# Patient Record
Sex: MALE | Race: BLACK OR AFRICAN AMERICAN | NOT HISPANIC OR LATINO | Employment: OTHER | URBAN - METROPOLITAN AREA
[De-identification: names, ages, dates, MRNs, and addresses within clinical notes are randomized per-mention and may not be internally consistent; named-entity substitution may affect disease eponyms.]

---

## 2017-01-05 ENCOUNTER — LAB VISIT (OUTPATIENT)
Dept: LAB | Facility: HOSPITAL | Age: 72
End: 2017-01-05
Attending: INTERNAL MEDICINE
Payer: MEDICARE

## 2017-01-05 DIAGNOSIS — C90.00 MULTIPLE MYELOMA: ICD-10-CM

## 2017-01-05 DIAGNOSIS — B37.0 THRUSH: ICD-10-CM

## 2017-01-05 DIAGNOSIS — B00.9 HERPES INFECTION: ICD-10-CM

## 2017-01-05 DIAGNOSIS — E11.9 TYPE 2 DIABETES MELLITUS WITHOUT COMPLICATION: ICD-10-CM

## 2017-01-05 LAB
BASOPHILS # BLD AUTO: 0.01 K/UL
BASOPHILS NFR BLD: 0.2 %
DIFFERENTIAL METHOD: ABNORMAL
EOSINOPHIL # BLD AUTO: 0.1 K/UL
EOSINOPHIL NFR BLD: 2 %
ERYTHROCYTE [DISTWIDTH] IN BLOOD BY AUTOMATED COUNT: 14.5 %
HCT VFR BLD AUTO: 37.9 %
HGB BLD-MCNC: 12.6 G/DL
LYMPHOCYTES # BLD AUTO: 2.4 K/UL
LYMPHOCYTES NFR BLD: 43 %
MCH RBC QN AUTO: 30.6 PG
MCHC RBC AUTO-ENTMCNC: 33.2 %
MCV RBC AUTO: 92 FL
MONOCYTES # BLD AUTO: 0.6 K/UL
MONOCYTES NFR BLD: 11 %
NEUTROPHILS # BLD AUTO: 2.4 K/UL
NEUTROPHILS NFR BLD: 43.8 %
PLATELET # BLD AUTO: 174 K/UL
PMV BLD AUTO: 10.9 FL
RBC # BLD AUTO: 4.12 M/UL
WBC # BLD AUTO: 5.53 K/UL

## 2017-01-05 PROCEDURE — 36415 COLL VENOUS BLD VENIPUNCTURE: CPT | Mod: PO

## 2017-01-05 PROCEDURE — 85025 COMPLETE CBC W/AUTO DIFF WBC: CPT | Mod: PO

## 2017-01-12 ENCOUNTER — TELEPHONE (OUTPATIENT)
Dept: HEMATOLOGY/ONCOLOGY | Facility: CLINIC | Age: 72
End: 2017-01-12

## 2017-01-12 NOTE — TELEPHONE ENCOUNTER
----- Message from Nimisha Ferro sent at 1/11/2017  3:22 PM CST -----  Contact: [facility]  Talia called to speak with the nurse; she stated the therapist wants to know what modalities can she do to help him with pain. She needs to know what she can and can't do since the patient has Bone Cancer. She can be contacted at [phone].    Thanks,  Nimisha

## 2017-01-31 ENCOUNTER — LAB VISIT (OUTPATIENT)
Dept: LAB | Facility: HOSPITAL | Age: 72
End: 2017-01-31
Attending: INTERNAL MEDICINE
Payer: MEDICARE

## 2017-01-31 ENCOUNTER — OFFICE VISIT (OUTPATIENT)
Dept: HEMATOLOGY/ONCOLOGY | Facility: CLINIC | Age: 72
End: 2017-01-31
Payer: MEDICARE

## 2017-01-31 VITALS
SYSTOLIC BLOOD PRESSURE: 118 MMHG | WEIGHT: 183.63 LBS | OXYGEN SATURATION: 99 % | DIASTOLIC BLOOD PRESSURE: 56 MMHG | BODY MASS INDEX: 26.29 KG/M2 | HEIGHT: 70 IN | HEART RATE: 72 BPM | TEMPERATURE: 98 F

## 2017-01-31 DIAGNOSIS — C90.00 MULTIPLE MYELOMA NOT HAVING ACHIEVED REMISSION: Primary | ICD-10-CM

## 2017-01-31 DIAGNOSIS — C90.00 MULTIPLE MYELOMA NOT HAVING ACHIEVED REMISSION: ICD-10-CM

## 2017-01-31 DIAGNOSIS — I70.0 CALCIFICATION OF ABDOMINAL AORTA: Chronic | ICD-10-CM

## 2017-01-31 DIAGNOSIS — Z79.4 INSULIN LONG-TERM USE: Chronic | ICD-10-CM

## 2017-01-31 LAB
ALBUMIN SERPL BCP-MCNC: 3.5 G/DL
ALP SERPL-CCNC: 65 U/L
ALT SERPL W/O P-5'-P-CCNC: 20 U/L
ANION GAP SERPL CALC-SCNC: 9 MMOL/L
AST SERPL-CCNC: 19 U/L
BASOPHILS # BLD AUTO: 0.01 K/UL
BASOPHILS NFR BLD: 0.2 %
BILIRUB SERPL-MCNC: 0.6 MG/DL
BUN SERPL-MCNC: 15 MG/DL
CALCIUM SERPL-MCNC: 9.3 MG/DL
CHLORIDE SERPL-SCNC: 103 MMOL/L
CO2 SERPL-SCNC: 29 MMOL/L
CREAT SERPL-MCNC: 1.2 MG/DL
DIFFERENTIAL METHOD: ABNORMAL
EOSINOPHIL # BLD AUTO: 0.1 K/UL
EOSINOPHIL NFR BLD: 1.5 %
ERYTHROCYTE [DISTWIDTH] IN BLOOD BY AUTOMATED COUNT: 15.7 %
EST. GFR  (AFRICAN AMERICAN): >60 ML/MIN/1.73 M^2
EST. GFR  (NON AFRICAN AMERICAN): >60 ML/MIN/1.73 M^2
GLUCOSE SERPL-MCNC: 178 MG/DL
HCT VFR BLD AUTO: 37.3 %
HGB BLD-MCNC: 12.1 G/DL
LYMPHOCYTES # BLD AUTO: 1.3 K/UL
LYMPHOCYTES NFR BLD: 24.5 %
MCH RBC QN AUTO: 30.7 PG
MCHC RBC AUTO-ENTMCNC: 32.4 %
MCV RBC AUTO: 95 FL
MONOCYTES # BLD AUTO: 0.6 K/UL
MONOCYTES NFR BLD: 10.9 %
NEUTROPHILS # BLD AUTO: 3.5 K/UL
NEUTROPHILS NFR BLD: 62.9 %
PLATELET # BLD AUTO: 139 K/UL
PMV BLD AUTO: 11.4 FL
POTASSIUM SERPL-SCNC: 4 MMOL/L
PROT SERPL-MCNC: 6.9 G/DL
RBC # BLD AUTO: 3.94 M/UL
SODIUM SERPL-SCNC: 141 MMOL/L
WBC # BLD AUTO: 5.48 K/UL

## 2017-01-31 PROCEDURE — 3078F DIAST BP <80 MM HG: CPT | Mod: S$GLB,,, | Performed by: INTERNAL MEDICINE

## 2017-01-31 PROCEDURE — 3074F SYST BP LT 130 MM HG: CPT | Mod: S$GLB,,, | Performed by: INTERNAL MEDICINE

## 2017-01-31 PROCEDURE — 1157F ADVNC CARE PLAN IN RCRD: CPT | Mod: S$GLB,,, | Performed by: INTERNAL MEDICINE

## 2017-01-31 PROCEDURE — 84165 PROTEIN E-PHORESIS SERUM: CPT | Mod: 26,,, | Performed by: PATHOLOGY

## 2017-01-31 PROCEDURE — 1159F MED LIST DOCD IN RCRD: CPT | Mod: S$GLB,,, | Performed by: INTERNAL MEDICINE

## 2017-01-31 PROCEDURE — 1160F RVW MEDS BY RX/DR IN RCRD: CPT | Mod: S$GLB,,, | Performed by: INTERNAL MEDICINE

## 2017-01-31 PROCEDURE — 1125F AMNT PAIN NOTED PAIN PRSNT: CPT | Mod: S$GLB,,, | Performed by: INTERNAL MEDICINE

## 2017-01-31 PROCEDURE — 99214 OFFICE O/P EST MOD 30 MIN: CPT | Mod: S$GLB,,, | Performed by: INTERNAL MEDICINE

## 2017-01-31 PROCEDURE — 99999 PR PBB SHADOW E&M-EST. PATIENT-LVL III: CPT | Mod: PBBFAC,,, | Performed by: INTERNAL MEDICINE

## 2017-01-31 NOTE — PROGRESS NOTES
Subjective:       Patient ID: Familia Durbin Jr. is a 71 y.o. male.    Chief Complaint: Results; Pain; Multiple Myeloma; and Chemotherapy    HPI 71-year-old male history of multiple myeloma patient is being treated at [facility] the last note that I received a been from September 2016 the patient appears to have a mass growing in his right clavicular region    Past Medical History   Diagnosis Date    CAD (coronary artery disease)      luikart    Diabetes mellitus, type 2 1979     eye dr rocha    Hearing impaired     Hyperlipidemia     Hypertension     Lupus      Discoid    Old MI (myocardial infarction) 2012    Tracheostomy tube present      Family History   Problem Relation Age of Onset    Diabetes Mother     Diabetes Father     Prostate cancer Father     Pancreatic cancer Sister     No Known Problems Brother     Diabetes Maternal Uncle     Diabetes Maternal Grandmother     No Known Problems Maternal Grandfather     No Known Problems Paternal Grandmother     No Known Problems Paternal Grandfather      Social History     Social History    Marital status: Single     Spouse name: N/A    Number of children: 9    Years of education: N/A     Occupational History    Not on file.     Social History Main Topics    Smoking status: Never Smoker    Smokeless tobacco: Never Used    Alcohol use No    Drug use: No    Sexual activity: Yes     Partners: Female     Other Topics Concern    Not on file     Social History Narrative     Past Surgical History   Procedure Laterality Date    Coronary artery bypass graft      Knee surgery      Colonoscopy      Cardiac surgery      Wrist fusion      Hand surgery      Tracheostomy tube placement         Labs:  Lab Results   Component Value Date    WBC 5.48 01/31/2017    HGB 12.1 (L) 01/31/2017    HCT 37.3 (L) 01/31/2017    MCV 95 01/31/2017     (L) 01/31/2017     BMP  Lab Results   Component Value Date      12/29/2016    K 3.8 12/29/2016     12/29/2016    CO2 26 12/29/2016    BUN 12 12/29/2016    CREATININE 1.2 12/29/2016    CALCIUM 9.4 12/29/2016    ANIONGAP 11 12/29/2016    ESTGFRAFRICA >60 12/29/2016    EGFRNONAA >60 12/29/2016     Lab Results   Component Value Date    ALT 36 12/29/2016    AST 28 12/29/2016    ALKPHOS 80 12/29/2016    BILITOT 0.3 12/29/2016       Lab Results   Component Value Date    IRON 81 07/21/2016    TIBC 302 07/21/2016    FERRITIN 324 (H) 07/21/2016     No results found for: [id number]  No results found for: FOLATE  No results found for: TSH      Review of Systems   Constitutional: Negative for activity change, appetite change, chills, diaphoresis, fatigue, fever and unexpected weight change.   HENT: Negative for congestion, dental problem, drooling, ear discharge, ear pain, facial swelling, hearing loss, mouth sores, nosebleeds, postnasal drip, rhinorrhea, sinus pressure, sneezing, sore throat, tinnitus, trouble swallowing and voice change.    Eyes: Negative for photophobia, pain, discharge, redness, itching and visual disturbance.   Respiratory: Negative for apnea, cough, choking, chest tightness, shortness of breath, wheezing and stridor.    Cardiovascular: Positive for chest pain. Negative for palpitations and leg swelling.   Gastrointestinal: Negative for abdominal distention, abdominal pain, anal bleeding, blood in stool, constipation, diarrhea, nausea, rectal pain and vomiting.   Endocrine: Negative for cold intolerance, heat intolerance, polydipsia, polyphagia and polyuria.   Genitourinary: Negative for decreased urine volume, difficulty urinating, discharge, dysuria, enuresis, flank pain, frequency, genital sores, hematuria, penile pain, penile swelling, scrotal swelling, testicular pain and urgency.   Musculoskeletal: Negative for arthralgias, back pain, gait problem, joint swelling, myalgias, neck pain and neck stiffness.   Skin: Negative for color change, pallor,  rash and wound.   Allergic/Immunologic: Negative for environmental allergies, food allergies and immunocompromised state.   Neurological: Negative for dizziness, tremors, seizures, syncope, facial asymmetry, speech difficulty, weakness, light-headedness, numbness and headaches.   Hematological: Negative for adenopathy. Does not bruise/bleed easily.   Psychiatric/Behavioral: Negative for agitation, behavioral problems, confusion, decreased concentration, dysphoric mood, hallucinations, self-injury, sleep disturbance and suicidal ideas. The patient is not nervous/anxious and is not hyperactive.        Objective:      Physical Exam   Constitutional: He is oriented to person, place, and time. He appears well-developed and well-nourished. No distress.   HENT:   Head: Normocephalic.   Right Ear: External ear normal.   Left Ear: External ear normal.   Nose: Nose normal. Right sinus exhibits no maxillary sinus tenderness and no frontal sinus tenderness. Left sinus exhibits no maxillary sinus tenderness and no frontal sinus tenderness.   Mouth/Throat: Oropharynx is clear and moist. No oropharyngeal exudate.   Eyes: EOM and lids are normal. Pupils are equal, round, and reactive to light. Right eye exhibits no discharge. Left eye exhibits no discharge. Right conjunctiva is not injected. Right conjunctiva has no hemorrhage. Left conjunctiva is not injected. Left conjunctiva has no hemorrhage. No scleral icterus. Right eye exhibits normal extraocular motion. Left eye exhibits normal extraocular motion.   Neck: Normal range of motion. Neck supple. No JVD present. No tracheal deviation present. No thyromegaly present.   Cardiovascular: Normal rate, regular rhythm and normal heart sounds.    Pulmonary/Chest: Effort normal. No stridor. No respiratory distress.       Abdominal: Soft. He exhibits no mass. There is no hepatosplenomegaly, splenomegaly or hepatomegaly. There is no tenderness.   Musculoskeletal: Normal range of motion. He  exhibits no edema or tenderness.   Lymphadenopathy:        Head (right side): No posterior auricular and no occipital adenopathy present.        Head (left side): No posterior auricular and no occipital adenopathy present.     He has no cervical adenopathy.        Right cervical: No superficial cervical, no deep cervical and no posterior cervical adenopathy present.       Left cervical: No superficial cervical, no deep cervical and no posterior cervical adenopathy present.     He has no axillary adenopathy.        Right: No supraclavicular adenopathy present.        Left: No supraclavicular adenopathy present.   Neurological: He is alert and oriented to person, place, and time. He has normal strength. No cranial nerve deficit. Coordination normal.   Skin: Skin is dry. No rash noted. He is not diaphoretic. No cyanosis or erythema. Nails show no clubbing.   Psychiatric: He has a normal mood and affect. His behavior is normal. Judgment and thought content normal. Cognition and memory are normal.   Vitals reviewed.          Assessment:       1. Multiple myeloma not having achieved remission    2. Metastatic bone cancer    3. Calcification of abdominal aorta            Plan:         I spoken with the patient also the patient's daughter by phone at this point the patient has a groin mass on his clavicle is scheduled [facility] appears that they're considering palliative radiation which I would concur I've asked that his records from there be sent to us so that I can review them and be aware of the issues that are going on with the patient I concerned about the progressive nature of the disease and that if he has a complication here in the [facility] that I would be not able to fully understand what the treatment plans or discussed this with them

## 2017-02-01 LAB
ALBUMIN SERPL ELPH-MCNC: 3.56 G/DL
ALPHA1 GLOB SERPL ELPH-MCNC: 0.33 G/DL
ALPHA2 GLOB SERPL ELPH-MCNC: 0.78 G/DL
B-GLOBULIN SERPL ELPH-MCNC: 0.88 G/DL
ESTIMATED AVG GLUCOSE: 252 MG/DL
GAMMA GLOB SERPL ELPH-MCNC: 1.05 G/DL
HBA1C MFR BLD HPLC: 10.4 %
KAPPA LC SER QL IA: 2.96 MG/DL
KAPPA LC/LAMBDA SER IA: 0.8
LAMBDA LC SER QL IA: 3.72 MG/DL
PROT SERPL-MCNC: 6.6 G/DL

## 2017-02-02 LAB — PATHOLOGIST INTERPRETATION SPE: NORMAL

## 2017-02-08 ENCOUNTER — OFFICE VISIT (OUTPATIENT)
Dept: HEMATOLOGY/ONCOLOGY | Facility: CLINIC | Age: 72
End: 2017-02-08
Payer: MEDICARE

## 2017-02-08 VITALS
BODY MASS INDEX: 25.73 KG/M2 | HEIGHT: 70 IN | SYSTOLIC BLOOD PRESSURE: 122 MMHG | WEIGHT: 179.69 LBS | OXYGEN SATURATION: 98 % | RESPIRATION RATE: 18 BRPM | HEART RATE: 78 BPM | DIASTOLIC BLOOD PRESSURE: 58 MMHG | TEMPERATURE: 98 F

## 2017-02-08 DIAGNOSIS — G89.3 NEOPLASM RELATED PAIN: ICD-10-CM

## 2017-02-08 DIAGNOSIS — B37.0 THRUSH: ICD-10-CM

## 2017-02-08 DIAGNOSIS — C90.00 MULTIPLE MYELOMA NOT HAVING ACHIEVED REMISSION: Primary | ICD-10-CM

## 2017-02-08 PROCEDURE — 1160F RVW MEDS BY RX/DR IN RCRD: CPT | Mod: S$GLB,,, | Performed by: INTERNAL MEDICINE

## 2017-02-08 PROCEDURE — 3078F DIAST BP <80 MM HG: CPT | Mod: S$GLB,,, | Performed by: INTERNAL MEDICINE

## 2017-02-08 PROCEDURE — 1159F MED LIST DOCD IN RCRD: CPT | Mod: S$GLB,,, | Performed by: INTERNAL MEDICINE

## 2017-02-08 PROCEDURE — 1157F ADVNC CARE PLAN IN RCRD: CPT | Mod: S$GLB,,, | Performed by: INTERNAL MEDICINE

## 2017-02-08 PROCEDURE — 99214 OFFICE O/P EST MOD 30 MIN: CPT | Mod: S$GLB,,, | Performed by: INTERNAL MEDICINE

## 2017-02-08 PROCEDURE — 1125F AMNT PAIN NOTED PAIN PRSNT: CPT | Mod: S$GLB,,, | Performed by: INTERNAL MEDICINE

## 2017-02-08 PROCEDURE — 3074F SYST BP LT 130 MM HG: CPT | Mod: S$GLB,,, | Performed by: INTERNAL MEDICINE

## 2017-02-08 PROCEDURE — 99999 PR PBB SHADOW E&M-EST. PATIENT-LVL IV: CPT | Mod: PBBFAC,,, | Performed by: INTERNAL MEDICINE

## 2017-02-08 RX ORDER — AMOXICILLIN 250 MG
2 CAPSULE ORAL DAILY PRN
Qty: 60 TABLET | Refills: 1 | Status: SHIPPED | OUTPATIENT
Start: 2017-02-08 | End: 2018-02-08

## 2017-02-08 RX ORDER — OXYCODONE HYDROCHLORIDE 10 MG/1
TABLET ORAL
COMMUNITY
Start: 2017-01-20 | End: 2017-02-08 | Stop reason: SDUPTHER

## 2017-02-08 RX ORDER — FLUCONAZOLE 100 MG/1
100 TABLET ORAL DAILY
Qty: 30 TABLET | Refills: 0 | Status: SHIPPED | OUTPATIENT
Start: 2017-02-08 | End: 2017-07-26 | Stop reason: SDUPTHER

## 2017-02-08 RX ORDER — OXYCODONE HYDROCHLORIDE 10 MG/1
10 TABLET ORAL EVERY 6 HOURS PRN
Qty: 30 TABLET | Refills: 0 | Status: SHIPPED | OUTPATIENT
Start: 2017-02-08 | End: 2017-09-06 | Stop reason: SDUPTHER

## 2017-02-08 RX ORDER — FENTANYL 12.5 UG/1
PATCH TRANSDERMAL
COMMUNITY
Start: 2017-01-20 | End: 2017-12-07 | Stop reason: SDUPTHER

## 2017-02-08 NOTE — PROGRESS NOTES
Subjective:       Patient ID: Familia Durbin Jr. is a 71 y.o. male.    Chief Complaint: No chief complaint on file.    HPI 71-year-old male with multiple myeloma who sees Dr. Solo, but is also getting treated at [facility] in [place], [place]. The patient states he is on treatment Revlimid + Dexamethasone as well as what sounds like monthly Zometa, for the past several months since appro 9/2016. The patient states he has an enlarging mass in the right clavicular region, for which he might get radiation. First, he is scheduled for an MRI of this region next week. Today, he comes in for an urgent visit ahead of schedule requesting pain medications. His pain is located in his right lower back and is not new. He was given Oxycodone 10mg and Fentanyl 12.5mcg patch on 1/22/17 by his doctors in [place]. He states the patch is not helping much, but he admits that he has not remembered to replace it every 3 days. He is taking the Oxycodone 4-6 times a day and states he will run out on Friday. He also complains of nausea which is new, constipation with no bowel movement in the past 3 days, as well as altered taste in his mouth related to a white coating. He states he was prescribed a pill for this, but cannot remember the name. His daughter called on the phone and states this medication is Fluconazole.     Past Medical History   Diagnosis Date    CAD (coronary artery disease)      tyree    Diabetes mellitus, type 2 1979     eye dr rocha    Hearing impaired     Hyperlipidemia     Hypertension     Lupus      Discoid    Old MI (myocardial infarction) 2012    Tracheostomy tube present      Family History   Problem Relation Age of Onset    Diabetes Mother     Diabetes Father     Prostate cancer Father     Pancreatic cancer Sister     No Known Problems Brother     Diabetes Maternal Uncle     Diabetes Maternal Grandmother     No Known Problems Maternal Grandfather     No Known  Problems Paternal Grandmother     No Known Problems Paternal Grandfather      Social History     Social History    Marital status: Single     Spouse name: N/A    Number of children: 9    Years of education: N/A     Occupational History    Not on file.     Social History Main Topics    Smoking status: Never Smoker    Smokeless tobacco: Never Used    Alcohol use No    Drug use: No    Sexual activity: Yes     Partners: Female     Other Topics Concern    Not on file     Social History Narrative     Past Surgical History   Procedure Laterality Date    Coronary artery bypass graft      Knee surgery      Colonoscopy      Cardiac surgery      Wrist fusion      Hand surgery      Tracheostomy tube placement         Labs:  Lab Results   Component Value Date    WBC 5.48 01/31/2017    HGB 12.1 (L) 01/31/2017    HCT 37.3 (L) 01/31/2017    MCV 95 01/31/2017     (L) 01/31/2017     BMP  Lab Results   Component Value Date     01/31/2017    K 4.0 01/31/2017     01/31/2017    CO2 29 01/31/2017    BUN 15 01/31/2017    CREATININE 1.2 01/31/2017    CALCIUM 9.3 01/31/2017    ANIONGAP 9 01/31/2017    ESTGFRAFRICA >60 01/31/2017    EGFRNONAA >60 01/31/2017     Lab Results   Component Value Date    ALT 20 01/31/2017    AST 19 01/31/2017    ALKPHOS 65 01/31/2017    BILITOT 0.6 01/31/2017       Lab Results   Component Value Date    IRON 81 07/21/2016    TIBC 302 07/21/2016    FERRITIN 324 (H) 07/21/2016     No results found for: [id number]  No results found for: FOLATE  No results found for: TSH      Review of Systems   Constitutional: Negative for activity change, appetite change, chills, diaphoresis, fatigue, fever and unexpected weight change.   HENT: Negative for congestion, dental problem, drooling, ear discharge, ear pain, facial swelling, hearing loss, mouth sores, nosebleeds, postnasal drip, rhinorrhea, sinus pressure, sneezing, sore throat, tinnitus, trouble swallowing and voice change.    Eyes:  Negative for photophobia, pain, discharge, redness, itching and visual disturbance.   Respiratory: Negative for apnea, cough, choking, chest tightness, shortness of breath, wheezing and stridor.    Cardiovascular: Positive for chest pain. Negative for palpitations and leg swelling.   Gastrointestinal: Negative for abdominal distention, abdominal pain, anal bleeding, blood in stool, constipation, diarrhea, nausea, rectal pain and vomiting.   Endocrine: Negative for cold intolerance, heat intolerance, polydipsia, polyphagia and polyuria.   Genitourinary: Negative for decreased urine volume, difficulty urinating, discharge, dysuria, enuresis, flank pain, frequency, genital sores, hematuria, penile pain, penile swelling, scrotal swelling, testicular pain and urgency.   Musculoskeletal: Negative for arthralgias, back pain, gait problem, joint swelling, myalgias, neck pain and neck stiffness.   Skin: Negative for color change, pallor, rash and wound.   Allergic/Immunologic: Negative for environmental allergies, food allergies and immunocompromised state.   Neurological: Negative for dizziness, tremors, seizures, syncope, facial asymmetry, speech difficulty, weakness, light-headedness, numbness and headaches.   Hematological: Negative for adenopathy. Does not bruise/bleed easily.   Psychiatric/Behavioral: Negative for agitation, behavioral problems, confusion, decreased concentration, dysphoric mood, hallucinations, self-injury, sleep disturbance and suicidal ideas. The patient is not nervous/anxious and is not hyperactive.        Objective:      Physical Exam   Constitutional: He is oriented to person, place, and time. He appears well-developed and well-nourished. No distress.   HENT:   Head: Normocephalic.   Right Ear: External ear normal.   Left Ear: External ear normal.   Nose: Nose normal. Right sinus exhibits no maxillary sinus tenderness and no frontal sinus tenderness. Left sinus exhibits no maxillary sinus  tenderness and no frontal sinus tenderness.   Mouth/Throat: Oropharynx is clear and moist. No oropharyngeal exudate.   Eyes: EOM and lids are normal. Pupils are equal, round, and reactive to light. Right eye exhibits no discharge. Left eye exhibits no discharge. Right conjunctiva is not injected. Right conjunctiva has no hemorrhage. Left conjunctiva is not injected. Left conjunctiva has no hemorrhage. No scleral icterus. Right eye exhibits normal extraocular motion. Left eye exhibits normal extraocular motion.   Neck: Normal range of motion. Neck supple. No JVD present. No tracheal deviation present. No thyromegaly present.   Cardiovascular: Normal rate, regular rhythm and normal heart sounds.    Pulmonary/Chest: Effort normal. No stridor. No respiratory distress.       Abdominal: Soft. He exhibits no mass. There is no hepatosplenomegaly, splenomegaly or hepatomegaly. There is no tenderness.   Musculoskeletal: Normal range of motion. He exhibits no edema or tenderness.   Lymphadenopathy:        Head (right side): No posterior auricular and no occipital adenopathy present.        Head (left side): No posterior auricular and no occipital adenopathy present.     He has no cervical adenopathy.        Right cervical: No superficial cervical, no deep cervical and no posterior cervical adenopathy present.       Left cervical: No superficial cervical, no deep cervical and no posterior cervical adenopathy present.     He has no axillary adenopathy.        Right: No supraclavicular adenopathy present.        Left: No supraclavicular adenopathy present.   Neurological: He is alert and oriented to person, place, and time. He has normal strength. No cranial nerve deficit. Coordination normal.   Skin: Skin is dry. No rash noted. He is not diaphoretic. No cyanosis or erythema. Nails show no clubbing.   Psychiatric: He has a normal mood and affect. His behavior is normal. Judgment and thought content normal. Cognition and memory  are normal.   Vitals reviewed.          Assessment:       1. Multiple myeloma not having achieved remission    2. Metastatic bone cancer    3. Neoplasm related pain    4. Thrush            Plan:     I discussed with the patient that ideally, he should be requesting his pain medications from one physician or one physician practice. I agreed to prescribe Oxycodone, with enough pills to last till his appt in [place] next week. I also asked patient to keep better track of his Fentanyl patches and switch them every 3 days. I discussing following a bowel regimen while on opioids with the patient and told him the opioid-induced constipation may be causing his nausea. I asked him to take Docusate/senna 2 tabs once or twice a day. He can take antinausea meds as needed. I also prescribed Fluconazole 100mg PO daily per patient's request, but no active thrush seen in oropharynx today.

## 2017-02-24 DIAGNOSIS — C90.00 MULTIPLE MYELOMA NOT HAVING ACHIEVED REMISSION: Primary | ICD-10-CM

## 2017-02-24 RX ORDER — DEXAMETHASONE 4 MG/1
40 TABLET ORAL WEEKLY
Qty: 40 TABLET | Refills: 12 | Status: SHIPPED | OUTPATIENT
Start: 2017-02-24 | End: 2017-09-05

## 2017-02-24 RX ORDER — DEXAMETHASONE 4 MG/1
TABLET ORAL
OUTPATIENT
Start: 2017-02-24

## 2017-02-24 NOTE — TELEPHONE ENCOUNTER
----- Message from Clarice Desai sent at 2/24/2017 12:08 PM CST -----  Contact: [facility]  Call pt sangeeta Nichole at [phone]//regarding refill rx for steroids he need 2 call to [facility] in [facility]//barron neely

## 2017-03-23 ENCOUNTER — TELEPHONE (OUTPATIENT)
Dept: HEMATOLOGY/ONCOLOGY | Facility: CLINIC | Age: 72
End: 2017-03-23

## 2017-03-29 ENCOUNTER — TELEPHONE (OUTPATIENT)
Dept: INTERNAL MEDICINE | Facility: CLINIC | Age: 72
End: 2017-03-29

## 2017-03-30 ENCOUNTER — OFFICE VISIT (OUTPATIENT)
Dept: HEMATOLOGY/ONCOLOGY | Facility: CLINIC | Age: 72
End: 2017-03-30
Payer: MEDICARE

## 2017-03-30 ENCOUNTER — LAB VISIT (OUTPATIENT)
Dept: LAB | Facility: HOSPITAL | Age: 72
End: 2017-03-30
Attending: INTERNAL MEDICINE
Payer: MEDICARE

## 2017-03-30 VITALS
DIASTOLIC BLOOD PRESSURE: 58 MMHG | HEART RATE: 75 BPM | SYSTOLIC BLOOD PRESSURE: 122 MMHG | TEMPERATURE: 98 F | WEIGHT: 183.19 LBS | BODY MASS INDEX: 26.22 KG/M2 | HEIGHT: 70 IN | OXYGEN SATURATION: 98 %

## 2017-03-30 DIAGNOSIS — C90.00 MULTIPLE MYELOMA NOT HAVING ACHIEVED REMISSION: ICD-10-CM

## 2017-03-30 DIAGNOSIS — C90.00 MULTIPLE MYELOMA NOT HAVING ACHIEVED REMISSION: Primary | ICD-10-CM

## 2017-03-30 LAB
ALBUMIN SERPL BCP-MCNC: 3.5 G/DL
ALP SERPL-CCNC: 68 U/L
ALT SERPL W/O P-5'-P-CCNC: 15 U/L
ANION GAP SERPL CALC-SCNC: 7 MMOL/L
AST SERPL-CCNC: 14 U/L
BASOPHILS # BLD AUTO: 0.01 K/UL
BASOPHILS NFR BLD: 0.2 %
BILIRUB SERPL-MCNC: 0.4 MG/DL
BUN SERPL-MCNC: 17 MG/DL
CALCIUM SERPL-MCNC: 8.8 MG/DL
CHLORIDE SERPL-SCNC: 103 MMOL/L
CO2 SERPL-SCNC: 29 MMOL/L
CREAT SERPL-MCNC: 1.4 MG/DL
DIFFERENTIAL METHOD: ABNORMAL
EOSINOPHIL # BLD AUTO: 0.1 K/UL
EOSINOPHIL NFR BLD: 2.2 %
ERYTHROCYTE [DISTWIDTH] IN BLOOD BY AUTOMATED COUNT: 15.2 %
EST. GFR  (AFRICAN AMERICAN): 58 ML/MIN/1.73 M^2
EST. GFR  (NON AFRICAN AMERICAN): 50 ML/MIN/1.73 M^2
GLUCOSE SERPL-MCNC: 313 MG/DL
HCT VFR BLD AUTO: 33.5 %
HGB BLD-MCNC: 10.8 G/DL
LYMPHOCYTES # BLD AUTO: 1 K/UL
LYMPHOCYTES NFR BLD: 25.2 %
MCH RBC QN AUTO: 32 PG
MCHC RBC AUTO-ENTMCNC: 32.2 %
MCV RBC AUTO: 99 FL
MONOCYTES # BLD AUTO: 0.6 K/UL
MONOCYTES NFR BLD: 14 %
NEUTROPHILS # BLD AUTO: 2.4 K/UL
NEUTROPHILS NFR BLD: 58.4 %
PLATELET # BLD AUTO: 134 K/UL
PMV BLD AUTO: 10.7 FL
POTASSIUM SERPL-SCNC: 4 MMOL/L
PROT SERPL-MCNC: 7.1 G/DL
RBC # BLD AUTO: 3.38 M/UL
SODIUM SERPL-SCNC: 139 MMOL/L
WBC # BLD AUTO: 4.13 K/UL

## 2017-03-30 PROCEDURE — 85025 COMPLETE CBC W/AUTO DIFF WBC: CPT

## 2017-03-30 PROCEDURE — 84165 PROTEIN E-PHORESIS SERUM: CPT

## 2017-03-30 PROCEDURE — 1157F ADVNC CARE PLAN IN RCRD: CPT | Mod: S$GLB,,, | Performed by: INTERNAL MEDICINE

## 2017-03-30 PROCEDURE — 99214 OFFICE O/P EST MOD 30 MIN: CPT | Mod: S$GLB,,, | Performed by: INTERNAL MEDICINE

## 2017-03-30 PROCEDURE — 1160F RVW MEDS BY RX/DR IN RCRD: CPT | Mod: S$GLB,,, | Performed by: INTERNAL MEDICINE

## 2017-03-30 PROCEDURE — 84165 PROTEIN E-PHORESIS SERUM: CPT | Mod: 26,,, | Performed by: PATHOLOGY

## 2017-03-30 PROCEDURE — 83520 IMMUNOASSAY QUANT NOS NONAB: CPT

## 2017-03-30 PROCEDURE — 36415 COLL VENOUS BLD VENIPUNCTURE: CPT

## 2017-03-30 PROCEDURE — 1126F AMNT PAIN NOTED NONE PRSNT: CPT | Mod: S$GLB,,, | Performed by: INTERNAL MEDICINE

## 2017-03-30 PROCEDURE — 99999 PR PBB SHADOW E&M-EST. PATIENT-LVL III: CPT | Mod: PBBFAC,,, | Performed by: INTERNAL MEDICINE

## 2017-03-30 PROCEDURE — 1159F MED LIST DOCD IN RCRD: CPT | Mod: S$GLB,,, | Performed by: INTERNAL MEDICINE

## 2017-03-30 PROCEDURE — 3074F SYST BP LT 130 MM HG: CPT | Mod: S$GLB,,, | Performed by: INTERNAL MEDICINE

## 2017-03-30 PROCEDURE — 3078F DIAST BP <80 MM HG: CPT | Mod: S$GLB,,, | Performed by: INTERNAL MEDICINE

## 2017-03-30 PROCEDURE — 80053 COMPREHEN METABOLIC PANEL: CPT

## 2017-03-30 NOTE — PROGRESS NOTES
Subjective:       Patient ID: Familia Durbin Jr. is a 71 y.o. male.    Chief Complaint: Results and Multiple Myeloma    HPI 71-year-old male history of multiple myeloma patient is currently being treated at [facility] currently receiving Revlimid dexamethasone.  At this time patient returns for follow-up was seen by me 2 months ago recently had kyphoplasty is on the spine and palliative radiation therapy note recorded in a chronic means ECOG status 1    Past Medical History:   Diagnosis Date    CAD (coronary artery disease)     tyree    Diabetes mellitus, type 2 1979    eye dr rocha    Hearing impaired     Hyperlipidemia     Hypertension     Lupus     Discoid    Old MI (myocardial infarction) 2012    Tracheostomy tube present      Family History   Problem Relation Age of Onset    Diabetes Mother     Diabetes Father     Prostate cancer Father     Pancreatic cancer Sister     No Known Problems Brother     Diabetes Maternal Uncle     Diabetes Maternal Grandmother     No Known Problems Maternal Grandfather     No Known Problems Paternal Grandmother     No Known Problems Paternal Grandfather      Social History     Social History    Marital status: Single     Spouse name: N/A    Number of children: 9    Years of education: N/A     Occupational History    Not on file.     Social History Main Topics    Smoking status: Never Smoker    Smokeless tobacco: Never Used    Alcohol use No    Drug use: No    Sexual activity: Yes     Partners: Female     Other Topics Concern    Not on file     Social History Narrative     Past Surgical History:   Procedure Laterality Date    CARDIAC SURGERY      COLONOSCOPY      CORONARY ARTERY BYPASS GRAFT      HAND SURGERY      KNEE SURGERY      TRACHEOSTOMY TUBE PLACEMENT      WRIST FUSION         Labs:  Lab Results   Component Value Date    WBC 5.48 01/31/2017    HGB 12.1 (L) 01/31/2017    HCT 37.3 (L)  01/31/2017    MCV 95 01/31/2017     (L) 01/31/2017     BMP  Lab Results   Component Value Date     01/31/2017    K 4.0 01/31/2017     01/31/2017    CO2 29 01/31/2017    BUN 15 01/31/2017    CREATININE 1.2 01/31/2017    CALCIUM 9.3 01/31/2017    ANIONGAP 9 01/31/2017    ESTGFRAFRICA >60 01/31/2017    EGFRNONAA >60 01/31/2017     Lab Results   Component Value Date    ALT 20 01/31/2017    AST 19 01/31/2017    ALKPHOS 65 01/31/2017    BILITOT 0.6 01/31/2017       Lab Results   Component Value Date    IRON 81 07/21/2016    TIBC 302 07/21/2016    FERRITIN 324 (H) 07/21/2016     No results found for: [id number]  No results found for: FOLATE  No results found for: TSH      Review of Systems   Constitutional: Negative for activity change, appetite change, chills, diaphoresis, fatigue, fever and unexpected weight change.   HENT: Negative for congestion, dental problem, drooling, ear discharge, ear pain, facial swelling, hearing loss, mouth sores, nosebleeds, postnasal drip, rhinorrhea, sinus pressure, sneezing, sore throat, tinnitus, trouble swallowing and voice change.    Eyes: Negative for photophobia, pain, discharge, redness, itching and visual disturbance.   Respiratory: Negative for apnea, cough, choking, chest tightness, shortness of breath, wheezing and stridor.    Cardiovascular: Negative for chest pain, palpitations and leg swelling.   Gastrointestinal: Negative for abdominal distention, abdominal pain, anal bleeding, blood in stool, constipation, diarrhea, nausea, rectal pain and vomiting.   Endocrine: Negative for cold intolerance, heat intolerance, polydipsia, polyphagia and polyuria.   Genitourinary: Negative for decreased urine volume, difficulty urinating, discharge, dysuria, enuresis, flank pain, frequency, genital sores, hematuria, penile pain, penile swelling, scrotal swelling, testicular pain and urgency.   Musculoskeletal: Positive for arthralgias, back pain and myalgias. Negative for  gait problem, joint swelling, neck pain and neck stiffness.   Skin: Negative for color change, pallor, rash and wound.   Allergic/Immunologic: Negative for environmental allergies, food allergies and immunocompromised state.   Neurological: Negative for dizziness, tremors, seizures, syncope, facial asymmetry, speech difficulty, weakness, light-headedness, numbness and headaches.   Hematological: Negative for adenopathy. Does not bruise/bleed easily.   Psychiatric/Behavioral: Negative for agitation, behavioral problems, confusion, decreased concentration, dysphoric mood, hallucinations, self-injury, sleep disturbance and suicidal ideas. The patient is not nervous/anxious and is not hyperactive.        Objective:      Physical Exam   Constitutional: He is oriented to person, place, and time. He appears well-developed and well-nourished. No distress.   HENT:   Head: Normocephalic.   Right Ear: External ear normal.   Left Ear: External ear normal.   Nose: Nose normal. Right sinus exhibits no maxillary sinus tenderness and no frontal sinus tenderness. Left sinus exhibits no maxillary sinus tenderness and no frontal sinus tenderness.   Mouth/Throat: Oropharynx is clear and moist. No oropharyngeal exudate.   Eyes: EOM and lids are normal. Pupils are equal, round, and reactive to light. Right eye exhibits no discharge. Left eye exhibits no discharge. Right conjunctiva is not injected. Right conjunctiva has no hemorrhage. Left conjunctiva is not injected. Left conjunctiva has no hemorrhage. No scleral icterus. Right eye exhibits normal extraocular motion. Left eye exhibits normal extraocular motion.   Neck: Normal range of motion. Neck supple. No JVD present. No tracheal deviation present. No thyromegaly present.   Cardiovascular: Normal rate, regular rhythm and normal heart sounds.    Pulmonary/Chest: Effort normal and breath sounds normal. No stridor. No respiratory distress.   Abdominal: Soft. Bowel sounds are normal. He  exhibits no mass. There is no hepatosplenomegaly, splenomegaly or hepatomegaly. There is no tenderness.   Musculoskeletal: Normal range of motion. He exhibits no edema or tenderness.   Lymphadenopathy:        Head (right side): No posterior auricular and no occipital adenopathy present.        Head (left side): No posterior auricular and no occipital adenopathy present.     He has no cervical adenopathy.        Right cervical: No superficial cervical, no deep cervical and no posterior cervical adenopathy present.       Left cervical: No superficial cervical, no deep cervical and no posterior cervical adenopathy present.     He has no axillary adenopathy.        Right: No supraclavicular adenopathy present.        Left: No supraclavicular adenopathy present.   Neurological: He is alert and oriented to person, place, and time. He has normal strength. No cranial nerve deficit. Coordination normal.   Skin: Skin is dry. No rash noted. He is not diaphoretic. No cyanosis or erythema. Nails show no clubbing.   Psychiatric: He has a normal mood and affect. His behavior is normal. Judgment and thought content normal. Cognition and memory are normal.   Vitals reviewed.          Assessment:       1. Multiple myeloma not having achieved remission    2. Metastatic bone cancer            Plan:     continue with current treatment recommendations from cancer treatment since [facility] or paraspinous medication I will see the patient back in 3 months with CBC CMP SPEP and free light chain same labs done today as baseline for continued follow-up

## 2017-03-31 LAB
ALBUMIN SERPL ELPH-MCNC: 3.59 G/DL
ALPHA1 GLOB SERPL ELPH-MCNC: 0.34 G/DL
ALPHA2 GLOB SERPL ELPH-MCNC: 0.78 G/DL
B-GLOBULIN SERPL ELPH-MCNC: 0.87 G/DL
GAMMA GLOB SERPL ELPH-MCNC: 1.11 G/DL
KAPPA LC SER QL IA: 4.31 MG/DL
KAPPA LC/LAMBDA SER IA: 1.27
LAMBDA LC SER QL IA: 3.39 MG/DL
PATHOLOGIST INTERPRETATION SPE: NORMAL
PROT SERPL-MCNC: 6.7 G/DL

## 2017-04-26 ENCOUNTER — LAB VISIT (OUTPATIENT)
Dept: LAB | Facility: HOSPITAL | Age: 72
End: 2017-04-26
Attending: PEDIATRICS
Payer: MEDICARE

## 2017-04-26 ENCOUNTER — OFFICE VISIT (OUTPATIENT)
Dept: DIABETES | Facility: CLINIC | Age: 72
End: 2017-04-26
Payer: MEDICARE

## 2017-04-26 ENCOUNTER — OFFICE VISIT (OUTPATIENT)
Dept: OPHTHALMOLOGY | Facility: CLINIC | Age: 72
End: 2017-04-26
Payer: MEDICARE

## 2017-04-26 VITALS
SYSTOLIC BLOOD PRESSURE: 140 MMHG | HEIGHT: 70 IN | DIASTOLIC BLOOD PRESSURE: 65 MMHG | BODY MASS INDEX: 25.88 KG/M2 | WEIGHT: 180.75 LBS

## 2017-04-26 DIAGNOSIS — E11.21 TYPE 2 DIABETES MELLITUS WITH DIABETIC NEPHROPATHY, WITHOUT LONG-TERM CURRENT USE OF INSULIN: Chronic | ICD-10-CM

## 2017-04-26 DIAGNOSIS — Z96.1 PSEUDOPHAKIA OF BOTH EYES: ICD-10-CM

## 2017-04-26 DIAGNOSIS — L93.0 DISCOID LUPUS: Chronic | ICD-10-CM

## 2017-04-26 DIAGNOSIS — E11.9 DIABETES MELLITUS TYPE 2 WITHOUT RETINOPATHY: ICD-10-CM

## 2017-04-26 DIAGNOSIS — E11.21 TYPE 2 DIABETES MELLITUS WITH DIABETIC NEPHROPATHY, WITHOUT LONG-TERM CURRENT USE OF INSULIN: Primary | Chronic | ICD-10-CM

## 2017-04-26 DIAGNOSIS — H52.7 REFRACTIVE ERROR: ICD-10-CM

## 2017-04-26 DIAGNOSIS — Z13.5 GLAUCOMA SCREENING: ICD-10-CM

## 2017-04-26 LAB — GLUCOSE SERPL-MCNC: 251 MG/DL (ref 70–110)

## 2017-04-26 PROCEDURE — 92004 COMPRE OPH EXAM NEW PT 1/>: CPT | Mod: S$GLB,,, | Performed by: OPTOMETRIST

## 2017-04-26 PROCEDURE — 3078F DIAST BP <80 MM HG: CPT | Mod: S$GLB,,, | Performed by: PHYSICIAN ASSISTANT

## 2017-04-26 PROCEDURE — 82948 REAGENT STRIP/BLOOD GLUCOSE: CPT | Mod: S$GLB,,, | Performed by: PHYSICIAN ASSISTANT

## 2017-04-26 PROCEDURE — 99999 PR PBB SHADOW E&M-EST. PATIENT-LVL III: CPT | Mod: PBBFAC,,, | Performed by: PHYSICIAN ASSISTANT

## 2017-04-26 PROCEDURE — 3046F HEMOGLOBIN A1C LEVEL >9.0%: CPT | Mod: S$GLB,,, | Performed by: PHYSICIAN ASSISTANT

## 2017-04-26 PROCEDURE — 1160F RVW MEDS BY RX/DR IN RCRD: CPT | Mod: S$GLB,,, | Performed by: PHYSICIAN ASSISTANT

## 2017-04-26 PROCEDURE — 92015 DETERMINE REFRACTIVE STATE: CPT | Mod: S$GLB,,, | Performed by: OPTOMETRIST

## 2017-04-26 PROCEDURE — 3077F SYST BP >= 140 MM HG: CPT | Mod: S$GLB,,, | Performed by: PHYSICIAN ASSISTANT

## 2017-04-26 PROCEDURE — 3077F SYST BP >= 140 MM HG: CPT | Mod: S$GLB,,, | Performed by: OPTOMETRIST

## 2017-04-26 PROCEDURE — 99999 PR PBB SHADOW E&M-EST. PATIENT-LVL II: CPT | Mod: PBBFAC,,, | Performed by: OPTOMETRIST

## 2017-04-26 PROCEDURE — 1159F MED LIST DOCD IN RCRD: CPT | Mod: S$GLB,,, | Performed by: PHYSICIAN ASSISTANT

## 2017-04-26 PROCEDURE — 99214 OFFICE O/P EST MOD 30 MIN: CPT | Mod: S$GLB,,, | Performed by: PHYSICIAN ASSISTANT

## 2017-04-26 PROCEDURE — 3078F DIAST BP <80 MM HG: CPT | Mod: S$GLB,,, | Performed by: OPTOMETRIST

## 2017-04-26 NOTE — MR AVS SNAPSHOT
[facility] Ophthalmology  [address] [place] [address]  Phone: [phone]  Fax: [phone]                  Familia Durbin JrAmisha   2017 2:30 PM   Office Visit    Description:  Male : 1945   Provider:  MENDEZ Silva OD   Department:  [facility] - Ophthalmology           Reason for Visit     Diabetic Eye Exam           Diagnoses this Visit        Comments    Type 2 diabetes mellitus with diabetic nephropathy, without long-term current use of insulin    -  Primary     Discoid lupus         Diabetes mellitus type 2 without retinopathy         Pseudophakia of both eyes         Glaucoma screening         Refractive error                To Do List           Future Appointments        Provider Department Dept Phone    2017 2:00 PM LABORATORY, [facility] [phone]    2017 2:40 PM Rodolfo Solo MD [facility] Hemotology Oncology [phone]    2017 1:00 PM Sanchez Rosario Jr., PA-C [facility] Diabetes Management [phone]      Goals (5 Years of Data)     None      Follow-Up and Disposition     Return in about 1 year (around 2018).      [facility]     [facility] Nurse Care Line -  Assistance  Unless otherwise directed by your provider, please contact [facility], our nurse care line that is available for  assistance.     Registered nurses in the [facility] Center provide: appointment scheduling, clinical advisement, health education, and other advisory services.  Call: [phone] (toll free)               Medications                Verify that the below list of medications is an accurate representation of the medications you are currently taking.  If none reported, the list may be blank. If incorrect, please contact your healthcare provider. Carry this list with you in case of emergency.           Current Medications     aspirin 81 MG Chew Take 1 tablet (81 mg total) by mouth once daily.    blood sugar diagnostic Strp 1  "strip by Misc.(Non-Drug; Combo Route) route 6 (six) times daily. FOR TRUE MATRIX METER    blood sugar diagnostic Strp 1 strip by Misc.(Non-Drug; Combo Route) route 6 (six) times daily.    blood-glucose meter kit Use as instructed    dexamethasone (DECADRON) 4 MG Tab Take 10 tablets (40 mg total) by mouth once a week.    fentanyl (DURAGESIC) 12 mcg/hr PT72     fluconazole (DIFLUCAN) 100 MG tablet Take 1 tablet (100 mg total) by mouth once daily.    insulin aspart (NOVOLOG) 100 unit/mL InPn pen Inject 20 Units into the skin 3 (three) times daily before meals.    insulin glargine (LANTUS) 100 unit/mL injection Inject 30 Units into the skin once daily.    lancets (TRUEPLUS LANCETS) 33 gauge Misc 1 lancet by Misc.(Non-Drug; Combo Route) route 6 (six) times daily.    lancets 33 gauge Misc 1 lancet by Misc.(Non-Drug; Combo Route) route 6 (six) times daily. FOR TRUE MATRIX METER    lenalidomide (REVLIMID) 25 mg Cap Take 1 capsule (25 mg total) by mouth once daily. Take days 1-21 every 28 days    metoprolol tartrate (LOPRESSOR) 50 MG tablet Take 1 tablet (50 mg total) by mouth 2 (two) times daily.    NITROSTAT 0.4 mg SL tablet     ondansetron (ZOFRAN) 4 MG tablet     oxycodone (ROXICODONE) 10 mg Tab immediate release tablet Take 1 tablet (10 mg total) by mouth every 6 (six) hours as needed for Pain.    pen needle, diabetic (BD ULTRA-FINE ADVE PEN NEEDLES) 32 gauge x 5/32" Ndle 1 each by Misc.(Non-Drug; Combo Route) route 4 (four) times daily with meals and nightly.    pioglitazone (ACTOS) 30 MG tablet Take 1 tablet (30 mg total) by mouth once daily.    ranolazine (RANEXA) 500 MG Tb12 Take 1 tablet (500 mg total) by mouth 2 (two) times daily.    rosuvastatin (CRESTOR) 40 MG Tab Take 1 tablet (40 mg total) by mouth every evening.    senna-docusate 8.6-50 mg (PERICOLACE) 8.6-50 mg per tablet Take 2 tablets by mouth daily as needed for Constipation.    trazodone (DESYREL) 50 MG tablet     meloxicam (MOBIC) 15 MG tablet 30 mg.  "    valacyclovir (VALTREX) 500 MG tablet Take 1 tablet (500 mg total) by mouth once daily.           Clinical Reference Information           Allergies as of 4/26/2017     Cephalexin    Codeine    Morphine    Morpholine Analogues      Immunizations Administered on Date of Encounter - 4/26/2017     None      Language Assistance Services     ATTENTION: Language assistance services are available, free of charge. Please call [phone].      ATENCIÓN: Si habla alexandre, tiene a mcnair disposición servicios gratuitos de asistencia lingüística. Llame al [phone].     CHÚ Ý: N?u b?n nói Ti?ng Vi?t, có các d?ch v? h? tr? ngôn ng? mi?n phí dành cho b?n. G?i s? [phone].         [facility] complies with applicable Federal civil rights laws and does not discriminate on the basis of race, color, national origin, age, disability, or sex.

## 2017-04-26 NOTE — PROGRESS NOTES
HPI     New Patient  Pseudophakia, OU  Diabetic/ 04/26/2017  Patient is on steroid medication  Being treated for cancer   Discoid Lupus  Screening for glaucoma  RE  Using OTC Readers +2.50       Last edited by MENDEZ Silva, OD on 4/26/2017  3:01 PM.         Assessment /Plan     For exam results, see Encounter Report.    Type 2 diabetes mellitus with diabetic nephropathy, without long-term current use of insulin    Discoid lupus    Diabetes mellitus type 2 without retinopathy    Pseudophakia of both eyes    Glaucoma screening    Refractive error      No diabetic retinopathy OU.  No ocular signs/symptoms of lupus.  Stable PIOL OU.  OTC readers.

## 2017-04-26 NOTE — LETTER
April 26, 2017      Sanchez Rosario Jr., YE  [address]  [place] [address]           [facility]hthalmology  [address] [place] [address]  Phone: [phone]  Fax: [phone]          Patient: Familia Durbin Jr.   MR Number: [id number]   YOB: 1945   Date of Visit: 4/26/2017       Dear Sanchez Rosario Jr.:    Thank you for referring Familia Durbin to me for evaluation. Attached you will find relevant portions of my assessment and plan of care.    If you have questions, please do not hesitate to call me. I look forward to following Familia Durbin along with you.    Sincerely,    MENDEZ Silva, OD    Enclosure  CC:  No Recipients    If you would like to receive this communication electronically, please contact [email or web address] or [phone] to request more information on [employer] [facility] access.    For providers and/or their staff who would like to refer a patient to [facility], please contact us through our one-stop-shop provider referral line, [facility] , at [phone].    If you feel you have received this communication in error or would no longer like to receive these types of communications, please e-mail [email or web address]

## 2017-04-26 NOTE — PROGRESS NOTES
Subjective:      Patient ID: Familia Durbin Jr. is a 71 y.o. male.    PCP: Andrea Elkins MD      Familia Durbin is a pleasant 71 y.o. male presenting to follow up on diabetes mellitus. He has had diabetes for 40 or more years. His last visit in Diabetes Management was 11/15/2016 Since that time he has had no improvement in his glycemia. His blood sugar range fasting has been 200's and 2 hour post meal has been 200's, and he has been monitoring 2 times per day as directed. His current concerns are glycemic control.    He denies any hospital admissions, emergency room visits, hypoglycemia, syncope, diaphoresis, chest pain, or dyspnea.    He has lost 3 pounds since last visit. His BMI is 25.94    His blood sugar in the clinic today was:   Lab Results   Component Value Date    POCGLU 251 (A) 04/26/2017       We discussed the American diabetes Association recommendations:  hemoglobin A1c below 7.0%; all diabetics should be on statins unless contraindicated; one aspirin daily unless contraindicated; fasting blood sugar between 80 and 130 mg/dL; postprandial blood sugar below 180 mg/dl; prevention of hypoglycemia, may adjust goals to higher levels if persistent; ACE or ARB therapy if not contraindicated; and maintain in an ideal body weight with BMI below 25.    Familia is compliant most of the time with DM medications.     Familia is noncompliant some of the time with lifestyle modifications to include activity and meal planning.       STANDARDS OF CARE:  Eye doctor: Dr. Silva exam, 4/26/2017.  Dental exam: Recommend regular exams; denies gums bleeding.  Podiatry doctor:    ACTIVITY LEVEL: He exercises rarely.  MEAL PLANNING: Number of meals per day - 3. Number of snacks per day - 2.  Per dietary recall, patient is not limiting carbohydrates, saturated fats and sodium.   BLOOD GLUCOSE TESTING: Self-monitoring with   SOCIAL HISTORY: single. Lives alone. retired no tobacco use    The following results were reviewed  with patient.    Lab Results   Component Value Date    WBC 4.13 03/30/2017    HGB 10.8 (L) 03/30/2017    HCT 33.5 (L) 03/30/2017     (L) 03/30/2017    CHOL 159 09/21/2016    TRIG 122 09/21/2016    HDL 44 09/21/2016    ALT 15 03/30/2017    AST 14 03/30/2017     03/30/2017    K 4.0 03/30/2017     03/30/2017    CREATININE 1.4 03/30/2017    ESTGFRAFRICA 58 (A) 03/30/2017    EGFRNONAA 50 (A) 03/30/2017    BUN 17 03/30/2017    CO2 29 03/30/2017    PSA 0.34 09/15/2015     (H) 03/30/2017       Lab Results   Component Value Date    HGBA1C 10.4 (H) 01/31/2017    HGBA1C 10.0 (H) 12/29/2016    HGBA1C 8.4 (H) 11/11/2016       Lab Results   Component Value Date    GLUTAMICACID 0.00 11/15/2016    CPEPTIDE 1.4 11/15/2016     Lab Results   Component Value Date    IRON 81 07/21/2016    TIBC 302 07/21/2016    FERRITIN 324 (H) 07/21/2016     Lab Results   Component Value Date    CALCIUM 8.8 03/30/2017           Review of patient's allergies indicates:   Allergen Reactions    Cephalexin     Codeine     Morphine     Morpholine analogues        Past Medical History:   Diagnosis Date    CAD (coronary artery disease)     luikart    Diabetes mellitus, type 2 1979    eye dr rocha    Hearing impaired     Hyperlipidemia     Hypertension     Lupus     Discoid    Old MI (myocardial infarction) 2012    Tracheostomy tube present        Review of Systems   Constitutional: Negative.  Negative for activity change, appetite change, chills, diaphoresis, fatigue, fever and unexpected weight change.   HENT: Negative.  Negative for dental problem, facial swelling, hearing loss, nosebleeds, trouble swallowing and voice change.    Eyes: Negative.  Negative for photophobia, pain, discharge, redness, itching and visual disturbance.   Respiratory: Negative.  Negative for cough, choking, chest tightness, shortness of breath and wheezing.    Cardiovascular: Negative.  Negative for chest pain, palpitations and leg  "swelling.   Gastrointestinal: Negative.  Negative for abdominal distention, abdominal pain, blood in stool, constipation, diarrhea, nausea and vomiting.   Endocrine: Negative.  Negative for cold intolerance, heat intolerance, polydipsia, polyphagia and polyuria.   Genitourinary: Negative.  Negative for decreased urine volume, difficulty urinating, dysuria, frequency, scrotal swelling, testicular pain and urgency.   Musculoskeletal: Negative.  Negative for arthralgias, back pain, gait problem, joint swelling, myalgias, neck pain and neck stiffness.   Skin: Negative.  Negative for color change, pallor, rash and wound.   Allergic/Immunologic: Negative.  Negative for environmental allergies, food allergies and immunocompromised state.   Neurological: Negative.  Negative for dizziness, tremors, seizures, syncope, facial asymmetry, speech difficulty, weakness, light-headedness, numbness and headaches.   Hematological: Negative.  Negative for adenopathy. Does not bruise/bleed easily.   Psychiatric/Behavioral: Negative.  Negative for agitation, behavioral problems, confusion, decreased concentration, dysphoric mood, hallucinations, self-injury, sleep disturbance and suicidal ideas. The patient is not nervous/anxious and is not hyperactive.       Objective:     Vitals - 1 value per visit 2/8/2017 3/30/2017 4/26/2017   SYSTOLIC 122 122 140   DIASTOLIC 58 58 65   PULSE 78 75 -   TEMPERATURE 97.5 98 -   RESPIRATIONS 18 - -   SPO2 98 98 -   Weight (lb) 179.68 183.2 180.78   Weight (kg) 81.5 83.1 82   HEIGHT 5' 10" 5' 10" 5' 10"   BODY MASS INDEX 25.78 26.29 25.94   VISIT REPORT - - -   Pain Score  9 0 -       Physical Exam   Constitutional: He is oriented to person, place, and time. He appears well-developed and well-nourished. He is cooperative.  Non-toxic appearance. He does not have a sickly appearance. He does not appear ill. No distress. He is not intubated.   HENT:   Head: Normocephalic and atraumatic. Not macrocephalic " and not microcephalic. Head is without raccoon's eyes, without Crane's sign, without abrasion, without contusion, without laceration, without right periorbital erythema and without left periorbital erythema. Hair is normal.   Right Ear: External ear normal. No lacerations. No drainage, swelling or tenderness. No foreign bodies. No mastoid tenderness. Tympanic membrane is not injected, not scarred, not perforated, not erythematous, not retracted and not bulging. Tympanic membrane mobility is normal. No middle ear effusion. No hemotympanum. No decreased hearing is noted.   Left Ear: External ear normal. No lacerations. No drainage, swelling or tenderness. No foreign bodies. No mastoid tenderness. Tympanic membrane is not injected, not scarred, not perforated, not erythematous, not retracted and not bulging. Tympanic membrane mobility is normal.  No middle ear effusion. No hemotympanum. No decreased hearing is noted.   Nose: Nose normal.   Mouth/Throat: Oropharynx is clear and moist.   Eyes: EOM and lids are normal. Pupils are equal, round, and reactive to light. Right eye exhibits no chemosis, no discharge, no exudate and no hordeolum. No foreign body present in the right eye. Left eye exhibits no chemosis, no discharge, no exudate and no hordeolum. No foreign body present in the left eye. Right conjunctiva is not injected. Right conjunctiva has no hemorrhage. Left conjunctiva is not injected. Left conjunctiva has no hemorrhage. No scleral icterus. Right eye exhibits normal extraocular motion and no nystagmus. Left eye exhibits normal extraocular motion and no nystagmus. Right pupil is round and reactive. Left pupil is round and reactive. Pupils are equal.   Fundoscopic exam:       The right eye shows no arteriolar narrowing, no AV nicking, no exudate, no hemorrhage and no papilledema. The right eye shows red reflex and venous pulsations.        The left eye shows no arteriolar narrowing, no AV nicking, no exudate,  no hemorrhage and no papilledema. The left eye shows red reflex and venous pulsations.   Neck: Normal range of motion, full passive range of motion without pain and phonation normal. Neck supple. Normal carotid pulses, no hepatojugular reflux and no JVD present. No tracheal tenderness, no spinous process tenderness and no muscular tenderness present. Carotid bruit is not present. No rigidity. No tracheal deviation, no edema, no erythema and normal range of motion present. No thyroid mass and no thyromegaly present.   Cardiovascular: Normal rate, regular rhythm, normal heart sounds and intact distal pulses.   No extrasystoles are present. PMI is not displaced.  Exam reveals no gallop, no friction rub and no decreased pulses.    No murmur heard.  Pulses:       Carotid pulses are 2+ on the right side, and 2+ on the left side.       Radial pulses are 2+ on the right side, and 2+ on the left side.        Dorsalis pedis pulses are 2+ on the right side, and 2+ on the left side.        Posterior tibial pulses are 2+ on the right side, and 2+ on the left side.   Pulmonary/Chest: Effort normal and breath sounds normal. No accessory muscle usage or stridor. No apnea, no tachypnea and no bradypnea. He is not intubated. No respiratory distress. He has no decreased breath sounds. He has no wheezes. He has no rhonchi. He has no rales. He exhibits no tenderness.   Abdominal: Soft. Bowel sounds are normal. He exhibits no shifting dullness, no distension, no pulsatile liver, no fluid wave, no abdominal bruit, no ascites, no pulsatile midline mass and no mass. There is no hepatosplenomegaly, splenomegaly or hepatomegaly. There is no tenderness. There is no rigidity, no rebound, no guarding, no CVA tenderness and no tenderness at McBurney's point. No hernia. Hernia confirmed negative in the ventral area.   Musculoskeletal: Normal range of motion. He exhibits no edema or tenderness.        Right foot: There is normal range of motion and  no deformity.        Left foot: There is normal range of motion and no deformity.   Feet:   Right Foot:   Protective Sensation: 6 sites tested. 6 sites sensed.   Skin Integrity: Negative for ulcer, blister, skin breakdown, erythema, warmth, callus or dry skin.   Left Foot:   Protective Sensation: 6 sites tested. 6 sites sensed.   Skin Integrity: Negative for ulcer, blister, skin breakdown, erythema, warmth, callus or dry skin.   Lymphadenopathy:        Head (right side): No submental, no submandibular, no tonsillar, no preauricular, no posterior auricular and no occipital adenopathy present.        Head (left side): No submental, no submandibular, no tonsillar, no preauricular, no posterior auricular and no occipital adenopathy present.     He has no cervical adenopathy.        Right cervical: No superficial cervical, no deep cervical and no posterior cervical adenopathy present.       Left cervical: No superficial cervical, no deep cervical and no posterior cervical adenopathy present.   Neurological: He is alert and oriented to person, place, and time. He has normal reflexes. He displays no atrophy and no tremor. No cranial nerve deficit or sensory deficit. He exhibits normal muscle tone. He displays no seizure activity. Coordination and gait normal.   Reflex Scores:       Bicep reflexes are 2+ on the right side and 2+ on the left side.       Brachioradialis reflexes are 2+ on the right side and 2+ on the left side.       Patellar reflexes are 2+ on the right side and 2+ on the left side.  Skin: Skin is warm and dry. No rash noted. No erythema. No pallor.   Psychiatric: He has a normal mood and affect. His mood appears not anxious. His affect is not angry, not blunt, not labile and not inappropriate. His speech is not rapid and/or pressured, not delayed, not tangential and not slurred. He is not agitated, not aggressive, not hyperactive, not slowed, not withdrawn, not actively hallucinating and not combative.  Thought content is not paranoid and not delusional. Cognition and memory are not impaired. He does not express impulsivity or inappropriate judgment. He does not exhibit a depressed mood. He expresses no homicidal and no suicidal ideation. He expresses no suicidal plans and no homicidal plans. He is communicative. He exhibits normal recent memory and normal remote memory. He is attentive.     Assessment:     1. Type 2 diabetes mellitus with diabetic nephropathy, without long-term current use of insulin       Plan:   Familia Durbin is seen today for   1. Type 2 diabetes mellitus with diabetic nephropathy, without long-term current use of insulin      We have discussed the etiology and treatment options associated with the diagnosis as well as alternatives. I will intensify his insulin therapy to achieve better glycemic control. He has elected the following treatments.     Type 2 diabetes mellitus with diabetic nephropathy, without long-term current use of insulin  -     POCT glucose  -     Hemoglobin A1c; Future; Expected date: 4/26/17    1.) Patient was instructed to monitor blood glucose twice daily, fasting, and 2 hour post meal; if on Multiple Daily Injections (MDI) he will need to have pre-meal blood glucose as well. Reminded to bring BG meter or record to each visit for review.  2.) Reviewed pathophysiology of type 2 diabetes, complications related to the disease, importance of annual dilated eye exam and self daily foot examination.  3.) Continue medications as prescribed Lantus and Novolog. [facility] MyChart or Phone review in 1 week with BG records for adjustment of medication.  4.) Reviewed carb counting, portion control, importance of spacing meals throughout the day to prevent post prandial elevations. Recommended low saturated fat, low sodium diet to aid in control of hypertension and cholesterol.  5.) Discussed activity, benefits, methods, and precautions. Recommended patient start/continue some form of  "exercise and increase as tolerated to 60 minutes per day to facilitate weight loss and aid in control of BGs. Also reminded patient of WHO recommendation of 10,000 steps daily as a goal.   6.) A1C, TSH, Lipid Panel, CMP with eGFR and Micro/Creatinine per ADA protocol.  7.) Return to clinic in 3 months for follow up. Advised patient to call clinic with any questions or concerns.    I have reviewed your results and they are still quite high. I would like to adjust your "Treating to Target". The treatment will be Insulin and your target will be the Fasting and 2 hour post meal blood sugar. It will work in this manner.      1. Goal for Fasting blood sugar is  mg/dl. I realize that you will need time to adjust to the new levels and presently you may feel too low if you are too aggressive now. So go slow and aim to lower your blood sugar to below 200 then 150 then 100 over several months.      2. Goal for 2 hour post meal blood sugar is below 180 mg/dl, here the same rules apply as in #1.      3. You will check your fasting blood sugar daily, if not where we would like it to be over a 3 day period then that evening we will increase the Lantus dose by 5 units. Then repeat the process over the next 3 days. Remember this is a slow process and take our time getting to goal. But, each week should be better than prior weeks. Blood sugars below 70 are unacceptable and should raise a "RED FLAG" where we may have to reduce our dose of insulin.      4. You will check your post meal glucose daily as well. However, each day you will check a different meal, (ie. Monday-breakfast; Tuesday- lunch; Wednesday- supper, then repeat). If your post meal glucose is not where we would like, increase pre-meal insulin by 2 units next time. A word of CAUTION: mealtime insulin is dependant on the size and concentration of your meal content. If not consuming a large meal do not take large dose of insulin. Use the reasonable person rule. " "      5. If you have any questions please do not hesitate to call.      Intensive insulin Therapy with correction factor:      You are on Intensive insulin therapy with Basal and Bolus insulin. Lantus, is your Basal insulin and will help maintain you Fasting and between meal sugar. Your fast acting or rescue insulin is Novolog insulin and will control your post meal sugar.       You will Take 35 units of Lantus at 9 pm each night. This will be adjusted up or down depending on your fasting blood sugar before breakfast.      Novolog will follow this pre meal schedule; Correction factor of "2 units per 50 mg/dl" and is based on 45-60 grams of carbohydrates in each meal, and 1800 k/aditya per day.       If blood sugar is below 70 eat first then check your blood sugar 2 hours later and make correction.  If blood pre-meal sugar is 70 -150 take 16 units of Novolog;  If blood pre-meal sugar is 151-200 Add +2 units of Novolog;  If blood pre-meal sugar is 201-250 Add +4 units of Novolog;  If blood pre-meal sugar is 251-300 Add +6 units of Novolog;  If blood pre-meal sugar is 301-350 Add +8 units of Novolog;  If blood pre-meal sugar is 351-400+ Add +10 units of Novolog;  Also increase water intake and call for appointment.          A total of 40 minutes was spent in face to face time, of which 50 % was spent in counseling patient on disease process, complications, treatment, and side effects of medications.    The patient was explained the above plan and given opportunity to ask questions.  He understands, chooses and consents to this plan and accepts all the risks, which include but are not limited to the risks mentioned above.   He understands the alternative of having no testing, interventions or treatments at this time. He left content and without further questions.   "

## 2017-04-27 LAB
ESTIMATED AVG GLUCOSE: 220 MG/DL
HBA1C MFR BLD HPLC: 9.3 %

## 2017-04-27 NOTE — PATIENT INSTRUCTIONS
"I have reviewed your results and they are still quite high. I would like to adjust your "Treating to Target". The treatment will be Insulin and your target will be the Fasting and 2 hour post meal blood sugar. It will work in this manner.      1. Goal for Fasting blood sugar is  mg/dl. I realize that you will need time to adjust to the new levels and presently you may feel too low if you are too aggressive now. So go slow and aim to lower your blood sugar to below 200 then 150 then 100 over several months.      2. Goal for 2 hour post meal blood sugar is below 180 mg/dl, here the same rules apply as in #1.      3. You will check your fasting blood sugar daily, if not where we would like it to be over a 3 day period then that evening we will increase the Lantus dose by 5 units. Then repeat the process over the next 3 days. Remember this is a slow process and take our time getting to goal. But, each week should be better than prior weeks. Blood sugars below 70 are unacceptable and should raise a "RED FLAG" where we may have to reduce our dose of insulin.      4. You will check your post meal glucose daily as well. However, each day you will check a different meal, (ie. Monday-breakfast; Tuesday- lunch; Wednesday- supper, then repeat). If your post meal glucose is not where we would like, increase pre-meal insulin by 2 units next time. A word of CAUTION: mealtime insulin is dependant on the size and concentration of your meal content. If not consuming a large meal do not take large dose of insulin. Use the reasonable person rule.       5. If you have any questions please do not hesitate to call.      Intensive insulin Therapy with correction factor:      You are on Intensive insulin therapy with Basal and Bolus insulin. Lantus, is your Basal insulin and will help maintain you Fasting and between meal sugar. Your fast acting or rescue insulin is Novolog insulin and will control your post meal sugar.       You will " "Take 35 units of Lantus at 9 pm each night. This will be adjusted up or down depending on your fasting blood sugar before breakfast.      Novolog will follow this pre meal schedule; Correction factor of "2 units per 50 mg/dl" and is based on 45-60 grams of carbohydrates in each meal, and 1800 k/aditya per day.       If blood sugar is below 70 eat first then check your blood sugar 2 hours later and make correction.  If blood pre-meal sugar is 70 -150 take 16 units of Novolog;  If blood pre-meal sugar is 151-200 Add +2 units of Novolog;  If blood pre-meal sugar is 201-250 Add +4 units of Novolog;  If blood pre-meal sugar is 251-300 Add +6 units of Novolog;  If blood pre-meal sugar is 301-350 Add +8 units of Novolog;  If blood pre-meal sugar is 351-400+ Add +10 units of Novolog;  Also increase water intake and call for appointment.          "

## 2017-05-06 DIAGNOSIS — I10 ESSENTIAL HYPERTENSION: ICD-10-CM

## 2017-05-09 RX ORDER — METOPROLOL TARTRATE 50 MG/1
TABLET ORAL
Qty: 60 TABLET | Refills: 0 | Status: SHIPPED | OUTPATIENT
Start: 2017-05-09 | End: 2017-06-11 | Stop reason: SDUPTHER

## 2017-05-27 ENCOUNTER — OFFICE VISIT (OUTPATIENT)
Dept: URGENT CARE | Facility: CLINIC | Age: 72
End: 2017-05-27
Payer: MEDICARE

## 2017-05-27 VITALS
OXYGEN SATURATION: 98 % | WEIGHT: 181.19 LBS | RESPIRATION RATE: 16 BRPM | HEART RATE: 70 BPM | TEMPERATURE: 98 F | SYSTOLIC BLOOD PRESSURE: 134 MMHG | HEIGHT: 70 IN | DIASTOLIC BLOOD PRESSURE: 78 MMHG | BODY MASS INDEX: 25.94 KG/M2

## 2017-05-27 DIAGNOSIS — J02.9 SORE THROAT: Primary | ICD-10-CM

## 2017-05-27 DIAGNOSIS — R11.10 VOMITING IN ADULT: ICD-10-CM

## 2017-05-27 PROCEDURE — 1159F MED LIST DOCD IN RCRD: CPT | Mod: S$GLB,,, | Performed by: INTERNAL MEDICINE

## 2017-05-27 PROCEDURE — 99213 OFFICE O/P EST LOW 20 MIN: CPT | Mod: S$GLB,,, | Performed by: INTERNAL MEDICINE

## 2017-05-27 PROCEDURE — 1126F AMNT PAIN NOTED NONE PRSNT: CPT | Mod: S$GLB,,, | Performed by: INTERNAL MEDICINE

## 2017-05-27 PROCEDURE — 99999 PR PBB SHADOW E&M-EST. PATIENT-LVL III: CPT | Mod: PBBFAC,,, | Performed by: INTERNAL MEDICINE

## 2017-05-27 NOTE — PATIENT INSTRUCTIONS

## 2017-05-27 NOTE — PROGRESS NOTES
"Familia Durbin Jr.  71 y.o.  Black or  male    Chief Complaint   Patient presents with    Sore Throat       HPI:  Presents to the urgent care clinic with complaint of a sore throat since yesterday. He states his throat became sore after vomiting a lot. He had been well until he took a new medication (Ninlaro) for multiple myeloma. He had never taken the medication. He became nauseated and began vomiting approximately 3 hours after taking the medication.   He states this morning the soreness has improved but not gone away.   Besides feeling "worn out" he has no other complaints.     PMH: Reviewed    MEDS: Reviewed med card    ALLERGIES: Reviewed allergy card    PE: Reviewed vitals  GENERAL: Alert and oriented, no acute distress  THROAT: No exudate, minimal erythema   NECK: No lymphadenopathy  HEART: Regular rate and rhythm   LUNGS: Unlabored respirations, bilaterally clear      ASSESSMENT/PLAN:    Familia was seen today for sore throat.    Diagnoses and all orders for this visit:    Sore throat  -     Likely due to vomiting  -     No abnormal findings on exam and symptoms are improving  -     Recommend warm salt water gargles  -     Handout provided    Vomiting in adult  -     Resolved  -     Due to new medication   -     Recommend anti-emetic 30 minutes to 1 hour prior to taking medication   -     Recommend discussing with oncologist    RTC: As needed    "

## 2017-06-03 DIAGNOSIS — Z79.4 TYPE 2 DIABETES MELLITUS WITHOUT COMPLICATION, WITH LONG-TERM CURRENT USE OF INSULIN: Chronic | ICD-10-CM

## 2017-06-03 DIAGNOSIS — E11.9 TYPE 2 DIABETES MELLITUS WITHOUT COMPLICATION, WITH LONG-TERM CURRENT USE OF INSULIN: Chronic | ICD-10-CM

## 2017-06-03 DIAGNOSIS — I10 ESSENTIAL HYPERTENSION: Chronic | ICD-10-CM

## 2017-06-03 DIAGNOSIS — Z79.52 LONG TERM CURRENT USE OF SYSTEMIC STEROIDS: ICD-10-CM

## 2017-06-03 DIAGNOSIS — E78.5 HYPERLIPIDEMIA, UNSPECIFIED HYPERLIPIDEMIA TYPE: Chronic | ICD-10-CM

## 2017-06-03 DIAGNOSIS — E11.65 TYPE 2 DIABETES MELLITUS WITH HYPERGLYCEMIA, WITH LONG-TERM CURRENT USE OF INSULIN: ICD-10-CM

## 2017-06-03 DIAGNOSIS — Z79.4 INSULIN LONG-TERM USE: Chronic | ICD-10-CM

## 2017-06-03 DIAGNOSIS — Z79.4 TYPE 2 DIABETES MELLITUS WITH HYPERGLYCEMIA, WITH LONG-TERM CURRENT USE OF INSULIN: ICD-10-CM

## 2017-06-04 RX ORDER — PIOGLITAZONEHYDROCHLORIDE 30 MG/1
TABLET ORAL
Qty: 90 TABLET | Refills: 0 | Status: SHIPPED | OUTPATIENT
Start: 2017-06-04 | End: 2017-07-10 | Stop reason: SDUPTHER

## 2017-06-09 DIAGNOSIS — I10 ESSENTIAL HYPERTENSION: ICD-10-CM

## 2017-06-11 RX ORDER — METOPROLOL TARTRATE 50 MG/1
TABLET ORAL
Qty: 60 TABLET | Refills: 0 | Status: SHIPPED | OUTPATIENT
Start: 2017-06-11 | End: 2017-07-11 | Stop reason: SDUPTHER

## 2017-07-05 ENCOUNTER — HOSPITAL ENCOUNTER (EMERGENCY)
Facility: HOSPITAL | Age: 72
Discharge: HOME | End: 2017-07-05
Attending: EMERGENCY MEDICINE
Payer: MEDICARE

## 2017-07-05 ENCOUNTER — OFFICE VISIT (OUTPATIENT)
Dept: HEMATOLOGY/ONCOLOGY | Facility: CLINIC | Age: 72
End: 2017-07-05
Payer: MEDICARE

## 2017-07-05 VITALS
TEMPERATURE: 98 F | SYSTOLIC BLOOD PRESSURE: 145 MMHG | HEART RATE: 70 BPM | RESPIRATION RATE: 18 BRPM | HEIGHT: 70 IN | DIASTOLIC BLOOD PRESSURE: 43 MMHG | OXYGEN SATURATION: 100 % | WEIGHT: 166 LBS | BODY MASS INDEX: 23.77 KG/M2

## 2017-07-05 VITALS
DIASTOLIC BLOOD PRESSURE: 60 MMHG | HEART RATE: 69 BPM | TEMPERATURE: 98 F | HEIGHT: 70 IN | SYSTOLIC BLOOD PRESSURE: 128 MMHG | OXYGEN SATURATION: 98 % | WEIGHT: 166.44 LBS | BODY MASS INDEX: 23.83 KG/M2 | RESPIRATION RATE: 18 BRPM

## 2017-07-05 DIAGNOSIS — C90.00 MULTIPLE MYELOMA NOT HAVING ACHIEVED REMISSION: Primary | ICD-10-CM

## 2017-07-05 DIAGNOSIS — E86.0 DEHYDRATION: ICD-10-CM

## 2017-07-05 DIAGNOSIS — R73.9 HYPERGLYCEMIA WITHOUT KETOSIS: Primary | ICD-10-CM

## 2017-07-05 DIAGNOSIS — N28.9 ACUTE RENAL INSUFFICIENCY: ICD-10-CM

## 2017-07-05 DIAGNOSIS — E87.6 HYPOKALEMIA: ICD-10-CM

## 2017-07-05 DIAGNOSIS — B37.0 THRUSH OF MOUTH AND ESOPHAGUS: ICD-10-CM

## 2017-07-05 DIAGNOSIS — B37.81 THRUSH OF MOUTH AND ESOPHAGUS: ICD-10-CM

## 2017-07-05 PROCEDURE — 1159F MED LIST DOCD IN RCRD: CPT | Mod: S$GLB,,, | Performed by: INTERNAL MEDICINE

## 2017-07-05 PROCEDURE — 96365 THER/PROPH/DIAG IV INF INIT: CPT

## 2017-07-05 PROCEDURE — 63600175 PHARM REV CODE 636 W HCPCS: Performed by: EMERGENCY MEDICINE

## 2017-07-05 PROCEDURE — 93010 ELECTROCARDIOGRAM REPORT: CPT | Mod: ,,, | Performed by: INTERNAL MEDICINE

## 2017-07-05 PROCEDURE — 96366 THER/PROPH/DIAG IV INF ADDON: CPT

## 2017-07-05 PROCEDURE — 99215 OFFICE O/P EST HI 40 MIN: CPT | Mod: S$GLB,,, | Performed by: INTERNAL MEDICINE

## 2017-07-05 PROCEDURE — 93005 ELECTROCARDIOGRAM TRACING: CPT

## 2017-07-05 PROCEDURE — 25000003 PHARM REV CODE 250: Performed by: EMERGENCY MEDICINE

## 2017-07-05 PROCEDURE — 99999 PR PBB SHADOW E&M-EST. PATIENT-LVL III: CPT | Mod: PBBFAC,,, | Performed by: INTERNAL MEDICINE

## 2017-07-05 PROCEDURE — 99284 EMERGENCY DEPT VISIT MOD MDM: CPT | Mod: 25

## 2017-07-05 PROCEDURE — 1126F AMNT PAIN NOTED NONE PRSNT: CPT | Mod: S$GLB,,, | Performed by: INTERNAL MEDICINE

## 2017-07-05 RX ORDER — IXAZOMIB 4 MG/1
CAPSULE ORAL
COMMUNITY
Start: 2017-06-19 | End: 2017-09-05

## 2017-07-05 RX ORDER — FLUCONAZOLE 100 MG/1
100 TABLET ORAL DAILY
Qty: 14 TABLET | Refills: 0 | Status: SHIPPED | OUTPATIENT
Start: 2017-07-05 | End: 2017-08-04

## 2017-07-05 RX ORDER — POTASSIUM CHLORIDE 750 MG/1
10 TABLET, EXTENDED RELEASE ORAL DAILY
Qty: 7 TABLET | Refills: 0 | Status: SHIPPED | OUTPATIENT
Start: 2017-07-05 | End: 2017-09-05

## 2017-07-05 RX ORDER — POTASSIUM CHLORIDE 7.45 MG/ML
20 INJECTION INTRAVENOUS
Status: COMPLETED | OUTPATIENT
Start: 2017-07-05 | End: 2017-07-05

## 2017-07-05 RX ORDER — POTASSIUM CHLORIDE 20 MEQ/1
40 TABLET, EXTENDED RELEASE ORAL
Status: COMPLETED | OUTPATIENT
Start: 2017-07-05 | End: 2017-07-05

## 2017-07-05 RX ADMIN — SODIUM CHLORIDE 1000 ML: 0.9 INJECTION, SOLUTION INTRAVENOUS at 07:07

## 2017-07-05 RX ADMIN — POTASSIUM CHLORIDE 20 MEQ: 10 INJECTION, SOLUTION INTRAVENOUS at 07:07

## 2017-07-05 RX ADMIN — POTASSIUM CHLORIDE 40 MEQ: 1500 TABLET, EXTENDED RELEASE ORAL at 07:07

## 2017-07-05 NOTE — PROGRESS NOTES
Subjective:       Patient ID: Familia Durbin Jr. is a 71 y.o. male.    Chief Complaint: Results; Multiple Myeloma; and Anemia    HPI 71-year-old male history of relapsed multiple myeloma being treated at [facility] treatment [facility] initially treated with lenalidomide dexamethasone patient has been seen and evaluated last visit approximately 6 weeks ago started on IXAZOMIB .  The patient reports over the last several days and week having increasing fatigue weakness nausea abdominal discomfort    Past Medical History:   Diagnosis Date    CAD (coronary artery disease)     tyree    Diabetes mellitus, type 2 1979    eye dr rocha    Hearing impaired     Hyperlipidemia     Hypertension     Lupus     Discoid    Old MI (myocardial infarction) 2012    Tracheostomy tube present      Family History   Problem Relation Age of Onset    Diabetes Mother     Diabetes Father     Prostate cancer Father     Pancreatic cancer Sister     No Known Problems Brother     Diabetes Maternal Uncle     Diabetes Maternal Grandmother     No Known Problems Maternal Grandfather     No Known Problems Paternal Grandmother     No Known Problems Paternal Grandfather      Social History     Social History    Marital status: Single     Spouse name: N/A    Number of children: 9    Years of education: N/A     Occupational History    Not on file.     Social History Main Topics    Smoking status: Never Smoker    Smokeless tobacco: Never Used    Alcohol use No    Drug use: No    Sexual activity: Yes     Partners: Female     Other Topics Concern    Not on file     Social History Narrative    No narrative on file     Past Surgical History:   Procedure Laterality Date    CARDIAC SURGERY      COLONOSCOPY      CORONARY ARTERY BYPASS GRAFT      HAND SURGERY      KNEE SURGERY      TRACHEOSTOMY TUBE PLACEMENT      WRIST FUSION         Labs:  Lab Results   Component Value Date    WBC 4.24 07/05/2017    HGB 11.8 (L)  07/05/2017    HCT 36.3 (L) 07/05/2017    MCV 98 07/05/2017    PLT 56 (L) 07/05/2017     BMP  Lab Results   Component Value Date     07/05/2017    K 2.7 (LL) 07/05/2017    CL 98 07/05/2017    CO2 31 (H) 07/05/2017    BUN 14 07/05/2017    CREATININE 1.7 (H) 07/05/2017    CALCIUM 9.8 07/05/2017    ANIONGAP 10 07/05/2017    ESTGFRAFRICA 46 (A) 07/05/2017    EGFRNONAA 39 (A) 07/05/2017     Lab Results   Component Value Date    ALT 15 07/05/2017    AST 20 07/05/2017    ALKPHOS 59 07/05/2017    BILITOT 0.5 07/05/2017       Lab Results   Component Value Date    IRON 81 07/21/2016    TIBC 302 07/21/2016    FERRITIN 324 (H) 07/21/2016     No results found for: [id number]  No results found for: FOLATE  No results found for: TSH      Review of Systems   Constitutional: Positive for appetite change and unexpected weight change. Negative for activity change, chills, diaphoresis, fatigue and fever.   HENT: Negative for congestion, dental problem, drooling, ear discharge, ear pain, facial swelling, hearing loss, mouth sores, nosebleeds, postnasal drip, rhinorrhea, sinus pressure, sneezing, sore throat, tinnitus, trouble swallowing and voice change.    Eyes: Negative for photophobia, pain, discharge, redness, itching and visual disturbance.   Respiratory: Negative for apnea, cough, choking, chest tightness, shortness of breath, wheezing and stridor.    Cardiovascular: Negative for chest pain, palpitations and leg swelling.   Gastrointestinal: Positive for abdominal pain. Negative for abdominal distention, anal bleeding, blood in stool, constipation, diarrhea, nausea, rectal pain and vomiting.   Endocrine: Negative for cold intolerance, heat intolerance, polydipsia, polyphagia and polyuria.   Genitourinary: Negative for decreased urine volume, difficulty urinating, discharge, dysuria, enuresis, flank pain, frequency, genital sores, hematuria, penile pain, penile swelling, scrotal swelling, testicular pain and urgency.    Musculoskeletal: Positive for arthralgias and joint swelling. Negative for back pain, gait problem, myalgias, neck pain and neck stiffness.   Skin: Negative for color change, pallor, rash and wound.   Allergic/Immunologic: Negative for environmental allergies, food allergies and immunocompromised state.   Neurological: Positive for weakness. Negative for dizziness, tremors, seizures, syncope, facial asymmetry, speech difficulty, light-headedness, numbness and headaches.   Hematological: Negative for adenopathy. Does not bruise/bleed easily.   Psychiatric/Behavioral: Positive for dysphoric mood. Negative for agitation, behavioral problems, confusion, decreased concentration, hallucinations, self-injury, sleep disturbance and suicidal ideas. The patient is nervous/anxious. The patient is not hyperactive.        Objective:      Physical Exam   Constitutional: He is oriented to person, place, and time. He appears well-developed and well-nourished. He has a sickly appearance. He appears ill. He appears distressed.   HENT:   Head: Normocephalic.   Right Ear: External ear normal.   Left Ear: External ear normal.   Nose: Nose normal. Right sinus exhibits no maxillary sinus tenderness and no frontal sinus tenderness. Left sinus exhibits no maxillary sinus tenderness and no frontal sinus tenderness.   Mouth/Throat: Oropharynx is clear and moist. No oropharyngeal exudate.   Eyes: EOM and lids are normal. Pupils are equal, round, and reactive to light. Right eye exhibits no discharge. Left eye exhibits no discharge. Right conjunctiva is not injected. Right conjunctiva has no hemorrhage. Left conjunctiva is not injected. Left conjunctiva has no hemorrhage. No scleral icterus. Right eye exhibits normal extraocular motion. Left eye exhibits normal extraocular motion.   Neck: Normal range of motion. Neck supple. No JVD present. No tracheal deviation present. No thyromegaly present.   Cardiovascular: Normal rate, regular rhythm  and normal heart sounds.    Pulmonary/Chest: Effort normal and breath sounds normal. No stridor. No respiratory distress.   Abdominal: Soft. Bowel sounds are normal. He exhibits no mass. There is no hepatosplenomegaly, splenomegaly or hepatomegaly. There is no tenderness.   Musculoskeletal: Normal range of motion. He exhibits no edema or tenderness.   Lymphadenopathy:        Head (right side): No posterior auricular and no occipital adenopathy present.        Head (left side): No posterior auricular and no occipital adenopathy present.     He has no cervical adenopathy.        Right cervical: No superficial cervical, no deep cervical and no posterior cervical adenopathy present.       Left cervical: No superficial cervical, no deep cervical and no posterior cervical adenopathy present.     He has no axillary adenopathy.        Right: No supraclavicular adenopathy present.        Left: No supraclavicular adenopathy present.   Neurological: He is alert and oriented to person, place, and time. He has normal strength. No cranial nerve deficit. Coordination normal.   Skin: Skin is dry. No rash noted. He is not diaphoretic. No cyanosis or erythema. Nails show no clubbing.   Psychiatric: His behavior is normal. Judgment and thought content normal. His mood appears anxious. Cognition and memory are normal. He exhibits a depressed mood.   Vitals reviewed.          Assessment:      1. Multiple myeloma not having achieved remission    2. Thrush of mouth and esophagus    3. Metastatic bone cancer           Plan:   Review of laboratory studies demonstrates declining kidney function hypokalemia patient has been advised to go to the emergency room [facility] for further evaluation of spoken to Dr. Oates in the emergency room will assess the patient recommend intravenous fluids because of his abdominal pain infected is not a bowel movement 3 days will recommend flat and erect of the abdomen if patient responds  clinically awaiting results of SPEP and free light chain will see back in 48 hours for review will notify he marked attending Dr. Gonzalez of patient

## 2017-07-05 NOTE — ED PROVIDER NOTES
SCRIBE #1 NOTE: I, Erica Rodarte, am scribing for, and in the presence of, Shamir Mahmood MD. I have scribed the entire note.      History      Chief Complaint   Patient presents with    Abnormal Lab     low potassium level, sent by Dr. Solo for evaluation       Review of patient's allergies indicates:   Allergen Reactions    Cephalexin     Codeine     Morphine     Morpholine analogues         HPI   HPI    7/5/2017, 6:37 PM   History obtained from the patient      History of Present Illness: Familia Durbin Jr. is a 71 y.o. male patient, with a PMHx of DM, CAD, and HTN, who presents to the Emergency Department for hypokalemia. Pt was seen by Dr. Solo (Hem/Onc) this morning and had blood work done that showed a low potassium level. Pt was sent to the ED for IV fluids and further evaluation. Sx have been constant and moderate in severity. No modifying factors noted. Associated sx include generalized weakness and decreased appetite. Pt denies any fever, chills, CP, SOB, abdominal pain, n/v,  HA, dizziness, or focal weakness/numbness. Daughter notes that pt has a f/u appointment with Dr. Solo in 2 days. No further complaints at this time.       Arrival mode: Personal vehicle     PCP: Andrea Elkins MD       Past Medical History:  Past Medical History:   Diagnosis Date    CAD (coronary artery disease)     maevert    Diabetes mellitus, type 2 1979    eye dr rocha    Hearing impaired     Hyperlipidemia     Hypertension     Lupus     Discoid    Old MI (myocardial infarction) 2012    Tracheostomy tube present        Past Surgical History:  Past Surgical History:   Procedure Laterality Date    CARDIAC SURGERY      COLONOSCOPY      CORONARY ARTERY BYPASS GRAFT      HAND SURGERY      KNEE SURGERY      TRACHEOSTOMY TUBE PLACEMENT      WRIST FUSION           Family History:  Family History   Problem Relation Age of Onset    Diabetes Mother     Diabetes Father     Prostate cancer Father      Pancreatic cancer Sister     No Known Problems Brother     Diabetes Maternal Uncle     Diabetes Maternal Grandmother     No Known Problems Maternal Grandfather     No Known Problems Paternal Grandmother     No Known Problems Paternal Grandfather        Social History:  Social History     Social History Main Topics    Smoking status: Never Smoker    Smokeless tobacco: Never Used    Alcohol use No    Drug use: No    Sexual activity: Yes     Partners: Female       ROS   Review of Systems   Constitutional: Positive for appetite change (decreased). Negative for chills, diaphoresis and fever.        (+) generalized weakness (+) hypokalemia   HENT: Negative for sore throat.    Respiratory: Negative for shortness of breath.    Cardiovascular: Negative for chest pain.   Gastrointestinal: Negative for abdominal pain, blood in stool, constipation, diarrhea, nausea and vomiting.   Genitourinary: Negative for dysuria.   Musculoskeletal: Negative for back pain.   Skin: Negative for rash.   Neurological: Negative for dizziness, weakness, light-headedness, numbness and headaches.   Hematological: Does not bruise/bleed easily.       Physical Exam      Initial Vitals [07/05/17 1648]   BP Pulse Resp Temp SpO2   (!) 117/53 63 19 97.5 °F (36.4 °C) 98 %      MAP       74.33          Physical Exam  Nursing Notes and Vital Signs Reviewed.  Constitutional: Patient is in no acute distress. Well-developed and well-nourished.  Head: Atraumatic. Normocephalic.  Eyes: PERRL. EOM intact. Conjunctivae are not pale. No scleral icterus.  ENT: Mucous membranes are moist. Oropharynx is clear and symmetric.    Neck: Supple. Full ROM. No lymphadenopathy.  Cardiovascular: Regular rate. Regular rhythm. 2/6 systolic ejection murmur. No rubs or gallops. Distal pulses are 2+ and symmetric.  Pulmonary/Chest: No respiratory distress. Clear to auscultation bilaterally. No wheezing, rales, or rhonchi.  Abdominal: Soft and non-distended.  There is no  "tenderness.  No rebound, guarding, or rigidity.  Musculoskeletal: Moves all extremities. No obvious deformities. No edema. No calf tenderness.  Skin: Warm and dry.  Neurological:  Alert, awake, and appropriate.  Normal speech.  No acute focal neurological deficits are appreciated.  Psychiatric: Normal affect. Good eye contact. Appropriate in content.    ED Course    Procedures  ED Vital Signs:  Vitals:    07/05/17 1648 07/05/17 2020 07/05/17 2032 07/05/17 2049   BP: (!) 117/53 (!) 152/53 (!) 139/56 (!) 136/98   Pulse: 63 70 70 76   Resp: 19 20 16 19   Temp: 97.5 °F (36.4 °C)      TempSrc: Oral      SpO2: 98% 100% 100% 100%   Weight: 75.3 kg (166 lb)      Height: 5' 10" (1.778 m)       07/05/17 2132 07/05/17 2147   BP: (!) 143/46 (!) 145/43   Pulse: 71 70   Resp: 20 18   Temp:  98.1 °F (36.7 °C)   TempSrc:  Oral   SpO2: 100% 100%   Weight:     Height:                  The Emergency Provider reviewed the vital signs and test results, which are outlined above.    ED Discussion     8:23 PM: Reassessed pt at this time.   Discussed with pt all pertinent ED information and results. Discussed pt dx and plan of tx. Gave pt all f/u and return to the ED instructions. All questions and concerns were addressed at this time. Pt expresses understanding of information and instructions, and is comfortable with plan to discharge. Pt is stable for discharge.      ED Medication(s):  Medications   sodium chloride 0.9% bolus 1,000 mL (0 mLs Intravenous Stopped 7/5/17 2129)   potassium chloride SA CR tablet 40 mEq (40 mEq Oral Given 7/5/17 1918)   potassium chloride 10 mEq in 100 mL IVPB (0 mEq Intravenous Stopped 7/5/17 2146)       Discharge Medication List as of 7/5/2017  8:41 PM      START taking these medications    Details   potassium chloride (KLOR-CON) 10 MEQ TbSR Take 1 tablet (10 mEq total) by mouth once daily., Starting Wed 7/5/2017, Print             Follow-up Information     Rodolfo Solo MD.    Specialty:  Hematology and " Oncology  Why:  Follow up with Dr. Solo in 2 days for a re-check as scheduled.  Return to the emergency department for any worsening signs or symptoms.  Contact information:  [id number]  [place] [address] [phone]                     Medical Decision Making              Scribe Attestation:   Scribe #1: I performed the above scribed service and the documentation accurately describes the services I performed. I attest to the accuracy of the note.    Attending:   Physician Attestation Statement for Scribe #1: I, Shamir Mahmood MD, personally performed the services described in this documentation, as scribed by Erica Rodarte, in my presence, and it is both accurate and complete.          Clinical Impression       ICD-10-CM ICD-9-CM   1. Hyperglycemia without ketosis R73.9 790.29   2. Hypokalemia E87.6 276.8   3. Dehydration E86.0 276.51   4. Acute renal insufficiency N28.9 593.9       Disposition:   Disposition: Discharged  Condition: Stable         Shamir Mahmood MD  07/08/17 1136

## 2017-07-06 NOTE — ED NOTES
Patient laying in bed with family member at the bedside.  Awaiting IV potassium infusion to complete for discharge.  Vitals stable.  Will continue to monitor.

## 2017-07-06 NOTE — ED NOTES
Patient placed on continuous cardiac monitor, automatic blood pressure cuff and continuous pulse oximeter by FELIX Mandel

## 2017-07-06 NOTE — ED NOTES
Food brought in by family members, patient sitting up in bed eating [employer].  [place] given to family member.  No further needs voiced at this time.

## 2017-07-06 NOTE — ED NOTES
Patient given urinal, extra blankets, and several cups of ice water.  Patient's IV fluids and IV potassium are infusing at this time.  Family members have stepped away from the bedside at the moment.  No further needs voiced.  Patient is stable.  Bed is in low, locked, position with side rails up x 2 and call light within reach.  Will continue to monitor.

## 2017-07-07 ENCOUNTER — OFFICE VISIT (OUTPATIENT)
Dept: HEMATOLOGY/ONCOLOGY | Facility: CLINIC | Age: 72
End: 2017-07-07
Payer: MEDICARE

## 2017-07-07 ENCOUNTER — LAB VISIT (OUTPATIENT)
Dept: LAB | Facility: HOSPITAL | Age: 72
End: 2017-07-07
Attending: INTERNAL MEDICINE
Payer: MEDICARE

## 2017-07-07 VITALS
TEMPERATURE: 97 F | WEIGHT: 168.88 LBS | SYSTOLIC BLOOD PRESSURE: 122 MMHG | DIASTOLIC BLOOD PRESSURE: 64 MMHG | HEIGHT: 70 IN | BODY MASS INDEX: 24.18 KG/M2 | OXYGEN SATURATION: 99 % | HEART RATE: 64 BPM

## 2017-07-07 DIAGNOSIS — C90.00 MULTIPLE MYELOMA NOT HAVING ACHIEVED REMISSION: ICD-10-CM

## 2017-07-07 DIAGNOSIS — C90.00 MULTIPLE MYELOMA NOT HAVING ACHIEVED REMISSION: Primary | ICD-10-CM

## 2017-07-07 LAB
ALBUMIN SERPL BCP-MCNC: 3.2 G/DL
ALP SERPL-CCNC: 55 U/L
ALT SERPL W/O P-5'-P-CCNC: 15 U/L
ANION GAP SERPL CALC-SCNC: 8 MMOL/L
AST SERPL-CCNC: 22 U/L
BILIRUB SERPL-MCNC: 0.4 MG/DL
BUN SERPL-MCNC: 12 MG/DL
CALCIUM SERPL-MCNC: 8.9 MG/DL
CHLORIDE SERPL-SCNC: 99 MMOL/L
CO2 SERPL-SCNC: 31 MMOL/L
CREAT SERPL-MCNC: 1.5 MG/DL
EST. GFR  (AFRICAN AMERICAN): 53 ML/MIN/1.73 M^2
EST. GFR  (NON AFRICAN AMERICAN): 46 ML/MIN/1.73 M^2
GLUCOSE SERPL-MCNC: 286 MG/DL
POTASSIUM SERPL-SCNC: 2.9 MMOL/L
PROT SERPL-MCNC: 7.7 G/DL
SODIUM SERPL-SCNC: 138 MMOL/L

## 2017-07-07 PROCEDURE — 1126F AMNT PAIN NOTED NONE PRSNT: CPT | Mod: S$GLB,,, | Performed by: INTERNAL MEDICINE

## 2017-07-07 PROCEDURE — 1159F MED LIST DOCD IN RCRD: CPT | Mod: S$GLB,,, | Performed by: INTERNAL MEDICINE

## 2017-07-07 PROCEDURE — 99999 PR PBB SHADOW E&M-EST. PATIENT-LVL III: CPT | Mod: PBBFAC,,, | Performed by: INTERNAL MEDICINE

## 2017-07-07 PROCEDURE — 99214 OFFICE O/P EST MOD 30 MIN: CPT | Mod: S$GLB,,, | Performed by: INTERNAL MEDICINE

## 2017-07-07 NOTE — PROGRESS NOTES
"Subjective:       Patient ID: Familia Durbin Jr. is a 71 y.o. male.    Chief Complaint: Results and Multiple Myeloma    HPI 71-year-old male recently sent to the emergency room treated for hypokalemia discharged home on oral test as "patient did not get just skin filled and starting on today patient returns for    Past Medical History:   Diagnosis Date    CAD (coronary artery disease)     luikart    Diabetes mellitus, type 2 1979    eye dr rocha    Hearing impaired     Hyperlipidemia     Hypertension     Lupus     Discoid    Old MI (myocardial infarction) 2012    Tracheostomy tube present      Family History   Problem Relation Age of Onset    Diabetes Mother     Diabetes Father     Prostate cancer Father     Pancreatic cancer Sister     No Known Problems Brother     Diabetes Maternal Uncle     Diabetes Maternal Grandmother     No Known Problems Maternal Grandfather     No Known Problems Paternal Grandmother     No Known Problems Paternal Grandfather      Social History     Social History    Marital status: Single     Spouse name: N/A    Number of children: 9    Years of education: N/A     Occupational History    Not on file.     Social History Main Topics    Smoking status: Never Smoker    Smokeless tobacco: Never Used    Alcohol use No    Drug use: No    Sexual activity: Yes     Partners: Female     Other Topics Concern    Not on file     Social History Narrative    No narrative on file     Past Surgical History:   Procedure Laterality Date    CARDIAC SURGERY      COLONOSCOPY      CORONARY ARTERY BYPASS GRAFT      HAND SURGERY      KNEE SURGERY      TRACHEOSTOMY TUBE PLACEMENT      WRIST FUSION         Labs:  Lab Results   Component Value Date    WBC 4.24 07/05/2017    HGB 11.8 (L) 07/05/2017    HCT 36.3 (L) 07/05/2017    MCV 98 07/05/2017    PLT 56 (L) 07/05/2017     BMP  Lab Results   Component Value Date     07/07/2017    K 2.9 (L) 07/07/2017    CL 99 07/07/2017    " CO2 31 (H) 07/07/2017    BUN 12 07/07/2017    CREATININE 1.5 (H) 07/07/2017    CALCIUM 8.9 07/07/2017    ANIONGAP 8 07/07/2017    ESTGFRAFRICA 53 (A) 07/07/2017    EGFRNONAA 46 (A) 07/07/2017     Lab Results   Component Value Date    ALT 15 07/07/2017    AST 22 07/07/2017    ALKPHOS 55 07/07/2017    BILITOT 0.4 07/07/2017       Lab Results   Component Value Date    IRON 81 07/21/2016    TIBC 302 07/21/2016    FERRITIN 324 (H) 07/21/2016     No results found for: [id number]  No results found for: FOLATE  No results found for: TSH      Review of Systems   Constitutional: Positive for activity change and fatigue. Negative for appetite change, chills, diaphoresis, fever and unexpected weight change.   HENT: Negative for congestion, dental problem, drooling, ear discharge, ear pain, facial swelling, hearing loss, mouth sores, nosebleeds, postnasal drip, rhinorrhea, sinus pressure, sneezing, sore throat, tinnitus, trouble swallowing and voice change.    Eyes: Negative for photophobia, pain, discharge, redness, itching and visual disturbance.   Respiratory: Negative for apnea, cough, choking, chest tightness, shortness of breath, wheezing and stridor.    Cardiovascular: Negative for chest pain, palpitations and leg swelling.   Gastrointestinal: Negative for abdominal distention, abdominal pain, anal bleeding, blood in stool, constipation, diarrhea, nausea, rectal pain and vomiting.   Endocrine: Negative for cold intolerance, heat intolerance, polydipsia, polyphagia and polyuria.   Genitourinary: Negative for decreased urine volume, difficulty urinating, discharge, dysuria, enuresis, flank pain, frequency, genital sores, hematuria, penile pain, penile swelling, scrotal swelling, testicular pain and urgency.   Musculoskeletal: Negative for arthralgias, back pain, gait problem, joint swelling, myalgias, neck pain and neck stiffness.   Skin: Negative for color change, pallor, rash and wound.   Allergic/Immunologic: Negative  for environmental allergies, food allergies and immunocompromised state.   Neurological: Negative for dizziness, tremors, seizures, syncope, facial asymmetry, speech difficulty, weakness, light-headedness, numbness and headaches.   Hematological: Negative for adenopathy. Does not bruise/bleed easily.   Psychiatric/Behavioral: Positive for dysphoric mood. Negative for agitation, behavioral problems, confusion, decreased concentration, hallucinations, self-injury, sleep disturbance and suicidal ideas. The patient is nervous/anxious. The patient is not hyperactive.        Objective:      Physical Exam   Constitutional: He is oriented to person, place, and time. He appears well-developed. He has a sickly appearance. He appears ill. He appears distressed.   HENT:   Head: Normocephalic.   Right Ear: External ear normal.   Left Ear: External ear normal.   Nose: Nose normal. Right sinus exhibits no maxillary sinus tenderness and no frontal sinus tenderness. Left sinus exhibits no maxillary sinus tenderness and no frontal sinus tenderness.   Mouth/Throat: Oropharynx is clear and moist. No oropharyngeal exudate.   Eyes: EOM and lids are normal. Pupils are equal, round, and reactive to light. Right eye exhibits no discharge. Left eye exhibits no discharge. Right conjunctiva is not injected. Right conjunctiva has no hemorrhage. Left conjunctiva is not injected. Left conjunctiva has no hemorrhage. No scleral icterus. Right eye exhibits normal extraocular motion. Left eye exhibits normal extraocular motion.   Neck: Normal range of motion. Neck supple. No JVD present. No tracheal deviation present. No thyromegaly present.   Cardiovascular: Normal rate.    Pulmonary/Chest: Effort normal. No stridor. No respiratory distress.   Abdominal: Soft. He exhibits no mass. There is no hepatosplenomegaly, splenomegaly or hepatomegaly. There is no tenderness.   Musculoskeletal: Normal range of motion. He exhibits no edema or tenderness.    Lymphadenopathy:        Head (right side): No posterior auricular and no occipital adenopathy present.        Head (left side): No posterior auricular and no occipital adenopathy present.     He has no cervical adenopathy.        Right cervical: No superficial cervical, no deep cervical and no posterior cervical adenopathy present.       Left cervical: No superficial cervical, no deep cervical and no posterior cervical adenopathy present.     He has no axillary adenopathy.        Right: No supraclavicular adenopathy present.        Left: No supraclavicular adenopathy present.   Neurological: He is alert and oriented to person, place, and time. He has normal strength. No cranial nerve deficit. Coordination normal.   Skin: Skin is dry. No rash noted. He is not diaphoretic. No cyanosis or erythema. Nails show no clubbing.   Psychiatric: His behavior is normal. Judgment and thought content normal. His mood appears anxious. Cognition and memory are normal. He exhibits a depressed mood.   Vitals reviewed.          Assessment:      1. Multiple myeloma not having achieved remission    2. Metastatic bone cancer           Plan:   Repeat potassium is 2.9 patient will start oral supplementation recommended patient have CMP performed in one week as scheduled to go in 2 weeks to be seen at [facility] [place] again of asked the family to provide documentation for my review I'll see back in one month with CBC CMP reviewed information with them

## 2017-07-10 ENCOUNTER — PATIENT MESSAGE (OUTPATIENT)
Dept: HEMATOLOGY/ONCOLOGY | Facility: CLINIC | Age: 72
End: 2017-07-10

## 2017-07-10 DIAGNOSIS — Z79.52 LONG TERM CURRENT USE OF SYSTEMIC STEROIDS: ICD-10-CM

## 2017-07-10 DIAGNOSIS — I10 ESSENTIAL HYPERTENSION: Chronic | ICD-10-CM

## 2017-07-10 DIAGNOSIS — Z79.4 TYPE 2 DIABETES MELLITUS WITH HYPERGLYCEMIA, WITH LONG-TERM CURRENT USE OF INSULIN: ICD-10-CM

## 2017-07-10 DIAGNOSIS — E11.9 TYPE 2 DIABETES MELLITUS WITHOUT COMPLICATION, WITH LONG-TERM CURRENT USE OF INSULIN: Chronic | ICD-10-CM

## 2017-07-10 DIAGNOSIS — E11.65 TYPE 2 DIABETES MELLITUS WITH HYPERGLYCEMIA, WITH LONG-TERM CURRENT USE OF INSULIN: ICD-10-CM

## 2017-07-10 DIAGNOSIS — Z79.4 INSULIN LONG-TERM USE: Chronic | ICD-10-CM

## 2017-07-10 DIAGNOSIS — I10 ESSENTIAL HYPERTENSION: ICD-10-CM

## 2017-07-10 DIAGNOSIS — Z79.4 TYPE 2 DIABETES MELLITUS WITHOUT COMPLICATION, WITH LONG-TERM CURRENT USE OF INSULIN: Chronic | ICD-10-CM

## 2017-07-10 DIAGNOSIS — E78.5 HYPERLIPIDEMIA, UNSPECIFIED HYPERLIPIDEMIA TYPE: Chronic | ICD-10-CM

## 2017-07-11 ENCOUNTER — OFFICE VISIT (OUTPATIENT)
Dept: HEMATOLOGY/ONCOLOGY | Facility: CLINIC | Age: 72
End: 2017-07-11
Payer: MEDICARE

## 2017-07-11 VITALS
HEIGHT: 70 IN | WEIGHT: 167.75 LBS | OXYGEN SATURATION: 98 % | SYSTOLIC BLOOD PRESSURE: 120 MMHG | DIASTOLIC BLOOD PRESSURE: 61 MMHG | BODY MASS INDEX: 24.02 KG/M2 | HEART RATE: 67 BPM | TEMPERATURE: 97 F

## 2017-07-11 DIAGNOSIS — C90.00 MULTIPLE MYELOMA NOT HAVING ACHIEVED REMISSION: Primary | ICD-10-CM

## 2017-07-11 PROCEDURE — 1126F AMNT PAIN NOTED NONE PRSNT: CPT | Mod: S$GLB,,, | Performed by: INTERNAL MEDICINE

## 2017-07-11 PROCEDURE — 99215 OFFICE O/P EST HI 40 MIN: CPT | Mod: S$GLB,,, | Performed by: INTERNAL MEDICINE

## 2017-07-11 PROCEDURE — 1159F MED LIST DOCD IN RCRD: CPT | Mod: S$GLB,,, | Performed by: INTERNAL MEDICINE

## 2017-07-11 PROCEDURE — 99999 PR PBB SHADOW E&M-EST. PATIENT-LVL III: CPT | Mod: PBBFAC,,, | Performed by: INTERNAL MEDICINE

## 2017-07-11 RX ORDER — PIOGLITAZONEHYDROCHLORIDE 30 MG/1
TABLET ORAL
Qty: 90 TABLET | Refills: 0 | Status: SHIPPED | OUTPATIENT
Start: 2017-07-11 | End: 2017-10-09 | Stop reason: SDUPTHER

## 2017-07-11 RX ORDER — METOPROLOL TARTRATE 50 MG/1
TABLET ORAL
Qty: 60 TABLET | Refills: 0 | Status: SHIPPED | OUTPATIENT
Start: 2017-07-11 | End: 2017-08-09 | Stop reason: SDUPTHER

## 2017-07-11 RX ORDER — ROSUVASTATIN CALCIUM 40 MG/1
40 TABLET, COATED ORAL NIGHTLY
Qty: 90 TABLET | Refills: 3 | Status: SHIPPED | OUTPATIENT
Start: 2017-07-11 | End: 2018-07-01 | Stop reason: SDUPTHER

## 2017-07-11 NOTE — PROGRESS NOTES
Subjective:       Patient ID: Familia Durbin Jr. is a 71 y.o. male.    Chief Complaint: Results; Multiple Myeloma; and Chemotherapy    HPI 71-year-old male returns patient is had progressive disease and is followed at [facility] [place] patient is currently on Revlimid 25 mg a 1 through 21 dexamethasone 8 mg weekly patient has started on Ninaldro on weekly basis.  The patient reports nausea weakness and fatigue it appears from his records that he has had progressive disease and was started on this medicine in the last several weeks    Past Medical History:   Diagnosis Date    CAD (coronary artery disease)     tyree    Diabetes mellitus, type 2 1979    eye dr rocha    Hearing impaired     Hyperlipidemia     Hypertension     Lupus     Discoid    Old MI (myocardial infarction) 2012    Tracheostomy tube present      Family History   Problem Relation Age of Onset    Diabetes Mother     Diabetes Father     Prostate cancer Father     Pancreatic cancer Sister     No Known Problems Brother     Diabetes Maternal Uncle     Diabetes Maternal Grandmother     No Known Problems Maternal Grandfather     No Known Problems Paternal Grandmother     No Known Problems Paternal Grandfather      Social History     Social History    Marital status: Single     Spouse name: N/A    Number of children: 9    Years of education: N/A     Occupational History    Not on file.     Social History Main Topics    Smoking status: Never Smoker    Smokeless tobacco: Never Used    Alcohol use No    Drug use: No    Sexual activity: Yes     Partners: Female     Other Topics Concern    Not on file     Social History Narrative    No narrative on file     Past Surgical History:   Procedure Laterality Date    CARDIAC SURGERY      COLONOSCOPY      CORONARY ARTERY BYPASS GRAFT      HAND SURGERY      KNEE SURGERY      TRACHEOSTOMY TUBE PLACEMENT      WRIST FUSION         Labs:  Lab Results   Component  Value Date    WBC 4.24 07/05/2017    HGB 11.8 (L) 07/05/2017    HCT 36.3 (L) 07/05/2017    MCV 98 07/05/2017    PLT 56 (L) 07/05/2017     BMP  Lab Results   Component Value Date     07/07/2017    K 2.9 (L) 07/07/2017    CL 99 07/07/2017    CO2 31 (H) 07/07/2017    BUN 12 07/07/2017    CREATININE 1.5 (H) 07/07/2017    CALCIUM 8.9 07/07/2017    ANIONGAP 8 07/07/2017    ESTGFRAFRICA 53 (A) 07/07/2017    EGFRNONAA 46 (A) 07/07/2017     Lab Results   Component Value Date    ALT 15 07/07/2017    AST 22 07/07/2017    ALKPHOS 55 07/07/2017    BILITOT 0.4 07/07/2017       Lab Results   Component Value Date    IRON 81 07/21/2016    TIBC 302 07/21/2016    FERRITIN 324 (H) 07/21/2016     No results found for: [id number]  No results found for: FOLATE  No results found for: TSH      Review of Systems   Constitutional: Positive for activity change, appetite change and fatigue. Negative for chills, diaphoresis, fever and unexpected weight change.   HENT: Negative for congestion, dental problem, drooling, ear discharge, ear pain, facial swelling, hearing loss, mouth sores, nosebleeds, postnasal drip, rhinorrhea, sinus pressure, sneezing, sore throat, tinnitus, trouble swallowing and voice change.    Eyes: Negative for photophobia, pain, discharge, redness, itching and visual disturbance.   Respiratory: Negative for apnea, cough, choking, chest tightness, shortness of breath, wheezing and stridor.    Cardiovascular: Negative for chest pain, palpitations and leg swelling.   Gastrointestinal: Positive for nausea. Negative for abdominal distention, abdominal pain, anal bleeding, blood in stool, constipation, diarrhea, rectal pain and vomiting.   Endocrine: Negative for cold intolerance, heat intolerance, polydipsia, polyphagia and polyuria.   Genitourinary: Negative for decreased urine volume, difficulty urinating, discharge, dysuria, enuresis, flank pain, frequency, genital sores, hematuria, penile pain, penile swelling, scrotal  swelling, testicular pain and urgency.   Musculoskeletal: Negative for arthralgias, back pain, gait problem, joint swelling, myalgias, neck pain and neck stiffness.   Skin: Negative for color change, pallor, rash and wound.   Allergic/Immunologic: Negative for environmental allergies, food allergies and immunocompromised state.   Neurological: Positive for weakness. Negative for dizziness, tremors, seizures, syncope, facial asymmetry, speech difficulty, light-headedness, numbness and headaches.   Hematological: Negative for adenopathy. Does not bruise/bleed easily.   Psychiatric/Behavioral: Negative for agitation, behavioral problems, confusion, decreased concentration, dysphoric mood, hallucinations, self-injury, sleep disturbance and suicidal ideas. The patient is not nervous/anxious and is not hyperactive.        Objective:      Physical Exam   Constitutional: He is oriented to person, place, and time. He appears well-developed and well-nourished. No distress.   HENT:   Head: Normocephalic.   Right Ear: External ear normal.   Left Ear: External ear normal.   Nose: Nose normal. Right sinus exhibits no maxillary sinus tenderness and no frontal sinus tenderness. Left sinus exhibits no maxillary sinus tenderness and no frontal sinus tenderness.   Mouth/Throat: Oropharynx is clear and moist. No oropharyngeal exudate.   Eyes: EOM and lids are normal. Pupils are equal, round, and reactive to light. Right eye exhibits no discharge. Left eye exhibits no discharge. Right conjunctiva is not injected. Right conjunctiva has no hemorrhage. Left conjunctiva is not injected. Left conjunctiva has no hemorrhage. No scleral icterus. Right eye exhibits normal extraocular motion. Left eye exhibits normal extraocular motion.   Neck: Normal range of motion. Neck supple. No JVD present. No tracheal deviation present. No thyromegaly present.   Cardiovascular: Normal rate and regular rhythm.    Pulmonary/Chest: Effort normal. No stridor.  No respiratory distress.   Abdominal: Soft. Bowel sounds are normal. He exhibits no mass. There is no hepatosplenomegaly, splenomegaly or hepatomegaly. There is no tenderness.   Musculoskeletal: Normal range of motion. He exhibits no edema or tenderness.   Lymphadenopathy:        Head (right side): No posterior auricular and no occipital adenopathy present.        Head (left side): No posterior auricular and no occipital adenopathy present.     He has no cervical adenopathy.        Right cervical: No superficial cervical, no deep cervical and no posterior cervical adenopathy present.       Left cervical: No superficial cervical, no deep cervical and no posterior cervical adenopathy present.     He has no axillary adenopathy.        Right: No supraclavicular adenopathy present.        Left: No supraclavicular adenopathy present.   Neurological: He is alert and oriented to person, place, and time. He has normal strength. No cranial nerve deficit. Coordination normal.   Skin: Skin is dry. No rash noted. He is not diaphoretic. No cyanosis or erythema. Nails show no clubbing.   Psychiatric: He has a normal mood and affect. His behavior is normal. Judgment and thought content normal. Cognition and memory are normal.   Vitals reviewed.          Assessment:      1. Multiple myeloma not having achieved remission    2. Metastatic bone cancer           Plan:   Extensive conversation with the patient's family present in the room I have gotten his records and have been able to review them at this point I agree that the patient has had progressive disease it appears that he has had some response already with a fall in his free light chains since initiation of medicine in mid June 2017 if he is not able to tolerate the current medications there are another of options that are intravenous he is currently on oral all oral regimen.  Discussed implications with him he is scheduled to be seen there on 7/20/17 and I'll will rate their  decision as to recommendations in terms of treatment 40 minutes face-to-face reviewed information with him

## 2017-07-12 ENCOUNTER — TELEPHONE (OUTPATIENT)
Dept: HEMATOLOGY/ONCOLOGY | Facility: CLINIC | Age: 72
End: 2017-07-12

## 2017-07-12 NOTE — TELEPHONE ENCOUNTER
----- Message from Lisa Baig sent at 7/12/2017 11:16 AM CDT -----  Contact: lucio/tisha  Would like to speak to nurse about pt. Please call back at [phone]. thanks

## 2017-07-20 ENCOUNTER — HOSPITAL ENCOUNTER (OUTPATIENT)
Dept: RADIOLOGY | Facility: HOSPITAL | Age: 72
Discharge: HOME | End: 2017-07-20
Attending: INTERNAL MEDICINE
Payer: MEDICARE

## 2017-07-20 DIAGNOSIS — C90.00 MULTIPLE MYELOMA NOT HAVING ACHIEVED REMISSION: ICD-10-CM

## 2017-07-20 PROCEDURE — 74020 XR ABDOMEN FLAT AND ERECT: CPT | Mod: 26,,, | Performed by: RADIOLOGY

## 2017-07-20 PROCEDURE — 71020 XR CHEST PA AND LATERAL: CPT | Mod: 26,,, | Performed by: RADIOLOGY

## 2017-07-20 PROCEDURE — 71020 XR CHEST PA AND LATERAL: CPT | Mod: TC,PO

## 2017-07-20 PROCEDURE — 74020 XR ABDOMEN FLAT AND ERECT: CPT | Mod: TC,PO

## 2017-07-26 ENCOUNTER — LAB VISIT (OUTPATIENT)
Dept: LAB | Facility: HOSPITAL | Age: 72
End: 2017-07-26
Attending: PEDIATRICS
Payer: MEDICARE

## 2017-07-26 ENCOUNTER — OFFICE VISIT (OUTPATIENT)
Dept: DIABETES | Facility: CLINIC | Age: 72
End: 2017-07-26
Payer: MEDICARE

## 2017-07-26 VITALS
DIASTOLIC BLOOD PRESSURE: 64 MMHG | BODY MASS INDEX: 23.99 KG/M2 | WEIGHT: 167.56 LBS | HEIGHT: 70 IN | SYSTOLIC BLOOD PRESSURE: 132 MMHG

## 2017-07-26 DIAGNOSIS — Z79.4 TYPE 2 DIABETES MELLITUS WITH DIABETIC NEPHROPATHY, WITH LONG-TERM CURRENT USE OF INSULIN: ICD-10-CM

## 2017-07-26 DIAGNOSIS — Z79.4 TYPE 2 DIABETES MELLITUS WITH DIABETIC NEPHROPATHY, WITH LONG-TERM CURRENT USE OF INSULIN: Primary | ICD-10-CM

## 2017-07-26 DIAGNOSIS — E11.21 TYPE 2 DIABETES MELLITUS WITH DIABETIC NEPHROPATHY, WITH LONG-TERM CURRENT USE OF INSULIN: Primary | ICD-10-CM

## 2017-07-26 DIAGNOSIS — E11.21 TYPE 2 DIABETES MELLITUS WITH DIABETIC NEPHROPATHY, WITH LONG-TERM CURRENT USE OF INSULIN: ICD-10-CM

## 2017-07-26 LAB — GLUCOSE SERPL-MCNC: 114 MG/DL (ref 70–110)

## 2017-07-26 PROCEDURE — 36415 COLL VENOUS BLD VENIPUNCTURE: CPT | Mod: PO

## 2017-07-26 PROCEDURE — 3045F PR MOST RECENT HEMOGLOBIN A1C LEVEL 7.0-9.0%: CPT | Mod: S$GLB,,, | Performed by: PHYSICIAN ASSISTANT

## 2017-07-26 PROCEDURE — 99999 PR PBB SHADOW E&M-EST. PATIENT-LVL IV: CPT | Mod: PBBFAC,,, | Performed by: PHYSICIAN ASSISTANT

## 2017-07-26 PROCEDURE — 83036 HEMOGLOBIN GLYCOSYLATED A1C: CPT

## 2017-07-26 PROCEDURE — 3008F BODY MASS INDEX DOCD: CPT | Mod: S$GLB,,, | Performed by: PHYSICIAN ASSISTANT

## 2017-07-26 PROCEDURE — 1159F MED LIST DOCD IN RCRD: CPT | Mod: S$GLB,,, | Performed by: PHYSICIAN ASSISTANT

## 2017-07-26 PROCEDURE — 3078F DIAST BP <80 MM HG: CPT | Mod: S$GLB,,, | Performed by: PHYSICIAN ASSISTANT

## 2017-07-26 PROCEDURE — 82948 REAGENT STRIP/BLOOD GLUCOSE: CPT | Mod: S$GLB,,, | Performed by: PHYSICIAN ASSISTANT

## 2017-07-26 PROCEDURE — 3075F SYST BP GE 130 - 139MM HG: CPT | Mod: S$GLB,,, | Performed by: PHYSICIAN ASSISTANT

## 2017-07-26 PROCEDURE — 1125F AMNT PAIN NOTED PAIN PRSNT: CPT | Mod: S$GLB,,, | Performed by: PHYSICIAN ASSISTANT

## 2017-07-26 PROCEDURE — 99214 OFFICE O/P EST MOD 30 MIN: CPT | Mod: S$GLB,,, | Performed by: PHYSICIAN ASSISTANT

## 2017-07-26 NOTE — PROGRESS NOTES
Subjective:      Patient ID: Familia Durbin Jr. is a 71 y.o. male.    PCP: Andrea Elkins MD      Familia Durbin is a pleasant 71 y.o. male presenting to follow up on diabetes mellitus. He has had diabetes for 40 or more years. His last visit in Diabetes Management was 4/26/2017 Since that time he has had no improvement in his glycemia. His blood sugar range fasting has been  and 2 hour post meal has been 180-200, and he has been monitoring 2 times per day as directed. His current concerns are glycemic control.    He denies any hospital admissions, emergency room visits, hypoglycemia, syncope, diaphoresis, chest pain, or dyspnea.    He has maintained 167 pounds since last visit. His BMI is 24.04    His blood sugar in the clinic today was:   Lab Results   Component Value Date    POCGLU 114 (A) 07/26/2017     We discussed the American diabetes Association recommendations:  hemoglobin A1c below 7.0%; all diabetics should be on statins unless contraindicated; one aspirin daily unless contraindicated; fasting blood sugar between 80 and 130 mg/dL; postprandial blood sugar below 180 mg/dl; prevention of hypoglycemia, may adjust goals to higher levels if persistent; ACE or ARB therapy if not contraindicated; and maintain in an ideal body weight with BMI below 25.    Familia is compliant most of the time with DM medications.     Familia is compliant most of the time with lifestyle modifications to include activity and meal planning.       STANDARDS OF CARE:  Eye doctor: Dr. Silva exam, 4/26/2017.  Dental exam: Recommend regular exams; denies gums bleeding.  Podiatry doctor:     ACTIVITY LEVEL: He exercises rarely.  BLOOD GLUCOSE TESTING: Self-monitoring with   SOCIAL HISTORY: single. Lives alone. retired no tobacco use    The following results were reviewed with patient.    Lab Results   Component Value Date    WBC 3.27 (L) 07/20/2017    HGB 9.8 (L) 07/20/2017    HCT 30.8 (L) 07/20/2017     07/20/2017     CHOL 159 09/21/2016    TRIG 122 09/21/2016    HDL 44 09/21/2016    ALT 15 07/20/2017    AST 22 07/20/2017     07/20/2017    K 4.3 07/20/2017     07/20/2017    CREATININE 1.5 (H) 07/20/2017    ESTGFRAFRICA 53 (A) 07/20/2017    EGFRNONAA 46 (A) 07/20/2017    BUN 17 07/20/2017    CO2 28 07/20/2017    PSA 0.34 09/15/2015     (H) 07/20/2017       Lab Results   Component Value Date    HGBA1C 9.3 (H) 04/26/2017    HGBA1C 10.4 (H) 01/31/2017    HGBA1C 10.0 (H) 12/29/2016       Lab Results   Component Value Date    GLUTAMICACID 0.00 11/15/2016    CPEPTIDE 1.4 11/15/2016     Lab Results   Component Value Date    IRON 81 07/21/2016    TIBC 302 07/21/2016    FERRITIN 324 (H) 07/21/2016     Lab Results   Component Value Date    CALCIUM 9.2 07/20/2017       Review of patient's allergies indicates:   Allergen Reactions    Cephalexin     Codeine     Morphine     Morpholine analogues        Past Medical History:   Diagnosis Date    CAD (coronary artery disease)     tyree    Diabetes mellitus, type 2 1979    eye dr rocha    Hearing impaired     Hyperlipidemia     Hypertension     Lupus     Discoid    Old MI (myocardial infarction) 2012    Tracheostomy tube present        Review of Systems   Constitutional: Negative.  Negative for activity change, appetite change, chills, diaphoresis, fatigue, fever and unexpected weight change.   HENT: Negative.  Negative for dental problem, facial swelling, hearing loss, nosebleeds, trouble swallowing and voice change.    Eyes: Negative.  Negative for photophobia, pain, discharge, redness, itching and visual disturbance.   Respiratory: Negative.  Negative for cough, choking, chest tightness, shortness of breath and wheezing.    Cardiovascular: Negative.  Negative for chest pain, palpitations and leg swelling.   Gastrointestinal: Negative.  Negative for abdominal distention, abdominal pain, blood in stool, constipation, diarrhea, nausea and vomiting.   Endocrine:  "Negative.  Negative for cold intolerance, heat intolerance, polydipsia, polyphagia and polyuria.   Genitourinary: Negative.  Negative for decreased urine volume, difficulty urinating, dysuria, frequency, scrotal swelling, testicular pain and urgency.   Musculoskeletal: Negative.  Negative for arthralgias, back pain, gait problem, joint swelling, myalgias, neck pain and neck stiffness.   Skin: Negative.  Negative for color change, pallor, rash and wound.   Allergic/Immunologic: Negative.  Negative for environmental allergies, food allergies and immunocompromised state.   Neurological: Negative.  Negative for dizziness, tremors, seizures, syncope, facial asymmetry, speech difficulty, weakness, light-headedness, numbness and headaches.   Hematological: Negative.  Negative for adenopathy. Does not bruise/bleed easily.   Psychiatric/Behavioral: Negative.  Negative for agitation, behavioral problems, confusion, decreased concentration, dysphoric mood, hallucinations, self-injury, sleep disturbance and suicidal ideas. The patient is not nervous/anxious and is not hyperactive.       Objective:     Vitals - 1 value per visit 7/7/2017 7/11/2017 7/26/2017   SYSTOLIC 122 120 132   DIASTOLIC 64 61 64   PULSE 64 67 -   TEMPERATURE 97.2 97.4 -   RESPIRATIONS - - -   SPO2 99 98 -   Weight (lb) 168.87 167.77 167.55   Weight (kg) 76.6 76.1 76   HEIGHT 5' 10" 5' 10" 5' 10"   BODY MASS INDEX 24.23 24.07 24.04   VISIT REPORT - - -   Pain Score  0 0 8   Some recent data might be hidden       Physical Exam   Constitutional: He is oriented to person, place, and time. He appears well-developed and well-nourished. He is cooperative.  Non-toxic appearance. He does not have a sickly appearance. He does not appear ill. No distress. He is not intubated.   HENT:   Head: Normocephalic and atraumatic. Not macrocephalic and not microcephalic. Head is without raccoon's eyes, without Crane's sign, without abrasion, without contusion, without " laceration, without right periorbital erythema and without left periorbital erythema. Hair is normal.   Right Ear: External ear normal. No lacerations. No drainage, swelling or tenderness. No foreign bodies. No mastoid tenderness. Tympanic membrane is not injected, not scarred, not perforated, not erythematous, not retracted and not bulging. Tympanic membrane mobility is normal. No middle ear effusion. No hemotympanum. No decreased hearing is noted.   Left Ear: External ear normal. No lacerations. No drainage, swelling or tenderness. No foreign bodies. No mastoid tenderness. Tympanic membrane is not injected, not scarred, not perforated, not erythematous, not retracted and not bulging. Tympanic membrane mobility is normal.  No middle ear effusion. No hemotympanum. No decreased hearing is noted.   Nose: Nose normal.   Mouth/Throat: Oropharynx is clear and moist.   Eyes: EOM and lids are normal. Pupils are equal, round, and reactive to light. Right eye exhibits no chemosis, no discharge, no exudate and no hordeolum. No foreign body present in the right eye. Left eye exhibits no chemosis, no discharge, no exudate and no hordeolum. No foreign body present in the left eye. Right conjunctiva is not injected. Right conjunctiva has no hemorrhage. Left conjunctiva is not injected. Left conjunctiva has no hemorrhage. No scleral icterus. Right eye exhibits normal extraocular motion and no nystagmus. Left eye exhibits normal extraocular motion and no nystagmus. Right pupil is round and reactive. Left pupil is round and reactive. Pupils are equal.   Neck: Normal range of motion, full passive range of motion without pain and phonation normal. Neck supple. Normal carotid pulses, no hepatojugular reflux and no JVD present. No tracheal tenderness, no spinous process tenderness and no muscular tenderness present. Carotid bruit is not present. No neck rigidity. No tracheal deviation, no edema, no erythema and normal range of motion  present. No thyroid mass and no thyromegaly present.   Cardiovascular: Normal rate, regular rhythm, normal heart sounds and intact distal pulses.   No extrasystoles are present. PMI is not displaced.  Exam reveals no gallop, no friction rub and no decreased pulses.    No murmur heard.  Pulses:       Carotid pulses are 2+ on the right side, and 2+ on the left side.       Radial pulses are 2+ on the right side, and 2+ on the left side.        Dorsalis pedis pulses are 2+ on the right side, and 2+ on the left side.        Posterior tibial pulses are 2+ on the right side, and 2+ on the left side.   Pulmonary/Chest: Effort normal and breath sounds normal. No accessory muscle usage or stridor. No apnea, no tachypnea and no bradypnea. He is not intubated. No respiratory distress. He has no decreased breath sounds. He has no wheezes. He has no rhonchi. He has no rales. He exhibits no tenderness.   Abdominal: Soft. Bowel sounds are normal. He exhibits no shifting dullness, no distension, no pulsatile liver, no fluid wave, no abdominal bruit, no ascites, no pulsatile midline mass and no mass. There is no hepatosplenomegaly, splenomegaly or hepatomegaly. There is no tenderness. There is no rigidity, no rebound, no guarding, no CVA tenderness and no tenderness at McBurney's point. No hernia. Hernia confirmed negative in the ventral area.   Musculoskeletal: Normal range of motion. He exhibits no edema or tenderness.        Right foot: There is normal range of motion and no deformity.        Left foot: There is normal range of motion and no deformity.   Feet:   Right Foot:   Protective Sensation: 6 sites tested. 6 sites sensed.   Skin Integrity: Negative for ulcer, blister, skin breakdown, erythema, warmth, callus or dry skin.   Left Foot:   Protective Sensation: 6 sites tested. 6 sites sensed.   Skin Integrity: Negative for ulcer, blister, skin breakdown, erythema, warmth, callus or dry skin.   Lymphadenopathy:        Head  (right side): No submental, no submandibular, no tonsillar, no preauricular, no posterior auricular and no occipital adenopathy present.        Head (left side): No submental, no submandibular, no tonsillar, no preauricular, no posterior auricular and no occipital adenopathy present.     He has no cervical adenopathy.        Right cervical: No superficial cervical, no deep cervical and no posterior cervical adenopathy present.       Left cervical: No superficial cervical, no deep cervical and no posterior cervical adenopathy present.   Neurological: He is alert and oriented to person, place, and time. He has normal reflexes. He displays no atrophy and no tremor. No cranial nerve deficit or sensory deficit. He exhibits normal muscle tone. He displays no seizure activity. Coordination and gait normal.   Reflex Scores:       Bicep reflexes are 2+ on the right side and 2+ on the left side.       Brachioradialis reflexes are 2+ on the right side and 2+ on the left side.       Patellar reflexes are 2+ on the right side and 2+ on the left side.  Skin: Skin is warm and dry. No rash noted. No erythema. No pallor.   Psychiatric: He has a normal mood and affect. His mood appears not anxious. His affect is not angry, not blunt, not labile and not inappropriate. His speech is not rapid and/or pressured, not delayed, not tangential and not slurred. He is not agitated, not aggressive, not hyperactive, not slowed, not withdrawn, not actively hallucinating and not combative. Thought content is not paranoid and not delusional. Cognition and memory are not impaired. He does not express impulsivity or inappropriate judgment. He does not exhibit a depressed mood. He expresses no homicidal and no suicidal ideation. He expresses no suicidal plans and no homicidal plans. He is communicative. He exhibits normal recent memory and normal remote memory. He is attentive.     Assessment:     1. Type 2 diabetes mellitus with diabetic nephropathy,  with long-term current use of insulin         Plan:     Familia Durbin is seen today for   1. Type 2 diabetes mellitus with diabetic nephropathy, with long-term current use of insulin      We have discussed the etiology and treatment options associated with the diagnosis as well as alternatives. He has elected the following treatments.     Type 2 diabetes mellitus with diabetic nephropathy, with long-term current use of insulin  -     POCT glucose  -     Hemoglobin A1c; Future; Expected date: 07/26/2017    1.) Patient was instructed to monitor blood glucose twice daily, fasting, and 2 hour post meal; if on Multiple Daily Injections (MDI) he will need to have pre-meal blood glucose as well. Reminded to bring BG meter or record to each visit for review.  2.) Reviewed pathophysiology of diabetes, complications related to the disease, importance of annual dilated eye exam and self daily foot examination.  3.) Continue medications as prescribed Lantus; Novolog and Actos. [facility] or Phone review in 1 week with [facility] records for adjustment of medication.  4.) Advised patient to continue to follow up with Dietician/CDE as directed.  5.) Discussed activity, benefits, methods, and precautions. Recommended patient start/continue some form of exercise and increase as tolerated to 60 minutes per day to facilitate weight loss and aid in control of BGs. Also reminded patient of WHO recommendation of 10,000 steps daily as a goal.   6.) A1C, POCT-glucose.  7.) Return to clinic in 3 months for follow up. Advised patient to call clinic with any questions or concerns.    A total of 50 minutes was spent in face to face time, of which 50 % was spent in counseling patient on disease process, complications, treatment, and side effects of medications.    The patient was explained the above plan and given opportunity to ask questions.  He understands, chooses and consents to this plan and accepts all the risks, which include but are  not limited to the risks mentioned above.   He understands the alternative of having no testing, interventions or treatments at this time. He left content and without further questions.

## 2017-07-27 LAB
ESTIMATED AVG GLUCOSE: 203 MG/DL
HBA1C MFR BLD HPLC: 8.7 %

## 2017-08-02 ENCOUNTER — PATIENT MESSAGE (OUTPATIENT)
Dept: DIABETES | Facility: CLINIC | Age: 72
End: 2017-08-02

## 2017-08-02 ENCOUNTER — TELEPHONE (OUTPATIENT)
Dept: DIABETES | Facility: CLINIC | Age: 72
End: 2017-08-02

## 2017-08-02 DIAGNOSIS — Z79.4 INSULIN LONG-TERM USE: Chronic | ICD-10-CM

## 2017-08-02 DIAGNOSIS — Z79.52 LONG TERM CURRENT USE OF SYSTEMIC STEROIDS: ICD-10-CM

## 2017-08-02 DIAGNOSIS — E11.65 TYPE 2 DIABETES MELLITUS WITH HYPERGLYCEMIA, WITHOUT LONG-TERM CURRENT USE OF INSULIN: ICD-10-CM

## 2017-08-02 DIAGNOSIS — I10 ESSENTIAL HYPERTENSION: Chronic | ICD-10-CM

## 2017-08-02 DIAGNOSIS — Z79.4 TYPE 2 DIABETES MELLITUS WITHOUT COMPLICATION, WITH LONG-TERM CURRENT USE OF INSULIN: Chronic | ICD-10-CM

## 2017-08-02 DIAGNOSIS — Z79.4 TYPE 2 DIABETES MELLITUS WITH HYPERGLYCEMIA, WITH LONG-TERM CURRENT USE OF INSULIN: ICD-10-CM

## 2017-08-02 DIAGNOSIS — E11.65 TYPE 2 DIABETES MELLITUS WITH HYPERGLYCEMIA, WITH LONG-TERM CURRENT USE OF INSULIN: ICD-10-CM

## 2017-08-02 DIAGNOSIS — E11.9 TYPE 2 DIABETES MELLITUS WITHOUT COMPLICATION, WITH LONG-TERM CURRENT USE OF INSULIN: Chronic | ICD-10-CM

## 2017-08-02 DIAGNOSIS — E78.5 HYPERLIPIDEMIA, UNSPECIFIED HYPERLIPIDEMIA TYPE: Chronic | ICD-10-CM

## 2017-08-02 RX ORDER — LANCETS 33 GAUGE
1 EACH MISCELLANEOUS
Qty: 100 EACH | Refills: 11 | Status: SHIPPED | OUTPATIENT
Start: 2017-08-02 | End: 2018-08-24 | Stop reason: SDUPTHER

## 2017-08-02 RX ORDER — INSULIN GLARGINE 100 [IU]/ML
30 INJECTION, SOLUTION SUBCUTANEOUS DAILY
Qty: 9 ML | Refills: 11 | Status: SHIPPED | OUTPATIENT
Start: 2017-08-02 | End: 2018-01-22 | Stop reason: SDUPTHER

## 2017-08-02 RX ORDER — PEN NEEDLE, DIABETIC 30 GX3/16"
1 NEEDLE, DISPOSABLE MISCELLANEOUS
Qty: 360 EACH | Refills: 3 | Status: SHIPPED | OUTPATIENT
Start: 2017-08-02 | End: 2018-08-24 | Stop reason: SDUPTHER

## 2017-08-02 RX ORDER — INSULIN ASPART 100 [IU]/ML
20 INJECTION, SOLUTION INTRAVENOUS; SUBCUTANEOUS
Qty: 18 ML | Refills: 11 | Status: SHIPPED | OUTPATIENT
Start: 2017-08-02 | End: 2018-01-22 | Stop reason: SDUPTHER

## 2017-08-02 NOTE — TELEPHONE ENCOUNTER
----- Message from Stephenie Coffey sent at 8/2/2017 10:42 AM CDT -----  Contact: David (pt's daughter)   David called and stated she needed to speak to the nurse. She stated that the pt needs a refill on all of diabetic supplies, including needles for insulin, lancets, glucose strips as well as his insulin and any other medication the pt takes for diabetes.    [facility] [address] [place], [place] - [address]  [address] [address]  Phone: [phone] Fax: [phone]    She can be reached at [phone].  Thanks,  TF

## 2017-08-07 ENCOUNTER — TELEPHONE (OUTPATIENT)
Dept: HEMATOLOGY/ONCOLOGY | Facility: CLINIC | Age: 72
End: 2017-08-07

## 2017-08-07 NOTE — TELEPHONE ENCOUNTER
Pt was SOB and went ER, [facility]. He was treated and released. He did not show for appt to day and is not answering calls//daughter will check on him and call back

## 2017-08-09 DIAGNOSIS — I10 ESSENTIAL HYPERTENSION: ICD-10-CM

## 2017-08-10 RX ORDER — METOPROLOL TARTRATE 50 MG/1
TABLET ORAL
Qty: 60 TABLET | Refills: 0 | Status: SHIPPED | OUTPATIENT
Start: 2017-08-10 | End: 2017-09-12 | Stop reason: SDUPTHER

## 2017-08-15 ENCOUNTER — LAB VISIT (OUTPATIENT)
Dept: LAB | Facility: HOSPITAL | Age: 72
End: 2017-08-15
Attending: INTERNAL MEDICINE
Payer: MEDICARE

## 2017-08-15 DIAGNOSIS — C90.00 MULTIPLE MYELOMA NOT HAVING ACHIEVED REMISSION: ICD-10-CM

## 2017-08-15 LAB
ALBUMIN SERPL BCP-MCNC: 3.7 G/DL
ALP SERPL-CCNC: 74 U/L
ALT SERPL W/O P-5'-P-CCNC: 17 U/L
ANION GAP SERPL CALC-SCNC: 11 MMOL/L
AST SERPL-CCNC: 24 U/L
BASOPHILS # BLD AUTO: 0 K/UL
BASOPHILS NFR BLD: 0 %
BILIRUB SERPL-MCNC: 0.3 MG/DL
BUN SERPL-MCNC: 19 MG/DL
CALCIUM SERPL-MCNC: 9.8 MG/DL
CHLORIDE SERPL-SCNC: 107 MMOL/L
CO2 SERPL-SCNC: 23 MMOL/L
CREAT SERPL-MCNC: 1.5 MG/DL
DIFFERENTIAL METHOD: ABNORMAL
EOSINOPHIL # BLD AUTO: 0.1 K/UL
EOSINOPHIL NFR BLD: 2.2 %
ERYTHROCYTE [DISTWIDTH] IN BLOOD BY AUTOMATED COUNT: 15.4 %
EST. GFR  (AFRICAN AMERICAN): 53 ML/MIN/1.73 M^2
EST. GFR  (NON AFRICAN AMERICAN): 46 ML/MIN/1.73 M^2
GLUCOSE SERPL-MCNC: 63 MG/DL
HCT VFR BLD AUTO: 32.4 %
HGB BLD-MCNC: 10.8 G/DL
LYMPHOCYTES # BLD AUTO: 1.7 K/UL
LYMPHOCYTES NFR BLD: 35 %
MCH RBC QN AUTO: 32.6 PG
MCHC RBC AUTO-ENTMCNC: 33.3 G/DL
MCV RBC AUTO: 98 FL
MONOCYTES # BLD AUTO: 0.6 K/UL
MONOCYTES NFR BLD: 11.9 %
NEUTROPHILS # BLD AUTO: 2.5 K/UL
NEUTROPHILS NFR BLD: 50.9 %
PLATELET # BLD AUTO: 133 K/UL
PMV BLD AUTO: 9.9 FL
POTASSIUM SERPL-SCNC: 3.7 MMOL/L
PROT SERPL-MCNC: 8.6 G/DL
RBC # BLD AUTO: 3.31 M/UL
SODIUM SERPL-SCNC: 141 MMOL/L
WBC # BLD AUTO: 4.97 K/UL

## 2017-08-15 PROCEDURE — 80053 COMPREHEN METABOLIC PANEL: CPT | Mod: PO

## 2017-08-15 PROCEDURE — 85025 COMPLETE CBC W/AUTO DIFF WBC: CPT | Mod: PO

## 2017-08-15 PROCEDURE — 36415 COLL VENOUS BLD VENIPUNCTURE: CPT | Mod: PO

## 2017-08-23 ENCOUNTER — OFFICE VISIT (OUTPATIENT)
Dept: HEMATOLOGY/ONCOLOGY | Facility: CLINIC | Age: 72
End: 2017-08-23
Payer: MEDICARE

## 2017-08-23 VITALS
TEMPERATURE: 98 F | OXYGEN SATURATION: 97 % | BODY MASS INDEX: 25.31 KG/M2 | DIASTOLIC BLOOD PRESSURE: 80 MMHG | SYSTOLIC BLOOD PRESSURE: 112 MMHG | WEIGHT: 176.81 LBS | HEART RATE: 73 BPM | RESPIRATION RATE: 18 BRPM | HEIGHT: 70 IN

## 2017-08-23 DIAGNOSIS — C90.00 MULTIPLE MYELOMA NOT HAVING ACHIEVED REMISSION: Primary | ICD-10-CM

## 2017-08-23 PROCEDURE — 99215 OFFICE O/P EST HI 40 MIN: CPT | Mod: S$GLB,,, | Performed by: INTERNAL MEDICINE

## 2017-08-23 PROCEDURE — 3079F DIAST BP 80-89 MM HG: CPT | Mod: S$GLB,,, | Performed by: INTERNAL MEDICINE

## 2017-08-23 PROCEDURE — 1125F AMNT PAIN NOTED PAIN PRSNT: CPT | Mod: S$GLB,,, | Performed by: INTERNAL MEDICINE

## 2017-08-23 PROCEDURE — 99999 PR PBB SHADOW E&M-EST. PATIENT-LVL III: CPT | Mod: PBBFAC,,, | Performed by: INTERNAL MEDICINE

## 2017-08-23 PROCEDURE — 3008F BODY MASS INDEX DOCD: CPT | Mod: S$GLB,,, | Performed by: INTERNAL MEDICINE

## 2017-08-23 PROCEDURE — 1159F MED LIST DOCD IN RCRD: CPT | Mod: S$GLB,,, | Performed by: INTERNAL MEDICINE

## 2017-08-23 PROCEDURE — 3074F SYST BP LT 130 MM HG: CPT | Mod: S$GLB,,, | Performed by: INTERNAL MEDICINE

## 2017-08-23 NOTE — PROGRESS NOTES
Subjective:       Patient ID: Familia Durbin Jr. is a 72 y.o. male.    Chief Complaint: Follow-up; Results; and Multiple Myeloma    HPI 72-year-old male history of multiple myeloma being treated currently at [facility].  The patient appears to have been treated in the past with Revlimid; dexamethasone; Ninalardo and appears to be having progressive disease.  The patient returns and is planning to return to [place] tomorrow for review he has been contacted by that organization to come back for review of medications and treatment options ECOG status 1    Past Medical History:   Diagnosis Date    CAD (coronary artery disease)     tyree    Diabetes mellitus, type 2 1979    eye dr rocha    Hearing impaired     Hyperlipidemia     Hypertension     Lupus     Discoid    Old MI (myocardial infarction) 2012    Tracheostomy tube present      Family History   Problem Relation Age of Onset    Diabetes Mother     Diabetes Father     Prostate cancer Father     Pancreatic cancer Sister     No Known Problems Brother     Diabetes Maternal Uncle     Diabetes Maternal Grandmother     No Known Problems Maternal Grandfather     No Known Problems Paternal Grandmother     No Known Problems Paternal Grandfather      Social History     Social History    Marital status: Single     Spouse name: N/A    Number of children: 9    Years of education: N/A     Occupational History    Not on file.     Social History Main Topics    Smoking status: Never Smoker    Smokeless tobacco: Never Used    Alcohol use No    Drug use: No    Sexual activity: Yes     Partners: Female     Other Topics Concern    Not on file     Social History Narrative    No narrative on file     Past Surgical History:   Procedure Laterality Date    CARDIAC SURGERY      COLONOSCOPY      CORONARY ARTERY BYPASS GRAFT      HAND SURGERY      KNEE SURGERY      TRACHEOSTOMY TUBE PLACEMENT      WRIST  FUSION         Labs:  Lab Results   Component Value Date    WBC 4.97 08/15/2017    HGB 10.8 (L) 08/15/2017    HCT 32.4 (L) 08/15/2017    MCV 98 08/15/2017     (L) 08/15/2017     BMP  Lab Results   Component Value Date     08/15/2017    K 3.7 08/15/2017     08/15/2017    CO2 23 08/15/2017    BUN 19 08/15/2017    CREATININE 1.5 (H) 08/15/2017    CALCIUM 9.8 08/15/2017    ANIONGAP 11 08/15/2017    ESTGFRAFRICA 53 (A) 08/15/2017    EGFRNONAA 46 (A) 08/15/2017     Lab Results   Component Value Date    ALT 17 08/15/2017    AST 24 08/15/2017    ALKPHOS 74 08/15/2017    BILITOT 0.3 08/15/2017       Lab Results   Component Value Date    IRON 81 07/21/2016    TIBC 302 07/21/2016    FERRITIN 324 (H) 07/21/2016     No results found for: YDNMYGJA67  No results found for: FOLATE  No results found for: TSH      Review of Systems   Constitutional: Positive for fatigue. Negative for activity change, appetite change, chills, diaphoresis, fever and unexpected weight change.   HENT: Negative for congestion, dental problem, drooling, ear discharge, ear pain, facial swelling, hearing loss, mouth sores, nosebleeds, postnasal drip, rhinorrhea, sinus pressure, sneezing, sore throat, tinnitus, trouble swallowing and voice change.    Eyes: Negative for photophobia, pain, discharge, redness, itching and visual disturbance.   Respiratory: Positive for shortness of breath. Negative for apnea, cough, choking, chest tightness, wheezing and stridor.    Cardiovascular: Negative for chest pain, palpitations and leg swelling.   Gastrointestinal: Negative for abdominal distention, abdominal pain, anal bleeding, blood in stool, constipation, diarrhea, nausea, rectal pain and vomiting.   Endocrine: Negative for cold intolerance, heat intolerance, polydipsia, polyphagia and polyuria.   Genitourinary: Negative for decreased urine volume, difficulty urinating, discharge, dysuria, enuresis, flank pain, frequency, genital sores, hematuria,  penile pain, penile swelling, scrotal swelling, testicular pain and urgency.   Musculoskeletal: Positive for back pain. Negative for arthralgias, gait problem, joint swelling, myalgias, neck pain and neck stiffness.   Skin: Negative for color change, pallor, rash and wound.   Allergic/Immunologic: Negative for environmental allergies, food allergies and immunocompromised state.   Neurological: Positive for weakness. Negative for dizziness, tremors, seizures, syncope, facial asymmetry, speech difficulty, light-headedness, numbness and headaches.   Hematological: Negative for adenopathy. Does not bruise/bleed easily.   Psychiatric/Behavioral: Positive for dysphoric mood. Negative for agitation, behavioral problems, confusion, decreased concentration, hallucinations, self-injury, sleep disturbance and suicidal ideas. The patient is nervous/anxious. The patient is not hyperactive.        Objective:      Physical Exam   Constitutional: He is oriented to person, place, and time. He appears well-developed and well-nourished. He has a sickly appearance. He appears ill. He appears distressed.   HENT:   Head: Normocephalic.   Right Ear: External ear normal.   Left Ear: External ear normal.   Nose: Nose normal. Right sinus exhibits no maxillary sinus tenderness and no frontal sinus tenderness. Left sinus exhibits no maxillary sinus tenderness and no frontal sinus tenderness.   Mouth/Throat: Oropharynx is clear and moist. No oropharyngeal exudate.   Eyes: EOM and lids are normal. Pupils are equal, round, and reactive to light. Right eye exhibits no discharge. Left eye exhibits no discharge. Right conjunctiva is not injected. Right conjunctiva has no hemorrhage. Left conjunctiva is not injected. Left conjunctiva has no hemorrhage. No scleral icterus. Right eye exhibits normal extraocular motion. Left eye exhibits normal extraocular motion.   Neck: Normal range of motion. Neck supple. No JVD present. No tracheal deviation  present. No thyromegaly present.   Cardiovascular: Normal rate and regular rhythm.    Pulmonary/Chest: Effort normal. No stridor. No respiratory distress.   Abdominal: Soft. He exhibits no mass. There is no hepatosplenomegaly, splenomegaly or hepatomegaly. There is no tenderness.   Musculoskeletal: Normal range of motion. He exhibits no edema or tenderness.   Lymphadenopathy:        Head (right side): No posterior auricular and no occipital adenopathy present.        Head (left side): No posterior auricular and no occipital adenopathy present.     He has no cervical adenopathy.        Right cervical: No superficial cervical, no deep cervical and no posterior cervical adenopathy present.       Left cervical: No superficial cervical, no deep cervical and no posterior cervical adenopathy present.     He has no axillary adenopathy.        Right: No supraclavicular adenopathy present.        Left: No supraclavicular adenopathy present.   Neurological: He is alert and oriented to person, place, and time. He has normal strength. No cranial nerve deficit. Coordination normal.   Skin: Skin is dry. No rash noted. He is not diaphoretic. No cyanosis or erythema. Nails show no clubbing.   Psychiatric: His behavior is normal. Judgment and thought content normal. His mood appears anxious. Cognition and memory are normal. He exhibits a depressed mood.   Vitals reviewed.          Assessment:      Relapsed multiple myeloma      Plan:   Extensive conversation with the patient I've talked about potential treatment options including that medicine such as Pomalyst and/or daratumumab and single agent or in combination I've discussed this with them an article from up-to-date has been printed for his review.  In addition we have shown him what a MediPort catheter looks like that would be needed should he receive intravenous medication.  At this point I've been peripherally involved in his care I've asked that when he does return to [facility]  that records be brought back here for my review in addition I would strongly urged patient to consider their treatment recommendations in the article from up-to-date I have provided with him the medicines he has received as well as  potentially in his future in terms of options.  Time spent 40 minutes face-to-face coordination of care [id number]

## 2017-09-05 ENCOUNTER — TELEPHONE (OUTPATIENT)
Dept: NEPHROLOGY | Facility: CLINIC | Age: 72
End: 2017-09-05

## 2017-09-05 ENCOUNTER — OFFICE VISIT (OUTPATIENT)
Dept: NEPHROLOGY | Facility: CLINIC | Age: 72
End: 2017-09-05
Payer: MEDICARE

## 2017-09-05 ENCOUNTER — LAB VISIT (OUTPATIENT)
Dept: LAB | Facility: HOSPITAL | Age: 72
End: 2017-09-05
Attending: NURSE PRACTITIONER
Payer: MEDICARE

## 2017-09-05 ENCOUNTER — TELEPHONE (OUTPATIENT)
Dept: HEMATOLOGY/ONCOLOGY | Facility: CLINIC | Age: 72
End: 2017-09-05

## 2017-09-05 ENCOUNTER — OFFICE VISIT (OUTPATIENT)
Dept: INTERNAL MEDICINE | Facility: CLINIC | Age: 72
End: 2017-09-05
Payer: MEDICARE

## 2017-09-05 VITALS
SYSTOLIC BLOOD PRESSURE: 116 MMHG | DIASTOLIC BLOOD PRESSURE: 62 MMHG | HEART RATE: 68 BPM | HEIGHT: 70 IN | WEIGHT: 174.19 LBS | BODY MASS INDEX: 24.94 KG/M2

## 2017-09-05 VITALS
HEART RATE: 71 BPM | TEMPERATURE: 97 F | OXYGEN SATURATION: 98 % | SYSTOLIC BLOOD PRESSURE: 106 MMHG | DIASTOLIC BLOOD PRESSURE: 48 MMHG | WEIGHT: 175.06 LBS | BODY MASS INDEX: 25.06 KG/M2 | HEIGHT: 70 IN

## 2017-09-05 DIAGNOSIS — R10.9 ACUTE RIGHT FLANK PAIN: ICD-10-CM

## 2017-09-05 DIAGNOSIS — N17.9 ACUTE KIDNEY INJURY: Primary | ICD-10-CM

## 2017-09-05 DIAGNOSIS — R10.9 ACUTE RIGHT FLANK PAIN: Primary | ICD-10-CM

## 2017-09-05 DIAGNOSIS — N08 MYELOMA KIDNEY: ICD-10-CM

## 2017-09-05 DIAGNOSIS — E11.21 DIABETIC NEPHROPATHY ASSOCIATED WITH TYPE 2 DIABETES MELLITUS: ICD-10-CM

## 2017-09-05 DIAGNOSIS — N18.30 CHRONIC KIDNEY DISEASE, STAGE III (MODERATE): ICD-10-CM

## 2017-09-05 DIAGNOSIS — N05.8 LIGHT CHAIN NEPHROPATHY: ICD-10-CM

## 2017-09-05 DIAGNOSIS — N14.0 ANALGESIC NEPHROPATHY: ICD-10-CM

## 2017-09-05 DIAGNOSIS — C90.00 MYELOMA KIDNEY: ICD-10-CM

## 2017-09-05 LAB
BACTERIA #/AREA URNS HPF: NORMAL /HPF
BILIRUB UR QL STRIP: NEGATIVE
CLARITY UR: CLEAR
COLOR UR: YELLOW
GLUCOSE UR QL STRIP: NEGATIVE
HGB UR QL STRIP: NEGATIVE
HYALINE CASTS #/AREA URNS LPF: 0 /LPF
KETONES UR QL STRIP: NEGATIVE
LEUKOCYTE ESTERASE UR QL STRIP: NEGATIVE
MICROSCOPIC COMMENT: NORMAL
NITRITE UR QL STRIP: NEGATIVE
PH UR STRIP: 6 [PH] (ref 5–8)
PROT UR QL STRIP: ABNORMAL
RBC #/AREA URNS HPF: 1 /HPF (ref 0–4)
SP GR UR STRIP: 1.02 (ref 1–1.03)
URN SPEC COLLECT METH UR: ABNORMAL
WBC #/AREA URNS HPF: 3 /HPF (ref 0–5)

## 2017-09-05 PROCEDURE — 1159F MED LIST DOCD IN RCRD: CPT | Mod: S$GLB,,, | Performed by: INTERNAL MEDICINE

## 2017-09-05 PROCEDURE — 1125F AMNT PAIN NOTED PAIN PRSNT: CPT | Mod: S$GLB,,, | Performed by: INTERNAL MEDICINE

## 2017-09-05 PROCEDURE — 3078F DIAST BP <80 MM HG: CPT | Mod: S$GLB,,, | Performed by: INTERNAL MEDICINE

## 2017-09-05 PROCEDURE — 81000 URINALYSIS NONAUTO W/SCOPE: CPT | Mod: PO

## 2017-09-05 PROCEDURE — 1125F AMNT PAIN NOTED PAIN PRSNT: CPT | Mod: S$GLB,,, | Performed by: NURSE PRACTITIONER

## 2017-09-05 PROCEDURE — 3008F BODY MASS INDEX DOCD: CPT | Mod: S$GLB,,, | Performed by: NURSE PRACTITIONER

## 2017-09-05 PROCEDURE — 3074F SYST BP LT 130 MM HG: CPT | Mod: S$GLB,,, | Performed by: INTERNAL MEDICINE

## 2017-09-05 PROCEDURE — 1159F MED LIST DOCD IN RCRD: CPT | Mod: S$GLB,,, | Performed by: NURSE PRACTITIONER

## 2017-09-05 PROCEDURE — 3066F NEPHROPATHY DOC TX: CPT | Mod: S$GLB,,, | Performed by: INTERNAL MEDICINE

## 2017-09-05 PROCEDURE — 99204 OFFICE O/P NEW MOD 45 MIN: CPT | Mod: S$GLB,,, | Performed by: INTERNAL MEDICINE

## 2017-09-05 PROCEDURE — 99999 PR PBB SHADOW E&M-EST. PATIENT-LVL V: CPT | Mod: PBBFAC,,, | Performed by: NURSE PRACTITIONER

## 2017-09-05 PROCEDURE — 99214 OFFICE O/P EST MOD 30 MIN: CPT | Mod: S$GLB,,, | Performed by: NURSE PRACTITIONER

## 2017-09-05 PROCEDURE — 3045F PR MOST RECENT HEMOGLOBIN A1C LEVEL 7.0-9.0%: CPT | Mod: S$GLB,,, | Performed by: INTERNAL MEDICINE

## 2017-09-05 PROCEDURE — 3008F BODY MASS INDEX DOCD: CPT | Mod: S$GLB,,, | Performed by: INTERNAL MEDICINE

## 2017-09-05 PROCEDURE — 3074F SYST BP LT 130 MM HG: CPT | Mod: S$GLB,,, | Performed by: NURSE PRACTITIONER

## 2017-09-05 PROCEDURE — 3078F DIAST BP <80 MM HG: CPT | Mod: S$GLB,,, | Performed by: NURSE PRACTITIONER

## 2017-09-05 PROCEDURE — 99999 PR PBB SHADOW E&M-EST. PATIENT-LVL IV: CPT | Mod: PBBFAC,,, | Performed by: INTERNAL MEDICINE

## 2017-09-05 RX ORDER — CYCLOBENZAPRINE HCL 5 MG
5 TABLET ORAL 2 TIMES DAILY PRN
Qty: 20 TABLET | Refills: 0 | Status: SHIPPED | OUTPATIENT
Start: 2017-09-05 | End: 2017-09-15

## 2017-09-05 RX ORDER — LENALIDOMIDE 25 MG/1
1 CAPSULE ORAL
COMMUNITY
Start: 2017-07-25 | End: 2017-10-10

## 2017-09-05 NOTE — PATIENT INSTRUCTIONS
Flank Pain, Uncertain Cause  The flank is the area between your upper abdomen and your back. Pain there is often caused by a problem with your kidneys. It might be a kidney infection or a kidney stone. Other causes of flank pain include spinal arthritis, a pinched nerve from a back injury, or a back muscle strain or spasm.  The cause of your flank pain is not certain. You may need other tests.  Home care  Follow these tips when caring for yourself at home:  · You may use acetaminophen or ibuprofen to control pain, unless your health care provider prescribed another medicine. If you have chronic liver or kidney disease, talk with your provider before taking these medicines. Also talk with your provider first if youve ever had a stomach ulcer or GI bleeding.  · If the pain is coming from your muscles, you may get relief with ice or heat. During the first 2 days after the injury, put an ice pack on the painful area for 20 minutes every 2 to 4 hours. This will reduce swelling and pain. A hot shower, hot bath, or heating pad works well for a muscle spasm. You can start with ice, then switch to heat after 2 days. You might find that alternating ice and heat works well. Use the method that feels the best to you.  Follow-up care  Follow up with your healthcare provider if your symptoms dont get better over the next few days.  When to seek medical advice  Call your healthcare provider right away if any of these happen:  · Repeated vomiting  · Fever of 100.4ºF (38ºC) or higher, or as directed by your health care provider  · Flank pain that gets worse  · Pain that spreads to the front of your belly (abdomen)  · Dizziness, weakness, or fainting  · Blood in your urine  · Burning feeling when you urinate or the need to urinate often  · Pain in one of your legs that gets worse  · Numbness or weakness in a leg  Date Last Reviewed: 10/1/2016  © 8263-7654 [employer]. [address] [place] [address], [place] [address]. All  rights reserved. This information is not intended as a substitute for professional medical care. Always follow your healthcare professional's instructions.

## 2017-09-05 NOTE — TELEPHONE ENCOUNTER
----- Message from Sully Daly LPN sent at 9/5/2017  8:55 AM CDT -----  Contact: family  He is seeing you today at 1:20 but would like to be seen this morning before his 11:20 appt.    Please call family ASAP  Thank you

## 2017-09-05 NOTE — PROGRESS NOTES
Subjective:       Patient ID: Familia Durbin Jr. is a 72 y.o. male.    Chief Complaint: Flank Pain (right)    Patient presents with right flank pain that started about 2 weeks ago.  Has multiple myeloma and reports having pain often.  Reports pain medications does not relieve the flank pain.  Denies dysuria.       Flank Pain   This is a new problem. The current episode started 1 to 4 weeks ago. The problem occurs constantly. The problem is unchanged. The pain is present in the costovertebral angle. The quality of the pain is described as aching. The pain does not radiate. The pain is at a severity of 10/10. The pain is moderate. The symptoms are aggravated by bending, sitting and standing. Pertinent negatives include no bladder incontinence, bowel incontinence, dysuria, fever, headaches, leg pain or tingling. Risk factors include history of cancer. He has tried analgesics for the symptoms. The treatment provided no relief.     Review of Systems   Constitutional: Negative for chills and fever.   Respiratory: Negative for cough and shortness of breath.    Gastrointestinal: Negative for bowel incontinence.   Genitourinary: Positive for flank pain. Negative for bladder incontinence, discharge and dysuria.   Musculoskeletal: Positive for back pain. Negative for joint swelling.   Skin: Negative for color change and rash.   Neurological: Negative for dizziness, tingling and headaches.   Psychiatric/Behavioral: Negative for agitation and confusion.       Objective:      Physical Exam   Constitutional: He is oriented to person, place, and time. Vital signs are normal. He appears well-developed and well-nourished.   HENT:   Head: Normocephalic and atraumatic.   Neck: Normal range of motion.   Cardiovascular: Normal rate.    Pulmonary/Chest: Effort normal.   Musculoskeletal: He exhibits tenderness.        Thoracic back: He exhibits decreased range of motion (slight decrese when flexing ) and tenderness.         Back:    Neurological: He is alert and oriented to person, place, and time.   Skin: Skin is warm and dry.   Psychiatric: He has a normal mood and affect. His behavior is normal.       Assessment:       1. Acute right flank pain        Plan:         Acute right flank pain  -     URINALYSIS; Future; Expected date: 09/05/2017  -     cyclobenzaprine (FLEXERIL) 5 MG tablet; Take 1 tablet (5 mg total) by mouth 2 (two) times daily as needed. Do take at the same time with Oxycodone.  Dispense: 20 tablet; Refill: 0      Urinalysis negative for UTI/Cystitis.  Will try muscle relaxer for pain relief.  If no improvement, return to clinic.

## 2017-09-05 NOTE — TELEPHONE ENCOUNTER
----- Message from Rodolfo Solo MD sent at 9/5/2017  8:36 AM CDT -----  Contact: Layneanda - daughter  Yes [facility] but his renal function is deteriorating he definitely needs to see someone I like to see him as well  ----- Message -----  From: Sully Daly LPN  Sent: 9/5/2017   8:10 AM  To: Rodolfo Solo MD    Did you want him to see nephrology?  ----- Message -----  From: Erica Ayala LPN  Sent: 9/1/2017   4:56 PM  To: Sully Daly LPN    I did not see in Dr. Solo not about a referral.  Please verify and get patient scheduled.       Erica  ----- Message -----  From: Oumou Cast  Sent: 9/1/2017   4:25 PM  To: Edil MONDRAGON Staff    The pt request a referral to a kidney specialist, can be reached [phone]///thxMW

## 2017-09-05 NOTE — LETTER
September 5, 2017      Rodolfo Solo MD  [address] [place] [address]           [facility] Nephrology  [address]  [place] [address]  Phone: [phone]  Fax: [phone]          Patient: Familia Durbin Jr.   MR Number: [id number]   YOB: 1945   Date of Visit: 9/5/2017       Dear Dr. Rodolfo Solo:    Thank you for referring Familia Durbin to me for evaluation. Attached you will find relevant portions of my assessment and plan of care.    If you have questions, please do not hesitate to call me. I look forward to following Familia Durbin along with you.    Sincerely,    Brando Terrazas MD    Enclosure  CC:  No Recipients    If you would like to receive this communication electronically, please contact [email or web address] or [phone] to request more information on [employer] access.    For providers and/or their staff who would like to refer a patient to [facility], please contact us through our one-stop-shop provider referral line, [facility], at [phone].    If you feel you have received this communication in error or would no longer like to receive these types of communications, please e-mail [email or web address]

## 2017-09-06 ENCOUNTER — OFFICE VISIT (OUTPATIENT)
Dept: HEMATOLOGY/ONCOLOGY | Facility: CLINIC | Age: 72
End: 2017-09-06
Payer: MEDICARE

## 2017-09-06 ENCOUNTER — LAB VISIT (OUTPATIENT)
Dept: LAB | Facility: HOSPITAL | Age: 72
End: 2017-09-06
Attending: INTERNAL MEDICINE
Payer: MEDICARE

## 2017-09-06 VITALS
SYSTOLIC BLOOD PRESSURE: 113 MMHG | DIASTOLIC BLOOD PRESSURE: 58 MMHG | OXYGEN SATURATION: 99 % | TEMPERATURE: 98 F | BODY MASS INDEX: 25.05 KG/M2 | WEIGHT: 174.63 LBS | HEART RATE: 75 BPM

## 2017-09-06 DIAGNOSIS — C90.00 MULTIPLE MYELOMA NOT HAVING ACHIEVED REMISSION: ICD-10-CM

## 2017-09-06 DIAGNOSIS — C90.00 MULTIPLE MYELOMA NOT HAVING ACHIEVED REMISSION: Primary | ICD-10-CM

## 2017-09-06 LAB
ALBUMIN SERPL BCP-MCNC: 3.6 G/DL
ALP SERPL-CCNC: 77 U/L
ALT SERPL W/O P-5'-P-CCNC: 25 U/L
ANION GAP SERPL CALC-SCNC: 8 MMOL/L
AST SERPL-CCNC: 27 U/L
BASOPHILS # BLD AUTO: 0 K/UL
BASOPHILS NFR BLD: 0 %
BILIRUB SERPL-MCNC: 0.3 MG/DL
BUN SERPL-MCNC: 23 MG/DL
CALCIUM SERPL-MCNC: 9.8 MG/DL
CHLORIDE SERPL-SCNC: 104 MMOL/L
CO2 SERPL-SCNC: 28 MMOL/L
CREAT SERPL-MCNC: 1.5 MG/DL
DIFFERENTIAL METHOD: ABNORMAL
EOSINOPHIL # BLD AUTO: 0 K/UL
EOSINOPHIL NFR BLD: 0.4 %
ERYTHROCYTE [DISTWIDTH] IN BLOOD BY AUTOMATED COUNT: 15.2 %
EST. GFR  (AFRICAN AMERICAN): 53 ML/MIN/1.73 M^2
EST. GFR  (NON AFRICAN AMERICAN): 46 ML/MIN/1.73 M^2
GLUCOSE SERPL-MCNC: 159 MG/DL
HCT VFR BLD AUTO: 35.2 %
HGB BLD-MCNC: 11.7 G/DL
LYMPHOCYTES # BLD AUTO: 1.7 K/UL
LYMPHOCYTES NFR BLD: 33.8 %
MCH RBC QN AUTO: 32.4 PG
MCHC RBC AUTO-ENTMCNC: 33.2 G/DL
MCV RBC AUTO: 98 FL
MONOCYTES # BLD AUTO: 0.5 K/UL
MONOCYTES NFR BLD: 10.9 %
NEUTROPHILS # BLD AUTO: 2.7 K/UL
NEUTROPHILS NFR BLD: 54.9 %
PLATELET # BLD AUTO: 166 K/UL
PMV BLD AUTO: 10.6 FL
POTASSIUM SERPL-SCNC: 4.6 MMOL/L
PROT SERPL-MCNC: 8.8 G/DL
RBC # BLD AUTO: 3.61 M/UL
SODIUM SERPL-SCNC: 140 MMOL/L
WBC # BLD AUTO: 4.88 K/UL

## 2017-09-06 PROCEDURE — 99999 PR PBB SHADOW E&M-EST. PATIENT-LVL V: CPT | Mod: PBBFAC,,, | Performed by: INTERNAL MEDICINE

## 2017-09-06 PROCEDURE — 84165 PROTEIN E-PHORESIS SERUM: CPT | Mod: 26,,, | Performed by: PATHOLOGY

## 2017-09-06 PROCEDURE — 3074F SYST BP LT 130 MM HG: CPT | Mod: S$GLB,,, | Performed by: INTERNAL MEDICINE

## 2017-09-06 PROCEDURE — 36415 COLL VENOUS BLD VENIPUNCTURE: CPT | Mod: PO

## 2017-09-06 PROCEDURE — 99214 OFFICE O/P EST MOD 30 MIN: CPT | Mod: S$GLB,,, | Performed by: INTERNAL MEDICINE

## 2017-09-06 PROCEDURE — 1125F AMNT PAIN NOTED PAIN PRSNT: CPT | Mod: S$GLB,,, | Performed by: INTERNAL MEDICINE

## 2017-09-06 PROCEDURE — 86334 IMMUNOFIX E-PHORESIS SERUM: CPT

## 2017-09-06 PROCEDURE — 3008F BODY MASS INDEX DOCD: CPT | Mod: S$GLB,,, | Performed by: INTERNAL MEDICINE

## 2017-09-06 PROCEDURE — 3078F DIAST BP <80 MM HG: CPT | Mod: S$GLB,,, | Performed by: INTERNAL MEDICINE

## 2017-09-06 PROCEDURE — 1159F MED LIST DOCD IN RCRD: CPT | Mod: S$GLB,,, | Performed by: INTERNAL MEDICINE

## 2017-09-06 PROCEDURE — 80053 COMPREHEN METABOLIC PANEL: CPT

## 2017-09-06 PROCEDURE — 85025 COMPLETE CBC W/AUTO DIFF WBC: CPT

## 2017-09-06 PROCEDURE — 86334 IMMUNOFIX E-PHORESIS SERUM: CPT | Mod: 26,,, | Performed by: PATHOLOGY

## 2017-09-06 PROCEDURE — 84165 PROTEIN E-PHORESIS SERUM: CPT

## 2017-09-06 PROCEDURE — 83520 IMMUNOASSAY QUANT NOS NONAB: CPT

## 2017-09-06 RX ORDER — OXYCODONE HYDROCHLORIDE 10 MG/1
10 TABLET ORAL EVERY 6 HOURS PRN
Qty: 60 TABLET | Refills: 0 | Status: SHIPPED | OUTPATIENT
Start: 2017-09-06 | End: 2017-12-07 | Stop reason: SDUPTHER

## 2017-09-06 NOTE — PROGRESS NOTES
NEPHROLOGY CONSULTATION NOTE    REASON FOR CONSULTATION:  Evidence of chronic kidney disease.    REFERRING PHYSICIAN:  Rodolfo Solo MD, from Hematology/Oncology.    HISTORY OF PRESENT ILLNESS:  Thank you very much for referring Mr. Durbin to   us.  As you know, he is a 72-year-old  male with a diagnosis of   multiple myeloma, currently under chemotherapy treatment at [facility] in   [place], who was sent to us for serum creatinine that was elevated at 1.5.  I   saw and examined the patient and I fully reviewed the medical records.  The   patient has had serum creatinine in the past six months that has ranged between   1.4 to 1.7.  Currently, creatinine is 1.5.  He says he was diagnosed with   multiple myeloma in 2016.  He also has a pertinent history of hypertension,   diabetes mellitus and he is taking Mobic.  He takes one tablet 15 mg once a day   and he has done that for several months.  The patient has bone involvement   including spine by myeloma per review of the MRI report.  Currently, he has no   acute complaints, no shortness of breath, no chest pain, no leg swelling.  No   other pertinent issues.  He takes no other NSAIDs.  No Advil.  No ibuprofen.  He   has not had any GI losses, no diarrhea.  No recent antibiotics.  No problems   urinating.  No other pertinent issues that would explain the renal   insufficiency.    PAST MEDICAL HISTORY:  1.  CKD stage III with somewhat fluctuating serum creatinine values.  2.  Multiple myeloma diagnosed in 2016, under treatment in [place].  3.  Diabetes mellitus.  4.  Hypertension.  5.  Diastolic congestive heart failure.  6.  Coronary artery disease with past history of MI and CABG.  7.  Hyperlipidemia.  8.  Discoid lupus.    PAST SURGICAL HISTORY:  Reviewed.  CABG in 2002, wrist surgery and hip surgery   after accident.    FAMILY MEDICAL HISTORY:  Reviewed.  Both parents with diabetes mellitus.    ALLERGIES:  Reviewed.  No known drug  allergies.    SOCIAL HISTORY:  Negative for smoking, quit at the age of 24.  No alcohol use.    MEDICATIONS:  Reviewed.  Aspirin 81 mg daily, fentanyl as needed, metoprolol 50   mg p.o. b.i.d., Zofran as needed, Actos 30 mg daily, Revlimid 25 mg daily,   oxycodone as needed, Mobic 15 mg daily, Crestor 40 mg daily, not on any ACE   inhibitors.    REVIEW OF SYSTEMS:  No recent hospitalizations.  GENERAL:  Negative.  HEAD, EYES, EARS AND THROAT:  Negative.  CARDIAC:  Negative.  PULMONARY:  Negative.  GASTROINTESTINAL:  Negative.  GENITOURINARY:  Negative.  PSYCHOLOGICAL:  Negative.  NEUROLOGICAL:  Negative.  ENDOCRINE:  Negative.  HEMATOLOGIC AND ONCOLOGIC:  Negative.  INFECTIOUS DISEASE:  Negative.  The rest of the review of systems is negative.    PHYSICAL EXAMINATION:  VITAL SIGNS:  Blood pressure is 116/62, pulse 68, weight is 174 pounds.  GENERAL:  He is ambulating by himself, in no acute distress.  HEENT:  Atraumatic, normocephalic, well developed, well nourished.  Mucous   membranes moist.  Speech and thought process appropriate and normal.  NECK:  No JVD.  HEART:  Regular rate and rhythm, S1 and S2 audible.  No rubs.  CHEST:  Clear to auscultation.  No rales.  No wheezes.  Breathing symmetric,   unlabored.  ABDOMEN:  Soft, nontender.  EXTREMITIES:  Showed no edema.    LABORATORY DATA:  Reviewed.  Labs show that his creatinine is 1.5, sodium 141,   potassium 3.7, chloride 107, bicarbonate 23, calcium 9.8.  Anion gap was 11.    White count is 4.9, hemoglobin 10.8, platelets 133.  Urinalysis shows that he   has 1+ protein, no blood, no casts reported, no rbc's.  Hemoglobin A1c is 8.7%.    Examination of the protein electrophoresis shows that he has lambda specific   monoclonal proteins in the urine, but not in the serum protein electrophoresis.    IMAGING STUDIES:  Reviewed and noted that he has on MRI evidence of bone   involvement by myeloma of the spine.    ASSESSMENT AND PLAN:  This is a 72-year-old   American male with evidence   of chronic kidney disease who presents for initial assessment and evaluation at   [facility].  The impression is as follows:  1.  Renal.  The patient appears to have developed mild-to-moderate renal   insufficiency in the past six months.  He has several reasons for renal damage   including possible myeloma kidney and light chain nephropathy versus diabetic   nephropathy given that he also has proteinuria versus analgesic nephropathy   given that he takes Mobic on a daily basis.    The above discussed with the patient.  Opportunity for questions and discussion   provided.  Differences between these were explained to him.  I advised the   patient to limit the intake of Mobic, explained to him what this is, explained   all other nonsteroidal anti-inflammatory drugs to him.  Advised that extensive   intake of nonsteroidal anti-inflammatory drugs can worsen renal function.  I   have not told him to totally stop Mobic, but simply lower the dose to perhaps   every other day.    Regarding proteinuria, I have explained that this is probably from diabetic   nephropathy, although it could be also the renal effect of myeloma.  His blood   pressure is rather controlled to low, but I have considered adding an   angiotensin-converting enzyme inhibitor.  I will watch him if his blood pressure   was better next visit.  We will add a low-dose of an angiotensin-converting   enzyme inhibitor.    2.  Hematology/Oncology.  He has myeloma with evidence of bone involvement.  He   constantly has back pain for which he is taking narcotics as well as Mobic.    Management of Mobic as above.    3.  Hypertension.  Blood pressure is controlled to low, management as above.  We   will consider an angiotensin-converting enzyme inhibitor if blood pressure was   higher next visit.    4.  Proteinuria.  As discussed above.    PLANS AND RECOMMENDATIONS:  As discussed above for each individual problem.    Opportunity  for questions and discussion provided.    Total time spent 50 minutes, more than 50% of the time was spent on counseling   and coordination of care.  Level 4 visit.      AK/IN  dd: 09/05/2017 17:49:31 (CDT)  td: 09/06/2017 18:55:30 (CDT)  Doc ID   #[id number]  Job ID #[id number]    CC:

## 2017-09-06 NOTE — PROGRESS NOTES
Distress Screening Results: Psychosocial Distress screening score of Distress Score: 6 {AMB ONC DISTRESS SCORE:02516}

## 2017-09-06 NOTE — PROGRESS NOTES
Subjective:       Patient ID: Familia Durbin Jr. is a 72 y.o. male.    Chief Complaint: Results; Multiple Myeloma; and Pain    HPI 72-year-old male being care for at [facility] [place] history of multiple myeloma patient reports increasing pain involving his right flank.  Patient denies nausea vomiting fever chills or night sweats patient required increasing doses of pain medicine pain is above his pelvic region on that side denies any dysuria    Past Medical History:   Diagnosis Date    CAD (coronary artery disease)     tyree    Diabetes mellitus, type 2 1979    eye dr rocha    Hearing impaired     Hyperlipidemia     Hypertension     Lupus     Discoid    Old MI (myocardial infarction) 2012    Tracheostomy tube present      Family History   Problem Relation Age of Onset    Diabetes Mother     Diabetes Father     Prostate cancer Father     Pancreatic cancer Sister     No Known Problems Brother     Diabetes Maternal Uncle     Diabetes Maternal Grandmother     No Known Problems Maternal Grandfather     No Known Problems Paternal Grandmother     No Known Problems Paternal Grandfather      Social History     Social History    Marital status: Single     Spouse name: N/A    Number of children: 9    Years of education: N/A     Occupational History    Not on file.     Social History Main Topics    Smoking status: Never Smoker    Smokeless tobacco: Never Used    Alcohol use No    Drug use: No    Sexual activity: Yes     Partners: Female     Other Topics Concern    Not on file     Social History Narrative    No narrative on file     Past Surgical History:   Procedure Laterality Date    CARDIAC SURGERY      COLONOSCOPY      CORONARY ARTERY BYPASS GRAFT      HAND SURGERY      KNEE SURGERY      TRACHEOSTOMY TUBE PLACEMENT      WRIST FUSION         Labs:  Lab Results   Component Value Date    WBC 4.97 08/15/2017    HGB 10.8 (L) 08/15/2017    HCT 32.4 (L)  08/15/2017    MCV 98 08/15/2017     (L) 08/15/2017     BMP  Lab Results   Component Value Date     08/15/2017    K 3.7 08/15/2017     08/15/2017    CO2 23 08/15/2017    BUN 19 08/15/2017    CREATININE 1.5 (H) 08/15/2017    CALCIUM 9.8 08/15/2017    ANIONGAP 11 08/15/2017    ESTGFRAFRICA 53 (A) 08/15/2017    EGFRNONAA 46 (A) 08/15/2017     Lab Results   Component Value Date    ALT 17 08/15/2017    AST 24 08/15/2017    ALKPHOS 74 08/15/2017    BILITOT 0.3 08/15/2017       Lab Results   Component Value Date    IRON 81 07/21/2016    TIBC 302 07/21/2016    FERRITIN 324 (H) 07/21/2016     No results found for: [id number]  No results found for: FOLATE  No results found for: TSH      Review of Systems   Constitutional: Positive for activity change, fatigue and unexpected weight change. Negative for appetite change, chills, diaphoresis and fever.   HENT: Negative for congestion, dental problem, drooling, ear discharge, ear pain, facial swelling, hearing loss, mouth sores, nosebleeds, postnasal drip, rhinorrhea, sinus pressure, sneezing, sore throat, tinnitus, trouble swallowing and voice change.    Eyes: Negative for photophobia, pain, discharge, redness, itching and visual disturbance.   Respiratory: Negative for apnea, cough, choking, chest tightness, shortness of breath, wheezing and stridor.    Cardiovascular: Negative for chest pain, palpitations and leg swelling.   Gastrointestinal: Negative for abdominal distention, abdominal pain, anal bleeding, blood in stool, constipation, diarrhea, nausea, rectal pain and vomiting.   Endocrine: Negative for cold intolerance, heat intolerance, polydipsia, polyphagia and polyuria.   Genitourinary: Positive for flank pain. Negative for decreased urine volume, difficulty urinating, discharge, dysuria, enuresis, frequency, genital sores, hematuria, penile pain, penile swelling, scrotal swelling, testicular pain and urgency.   Musculoskeletal: Negative for  arthralgias, back pain, gait problem, joint swelling, myalgias, neck pain and neck stiffness.   Skin: Negative for color change, pallor, rash and wound.   Allergic/Immunologic: Negative for environmental allergies, food allergies and immunocompromised state.   Neurological: Positive for weakness. Negative for dizziness, tremors, seizures, syncope, facial asymmetry, speech difficulty, light-headedness, numbness and headaches.   Hematological: Negative for adenopathy. Does not bruise/bleed easily.   Psychiatric/Behavioral: Positive for dysphoric mood. Negative for agitation, behavioral problems, confusion, decreased concentration, hallucinations, self-injury, sleep disturbance and suicidal ideas. The patient is nervous/anxious. The patient is not hyperactive.        Objective:      Physical Exam   Constitutional: He is oriented to person, place, and time. He appears well-developed and well-nourished. He appears distressed.   HENT:   Head: Normocephalic.   Right Ear: External ear normal.   Left Ear: External ear normal.   Nose: Nose normal. Right sinus exhibits no maxillary sinus tenderness and no frontal sinus tenderness. Left sinus exhibits no maxillary sinus tenderness and no frontal sinus tenderness.   Mouth/Throat: Oropharynx is clear and moist. No oropharyngeal exudate.   Eyes: EOM and lids are normal. Pupils are equal, round, and reactive to light. Right eye exhibits no discharge. Left eye exhibits no discharge. Right conjunctiva is not injected. Right conjunctiva has no hemorrhage. Left conjunctiva is not injected. Left conjunctiva has no hemorrhage. No scleral icterus. Right eye exhibits normal extraocular motion. Left eye exhibits normal extraocular motion.   Neck: Normal range of motion. Neck supple. No JVD present. No tracheal deviation present. No thyromegaly present.   Cardiovascular: Normal rate, regular rhythm and normal heart sounds.    Pulmonary/Chest: Effort normal and breath sounds normal. No  stridor. No respiratory distress.   Abdominal: Soft. Bowel sounds are normal. He exhibits no mass. There is no hepatosplenomegaly, splenomegaly or hepatomegaly. There is tenderness. There is guarding.       Musculoskeletal: Normal range of motion. He exhibits no edema or tenderness.   Lymphadenopathy:        Head (right side): No posterior auricular and no occipital adenopathy present.        Head (left side): No posterior auricular and no occipital adenopathy present.     He has no cervical adenopathy.        Right cervical: No superficial cervical, no deep cervical and no posterior cervical adenopathy present.       Left cervical: No superficial cervical, no deep cervical and no posterior cervical adenopathy present.     He has no axillary adenopathy.        Right: No supraclavicular adenopathy present.        Left: No supraclavicular adenopathy present.   Neurological: He is alert and oriented to person, place, and time. He has normal strength. No cranial nerve deficit. Coordination normal.   Skin: Skin is dry. No rash noted. He is not diaphoretic. No cyanosis or erythema. Nails show no clubbing.   Psychiatric: He has a normal mood and affect. His behavior is normal. Judgment and thought content normal. Cognition and memory are normal.   Vitals reviewed.          Assessment:      1. Multiple myeloma not having achieved remission    2. Metastatic bone cancer           Plan:   Concern over progression of multiple myeloma will proceed with CT abdomen and pelvis without IV contrast.  Prescription for Roxicodone given baseline laboratory studies done urinalysis chest today reveals no evidence of pyuria. Psychosocial Distress screening score of Distress Score: 6 noted and reviewed. No intervention indicated.

## 2017-09-07 LAB
ALBUMIN SERPL ELPH-MCNC: 4.07 G/DL
ALPHA1 GLOB SERPL ELPH-MCNC: 0.34 G/DL
ALPHA2 GLOB SERPL ELPH-MCNC: 0.81 G/DL
B-GLOBULIN SERPL ELPH-MCNC: 1.01 G/DL
GAMMA GLOB SERPL ELPH-MCNC: 2.07 G/DL
KAPPA LC SER QL IA: 5.49 MG/DL
KAPPA LC/LAMBDA SER IA: 0.82
LAMBDA LC SER QL IA: 6.7 MG/DL
PROT SERPL-MCNC: 8.3 G/DL

## 2017-09-07 NOTE — PROGRESS NOTES
Subjective:       Patient ID: Familia Durbin Jr. is a 72 y.o. male.    Chief Complaint: No chief complaint on file.    HPI    Past Medical History:   Diagnosis Date    CAD (coronary artery disease)     luikart    Diabetes mellitus, type 2 1979    eye dr rocha    Hearing impaired     Hyperlipidemia     Hypertension     Lupus     Discoid    Old MI (myocardial infarction) 2012    Tracheostomy tube present      Family History   Problem Relation Age of Onset    Diabetes Mother     Diabetes Father     Prostate cancer Father     Pancreatic cancer Sister     No Known Problems Brother     Diabetes Maternal Uncle     Diabetes Maternal Grandmother     No Known Problems Maternal Grandfather     No Known Problems Paternal Grandmother     No Known Problems Paternal Grandfather      Social History     Social History    Marital status: Single     Spouse name: N/A    Number of children: 9    Years of education: N/A     Occupational History    Not on file.     Social History Main Topics    Smoking status: Never Smoker    Smokeless tobacco: Never Used    Alcohol use No    Drug use: No    Sexual activity: Yes     Partners: Female     Other Topics Concern    Not on file     Social History Narrative    No narrative on file     Past Surgical History:   Procedure Laterality Date    CARDIAC SURGERY      COLONOSCOPY      CORONARY ARTERY BYPASS GRAFT      HAND SURGERY      KNEE SURGERY      TRACHEOSTOMY TUBE PLACEMENT      WRIST FUSION         Labs:  Lab Results   Component Value Date    WBC 4.88 09/06/2017    HGB 11.7 (L) 09/06/2017    HCT 35.2 (L) 09/06/2017    MCV 98 09/06/2017     09/06/2017     BMP  Lab Results   Component Value Date     09/06/2017    K 4.6 09/06/2017     09/06/2017    CO2 28 09/06/2017    BUN 23 09/06/2017    CREATININE 1.5 (H) 09/06/2017    CALCIUM 9.8 09/06/2017    ANIONGAP 8 09/06/2017    ESTGFRAFRICA 53 (A) 09/06/2017    EGFRNONAA 46 (A) 09/06/2017     Lab  Results   Component Value Date    ALT 25 09/06/2017    AST 27 09/06/2017    ALKPHOS 77 09/06/2017    BILITOT 0.3 09/06/2017       Lab Results   Component Value Date    IRON 81 07/21/2016    TIBC 302 07/21/2016    FERRITIN 324 (H) 07/21/2016     No results found for: [id number]  No results found for: FOLATE  No results found for: TSH      Review of Systems    Objective:      Physical Exam        Assessment:      No diagnosis found.       Plan:

## 2017-09-08 LAB
INTERPRETATION SERPL IFE-IMP: NORMAL
PATHOLOGIST INTERPRETATION IFE: NORMAL
PATHOLOGIST INTERPRETATION SPE: NORMAL

## 2017-09-12 ENCOUNTER — TELEPHONE (OUTPATIENT)
Dept: RADIOLOGY | Facility: HOSPITAL | Age: 72
End: 2017-09-12

## 2017-09-12 DIAGNOSIS — I10 ESSENTIAL HYPERTENSION: ICD-10-CM

## 2017-09-14 ENCOUNTER — TELEPHONE (OUTPATIENT)
Dept: RADIOLOGY | Facility: HOSPITAL | Age: 72
End: 2017-09-14

## 2017-09-14 RX ORDER — METOPROLOL TARTRATE 50 MG/1
TABLET ORAL
Qty: 60 TABLET | Refills: 1 | Status: SHIPPED | OUTPATIENT
Start: 2017-09-14 | End: 2017-11-09 | Stop reason: SDUPTHER

## 2017-09-15 ENCOUNTER — OFFICE VISIT (OUTPATIENT)
Dept: HEMATOLOGY/ONCOLOGY | Facility: CLINIC | Age: 72
End: 2017-09-15
Payer: MEDICARE

## 2017-09-15 ENCOUNTER — HOSPITAL ENCOUNTER (OUTPATIENT)
Dept: RADIOLOGY | Facility: HOSPITAL | Age: 72
Discharge: HOME | End: 2017-09-15
Attending: INTERNAL MEDICINE
Payer: MEDICARE

## 2017-09-15 VITALS
BODY MASS INDEX: 26.01 KG/M2 | WEIGHT: 181.69 LBS | TEMPERATURE: 98 F | SYSTOLIC BLOOD PRESSURE: 100 MMHG | RESPIRATION RATE: 18 BRPM | HEIGHT: 70 IN | OXYGEN SATURATION: 99 % | HEART RATE: 81 BPM | DIASTOLIC BLOOD PRESSURE: 60 MMHG

## 2017-09-15 DIAGNOSIS — C90.00 MULTIPLE MYELOMA NOT HAVING ACHIEVED REMISSION: Primary | ICD-10-CM

## 2017-09-15 PROCEDURE — 74176 CT ABD & PELVIS W/O CONTRAST: CPT | Mod: 26,,, | Performed by: RADIOLOGY

## 2017-09-15 PROCEDURE — 74176 CT ABD & PELVIS W/O CONTRAST: CPT | Mod: TC,PO

## 2017-09-15 PROCEDURE — 99214 OFFICE O/P EST MOD 30 MIN: CPT | Mod: S$GLB,,, | Performed by: INTERNAL MEDICINE

## 2017-09-15 PROCEDURE — 3078F DIAST BP <80 MM HG: CPT | Mod: S$GLB,,, | Performed by: INTERNAL MEDICINE

## 2017-09-15 PROCEDURE — 3008F BODY MASS INDEX DOCD: CPT | Mod: S$GLB,,, | Performed by: INTERNAL MEDICINE

## 2017-09-15 PROCEDURE — 99999 PR PBB SHADOW E&M-EST. PATIENT-LVL V: CPT | Mod: PBBFAC,,, | Performed by: INTERNAL MEDICINE

## 2017-09-15 PROCEDURE — 25500020 PHARM REV CODE 255: Mod: PO | Performed by: INTERNAL MEDICINE

## 2017-09-15 PROCEDURE — 1159F MED LIST DOCD IN RCRD: CPT | Mod: S$GLB,,, | Performed by: INTERNAL MEDICINE

## 2017-09-15 PROCEDURE — 3074F SYST BP LT 130 MM HG: CPT | Mod: S$GLB,,, | Performed by: INTERNAL MEDICINE

## 2017-09-15 PROCEDURE — 1125F AMNT PAIN NOTED PAIN PRSNT: CPT | Mod: S$GLB,,, | Performed by: INTERNAL MEDICINE

## 2017-09-15 RX ADMIN — IOHEXOL 30 ML: 350 INJECTION, SOLUTION INTRAVENOUS at 01:09

## 2017-09-15 NOTE — PROGRESS NOTES
Subjective:       Patient ID: Familia Durbin Jr. is a 72 y.o. male.    Chief Complaint: Follow-up    HPI 72-year-old male followed at [facility] currently receiving second line therapy with NINALARDO .  The patient has persistent right pain in his superior aspect of his pelvis    Past Medical History:   Diagnosis Date    CAD (coronary artery disease)     luikart    Diabetes mellitus, type 2 1979    eye dr rocha    Hearing impaired     Hyperlipidemia     Hypertension     Lupus     Discoid    Old MI (myocardial infarction) 2012    Tracheostomy tube present      Family History   Problem Relation Age of Onset    Diabetes Mother     Diabetes Father     Prostate cancer Father     Pancreatic cancer Sister     No Known Problems Brother     Diabetes Maternal Uncle     Diabetes Maternal Grandmother     No Known Problems Maternal Grandfather     No Known Problems Paternal Grandmother     No Known Problems Paternal Grandfather      Social History     Social History    Marital status: Single     Spouse name: N/A    Number of children: 9    Years of education: N/A     Occupational History    Not on file.     Social History Main Topics    Smoking status: Never Smoker    Smokeless tobacco: Never Used    Alcohol use No    Drug use: No    Sexual activity: Yes     Partners: Female     Other Topics Concern    Not on file     Social History Narrative    No narrative on file     Past Surgical History:   Procedure Laterality Date    CARDIAC SURGERY      COLONOSCOPY      CORONARY ARTERY BYPASS GRAFT      HAND SURGERY      KNEE SURGERY      TRACHEOSTOMY TUBE PLACEMENT      WRIST FUSION         Labs:  Lab Results   Component Value Date    WBC 4.88 09/06/2017    HGB 11.7 (L) 09/06/2017    HCT 35.2 (L) 09/06/2017    MCV 98 09/06/2017     09/06/2017     BMP  Lab Results   Component Value Date     09/06/2017    K 4.6 09/06/2017     09/06/2017     CO2 28 09/06/2017    BUN 23 09/06/2017    CREATININE 1.5 (H) 09/06/2017    CALCIUM 9.8 09/06/2017    ANIONGAP 8 09/06/2017    ESTGFRAFRICA 53 (A) 09/06/2017    EGFRNONAA 46 (A) 09/06/2017     Lab Results   Component Value Date    ALT 25 09/06/2017    AST 27 09/06/2017    ALKPHOS 77 09/06/2017    BILITOT 0.3 09/06/2017       Lab Results   Component Value Date    IRON 81 07/21/2016    TIBC 302 07/21/2016    FERRITIN 324 (H) 07/21/2016     No results found for: [id number]  No results found for: FOLATE  No results found for: TSH      Review of Systems   Constitutional: Positive for activity change and fatigue. Negative for appetite change, chills, diaphoresis, fever and unexpected weight change.   HENT: Negative for congestion, dental problem, drooling, ear discharge, ear pain, facial swelling, hearing loss, mouth sores, nosebleeds, postnasal drip, rhinorrhea, sinus pressure, sneezing, sore throat, tinnitus, trouble swallowing and voice change.    Eyes: Negative for photophobia, pain, discharge, redness, itching and visual disturbance.   Respiratory: Negative for apnea, cough, choking, chest tightness, shortness of breath, wheezing and stridor.    Cardiovascular: Negative for chest pain, palpitations and leg swelling.   Gastrointestinal: Negative for abdominal distention, abdominal pain, anal bleeding, blood in stool, constipation, diarrhea, nausea, rectal pain and vomiting.   Endocrine: Negative for cold intolerance, heat intolerance, polydipsia, polyphagia and polyuria.   Genitourinary: Negative for decreased urine volume, difficulty urinating, discharge, dysuria, enuresis, flank pain, frequency, genital sores, hematuria, penile pain, penile swelling, scrotal swelling, testicular pain and urgency.   Musculoskeletal: Positive for arthralgias, gait problem and myalgias. Negative for back pain, joint swelling, neck pain and neck stiffness.   Skin: Negative for color change, pallor, rash and wound.    Allergic/Immunologic: Negative for environmental allergies, food allergies and immunocompromised state.   Neurological: Positive for weakness. Negative for dizziness, tremors, seizures, syncope, facial asymmetry, speech difficulty, light-headedness, numbness and headaches.   Hematological: Negative for adenopathy. Does not bruise/bleed easily.   Psychiatric/Behavioral: Positive for dysphoric mood. Negative for agitation, behavioral problems, confusion, decreased concentration, hallucinations, self-injury, sleep disturbance and suicidal ideas. The patient is nervous/anxious. The patient is not hyperactive.        Objective:      Physical Exam   Constitutional: He is oriented to person, place, and time. He appears well-developed and well-nourished. He appears distressed.   HENT:   Head: Normocephalic.   Right Ear: External ear normal.   Left Ear: External ear normal.   Nose: Nose normal. Right sinus exhibits no maxillary sinus tenderness and no frontal sinus tenderness. Left sinus exhibits no maxillary sinus tenderness and no frontal sinus tenderness.   Mouth/Throat: Oropharynx is clear and moist. No oropharyngeal exudate.   Eyes: EOM and lids are normal. Pupils are equal, round, and reactive to light. Right eye exhibits no discharge. Left eye exhibits no discharge. Right conjunctiva is not injected. Right conjunctiva has no hemorrhage. Left conjunctiva is not injected. Left conjunctiva has no hemorrhage. No scleral icterus. Right eye exhibits normal extraocular motion. Left eye exhibits normal extraocular motion.   Neck: Normal range of motion. Neck supple. No JVD present. No tracheal deviation present. No thyromegaly present.   Cardiovascular: Normal rate and regular rhythm.    Pulmonary/Chest: Effort normal. No stridor. No respiratory distress.   Abdominal: Soft. He exhibits no mass. There is no hepatosplenomegaly, splenomegaly or hepatomegaly. There is no tenderness.   Musculoskeletal: Normal range of motion. He  exhibits no edema.        Legs:  Lymphadenopathy:        Head (right side): No posterior auricular and no occipital adenopathy present.        Head (left side): No posterior auricular and no occipital adenopathy present.     He has no cervical adenopathy.        Right cervical: No superficial cervical, no deep cervical and no posterior cervical adenopathy present.       Left cervical: No superficial cervical, no deep cervical and no posterior cervical adenopathy present.     He has no axillary adenopathy.        Right: No supraclavicular adenopathy present.        Left: No supraclavicular adenopathy present.   Neurological: He is alert and oriented to person, place, and time. He has normal strength. No cranial nerve deficit. Coordination normal.   Skin: Skin is dry. No rash noted. He is not diaphoretic. No cyanosis or erythema. Nails show no clubbing.   Psychiatric: His behavior is normal. Judgment and thought content normal. His mood appears anxious. Cognition and memory are normal. He exhibits a depressed mood.   Vitals reviewed.          Assessment:      1. Multiple myeloma not having achieved remission    2. Metastatic bone cancer           Plan:   CT IV contrast demonstrates no intra-abdominal mass.  There is widespread bony disease.  I would like radiation oncology to see the patient is see if any palliative radiation to be given to the superior aspect of his pelvis with is much bone sparing as possible from a bone marrow standpoint in future chemotherapeutic regimens.  He scheduled next week to be seen at [facility] in a copy of this note with his most recent CT both on paper as well as a disc will be sent with him.

## 2017-09-20 ENCOUNTER — INITIAL CONSULT (OUTPATIENT)
Dept: RADIATION ONCOLOGY | Facility: CLINIC | Age: 72
End: 2017-09-20
Payer: MEDICARE

## 2017-09-20 VITALS
SYSTOLIC BLOOD PRESSURE: 112 MMHG | DIASTOLIC BLOOD PRESSURE: 60 MMHG | WEIGHT: 180.75 LBS | BODY MASS INDEX: 25.88 KG/M2 | TEMPERATURE: 97 F | RESPIRATION RATE: 18 BRPM | HEIGHT: 70 IN | HEART RATE: 71 BPM

## 2017-09-20 DIAGNOSIS — C90.00 MULTIPLE MYELOMA NOT HAVING ACHIEVED REMISSION: Primary | ICD-10-CM

## 2017-09-20 PROCEDURE — 99999 PR PBB SHADOW E&M-EST. PATIENT-LVL III: CPT | Mod: PBBFAC,,, | Performed by: RADIOLOGY

## 2017-09-20 PROCEDURE — 1159F MED LIST DOCD IN RCRD: CPT | Mod: S$GLB,,, | Performed by: RADIOLOGY

## 2017-09-20 PROCEDURE — 3008F BODY MASS INDEX DOCD: CPT | Mod: S$GLB,,, | Performed by: RADIOLOGY

## 2017-09-20 PROCEDURE — 3074F SYST BP LT 130 MM HG: CPT | Mod: S$GLB,,, | Performed by: RADIOLOGY

## 2017-09-20 PROCEDURE — 1125F AMNT PAIN NOTED PAIN PRSNT: CPT | Mod: S$GLB,,, | Performed by: RADIOLOGY

## 2017-09-20 PROCEDURE — 3078F DIAST BP <80 MM HG: CPT | Mod: S$GLB,,, | Performed by: RADIOLOGY

## 2017-09-20 PROCEDURE — 99204 OFFICE O/P NEW MOD 45 MIN: CPT | Mod: S$GLB,,, | Performed by: RADIOLOGY

## 2017-09-20 NOTE — PROGRESS NOTES
REFERRING PHYSICIAN: Rodolfo Solo MD    PROBLEM: Mr Durbin is a 72 years old man with history of multiple myeloma now with pain in the right pelvic crest area.     OTHER MEDICAL HISTORY: Patient has never smoked. He has had coronary artery by pass, knee surgery and wrist fusion. He had an MI in 2012. He is treated or followed for coronary artery disease, diabetes, hyperlipidemia, hypertension and discoid lupus. PS is ECOG 1. Psychosocial Distress screening score of Distress Score: 1 noted and reviewed. No intervention indicated.    PRIOR CANCER HISTORY: A diagnosis of multiple myeloma was made from bone marrow biopsy on 7/21/16. He sought treatment at [facility] in [place]. While there in about August 2016 he required a tracheostomy and intubation because of a mass obstructing the upper airway.  He has had systemic treatment in [place].     He describes radiation treatment in [place] to two sites. One was to the sternum, neck or thoracic spine and a second was to the right side of the pelvis. The sternum-neck field may have been treatment for the airway obstructing mass. Radiation treatment to the pelvis was approximately 6 months ago and followed a vertebroplasty of L4.     PRESENT ILLNESS: He comes now because of development of pain in the low back. CT of the abdomen and pelvis on 9/15/17 shows extensive destruction of the visualized skeleton, osteopenia and the L4 vertebroplasty.     PHYSICAL EXAM: Patient is an alert slender man who responds appropriately with normal voice. The respirations are normal. He indicates pain and tenderness centering at a point above the right pelvic crest that is without radiation. There are no obvious radiation changes in the area suggesting that the dose given was low as is usual for multiple myeloma. There are no evident neurologic deficits. The gait is normal.     RADIOLOGIC STUDIES: As noted above.     LABORATORY STUDIES: On 9/6/17 the Hb is 11.7  with a wbc of 4,880 and a platelet count of 166,000. Comprehensive metabolic panel is remarkable for creatinine marginally elevated to 1.5 and glucose of 159.  On 9/6/17 the monoclonal band has 0.2 gm/dl which was unchanged from prior level of 7/20/17.     IMPRESSION: Because almost all the visualized skeleton has extensive bone destruction and no significant soft tissue masses are seen, it is difficult to be sure where the pain originates from. Its location suggests the right superior iliac wing, the sacrum and L4 and L5 vertebra.  Patient may benefit from irradiation of these sites and this is offered to him.     PLAN: He is going to [place] tomorrow and returns on 9/26. He will return 9/27/17 for radiation treatment planning simulation. He will bring with him the radiation treatment summary from [place]. (45 minutes in discussion with patient.).

## 2017-09-20 NOTE — LETTER
September 20, 2017      Rodolfo Solo MD  [address]  [facility] [address]           [facility]iation [facility]  [address] [place] [id number]  Phone: [phone]  Fax: [phone]          Patient: Familia Durbin Jr.   MR Number: [id number]   YOB: 1945   Date of Visit: 9/20/2017       Dear Dr. Rodolfo Solo:    Thank you for referring Familia Durbin to me for evaluation. Attached you will find relevant portions of my assessment and plan of care.    If you have questions, please do not hesitate to call me. I look forward to following Familia Durbin along with you.    Sincerely,    Rios Phelan MD    Enclosure  CC:  No Recipients    If you would like to receive this communication electronically, please contact [email or web address] or [phone] to request more information on [employer] access.    For providers and/or their staff who would like to refer a patient to [facility], please contact us through our one-stop-shop provider referral line, [facility], at [phone].    If you feel you have received this communication in error or would no longer like to receive these types of communications, please e-mail [email or web address]

## 2017-09-27 ENCOUNTER — TELEPHONE (OUTPATIENT)
Dept: RADIATION ONCOLOGY | Facility: CLINIC | Age: 72
End: 2017-09-27

## 2017-09-27 NOTE — TELEPHONE ENCOUNTER
Called and left message for patient re: missed sim appt.  No message availability at other number. Will continue to follow up.

## 2017-10-05 ENCOUNTER — TELEPHONE (OUTPATIENT)
Dept: HEMATOLOGY/ONCOLOGY | Facility: CLINIC | Age: 72
End: 2017-10-05

## 2017-10-09 DIAGNOSIS — Z79.4 TYPE 2 DIABETES MELLITUS WITHOUT COMPLICATION, WITH LONG-TERM CURRENT USE OF INSULIN: Chronic | ICD-10-CM

## 2017-10-09 DIAGNOSIS — Z79.4 INSULIN LONG-TERM USE: Chronic | ICD-10-CM

## 2017-10-09 DIAGNOSIS — I10 ESSENTIAL HYPERTENSION: Chronic | ICD-10-CM

## 2017-10-09 DIAGNOSIS — Z79.4 TYPE 2 DIABETES MELLITUS WITH HYPERGLYCEMIA, WITH LONG-TERM CURRENT USE OF INSULIN: ICD-10-CM

## 2017-10-09 DIAGNOSIS — E78.5 HYPERLIPIDEMIA, UNSPECIFIED HYPERLIPIDEMIA TYPE: Chronic | ICD-10-CM

## 2017-10-09 DIAGNOSIS — Z79.52 LONG TERM CURRENT USE OF SYSTEMIC STEROIDS: ICD-10-CM

## 2017-10-09 DIAGNOSIS — E11.65 TYPE 2 DIABETES MELLITUS WITH HYPERGLYCEMIA, WITH LONG-TERM CURRENT USE OF INSULIN: ICD-10-CM

## 2017-10-09 DIAGNOSIS — E11.9 TYPE 2 DIABETES MELLITUS WITHOUT COMPLICATION, WITH LONG-TERM CURRENT USE OF INSULIN: Chronic | ICD-10-CM

## 2017-10-10 ENCOUNTER — LAB VISIT (OUTPATIENT)
Dept: LAB | Facility: HOSPITAL | Age: 72
End: 2017-10-10
Attending: INTERNAL MEDICINE
Payer: MEDICARE

## 2017-10-10 ENCOUNTER — OFFICE VISIT (OUTPATIENT)
Dept: HEMATOLOGY/ONCOLOGY | Facility: CLINIC | Age: 72
End: 2017-10-10
Payer: MEDICARE

## 2017-10-10 VITALS
HEIGHT: 70 IN | HEART RATE: 63 BPM | WEIGHT: 176.38 LBS | TEMPERATURE: 97 F | OXYGEN SATURATION: 90 % | DIASTOLIC BLOOD PRESSURE: 60 MMHG | BODY MASS INDEX: 25.25 KG/M2 | SYSTOLIC BLOOD PRESSURE: 131 MMHG

## 2017-10-10 DIAGNOSIS — C90.00 MULTIPLE MYELOMA NOT HAVING ACHIEVED REMISSION: Primary | ICD-10-CM

## 2017-10-10 DIAGNOSIS — Z79.4 INSULIN LONG-TERM USE: Chronic | ICD-10-CM

## 2017-10-10 DIAGNOSIS — C90.00 MULTIPLE MYELOMA NOT HAVING ACHIEVED REMISSION: ICD-10-CM

## 2017-10-10 LAB
ALBUMIN SERPL BCP-MCNC: 3.7 G/DL
ALP SERPL-CCNC: 62 U/L
ALT SERPL W/O P-5'-P-CCNC: 19 U/L
ANION GAP SERPL CALC-SCNC: 7 MMOL/L
AST SERPL-CCNC: 25 U/L
BASOPHILS # BLD AUTO: 0.02 K/UL
BASOPHILS NFR BLD: 0.6 %
BILIRUB SERPL-MCNC: 0.3 MG/DL
BUN SERPL-MCNC: 19 MG/DL
CALCIUM SERPL-MCNC: 9.5 MG/DL
CHLORIDE SERPL-SCNC: 108 MMOL/L
CO2 SERPL-SCNC: 26 MMOL/L
CREAT SERPL-MCNC: 1.5 MG/DL
DIFFERENTIAL METHOD: ABNORMAL
EOSINOPHIL # BLD AUTO: 0 K/UL
EOSINOPHIL NFR BLD: 0.6 %
ERYTHROCYTE [DISTWIDTH] IN BLOOD BY AUTOMATED COUNT: 15 %
EST. GFR  (AFRICAN AMERICAN): 53 ML/MIN/1.73 M^2
EST. GFR  (NON AFRICAN AMERICAN): 46 ML/MIN/1.73 M^2
GLUCOSE SERPL-MCNC: 52 MG/DL
HCT VFR BLD AUTO: 34.3 %
HGB BLD-MCNC: 11.6 G/DL
LYMPHOCYTES # BLD AUTO: 1.5 K/UL
LYMPHOCYTES NFR BLD: 42.1 %
MCH RBC QN AUTO: 33 PG
MCHC RBC AUTO-ENTMCNC: 33.8 G/DL
MCV RBC AUTO: 97 FL
MONOCYTES # BLD AUTO: 0.4 K/UL
MONOCYTES NFR BLD: 12.1 %
NEUTROPHILS # BLD AUTO: 1.6 K/UL
NEUTROPHILS NFR BLD: 44.6 %
PLATELET # BLD AUTO: 191 K/UL
PMV BLD AUTO: 9.3 FL
POTASSIUM SERPL-SCNC: 4 MMOL/L
PROT SERPL-MCNC: 8.6 G/DL
RBC # BLD AUTO: 3.52 M/UL
SODIUM SERPL-SCNC: 141 MMOL/L
WBC # BLD AUTO: 3.54 K/UL

## 2017-10-10 PROCEDURE — 83036 HEMOGLOBIN GLYCOSYLATED A1C: CPT

## 2017-10-10 PROCEDURE — 36415 COLL VENOUS BLD VENIPUNCTURE: CPT | Mod: PO

## 2017-10-10 PROCEDURE — 84165 PROTEIN E-PHORESIS SERUM: CPT

## 2017-10-10 PROCEDURE — 99214 OFFICE O/P EST MOD 30 MIN: CPT | Mod: S$GLB,,, | Performed by: INTERNAL MEDICINE

## 2017-10-10 PROCEDURE — 99999 PR PBB SHADOW E&M-EST. PATIENT-LVL III: CPT | Mod: PBBFAC,,, | Performed by: INTERNAL MEDICINE

## 2017-10-10 PROCEDURE — 80053 COMPREHEN METABOLIC PANEL: CPT | Mod: PO

## 2017-10-10 PROCEDURE — 84165 PROTEIN E-PHORESIS SERUM: CPT | Mod: 26,,, | Performed by: PATHOLOGY

## 2017-10-10 PROCEDURE — 85025 COMPLETE CBC W/AUTO DIFF WBC: CPT | Mod: PO

## 2017-10-10 PROCEDURE — 83520 IMMUNOASSAY QUANT NOS NONAB: CPT

## 2017-10-10 RX ORDER — PIOGLITAZONEHYDROCHLORIDE 30 MG/1
TABLET ORAL
Qty: 90 TABLET | Refills: 0 | Status: SHIPPED | OUTPATIENT
Start: 2017-10-10 | End: 2018-01-22 | Stop reason: SDUPTHER

## 2017-10-10 NOTE — PROGRESS NOTES
Subjective:       Patient ID: Familia Durbin Jr. is a 72 y.o. male.    Chief Complaint: Results and Multiple Myeloma    HPI 72-year-old male history of myeloma patient returns for review; patients currently treated at [facility] recommendation for radiation for palliation for pain declined.  By treating physicians in [place] according to patient patient is currently receivingNINALARDO .    Past Medical History:   Diagnosis Date    CAD (coronary artery disease)     luikart    Diabetes mellitus, type 2 1979    eye dr rocha    Hearing impaired     Hyperlipidemia     Hypertension     Lupus     Discoid    Old MI (myocardial infarction) 2012    Tracheostomy tube present      Family History   Problem Relation Age of Onset    Diabetes Mother     Diabetes Father     Prostate cancer Father     Pancreatic cancer Sister     No Known Problems Brother     Diabetes Maternal Uncle     Diabetes Maternal Grandmother     No Known Problems Maternal Grandfather     No Known Problems Paternal Grandmother     No Known Problems Paternal Grandfather      Social History     Social History    Marital status: Single     Spouse name: N/A    Number of children: 9    Years of education: N/A     Occupational History    Not on file.     Social History Main Topics    Smoking status: Never Smoker    Smokeless tobacco: Never Used    Alcohol use No    Drug use: No    Sexual activity: Yes     Partners: Female     Other Topics Concern    Not on file     Social History Narrative    No narrative on file     Past Surgical History:   Procedure Laterality Date    CARDIAC SURGERY      COLONOSCOPY      CORONARY ARTERY BYPASS GRAFT      HAND SURGERY      KNEE SURGERY      TRACHEOSTOMY TUBE PLACEMENT      WRIST FUSION         Labs:  Lab Results   Component Value Date    WBC 3.54 (L) 10/10/2017    HGB 11.6 (L) 10/10/2017    HCT 34.3 (L) 10/10/2017    MCV 97 10/10/2017      10/10/2017     BMP  Lab Results   Component Value Date     09/06/2017    K 4.6 09/06/2017     09/06/2017    CO2 28 09/06/2017    BUN 23 09/06/2017    CREATININE 1.5 (H) 09/06/2017    CALCIUM 9.8 09/06/2017    ANIONGAP 8 09/06/2017    ESTGFRAFRICA 53 (A) 09/06/2017    EGFRNONAA 46 (A) 09/06/2017     Lab Results   Component Value Date    ALT 25 09/06/2017    AST 27 09/06/2017    ALKPHOS 77 09/06/2017    BILITOT 0.3 09/06/2017       Lab Results   Component Value Date    IRON 81 07/21/2016    TIBC 302 07/21/2016    FERRITIN 324 (H) 07/21/2016     No results found for: [id number]  No results found for: FOLATE  No results found for: TSH      Review of Systems   Constitutional: Positive for activity change and fatigue. Negative for appetite change, chills, diaphoresis, fever and unexpected weight change.   HENT: Negative for congestion, dental problem, drooling, ear discharge, ear pain, facial swelling, hearing loss, mouth sores, nosebleeds, postnasal drip, rhinorrhea, sinus pressure, sneezing, sore throat, tinnitus, trouble swallowing and voice change.    Eyes: Negative for photophobia, pain, discharge, redness, itching and visual disturbance.   Respiratory: Negative for apnea, cough, choking, chest tightness, shortness of breath, wheezing and stridor.    Cardiovascular: Negative for chest pain, palpitations and leg swelling.   Gastrointestinal: Negative for abdominal distention, abdominal pain, anal bleeding, blood in stool, constipation, diarrhea, nausea, rectal pain and vomiting.   Endocrine: Negative for cold intolerance, heat intolerance, polydipsia, polyphagia and polyuria.   Genitourinary: Negative for decreased urine volume, difficulty urinating, discharge, dysuria, enuresis, flank pain, frequency, genital sores, hematuria, penile pain, penile swelling, scrotal swelling, testicular pain and urgency.   Musculoskeletal: Positive for arthralgias, back pain, gait problem and myalgias. Negative for joint  swelling, neck pain and neck stiffness.   Skin: Negative for color change, pallor, rash and wound.   Allergic/Immunologic: Negative for environmental allergies, food allergies and immunocompromised state.   Neurological: Positive for weakness. Negative for dizziness, tremors, seizures, syncope, facial asymmetry, speech difficulty, light-headedness, numbness and headaches.   Hematological: Negative for adenopathy. Does not bruise/bleed easily.   Psychiatric/Behavioral: Positive for dysphoric mood. Negative for agitation, behavioral problems, confusion, decreased concentration, hallucinations, self-injury, sleep disturbance and suicidal ideas. The patient is nervous/anxious. The patient is not hyperactive.        Objective:      Physical Exam   Constitutional: He is oriented to person, place, and time. He has a sickly appearance. He appears ill. He appears distressed.   HENT:   Head: Normocephalic.   Right Ear: External ear normal.   Left Ear: External ear normal.   Nose: Nose normal. Right sinus exhibits no maxillary sinus tenderness and no frontal sinus tenderness. Left sinus exhibits no maxillary sinus tenderness and no frontal sinus tenderness.   Mouth/Throat: Oropharynx is clear and moist. No oropharyngeal exudate.   Eyes: EOM and lids are normal. Pupils are equal, round, and reactive to light. Right eye exhibits no discharge. Left eye exhibits no discharge. Right conjunctiva is not injected. Right conjunctiva has no hemorrhage. Left conjunctiva is not injected. Left conjunctiva has no hemorrhage. No scleral icterus. Right eye exhibits normal extraocular motion. Left eye exhibits normal extraocular motion.   Neck: Normal range of motion. Neck supple. No JVD present. No tracheal deviation present. No thyromegaly present.   Cardiovascular: Normal rate, regular rhythm and normal heart sounds.    Pulmonary/Chest: Effort normal and breath sounds normal. No stridor. No respiratory distress.   Abdominal: Soft. Bowel  sounds are normal. He exhibits no mass. There is no hepatosplenomegaly, splenomegaly or hepatomegaly. There is no tenderness.   Musculoskeletal: Normal range of motion. He exhibits no edema or tenderness.   Lymphadenopathy:        Head (right side): No posterior auricular and no occipital adenopathy present.        Head (left side): No posterior auricular and no occipital adenopathy present.     He has no cervical adenopathy.        Right cervical: No superficial cervical, no deep cervical and no posterior cervical adenopathy present.       Left cervical: No superficial cervical, no deep cervical and no posterior cervical adenopathy present.     He has no axillary adenopathy.        Right: No supraclavicular adenopathy present.        Left: No supraclavicular adenopathy present.   Neurological: He is alert and oriented to person, place, and time. He has normal strength. No cranial nerve deficit. Coordination normal.   Skin: Skin is dry. No rash noted. He is not diaphoretic. No cyanosis or erythema. Nails show no clubbing.   Psychiatric: His behavior is normal. Judgment and thought content normal. His mood appears anxious. Cognition and memory are normal. He exhibits a depressed mood.   Vitals reviewed.          Assessment:      1. Multiple myeloma not having achieved remission    2. Metastatic bone cancer           Plan:   Laboratory review pending will contact patient results to patient portal otherwise see back in 6 weeks narcotics medicines being filled by physicians in [place] [facility]

## 2017-10-11 LAB
ALBUMIN SERPL ELPH-MCNC: 3.92 G/DL
ALPHA1 GLOB SERPL ELPH-MCNC: 0.35 G/DL
ALPHA2 GLOB SERPL ELPH-MCNC: 0.8 G/DL
B-GLOBULIN SERPL ELPH-MCNC: 1.03 G/DL
ESTIMATED AVG GLUCOSE: 177 MG/DL
GAMMA GLOB SERPL ELPH-MCNC: 2 G/DL
HBA1C MFR BLD HPLC: 7.8 %
KAPPA LC SER QL IA: 6.96 MG/DL
KAPPA LC/LAMBDA SER IA: 1.9
LAMBDA LC SER QL IA: 3.66 MG/DL
PATHOLOGIST INTERPRETATION SPE: NORMAL
PROT SERPL-MCNC: 8.1 G/DL

## 2017-11-09 DIAGNOSIS — I10 ESSENTIAL HYPERTENSION: ICD-10-CM

## 2017-11-13 RX ORDER — METOPROLOL TARTRATE 50 MG/1
TABLET ORAL
Qty: 60 TABLET | Refills: 1 | Status: SHIPPED | OUTPATIENT
Start: 2017-11-13 | End: 2017-11-16 | Stop reason: SDUPTHER

## 2017-11-16 DIAGNOSIS — I10 ESSENTIAL HYPERTENSION: ICD-10-CM

## 2017-11-16 RX ORDER — METOPROLOL TARTRATE 50 MG/1
50 TABLET ORAL 2 TIMES DAILY
Qty: 60 TABLET | Refills: 1 | Status: SHIPPED | OUTPATIENT
Start: 2017-11-16 | End: 2018-03-13 | Stop reason: SDUPTHER

## 2017-11-21 ENCOUNTER — TELEPHONE (OUTPATIENT)
Dept: HEMATOLOGY/ONCOLOGY | Facility: CLINIC | Age: 72
End: 2017-11-21

## 2017-12-07 DIAGNOSIS — C90.00 MULTIPLE MYELOMA NOT HAVING ACHIEVED REMISSION: ICD-10-CM

## 2017-12-07 RX ORDER — FENTANYL 12.5 UG/1
1 PATCH TRANSDERMAL
Qty: 10 PATCH | Refills: 0 | Status: SHIPPED | OUTPATIENT
Start: 2017-12-07 | End: 2017-12-08

## 2017-12-07 RX ORDER — OXYCODONE HYDROCHLORIDE 20 MG/1
20 TABLET, FILM COATED, EXTENDED RELEASE ORAL EVERY 12 HOURS
Qty: 60 TABLET | Refills: 0 | Status: SHIPPED | OUTPATIENT
Start: 2017-12-07 | End: 2017-12-08 | Stop reason: SDUPTHER

## 2017-12-07 RX ORDER — OXYCODONE HYDROCHLORIDE 20 MG/1
TABLET, FILM COATED, EXTENDED RELEASE ORAL
COMMUNITY
Start: 2017-10-24 | End: 2017-12-07 | Stop reason: SDUPTHER

## 2017-12-07 RX ORDER — OXYCODONE HYDROCHLORIDE 10 MG/1
10 TABLET ORAL EVERY 6 HOURS PRN
Qty: 60 TABLET | Refills: 0 | Status: SHIPPED | OUTPATIENT
Start: 2017-12-07 | End: 2017-12-08 | Stop reason: SDUPTHER

## 2017-12-08 ENCOUNTER — OFFICE VISIT (OUTPATIENT)
Dept: HEMATOLOGY/ONCOLOGY | Facility: CLINIC | Age: 72
End: 2017-12-08
Payer: MEDICARE

## 2017-12-08 ENCOUNTER — LAB VISIT (OUTPATIENT)
Dept: LAB | Facility: HOSPITAL | Age: 72
End: 2017-12-08
Attending: INTERNAL MEDICINE
Payer: MEDICARE

## 2017-12-08 VITALS
OXYGEN SATURATION: 94 % | BODY MASS INDEX: 27.24 KG/M2 | SYSTOLIC BLOOD PRESSURE: 140 MMHG | HEIGHT: 70 IN | DIASTOLIC BLOOD PRESSURE: 82 MMHG | HEART RATE: 82 BPM | WEIGHT: 190.25 LBS | RESPIRATION RATE: 18 BRPM | TEMPERATURE: 98 F

## 2017-12-08 DIAGNOSIS — C90.00 MULTIPLE MYELOMA NOT HAVING ACHIEVED REMISSION: Primary | ICD-10-CM

## 2017-12-08 DIAGNOSIS — C90.00 MULTIPLE MYELOMA NOT HAVING ACHIEVED REMISSION: ICD-10-CM

## 2017-12-08 LAB
ALBUMIN SERPL BCP-MCNC: 3.8 G/DL
ALP SERPL-CCNC: 56 U/L
ALT SERPL W/O P-5'-P-CCNC: 15 U/L
ANION GAP SERPL CALC-SCNC: 8 MMOL/L
AST SERPL-CCNC: 18 U/L
BASOPHILS # BLD AUTO: 0.02 K/UL
BASOPHILS NFR BLD: 0.6 %
BILIRUB SERPL-MCNC: 0.4 MG/DL
BUN SERPL-MCNC: 22 MG/DL
CALCIUM SERPL-MCNC: 9.4 MG/DL
CHLORIDE SERPL-SCNC: 106 MMOL/L
CO2 SERPL-SCNC: 26 MMOL/L
CREAT SERPL-MCNC: 1.5 MG/DL
DIFFERENTIAL METHOD: ABNORMAL
EOSINOPHIL # BLD AUTO: 0.1 K/UL
EOSINOPHIL NFR BLD: 1.8 %
ERYTHROCYTE [DISTWIDTH] IN BLOOD BY AUTOMATED COUNT: 15.8 %
EST. GFR  (AFRICAN AMERICAN): 53 ML/MIN/1.73 M^2
EST. GFR  (NON AFRICAN AMERICAN): 46 ML/MIN/1.73 M^2
GLUCOSE SERPL-MCNC: 233 MG/DL
HCT VFR BLD AUTO: 35 %
HGB BLD-MCNC: 11.6 G/DL
LDH SERPL L TO P-CCNC: 138 U/L
LYMPHOCYTES # BLD AUTO: 1.5 K/UL
LYMPHOCYTES NFR BLD: 45.4 %
MCH RBC QN AUTO: 33 PG
MCHC RBC AUTO-ENTMCNC: 33.1 G/DL
MCV RBC AUTO: 99 FL
MONOCYTES # BLD AUTO: 0.6 K/UL
MONOCYTES NFR BLD: 17.5 %
NEUTROPHILS # BLD AUTO: 1.2 K/UL
NEUTROPHILS NFR BLD: 34.7 %
PLATELET # BLD AUTO: 256 K/UL
PMV BLD AUTO: 9.9 FL
POTASSIUM SERPL-SCNC: 4.1 MMOL/L
PROT SERPL-MCNC: 8.8 G/DL
RBC # BLD AUTO: 3.52 M/UL
SODIUM SERPL-SCNC: 140 MMOL/L
WBC # BLD AUTO: 3.37 K/UL

## 2017-12-08 PROCEDURE — 80053 COMPREHEN METABOLIC PANEL: CPT | Mod: PO

## 2017-12-08 PROCEDURE — 99999 PR PBB SHADOW E&M-EST. PATIENT-LVL III: CPT | Mod: PBBFAC,,, | Performed by: INTERNAL MEDICINE

## 2017-12-08 PROCEDURE — 99214 OFFICE O/P EST MOD 30 MIN: CPT | Mod: S$GLB,,, | Performed by: INTERNAL MEDICINE

## 2017-12-08 PROCEDURE — 83615 LACTATE (LD) (LDH) ENZYME: CPT | Mod: PO

## 2017-12-08 PROCEDURE — 85025 COMPLETE CBC W/AUTO DIFF WBC: CPT | Mod: PO

## 2017-12-08 PROCEDURE — 36415 COLL VENOUS BLD VENIPUNCTURE: CPT | Mod: PO

## 2017-12-08 PROCEDURE — 83520 IMMUNOASSAY QUANT NOS NONAB: CPT

## 2017-12-08 PROCEDURE — 84165 PROTEIN E-PHORESIS SERUM: CPT | Mod: 26,,, | Performed by: PATHOLOGY

## 2017-12-08 PROCEDURE — 84165 PROTEIN E-PHORESIS SERUM: CPT

## 2017-12-08 RX ORDER — OXYCODONE HYDROCHLORIDE 10 MG/1
10 TABLET ORAL EVERY 6 HOURS PRN
Qty: 60 TABLET | Refills: 0 | Status: SHIPPED | OUTPATIENT
Start: 2017-12-08 | End: 2018-01-19 | Stop reason: SDUPTHER

## 2017-12-08 RX ORDER — OXYCODONE HYDROCHLORIDE 20 MG/1
20 TABLET, FILM COATED, EXTENDED RELEASE ORAL EVERY 12 HOURS
Qty: 60 TABLET | Refills: 0 | Status: SHIPPED | OUTPATIENT
Start: 2017-12-08 | End: 2018-01-19 | Stop reason: SDUPTHER

## 2017-12-08 NOTE — PROGRESS NOTES
Subjective:       Patient ID: Familia Durbin Jr. is a 72 y.o. male.    Chief Complaint: Follow-up; Results; and Multiple Myeloma    HPI 72-year-old with multiple myeloma patient is being treated at [facility] in [place] patient reports needing additional kyphoplasty be done there later patient reports persistent back pain    Past Medical History:   Diagnosis Date    CAD (coronary artery disease)     luikart    Diabetes mellitus, type 2 1979    eye dr rocha    Hearing impaired     Hyperlipidemia     Hypertension     Lupus     Discoid    Old MI (myocardial infarction) 2012    Tracheostomy tube present      Family History   Problem Relation Age of Onset    Diabetes Mother     Diabetes Father     Prostate cancer Father     Pancreatic cancer Sister     No Known Problems Brother     Diabetes Maternal Uncle     Diabetes Maternal Grandmother     No Known Problems Maternal Grandfather     No Known Problems Paternal Grandmother     No Known Problems Paternal Grandfather      Social History     Social History    Marital status: Single     Spouse name: N/A    Number of children: 9    Years of education: N/A     Occupational History    Not on file.     Social History Main Topics    Smoking status: Never Smoker    Smokeless tobacco: Never Used    Alcohol use No    Drug use: No    Sexual activity: Yes     Partners: Female     Other Topics Concern    Not on file     Social History Narrative    No narrative on file     Past Surgical History:   Procedure Laterality Date    CARDIAC SURGERY      COLONOSCOPY      CORONARY ARTERY BYPASS GRAFT      HAND SURGERY      KNEE SURGERY      TRACHEOSTOMY TUBE PLACEMENT      WRIST FUSION         Labs:  Lab Results   Component Value Date    WBC 3.37 (L) 12/08/2017    HGB 11.6 (L) 12/08/2017    HCT 35.0 (L) 12/08/2017    MCV 99 (H) 12/08/2017     12/08/2017     BMP  Lab Results   Component Value Date     10/10/2017    K  4.0 10/10/2017     10/10/2017    CO2 26 10/10/2017    BUN 19 10/10/2017    CREATININE 1.5 (H) 10/10/2017    CALCIUM 9.5 10/10/2017    ANIONGAP 7 (L) 10/10/2017    ESTGFRAFRICA 53 (A) 10/10/2017    EGFRNONAA 46 (A) 10/10/2017     Lab Results   Component Value Date    ALT 19 10/10/2017    AST 25 10/10/2017    ALKPHOS 62 10/10/2017    BILITOT 0.3 10/10/2017       Lab Results   Component Value Date    IRON 81 07/21/2016    TIBC 302 07/21/2016    FERRITIN 324 (H) 07/21/2016     No results found for: [id number]  No results found for: FOLATE  No results found for: TSH      Review of Systems   Constitutional: Positive for activity change and fatigue. Negative for appetite change, chills, diaphoresis, fever and unexpected weight change.   HENT: Negative for congestion, dental problem, drooling, ear discharge, ear pain, facial swelling, hearing loss, mouth sores, nosebleeds, postnasal drip, rhinorrhea, sinus pressure, sneezing, sore throat, tinnitus, trouble swallowing and voice change.    Eyes: Negative for photophobia, pain, discharge, redness, itching and visual disturbance.   Respiratory: Negative for apnea, cough, choking, chest tightness, shortness of breath, wheezing and stridor.    Cardiovascular: Negative for chest pain, palpitations and leg swelling.   Gastrointestinal: Negative for abdominal distention, abdominal pain, anal bleeding, blood in stool, constipation, diarrhea, nausea, rectal pain and vomiting.   Endocrine: Negative for cold intolerance, heat intolerance, polydipsia, polyphagia and polyuria.   Genitourinary: Negative for decreased urine volume, difficulty urinating, discharge, dysuria, enuresis, flank pain, frequency, genital sores, hematuria, penile pain, penile swelling, scrotal swelling, testicular pain and urgency.   Musculoskeletal: Positive for arthralgias and back pain. Negative for gait problem, joint swelling, myalgias, neck pain and neck stiffness.   Skin: Negative for color change,  pallor, rash and wound.   Allergic/Immunologic: Negative for environmental allergies, food allergies and immunocompromised state.   Neurological: Positive for weakness. Negative for dizziness, tremors, seizures, syncope, facial asymmetry, speech difficulty, light-headedness, numbness and headaches.   Hematological: Negative for adenopathy. Does not bruise/bleed easily.   Psychiatric/Behavioral: Positive for dysphoric mood. Negative for agitation, behavioral problems, confusion, decreased concentration, hallucinations, self-injury, sleep disturbance and suicidal ideas. The patient is nervous/anxious. The patient is not hyperactive.        Objective:      Physical Exam   Constitutional: He is oriented to person, place, and time. He has a sickly appearance. He appears ill. He appears distressed.   HENT:   Head: Normocephalic.   Right Ear: External ear normal.   Left Ear: External ear normal.   Nose: Nose normal. Right sinus exhibits no maxillary sinus tenderness and no frontal sinus tenderness. Left sinus exhibits no maxillary sinus tenderness and no frontal sinus tenderness.   Mouth/Throat: Oropharynx is clear and moist. No oropharyngeal exudate.   Eyes: EOM and lids are normal. Pupils are equal, round, and reactive to light. Right eye exhibits no discharge. Left eye exhibits no discharge. Right conjunctiva is not injected. Right conjunctiva has no hemorrhage. Left conjunctiva is not injected. Left conjunctiva has no hemorrhage. No scleral icterus. Right eye exhibits normal extraocular motion. Left eye exhibits normal extraocular motion.   Neck: Normal range of motion. Neck supple. No JVD present. No tracheal deviation present. No thyromegaly present.   Cardiovascular: Normal rate and regular rhythm.    Pulmonary/Chest: Effort normal. No stridor. No respiratory distress.   Abdominal: Soft. He exhibits no mass. There is no hepatosplenomegaly, splenomegaly or hepatomegaly. There is no tenderness.   Musculoskeletal:  Normal range of motion. He exhibits no edema or tenderness.   Lymphadenopathy:        Head (right side): No posterior auricular and no occipital adenopathy present.        Head (left side): No posterior auricular and no occipital adenopathy present.     He has no cervical adenopathy.        Right cervical: No superficial cervical, no deep cervical and no posterior cervical adenopathy present.       Left cervical: No superficial cervical, no deep cervical and no posterior cervical adenopathy present.     He has no axillary adenopathy.        Right: No supraclavicular adenopathy present.        Left: No supraclavicular adenopathy present.   Neurological: He is alert and oriented to person, place, and time. He has normal strength. No cranial nerve deficit. Coordination normal.   Skin: Skin is dry. No rash noted. He is not diaphoretic. No cyanosis or erythema. Nails show no clubbing.   Psychiatric: His behavior is normal. Judgment and thought content normal. His mood appears anxious. Cognition and memory are normal. He exhibits a depressed mood.   Vitals reviewed.          Assessment:      1. Multiple myeloma not having achieved remission    2. Metastatic bone cancer           Plan:   Current care for [facility] will be happy to see the patient I'll see the patient back for 6 weeks with labs prior for review communicate results of labs today through electronic portal

## 2017-12-11 LAB
ALBUMIN SERPL ELPH-MCNC: 3.94 G/DL
ALPHA1 GLOB SERPL ELPH-MCNC: 0.34 G/DL
ALPHA2 GLOB SERPL ELPH-MCNC: 0.81 G/DL
B-GLOBULIN SERPL ELPH-MCNC: 1.15 G/DL
GAMMA GLOB SERPL ELPH-MCNC: 2.07 G/DL
KAPPA LC SER QL IA: 8.47 MG/DL
KAPPA LC/LAMBDA SER IA: 1.75
LAMBDA LC SER QL IA: 4.83 MG/DL
PATHOLOGIST INTERPRETATION SPE: NORMAL
PROT SERPL-MCNC: 8.3 G/DL

## 2017-12-28 ENCOUNTER — OFFICE VISIT (OUTPATIENT)
Dept: INTERNAL MEDICINE | Facility: CLINIC | Age: 72
End: 2017-12-28
Payer: MEDICARE

## 2017-12-28 ENCOUNTER — TELEPHONE (OUTPATIENT)
Dept: INTERNAL MEDICINE | Facility: CLINIC | Age: 72
End: 2017-12-28

## 2017-12-28 VITALS
SYSTOLIC BLOOD PRESSURE: 136 MMHG | WEIGHT: 188.25 LBS | BODY MASS INDEX: 26.95 KG/M2 | HEIGHT: 70 IN | TEMPERATURE: 97 F | DIASTOLIC BLOOD PRESSURE: 60 MMHG

## 2017-12-28 DIAGNOSIS — B96.89 ACUTE BACTERIAL RHINOSINUSITIS: Primary | ICD-10-CM

## 2017-12-28 DIAGNOSIS — J01.90 ACUTE BACTERIAL RHINOSINUSITIS: Primary | ICD-10-CM

## 2017-12-28 LAB
FLUAV AG SPEC QL IA: NEGATIVE
FLUBV AG SPEC QL IA: NEGATIVE
SPECIMEN SOURCE: NORMAL

## 2017-12-28 PROCEDURE — 87400 INFLUENZA A/B EACH AG IA: CPT | Mod: 59,PO

## 2017-12-28 PROCEDURE — 99999 PR PBB SHADOW E&M-EST. PATIENT-LVL III: CPT | Mod: PBBFAC,,, | Performed by: FAMILY MEDICINE

## 2017-12-28 PROCEDURE — 99214 OFFICE O/P EST MOD 30 MIN: CPT | Mod: S$GLB,,, | Performed by: FAMILY MEDICINE

## 2017-12-28 RX ORDER — AMOXICILLIN AND CLAVULANATE POTASSIUM 875; 125 MG/1; MG/1
1 TABLET, FILM COATED ORAL 2 TIMES DAILY
Qty: 10 TABLET | Refills: 0 | Status: SHIPPED | OUTPATIENT
Start: 2017-12-28 | End: 2018-01-02

## 2017-12-28 NOTE — TELEPHONE ENCOUNTER
----- Message from Nik Ibrahim MD sent at 12/28/2017  3:34 PM CST -----  Influenza testing negative.  No Tamiflu needed.  Sent prescription for Augmentin antibiotic twice a day 5 days for bacterial sinusitis.

## 2017-12-28 NOTE — PROGRESS NOTES
"Subjective:   Patient ID: Familia Durbin Jr. is a 72 y.o. male.  Chief Complaint:  Nasal Congestion; bodyaches; and Chills    Sinusitis   This is a new problem. The current episode started in the past 7 days. The problem is unchanged. There has been no fever. His pain is at a severity of 3/10. The pain is mild. Associated symptoms include chills, congestion, coughing, diaphoresis, headaches and sinus pressure. Pertinent negatives include no ear pain, hoarse voice, neck pain, shortness of breath, sneezing, sore throat or swollen glands. Past treatments include nothing.     Review of Systems   Constitutional: Positive for chills, diaphoresis and fatigue. Negative for fever.   HENT: Positive for congestion, postnasal drip, rhinorrhea, sinus pain and sinus pressure. Negative for dental problem, drooling, ear discharge, ear pain, facial swelling, hearing loss, hoarse voice, mouth sores, nosebleeds, sneezing, sore throat, tinnitus, trouble swallowing and voice change.    Eyes: Negative for discharge, redness and visual disturbance.   Respiratory: Positive for cough. Negative for shortness of breath and wheezing.    Cardiovascular: Negative for chest pain, palpitations and leg swelling.   Gastrointestinal: Negative for abdominal pain, diarrhea, nausea and vomiting.   Musculoskeletal: Positive for myalgias. Negative for neck pain.   Skin: Negative for rash.   Neurological: Positive for headaches. Negative for dizziness and light-headedness.   Hematological: Negative for adenopathy.     Objective:   /60 (BP Location: Right arm, Patient Position: Sitting, BP Method: Small (Manual))   Temp 97.3 °F (36.3 °C) (Tympanic)   Ht 5' 10" (1.778 m)   Wt 85.4 kg (188 lb 4.4 oz)   BMI 27.01 kg/m²     Physical Exam   Constitutional: Vital signs are normal. He appears well-developed and well-nourished. He has a sickly appearance. No distress.   HENT:   Head: Normocephalic and atraumatic.   Right Ear: Hearing, tympanic membrane, " "external ear and ear canal normal.   Left Ear: Hearing, tympanic membrane, external ear and ear canal normal.   Nose: Mucosal edema and rhinorrhea present. Right sinus exhibits maxillary sinus tenderness. Right sinus exhibits no frontal sinus tenderness. Left sinus exhibits maxillary sinus tenderness. Left sinus exhibits no frontal sinus tenderness.   Mouth/Throat: Uvula is midline, oropharynx is clear and moist and mucous membranes are normal.   Eyes: Conjunctivae are normal. Right conjunctiva is not injected. Left conjunctiva is not injected.   Cardiovascular: Normal rate, regular rhythm and normal heart sounds.    Pulmonary/Chest: Effort normal and breath sounds normal. He has no wheezes. He has no rhonchi. He has no rales.   Musculoskeletal: He exhibits no edema.   Lymphadenopathy:     He has no cervical adenopathy.   Skin: Skin is warm and dry. No rash noted.     Assessment:     1. Acute bacterial rhinosinusitis      Plan:   Acute bacterial rhinosinusitis  -     Influenza antigen Nasopharyngeal Swab  -     amoxicillin-clavulanate 875-125mg (AUGMENTIN) 875-125 mg per tablet; Take 1 tablet by mouth 2 (two) times daily.  Dispense: 10 tablet; Refill: 0    Rapid influenza testing negative.  Patient overall with potential immunocompromised state.  Described "double sickening "" with sinus infection. Probable bacterial sinusitis.  Treat with Augmentin 875 milligrams twice a day for 5 days.  Follow-up with Dr. Elkins and all specialists as scheduled.    "

## 2018-01-19 ENCOUNTER — TELEPHONE (OUTPATIENT)
Dept: HEMATOLOGY/ONCOLOGY | Facility: CLINIC | Age: 73
End: 2018-01-19

## 2018-01-19 DIAGNOSIS — C90.00 MULTIPLE MYELOMA NOT HAVING ACHIEVED REMISSION: ICD-10-CM

## 2018-01-19 RX ORDER — OXYCODONE HYDROCHLORIDE 10 MG/1
10 TABLET ORAL EVERY 6 HOURS PRN
Qty: 60 TABLET | Refills: 0 | Status: SHIPPED | OUTPATIENT
Start: 2018-01-19 | End: 2018-01-26

## 2018-01-19 RX ORDER — OXYCODONE HYDROCHLORIDE 20 MG/1
20 TABLET, FILM COATED, EXTENDED RELEASE ORAL EVERY 12 HOURS
Qty: 60 TABLET | Refills: 0 | Status: SHIPPED | OUTPATIENT
Start: 2018-01-19 | End: 2018-01-26 | Stop reason: SDUPTHER

## 2018-01-19 NOTE — TELEPHONE ENCOUNTER
Patient needs a refill on oxycodone 10mg and oxycontin 20mg sent to [facility] on [address]

## 2018-01-19 NOTE — TELEPHONE ENCOUNTER
----- Message from Naomi Desai sent at 1/19/2018  1:22 PM CST -----  Contact: pt's daughter  She's calling stating that the pt needs a refill on his pain medications, unsure of names, also wants to see if he could be fit into the schedule on Monday if possible, please advise [phone]

## 2018-01-22 ENCOUNTER — LAB VISIT (OUTPATIENT)
Dept: LAB | Facility: HOSPITAL | Age: 73
End: 2018-01-22
Attending: PHYSICIAN ASSISTANT
Payer: COMMERCIAL

## 2018-01-22 ENCOUNTER — OFFICE VISIT (OUTPATIENT)
Dept: HEMATOLOGY/ONCOLOGY | Facility: CLINIC | Age: 73
End: 2018-01-22
Payer: COMMERCIAL

## 2018-01-22 ENCOUNTER — TELEPHONE (OUTPATIENT)
Dept: HEMATOLOGY/ONCOLOGY | Facility: CLINIC | Age: 73
End: 2018-01-22

## 2018-01-22 ENCOUNTER — OFFICE VISIT (OUTPATIENT)
Dept: DIABETES | Facility: CLINIC | Age: 73
End: 2018-01-22
Payer: COMMERCIAL

## 2018-01-22 VITALS
BODY MASS INDEX: 26.64 KG/M2 | WEIGHT: 186.06 LBS | DIASTOLIC BLOOD PRESSURE: 60 MMHG | HEIGHT: 70 IN | SYSTOLIC BLOOD PRESSURE: 134 MMHG

## 2018-01-22 VITALS
HEIGHT: 70 IN | BODY MASS INDEX: 26.89 KG/M2 | SYSTOLIC BLOOD PRESSURE: 130 MMHG | DIASTOLIC BLOOD PRESSURE: 50 MMHG | WEIGHT: 187.81 LBS | OXYGEN SATURATION: 97 % | HEART RATE: 83 BPM | TEMPERATURE: 98 F

## 2018-01-22 DIAGNOSIS — E78.5 HYPERLIPIDEMIA, UNSPECIFIED HYPERLIPIDEMIA TYPE: Chronic | ICD-10-CM

## 2018-01-22 DIAGNOSIS — I10 ESSENTIAL HYPERTENSION: Chronic | ICD-10-CM

## 2018-01-22 DIAGNOSIS — Z79.4 TYPE 2 DIABETES MELLITUS WITH DIABETIC NEPHROPATHY, WITH LONG-TERM CURRENT USE OF INSULIN: Chronic | ICD-10-CM

## 2018-01-22 DIAGNOSIS — Z79.4 INSULIN LONG-TERM USE: Chronic | ICD-10-CM

## 2018-01-22 DIAGNOSIS — E11.21 TYPE 2 DIABETES MELLITUS WITH DIABETIC NEPHROPATHY, WITH LONG-TERM CURRENT USE OF INSULIN: Primary | Chronic | ICD-10-CM

## 2018-01-22 DIAGNOSIS — C90.00 MULTIPLE MYELOMA NOT HAVING ACHIEVED REMISSION: ICD-10-CM

## 2018-01-22 DIAGNOSIS — Z79.4 TYPE 2 DIABETES MELLITUS WITH DIABETIC NEPHROPATHY, WITH LONG-TERM CURRENT USE OF INSULIN: Primary | Chronic | ICD-10-CM

## 2018-01-22 DIAGNOSIS — C90.00 MULTIPLE MYELOMA NOT HAVING ACHIEVED REMISSION: Primary | ICD-10-CM

## 2018-01-22 DIAGNOSIS — E11.21 TYPE 2 DIABETES MELLITUS WITH DIABETIC NEPHROPATHY, WITH LONG-TERM CURRENT USE OF INSULIN: Chronic | ICD-10-CM

## 2018-01-22 DIAGNOSIS — I25.118 CORONARY ARTERY DISEASE OF NATIVE ARTERY OF NATIVE HEART WITH STABLE ANGINA PECTORIS: ICD-10-CM

## 2018-01-22 LAB
ALBUMIN SERPL BCP-MCNC: 3.5 G/DL
ALP SERPL-CCNC: 63 U/L
ALT SERPL W/O P-5'-P-CCNC: 9 U/L
ANION GAP SERPL CALC-SCNC: 7 MMOL/L
AST SERPL-CCNC: 18 U/L
BASOPHILS # BLD AUTO: 0.01 K/UL
BASOPHILS NFR BLD: 0.3 %
BILIRUB SERPL-MCNC: 0.3 MG/DL
BUN SERPL-MCNC: 15 MG/DL
CALCIUM SERPL-MCNC: 9.5 MG/DL
CHLORIDE SERPL-SCNC: 107 MMOL/L
CHOLEST SERPL-MCNC: 114 MG/DL
CHOLEST/HDLC SERPL: 3 {RATIO}
CO2 SERPL-SCNC: 28 MMOL/L
CREAT SERPL-MCNC: 1.4 MG/DL
DIFFERENTIAL METHOD: ABNORMAL
EOSINOPHIL # BLD AUTO: 0.2 K/UL
EOSINOPHIL NFR BLD: 4.9 %
ERYTHROCYTE [DISTWIDTH] IN BLOOD BY AUTOMATED COUNT: 15 %
EST. GFR  (AFRICAN AMERICAN): 57 ML/MIN/1.73 M^2
EST. GFR  (NON AFRICAN AMERICAN): 49 ML/MIN/1.73 M^2
ESTIMATED AVG GLUCOSE: 177 MG/DL
ESTIMATED AVG GLUCOSE: 177 MG/DL
GLUCOSE SERPL-MCNC: 134 MG/DL
GLUCOSE SERPL-MCNC: 80 MG/DL (ref 70–110)
HBA1C MFR BLD HPLC: 7.8 %
HBA1C MFR BLD HPLC: 7.8 %
HCT VFR BLD AUTO: 34 %
HDLC SERPL-MCNC: 38 MG/DL
HDLC SERPL: 33.3 %
HGB BLD-MCNC: 10.8 G/DL
LDLC SERPL CALC-MCNC: 57.4 MG/DL
LYMPHOCYTES # BLD AUTO: 1.8 K/UL
LYMPHOCYTES NFR BLD: 49.7 %
MCH RBC QN AUTO: 32.1 PG
MCHC RBC AUTO-ENTMCNC: 31.8 G/DL
MCV RBC AUTO: 101 FL
MONOCYTES # BLD AUTO: 0.1 K/UL
MONOCYTES NFR BLD: 3 %
NEUTROPHILS # BLD AUTO: 1.6 K/UL
NEUTROPHILS NFR BLD: 42.1 %
NONHDLC SERPL-MCNC: 76 MG/DL
PHOSPHATE SERPL-MCNC: 3.9 MG/DL
PLATELET # BLD AUTO: 185 K/UL
PMV BLD AUTO: 10.2 FL
POTASSIUM SERPL-SCNC: 4.1 MMOL/L
PROT SERPL-MCNC: 8.6 G/DL
RBC # BLD AUTO: 3.36 M/UL
SODIUM SERPL-SCNC: 142 MMOL/L
TRIGL SERPL-MCNC: 93 MG/DL
TSH SERPL DL<=0.005 MIU/L-ACNC: 0.68 UIU/ML
WBC # BLD AUTO: 3.68 K/UL

## 2018-01-22 PROCEDURE — 86334 IMMUNOFIX E-PHORESIS SERUM: CPT

## 2018-01-22 PROCEDURE — 82948 REAGENT STRIP/BLOOD GLUCOSE: CPT | Mod: S$GLB,,, | Performed by: PHYSICIAN ASSISTANT

## 2018-01-22 PROCEDURE — 83036 HEMOGLOBIN GLYCOSYLATED A1C: CPT

## 2018-01-22 PROCEDURE — 86334 IMMUNOFIX E-PHORESIS SERUM: CPT | Mod: 26,,, | Performed by: PATHOLOGY

## 2018-01-22 PROCEDURE — 84165 PROTEIN E-PHORESIS SERUM: CPT | Mod: 26,,, | Performed by: PATHOLOGY

## 2018-01-22 PROCEDURE — 99214 OFFICE O/P EST MOD 30 MIN: CPT | Mod: S$GLB,,, | Performed by: INTERNAL MEDICINE

## 2018-01-22 PROCEDURE — 83520 IMMUNOASSAY QUANT NOS NONAB: CPT | Mod: 59

## 2018-01-22 PROCEDURE — 84165 PROTEIN E-PHORESIS SERUM: CPT

## 2018-01-22 PROCEDURE — 80053 COMPREHEN METABOLIC PANEL: CPT | Mod: PO

## 2018-01-22 PROCEDURE — 99999 PR PBB SHADOW E&M-EST. PATIENT-LVL III: CPT | Mod: PBBFAC,,, | Performed by: INTERNAL MEDICINE

## 2018-01-22 PROCEDURE — 84100 ASSAY OF PHOSPHORUS: CPT | Mod: PO

## 2018-01-22 PROCEDURE — 84443 ASSAY THYROID STIM HORMONE: CPT

## 2018-01-22 PROCEDURE — 36415 COLL VENOUS BLD VENIPUNCTURE: CPT | Mod: PO

## 2018-01-22 PROCEDURE — 99214 OFFICE O/P EST MOD 30 MIN: CPT | Mod: S$GLB,,, | Performed by: PHYSICIAN ASSISTANT

## 2018-01-22 PROCEDURE — 80061 LIPID PANEL: CPT | Mod: PO

## 2018-01-22 PROCEDURE — 99999 PR PBB SHADOW E&M-EST. PATIENT-LVL III: CPT | Mod: PBBFAC,,, | Performed by: PHYSICIAN ASSISTANT

## 2018-01-22 PROCEDURE — 85025 COMPLETE CBC W/AUTO DIFF WBC: CPT | Mod: PO

## 2018-01-22 RX ORDER — ISOSORBIDE MONONITRATE 30 MG/1
30 TABLET, EXTENDED RELEASE ORAL DAILY
Status: ON HOLD | COMMUNITY
Start: 2018-01-12 | End: 2020-11-22 | Stop reason: HOSPADM

## 2018-01-22 RX ORDER — PROCHLORPERAZINE MALEATE 5 MG
TABLET ORAL
COMMUNITY
End: 2019-07-12 | Stop reason: SDUPTHER

## 2018-01-22 RX ORDER — CYANOCOBALAMIN (VITAMIN B-12) 500 MCG
TABLET ORAL
COMMUNITY
End: 2018-09-04

## 2018-01-22 RX ORDER — INSULIN GLARGINE 100 [IU]/ML
30 INJECTION, SOLUTION SUBCUTANEOUS DAILY
Qty: 9 ML | Refills: 11
Start: 2018-01-22 | End: 2018-09-18 | Stop reason: SDUPTHER

## 2018-01-22 RX ORDER — PIOGLITAZONEHYDROCHLORIDE 30 MG/1
30 TABLET ORAL DAILY
Qty: 90 TABLET | Refills: 0
Start: 2018-01-22 | End: 2018-02-13 | Stop reason: SDUPTHER

## 2018-01-22 RX ORDER — FLUCONAZOLE 100 MG/1
TABLET ORAL
COMMUNITY
End: 2018-10-23 | Stop reason: ALTCHOICE

## 2018-01-22 RX ORDER — INSULIN ASPART 100 [IU]/ML
20 INJECTION, SOLUTION INTRAVENOUS; SUBCUTANEOUS
Qty: 18 ML | Refills: 11
Start: 2018-01-22 | End: 2018-09-18 | Stop reason: SDUPTHER

## 2018-01-22 RX ORDER — VALACYCLOVIR HYDROCHLORIDE 500 MG/1
TABLET, FILM COATED ORAL
COMMUNITY
End: 2020-11-20

## 2018-01-22 RX ORDER — TRIAMCINOLONE ACETONIDE 1 MG/G
CREAM TOPICAL
COMMUNITY
End: 2019-04-09 | Stop reason: SDUPTHER

## 2018-01-22 RX ORDER — ACETAMINOPHEN 500 MG
TABLET ORAL
COMMUNITY

## 2018-01-22 NOTE — PROGRESS NOTES
Subjective:       Patient ID: Familia Durbin Jr. is a 72 y.o. male.    Chief Complaint: Results and Multiple Myeloma    HPI 72-year-old male being treated for multiple myeloma [facility].  The patient is not know the medicine he is on at present time otherwise he states that he is doing recently well feels good    Past Medical History:   Diagnosis Date    CAD (coronary artery disease)     luikart    Diabetes mellitus, type 2 1979    eye dr rocha    Hearing impaired     Hyperlipidemia     Hypertension     Lupus     Discoid    Old MI (myocardial infarction) 2012    Tracheostomy tube present      Family History   Problem Relation Age of Onset    Diabetes Mother     Diabetes Father     Prostate cancer Father     Pancreatic cancer Sister     No Known Problems Brother     Diabetes Maternal Uncle     Diabetes Maternal Grandmother     No Known Problems Maternal Grandfather     No Known Problems Paternal Grandmother     No Known Problems Paternal Grandfather      Social History     Social History    Marital status: Single     Spouse name: N/A    Number of children: 9    Years of education: N/A     Occupational History    Not on file.     Social History Main Topics    Smoking status: Never Smoker    Smokeless tobacco: Never Used    Alcohol use No    Drug use: No    Sexual activity: Yes     Partners: Female     Other Topics Concern    Not on file     Social History Narrative    No narrative on file     Past Surgical History:   Procedure Laterality Date    CARDIAC SURGERY      COLONOSCOPY      CORONARY ARTERY BYPASS GRAFT      HAND SURGERY      KNEE SURGERY      TRACHEOSTOMY TUBE PLACEMENT      WRIST FUSION         Labs:  Lab Results   Component Value Date    WBC 3.68 (L) 01/22/2018    HGB 10.8 (L) 01/22/2018    HCT 34.0 (L) 01/22/2018     (H) 01/22/2018     01/22/2018     BMP  Lab Results   Component Value Date      01/22/2018    K 4.1 01/22/2018     01/22/2018    CO2 28 01/22/2018    BUN 15 01/22/2018    CREATININE 1.4 01/22/2018    CALCIUM 9.5 01/22/2018    ANIONGAP 7 (L) 01/22/2018    ESTGFRAFRICA 57 (A) 01/22/2018    EGFRNONAA 49 (A) 01/22/2018     Lab Results   Component Value Date    ALT 9 (L) 01/22/2018    AST 18 01/22/2018    ALKPHOS 63 01/22/2018    BILITOT 0.3 01/22/2018       Lab Results   Component Value Date    IRON 81 07/21/2016    TIBC 302 07/21/2016    FERRITIN 324 (H) 07/21/2016     No results found for: [id number]  No results found for: FOLATE  No results found for: TSH      Review of Systems   Constitutional: Negative for activity change, appetite change, chills, diaphoresis, fatigue, fever and unexpected weight change.   HENT: Negative for congestion, dental problem, drooling, ear discharge, ear pain, facial swelling, hearing loss, mouth sores, nosebleeds, postnasal drip, rhinorrhea, sinus pressure, sneezing, sore throat, tinnitus, trouble swallowing and voice change.    Eyes: Negative for photophobia, pain, discharge, redness, itching and visual disturbance.   Respiratory: Negative for apnea, cough, choking, chest tightness, shortness of breath, wheezing and stridor.    Cardiovascular: Negative for chest pain, palpitations and leg swelling.   Gastrointestinal: Negative for abdominal distention, abdominal pain, anal bleeding, blood in stool, constipation, diarrhea, nausea, rectal pain and vomiting.   Endocrine: Negative for cold intolerance, heat intolerance, polydipsia, polyphagia and polyuria.   Genitourinary: Negative for decreased urine volume, difficulty urinating, discharge, dysuria, enuresis, flank pain, frequency, genital sores, hematuria, penile pain, penile swelling, scrotal swelling, testicular pain and urgency.   Musculoskeletal: Negative for arthralgias, back pain, gait problem, joint swelling, myalgias, neck pain and neck stiffness.   Skin: Negative for color change, pallor, rash and  wound.   Allergic/Immunologic: Negative for environmental allergies, food allergies and immunocompromised state.   Neurological: Negative for dizziness, tremors, seizures, syncope, facial asymmetry, speech difficulty, weakness, light-headedness, numbness and headaches.   Hematological: Negative for adenopathy. Does not bruise/bleed easily.   Psychiatric/Behavioral: Negative for agitation, behavioral problems, confusion, decreased concentration, dysphoric mood, hallucinations, self-injury, sleep disturbance and suicidal ideas. The patient is not nervous/anxious and is not hyperactive.        Objective:      Physical Exam   Constitutional: He is oriented to person, place, and time. He appears well-developed and well-nourished. No distress.   HENT:   Head: Normocephalic.   Right Ear: External ear normal.   Left Ear: External ear normal.   Nose: Nose normal. Right sinus exhibits no maxillary sinus tenderness and no frontal sinus tenderness. Left sinus exhibits no maxillary sinus tenderness and no frontal sinus tenderness.   Mouth/Throat: Oropharynx is clear and moist. No oropharyngeal exudate.   Eyes: EOM and lids are normal. Pupils are equal, round, and reactive to light. Right eye exhibits no discharge. Left eye exhibits no discharge. Right conjunctiva is not injected. Right conjunctiva has no hemorrhage. Left conjunctiva is not injected. Left conjunctiva has no hemorrhage. No scleral icterus. Right eye exhibits normal extraocular motion. Left eye exhibits normal extraocular motion.   Neck: Normal range of motion. Neck supple. No JVD present. No tracheal deviation present. No thyromegaly present.   Cardiovascular: Normal rate and regular rhythm.    Pulmonary/Chest: Effort normal. No stridor. No respiratory distress.   Abdominal: Soft. He exhibits no mass. There is no hepatosplenomegaly, splenomegaly or hepatomegaly. There is no tenderness.   Musculoskeletal: Normal range of motion. He exhibits no edema or tenderness.    Lymphadenopathy:        Head (right side): No posterior auricular and no occipital adenopathy present.        Head (left side): No posterior auricular and no occipital adenopathy present.     He has no cervical adenopathy.        Right cervical: No superficial cervical, no deep cervical and no posterior cervical adenopathy present.       Left cervical: No superficial cervical, no deep cervical and no posterior cervical adenopathy present.     He has no axillary adenopathy.        Right: No supraclavicular adenopathy present.        Left: No supraclavicular adenopathy present.   Neurological: He is alert and oriented to person, place, and time. He has normal strength. No cranial nerve deficit. Coordination normal.   Skin: Skin is dry. No rash noted. He is not diaphoretic. No cyanosis or erythema. Nails show no clubbing.   Psychiatric: He has a normal mood and affect. His behavior is normal. Judgment and thought content normal. Cognition and memory are normal.   Vitals reviewed.          Assessment:      1. Multiple myeloma not having achieved remission    2. Metastatic bone cancer           Plan:   The patient is unremarkable well as kyphoplasty for metastatic disease to bone status post radiation at this point would have patient return in 2 months scheduled for follow-up in [place] next month we'll try to obtain records to see where and what medication he is on

## 2018-01-22 NOTE — TELEPHONE ENCOUNTER
----- Message from Stephenie Coffey sent at 1/19/2018  3:49 PM CST -----  Contact: Marguerite with [facility]   Marguerite called and stated she needed to speak to the nurse. She stated she needs to verify a prescription. She can be reached at [phone].     Thanks,  Tf

## 2018-01-22 NOTE — TELEPHONE ENCOUNTER
----- Message from Lisa Cast sent at 1/22/2018 12:17 PM CST -----  Contact: [facility]  State have questions regarding Rx for Oxycotin 20.    Please adv/call    Pt use..  [id number] - [place] - [address] [place]  [address] [address]  Phone: [phone] Fax: [phone]

## 2018-01-22 NOTE — PROGRESS NOTES
Subjective:      Patient ID: Familia Durbin Jr. is a 72 y.o. male.    PCP: Andrea Elkins MD      Familia Durbin is a pleasant 72 y.o. male presenting to follow up on diabetes mellitus. He has had diabetes for 40 or more years. His last visit in Diabetes Management was 7/26/2017. Since that time he has continued to have modest improvements in his glycemic control.  However, in January of this year he was scheduled to have spinal surgery and during the procedure he coded.  Subsequent to this he was admitted and had cardiac catheterization (results are in care everywhere).  He has not been diligently monitoring his blood sugars but was told that his hemoglobin A1c was high and that he should see his diabetes specialist.. His current concerns are glycemic control.    He denies any hospital admissions, emergency room visits, hypoglycemia, syncope, diaphoresis, chest pain, or dyspnea.    He has lost 2 pounds since last visit. His BMI is 26.70 kg/m².    His blood sugar in the clinic today was:   Lab Results   Component Value Date    POCGLU 80 01/20/2018       We discussed the American diabetes Association recommendations:  hemoglobin A1c below 7.0%; all diabetics should be on statins unless contraindicated; one aspirin daily unless contraindicated; fasting blood sugar between 80 and 130 mg/dL; postprandial blood sugar below 180 mg/dl; prevention of hypoglycemia, may adjust goals to higher levels if persistent; ACE or ARB therapy if not contraindicated; and maintain in an ideal body weight with BMI below 25.    Familia is compliant most of the time with DM medications.     Familia is noncompliant some of the time with lifestyle modifications to include activity and meal planning.       Current Outpatient Prescriptions:     aspirin 81 MG Chew, Take 1 tablet (81 mg total) by mouth once daily., Disp: 100 tablet, Rfl: 12    blood sugar diagnostic Strp, 1 strip by Misc.(Non-Drug; Combo Route) route 6 (six) times daily.,  "Disp: 240 strip, Rfl: 11    blood-glucose meter kit, Use as instructed, Disp: 1 each, Rfl: 0    insulin aspart (NOVOLOG) 100 unit/mL InPn pen, Inject 20 Units into the skin 3 (three) times daily before meals., Disp: 18 mL, Rfl: 11    insulin glargine (LANTUS) 100 unit/mL injection, Inject 30 Units into the skin once daily., Disp: 9 mL, Rfl: 11    lancets 33 gauge Misc, 1 lancet by Misc.(Non-Drug; Combo Route) route 6 (six) times daily. FOR TRUE MATRIX METER, Disp: 100 each, Rfl: 11    meloxicam (MOBIC) 15 MG tablet, 30 mg. , Disp: , Rfl:     metoprolol tartrate (LOPRESSOR) 50 MG tablet, Take 1 tablet (50 mg total) by mouth 2 (two) times daily., Disp: 60 tablet, Rfl: 1    NITROSTAT 0.4 mg SL tablet, , Disp: , Rfl:     ondansetron (ZOFRAN) 4 MG tablet, , Disp: , Rfl:     oxyCODONE (ROXICODONE) 10 mg Tab immediate release tablet, Take 1 tablet (10 mg total) by mouth every 6 (six) hours as needed for Pain., Disp: 60 tablet, Rfl: 0    OXYCONTIN 20 mg 12 hr tablet, Take 1 tablet (20 mg total) by mouth every 12 (twelve) hours., Disp: 60 tablet, Rfl: 0    pen needle, diabetic (BD ULTRA-FINE DAVE PEN NEEDLES) 32 gauge x 5/32" Ndle, 1 each by Misc.(Non-Drug; Combo Route) route 4 (four) times daily with meals and nightly., Disp: 360 each, Rfl: 3    pioglitazone (ACTOS) 30 MG tablet, TAKE ONE TABLET BY MOUTH ONCE DAILY, Disp: 90 tablet, Rfl: 0    ranolazine (RANEXA) 500 MG Tb12, Take 1 tablet (500 mg total) by mouth 2 (two) times daily., Disp: 180 tablet, Rfl: 1    rosuvastatin (CRESTOR) 40 MG Tab, Take 1 tablet (40 mg total) by mouth every evening., Disp: 90 tablet, Rfl: 3    senna-docusate 8.6-50 mg (PERICOLACE) 8.6-50 mg per tablet, Take 2 tablets by mouth daily as needed for Constipation., Disp: 60 tablet, Rfl: 1    STANDARDS OF CARE:  Eye doctor: Dr. Silva exam, 4/26/2017.  Dental exam: Recommend regular exams; denies gums bleeding.  Podiatry doctor:     ACTIVITY LEVEL: He exercises rarely.  BLOOD GLUCOSE " TESTING: Self-monitoring with   SOCIAL HISTORY: single. Lives alone. retired no tobacco use  ACE/ARB: No  Statin: Yes    Health Maintenance   Topic Date Due    TETANUS VACCINE  08/21/1962    Zoster Vaccine  08/21/2004    Lipid Panel  09/21/2017    Hemoglobin A1c  04/10/2018    Eye Exam  04/26/2018    Foot Exam  10/25/2018    Colonoscopy  10/31/2024    Pneumococcal (65+)  Completed    Influenza Vaccine  Completed         Diabetes Management Status    Statin: Taking  ACE/ARB: Not taking    Screening or Prevention Patient's value Goal Complete/Controlled?   HgA1C Testing and Control   Lab Results   Component Value Date    HGBA1C 7.8 (H) 10/10/2017      Annually/Less than 8% Yes   Lipid profile : 09/21/2016 Annually No   LDL control Lab Results   Component Value Date    LDLCALC 90.6 09/21/2016    Annually/Less than 100 mg/dl  No   Nephropathy screening Lab Results   Component Value Date    LABMICR 22.0 09/21/2016     Lab Results   Component Value Date    PROTEINUA 1+ (A) 09/05/2017    Annually Yes   Blood pressure BP Readings from Last 1 Encounters:   01/22/18 134/60    Less than 140/90 Yes   Dilated retinal exam : 04/26/2017 Annually Yes   Foot exam   : 10/25/2017 Annually Yes       The following results were reviewed with patient.    Lab Results   Component Value Date    WBC 3.37 (L) 12/08/2017    HGB 11.6 (L) 12/08/2017    HCT 35.0 (L) 12/08/2017     12/08/2017    CHOL 159 09/21/2016    TRIG 122 09/21/2016    HDL 44 09/21/2016    ALT 15 12/08/2017    AST 18 12/08/2017     12/08/2017    K 4.1 12/08/2017     12/08/2017    CREATININE 1.5 (H) 12/08/2017    ESTGFRAFRICA 53 (A) 12/08/2017    EGFRNONAA 46 (A) 12/08/2017    BUN 22 12/08/2017    CO2 26 12/08/2017    PSA 0.34 09/15/2015     (H) 12/08/2017       Lab Results   Component Value Date    HGBA1C 7.8 (H) 10/10/2017    HGBA1C 8.7 (H) 07/26/2017    HGBA1C 9.3 (H) 04/26/2017       Lab Results   Component Value Date    GLUTAMICACID 0.00  11/15/2016    CPEPTIDE 1.4 11/15/2016     Lab Results   Component Value Date    IRON 81 07/21/2016    TIBC 302 07/21/2016    FERRITIN 324 (H) 07/21/2016     Lab Results   Component Value Date    CALCIUM 9.4 12/08/2017       Review of patient's allergies indicates:   Allergen Reactions    Cephalexin        Past Medical History:   Diagnosis Date    CAD (coronary artery disease)     luikart    Diabetes mellitus, type 2 1979    eye dr rocha    Hearing impaired     Hyperlipidemia     Hypertension     Lupus     Discoid    Old MI (myocardial infarction) 2012    Tracheostomy tube present        Review of Systems   Constitutional: Negative.  Negative for activity change, appetite change, chills, diaphoresis, fatigue, fever and unexpected weight change.   HENT: Negative.  Negative for dental problem, facial swelling, hearing loss, nosebleeds, trouble swallowing and voice change.    Eyes: Negative.  Negative for photophobia, pain, discharge, redness, itching and visual disturbance.   Respiratory: Negative.  Negative for cough, choking, chest tightness, shortness of breath and wheezing.    Cardiovascular: Positive for chest pain (recent episode of Angina, but is now better.) and palpitations. Negative for leg swelling.   Gastrointestinal: Negative.  Negative for abdominal distention, abdominal pain, blood in stool, constipation, diarrhea, nausea and vomiting.   Endocrine: Negative.  Negative for cold intolerance, heat intolerance, polydipsia, polyphagia and polyuria.   Genitourinary: Negative.  Negative for decreased urine volume, difficulty urinating, dysuria, frequency, scrotal swelling, testicular pain and urgency.   Musculoskeletal: Negative.  Negative for arthralgias, back pain, gait problem, joint swelling, myalgias, neck pain and neck stiffness.   Skin: Negative.  Negative for color change, pallor, rash and wound.   Allergic/Immunologic: Negative.  Negative for environmental allergies, food allergies and  "immunocompromised state.   Neurological: Negative.  Negative for dizziness, tremors, seizures, syncope, facial asymmetry, speech difficulty, weakness, light-headedness, numbness and headaches.   Hematological: Negative.  Negative for adenopathy. Does not bruise/bleed easily.   Psychiatric/Behavioral: Negative.  Negative for agitation, behavioral problems, confusion, decreased concentration, dysphoric mood, hallucinations, self-injury, sleep disturbance and suicidal ideas. The patient is not nervous/anxious and is not hyperactive.       Objective:     Vitals - 1 value per visit 12/8/2017 12/28/2017 1/22/2018   SYSTOLIC 140 136 134   DIASTOLIC 82 60 60   PULSE 82 - -   TEMPERATURE 97.7 97.3 -   RESPIRATIONS 18 - -   SPO2 94 - -   Weight (lb) 190.26 188.27 186.07   Weight (kg) 86.3 85.4 84.4   HEIGHT 5' 10" 5' 10" 5' 10"   BODY MASS INDEX 27.3 27.01 26.7   VISIT REPORT - - -   Pain Score  10 5 0   Some recent data might be hidden       Physical Exam   Constitutional: He is oriented to person, place, and time. He appears well-developed and well-nourished. He is cooperative.  Non-toxic appearance. He does not have a sickly appearance. He does not appear ill. No distress. He is not intubated.   HENT:   Head: Normocephalic and atraumatic. Not macrocephalic and not microcephalic. Head is without raccoon's eyes, without Crane's sign, without abrasion, without contusion, without laceration, without right periorbital erythema and without left periorbital erythema. Hair is normal.   Right Ear: External ear normal. No lacerations. No drainage, swelling or tenderness. No foreign bodies. No mastoid tenderness. Tympanic membrane is not injected, not scarred, not perforated, not erythematous, not retracted and not bulging. Tympanic membrane mobility is normal. No middle ear effusion. No hemotympanum. No decreased hearing is noted.   Left Ear: External ear normal. No lacerations. No drainage, swelling or tenderness. No foreign " bodies. No mastoid tenderness. Tympanic membrane is not injected, not scarred, not perforated, not erythematous, not retracted and not bulging. Tympanic membrane mobility is normal.  No middle ear effusion. No hemotympanum. No decreased hearing is noted.   Nose: Nose normal.   Mouth/Throat: Oropharynx is clear and moist.   Eyes: EOM and lids are normal. Pupils are equal, round, and reactive to light. Right eye exhibits no chemosis, no discharge, no exudate and no hordeolum. No foreign body present in the right eye. Left eye exhibits no chemosis, no discharge, no exudate and no hordeolum. No foreign body present in the left eye. Right conjunctiva is not injected. Right conjunctiva has no hemorrhage. Left conjunctiva is not injected. Left conjunctiva has no hemorrhage. No scleral icterus. Right eye exhibits normal extraocular motion and no nystagmus. Left eye exhibits normal extraocular motion and no nystagmus. Right pupil is round and reactive. Left pupil is round and reactive. Pupils are equal.   Neck: Normal range of motion, full passive range of motion without pain and phonation normal. Neck supple. Normal carotid pulses, no hepatojugular reflux and no JVD present. No tracheal tenderness, no spinous process tenderness and no muscular tenderness present. Carotid bruit is not present. No neck rigidity. No tracheal deviation, no edema, no erythema and normal range of motion present. No thyroid mass and no thyromegaly present.   Cardiovascular: Normal rate, regular rhythm, normal heart sounds and intact distal pulses.   No extrasystoles are present. PMI is not displaced.  Exam reveals no gallop, no friction rub and no decreased pulses.    No murmur heard.  Pulses:       Carotid pulses are 2+ on the right side, and 2+ on the left side.       Radial pulses are 2+ on the right side, and 2+ on the left side.        Dorsalis pedis pulses are 2+ on the right side, and 2+ on the left side.        Posterior tibial pulses are  2+ on the right side, and 2+ on the left side.   Pulmonary/Chest: Effort normal and breath sounds normal. No accessory muscle usage or stridor. No apnea, no tachypnea and no bradypnea. He is not intubated. No respiratory distress. He has no decreased breath sounds. He has no wheezes. He has no rhonchi. He has no rales. He exhibits no tenderness.   Abdominal: Soft. Bowel sounds are normal. He exhibits no shifting dullness, no distension, no pulsatile liver, no fluid wave, no abdominal bruit, no ascites, no pulsatile midline mass and no mass. There is no hepatosplenomegaly, splenomegaly or hepatomegaly. There is no tenderness. There is no rigidity, no rebound, no guarding, no CVA tenderness and no tenderness at McBurney's point. No hernia. Hernia confirmed negative in the ventral area.   Musculoskeletal: Normal range of motion. He exhibits no edema or tenderness.        Right foot: There is normal range of motion and no deformity.        Left foot: There is normal range of motion and no deformity.   Feet:   Right Foot:   Protective Sensation: 6 sites tested. 6 sites sensed.   Skin Integrity: Negative for ulcer, blister, skin breakdown, erythema, warmth, callus or dry skin.   Left Foot:   Protective Sensation: 6 sites tested. 6 sites sensed.   Skin Integrity: Negative for ulcer, blister, skin breakdown, erythema, warmth, callus or dry skin.   Lymphadenopathy:        Head (right side): No submental, no submandibular, no tonsillar, no preauricular, no posterior auricular and no occipital adenopathy present.        Head (left side): No submental, no submandibular, no tonsillar, no preauricular, no posterior auricular and no occipital adenopathy present.     He has no cervical adenopathy.        Right cervical: No superficial cervical, no deep cervical and no posterior cervical adenopathy present.       Left cervical: No superficial cervical, no deep cervical and no posterior cervical adenopathy present.   Neurological: He  is alert and oriented to person, place, and time. He has normal reflexes. He displays no atrophy and no tremor. No cranial nerve deficit or sensory deficit. He exhibits normal muscle tone. He displays no seizure activity. Coordination and gait normal.   Reflex Scores:       Bicep reflexes are 2+ on the right side and 2+ on the left side.       Brachioradialis reflexes are 2+ on the right side and 2+ on the left side.       Patellar reflexes are 2+ on the right side and 2+ on the left side.  Skin: Skin is warm and dry. No rash noted. No erythema. No pallor.   Psychiatric: He has a normal mood and affect. His mood appears not anxious. His affect is not angry, not blunt, not labile and not inappropriate. His speech is not rapid and/or pressured, not delayed, not tangential and not slurred. He is not agitated, not aggressive, not hyperactive, not slowed, not withdrawn, not actively hallucinating and not combative. Thought content is not paranoid and not delusional. Cognition and memory are not impaired. He does not express impulsivity or inappropriate judgment. He does not exhibit a depressed mood. He expresses no homicidal and no suicidal ideation. He expresses no suicidal plans and no homicidal plans. He is communicative. He exhibits normal recent memory and normal remote memory. He is attentive.     Assessment:     1. Type 2 diabetes mellitus with diabetic nephropathy, with long-term current use of insulin      Plan:     Familia Durbin is seen today for   1. Type 2 diabetes mellitus with diabetic nephropathy, with long-term current use of insulin    2. Coronary Artery Disease with recent MI/ACS   3. Hyperlipidemia, unspecified hyperlipidemia type    4. Essential hypertension    5. Insulin long-term use    6. Type 2 diabetes mellitus with hyperglycemia, with long-term current use of insulin    7. Long term current use of systemic steroids      We have discussed the etiology and treatment options associated with the  diagnosis as well as alternatives. He has elected the following treatments.     Type 2 diabetes mellitus with diabetic nephropathy, with long-term current use of insulin  -     POCT glucose  -     Hemoglobin A1c; Future; Expected date: 01/22/2018  -     Renal function panel; Future; Expected date: 01/22/2018  -     ALT (SGPT); Future; Expected date: 01/22/2018  -     AST (SGOT); Future; Expected date: 01/22/2018  -     Protein / creatinine ratio, urine; Future; Expected date: 01/22/2018  -     Lipid panel; Future; Expected date: 01/22/2018  -     TSH; Future; Expected date: 01/22/2018  -     pioglitazone (ACTOS) 30 MG tablet; Take 1 tablet (30 mg total) by mouth once daily.  Dispense: 90 tablet; Refill: 0  -     insulin glargine (LANTUS) 100 unit/mL injection; Inject 30 Units into the skin once daily.  Dispense: 9 mL; Refill: 11  -     insulin aspart (NOVOLOG) 100 unit/mL InPn pen; Inject 20 Units into the skin 3 (three) times daily before meals.  Dispense: 18 mL; Refill: 11    1.) Patient was instructed to monitor blood glucose twice daily, fasting, post meal and ac dinner or at bedtime. If on MDI will also need to test pre-meal blood sugar. Reminded to bring BG meter or record to each visit for review.  2.) Reviewed pathophysiology of type 2 diabetes, complications related to the disease, importance of annual dilated eye exam and self daily foot examination.  3.) Continue medications as prescribed MDI with Lantus and Humalog. [facility] MyChart or Phone review in 1 week with [facility] records for adjustment of medication.  4.) Dietician/CDE evaluation for ongoing meal planning, carbohydrate counting, and diabetic education.  5.) Discussed activity, benefits, methods, and precautions. Recommended patient start/continue some form of exercise and increase as tolerated to 60 minutes per day to facilitate weight loss and aid in control of BGs. Also reminded patient of WHO recommendation of 10,000 steps daily as a goal.   6.)  A1C, TSH, Lipid Panel, CMP/Renal panel with eGFR and Micro/Creatinine.  7.) Return to clinic in 12 months for follow up. Advised patient to call clinic with any questions or concerns.     A total of 30 minutes was spent in face to face time, of which 50 % was spent in counseling patient on disease process, complications, treatment, and side effects of medications.    The patient was explained the above plan and given opportunity to ask questions. He understands, chooses and consents to this plan and accepts all the risks, which include but are not limited to the risks mentioned above. He understands the alternative of having no testing, interventions or treatments at this time. He left content and without further questions.

## 2018-01-23 LAB
ALBUMIN SERPL ELPH-MCNC: 3.56 G/DL
ALPHA1 GLOB SERPL ELPH-MCNC: 0.36 G/DL
ALPHA2 GLOB SERPL ELPH-MCNC: 0.83 G/DL
B-GLOBULIN SERPL ELPH-MCNC: 1.2 G/DL
GAMMA GLOB SERPL ELPH-MCNC: 2.14 G/DL
KAPPA LC SER QL IA: 11.59 MG/DL
KAPPA LC/LAMBDA SER IA: 2.19
LAMBDA LC SER QL IA: 5.3 MG/DL
PROT SERPL-MCNC: 8.1 G/DL

## 2018-01-26 DIAGNOSIS — C90.00 MULTIPLE MYELOMA NOT HAVING ACHIEVED REMISSION: ICD-10-CM

## 2018-01-26 RX ORDER — OXYCODONE HYDROCHLORIDE 20 MG/1
20 TABLET, FILM COATED, EXTENDED RELEASE ORAL EVERY 12 HOURS
Qty: 60 TABLET | Refills: 0 | Status: SHIPPED | OUTPATIENT
Start: 2018-01-26 | End: 2018-03-01 | Stop reason: SDUPTHER

## 2018-02-12 DIAGNOSIS — Z79.4 TYPE 2 DIABETES MELLITUS WITH HYPERGLYCEMIA, WITH LONG-TERM CURRENT USE OF INSULIN: ICD-10-CM

## 2018-02-12 DIAGNOSIS — E11.9 TYPE 2 DIABETES MELLITUS WITHOUT COMPLICATION, WITH LONG-TERM CURRENT USE OF INSULIN: Chronic | ICD-10-CM

## 2018-02-12 DIAGNOSIS — E11.65 TYPE 2 DIABETES MELLITUS WITH HYPERGLYCEMIA, WITH LONG-TERM CURRENT USE OF INSULIN: ICD-10-CM

## 2018-02-12 DIAGNOSIS — Z79.52 LONG TERM CURRENT USE OF SYSTEMIC STEROIDS: ICD-10-CM

## 2018-02-12 DIAGNOSIS — E78.5 HYPERLIPIDEMIA, UNSPECIFIED HYPERLIPIDEMIA TYPE: Chronic | ICD-10-CM

## 2018-02-12 DIAGNOSIS — Z79.4 INSULIN LONG-TERM USE: Chronic | ICD-10-CM

## 2018-02-12 DIAGNOSIS — I10 ESSENTIAL HYPERTENSION: Chronic | ICD-10-CM

## 2018-02-12 DIAGNOSIS — Z79.4 TYPE 2 DIABETES MELLITUS WITHOUT COMPLICATION, WITH LONG-TERM CURRENT USE OF INSULIN: Chronic | ICD-10-CM

## 2018-02-13 RX ORDER — PIOGLITAZONEHYDROCHLORIDE 30 MG/1
TABLET ORAL
Qty: 90 TABLET | Refills: 0 | Status: SHIPPED | OUTPATIENT
Start: 2018-02-13 | End: 2018-06-12 | Stop reason: SDUPTHER

## 2018-02-27 ENCOUNTER — TELEPHONE (OUTPATIENT)
Dept: HEMATOLOGY/ONCOLOGY | Facility: CLINIC | Age: 73
End: 2018-02-27

## 2018-02-27 NOTE — TELEPHONE ENCOUNTER
----- Message from Rodolfo Solo MD sent at 2/27/2018  9:12 AM CST -----  It's been one month I need to see him  ----- Message -----  From: Deja Vinson LPN  Sent: 2/27/2018   8:57 AM  To: Rodolfo Solo MD    Patient is requesting a refill on pain medication. Would you like to see him? Or just send it in?

## 2018-02-27 NOTE — TELEPHONE ENCOUNTER
Called patient in regards to medication refill. Message sent to Dr. Solo and verified pharmacy. Will contact patient with update.

## 2018-03-01 ENCOUNTER — OFFICE VISIT (OUTPATIENT)
Dept: HEMATOLOGY/ONCOLOGY | Facility: CLINIC | Age: 73
End: 2018-03-01
Payer: MEDICARE

## 2018-03-01 VITALS
HEIGHT: 70 IN | TEMPERATURE: 98 F | HEART RATE: 74 BPM | RESPIRATION RATE: 18 BRPM | DIASTOLIC BLOOD PRESSURE: 70 MMHG | OXYGEN SATURATION: 99 % | WEIGHT: 181.69 LBS | SYSTOLIC BLOOD PRESSURE: 120 MMHG | BODY MASS INDEX: 26.01 KG/M2

## 2018-03-01 DIAGNOSIS — C90.00 MULTIPLE MYELOMA NOT HAVING ACHIEVED REMISSION: ICD-10-CM

## 2018-03-01 PROCEDURE — 99999 PR PBB SHADOW E&M-EST. PATIENT-LVL III: CPT | Mod: PBBFAC,,, | Performed by: INTERNAL MEDICINE

## 2018-03-01 PROCEDURE — 3078F DIAST BP <80 MM HG: CPT | Mod: S$GLB,,, | Performed by: INTERNAL MEDICINE

## 2018-03-01 PROCEDURE — 99214 OFFICE O/P EST MOD 30 MIN: CPT | Mod: S$GLB,,, | Performed by: INTERNAL MEDICINE

## 2018-03-01 PROCEDURE — 3074F SYST BP LT 130 MM HG: CPT | Mod: S$GLB,,, | Performed by: INTERNAL MEDICINE

## 2018-03-01 RX ORDER — OXYCODONE HYDROCHLORIDE 20 MG/1
20 TABLET, FILM COATED, EXTENDED RELEASE ORAL EVERY 12 HOURS
Qty: 60 TABLET | Refills: 0 | Status: SHIPPED | OUTPATIENT
Start: 2018-03-01 | End: 2018-03-27 | Stop reason: SDUPTHER

## 2018-03-01 RX ORDER — OXYCODONE HYDROCHLORIDE 10 MG/1
10 TABLET ORAL EVERY 6 HOURS PRN
Qty: 100 TABLET | Refills: 0 | Status: SHIPPED | OUTPATIENT
Start: 2018-03-01 | End: 2018-03-27 | Stop reason: SDUPTHER

## 2018-03-01 NOTE — PROGRESS NOTES
Subjective:       Patient ID: Familia Durbin Jr. is a 72 y.o. male.    Chief Complaint: Follow-up; Results; Pain; and Multiple Myeloma    HPI 72-year-old male history of multiple myeloma being treated by [facility] currently on Steven dexamethasone patient is scheduled for follow-up there on 3/5/18 persistent back arm leg pain requiring chronic stable narcotic use    Past Medical History:   Diagnosis Date    CAD (coronary artery disease)     tyree    Diabetes mellitus, type 2 1979    eye dr rocha    Hearing impaired     Hyperlipidemia     Hypertension     Lupus     Discoid    Old MI (myocardial infarction) 2012    Tracheostomy tube present      Family History   Problem Relation Age of Onset    Diabetes Mother     Diabetes Father     Prostate cancer Father     Pancreatic cancer Sister     No Known Problems Brother     Diabetes Maternal Uncle     Diabetes Maternal Grandmother     No Known Problems Maternal Grandfather     No Known Problems Paternal Grandmother     No Known Problems Paternal Grandfather      Social History     Social History    Marital status: Single     Spouse name: N/A    Number of children: 9    Years of education: N/A     Occupational History    Not on file.     Social History Main Topics    Smoking status: Never Smoker    Smokeless tobacco: Never Used    Alcohol use No    Drug use: No    Sexual activity: Yes     Partners: Female     Other Topics Concern    Not on file     Social History Narrative    No narrative on file     Past Surgical History:   Procedure Laterality Date    CARDIAC SURGERY      COLONOSCOPY      CORONARY ARTERY BYPASS GRAFT      HAND SURGERY      KNEE SURGERY      TRACHEOSTOMY TUBE PLACEMENT      WRIST FUSION         Labs:  Lab Results   Component Value Date    WBC 3.68 (L) 01/22/2018    HGB 10.8 (L) 01/22/2018    HCT 34.0 (L) 01/22/2018     (H) 01/22/2018     01/22/2018     BMP  Lab Results    Component Value Date     01/22/2018    K 4.1 01/22/2018     01/22/2018    CO2 28 01/22/2018    BUN 15 01/22/2018    CREATININE 1.4 01/22/2018    CALCIUM 9.5 01/22/2018    ANIONGAP 7 (L) 01/22/2018    ESTGFRAFRICA 57 (A) 01/22/2018    EGFRNONAA 49 (A) 01/22/2018     Lab Results   Component Value Date    ALT 9 (L) 01/22/2018    AST 18 01/22/2018    ALKPHOS 63 01/22/2018    BILITOT 0.3 01/22/2018       Lab Results   Component Value Date    IRON 81 07/21/2016    TIBC 302 07/21/2016    FERRITIN 324 (H) 07/21/2016     No results found for: [id number]  No results found for: FOLATE  Lab Results   Component Value Date    TSH 0.683 01/22/2018         Review of Systems   Constitutional: Positive for fatigue. Negative for activity change, appetite change, chills, diaphoresis, fever and unexpected weight change.   HENT: Negative for congestion, dental problem, drooling, ear discharge, ear pain, facial swelling, hearing loss, mouth sores, nosebleeds, postnasal drip, rhinorrhea, sinus pressure, sneezing, sore throat, tinnitus, trouble swallowing and voice change.    Eyes: Negative for photophobia, pain, discharge, redness, itching and visual disturbance.   Respiratory: Negative for apnea, cough, choking, chest tightness, shortness of breath, wheezing and stridor.    Cardiovascular: Negative for chest pain, palpitations and leg swelling.   Gastrointestinal: Negative for abdominal distention, abdominal pain, anal bleeding, blood in stool, constipation, diarrhea, nausea, rectal pain and vomiting.   Endocrine: Negative for cold intolerance, heat intolerance, polydipsia, polyphagia and polyuria.   Genitourinary: Negative for decreased urine volume, difficulty urinating, discharge, dysuria, enuresis, flank pain, frequency, genital sores, hematuria, penile pain, penile swelling, scrotal swelling, testicular pain and urgency.   Musculoskeletal: Positive for arthralgias, back pain and myalgias. Negative for gait problem,  joint swelling, neck pain and neck stiffness.   Skin: Negative for color change, pallor, rash and wound.   Allergic/Immunologic: Negative for environmental allergies, food allergies and immunocompromised state.   Neurological: Negative for dizziness, tremors, seizures, syncope, facial asymmetry, speech difficulty, weakness, light-headedness, numbness and headaches.   Hematological: Negative for adenopathy. Does not bruise/bleed easily.   Psychiatric/Behavioral: Negative for agitation, behavioral problems, confusion, decreased concentration, dysphoric mood, hallucinations, self-injury, sleep disturbance and suicidal ideas. The patient is nervous/anxious. The patient is not hyperactive.        Objective:      Physical Exam   Constitutional: He is oriented to person, place, and time. He appears well-developed and well-nourished. He appears distressed.   HENT:   Head: Normocephalic.   Right Ear: External ear normal.   Left Ear: External ear normal.   Nose: Nose normal. Right sinus exhibits no maxillary sinus tenderness and no frontal sinus tenderness. Left sinus exhibits no maxillary sinus tenderness and no frontal sinus tenderness.   Mouth/Throat: Oropharynx is clear and moist. No oropharyngeal exudate.   Eyes: EOM and lids are normal. Pupils are equal, round, and reactive to light. Right eye exhibits no discharge. Left eye exhibits no discharge. Right conjunctiva is not injected. Right conjunctiva has no hemorrhage. Left conjunctiva is not injected. Left conjunctiva has no hemorrhage. No scleral icterus. Right eye exhibits normal extraocular motion. Left eye exhibits normal extraocular motion.   Neck: Normal range of motion. Neck supple. No JVD present. No tracheal deviation present. No thyromegaly present.   Cardiovascular: Normal rate and regular rhythm.    Pulmonary/Chest: Effort normal. No stridor. No respiratory distress.   Abdominal: Soft. He exhibits no mass. There is no hepatosplenomegaly, splenomegaly or  hepatomegaly. There is no tenderness.   Musculoskeletal: Normal range of motion. He exhibits no edema or tenderness.   Lymphadenopathy:        Head (right side): No posterior auricular and no occipital adenopathy present.        Head (left side): No posterior auricular and no occipital adenopathy present.     He has no cervical adenopathy.        Right cervical: No superficial cervical, no deep cervical and no posterior cervical adenopathy present.       Left cervical: No superficial cervical, no deep cervical and no posterior cervical adenopathy present.     He has no axillary adenopathy.        Right: No supraclavicular adenopathy present.        Left: No supraclavicular adenopathy present.   Neurological: He is alert and oriented to person, place, and time. He has normal strength. No cranial nerve deficit. Coordination normal.   Skin: Skin is dry. No rash noted. He is not diaphoretic. No cyanosis or erythema. Nails show no clubbing.   Psychiatric: He has a normal mood and affect. His behavior is normal. Judgment and thought content normal. Cognition and memory are normal.   Vitals reviewed.          Assessment:      1. Metastatic bone cancer    2. Multiple myeloma not having achieved remission           Plan:   Patient remains in chronic stable narcotic use checked through .  Follow-up with me in one month with laboratory review the results.  Continues on current dose of palm a list dexamethasone with stable multiple myeloma primary treatment through [facility]

## 2018-03-13 DIAGNOSIS — I10 ESSENTIAL HYPERTENSION: ICD-10-CM

## 2018-03-13 RX ORDER — METOPROLOL TARTRATE 50 MG/1
TABLET ORAL
Qty: 60 TABLET | Refills: 1 | Status: SHIPPED | OUTPATIENT
Start: 2018-03-13 | End: 2018-05-14 | Stop reason: SDUPTHER

## 2018-03-27 ENCOUNTER — PATIENT MESSAGE (OUTPATIENT)
Dept: HEMATOLOGY/ONCOLOGY | Facility: CLINIC | Age: 73
End: 2018-03-27

## 2018-03-27 DIAGNOSIS — C90.00 MULTIPLE MYELOMA NOT HAVING ACHIEVED REMISSION: ICD-10-CM

## 2018-03-27 RX ORDER — OXYCODONE HYDROCHLORIDE 10 MG/1
10 TABLET ORAL EVERY 6 HOURS PRN
Qty: 100 TABLET | Refills: 0 | Status: SHIPPED | OUTPATIENT
Start: 2018-03-27 | End: 2018-04-23 | Stop reason: SDUPTHER

## 2018-03-27 RX ORDER — OXYCODONE HYDROCHLORIDE 20 MG/1
TABLET, FILM COATED, EXTENDED RELEASE ORAL
Qty: 60 EACH | Refills: 0 | Status: SHIPPED | OUTPATIENT
Start: 2018-03-27 | End: 2018-04-23 | Stop reason: SDUPTHER

## 2018-04-23 ENCOUNTER — OFFICE VISIT (OUTPATIENT)
Dept: HEMATOLOGY/ONCOLOGY | Facility: CLINIC | Age: 73
End: 2018-04-23
Payer: MEDICARE

## 2018-04-23 ENCOUNTER — HOSPITAL ENCOUNTER (OUTPATIENT)
Dept: RADIOLOGY | Facility: HOSPITAL | Age: 73
Discharge: HOME | End: 2018-04-23
Attending: INTERNAL MEDICINE
Payer: MEDICARE

## 2018-04-23 VITALS
OXYGEN SATURATION: 100 % | WEIGHT: 186.5 LBS | SYSTOLIC BLOOD PRESSURE: 130 MMHG | DIASTOLIC BLOOD PRESSURE: 84 MMHG | BODY MASS INDEX: 26.7 KG/M2 | HEART RATE: 64 BPM | TEMPERATURE: 98 F | RESPIRATION RATE: 18 BRPM | HEIGHT: 70 IN

## 2018-04-23 DIAGNOSIS — I82.A12 DVT OF AXILLARY VEIN, ACUTE LEFT: ICD-10-CM

## 2018-04-23 DIAGNOSIS — C90.00 MULTIPLE MYELOMA NOT HAVING ACHIEVED REMISSION: ICD-10-CM

## 2018-04-23 PROCEDURE — 3079F DIAST BP 80-89 MM HG: CPT | Mod: CPTII,S$GLB,, | Performed by: INTERNAL MEDICINE

## 2018-04-23 PROCEDURE — 93971 EXTREMITY STUDY: CPT | Mod: TC,PO

## 2018-04-23 PROCEDURE — 93971 EXTREMITY STUDY: CPT | Mod: 26,,, | Performed by: RADIOLOGY

## 2018-04-23 PROCEDURE — 99999 PR PBB SHADOW E&M-EST. PATIENT-LVL III: CPT | Mod: PBBFAC,,, | Performed by: INTERNAL MEDICINE

## 2018-04-23 PROCEDURE — 3075F SYST BP GE 130 - 139MM HG: CPT | Mod: CPTII,S$GLB,, | Performed by: INTERNAL MEDICINE

## 2018-04-23 PROCEDURE — 99215 OFFICE O/P EST HI 40 MIN: CPT | Mod: S$GLB,,, | Performed by: INTERNAL MEDICINE

## 2018-04-23 RX ORDER — OXYCODONE HYDROCHLORIDE 10 MG/1
10 TABLET ORAL EVERY 6 HOURS PRN
Qty: 100 TABLET | Refills: 0 | Status: SHIPPED | OUTPATIENT
Start: 2018-04-23 | End: 2018-05-18 | Stop reason: SDUPTHER

## 2018-04-23 RX ORDER — OXYCODONE HCL 20 MG/1
20 TABLET, FILM COATED, EXTENDED RELEASE ORAL EVERY 12 HOURS
Qty: 60 EACH | Refills: 0 | Status: SHIPPED | OUTPATIENT
Start: 2018-04-23 | End: 2018-05-18 | Stop reason: SDUPTHER

## 2018-04-23 RX ORDER — OXYCODONE HYDROCHLORIDE 10 MG/1
10 TABLET ORAL EVERY 6 HOURS PRN
Qty: 100 TABLET | Refills: 0 | Status: SHIPPED | OUTPATIENT
Start: 2018-04-23 | End: 2018-04-23 | Stop reason: SDUPTHER

## 2018-04-23 RX ORDER — OXYCODONE HCL 20 MG/1
20 TABLET, FILM COATED, EXTENDED RELEASE ORAL EVERY 12 HOURS
Qty: 60 EACH | Refills: 0 | Status: SHIPPED | OUTPATIENT
Start: 2018-04-23 | End: 2018-04-23 | Stop reason: SDUPTHER

## 2018-04-23 NOTE — PROGRESS NOTES
Subjective:       Patient ID: Familia Durbin Jr. is a 72 y.o. male.    Chief Complaint: Follow-up; Results; and Multiple Myeloma    HPI 72-year-old male currently being treated at [facility] remains on POMALYST 3 mg days 1 through 21/ 28 day cycle.  Reports pain involving left hand and warm especially at night    Past Medical History:   Diagnosis Date    CAD (coronary artery disease)     luikart    Diabetes mellitus, type 2 1979    eye dr rocha    Hearing impaired     Hyperlipidemia     Hypertension     Lupus     Discoid    Old MI (myocardial infarction) 2012    Tracheostomy tube present      Family History   Problem Relation Age of Onset    Diabetes Mother     Diabetes Father     Prostate cancer Father     Pancreatic cancer Sister     No Known Problems Brother     Diabetes Maternal Uncle     Diabetes Maternal Grandmother     No Known Problems Maternal Grandfather     No Known Problems Paternal Grandmother     No Known Problems Paternal Grandfather      Social History     Social History    Marital status: Single     Spouse name: N/A    Number of children: 9    Years of education: N/A     Occupational History    Not on file.     Social History Main Topics    Smoking status: Never Smoker    Smokeless tobacco: Never Used    Alcohol use No    Drug use: No    Sexual activity: Yes     Partners: Female     Other Topics Concern    Not on file     Social History Narrative    No narrative on file     Past Surgical History:   Procedure Laterality Date    CARDIAC SURGERY      COLONOSCOPY      CORONARY ARTERY BYPASS GRAFT      HAND SURGERY      KNEE SURGERY      TRACHEOSTOMY TUBE PLACEMENT      WRIST FUSION         Labs:  Lab Results   Component Value Date    WBC 3.98 04/23/2018    HGB 12.1 (L) 04/23/2018    HCT 37.2 (L) 04/23/2018     (H) 04/23/2018     04/23/2018     BMP  Lab Results   Component Value Date     01/22/2018    K 4.1 01/22/2018      01/22/2018    CO2 28 01/22/2018    BUN 15 01/22/2018    CREATININE 1.4 01/22/2018    CALCIUM 9.5 01/22/2018    ANIONGAP 7 (L) 01/22/2018    ESTGFRAFRICA 57 (A) 01/22/2018    EGFRNONAA 49 (A) 01/22/2018     Lab Results   Component Value Date    ALT 9 (L) 01/22/2018    AST 18 01/22/2018    ALKPHOS 63 01/22/2018    BILITOT 0.3 01/22/2018       Lab Results   Component Value Date    IRON 81 07/21/2016    TIBC 302 07/21/2016    FERRITIN 324 (H) 07/21/2016     No results found for: [id number]  No results found for: FOLATE  Lab Results   Component Value Date    TSH 0.683 01/22/2018         Review of Systems   Constitutional: Positive for activity change and fatigue. Negative for appetite change, chills, diaphoresis, fever and unexpected weight change.   HENT: Negative for congestion, dental problem, drooling, ear discharge, ear pain, facial swelling, hearing loss, mouth sores, nosebleeds, postnasal drip, rhinorrhea, sinus pressure, sneezing, sore throat, tinnitus, trouble swallowing and voice change.    Eyes: Negative for photophobia, pain, discharge, redness, itching and visual disturbance.   Respiratory: Negative for apnea, cough, choking, chest tightness, shortness of breath, wheezing and stridor.    Cardiovascular: Negative for chest pain, palpitations and leg swelling.   Gastrointestinal: Negative for abdominal distention, abdominal pain, anal bleeding, blood in stool, constipation, diarrhea, nausea, rectal pain and vomiting.   Endocrine: Negative for cold intolerance, heat intolerance, polydipsia, polyphagia and polyuria.   Genitourinary: Negative for decreased urine volume, difficulty urinating, discharge, dysuria, enuresis, flank pain, frequency, genital sores, hematuria, penile pain, penile swelling, scrotal swelling, testicular pain and urgency.   Musculoskeletal: Positive for arthralgias, joint swelling and myalgias. Negative for back pain, gait problem, neck pain and neck stiffness.   Skin: Negative  for color change, pallor, rash and wound.   Allergic/Immunologic: Negative for environmental allergies, food allergies and immunocompromised state.   Neurological: Negative for dizziness, tremors, seizures, syncope, facial asymmetry, speech difficulty, weakness, light-headedness, numbness and headaches.   Hematological: Negative for adenopathy. Does not bruise/bleed easily.   Psychiatric/Behavioral: Negative for agitation, behavioral problems, confusion, decreased concentration, dysphoric mood, hallucinations, self-injury, sleep disturbance and suicidal ideas. The patient is not nervous/anxious and is not hyperactive.        Objective:      Physical Exam   Constitutional: He is oriented to person, place, and time. He appears well-developed and well-nourished. He appears distressed.   HENT:   Head: Normocephalic.   Right Ear: External ear normal.   Left Ear: External ear normal.   Nose: Nose normal. Right sinus exhibits no maxillary sinus tenderness and no frontal sinus tenderness. Left sinus exhibits no maxillary sinus tenderness and no frontal sinus tenderness.   Mouth/Throat: Oropharynx is clear and moist. No oropharyngeal exudate.   Eyes: EOM and lids are normal. Pupils are equal, round, and reactive to light. Right eye exhibits no discharge. Left eye exhibits no discharge. Right conjunctiva is not injected. Right conjunctiva has no hemorrhage. Left conjunctiva is not injected. Left conjunctiva has no hemorrhage. No scleral icterus. Right eye exhibits normal extraocular motion. Left eye exhibits normal extraocular motion.   Neck: Normal range of motion. Neck supple. No JVD present. No tracheal deviation present. No thyromegaly present.   Cardiovascular: Normal rate and regular rhythm.    Pulmonary/Chest: Effort normal. No stridor. No respiratory distress.   Abdominal: Soft. He exhibits no mass. There is no hepatosplenomegaly, splenomegaly or hepatomegaly. There is no tenderness.   Musculoskeletal: Normal range of  motion. He exhibits edema. He exhibits no tenderness.   Minimal swelling that I can detect in left hand some tenderness on the left on   Lymphadenopathy:        Head (right side): No posterior auricular and no occipital adenopathy present.        Head (left side): No posterior auricular and no occipital adenopathy present.     He has no cervical adenopathy.        Right cervical: No superficial cervical, no deep cervical and no posterior cervical adenopathy present.       Left cervical: No superficial cervical, no deep cervical and no posterior cervical adenopathy present.     He has no axillary adenopathy.        Right: No supraclavicular adenopathy present.        Left: No supraclavicular adenopathy present.   Neurological: He is alert and oriented to person, place, and time. He has normal strength. No cranial nerve deficit. Coordination normal.   Skin: Skin is dry. No rash noted. He is not diaphoretic. No cyanosis or erythema. Nails show no clubbing.   Psychiatric: He has a normal mood and affect. His behavior is normal. Judgment and thought content normal. Cognition and memory are normal.   Vitals reviewed.          Assessment:      1. Metastatic bone cancer    2. Multiple myeloma not having achieved remission    3. DVT of axillary vein, acute left           Plan:   Continue his current treatment recommendations labs pending prescription for pain medicine sent to our pharmacy.  In addition I would recommend that patient have an ultrasound in his left arm make sure there's no DVT if not would recommend hand splint for trial to see whether or not carpal tunnel syndrome may be developing discussed applications with him communicate results through portal

## 2018-05-11 DIAGNOSIS — I10 ESSENTIAL HYPERTENSION: ICD-10-CM

## 2018-05-11 RX ORDER — METOPROLOL TARTRATE 50 MG/1
TABLET ORAL
Qty: 60 TABLET | Refills: 1 | Status: CANCELLED | OUTPATIENT
Start: 2018-05-11

## 2018-05-14 DIAGNOSIS — I10 ESSENTIAL HYPERTENSION: ICD-10-CM

## 2018-05-15 RX ORDER — METOPROLOL TARTRATE 50 MG/1
50 TABLET ORAL 2 TIMES DAILY
Qty: 60 TABLET | Refills: 1 | Status: SHIPPED | OUTPATIENT
Start: 2018-05-15 | End: 2018-07-13 | Stop reason: SDUPTHER

## 2018-05-18 DIAGNOSIS — C90.00 MULTIPLE MYELOMA NOT HAVING ACHIEVED REMISSION: ICD-10-CM

## 2018-05-18 RX ORDER — OXYCODONE HCL 20 MG/1
20 TABLET, FILM COATED, EXTENDED RELEASE ORAL EVERY 12 HOURS
Qty: 14 EACH | Refills: 0 | Status: SHIPPED | OUTPATIENT
Start: 2018-05-18 | End: 2018-05-22 | Stop reason: SDUPTHER

## 2018-05-18 RX ORDER — OXYCODONE HYDROCHLORIDE 10 MG/1
10 TABLET ORAL EVERY 6 HOURS PRN
Qty: 20 TABLET | Refills: 0 | Status: SHIPPED | OUTPATIENT
Start: 2018-05-18 | End: 2018-05-22 | Stop reason: SDUPTHER

## 2018-05-18 RX ORDER — OXYCODONE HYDROCHLORIDE 10 MG/1
10 TABLET ORAL EVERY 6 HOURS PRN
Qty: 100 TABLET | Refills: 0 | Status: CANCELLED | OUTPATIENT
Start: 2018-05-18

## 2018-05-18 NOTE — TELEPHONE ENCOUNTER
----- Message from Roula Girard sent at 5/18/2018  2:58 PM CDT -----  Karen Brown (Daughter) is requesting a call from nurse to discuss pt medications and refills.          Please call Karenbrittany Nichole (Daughter) [phone]

## 2018-05-18 NOTE — TELEPHONE ENCOUNTER
Emmett called to say he wanted other pain pill filled too. I explained only partial refills per dr suresh because he did not see him today and for this time it can go to [facility] but in the future it will be [facility] only.

## 2018-05-18 NOTE — TELEPHONE ENCOUNTER
----- Message from Rodolfo Solo MD sent at 5/18/2018  3:20 PM CDT -----  Please let the family know that I sent a prescription for refill for only 20 tablets he is scheduled to see me in 528 I did send that to [facility] because of the weekend but all narcotics to be filled within [facility]

## 2018-05-22 ENCOUNTER — HOSPITAL ENCOUNTER (OUTPATIENT)
Dept: RADIOLOGY | Facility: HOSPITAL | Age: 73
Discharge: HOME | End: 2018-05-22
Attending: INTERNAL MEDICINE
Payer: MEDICARE

## 2018-05-22 ENCOUNTER — OFFICE VISIT (OUTPATIENT)
Dept: HEMATOLOGY/ONCOLOGY | Facility: CLINIC | Age: 73
End: 2018-05-22
Payer: MEDICARE

## 2018-05-22 VITALS
HEART RATE: 63 BPM | SYSTOLIC BLOOD PRESSURE: 130 MMHG | RESPIRATION RATE: 18 BRPM | WEIGHT: 184.31 LBS | TEMPERATURE: 97 F | BODY MASS INDEX: 26.39 KG/M2 | HEIGHT: 70 IN | DIASTOLIC BLOOD PRESSURE: 80 MMHG | OXYGEN SATURATION: 97 %

## 2018-05-22 DIAGNOSIS — C90.00 MULTIPLE MYELOMA NOT HAVING ACHIEVED REMISSION: ICD-10-CM

## 2018-05-22 DIAGNOSIS — M79.641 HAND PAIN, NOT ARTHRALGIA, RIGHT: Primary | ICD-10-CM

## 2018-05-22 DIAGNOSIS — M79.641 HAND PAIN, NOT ARTHRALGIA, RIGHT: ICD-10-CM

## 2018-05-22 PROCEDURE — 3075F SYST BP GE 130 - 139MM HG: CPT | Mod: CPTII,S$GLB,, | Performed by: INTERNAL MEDICINE

## 2018-05-22 PROCEDURE — 73130 X-RAY EXAM OF HAND: CPT | Mod: 50,TC,FY,PO

## 2018-05-22 PROCEDURE — 3079F DIAST BP 80-89 MM HG: CPT | Mod: CPTII,S$GLB,, | Performed by: INTERNAL MEDICINE

## 2018-05-22 PROCEDURE — 99214 OFFICE O/P EST MOD 30 MIN: CPT | Mod: S$GLB,,, | Performed by: INTERNAL MEDICINE

## 2018-05-22 PROCEDURE — 99999 PR PBB SHADOW E&M-EST. PATIENT-LVL III: CPT | Mod: PBBFAC,,, | Performed by: INTERNAL MEDICINE

## 2018-05-22 PROCEDURE — 73130 X-RAY EXAM OF HAND: CPT | Mod: 26,50,, | Performed by: RADIOLOGY

## 2018-05-22 RX ORDER — OXYCODONE HCL 20 MG/1
20 TABLET, FILM COATED, EXTENDED RELEASE ORAL EVERY 12 HOURS
Qty: 60 EACH | Refills: 0 | Status: SHIPPED | OUTPATIENT
Start: 2018-05-22 | End: 2018-06-20 | Stop reason: SDUPTHER

## 2018-05-22 RX ORDER — OXYCODONE HYDROCHLORIDE 10 MG/1
10 TABLET ORAL EVERY 6 HOURS PRN
Qty: 90 TABLET | Refills: 0 | Status: SHIPPED | OUTPATIENT
Start: 2018-05-22 | End: 2018-06-20 | Stop reason: SDUPTHER

## 2018-05-22 NOTE — PROGRESS NOTES
Subjective:       Patient ID: Familia Durbin Jr. is a 72 y.o. male.    Chief Complaint: Follow-up; Results; and Multiple Myeloma    HPI  72-year-old male history of relapsed multiple myeloma, on Pomalyst 3 mg daily days 1 through 21 on a 28 day cycle ECOG status 1.  Patient reports pain involving his left hand especially upon waking in the morning    Past Medical History:   Diagnosis Date    CAD (coronary artery disease)     luikart    Diabetes mellitus, type 2 1979    eye dr rocha    Hearing impaired     Hyperlipidemia     Hypertension     Lupus     Discoid    Old MI (myocardial infarction) 2012    Tracheostomy tube present      Family History   Problem Relation Age of Onset    Diabetes Mother     Diabetes Father     Prostate cancer Father     Pancreatic cancer Sister     No Known Problems Brother     Diabetes Maternal Uncle     Diabetes Maternal Grandmother     No Known Problems Maternal Grandfather     No Known Problems Paternal Grandmother     No Known Problems Paternal Grandfather      Social History     Social History    Marital status: Single     Spouse name: N/A    Number of children: 9    Years of education: N/A     Occupational History    Not on file.     Social History Main Topics    Smoking status: Never Smoker    Smokeless tobacco: Never Used    Alcohol use No    Drug use: No    Sexual activity: Yes     Partners: Female     Other Topics Concern    Not on file     Social History Narrative    No narrative on file     Past Surgical History:   Procedure Laterality Date    CARDIAC SURGERY      COLONOSCOPY      CORONARY ARTERY BYPASS GRAFT      HAND SURGERY      KNEE SURGERY      TRACHEOSTOMY TUBE PLACEMENT      WRIST FUSION         Labs:  Lab Results   Component Value Date    WBC 3.72 (L) 05/22/2018    HGB 12.4 (L) 05/22/2018    HCT 38.0 (L) 05/22/2018     (H) 05/22/2018     05/22/2018     BMP  Lab Results   Component Value Date     05/22/2018     K 4.2 05/22/2018     05/22/2018    CO2 28 05/22/2018    BUN 19 05/22/2018    CREATININE 1.7 (H) 05/22/2018    CALCIUM 10.1 05/22/2018    ANIONGAP 9 05/22/2018    ESTGFRAFRICA 45 (A) 05/22/2018    EGFRNONAA 39 (A) 05/22/2018     Lab Results   Component Value Date    ALT 12 05/22/2018    AST 18 05/22/2018    ALKPHOS 67 05/22/2018    BILITOT 0.5 05/22/2018       Lab Results   Component Value Date    IRON 81 07/21/2016    TIBC 302 07/21/2016    FERRITIN 324 (H) 07/21/2016     No results found for: [id number]  No results found for: FOLATE  Lab Results   Component Value Date    TSH 0.683 01/22/2018         Review of Systems   Constitutional: Positive for activity change and fatigue. Negative for appetite change, chills, diaphoresis, fever and unexpected weight change.   HENT: Negative for congestion, dental problem, drooling, ear discharge, ear pain, facial swelling, hearing loss, mouth sores, nosebleeds, postnasal drip, rhinorrhea, sinus pressure, sneezing, sore throat, tinnitus, trouble swallowing and voice change.    Eyes: Negative for photophobia, pain, discharge, redness, itching and visual disturbance.   Respiratory: Negative for apnea, cough, choking, chest tightness, shortness of breath, wheezing and stridor.    Cardiovascular: Negative for chest pain, palpitations and leg swelling.   Gastrointestinal: Negative for abdominal distention, abdominal pain, anal bleeding, blood in stool, constipation, diarrhea, nausea, rectal pain and vomiting.   Endocrine: Negative for cold intolerance, heat intolerance, polydipsia, polyphagia and polyuria.   Genitourinary: Negative for decreased urine volume, difficulty urinating, discharge, dysuria, enuresis, flank pain, frequency, genital sores, hematuria, penile pain, penile swelling, scrotal swelling, testicular pain and urgency.   Musculoskeletal: Positive for arthralgias, joint swelling, myalgias and neck pain. Negative for back pain, gait problem and neck stiffness.    Skin: Negative for color change, pallor, rash and wound.   Allergic/Immunologic: Negative for environmental allergies, food allergies and immunocompromised state.   Neurological: Positive for weakness. Negative for dizziness, tremors, seizures, syncope, facial asymmetry, speech difficulty, light-headedness, numbness and headaches.   Hematological: Negative for adenopathy. Does not bruise/bleed easily.   Psychiatric/Behavioral: Positive for dysphoric mood. Negative for agitation, behavioral problems, confusion, decreased concentration, hallucinations, self-injury, sleep disturbance and suicidal ideas. The patient is nervous/anxious. The patient is not hyperactive.        Objective:      Physical Exam   Constitutional: He is oriented to person, place, and time. He appears well-developed and well-nourished. He appears distressed.   HENT:   Head: Normocephalic.   Right Ear: External ear normal.   Left Ear: External ear normal.   Nose: Nose normal. Right sinus exhibits no maxillary sinus tenderness and no frontal sinus tenderness. Left sinus exhibits no maxillary sinus tenderness and no frontal sinus tenderness.   Mouth/Throat: Oropharynx is clear and moist. No oropharyngeal exudate.   Eyes: EOM and lids are normal. Pupils are equal, round, and reactive to light. Right eye exhibits no discharge. Left eye exhibits no discharge. Right conjunctiva is not injected. Right conjunctiva has no hemorrhage. Left conjunctiva is not injected. Left conjunctiva has no hemorrhage. No scleral icterus. Right eye exhibits normal extraocular motion. Left eye exhibits normal extraocular motion.   Neck: Normal range of motion. Neck supple. No JVD present. No tracheal deviation present. No thyromegaly present.   Cardiovascular: Normal rate and regular rhythm.    Pulmonary/Chest: Effort normal. No stridor. No respiratory distress.   Abdominal: Soft. He exhibits no mass. There is no hepatosplenomegaly, splenomegaly or hepatomegaly. There is  no tenderness.   Musculoskeletal: Normal range of motion. He exhibits tenderness.   Pain and tenderness involving the left hand   Lymphadenopathy:        Head (right side): No posterior auricular and no occipital adenopathy present.        Head (left side): No posterior auricular and no occipital adenopathy present.     He has no cervical adenopathy.        Right cervical: No superficial cervical, no deep cervical and no posterior cervical adenopathy present.       Left cervical: No superficial cervical, no deep cervical and no posterior cervical adenopathy present.     He has no axillary adenopathy.        Right: No supraclavicular adenopathy present.        Left: No supraclavicular adenopathy present.   Neurological: He is alert and oriented to person, place, and time. He has normal strength. No cranial nerve deficit. Coordination normal.   Skin: Skin is dry. No rash noted. He is not diaphoretic. No cyanosis or erythema. Nails show no clubbing.   Psychiatric: He has a normal mood and affect. His behavior is normal. Judgment and thought content normal. Cognition and memory are normal.   Vitals reviewed.          Assessment:      1. Hand pain, not arthralgia, right    2. Multiple myeloma not having achieved remission    3. Metastatic bone cancer           Plan:   Results of his laboratory studies are pending continue with Pomalyst to [facility] at this point x-rays of his hand will be taken and revealed no evidence of abnormalities of possibility of carpal tunnel syndrome was described discussed and recommended the hand splint refill chronic stable narcotic use discussed implications with patient

## 2018-06-12 DIAGNOSIS — C90.00 MULTIPLE MYELOMA: ICD-10-CM

## 2018-06-12 DIAGNOSIS — E11.9 TYPE 2 DIABETES MELLITUS WITHOUT COMPLICATION, WITH LONG-TERM CURRENT USE OF INSULIN: Chronic | ICD-10-CM

## 2018-06-12 DIAGNOSIS — Z79.4 TYPE 2 DIABETES MELLITUS WITH HYPERGLYCEMIA, WITH LONG-TERM CURRENT USE OF INSULIN: ICD-10-CM

## 2018-06-12 DIAGNOSIS — I10 ESSENTIAL HYPERTENSION: Chronic | ICD-10-CM

## 2018-06-12 DIAGNOSIS — E11.65 UNCONTROLLED TYPE 2 DIABETES MELLITUS WITH HYPERGLYCEMIA, WITHOUT LONG-TERM CURRENT USE OF INSULIN: Primary | ICD-10-CM

## 2018-06-12 DIAGNOSIS — Z79.4 TYPE 2 DIABETES MELLITUS WITHOUT COMPLICATION, WITH LONG-TERM CURRENT USE OF INSULIN: Chronic | ICD-10-CM

## 2018-06-12 DIAGNOSIS — E11.65 TYPE 2 DIABETES MELLITUS WITH HYPERGLYCEMIA, WITH LONG-TERM CURRENT USE OF INSULIN: ICD-10-CM

## 2018-06-12 DIAGNOSIS — E78.5 HYPERLIPIDEMIA, UNSPECIFIED HYPERLIPIDEMIA TYPE: Chronic | ICD-10-CM

## 2018-06-12 DIAGNOSIS — Z79.4 INSULIN LONG-TERM USE: Chronic | ICD-10-CM

## 2018-06-12 DIAGNOSIS — M89.9 LYTIC BONE LESIONS ON XRAY: ICD-10-CM

## 2018-06-12 DIAGNOSIS — Z79.52 LONG TERM CURRENT USE OF SYSTEMIC STEROIDS: ICD-10-CM

## 2018-06-12 RX ORDER — PIOGLITAZONEHYDROCHLORIDE 30 MG/1
TABLET ORAL
Qty: 90 TABLET | Refills: 0 | Status: SHIPPED | OUTPATIENT
Start: 2018-06-12 | End: 2018-09-13 | Stop reason: SDUPTHER

## 2018-06-12 NOTE — TELEPHONE ENCOUNTER
I have ordered a A1c for him, can we see if it can be drawn along with other labs on the 20th with Dr Solo lab order?     Thank you Ory

## 2018-06-13 ENCOUNTER — LAB VISIT (OUTPATIENT)
Dept: LAB | Facility: HOSPITAL | Age: 73
End: 2018-06-13
Attending: INTERNAL MEDICINE
Payer: MEDICARE

## 2018-06-13 DIAGNOSIS — M89.9 LYTIC BONE LESIONS ON XRAY: ICD-10-CM

## 2018-06-13 DIAGNOSIS — C90.00 MULTIPLE MYELOMA: ICD-10-CM

## 2018-06-13 DIAGNOSIS — E11.65 UNCONTROLLED TYPE 2 DIABETES MELLITUS WITH HYPERGLYCEMIA, WITHOUT LONG-TERM CURRENT USE OF INSULIN: ICD-10-CM

## 2018-06-13 PROCEDURE — 86334 IMMUNOFIX E-PHORESIS SERUM: CPT

## 2018-06-13 PROCEDURE — 36415 COLL VENOUS BLD VENIPUNCTURE: CPT | Mod: PO

## 2018-06-13 PROCEDURE — 83735 ASSAY OF MAGNESIUM: CPT

## 2018-06-13 PROCEDURE — 82232 ASSAY OF BETA-2 PROTEIN: CPT

## 2018-06-13 PROCEDURE — 83520 IMMUNOASSAY QUANT NOS NONAB: CPT | Mod: 59

## 2018-06-13 PROCEDURE — 86334 IMMUNOFIX E-PHORESIS SERUM: CPT | Mod: 26,,, | Performed by: PATHOLOGY

## 2018-06-13 PROCEDURE — 82784 ASSAY IGA/IGD/IGG/IGM EACH: CPT | Mod: 59

## 2018-06-13 PROCEDURE — 84165 PROTEIN E-PHORESIS SERUM: CPT | Mod: 26,,, | Performed by: PATHOLOGY

## 2018-06-13 PROCEDURE — 84165 PROTEIN E-PHORESIS SERUM: CPT

## 2018-06-13 PROCEDURE — 85025 COMPLETE CBC W/AUTO DIFF WBC: CPT

## 2018-06-13 PROCEDURE — 80053 COMPREHEN METABOLIC PANEL: CPT

## 2018-06-14 LAB
ALBUMIN SERPL BCP-MCNC: 3.8 G/DL
ALP SERPL-CCNC: 64 U/L
ALT SERPL W/O P-5'-P-CCNC: 11 U/L
ANION GAP SERPL CALC-SCNC: 7 MMOL/L
AST SERPL-CCNC: 17 U/L
B2 MICROGLOB SERPL-MCNC: 3.1 UG/ML
BASOPHILS # BLD AUTO: 0.03 K/UL
BASOPHILS NFR BLD: 0.8 %
BILIRUB SERPL-MCNC: 0.6 MG/DL
BUN SERPL-MCNC: 19 MG/DL
CALCIUM SERPL-MCNC: 10.1 MG/DL
CHLORIDE SERPL-SCNC: 106 MMOL/L
CO2 SERPL-SCNC: 29 MMOL/L
CREAT SERPL-MCNC: 1.6 MG/DL
DIFFERENTIAL METHOD: ABNORMAL
EOSINOPHIL # BLD AUTO: 0.1 K/UL
EOSINOPHIL NFR BLD: 1.8 %
ERYTHROCYTE [DISTWIDTH] IN BLOOD BY AUTOMATED COUNT: 15.7 %
EST. GFR  (AFRICAN AMERICAN): 48.7 ML/MIN/1.73 M^2
EST. GFR  (NON AFRICAN AMERICAN): 42.1 ML/MIN/1.73 M^2
GLUCOSE SERPL-MCNC: 135 MG/DL
HCT VFR BLD AUTO: 35.7 %
HGB BLD-MCNC: 11.2 G/DL
IGA SERPL-MCNC: 466 MG/DL
IGG SERPL-MCNC: 2077 MG/DL
IGM SERPL-MCNC: 38 MG/DL
IMM GRANULOCYTES # BLD AUTO: 0.02 K/UL
IMM GRANULOCYTES NFR BLD AUTO: 0.5 %
LYMPHOCYTES # BLD AUTO: 1.5 K/UL
LYMPHOCYTES NFR BLD: 38.7 %
MAGNESIUM SERPL-MCNC: 2.3 MG/DL
MCH RBC QN AUTO: 33 PG
MCHC RBC AUTO-ENTMCNC: 31.4 G/DL
MCV RBC AUTO: 105 FL
MONOCYTES # BLD AUTO: 0.5 K/UL
MONOCYTES NFR BLD: 12.7 %
NEUTROPHILS # BLD AUTO: 1.8 K/UL
NEUTROPHILS NFR BLD: 45.5 %
NRBC BLD-RTO: 0 /100 WBC
PLATELET # BLD AUTO: 186 K/UL
PMV BLD AUTO: 11.5 FL
POTASSIUM SERPL-SCNC: 4.3 MMOL/L
PROT SERPL-MCNC: 8.2 G/DL
RBC # BLD AUTO: 3.39 M/UL
SODIUM SERPL-SCNC: 142 MMOL/L
WBC # BLD AUTO: 3.95 K/UL

## 2018-06-15 LAB
ALBUMIN SERPL ELPH-MCNC: 3.93 G/DL
ALPHA1 GLOB SERPL ELPH-MCNC: 0.3 G/DL
ALPHA2 GLOB SERPL ELPH-MCNC: 0.71 G/DL
B-GLOBULIN SERPL ELPH-MCNC: 1.09 G/DL
GAMMA GLOB SERPL ELPH-MCNC: 1.97 G/DL
INTERPRETATION SERPL IFE-IMP: NORMAL
KAPPA LC SER QL IA: 11.4 MG/DL
KAPPA LC/LAMBDA SER IA: 2.47
LAMBDA LC SER QL IA: 4.61 MG/DL
PATHOLOGIST INTERPRETATION IFE: NORMAL
PATHOLOGIST INTERPRETATION SPE: NORMAL
PROT SERPL-MCNC: 8 G/DL

## 2018-06-18 ENCOUNTER — TELEPHONE (OUTPATIENT)
Dept: OPHTHALMOLOGY | Facility: CLINIC | Age: 73
End: 2018-06-18

## 2018-06-20 ENCOUNTER — OFFICE VISIT (OUTPATIENT)
Dept: HEMATOLOGY/ONCOLOGY | Facility: CLINIC | Age: 73
End: 2018-06-20
Payer: MEDICARE

## 2018-06-20 ENCOUNTER — LAB VISIT (OUTPATIENT)
Dept: LAB | Facility: HOSPITAL | Age: 73
End: 2018-06-20
Attending: INTERNAL MEDICINE
Payer: MEDICARE

## 2018-06-20 VITALS
SYSTOLIC BLOOD PRESSURE: 121 MMHG | HEIGHT: 70 IN | OXYGEN SATURATION: 98 % | TEMPERATURE: 98 F | HEART RATE: 80 BPM | BODY MASS INDEX: 27.36 KG/M2 | DIASTOLIC BLOOD PRESSURE: 59 MMHG | WEIGHT: 191.13 LBS

## 2018-06-20 DIAGNOSIS — C90.00 MULTIPLE MYELOMA NOT HAVING ACHIEVED REMISSION: ICD-10-CM

## 2018-06-20 DIAGNOSIS — E11.65 UNCONTROLLED TYPE 2 DIABETES MELLITUS WITH HYPERGLYCEMIA, WITHOUT LONG-TERM CURRENT USE OF INSULIN: ICD-10-CM

## 2018-06-20 DIAGNOSIS — C90.00 MULTIPLE MYELOMA: ICD-10-CM

## 2018-06-20 DIAGNOSIS — M89.8X9 BONE PAIN: ICD-10-CM

## 2018-06-20 DIAGNOSIS — M89.9 LYTIC BONE LESIONS ON XRAY: ICD-10-CM

## 2018-06-20 DIAGNOSIS — C90.00 MULTIPLE MYELOMA NOT HAVING ACHIEVED REMISSION: Primary | ICD-10-CM

## 2018-06-20 LAB
ALBUMIN SERPL BCP-MCNC: 3.8 G/DL
ALP SERPL-CCNC: 51 U/L
ALT SERPL W/O P-5'-P-CCNC: 12 U/L
ANION GAP SERPL CALC-SCNC: 9 MMOL/L
AST SERPL-CCNC: 21 U/L
BASOPHILS # BLD AUTO: 0.02 K/UL
BASOPHILS NFR BLD: 0.6 %
BILIRUB SERPL-MCNC: 0.5 MG/DL
BUN SERPL-MCNC: 19 MG/DL
CALCIUM SERPL-MCNC: 9.9 MG/DL
CHLORIDE SERPL-SCNC: 105 MMOL/L
CO2 SERPL-SCNC: 24 MMOL/L
CREAT SERPL-MCNC: 1.5 MG/DL
DIFFERENTIAL METHOD: ABNORMAL
EOSINOPHIL # BLD AUTO: 0.1 K/UL
EOSINOPHIL NFR BLD: 4.1 %
ERYTHROCYTE [DISTWIDTH] IN BLOOD BY AUTOMATED COUNT: 16 %
EST. GFR  (AFRICAN AMERICAN): 53 ML/MIN/1.73 M^2
EST. GFR  (NON AFRICAN AMERICAN): 46 ML/MIN/1.73 M^2
ESTIMATED AVG GLUCOSE: 143 MG/DL
GLUCOSE SERPL-MCNC: 182 MG/DL
HBA1C MFR BLD HPLC: 6.6 %
HCT VFR BLD AUTO: 33.9 %
HGB BLD-MCNC: 11.2 G/DL
LYMPHOCYTES # BLD AUTO: 1.5 K/UL
LYMPHOCYTES NFR BLD: 47.9 %
MCH RBC QN AUTO: 33.5 PG
MCHC RBC AUTO-ENTMCNC: 33 G/DL
MCV RBC AUTO: 102 FL
MONOCYTES # BLD AUTO: 0.5 K/UL
MONOCYTES NFR BLD: 15.8 %
NEUTROPHILS # BLD AUTO: 1 K/UL
NEUTROPHILS NFR BLD: 31.6 %
PLATELET # BLD AUTO: 140 K/UL
PMV BLD AUTO: 10.1 FL
POTASSIUM SERPL-SCNC: 3.9 MMOL/L
PROT SERPL-MCNC: 7.9 G/DL
RBC # BLD AUTO: 3.34 M/UL
SODIUM SERPL-SCNC: 138 MMOL/L
WBC # BLD AUTO: 3.17 K/UL

## 2018-06-20 PROCEDURE — 86334 IMMUNOFIX E-PHORESIS SERUM: CPT | Mod: 26,,, | Performed by: PATHOLOGY

## 2018-06-20 PROCEDURE — 86334 IMMUNOFIX E-PHORESIS SERUM: CPT

## 2018-06-20 PROCEDURE — 83520 IMMUNOASSAY QUANT NOS NONAB: CPT | Mod: 59

## 2018-06-20 PROCEDURE — 83036 HEMOGLOBIN GLYCOSYLATED A1C: CPT

## 2018-06-20 PROCEDURE — 99214 OFFICE O/P EST MOD 30 MIN: CPT | Mod: S$GLB,,, | Performed by: INTERNAL MEDICINE

## 2018-06-20 PROCEDURE — 3074F SYST BP LT 130 MM HG: CPT | Mod: CPTII,S$GLB,, | Performed by: INTERNAL MEDICINE

## 2018-06-20 PROCEDURE — 99999 PR PBB SHADOW E&M-EST. PATIENT-LVL III: CPT | Mod: PBBFAC,,, | Performed by: INTERNAL MEDICINE

## 2018-06-20 PROCEDURE — 36415 COLL VENOUS BLD VENIPUNCTURE: CPT

## 2018-06-20 PROCEDURE — 84165 PROTEIN E-PHORESIS SERUM: CPT

## 2018-06-20 PROCEDURE — 85025 COMPLETE CBC W/AUTO DIFF WBC: CPT

## 2018-06-20 PROCEDURE — 80053 COMPREHEN METABOLIC PANEL: CPT

## 2018-06-20 PROCEDURE — 3078F DIAST BP <80 MM HG: CPT | Mod: CPTII,S$GLB,, | Performed by: INTERNAL MEDICINE

## 2018-06-20 PROCEDURE — 84165 PROTEIN E-PHORESIS SERUM: CPT | Mod: 26,,, | Performed by: PATHOLOGY

## 2018-06-20 RX ORDER — OXYCODONE HCL 20 MG/1
20 TABLET, FILM COATED, EXTENDED RELEASE ORAL EVERY 12 HOURS
Qty: 60 EACH | Refills: 0 | Status: SHIPPED | OUTPATIENT
Start: 2018-06-20 | End: 2018-07-30 | Stop reason: SDUPTHER

## 2018-06-20 RX ORDER — OXYCODONE HYDROCHLORIDE 10 MG/1
10 TABLET ORAL EVERY 6 HOURS PRN
Qty: 90 TABLET | Refills: 0 | Status: SHIPPED | OUTPATIENT
Start: 2018-06-20 | End: 2018-07-30 | Stop reason: SDUPTHER

## 2018-06-20 NOTE — PROGRESS NOTES
Subjective:       Patient ID: Familia Durbin Jr. is a 72 y.o. male.    Chief Complaint: Results and Multiple Myeloma    HPI 72-year-old male history of multiple myeloma being maintained on Pomalyst 3 mg days 1 through 21 on a 28 day cycle currently being treated by [facility].  Patient returns for review    Past Medical History:   Diagnosis Date    CAD (coronary artery disease)     luikart    Diabetes mellitus, type 2 1979    eye dr rocha    Hearing impaired     Hyperlipidemia     Hypertension     Lupus     Discoid    Old MI (myocardial infarction) 2012    Tracheostomy tube present      Family History   Problem Relation Age of Onset    Diabetes Mother     Diabetes Father     Prostate cancer Father     Pancreatic cancer Sister     No Known Problems Brother     Diabetes Maternal Uncle     Diabetes Maternal Grandmother     No Known Problems Maternal Grandfather     No Known Problems Paternal Grandmother     No Known Problems Paternal Grandfather      Social History     Social History    Marital status: Single     Spouse name: N/A    Number of children: 9    Years of education: N/A     Occupational History    Not on file.     Social History Main Topics    Smoking status: Never Smoker    Smokeless tobacco: Never Used    Alcohol use No    Drug use: No    Sexual activity: Yes     Partners: Female     Other Topics Concern    Not on file     Social History Narrative    No narrative on file     Past Surgical History:   Procedure Laterality Date    CARDIAC SURGERY      COLONOSCOPY      CORONARY ARTERY BYPASS GRAFT      HAND SURGERY      KNEE SURGERY      TRACHEOSTOMY TUBE PLACEMENT      WRIST FUSION         Labs:  Lab Results   Component Value Date    WBC 3.17 (L) 06/20/2018    HGB 11.2 (L) 06/20/2018    HCT 33.9 (L) 06/20/2018     (H) 06/20/2018     (L) 06/20/2018     BMP  Lab Results   Component Value Date     06/20/2018    K 3.9  06/20/2018     06/20/2018    CO2 24 06/20/2018    BUN 19 06/20/2018    CREATININE 1.5 (H) 06/20/2018    CALCIUM 9.9 06/20/2018    ANIONGAP 9 06/20/2018    ESTGFRAFRICA 53 (A) 06/20/2018    EGFRNONAA 46 (A) 06/20/2018     Lab Results   Component Value Date    ALT 12 06/20/2018    AST 21 06/20/2018    ALKPHOS 51 (L) 06/20/2018    BILITOT 0.5 06/20/2018       Lab Results   Component Value Date    IRON 81 07/21/2016    TIBC 302 07/21/2016    FERRITIN 324 (H) 07/21/2016     No results found for: [id number]  No results found for: FOLATE  Lab Results   Component Value Date    TSH 0.683 01/22/2018         Review of Systems   Constitutional: Positive for activity change and fatigue. Negative for appetite change, chills, diaphoresis, fever and unexpected weight change.   HENT: Negative for congestion, dental problem, drooling, ear discharge, ear pain, facial swelling, hearing loss, mouth sores, nosebleeds, postnasal drip, rhinorrhea, sinus pressure, sneezing, sore throat, tinnitus, trouble swallowing and voice change.    Eyes: Negative for photophobia, pain, discharge, redness, itching and visual disturbance.   Respiratory: Negative for apnea, cough, choking, chest tightness, shortness of breath, wheezing and stridor.    Cardiovascular: Negative for chest pain, palpitations and leg swelling.   Gastrointestinal: Negative for abdominal distention, abdominal pain, anal bleeding, blood in stool, constipation, diarrhea, nausea, rectal pain and vomiting.   Endocrine: Negative for cold intolerance, heat intolerance, polydipsia, polyphagia and polyuria.   Genitourinary: Negative for decreased urine volume, difficulty urinating, discharge, dysuria, enuresis, flank pain, frequency, genital sores, hematuria, penile pain, penile swelling, scrotal swelling, testicular pain and urgency.   Musculoskeletal: Positive for arthralgias. Negative for back pain, gait problem, joint swelling, myalgias, neck pain and neck stiffness.         Complains of pain of her right hand swollen in the morning and less so as the day goes by   Skin: Negative for color change, pallor, rash and wound.   Allergic/Immunologic: Negative for environmental allergies, food allergies and immunocompromised state.   Neurological: Positive for weakness. Negative for dizziness, tremors, seizures, syncope, facial asymmetry, speech difficulty, light-headedness, numbness and headaches.   Hematological: Negative for adenopathy. Does not bruise/bleed easily.   Psychiatric/Behavioral: Positive for dysphoric mood. Negative for agitation, behavioral problems, confusion, decreased concentration, hallucinations, self-injury, sleep disturbance and suicidal ideas. The patient is nervous/anxious. The patient is not hyperactive.        Objective:      Physical Exam   Constitutional: He is oriented to person, place, and time. He appears well-developed and well-nourished. He appears distressed.   HENT:   Head: Normocephalic.   Right Ear: External ear normal.   Left Ear: External ear normal.   Nose: Nose normal. Right sinus exhibits no maxillary sinus tenderness and no frontal sinus tenderness. Left sinus exhibits no maxillary sinus tenderness and no frontal sinus tenderness.   Mouth/Throat: Oropharynx is clear and moist. No oropharyngeal exudate.   Eyes: EOM and lids are normal. Pupils are equal, round, and reactive to light. Right eye exhibits no discharge. Left eye exhibits no discharge. Right conjunctiva is not injected. Right conjunctiva has no hemorrhage. Left conjunctiva is not injected. Left conjunctiva has no hemorrhage. No scleral icterus. Right eye exhibits normal extraocular motion. Left eye exhibits normal extraocular motion.   Neck: Normal range of motion. Neck supple. No JVD present. No tracheal deviation present. No thyromegaly present.   Cardiovascular: Normal rate and regular rhythm.    Pulmonary/Chest: Effort normal. No stridor. No respiratory distress.   Abdominal: Soft. He  exhibits no mass. There is no hepatosplenomegaly, splenomegaly or hepatomegaly. There is no tenderness.   Musculoskeletal: Normal range of motion. He exhibits edema and tenderness.   Lymphadenopathy:        Head (right side): No posterior auricular and no occipital adenopathy present.        Head (left side): No posterior auricular and no occipital adenopathy present.     He has no cervical adenopathy.        Right cervical: No superficial cervical, no deep cervical and no posterior cervical adenopathy present.       Left cervical: No superficial cervical, no deep cervical and no posterior cervical adenopathy present.     He has no axillary adenopathy.        Right: No supraclavicular adenopathy present.        Left: No supraclavicular adenopathy present.   Neurological: He is alert and oriented to person, place, and time. He has normal strength. No cranial nerve deficit. Coordination normal.   Skin: Skin is dry. No rash noted. He is not diaphoretic. No cyanosis or erythema. Nails show no clubbing.   Psychiatric: He has a normal mood and affect. His behavior is normal. Judgment and thought content normal. Cognition and memory are normal.   Vitals reviewed.          Assessment:      1. Multiple myeloma not having achieved remission    2. Metastatic bone cancer           Plan:   X-rays reveals no clear etiology in terms of abnormalities in terms of hand results of laboratory studies pending will follow up in 4-6 week chronic stable narcotics given reviewed  EM.  Will check rheumatoid factor and MARGAUX

## 2018-06-21 LAB
ALBUMIN SERPL ELPH-MCNC: 3.86 G/DL
ALPHA1 GLOB SERPL ELPH-MCNC: 0.29 G/DL
ALPHA2 GLOB SERPL ELPH-MCNC: 0.72 G/DL
B-GLOBULIN SERPL ELPH-MCNC: 1.02 G/DL
GAMMA GLOB SERPL ELPH-MCNC: 1.82 G/DL
KAPPA LC SER QL IA: 10.06 MG/DL
KAPPA LC/LAMBDA SER IA: 2.31
LAMBDA LC SER QL IA: 4.36 MG/DL
PROT SERPL-MCNC: 7.7 G/DL

## 2018-06-26 ENCOUNTER — PATIENT OUTREACH (OUTPATIENT)
Dept: ADMINISTRATIVE | Facility: HOSPITAL | Age: 73
End: 2018-06-26

## 2018-07-01 DIAGNOSIS — E78.5 HYPERLIPIDEMIA, UNSPECIFIED HYPERLIPIDEMIA TYPE: Chronic | ICD-10-CM

## 2018-07-01 DIAGNOSIS — Z79.52 LONG TERM CURRENT USE OF SYSTEMIC STEROIDS: ICD-10-CM

## 2018-07-01 DIAGNOSIS — Z79.4 TYPE 2 DIABETES MELLITUS WITHOUT COMPLICATION, WITH LONG-TERM CURRENT USE OF INSULIN: Chronic | ICD-10-CM

## 2018-07-01 DIAGNOSIS — Z79.4 TYPE 2 DIABETES MELLITUS WITH HYPERGLYCEMIA, WITH LONG-TERM CURRENT USE OF INSULIN: ICD-10-CM

## 2018-07-01 DIAGNOSIS — Z79.4 INSULIN LONG-TERM USE: Chronic | ICD-10-CM

## 2018-07-01 DIAGNOSIS — E11.65 TYPE 2 DIABETES MELLITUS WITH HYPERGLYCEMIA, WITH LONG-TERM CURRENT USE OF INSULIN: ICD-10-CM

## 2018-07-01 DIAGNOSIS — E11.9 TYPE 2 DIABETES MELLITUS WITHOUT COMPLICATION, WITH LONG-TERM CURRENT USE OF INSULIN: Chronic | ICD-10-CM

## 2018-07-01 DIAGNOSIS — I10 ESSENTIAL HYPERTENSION: Chronic | ICD-10-CM

## 2018-07-01 RX ORDER — ROSUVASTATIN CALCIUM 40 MG/1
TABLET, COATED ORAL
Qty: 90 TABLET | Refills: 3 | Status: SHIPPED | OUTPATIENT
Start: 2018-07-01 | End: 2018-07-02 | Stop reason: SDUPTHER

## 2018-07-02 RX ORDER — ROSUVASTATIN CALCIUM 40 MG/1
40 TABLET, COATED ORAL NIGHTLY
Qty: 90 TABLET | Refills: 3 | Status: SHIPPED | OUTPATIENT
Start: 2018-07-02 | End: 2019-07-08 | Stop reason: SDUPTHER

## 2018-07-13 DIAGNOSIS — I10 ESSENTIAL HYPERTENSION: ICD-10-CM

## 2018-07-16 RX ORDER — METOPROLOL TARTRATE 50 MG/1
TABLET ORAL
Qty: 60 TABLET | Refills: 1 | Status: SHIPPED | OUTPATIENT
Start: 2018-07-16 | End: 2018-09-18 | Stop reason: SDUPTHER

## 2018-07-30 ENCOUNTER — OFFICE VISIT (OUTPATIENT)
Dept: HEMATOLOGY/ONCOLOGY | Facility: CLINIC | Age: 73
End: 2018-07-30
Payer: MEDICARE

## 2018-07-30 ENCOUNTER — LAB VISIT (OUTPATIENT)
Dept: LAB | Facility: HOSPITAL | Age: 73
End: 2018-07-30
Attending: INTERNAL MEDICINE
Payer: MEDICARE

## 2018-07-30 VITALS
OXYGEN SATURATION: 97 % | BODY MASS INDEX: 27.21 KG/M2 | HEIGHT: 70 IN | HEART RATE: 72 BPM | WEIGHT: 190.06 LBS | SYSTOLIC BLOOD PRESSURE: 137 MMHG | DIASTOLIC BLOOD PRESSURE: 59 MMHG | TEMPERATURE: 99 F

## 2018-07-30 DIAGNOSIS — C90.00 MULTIPLE MYELOMA NOT HAVING ACHIEVED REMISSION: Primary | ICD-10-CM

## 2018-07-30 DIAGNOSIS — M89.8X9 BONE PAIN: ICD-10-CM

## 2018-07-30 DIAGNOSIS — C90.00 MULTIPLE MYELOMA NOT HAVING ACHIEVED REMISSION: ICD-10-CM

## 2018-07-30 LAB
ALBUMIN SERPL BCP-MCNC: 3.9 G/DL
ALP SERPL-CCNC: 50 U/L
ALT SERPL W/O P-5'-P-CCNC: 16 U/L
ANION GAP SERPL CALC-SCNC: 7 MMOL/L
AST SERPL-CCNC: 20 U/L
BASOPHILS # BLD AUTO: 0.01 K/UL
BASOPHILS NFR BLD: 0.3 %
BILIRUB SERPL-MCNC: 0.4 MG/DL
BUN SERPL-MCNC: 25 MG/DL
CALCIUM SERPL-MCNC: 9.9 MG/DL
CHLORIDE SERPL-SCNC: 103 MMOL/L
CO2 SERPL-SCNC: 28 MMOL/L
CREAT SERPL-MCNC: 1.6 MG/DL
DIFFERENTIAL METHOD: ABNORMAL
EOSINOPHIL # BLD AUTO: 0 K/UL
EOSINOPHIL NFR BLD: 1 %
ERYTHROCYTE [DISTWIDTH] IN BLOOD BY AUTOMATED COUNT: 15.7 %
EST. GFR  (AFRICAN AMERICAN): 49 ML/MIN/1.73 M^2
EST. GFR  (NON AFRICAN AMERICAN): 42 ML/MIN/1.73 M^2
GLUCOSE SERPL-MCNC: 153 MG/DL
HCT VFR BLD AUTO: 36.2 %
HGB BLD-MCNC: 12.1 G/DL
LYMPHOCYTES # BLD AUTO: 1.5 K/UL
LYMPHOCYTES NFR BLD: 48.2 %
MCH RBC QN AUTO: 34 PG
MCHC RBC AUTO-ENTMCNC: 33.4 G/DL
MCV RBC AUTO: 102 FL
MONOCYTES # BLD AUTO: 0.6 K/UL
MONOCYTES NFR BLD: 19.3 %
NEUTROPHILS # BLD AUTO: 1 K/UL
NEUTROPHILS NFR BLD: 31.2 %
PLATELET # BLD AUTO: 203 K/UL
PMV BLD AUTO: 9.8 FL
POTASSIUM SERPL-SCNC: 4.2 MMOL/L
PROT SERPL-MCNC: 8.6 G/DL
RBC # BLD AUTO: 3.56 M/UL
RHEUMATOID FACT SERPL-ACNC: 11 IU/ML
SODIUM SERPL-SCNC: 138 MMOL/L
WBC # BLD AUTO: 3.05 K/UL

## 2018-07-30 PROCEDURE — 80053 COMPREHEN METABOLIC PANEL: CPT

## 2018-07-30 PROCEDURE — 86334 IMMUNOFIX E-PHORESIS SERUM: CPT

## 2018-07-30 PROCEDURE — 84165 PROTEIN E-PHORESIS SERUM: CPT

## 2018-07-30 PROCEDURE — 3075F SYST BP GE 130 - 139MM HG: CPT | Mod: CPTII,S$GLB,, | Performed by: INTERNAL MEDICINE

## 2018-07-30 PROCEDURE — 86431 RHEUMATOID FACTOR QUANT: CPT

## 2018-07-30 PROCEDURE — 3078F DIAST BP <80 MM HG: CPT | Mod: CPTII,S$GLB,, | Performed by: INTERNAL MEDICINE

## 2018-07-30 PROCEDURE — 99214 OFFICE O/P EST MOD 30 MIN: CPT | Mod: S$GLB,,, | Performed by: INTERNAL MEDICINE

## 2018-07-30 PROCEDURE — 36415 COLL VENOUS BLD VENIPUNCTURE: CPT

## 2018-07-30 PROCEDURE — 86334 IMMUNOFIX E-PHORESIS SERUM: CPT | Mod: 26,,, | Performed by: PATHOLOGY

## 2018-07-30 PROCEDURE — 84165 PROTEIN E-PHORESIS SERUM: CPT | Mod: 26,,, | Performed by: PATHOLOGY

## 2018-07-30 PROCEDURE — 86038 ANTINUCLEAR ANTIBODIES: CPT

## 2018-07-30 PROCEDURE — 83520 IMMUNOASSAY QUANT NOS NONAB: CPT | Mod: 59

## 2018-07-30 PROCEDURE — 85025 COMPLETE CBC W/AUTO DIFF WBC: CPT

## 2018-07-30 PROCEDURE — 99999 PR PBB SHADOW E&M-EST. PATIENT-LVL III: CPT | Mod: PBBFAC,,, | Performed by: INTERNAL MEDICINE

## 2018-07-30 RX ORDER — OXYCODONE HYDROCHLORIDE 10 MG/1
10 TABLET ORAL EVERY 6 HOURS PRN
Qty: 90 TABLET | Refills: 0 | Status: SHIPPED | OUTPATIENT
Start: 2018-07-30 | End: 2018-09-06 | Stop reason: SDUPTHER

## 2018-07-30 RX ORDER — OXYCODONE HCL 20 MG/1
20 TABLET, FILM COATED, EXTENDED RELEASE ORAL EVERY 12 HOURS
Qty: 60 EACH | Refills: 0 | Status: SHIPPED | OUTPATIENT
Start: 2018-07-30 | End: 2018-09-06 | Stop reason: SDUPTHER

## 2018-07-30 NOTE — PROGRESS NOTES
Subjective:       Patient ID: Familia Durbin Jr. is a 72 y.o. male.    Chief Complaint: Results and Multiple Myeloma    HPI 72-year-old male history of multiple myeloma patient returns for review patient is currently treated at [facility] in [place] is scheduled to go tomorrow the patient is currently on maintenance NINARLO    Past Medical History:   Diagnosis Date    CAD (coronary artery disease)     luikart    Diabetes mellitus, type 2 1979    eye dr rocha    Hearing impaired     Hyperlipidemia     Hypertension     Lupus     Discoid    Old MI (myocardial infarction) 2012    Tracheostomy tube present      Family History   Problem Relation Age of Onset    Diabetes Mother     Diabetes Father     Prostate cancer Father     Pancreatic cancer Sister     No Known Problems Brother     Diabetes Maternal Uncle     Diabetes Maternal Grandmother     No Known Problems Maternal Grandfather     No Known Problems Paternal Grandmother     No Known Problems Paternal Grandfather      Social History     Social History    Marital status: Single     Spouse name: N/A    Number of children: 9    Years of education: N/A     Occupational History    Not on file.     Social History Main Topics    Smoking status: Never Smoker    Smokeless tobacco: Never Used    Alcohol use No    Drug use: No    Sexual activity: Yes     Partners: Female     Other Topics Concern    Not on file     Social History Narrative    No narrative on file     Past Surgical History:   Procedure Laterality Date    CARDIAC SURGERY      COLONOSCOPY      CORONARY ARTERY BYPASS GRAFT      HAND SURGERY      KNEE SURGERY      TRACHEOSTOMY TUBE PLACEMENT      WRIST FUSION         Labs:  Lab Results   Component Value Date    WBC 3.05 (L) 07/30/2018    HGB 12.1 (L) 07/30/2018    HCT 36.2 (L) 07/30/2018     (H) 07/30/2018     07/30/2018     BMP  Lab Results   Component Value Date     07/30/2018     K 4.2 07/30/2018     07/30/2018    CO2 28 07/30/2018    BUN 25 (H) 07/30/2018    CREATININE 1.6 (H) 07/30/2018    CALCIUM 9.9 07/30/2018    ANIONGAP 7 (L) 07/30/2018    ESTGFRAFRICA 49 (A) 07/30/2018    EGFRNONAA 42 (A) 07/30/2018     Lab Results   Component Value Date    ALT 16 07/30/2018    AST 20 07/30/2018    ALKPHOS 50 (L) 07/30/2018    BILITOT 0.4 07/30/2018       Lab Results   Component Value Date    IRON 81 07/21/2016    TIBC 302 07/21/2016    FERRITIN 324 (H) 07/21/2016     No results found for: [id number]  No results found for: FOLATE  Lab Results   Component Value Date    TSH 0.683 01/22/2018         Review of Systems   Constitutional: Positive for activity change and fatigue. Negative for appetite change, chills, diaphoresis, fever and unexpected weight change.   HENT: Negative for congestion, dental problem, drooling, ear discharge, ear pain, facial swelling, hearing loss, mouth sores, nosebleeds, postnasal drip, rhinorrhea, sinus pressure, sneezing, sore throat, tinnitus, trouble swallowing and voice change.    Eyes: Negative for photophobia, pain, discharge, redness, itching and visual disturbance.   Respiratory: Negative for apnea, cough, choking, chest tightness, shortness of breath, wheezing and stridor.    Cardiovascular: Negative for chest pain, palpitations and leg swelling.   Gastrointestinal: Negative for abdominal distention, abdominal pain, anal bleeding, blood in stool, constipation, diarrhea, nausea, rectal pain and vomiting.   Endocrine: Negative for cold intolerance, heat intolerance, polydipsia, polyphagia and polyuria.   Genitourinary: Negative for decreased urine volume, difficulty urinating, discharge, dysuria, enuresis, flank pain, frequency, genital sores, hematuria, penile pain, penile swelling, scrotal swelling, testicular pain and urgency.   Musculoskeletal: Positive for arthralgias, back pain and myalgias. Negative for gait problem, joint swelling, neck pain and neck  stiffness.   Skin: Negative for color change, pallor, rash and wound.   Allergic/Immunologic: Negative for environmental allergies, food allergies and immunocompromised state.   Neurological: Positive for weakness. Negative for dizziness, tremors, seizures, syncope, facial asymmetry, speech difficulty, light-headedness, numbness and headaches.   Hematological: Negative for adenopathy. Does not bruise/bleed easily.   Psychiatric/Behavioral: Positive for dysphoric mood. Negative for agitation, behavioral problems, confusion, decreased concentration, hallucinations, self-injury, sleep disturbance and suicidal ideas. The patient is nervous/anxious. The patient is not hyperactive.        Objective:      Physical Exam   Constitutional: He is oriented to person, place, and time. He appears distressed.   HENT:   Head: Normocephalic.   Right Ear: External ear normal.   Left Ear: External ear normal.   Nose: Nose normal. Right sinus exhibits no maxillary sinus tenderness and no frontal sinus tenderness. Left sinus exhibits no maxillary sinus tenderness and no frontal sinus tenderness.   Mouth/Throat: Oropharynx is clear and moist. No oropharyngeal exudate.   Eyes: EOM and lids are normal. Pupils are equal, round, and reactive to light. Right eye exhibits no discharge. Left eye exhibits no discharge. Right conjunctiva is not injected. Right conjunctiva has no hemorrhage. Left conjunctiva is not injected. Left conjunctiva has no hemorrhage. No scleral icterus. Right eye exhibits normal extraocular motion. Left eye exhibits normal extraocular motion.   Neck: Normal range of motion. Neck supple. No JVD present. No tracheal deviation present. No thyromegaly present.   Cardiovascular: Normal rate and regular rhythm.    Pulmonary/Chest: Effort normal. No stridor. No respiratory distress.   Abdominal: Soft. He exhibits no mass. There is no hepatosplenomegaly, splenomegaly or hepatomegaly. There is no tenderness.   Musculoskeletal:  Normal range of motion. He exhibits no edema or tenderness.   Lymphadenopathy:        Head (right side): No posterior auricular and no occipital adenopathy present.        Head (left side): No posterior auricular and no occipital adenopathy present.     He has no cervical adenopathy.        Right cervical: No superficial cervical, no deep cervical and no posterior cervical adenopathy present.       Left cervical: No superficial cervical, no deep cervical and no posterior cervical adenopathy present.     He has no axillary adenopathy.        Right: No supraclavicular adenopathy present.        Left: No supraclavicular adenopathy present.   Neurological: He is alert and oriented to person, place, and time. He has normal strength. No cranial nerve deficit. Coordination normal.   Skin: Skin is dry. No rash noted. He is not diaphoretic. No cyanosis or erythema. Nails show no clubbing.   Psychiatric: He has a normal mood and affect. His behavior is normal. Judgment and thought content normal. Cognition and memory are normal.   Vitals reviewed.          Assessment:      1. Multiple myeloma not having achieved remission    2. Metastatic bone cancer           Plan:     Will last patient again followed me his records will most recent ones demonstrates stable disease review laboratory studies today communicate through patient portal reviewed state  him all narcotics filled within the [facility] follow-up with me as detailed        Rodolfo Solo Jr, MD FACP

## 2018-07-31 LAB
ALBUMIN SERPL ELPH-MCNC: 3.96 G/DL
ALPHA1 GLOB SERPL ELPH-MCNC: 0.31 G/DL
ALPHA2 GLOB SERPL ELPH-MCNC: 0.75 G/DL
ANA SER QL IF: NORMAL
B-GLOBULIN SERPL ELPH-MCNC: 1.06 G/DL
GAMMA GLOB SERPL ELPH-MCNC: 2.03 G/DL
KAPPA LC SER QL IA: 8.55 MG/DL
KAPPA LC/LAMBDA SER IA: 2.22
LAMBDA LC SER QL IA: 3.85 MG/DL
PROT SERPL-MCNC: 8.1 G/DL

## 2018-08-24 DIAGNOSIS — E11.65 TYPE 2 DIABETES MELLITUS WITH HYPERGLYCEMIA, WITHOUT LONG-TERM CURRENT USE OF INSULIN: ICD-10-CM

## 2018-08-24 DIAGNOSIS — Z79.52 LONG TERM CURRENT USE OF SYSTEMIC STEROIDS: ICD-10-CM

## 2018-08-24 RX ORDER — CALCIUM CITRATE/VITAMIN D3 200MG-6.25
TABLET ORAL
Qty: 250 STRIP | Refills: 11 | Status: SHIPPED | OUTPATIENT
Start: 2018-08-24 | End: 2022-01-25

## 2018-08-24 RX ORDER — LANCETS 33 GAUGE
EACH MISCELLANEOUS
Qty: 100 EACH | Refills: 11 | Status: SHIPPED | OUTPATIENT
Start: 2018-08-24

## 2018-08-24 RX ORDER — PEN NEEDLE, DIABETIC 31 GX5/16"
NEEDLE, DISPOSABLE MISCELLANEOUS
Qty: 400 EACH | Refills: 3 | Status: SHIPPED | OUTPATIENT
Start: 2018-08-24

## 2018-08-30 ENCOUNTER — TELEPHONE (OUTPATIENT)
Dept: INTERNAL MEDICINE | Facility: CLINIC | Age: 73
End: 2018-08-30

## 2018-08-30 NOTE — TELEPHONE ENCOUNTER
----- Message from Stephenie Coffey sent at 8/29/2018  4:25 PM CDT -----  Contact: Shandra Devi called and stated the pt is being admitted into the hospital and she needs to verify the pt's insulin dosage. She can be reached at [phone].    Thanks,  TF

## 2018-09-04 ENCOUNTER — LAB VISIT (OUTPATIENT)
Dept: LAB | Facility: HOSPITAL | Age: 73
End: 2018-09-04
Attending: INTERNAL MEDICINE
Payer: MEDICARE

## 2018-09-04 ENCOUNTER — OFFICE VISIT (OUTPATIENT)
Dept: INTERNAL MEDICINE | Facility: CLINIC | Age: 73
End: 2018-09-04
Payer: MEDICARE

## 2018-09-04 VITALS
BODY MASS INDEX: 25.75 KG/M2 | WEIGHT: 179.88 LBS | OXYGEN SATURATION: 98 % | TEMPERATURE: 97 F | HEART RATE: 71 BPM | HEIGHT: 70 IN | SYSTOLIC BLOOD PRESSURE: 136 MMHG | DIASTOLIC BLOOD PRESSURE: 60 MMHG

## 2018-09-04 DIAGNOSIS — I10 ESSENTIAL HYPERTENSION: Chronic | ICD-10-CM

## 2018-09-04 DIAGNOSIS — C90.00 MULTIPLE MYELOMA NOT HAVING ACHIEVED REMISSION: ICD-10-CM

## 2018-09-04 DIAGNOSIS — Z79.4 TYPE 2 DIABETES MELLITUS WITH DIABETIC NEPHROPATHY, WITH LONG-TERM CURRENT USE OF INSULIN: Primary | Chronic | ICD-10-CM

## 2018-09-04 DIAGNOSIS — I25.118 CORONARY ARTERY DISEASE OF NATIVE ARTERY OF NATIVE HEART WITH STABLE ANGINA PECTORIS: Chronic | ICD-10-CM

## 2018-09-04 DIAGNOSIS — E11.21 TYPE 2 DIABETES MELLITUS WITH DIABETIC NEPHROPATHY, WITH LONG-TERM CURRENT USE OF INSULIN: Primary | Chronic | ICD-10-CM

## 2018-09-04 LAB
ALBUMIN SERPL BCP-MCNC: 3.9 G/DL
ALP SERPL-CCNC: 54 U/L
ALT SERPL W/O P-5'-P-CCNC: 16 U/L
ANION GAP SERPL CALC-SCNC: 7 MMOL/L
AST SERPL-CCNC: 21 U/L
B2 MICROGLOB SERPL-MCNC: 2.9 UG/ML
BASOPHILS # BLD AUTO: 0 K/UL
BASOPHILS NFR BLD: 0 %
BILIRUB SERPL-MCNC: 0.4 MG/DL
BUN SERPL-MCNC: 30 MG/DL
CALCIUM SERPL-MCNC: 9.8 MG/DL
CHLORIDE SERPL-SCNC: 107 MMOL/L
CO2 SERPL-SCNC: 25 MMOL/L
CREAT SERPL-MCNC: 1.6 MG/DL
DIFFERENTIAL METHOD: ABNORMAL
EOSINOPHIL # BLD AUTO: 0 K/UL
EOSINOPHIL NFR BLD: 1.3 %
ERYTHROCYTE [DISTWIDTH] IN BLOOD BY AUTOMATED COUNT: 15 %
EST. GFR  (AFRICAN AMERICAN): 48 ML/MIN/1.73 M^2
EST. GFR  (NON AFRICAN AMERICAN): 42 ML/MIN/1.73 M^2
GLUCOSE SERPL-MCNC: 171 MG/DL
HCT VFR BLD AUTO: 38.7 %
HGB BLD-MCNC: 12.9 G/DL
LYMPHOCYTES # BLD AUTO: 1.2 K/UL
LYMPHOCYTES NFR BLD: 40.7 %
MCH RBC QN AUTO: 33.5 PG
MCHC RBC AUTO-ENTMCNC: 33.3 G/DL
MCV RBC AUTO: 101 FL
MONOCYTES # BLD AUTO: 0.5 K/UL
MONOCYTES NFR BLD: 15.7 %
NEUTROPHILS # BLD AUTO: 1.3 K/UL
NEUTROPHILS NFR BLD: 42.3 %
PLATELET # BLD AUTO: 199 K/UL
PMV BLD AUTO: 10.7 FL
POTASSIUM SERPL-SCNC: 4.4 MMOL/L
PROT SERPL-MCNC: 8.9 G/DL
RBC # BLD AUTO: 3.85 M/UL
SODIUM SERPL-SCNC: 139 MMOL/L
WBC # BLD AUTO: 3 K/UL

## 2018-09-04 PROCEDURE — 99214 OFFICE O/P EST MOD 30 MIN: CPT | Mod: S$PBB,,, | Performed by: FAMILY MEDICINE

## 2018-09-04 PROCEDURE — 99999 PR PBB SHADOW E&M-EST. PATIENT-LVL III: CPT | Mod: PBBFAC,,, | Performed by: FAMILY MEDICINE

## 2018-09-04 PROCEDURE — 84165 PROTEIN E-PHORESIS SERUM: CPT | Mod: 26,,, | Performed by: PATHOLOGY

## 2018-09-04 PROCEDURE — 83520 IMMUNOASSAY QUANT NOS NONAB: CPT | Mod: 59

## 2018-09-04 PROCEDURE — 99213 OFFICE O/P EST LOW 20 MIN: CPT | Mod: PBBFAC,PO | Performed by: FAMILY MEDICINE

## 2018-09-04 PROCEDURE — 86334 IMMUNOFIX E-PHORESIS SERUM: CPT

## 2018-09-04 PROCEDURE — 3044F HG A1C LEVEL LT 7.0%: CPT | Mod: CPTII,,, | Performed by: FAMILY MEDICINE

## 2018-09-04 PROCEDURE — 3078F DIAST BP <80 MM HG: CPT | Mod: CPTII,,, | Performed by: FAMILY MEDICINE

## 2018-09-04 PROCEDURE — 85025 COMPLETE CBC W/AUTO DIFF WBC: CPT | Mod: PO

## 2018-09-04 PROCEDURE — 84165 PROTEIN E-PHORESIS SERUM: CPT

## 2018-09-04 PROCEDURE — 80053 COMPREHEN METABOLIC PANEL: CPT | Mod: PO

## 2018-09-04 PROCEDURE — 86334 IMMUNOFIX E-PHORESIS SERUM: CPT | Mod: 26,,, | Performed by: PATHOLOGY

## 2018-09-04 PROCEDURE — 82232 ASSAY OF BETA-2 PROTEIN: CPT

## 2018-09-04 PROCEDURE — 36415 COLL VENOUS BLD VENIPUNCTURE: CPT | Mod: PO

## 2018-09-04 PROCEDURE — 3075F SYST BP GE 130 - 139MM HG: CPT | Mod: CPTII,,, | Performed by: FAMILY MEDICINE

## 2018-09-04 RX ORDER — ACETAMINOPHEN 500 MG
5000 TABLET ORAL DAILY
COMMUNITY

## 2018-09-04 RX ORDER — TICAGRELOR 90 MG/1
1 TABLET ORAL DAILY
COMMUNITY
Start: 2018-08-31 | End: 2021-08-18 | Stop reason: SDUPTHER

## 2018-09-04 RX ORDER — ONDANSETRON 4 MG/1
4 TABLET, ORALLY DISINTEGRATING ORAL
COMMUNITY
End: 2018-10-15 | Stop reason: SDUPTHER

## 2018-09-04 RX ORDER — DOCUSATE SODIUM 100 MG/1
100 CAPSULE, LIQUID FILLED ORAL 2 TIMES DAILY
COMMUNITY

## 2018-09-04 RX ORDER — RANOLAZINE 1000 MG/1
1000 TABLET, EXTENDED RELEASE ORAL 2 TIMES DAILY
COMMUNITY
Start: 2018-08-15 | End: 2021-12-28 | Stop reason: SDUPTHER

## 2018-09-04 NOTE — PROGRESS NOTES
Subjective:       Patient ID: Familia Durbin Jr. is a o. male.    Chief Complaint: Multiple issues see below    HPI Type 2 diabetes mellitus without complication, with long-term current use of insulin w nephrop:better with Ory's help a1c <7. Not utd; Sugar was going into 50s so has but back on insul  Mult myeloma sees Dr Solo    Coronary artery disease: up to date with cardiology. Recent olol admit with cp >> stent. Doing ok now  Hypertension: blood pressures  Typically normal. Just took med this amTolerating medicine.     Past Medical History   Diagnosis Date    CAD (coronary artery disease)      luikart    Diabetes mellitus, type 2      eye dr rocha    Hearing impaired     Hyperlipidemia     Hypertension     Lupus      Discoid    Old MI (myocardial infarction) 2012    Tracheostomy tube present      Past Surgical History   Procedure Laterality Date    Coronary artery bypass graft      Knee surgery      Colonoscopy      Cardiac surgery      Wrist fusion      Hand surgery      Tracheostomy tube placement       Family History   Problem Relation Age of Onset    Diabetes Mother     Diabetes Father     Prostate cancer Father     Pancreatic cancer Sister     No Known Problems Brother     Diabetes Maternal Uncle     Diabetes Maternal Grandmother     No Known Problems Maternal Grandfather     No Known Problems Paternal Grandmother     No Known Problems Paternal Grandfather      Social History     Social History    Marital status: Single     Spouse name: N/A    Number of children: 9    Years of education: N/A     Social History Main Topics    Smoking status: Never Smoker    Smokeless tobacco: Never Used    Alcohol use No    Drug use: No    Sexual activity: Yes     Partners: Female     Other Topics Concern    None     Social History Narrative         Review of Systems  no exertional cp no sob  Objective:    Alejandrina's sister/patients daugh here today  Physical Exam   Constitutional:  He is oriented to person, place, and time. He appears well-developed and well-nourished.   HENT:   Head: Normocephalic and atraumatic.   Right Ear: External ear normal.   Left Ear: External ear normal.   Nose: Nose normal.   Mouth/Throat: Oropharynx is clear and moist. No oropharyngeal exudate.   Nl tms   Eyes: Conjunctivae and EOM are normal. Pupils are equal, round, and reactive to light.   Neck: Normal range of motion. Neck supple. Carotid bruit is not present.   Cardiovascular: Normal rate and regular rhythm.    Pulmonary/Chest: Effort normal and breath sounds normal.   Lymphadenopathy:     He has no cervical adenopathy.   Neurological: He is alert and oriented to person, place, and time.   Skin: Skin is warm and dry.   Psychiatric: He has a normal mood and affect. His behavior is normal. Judgment and thought content normal.       Assessment:       1. Type 2 diabetes mellitus without complication, with long-term current use of insulin    2. Essential hypertension    3. CAD; s/p MI; s/p CABG    4. Multiple myeloma, remission status unspecified      Tracheostomy tube presnt   Plan:       *  Dr Solo as sched  F/u card when due  Lab January and sigifredo after    F/u nephrol (kamyar)  Shingrix new shingles vaccine  via a pharmacy   Type 2 diabetes mellitus with diabetic nephropathy, with long-term current use of insulin  -     Hemoglobin A1c; Future; Expected date: 12/03/2018  -     Lipid panel; Future; Expected date: 12/03/2018  -     Ambulatory Referral to Optometry    Coronary artery disease of native artery of native heart with stable angina pectoris    Essential hypertension    Multiple myeloma not having achieved remission

## 2018-09-05 LAB
ALBUMIN SERPL ELPH-MCNC: 3.93 G/DL
ALPHA1 GLOB SERPL ELPH-MCNC: 0.36 G/DL
ALPHA2 GLOB SERPL ELPH-MCNC: 0.82 G/DL
B-GLOBULIN SERPL ELPH-MCNC: 1.17 G/DL
GAMMA GLOB SERPL ELPH-MCNC: 2.02 G/DL
KAPPA LC SER QL IA: 7.98 MG/DL
KAPPA LC/LAMBDA SER IA: 2.02
LAMBDA LC SER QL IA: 3.96 MG/DL
PATHOLOGIST INTERPRETATION SPE: NORMAL
PROT SERPL-MCNC: 8.3 G/DL

## 2018-09-06 DIAGNOSIS — C90.00 MULTIPLE MYELOMA NOT HAVING ACHIEVED REMISSION: ICD-10-CM

## 2018-09-06 DIAGNOSIS — C90.00 MULTIPLE MYELOMA: ICD-10-CM

## 2018-09-06 DIAGNOSIS — B00.9 HERPES INFECTION: ICD-10-CM

## 2018-09-06 LAB — INTERPRETATION SERPL IFE-IMP: NORMAL

## 2018-09-06 RX ORDER — OXYCODONE HYDROCHLORIDE 10 MG/1
10 TABLET ORAL EVERY 6 HOURS PRN
Qty: 90 TABLET | Refills: 0 | Status: CANCELLED | OUTPATIENT
Start: 2018-09-06

## 2018-09-06 RX ORDER — NAPROXEN SODIUM 220 MG/1
81 TABLET, FILM COATED ORAL DAILY
Qty: 100 TABLET | Refills: 12 | Status: SHIPPED | OUTPATIENT
Start: 2018-09-06

## 2018-09-06 RX ORDER — OXYCODONE HCL 20 MG/1
20 TABLET, FILM COATED, EXTENDED RELEASE ORAL EVERY 12 HOURS
Qty: 60 EACH | Refills: 0 | Status: CANCELLED | OUTPATIENT
Start: 2018-09-06

## 2018-09-06 RX ORDER — OXYCODONE HCL 20 MG/1
20 TABLET, FILM COATED, EXTENDED RELEASE ORAL EVERY 12 HOURS
Qty: 60 EACH | Refills: 0 | Status: SHIPPED | OUTPATIENT
Start: 2018-09-06 | End: 2018-09-10 | Stop reason: SDUPTHER

## 2018-09-06 RX ORDER — OXYCODONE HYDROCHLORIDE 10 MG/1
10 TABLET ORAL EVERY 6 HOURS PRN
Qty: 90 TABLET | Refills: 0 | Status: SHIPPED | OUTPATIENT
Start: 2018-09-06 | End: 2018-09-10 | Stop reason: SDUPTHER

## 2018-09-06 NOTE — TELEPHONE ENCOUNTER
----- Message from Argentina Fierro sent at 9/6/2018 11:17 AM CDT -----  returned call...[phone] (home)

## 2018-09-06 NOTE — TELEPHONE ENCOUNTER
Attempted to contact patient to let him know of medication refill sent to [facility] pharmacy. No answer, left voicemail with callback number.

## 2018-09-06 NOTE — TELEPHONE ENCOUNTER
Returned patients phone call, no documentation that someone had called from Dr. Solo office. Patient stated he is unaware of who called him this morning. Patient asked for refill on medications. Patient is scheduled for appointment with Dr. Solo on Monday 9/10.  Advised patient that I would send refill request to Dr. Solo but he is most likely going to want to see him before filling. Patient verbalized understanding.

## 2018-09-07 LAB — PATHOLOGIST INTERPRETATION IFE: NORMAL

## 2018-09-10 ENCOUNTER — OFFICE VISIT (OUTPATIENT)
Dept: HEMATOLOGY/ONCOLOGY | Facility: CLINIC | Age: 73
End: 2018-09-10
Payer: MEDICARE

## 2018-09-10 VITALS
HEIGHT: 70 IN | OXYGEN SATURATION: 98 % | TEMPERATURE: 99 F | WEIGHT: 182.31 LBS | BODY MASS INDEX: 26.1 KG/M2 | DIASTOLIC BLOOD PRESSURE: 73 MMHG | SYSTOLIC BLOOD PRESSURE: 126 MMHG | HEART RATE: 70 BPM

## 2018-09-10 DIAGNOSIS — I70.0 CALCIFICATION OF ABDOMINAL AORTA: Chronic | ICD-10-CM

## 2018-09-10 DIAGNOSIS — I25.118 CORONARY ARTERY DISEASE OF NATIVE ARTERY OF NATIVE HEART WITH STABLE ANGINA PECTORIS: Chronic | ICD-10-CM

## 2018-09-10 DIAGNOSIS — C90.00 MULTIPLE MYELOMA NOT HAVING ACHIEVED REMISSION: Primary | ICD-10-CM

## 2018-09-10 PROCEDURE — 1101F PT FALLS ASSESS-DOCD LE1/YR: CPT | Mod: CPTII,,, | Performed by: INTERNAL MEDICINE

## 2018-09-10 PROCEDURE — 3074F SYST BP LT 130 MM HG: CPT | Mod: CPTII,,, | Performed by: INTERNAL MEDICINE

## 2018-09-10 PROCEDURE — 99999 PR PBB SHADOW E&M-EST. PATIENT-LVL III: CPT | Mod: PBBFAC,,, | Performed by: INTERNAL MEDICINE

## 2018-09-10 PROCEDURE — 99213 OFFICE O/P EST LOW 20 MIN: CPT | Mod: PBBFAC | Performed by: INTERNAL MEDICINE

## 2018-09-10 PROCEDURE — 3078F DIAST BP <80 MM HG: CPT | Mod: CPTII,,, | Performed by: INTERNAL MEDICINE

## 2018-09-10 PROCEDURE — 99214 OFFICE O/P EST MOD 30 MIN: CPT | Mod: S$PBB,,, | Performed by: INTERNAL MEDICINE

## 2018-09-10 RX ORDER — OXYCODONE AND ACETAMINOPHEN 7.5; 325 MG/1; MG/1
7.5 TABLET ORAL EVERY 12 HOURS
COMMUNITY
End: 2018-09-10

## 2018-09-10 RX ORDER — OXYCODONE HYDROCHLORIDE 10 MG/1
10 TABLET ORAL EVERY 6 HOURS PRN
Qty: 90 TABLET | Refills: 0 | Status: SHIPPED | OUTPATIENT
Start: 2018-09-10 | End: 2018-10-15 | Stop reason: SDUPTHER

## 2018-09-10 RX ORDER — OXYCODONE HCL 20 MG/1
20 TABLET, FILM COATED, EXTENDED RELEASE ORAL EVERY 12 HOURS
Qty: 60 EACH | Refills: 0 | Status: SHIPPED | OUTPATIENT
Start: 2018-09-10 | End: 2018-10-15 | Stop reason: SDUPTHER

## 2018-09-10 NOTE — PROGRESS NOTES
Subjective:       Patient ID: Familia Durbin Jr. is a 73 y.o. male.    Chief Complaint: Results and Multiple Myeloma    HPI 73-year-old male history of multiple myeloma patient is currently on Pomalyst 4 mg days 1 through 21 on a 28 day cycle managed to [facility] recently hospitalized at our [facility] underwent state heart catheterization and stent placement feels better returns today for review    Past Medical History:   Diagnosis Date    CAD (coronary artery disease)     tyree    Diabetes mellitus, type 2 1979    eye dr rocha    Hearing impaired     Hyperlipidemia     Hypertension     Lupus     Discoid    Old MI (myocardial infarction) 2012    Tracheostomy tube present      Family History   Problem Relation Age of Onset    Diabetes Mother     Diabetes Father     Prostate cancer Father     Pancreatic cancer Sister     No Known Problems Brother     Diabetes Maternal Uncle     Diabetes Maternal Grandmother     No Known Problems Maternal Grandfather     No Known Problems Paternal Grandmother     No Known Problems Paternal Grandfather      Social History     Socioeconomic History    Marital status: Single     Spouse name: Not on file    Number of children: 9    Years of education: Not on file    Highest education level: Not on file   Social Needs    Financial resource strain: Not on file    Food insecurity - worry: Not on file    Food insecurity - inability: Not on file    Transportation needs - medical: Not on file    Transportation needs - non-medical: Not on file   Occupational History    Not on file   Tobacco Use    Smoking status: Never Smoker    Smokeless tobacco: Never Used   Substance and Sexual Activity    Alcohol use: No    Drug use: No    Sexual activity: Yes     Partners: Female   Other Topics Concern    Not on file   Social History Narrative    Not on file     Past Surgical History:   Procedure Laterality Date     CARDIAC SURGERY      COLONOSCOPY      CORONARY ARTERY BYPASS GRAFT      HAND SURGERY      KNEE SURGERY      TRACHEOSTOMY TUBE PLACEMENT      WRIST FUSION         Labs:  Lab Results   Component Value Date    WBC 3.00 (L) 09/04/2018    HGB 12.9 (L) 09/04/2018    HCT 38.7 (L) 09/04/2018     (H) 09/04/2018     09/04/2018     BMP  Lab Results   Component Value Date     09/04/2018    K 4.4 09/04/2018     09/04/2018    CO2 25 09/04/2018    BUN 30 (H) 09/04/2018    CREATININE 1.6 (H) 09/04/2018    CALCIUM 9.8 09/04/2018    ANIONGAP 7 (L) 09/04/2018    ESTGFRAFRICA 48 (A) 09/04/2018    EGFRNONAA 42 (A) 09/04/2018     Lab Results   Component Value Date    ALT 16 09/04/2018    AST 21 09/04/2018    ALKPHOS 54 (L) 09/04/2018    BILITOT 0.4 09/04/2018       Lab Results   Component Value Date    IRON 81 07/21/2016    TIBC 302 07/21/2016    FERRITIN 324 (H) 07/21/2016     No results found for: JSFOFRCH82  No results found for: FOLATE  Lab Results   Component Value Date    TSH 0.683 01/22/2018         Review of Systems   Constitutional: Negative for appetite change and unexpected weight change.   HENT: Negative for hearing loss, postnasal drip, sinus pain and sneezing.    Eyes: Negative for visual disturbance.   Respiratory: Negative for cough and shortness of breath.    Cardiovascular: Negative for chest pain.   Gastrointestinal: Negative for abdominal pain and diarrhea.   Genitourinary: Negative for frequency.   Musculoskeletal: Negative for back pain.   Skin: Negative for rash.   Neurological: Negative for headaches.   Hematological: Negative for adenopathy.   Psychiatric/Behavioral: The patient is not nervous/anxious.        Objective:      Physical Exam   Constitutional: He is oriented to person, place, and time. He appears well-developed and well-nourished. He appears distressed.   HENT:   Head: Normocephalic.   Right Ear: External ear normal.   Left Ear: External ear normal.   Nose: Nose  normal. Right sinus exhibits no maxillary sinus tenderness and no frontal sinus tenderness. Left sinus exhibits no maxillary sinus tenderness and no frontal sinus tenderness.   Mouth/Throat: Oropharynx is clear and moist. No oropharyngeal exudate.   Eyes: EOM and lids are normal. Pupils are equal, round, and reactive to light. Right eye exhibits no discharge. Left eye exhibits no discharge. Right conjunctiva is not injected. Right conjunctiva has no hemorrhage. Left conjunctiva is not injected. Left conjunctiva has no hemorrhage. No scleral icterus. Right eye exhibits normal extraocular motion. Left eye exhibits normal extraocular motion.   Neck: Normal range of motion. Neck supple. No JVD present. No tracheal deviation present. No thyromegaly present.   Cardiovascular: Normal rate and regular rhythm.   Pulmonary/Chest: Effort normal. No stridor. No respiratory distress.   Abdominal: Soft. He exhibits no mass. There is no hepatosplenomegaly, splenomegaly or hepatomegaly. There is no tenderness.   Musculoskeletal: Normal range of motion. He exhibits no edema or tenderness.   Lymphadenopathy:        Head (right side): No posterior auricular and no occipital adenopathy present.        Head (left side): No posterior auricular and no occipital adenopathy present.     He has no cervical adenopathy.        Right cervical: No superficial cervical, no deep cervical and no posterior cervical adenopathy present.       Left cervical: No superficial cervical, no deep cervical and no posterior cervical adenopathy present.     He has no axillary adenopathy.        Right: No supraclavicular adenopathy present.        Left: No supraclavicular adenopathy present.   Neurological: He is alert and oriented to person, place, and time. He has normal strength. No cranial nerve deficit. Coordination normal.   Skin: Skin is dry. No rash noted. He is not diaphoretic. No cyanosis or erythema. Nails show no clubbing.   Psychiatric: He has a  normal mood and affect. His behavior is normal. Judgment and thought content normal. Cognition and memory are normal.   Vitals reviewed.          Assessment:      1. Multiple myeloma not having achieved remission    2. Metastatic bone cancer    3. Calcification of abdominal aorta    4. Coronary artery disease of native artery of native heart with stable angina pectoris           Plan:   Review of laboratory studies stable findings reviewed note through Care everywhere continue on current dose of Pomalyst prescription for through [facility] return 6 weeks chronic stable narcotic use pain medicines written stay checked state  M          Rodolfo Solo Jr, MD FACP

## 2018-09-11 ENCOUNTER — HOSPITAL ENCOUNTER (EMERGENCY)
Facility: HOSPITAL | Age: 73
Discharge: HOME | End: 2018-09-11
Attending: EMERGENCY MEDICINE
Payer: MEDICARE

## 2018-09-11 VITALS
OXYGEN SATURATION: 99 % | SYSTOLIC BLOOD PRESSURE: 182 MMHG | DIASTOLIC BLOOD PRESSURE: 77 MMHG | HEIGHT: 70 IN | BODY MASS INDEX: 26.05 KG/M2 | RESPIRATION RATE: 19 BRPM | WEIGHT: 182 LBS | HEART RATE: 60 BPM | TEMPERATURE: 97 F

## 2018-09-11 DIAGNOSIS — R53.1 WEAKNESS: ICD-10-CM

## 2018-09-11 DIAGNOSIS — R07.9 CHEST PAIN, UNSPECIFIED TYPE: Primary | ICD-10-CM

## 2018-09-11 LAB
ALBUMIN SERPL BCP-MCNC: 3.9 G/DL
ALP SERPL-CCNC: 55 U/L
ALT SERPL W/O P-5'-P-CCNC: 12 U/L
ANION GAP SERPL CALC-SCNC: 11 MMOL/L
AST SERPL-CCNC: 16 U/L
BACTERIA #/AREA URNS HPF: NORMAL /HPF
BASOPHILS # BLD AUTO: 0.01 K/UL
BASOPHILS NFR BLD: 0.2 %
BILIRUB SERPL-MCNC: 0.6 MG/DL
BILIRUB UR QL STRIP: NEGATIVE
BNP SERPL-MCNC: 150 PG/ML
BUN SERPL-MCNC: 20 MG/DL
CALCIUM SERPL-MCNC: 9.9 MG/DL
CHLORIDE SERPL-SCNC: 102 MMOL/L
CK SERPL-CCNC: 65 U/L
CLARITY UR: CLEAR
CO2 SERPL-SCNC: 24 MMOL/L
COLOR UR: YELLOW
CREAT SERPL-MCNC: 1.6 MG/DL
DIFFERENTIAL METHOD: ABNORMAL
EOSINOPHIL # BLD AUTO: 0 K/UL
EOSINOPHIL NFR BLD: 0.4 %
ERYTHROCYTE [DISTWIDTH] IN BLOOD BY AUTOMATED COUNT: 14.7 %
EST. GFR  (AFRICAN AMERICAN): 48 ML/MIN/1.73 M^2
EST. GFR  (NON AFRICAN AMERICAN): 42 ML/MIN/1.73 M^2
GLUCOSE SERPL-MCNC: 237 MG/DL
GLUCOSE UR QL STRIP: ABNORMAL
HCT VFR BLD AUTO: 37.6 %
HGB BLD-MCNC: 12.7 G/DL
HGB UR QL STRIP: NEGATIVE
HYALINE CASTS #/AREA URNS LPF: 0 /LPF
KETONES UR QL STRIP: NEGATIVE
LEUKOCYTE ESTERASE UR QL STRIP: NEGATIVE
LYMPHOCYTES # BLD AUTO: 1.4 K/UL
LYMPHOCYTES NFR BLD: 26.2 %
MCH RBC QN AUTO: 33.2 PG
MCHC RBC AUTO-ENTMCNC: 33.8 G/DL
MCV RBC AUTO: 98 FL
MICROSCOPIC COMMENT: NORMAL
MONOCYTES # BLD AUTO: 0.3 K/UL
MONOCYTES NFR BLD: 6.2 %
NEUTROPHILS # BLD AUTO: 3.7 K/UL
NEUTROPHILS NFR BLD: 67 %
NITRITE UR QL STRIP: NEGATIVE
PH UR STRIP: 7 [PH] (ref 5–8)
PLATELET # BLD AUTO: 199 K/UL
PMV BLD AUTO: 10.3 FL
POTASSIUM SERPL-SCNC: 4.2 MMOL/L
PROT SERPL-MCNC: 8.8 G/DL
PROT UR QL STRIP: ABNORMAL
RBC # BLD AUTO: 3.82 M/UL
RBC #/AREA URNS HPF: 2 /HPF (ref 0–4)
SODIUM SERPL-SCNC: 137 MMOL/L
SP GR UR STRIP: 1.02 (ref 1–1.03)
TROPONIN I SERPL DL<=0.01 NG/ML-MCNC: 0.01 NG/ML
TROPONIN I SERPL DL<=0.01 NG/ML-MCNC: 0.02 NG/ML
URN SPEC COLLECT METH UR: ABNORMAL
UROBILINOGEN UR STRIP-ACNC: NEGATIVE EU/DL
WBC # BLD AUTO: 5.46 K/UL
WBC #/AREA URNS HPF: 3 /HPF (ref 0–5)

## 2018-09-11 PROCEDURE — 82550 ASSAY OF CK (CPK): CPT

## 2018-09-11 PROCEDURE — 83880 ASSAY OF NATRIURETIC PEPTIDE: CPT

## 2018-09-11 PROCEDURE — 81000 URINALYSIS NONAUTO W/SCOPE: CPT

## 2018-09-11 PROCEDURE — 85025 COMPLETE CBC W/AUTO DIFF WBC: CPT

## 2018-09-11 PROCEDURE — 84484 ASSAY OF TROPONIN QUANT: CPT

## 2018-09-11 PROCEDURE — 93010 ELECTROCARDIOGRAM REPORT: CPT | Mod: ,,, | Performed by: INTERNAL MEDICINE

## 2018-09-11 PROCEDURE — 80053 COMPREHEN METABOLIC PANEL: CPT

## 2018-09-11 PROCEDURE — 93005 ELECTROCARDIOGRAM TRACING: CPT

## 2018-09-11 PROCEDURE — 99284 EMERGENCY DEPT VISIT MOD MDM: CPT | Mod: 25

## 2018-09-11 NOTE — ED PROVIDER NOTES
SCRIBE #1 NOTE: I, Glynn Allen, am scribing for, and in the presence of, Blanco Ervin MD. I have scribed the entire note.      History      Chief Complaint   Patient presents with    weakness     Pt reports feeling overheated, vomiting, and weakness       Review of patient's allergies indicates:   Allergen Reactions    Cephalexin     Fentanyl Nausea And Vomiting    Nsaids (non-steroidal anti-inflammatory drug)      Renal insuf        HPI   HPI    9/11/2018, 1:32 PM   History obtained from the patient      History of Present Illness: Familia Durbin Jr. is a 73 y.o. male patient with a PMHx of CAD, DM, and MI who presents to the Emergency Department for an evaluation of midsternal chest pain which onset suddenly PTA. Pt reports he suddenly began to suffer from a tightness in the center of his chest. He also reports he suddenly became sweaty and nauseated which prompted him to come in for further evaluation. Pt reports a cardiac hx and reports he is followed by a cardiologist at [facility]. Pt reports a recent heart cath. Symptoms are constant and moderate in severity. Exacerbated by nothing and relieved by nothing. Associated sxs include fatigue, N/V, and diaphoresis. Patient denies any fever, chills, leg swelling, SOB, abd pain, diarrhea, dysuria, hematuria, HA, weakness/numbness, and all other sxs at this time. No further complaints or concerns at this time.       Arrival mode: Personal vehicle    PCP: Andrea Elkins MD       Past Medical History:  Past Medical History:   Diagnosis Date    CAD (coronary artery disease)     tyree    Diabetes mellitus, type 2 1979    eye dr rocha    Hearing impaired     Hyperlipidemia     Hypertension     Lupus     Discoid    Multiple myeloma     Old MI (myocardial infarction) 2012    Tracheostomy tube present        Past Surgical History:  Past Surgical History:   Procedure Laterality Date    CARDIAC SURGERY      COLONOSCOPY      CORONARY ARTERY BYPASS  GRAFT      HAND SURGERY      KNEE SURGERY      TRACHEOSTOMY TUBE PLACEMENT      WRIST FUSION           Family History:  Family History   Problem Relation Age of Onset    Diabetes Mother     Diabetes Father     Prostate cancer Father     Pancreatic cancer Sister     No Known Problems Brother     Diabetes Maternal Uncle     Diabetes Maternal Grandmother     No Known Problems Maternal Grandfather     No Known Problems Paternal Grandmother     No Known Problems Paternal Grandfather        Social History:  Social History     Tobacco Use    Smoking status: Never Smoker    Smokeless tobacco: Never Used   Substance and Sexual Activity    Alcohol use: No    Drug use: No    Sexual activity: Yes     Partners: Female       ROS   Review of Systems   Constitutional: Positive for diaphoresis and fatigue. Negative for chills and fever.        (-) Recent travel  (-) Long car trips   HENT: Negative for congestion, sore throat and trouble swallowing.    Respiratory: Positive for chest tightness. Negative for cough and shortness of breath.    Cardiovascular: Positive for chest pain. Negative for palpitations and leg swelling.   Gastrointestinal: Positive for nausea and vomiting. Negative for abdominal pain.   Genitourinary: Negative for dysuria, frequency, hematuria and urgency.   Musculoskeletal: Negative for back pain and neck pain.   Skin: Negative for rash.   Neurological: Negative for dizziness, weakness, light-headedness, numbness and headaches.     Physical Exam      Initial Vitals [09/11/18 1255]   BP Pulse Resp Temp SpO2   (!) 129/58 73 20 97.2 °F (36.2 °C) (!) 94 %      MAP       --          Physical Exam  Nursing Notes and Vital Signs Reviewed.  Constitutional: Patient is in no acute distress. Well-developed and well-nourished.  Head: Atraumatic. Normocephalic.  Eyes: PERRL. EOM intact. Conjunctivae are not pale. No scleral icterus.  ENT: Mucous membranes are moist. Oropharynx is clear and symmetric.   "  Neck: Supple. Full ROM. No lymphadenopathy.  Cardiovascular: Regular rate. Regular rhythm. No murmurs, rubs, or gallops. Distal pulses are 2+ and symmetric.  Pulmonary/Chest: No respiratory distress. Clear to auscultation bilaterally. No wheezing or rales. Midsternal surgical scar noted.   Abdominal: Soft and non-distended.  There is no tenderness.   Musculoskeletal: Moves all extremities. No obvious deformities. No edema. No calf tenderness.  Skin: Warm and dry.  Neurological:  Alert, awake, and appropriate.  Normal speech.  No acute focal neurological deficits are appreciated.  Psychiatric: Normal affect. Good eye contact. Appropriate in content.    ED Course    Procedures  ED Vital Signs:  Vitals:    09/11/18 1255 09/11/18 1509 09/11/18 1510 09/11/18 1632   BP: (!) 129/58 (!) 154/66  (!) 137/96   Pulse: 73  65 68   Resp: 20  20 17   Temp: 97.2 °F (36.2 °C)      TempSrc: Oral      SpO2: (!) 94% 100% 100% 99%   Weight: 82.6 kg (182 lb)      Height: 5' 10" (1.778 m)       09/11/18 1802   BP: (!) 182/77   Pulse: 60   Resp: 19   Temp:    TempSrc:    SpO2:    Weight:    Height:        Abnormal Lab Results:  Labs Reviewed   CBC W/ AUTO DIFFERENTIAL - Abnormal; Notable for the following components:       Result Value    RBC 3.82 (*)     Hemoglobin 12.7 (*)     Hematocrit 37.6 (*)     MCH 33.2 (*)     RDW 14.7 (*)     All other components within normal limits   COMPREHENSIVE METABOLIC PANEL - Abnormal; Notable for the following components:    Glucose 237 (*)     Creatinine 1.6 (*)     Total Protein 8.8 (*)     eGFR if  48 (*)     eGFR if non  42 (*)     All other components within normal limits   URINALYSIS, REFLEX TO URINE CULTURE - Abnormal; Notable for the following components:    Protein, UA 1+ (*)     Glucose, UA 1+ (*)     All other components within normal limits    Narrative:     Preferred Collection Type->Urine, Clean Catch   B-TYPE NATRIURETIC PEPTIDE - Abnormal; Notable for the " following components:     (*)     All other components within normal limits   CK   TROPONIN I   URINALYSIS MICROSCOPIC    Narrative:     Preferred Collection Type->Urine, Clean Catch   TROPONIN I        All Lab Results:  Results for orders placed or performed during the hospital encounter of 09/11/18   CBC auto differential   Result Value Ref Range    WBC 5.46 3.90 - 12.70 K/uL    RBC 3.82 (L) 4.60 - 6.20 M/uL    Hemoglobin 12.7 (L) 14.0 - 18.0 g/dL    Hematocrit 37.6 (L) 40.0 - 54.0 %    MCV 98 82 - 98 fL    MCH 33.2 (H) 27.0 - 31.0 pg    MCHC 33.8 32.0 - 36.0 g/dL    RDW 14.7 (H) 11.5 - 14.5 %    Platelets 199 150 - 350 K/uL    MPV 10.3 9.2 - 12.9 fL    Gran # (ANC) 3.7 1.8 - 7.7 K/uL    Lymph # 1.4 1.0 - 4.8 K/uL    Mono # 0.3 0.3 - 1.0 K/uL    Eos # 0.0 0.0 - 0.5 K/uL    Baso # 0.01 0.00 - 0.20 K/uL    Gran% 67.0 38.0 - 73.0 %    Lymph% 26.2 18.0 - 48.0 %    Mono% 6.2 4.0 - 15.0 %    Eosinophil% 0.4 0.0 - 8.0 %    Basophil% 0.2 0.0 - 1.9 %    Differential Method Automated    Comprehensive metabolic panel   Result Value Ref Range    Sodium 137 136 - 145 mmol/L    Potassium 4.2 3.5 - 5.1 mmol/L    Chloride 102 95 - 110 mmol/L    CO2 24 23 - 29 mmol/L    Glucose 237 (H) 70 - 110 mg/dL    BUN, Bld 20 8 - 23 mg/dL    Creatinine 1.6 (H) 0.5 - 1.4 mg/dL    Calcium 9.9 8.7 - 10.5 mg/dL    Total Protein 8.8 (H) 6.0 - 8.4 g/dL    Albumin 3.9 3.5 - 5.2 g/dL    Total Bilirubin 0.6 0.1 - 1.0 mg/dL    Alkaline Phosphatase 55 55 - 135 U/L    AST 16 10 - 40 U/L    ALT 12 10 - 44 U/L    Anion Gap 11 8 - 16 mmol/L    eGFR if African American 48 (A) >60 mL/min/1.73 m^2    eGFR if non African American 42 (A) >60 mL/min/1.73 m^2   Urinalysis, Reflex to Urine Culture Urine, Clean Catch   Result Value Ref Range    Specimen UA Urine, Clean Catch     Color, UA Yellow Yellow, Straw, Nora    Appearance, UA Clear Clear    pH, UA 7.0 5.0 - 8.0    Specific Gravity, UA 1.020 1.005 - 1.030    Protein, UA 1+ (A) Negative    Glucose, UA  1+ (A) Negative    Ketones, UA Negative Negative    Bilirubin (UA) Negative Negative    Occult Blood UA Negative Negative    Nitrite, UA Negative Negative    Urobilinogen, UA Negative <2.0 EU/dL    Leukocytes, UA Negative Negative   Brain natriuretic peptide   Result Value Ref Range     (H) 0 - 99 pg/mL   CK   Result Value Ref Range    CPK 65 20 - 200 U/L   Troponin I   Result Value Ref Range    Troponin I 0.020 0.000 - 0.026 ng/mL   Urinalysis Microscopic   Result Value Ref Range    RBC, UA 2 0 - 4 /hpf    WBC, UA 3 0 - 5 /hpf    Bacteria, UA Rare None-Occ /hpf    Hyaline Casts, UA 0 0-1/lpf /lpf    Microscopic Comment SEE COMMENT    Troponin I   Result Value Ref Range    Troponin I 0.012 0.000 - 0.026 ng/mL         Imaging Results:  Imaging Results          X-Ray Chest PA And Lateral (Final result)  Result time 09/11/18 14:43:06    Final result by MARCE Lu Sr., MD (09/11/18 14:43:06)                 Impression:      1. There has been interval development of a mild amount of interstitial and alveolar opacities seen in the lower halves of both lungs.  This is characteristic of pulmonary edema.  2. There are mild emphysematous changes in both lungs.  3. There is mild pleural thickening in both hemithoraces.  4. There are mild degenerative changes in the thoracic spine.  5. There is a healed fracture in the lateral aspect of the left 8th rib.  6. Surgical changes  .      Electronically signed by: Froilan Lu MD  Date:    09/11/2018  Time:    14:43             Narrative:    EXAMINATION:  XR CHEST PA AND LATERAL    CLINICAL HISTORY:  chest pain;    COMPARISON:  07/20/2017    FINDINGS:  There are multiple sternotomy wires in place.  There are multiple surgical clips in mediastinum.  The size and contour of the heart are normal.  There has been interval development of a mild amount of interstitial and alveolar opacities seen in the lower halves of both lungs.  There are mild emphysematous changes in  both lungs.  There is mild pleural thickening in both hemithoraces.  There is no pneumothorax or pleural effusion.  There are mild degenerative changes in the thoracic spine.  There is a healed fracture in the lateral aspect of the left 8th rib.                               CT Head Without Contrast (Final result)  Result time 09/11/18 14:39:25    Final result by MARCE Lu Sr., MD (09/11/18 14:39:25)                 Impression:      Normal study.    All CT scans at this facility use dose modulation, iterative reconstruction, and/or weight base dosing when appropriate to reduce radiation dose when appropriate to reduce radiation dose to as low as reasonably achievable.      Electronically signed by: Froilan Lu MD  Date:    09/11/2018  Time:    14:39             Narrative:    EXAMINATION:  CT HEAD WITHOUT CONTRAST    CLINICAL HISTORY:  dizziness;    TECHNIQUE:  Standard brain CT protocol without IV contrast was performed.    COMPARISON:  None    FINDINGS:  The ventricles have a normal size, position, and appearance. There is no abnormal intracranial mass or intracranial hemorrhage. There is no skull fracture. The paranasal sinuses are normal in appearance.                                      The EKG was ordered, reviewed, and independently interpreted by the ED provider.  Interpretation time: 14:03  Rate: 63 BPM  Rhythm: normal sinus rhythm  Interpretation: Possible left atrial enlargement. T wave abnormality. No STEMI.      The Emergency Provider reviewed the vital signs and test results, which are outlined above.    ED Discussion     3:59 PM: Dr. Ervin discussed the pt's case with Dr. Valles (Cardiology) who recommends getting a repeat troponin in 4 hours and if it come back normal pt is safe to be d/c home with instructions to f/u with me tomorrow in clinic.    7:13 PM: Re-evaluated pt. Pt is resting comfortably and is in no acute distress.  D/w pt all pertinent results and plan discussed with   "Reena (Cardiology). D/w pt any concerns expressed at this time. Answered all questions. Pt expresses understanding at this time.    7:23 PM: Reassessed pt at this time. Pt is awake, alert, and in NAD. Pt states his condition has improved at this time. Discussed with pt all pertinent ED information and results. Discussed pt dx and plan of tx. Gave pt all f/u and return to the ED instructions. All questions and concerns were addressed at this time. Pt expresses understanding of information and instructions, and is comfortable with plan to discharge. Pt is stable for discharge.    I discussed with patient and/or family/caretaker that evaluation in the ED does not suggest any emergent or life threatening medical conditions requiring immediate intervention beyond what was provided in the ED, and I believe patient is safe for discharge.  Regardless, an unremarkable evaluation in the ED does not preclude the development or presence of a serious of life threatening condition. As such, patient was instructed to return immediately for any worsening or change in current symptoms.    I have discussed with patient and/or family/caretaker chest pain precautions, specifically to return for worsening chest pain, shortness of breath, fever, or any concern.  I have low suspicion for cardiopulmonary, vascular, infectious, respiratory, or other emergent medical condition based on my evaluation in the ED.        ED Medication(s):  Medications - No data to display       Medication List      ASK your doctor about these medications    aspirin 81 MG Chew  Take 1 tablet (81 mg total) by mouth once daily.     BD ULTRA-FINE DAVE PEN NEEDLE 32 gauge x 5/32" Ndle  Generic drug:  pen needle, diabetic  USE  4 TIMES DAILY WITH MEALS AND AT BEDTIME     blood-glucose meter kit  Use as instructed     BRILINTA 90 mg tablet  Generic drug:  ticagrelor     carboxymethylcellulose-glycern 0.5-0.9 % Drop     cholecalciferol (vitamin D3) 5,000 unit Tab   "   DOCOSAHEXANOIC ACID ORAL     docusate sodium 100 MG capsule  Commonly known as:  COLACE     fluconazole 100 MG tablet  Commonly known as:  DIFLUCAN     insulin aspart U-100 100 unit/mL Inpn pen  Commonly known as:  NovoLOG  Inject 20 Units into the skin 3 (three) times daily before meals.     insulin glargine 100 unit/mL injection  Commonly known as:  LANTUS  Inject 30 Units into the skin once daily.     isosorbide mononitrate 30 MG 24 hr tablet  Commonly known as:  IMDUR     methotrexate 10 MG tablet  Commonly known as:  TREXALL     metoprolol tartrate 50 MG tablet  Commonly known as:  LOPRESSOR  TAKE 1 TABLET BY MOUTH TWICE DAILY     multivit-min-FA-lycopen-lutein 300-600-300 mcg Tab     NITROSTAT 0.4 MG SL tablet  Generic drug:  nitroGLYCERIN     omega 3-dha-epa-fish oil 300-1,000 mg Cap     ondansetron 4 MG tablet  Commonly known as:  ZOFRAN     ondansetron 4 MG Tbdl  Commonly known as:  ZOFRAN-ODT     * oxyCODONE 10 mg Tab immediate release tablet  Commonly known as:  ROXICODONE  Take 1 tablet (10 mg total) by mouth every 6 (six) hours as needed for Pain.     * oxyCODONE 20 mg 12 hr tablet  Commonly known as:  OxyCONTIN  Take 1 tablet (20 mg total) by mouth every 12 (twelve) hours.     pioglitazone 30 MG tablet  Commonly known as:  ACTOS  TAKE 1 TABLET BY MOUTH ONCE DAILY     pomalidomide 3 mg Cap     prochlorperazine 5 MG tablet  Commonly known as:  COMPAZINE     ranolazine 1,000 mg Tb12  Commonly known as:  RANEXA     rosuvastatin 40 MG Tab  Commonly known as:  CRESTOR  Take 1 tablet (40 mg total) by mouth every evening.     triamcinolone acetonide 0.1% 0.1 % cream  Commonly known as:  KENALOG     TRUE METRIX GLUCOSE TEST STRIP Strp  Generic drug:  blood sugar diagnostic  USE  STRIP TO CHECK GLUCOSE SIX TIMES DAILY     TRUEPLUS LANCETS 33 gauge Misc  Generic drug:  lancets  USE   TO CHECK GLUCOSE SIX TIMES DAILY     valACYclovir 500 MG tablet  Commonly known as:  VALTREX     VITAMIN B COMPLEX ORAL         *  This list has 2 medication(s) that are the same as other medications prescribed for you. Read the directions carefully, and ask your doctor or other care provider to review them with you.                Follow-up Information     Valentín Valles MD In 1 day.    Specialty:  Cardiology  Contact information:  [id number]  [facility] [id number]  [phone]                     Medical Decision Making    Medical Decision Making:   Clinical Tests:   Lab Tests: Ordered and Reviewed  Radiological Study: Ordered and Reviewed  Medical Tests: Ordered and Reviewed           Scribe Attestation:   Scribe #1: I performed the above scribed service and the documentation accurately describes the services I performed. I attest to the accuracy of the note.    Attending:   Physician Attestation Statement for Scribe #1: I, Blanco Ervin MD, personally performed the services described in this documentation, as scribed by Glynn Allen, in my presence, and it is both accurate and complete.          Clinical Impression       ICD-10-CM ICD-9-CM   1. Chest pain, unspecified type R07.9 786.50   2. Weakness R53.1 780.79       Disposition:   Disposition: Discharged  Condition: Stable         Blanco Ervin MD  09/11/18 4929

## 2018-09-12 ENCOUNTER — PES CALL (OUTPATIENT)
Dept: ADMINISTRATIVE | Facility: CLINIC | Age: 73
End: 2018-09-12

## 2018-09-13 DIAGNOSIS — E11.65 TYPE 2 DIABETES MELLITUS WITH HYPERGLYCEMIA, WITH LONG-TERM CURRENT USE OF INSULIN: ICD-10-CM

## 2018-09-13 DIAGNOSIS — I10 ESSENTIAL HYPERTENSION: Chronic | ICD-10-CM

## 2018-09-13 DIAGNOSIS — Z79.4 TYPE 2 DIABETES MELLITUS WITHOUT COMPLICATION, WITH LONG-TERM CURRENT USE OF INSULIN: Chronic | ICD-10-CM

## 2018-09-13 DIAGNOSIS — E78.5 HYPERLIPIDEMIA, UNSPECIFIED HYPERLIPIDEMIA TYPE: Chronic | ICD-10-CM

## 2018-09-13 DIAGNOSIS — E11.9 TYPE 2 DIABETES MELLITUS WITHOUT COMPLICATION, WITH LONG-TERM CURRENT USE OF INSULIN: Chronic | ICD-10-CM

## 2018-09-13 DIAGNOSIS — Z79.4 INSULIN LONG-TERM USE: Chronic | ICD-10-CM

## 2018-09-13 DIAGNOSIS — Z79.4 TYPE 2 DIABETES MELLITUS WITH HYPERGLYCEMIA, WITH LONG-TERM CURRENT USE OF INSULIN: ICD-10-CM

## 2018-09-13 DIAGNOSIS — Z79.52 LONG TERM CURRENT USE OF SYSTEMIC STEROIDS: ICD-10-CM

## 2018-09-13 RX ORDER — PIOGLITAZONEHYDROCHLORIDE 30 MG/1
TABLET ORAL
Qty: 30 TABLET | Refills: 1 | Status: SHIPPED | OUTPATIENT
Start: 2018-09-13 | End: 2018-10-15

## 2018-09-18 DIAGNOSIS — I10 ESSENTIAL HYPERTENSION: Chronic | ICD-10-CM

## 2018-09-18 DIAGNOSIS — Z79.52 LONG TERM CURRENT USE OF SYSTEMIC STEROIDS: ICD-10-CM

## 2018-09-18 DIAGNOSIS — E11.65 TYPE 2 DIABETES MELLITUS WITH HYPERGLYCEMIA, WITH LONG-TERM CURRENT USE OF INSULIN: ICD-10-CM

## 2018-09-18 DIAGNOSIS — I10 ESSENTIAL HYPERTENSION: ICD-10-CM

## 2018-09-18 DIAGNOSIS — E78.5 HYPERLIPIDEMIA, UNSPECIFIED HYPERLIPIDEMIA TYPE: Chronic | ICD-10-CM

## 2018-09-18 DIAGNOSIS — Z79.4 TYPE 2 DIABETES MELLITUS WITH HYPERGLYCEMIA, WITH LONG-TERM CURRENT USE OF INSULIN: ICD-10-CM

## 2018-09-18 DIAGNOSIS — Z79.4 INSULIN LONG-TERM USE: Chronic | ICD-10-CM

## 2018-09-18 DIAGNOSIS — Z79.4 TYPE 2 DIABETES MELLITUS WITHOUT COMPLICATION, WITH LONG-TERM CURRENT USE OF INSULIN: Chronic | ICD-10-CM

## 2018-09-18 DIAGNOSIS — E11.9 TYPE 2 DIABETES MELLITUS WITHOUT COMPLICATION, WITH LONG-TERM CURRENT USE OF INSULIN: Chronic | ICD-10-CM

## 2018-09-18 RX ORDER — INSULIN ASPART 100 [IU]/ML
INJECTION, SOLUTION INTRAVENOUS; SUBCUTANEOUS
Qty: 15 ML | Refills: 1 | Status: SHIPPED | OUTPATIENT
Start: 2018-09-18 | End: 2018-10-15

## 2018-09-18 RX ORDER — INSULIN GLARGINE 100 [IU]/ML
INJECTION, SOLUTION SUBCUTANEOUS
Qty: 15 ML | Refills: 1 | Status: SHIPPED | OUTPATIENT
Start: 2018-09-18 | End: 2019-07-07 | Stop reason: SDUPTHER

## 2018-09-18 RX ORDER — METOPROLOL TARTRATE 50 MG/1
TABLET ORAL
Qty: 60 TABLET | Refills: 11 | Status: SHIPPED | OUTPATIENT
Start: 2018-09-18 | End: 2018-09-19 | Stop reason: SDUPTHER

## 2018-09-19 RX ORDER — METOPROLOL TARTRATE 50 MG/1
50 TABLET ORAL 2 TIMES DAILY
Qty: 60 TABLET | Refills: 11 | Status: SHIPPED | OUTPATIENT
Start: 2018-09-19 | End: 2019-10-15 | Stop reason: SDUPTHER

## 2018-10-08 ENCOUNTER — PATIENT MESSAGE (OUTPATIENT)
Dept: DIABETES | Facility: CLINIC | Age: 73
End: 2018-10-08

## 2018-10-15 ENCOUNTER — PATIENT MESSAGE (OUTPATIENT)
Dept: HEMATOLOGY/ONCOLOGY | Facility: CLINIC | Age: 73
End: 2018-10-15

## 2018-10-15 ENCOUNTER — OFFICE VISIT (OUTPATIENT)
Dept: DIABETES | Facility: CLINIC | Age: 73
End: 2018-10-15
Payer: MEDICARE

## 2018-10-15 VITALS
SYSTOLIC BLOOD PRESSURE: 124 MMHG | HEIGHT: 70 IN | DIASTOLIC BLOOD PRESSURE: 62 MMHG | WEIGHT: 171.5 LBS | BODY MASS INDEX: 24.55 KG/M2

## 2018-10-15 DIAGNOSIS — Z79.4 TYPE 2 DIABETES MELLITUS WITH DIABETIC NEPHROPATHY, WITH LONG-TERM CURRENT USE OF INSULIN: Primary | Chronic | ICD-10-CM

## 2018-10-15 DIAGNOSIS — C90.00 MULTIPLE MYELOMA NOT HAVING ACHIEVED REMISSION: ICD-10-CM

## 2018-10-15 DIAGNOSIS — E11.21 TYPE 2 DIABETES MELLITUS WITH DIABETIC NEPHROPATHY, WITH LONG-TERM CURRENT USE OF INSULIN: Primary | Chronic | ICD-10-CM

## 2018-10-15 LAB — GLUCOSE SERPL-MCNC: 144 MG/DL (ref 70–110)

## 2018-10-15 PROCEDURE — 1101F PT FALLS ASSESS-DOCD LE1/YR: CPT | Mod: CPTII,,, | Performed by: PHYSICIAN ASSISTANT

## 2018-10-15 PROCEDURE — 99999 PR PBB SHADOW E&M-EST. PATIENT-LVL II: CPT | Mod: PBBFAC,,, | Performed by: PHYSICIAN ASSISTANT

## 2018-10-15 PROCEDURE — 99214 OFFICE O/P EST MOD 30 MIN: CPT | Mod: S$PBB,,, | Performed by: PHYSICIAN ASSISTANT

## 2018-10-15 PROCEDURE — 3078F DIAST BP <80 MM HG: CPT | Mod: CPTII,,, | Performed by: PHYSICIAN ASSISTANT

## 2018-10-15 PROCEDURE — 3074F SYST BP LT 130 MM HG: CPT | Mod: CPTII,,, | Performed by: PHYSICIAN ASSISTANT

## 2018-10-15 PROCEDURE — 3044F HG A1C LEVEL LT 7.0%: CPT | Mod: CPTII,,, | Performed by: PHYSICIAN ASSISTANT

## 2018-10-15 PROCEDURE — 82948 REAGENT STRIP/BLOOD GLUCOSE: CPT | Mod: PBBFAC,PO | Performed by: PHYSICIAN ASSISTANT

## 2018-10-15 PROCEDURE — 99212 OFFICE O/P EST SF 10 MIN: CPT | Mod: PBBFAC,PO | Performed by: PHYSICIAN ASSISTANT

## 2018-10-15 RX ORDER — OXYCODONE HYDROCHLORIDE 10 MG/1
10 TABLET ORAL EVERY 6 HOURS PRN
Qty: 90 TABLET | Refills: 0 | Status: SHIPPED | OUTPATIENT
Start: 2018-10-15 | End: 2018-10-23 | Stop reason: SDUPTHER

## 2018-10-15 RX ORDER — OXYCODONE HCL 20 MG/1
20 TABLET, FILM COATED, EXTENDED RELEASE ORAL EVERY 12 HOURS
Qty: 60 EACH | Refills: 0 | Status: SHIPPED | OUTPATIENT
Start: 2018-10-15 | End: 2018-10-23 | Stop reason: SDUPTHER

## 2018-10-15 NOTE — PROGRESS NOTES
"Subjective:      Patient ID: Familia Durbin Jr. is a 73 y.o. male.    PCP: Andrea Elkins MD      Familia Durbin is a pleasant 73 y.o. male presenting to follow up on diabetes mellitus. Also, pertinent to this visit in decision making is hypertension, hyperlipidemia, and compliance. He has had diabetes for 40 or more years. His last visit in Diabetes Management was Visit date not found. Since that time he has had significant improvement in his glycemia. His blood sugar range fasting has been  and fed has been , and he has been monitoring 2 times per day. His current concerns are glycemic control.    He denies any hospital admissions, emergency room visits, hypoglycemia, syncope, diaphoresis, chest pain, or dyspnea.    He has lost 11 pounds since last visit. His BMI is 24.61 kg/m².    His blood sugar in the clinic today was:   Lab Results   Component Value Date    POCGLU 144 (A) 10/15/2018       We discussed the American diabetes Association recommendations:  hemoglobin A1c below 7.0%; all diabetics should be on statins unless contraindicated; one aspirin daily unless contraindicated; fasting blood sugar between 80 and 130 mg/dL; postprandial blood sugar below 180 mg/dl; prevention of hypoglycemia, may adjust goals to higher levels if persistent; ACE or ARB therapy if not contraindicated; and maintain in an ideal body weight with BMI below 25.    Familia is compliant most of the time with DM medications.     Familia is compliant most of the time with lifestyle modifications to include activity and meal planning.       Current Outpatient Medications:     aspirin 81 MG Chew, Take 1 tablet (81 mg total) by mouth once daily., Disp: 100 tablet, Rfl: 12    BD ULTRA-FINE DAVE PEN NEEDLE 32 gauge x 5/32" Ndle, USE  4 TIMES DAILY WITH MEALS AND AT BEDTIME, Disp: 400 each, Rfl: 3    blood-glucose meter kit, Use as instructed, Disp: 1 each, Rfl: 0    BRILINTA 90 mg tablet, Take 1 tablet by mouth once daily., " Disp: , Rfl:     carboxymethylcellulose-glycern 0.5-0.9 % Drop, , Disp: , Rfl:     cholecalciferol, vitamin D3, 5,000 unit Tab, Take 5,000 Units by mouth once daily., Disp: , Rfl:     DOCOSAHEXANOIC ACID ORAL, Take 1 capsule by mouth once daily., Disp: , Rfl:     docusate sodium (COLACE) 100 MG capsule, Take 100 mg by mouth 2 (two) times daily., Disp: , Rfl:     fluconazole (DIFLUCAN) 100 MG tablet, Take by mouth., Disp: , Rfl:     isosorbide mononitrate (IMDUR) 30 MG 24 hr tablet, Take by mouth., Disp: , Rfl:     LANTUS SOLOSTAR U-100 INSULIN 100 unit/mL (3 mL) InPn pen, INJECT 30 UNITS SUBCUTANEOUSLY ONCE DAILY, Disp: 15 mL, Rfl: 1    methotrexate (TREXALL) 10 MG tablet, Take 10 mg by mouth., Disp: , Rfl:     metoprolol tartrate (LOPRESSOR) 50 MG tablet, Take 1 tablet (50 mg total) by mouth 2 (two) times daily., Disp: 60 tablet, Rfl: 11    multivit-min-FA-lycopen-lutein 300-600-300 mcg Tab, Take 1 tablet by mouth once daily., Disp: , Rfl:     NITROSTAT 0.4 mg SL tablet, , Disp: , Rfl:     NOVOLOG FLEXPEN U-100 INSULIN 100 unit/mL InPn pen, INJECT 20 UNITS SUBCUTANEOUSLY THREE TIMES DAILY BEFORE MEAL(S), Disp: 15 mL, Rfl: 1    omega 3-dha-epa-fish oil 300-1,000 mg Cap, Take by mouth., Disp: , Rfl:     ondansetron (ZOFRAN) 4 MG tablet, , Disp: , Rfl:     oxyCODONE (OXYCONTIN) 20 mg 12 hr tablet, Take 1 tablet (20 mg total) by mouth every 12 (twelve) hours., Disp: 60 each, Rfl: 0    oxyCODONE (ROXICODONE) 10 mg Tab immediate release tablet, Take 1 tablet (10 mg total) by mouth every 6 (six) hours as needed for Pain., Disp: 90 tablet, Rfl: 0    pioglitazone (ACTOS) 30 MG tablet, TAKE 1 TABLET BY MOUTH ONCE DAILY, Disp: 30 tablet, Rfl: 1    pomalidomide 3 mg Cap, Take by mouth., Disp: , Rfl:     prochlorperazine (COMPAZINE) 5 MG tablet, Take by mouth., Disp: , Rfl:     ranolazine (RANEXA) 1,000 mg Tb12, Take 1,000 mg by mouth 2 (two) times daily., Disp: , Rfl:     rosuvastatin (CRESTOR) 40 MG Tab,  Take 1 tablet (40 mg total) by mouth every evening., Disp: 90 tablet, Rfl: 3    triamcinolone acetonide 0.1% (KENALOG) 0.1 % cream, as needed. , Disp: , Rfl:     TRUE METRIX GLUCOSE TEST STRIP Strp, USE  STRIP TO CHECK GLUCOSE SIX TIMES DAILY, Disp: 250 strip, Rfl: 11    TRUEPLUS LANCETS 33 gauge Misc, USE   TO CHECK GLUCOSE SIX TIMES DAILY, Disp: 100 each, Rfl: 11    valACYclovir (VALTREX) 500 MG tablet, Take by mouth., Disp: , Rfl:     VITAMIN B COMPLEX ORAL, Take by mouth., Disp: , Rfl:     STANDARDS OF CARE:  Eye doctor: Dr. Silva exam, 4/26/2017.  Dental exam: Recommend regular exams; denies gums bleeding.  Podiatry doctor:     ACTIVITY LEVEL: He exercises rarely.  BLOOD GLUCOSE TESTING: Self-monitoring with   SOCIAL HISTORY: single. Lives alone. retired no tobacco use  ACE/ARB: No  Statin: Yes    Health Maintenance   Topic Date Due    TETANUS VACCINE  08/21/1962    Zoster Vaccine  08/21/2004    Eye Exam  04/26/2018    Influenza Vaccine  08/01/2018    Hemoglobin A1c  12/20/2018    Lipid Panel  01/22/2019    Urine Microalbumin  01/22/2019    Foot Exam  04/23/2019    Colonoscopy  10/31/2024    Pneumococcal (65+)  Completed       Diabetes Status:   Diabetes Management Status    Statin: Taking  ACE/ARB: Not taking    Screening or Prevention Patient's value Goal Complete/Controlled?   HgA1C Testing and Control   Lab Results   Component Value Date    HGBA1C 6.6 (H) 06/20/2018      Annually/Less than 8% Yes   Lipid profile : 01/22/2018 Annually Yes   LDL control Lab Results   Component Value Date    LDLCALC 57.4 (L) 01/22/2018    Annually/Less than 100 mg/dl  Yes   Nephropathy screening Lab Results   Component Value Date    LABMICR 22.0 09/21/2016     Lab Results   Component Value Date    PROTEINUA 1+ (A) 09/11/2018    Annually Yes   Blood pressure BP Readings from Last 1 Encounters:   10/15/18 124/62    Less than 140/90 Yes   Dilated retinal exam : 04/26/2017 Annually No   Foot exam   : 04/23/2018  Annually Yes     The following results were reviewed with patient.    Lab Results   Component Value Date    WBC 5.46 09/11/2018    HGB 12.7 (L) 09/11/2018    HCT 37.6 (L) 09/11/2018     09/11/2018    CHOL 114 (L) 01/22/2018    TRIG 93 01/22/2018    HDL 38 (L) 01/22/2018    LDLCALC 57.4 (L) 01/22/2018    ALT 12 09/11/2018    AST 16 09/11/2018     09/11/2018    K 4.2 09/11/2018     09/11/2018    ANIONGAP 11 09/11/2018    CREATININE 1.6 (H) 09/11/2018    ESTGFRAFRICA 48 (A) 09/11/2018    EGFRNONAA 42 (A) 09/11/2018    BUN 20 09/11/2018    CO2 24 09/11/2018    TSH 0.683 01/22/2018    PSA 0.34 09/15/2015     (H) 09/11/2018    UTPCR 0.63 (H) 01/22/2018       Lab Results   Component Value Date    HGBA1C 6.6 (H) 06/20/2018    HGBA1C 7.8 (H) 01/22/2018    HGBA1C 7.8 (H) 01/22/2018       Lab Results   Component Value Date    GLUTAMICACID 0.00 11/15/2016    CPEPTIDE 1.4 11/15/2016    TSH 0.683 01/22/2018    IRON 81 07/21/2016    TIBC 302 07/21/2016    FERRITIN 324 (H) 07/21/2016    CALCIUM 9.9 09/11/2018    PHOS 3.9 01/22/2018       Review of patient's allergies indicates:   Allergen Reactions    Cephalexin     Fentanyl Nausea And Vomiting    Nsaids (non-steroidal anti-inflammatory drug)      Renal insuf       Past Medical History:   Diagnosis Date    CAD (coronary artery disease)     tyree    Diabetes mellitus, type 2 1979    eye dr rocha    Hearing impaired     Hyperlipidemia     Hypertension     Lupus     Discoid    Multiple myeloma     Old MI (myocardial infarction) 2012    Tracheostomy tube present        Review of Systems   Constitutional: Negative.  Negative for activity change, appetite change, chills, diaphoresis, fatigue, fever and unexpected weight change.   HENT: Negative.  Negative for dental problem, facial swelling, hearing loss, nosebleeds, trouble swallowing and voice change.    Eyes: Negative.  Negative for photophobia, pain, discharge, redness, itching and visual  "disturbance.   Respiratory: Negative.  Negative for cough, choking, chest tightness, shortness of breath and wheezing.    Cardiovascular: Negative.  Negative for chest pain, palpitations and leg swelling.   Gastrointestinal: Negative.  Negative for abdominal distention, abdominal pain, blood in stool, constipation, diarrhea, nausea and vomiting.   Endocrine: Negative.  Negative for cold intolerance, heat intolerance, polydipsia, polyphagia and polyuria.   Genitourinary: Negative.  Negative for decreased urine volume, difficulty urinating, dysuria, frequency, scrotal swelling, testicular pain and urgency.   Musculoskeletal: Negative.  Negative for arthralgias, back pain, gait problem, joint swelling, myalgias, neck pain and neck stiffness.   Skin: Negative.  Negative for color change, pallor, rash and wound.   Allergic/Immunologic: Negative.  Negative for environmental allergies, food allergies and immunocompromised state.   Neurological: Negative.  Negative for dizziness, tremors, seizures, syncope, facial asymmetry, speech difficulty, weakness, light-headedness, numbness and headaches.   Hematological: Negative.  Negative for adenopathy. Does not bruise/bleed easily.   Psychiatric/Behavioral: Negative.  Negative for agitation, behavioral problems, confusion, decreased concentration, dysphoric mood, hallucinations, self-injury, sleep disturbance and suicidal ideas. The patient is not nervous/anxious and is not hyperactive.       Objective:     Vitals - 1 value per visit 9/10/2018 9/11/2018 10/15/2018   SYSTOLIC 126 182 124   DIASTOLIC 73 77 62   PULSE 70 60 -   TEMPERATURE 98.9 97.2 -   RESPIRATIONS - 19 -   SPO2 98 99 -   Weight (lb) 182.32 182 171.52   Weight (kg) 82.7 82.555 77.8   HEIGHT 5' 10" 5' 10" 5' 10"   BODY MASS INDEX 26.16 26.11 24.61   VISIT REPORT - - -   Pain Score  0 - 0   Some recent data might be hidden       Physical Exam   Constitutional: He is oriented to person, place, and time. He appears " well-developed and well-nourished. He is cooperative.  Non-toxic appearance. He does not have a sickly appearance. He does not appear ill. No distress. He is not intubated.   HENT:   Head: Normocephalic and atraumatic. Not macrocephalic and not microcephalic. Head is without raccoon's eyes, without Crane's sign, without abrasion, without contusion, without laceration, without right periorbital erythema and without left periorbital erythema. Hair is normal.   Right Ear: External ear normal. No lacerations. No drainage, swelling or tenderness. No foreign bodies. No mastoid tenderness. Tympanic membrane is not injected, not scarred, not perforated, not erythematous, not retracted and not bulging. Tympanic membrane mobility is normal. No middle ear effusion. No hemotympanum. No decreased hearing is noted.   Left Ear: External ear normal. No lacerations. No drainage, swelling or tenderness. No foreign bodies. No mastoid tenderness. Tympanic membrane is not injected, not scarred, not perforated, not erythematous, not retracted and not bulging. Tympanic membrane mobility is normal.  No middle ear effusion. No hemotympanum. No decreased hearing is noted.   Nose: Nose normal.   Mouth/Throat: Oropharynx is clear and moist.   Eyes: EOM and lids are normal. Pupils are equal, round, and reactive to light. Right eye exhibits no chemosis, no discharge, no exudate and no hordeolum. No foreign body present in the right eye. Left eye exhibits no chemosis, no discharge, no exudate and no hordeolum. No foreign body present in the left eye. Right conjunctiva is not injected. Right conjunctiva has no hemorrhage. Left conjunctiva is not injected. Left conjunctiva has no hemorrhage. No scleral icterus. Right eye exhibits normal extraocular motion and no nystagmus. Left eye exhibits normal extraocular motion and no nystagmus. Right pupil is round and reactive. Left pupil is round and reactive. Pupils are equal.   Neck: Normal range of  motion, full passive range of motion without pain and phonation normal. Neck supple. Normal carotid pulses, no hepatojugular reflux and no JVD present. No tracheal tenderness, no spinous process tenderness and no muscular tenderness present. Carotid bruit is not present. No neck rigidity. No tracheal deviation, no edema, no erythema and normal range of motion present. No thyroid mass and no thyromegaly present.   Cardiovascular: Normal rate, regular rhythm, normal heart sounds and intact distal pulses.  No extrasystoles are present. PMI is not displaced. Exam reveals no gallop, no friction rub and no decreased pulses.   No murmur heard.  Pulses:       Carotid pulses are 2+ on the right side, and 2+ on the left side.       Radial pulses are 2+ on the right side, and 2+ on the left side.        Dorsalis pedis pulses are 2+ on the right side, and 2+ on the left side.        Posterior tibial pulses are 2+ on the right side, and 2+ on the left side.   Pulmonary/Chest: Effort normal and breath sounds normal. No accessory muscle usage or stridor. No apnea, no tachypnea and no bradypnea. He is not intubated. No respiratory distress. He has no decreased breath sounds. He has no wheezes. He has no rhonchi. He has no rales. He exhibits no tenderness.   Abdominal: Soft. Bowel sounds are normal. He exhibits no shifting dullness, no distension, no pulsatile liver, no fluid wave, no abdominal bruit, no ascites, no pulsatile midline mass and no mass. There is no hepatosplenomegaly, splenomegaly or hepatomegaly. There is no tenderness. There is no rigidity, no rebound, no guarding, no CVA tenderness and no tenderness at McBurney's point. No hernia. Hernia confirmed negative in the ventral area.   Musculoskeletal: Normal range of motion. He exhibits no edema or tenderness.        Right foot: There is normal range of motion and no deformity.        Left foot: There is normal range of motion and no deformity.   Feet:   Right Foot:    Protective Sensation: 6 sites tested. 6 sites sensed.   Skin Integrity: Negative for ulcer, blister, skin breakdown, erythema, warmth, callus or dry skin.   Left Foot:   Protective Sensation: 6 sites tested. 6 sites sensed.   Skin Integrity: Negative for ulcer, blister, skin breakdown, erythema, warmth, callus or dry skin.   Lymphadenopathy:        Head (right side): No submental, no submandibular, no tonsillar, no preauricular, no posterior auricular and no occipital adenopathy present.        Head (left side): No submental, no submandibular, no tonsillar, no preauricular, no posterior auricular and no occipital adenopathy present.     He has no cervical adenopathy.        Right cervical: No superficial cervical, no deep cervical and no posterior cervical adenopathy present.       Left cervical: No superficial cervical, no deep cervical and no posterior cervical adenopathy present.   Neurological: He is alert and oriented to person, place, and time. He has normal reflexes. He displays no atrophy and no tremor. No cranial nerve deficit or sensory deficit. He exhibits normal muscle tone. He displays no seizure activity. Coordination and gait normal.   Reflex Scores:       Bicep reflexes are 2+ on the right side and 2+ on the left side.       Brachioradialis reflexes are 2+ on the right side and 2+ on the left side.       Patellar reflexes are 2+ on the right side and 2+ on the left side.  Skin: Skin is warm and dry. No rash noted. No erythema. No pallor.   Psychiatric: He has a normal mood and affect. His mood appears not anxious. His affect is not angry, not blunt, not labile and not inappropriate. His speech is not rapid and/or pressured, not delayed, not tangential and not slurred. He is not agitated, not aggressive, not hyperactive, not slowed, not withdrawn, not actively hallucinating and not combative. Thought content is not paranoid and not delusional. Cognition and memory are not impaired. He does not  express impulsivity or inappropriate judgment. He does not exhibit a depressed mood. He expresses no homicidal and no suicidal ideation. He expresses no suicidal plans and no homicidal plans. He is communicative. He exhibits normal recent memory and normal remote memory. He is attentive.     Assessment:     1. Type 2 diabetes mellitus with diabetic nephropathy, with long-term current use of insulin       Plan:   Familia Durbin is seen today for   1. Type 2 diabetes mellitus with diabetic nephropathy, with long-term current use of insulin      We have discussed the etiology and treatment options associated with the diagnosis as well as alternatives.       He has elected the following treatments.     Type 2 diabetes mellitus with diabetic nephropathy, with long-term current use of insulin  -     POCT glucose  - He has stopped his insulin because of Nocturnal hypoglycemia.  - Suggested he reduce his dosage in half and start taking it in the morning.   - From what he describe to me, he is having reactive hypoglycemia.  - Will follow up in 3 months.     1.) Patient was instructed to continue to monitor blood glucose 4 times daily. Reminded to bring BG meter or record to each visit for review.  2.) Reviewed pathophysiology of diabetes, complications related to the disease, importance of annual dilated eye exam and self daily foot examination.  3.) Continue medications as prescribed Lantus. [facility] MyChart or Phone review in 1 week with BG records for adjustment of medication.  4.) Advised patient to continue to follow up with Dietician/CDE as directed.  5.) Discussed activity, benefits, methods, and precautions. Recommended patient start/continue some form of exercise and increase as tolerated to 60 minutes per day to facilitate weight loss and aid in control of BGs. Also reminded patient of WHO recommendation of 10,000 steps daily as a goal.   6.) A1C, TSH, Lipid Panel, CMP/renal panel with eGFR and Micro/Creatinine  prior to next visit, if not already done.  7.) Return to clinic in 12 weeks for follow up. Advised patient to call clinic with any questions or concerns.    A total of 30 minutes was spent in face to face time, of which 50 % was spent in counseling patient on disease process, complications, treatment, and side effects of medications.    The patient was explained the above plan and given opportunity to ask questions.  He understands, chooses and consents to this plan and accepts all the risks, which include but are not limited to the risks mentioned above.   He understands the alternative of having no testing, interventions or treatments at this time. He left content and without further questions.     Disclaimer:  This note is prepared using voice recognition software and as such is likely to have errors and has not been proof read. Please contact me for questions.

## 2018-10-23 ENCOUNTER — OFFICE VISIT (OUTPATIENT)
Dept: HEMATOLOGY/ONCOLOGY | Facility: CLINIC | Age: 73
End: 2018-10-23
Payer: MEDICARE

## 2018-10-23 ENCOUNTER — LAB VISIT (OUTPATIENT)
Dept: LAB | Facility: HOSPITAL | Age: 73
End: 2018-10-23
Attending: INTERNAL MEDICINE
Payer: MEDICARE

## 2018-10-23 VITALS
BODY MASS INDEX: 25.44 KG/M2 | OXYGEN SATURATION: 100 % | WEIGHT: 177.69 LBS | TEMPERATURE: 98 F | HEIGHT: 70 IN | SYSTOLIC BLOOD PRESSURE: 138 MMHG | HEART RATE: 59 BPM | DIASTOLIC BLOOD PRESSURE: 59 MMHG

## 2018-10-23 DIAGNOSIS — I25.118 CORONARY ARTERY DISEASE OF NATIVE ARTERY OF NATIVE HEART WITH STABLE ANGINA PECTORIS: Chronic | ICD-10-CM

## 2018-10-23 DIAGNOSIS — C90.00 MULTIPLE MYELOMA NOT HAVING ACHIEVED REMISSION: ICD-10-CM

## 2018-10-23 DIAGNOSIS — I25.10 CORONARY ARTERY DISEASE INVOLVING NATIVE CORONARY ARTERY OF NATIVE HEART WITHOUT ANGINA PECTORIS: ICD-10-CM

## 2018-10-23 DIAGNOSIS — I70.0 CALCIFICATION OF ABDOMINAL AORTA: Chronic | ICD-10-CM

## 2018-10-23 DIAGNOSIS — C90.00 MULTIPLE MYELOMA NOT HAVING ACHIEVED REMISSION: Primary | ICD-10-CM

## 2018-10-23 LAB
ALBUMIN SERPL BCP-MCNC: 3.8 G/DL
ALP SERPL-CCNC: 50 U/L
ALT SERPL W/O P-5'-P-CCNC: 13 U/L
ANION GAP SERPL CALC-SCNC: 8 MMOL/L
AST SERPL-CCNC: 18 U/L
BASOPHILS # BLD AUTO: 0.02 K/UL
BASOPHILS NFR BLD: 0.5 %
BILIRUB SERPL-MCNC: 0.6 MG/DL
BUN SERPL-MCNC: 18 MG/DL
CALCIUM SERPL-MCNC: 9.2 MG/DL
CHLORIDE SERPL-SCNC: 105 MMOL/L
CO2 SERPL-SCNC: 25 MMOL/L
CREAT SERPL-MCNC: 1.8 MG/DL
DIFFERENTIAL METHOD: ABNORMAL
EOSINOPHIL # BLD AUTO: 0 K/UL
EOSINOPHIL NFR BLD: 0.8 %
ERYTHROCYTE [DISTWIDTH] IN BLOOD BY AUTOMATED COUNT: 15.5 %
EST. GFR  (AFRICAN AMERICAN): 42 ML/MIN/1.73 M^2
EST. GFR  (NON AFRICAN AMERICAN): 36 ML/MIN/1.73 M^2
GLUCOSE SERPL-MCNC: 185 MG/DL
HCT VFR BLD AUTO: 35.5 %
HGB BLD-MCNC: 11.9 G/DL
LYMPHOCYTES # BLD AUTO: 1.7 K/UL
LYMPHOCYTES NFR BLD: 45.6 %
MCH RBC QN AUTO: 33.1 PG
MCHC RBC AUTO-ENTMCNC: 33.5 G/DL
MCV RBC AUTO: 99 FL
MONOCYTES # BLD AUTO: 0.5 K/UL
MONOCYTES NFR BLD: 13.7 %
NEUTROPHILS # BLD AUTO: 1.4 K/UL
NEUTROPHILS NFR BLD: 39.4 %
PLATELET # BLD AUTO: 192 K/UL
PMV BLD AUTO: 9.4 FL
POTASSIUM SERPL-SCNC: 3.9 MMOL/L
PROT SERPL-MCNC: 8.5 G/DL
RBC # BLD AUTO: 3.6 M/UL
SODIUM SERPL-SCNC: 138 MMOL/L
WBC # BLD AUTO: 3.64 K/UL

## 2018-10-23 PROCEDURE — 85025 COMPLETE CBC W/AUTO DIFF WBC: CPT

## 2018-10-23 PROCEDURE — 83520 IMMUNOASSAY QUANT NOS NONAB: CPT

## 2018-10-23 PROCEDURE — 3078F DIAST BP <80 MM HG: CPT | Mod: CPTII,,, | Performed by: INTERNAL MEDICINE

## 2018-10-23 PROCEDURE — 3075F SYST BP GE 130 - 139MM HG: CPT | Mod: CPTII,,, | Performed by: INTERNAL MEDICINE

## 2018-10-23 PROCEDURE — 99999 PR PBB SHADOW E&M-EST. PATIENT-LVL III: CPT | Mod: PBBFAC,,, | Performed by: INTERNAL MEDICINE

## 2018-10-23 PROCEDURE — 1101F PT FALLS ASSESS-DOCD LE1/YR: CPT | Mod: CPTII,,, | Performed by: INTERNAL MEDICINE

## 2018-10-23 PROCEDURE — 84165 PROTEIN E-PHORESIS SERUM: CPT

## 2018-10-23 PROCEDURE — 86334 IMMUNOFIX E-PHORESIS SERUM: CPT

## 2018-10-23 PROCEDURE — 99215 OFFICE O/P EST HI 40 MIN: CPT | Mod: S$PBB,,, | Performed by: INTERNAL MEDICINE

## 2018-10-23 PROCEDURE — 80053 COMPREHEN METABOLIC PANEL: CPT

## 2018-10-23 PROCEDURE — 86334 IMMUNOFIX E-PHORESIS SERUM: CPT | Mod: 26,,, | Performed by: PATHOLOGY

## 2018-10-23 PROCEDURE — 99213 OFFICE O/P EST LOW 20 MIN: CPT | Mod: PBBFAC | Performed by: INTERNAL MEDICINE

## 2018-10-23 PROCEDURE — 84165 PROTEIN E-PHORESIS SERUM: CPT | Mod: 26,,, | Performed by: PATHOLOGY

## 2018-10-23 PROCEDURE — 36415 COLL VENOUS BLD VENIPUNCTURE: CPT

## 2018-10-23 RX ORDER — OXYCODONE HCL 20 MG/1
20 TABLET, FILM COATED, EXTENDED RELEASE ORAL EVERY 12 HOURS
Qty: 60 EACH | Refills: 0 | Status: SHIPPED | OUTPATIENT
Start: 2018-10-23 | End: 2018-12-24 | Stop reason: SDUPTHER

## 2018-10-23 RX ORDER — LISINOPRIL 2.5 MG/1
2.5 TABLET ORAL DAILY
COMMUNITY
Start: 2018-09-13 | End: 2020-12-23

## 2018-10-23 RX ORDER — OXYCODONE HYDROCHLORIDE 10 MG/1
10 TABLET ORAL EVERY 6 HOURS PRN
Qty: 90 TABLET | Refills: 0 | Status: SHIPPED | OUTPATIENT
Start: 2018-10-23 | End: 2018-12-24 | Stop reason: SDUPTHER

## 2018-10-23 NOTE — PROGRESS NOTES
Subjective:       Patient ID: Familia Durbin Jr. is a 73 y.o. male.    Chief Complaint: Results and Multiple Myeloma    HPI 73-year-old male history of multiple myeloma currently being treated at [facility] in [place] currently being maintained on the Pomalyst days 1 through 21 on a 28 day cycle patient is concerned about recent diagnosis of progressive atherosclerotic cardiovascular disease status post a AC bypass and stent recent hospital a station at [facility] reviewed note from cardiology in Care everywhere    Past Medical History:   Diagnosis Date    CAD (coronary artery disease)     tyree    Diabetes mellitus, type 2 1979    eye dr rocha    Hearing impaired     Hyperlipidemia     Hypertension     Lupus     Discoid    Multiple myeloma     Old MI (myocardial infarction) 2012    Tracheostomy tube present      Family History   Problem Relation Age of Onset    Diabetes Mother     Diabetes Father     Prostate cancer Father     Pancreatic cancer Sister     No Known Problems Brother     Diabetes Maternal Uncle     Diabetes Maternal Grandmother     No Known Problems Maternal Grandfather     No Known Problems Paternal Grandmother     No Known Problems Paternal Grandfather      Social History     Socioeconomic History    Marital status: Single     Spouse name: Not on file    Number of children: 9    Years of education: Not on file    Highest education level: Not on file   Social Needs    Financial resource strain: Not on file    Food insecurity - worry: Not on file    Food insecurity - inability: Not on file    Transportation needs - medical: Not on file    Transportation needs - non-medical: Not on file   Occupational History    Not on file   Tobacco Use    Smoking status: Never Smoker    Smokeless tobacco: Never Used   Substance and Sexual Activity    Alcohol use: No    Drug use: No    Sexual activity: Yes     Partners:  Female   Other Topics Concern    Not on file   Social History Narrative    Not on file     Past Surgical History:   Procedure Laterality Date    CARDIAC SURGERY      COLONOSCOPY      CORONARY ARTERY BYPASS GRAFT      HAND SURGERY      KNEE SURGERY      TRACHEOSTOMY TUBE PLACEMENT      WRIST FUSION         Labs:  Lab Results   Component Value Date    WBC 3.64 (L) 10/23/2018    HGB 11.9 (L) 10/23/2018    HCT 35.5 (L) 10/23/2018    MCV 99 (H) 10/23/2018     10/23/2018     BMP  Lab Results   Component Value Date     10/23/2018    K 3.9 10/23/2018     10/23/2018    CO2 25 10/23/2018    BUN 18 10/23/2018    CREATININE 1.8 (H) 10/23/2018    CALCIUM 9.2 10/23/2018    ANIONGAP 8 10/23/2018    ESTGFRAFRICA 42 (A) 10/23/2018    EGFRNONAA 36 (A) 10/23/2018     Lab Results   Component Value Date    ALT 13 10/23/2018    AST 18 10/23/2018    ALKPHOS 50 (L) 10/23/2018    BILITOT 0.6 10/23/2018       Lab Results   Component Value Date    IRON 81 07/21/2016    TIBC 302 07/21/2016    FERRITIN 324 (H) 07/21/2016     No results found for: [id number]  No results found for: FOLATE  Lab Results   Component Value Date    TSH 0.683 01/22/2018         Review of Systems   Constitutional: Positive for activity change, appetite change and fatigue. Negative for chills, diaphoresis, fever and unexpected weight change.   HENT: Negative for congestion, dental problem, drooling, ear discharge, ear pain, facial swelling, hearing loss, mouth sores, nosebleeds, postnasal drip, rhinorrhea, sinus pressure, sneezing, sore throat, tinnitus, trouble swallowing and voice change.    Eyes: Negative for photophobia, pain, discharge, redness, itching and visual disturbance.   Respiratory: Positive for chest tightness. Negative for apnea, cough, choking, shortness of breath, wheezing and stridor.    Cardiovascular: Positive for chest pain. Negative for palpitations and leg swelling.   Gastrointestinal: Negative for abdominal distention,  abdominal pain, anal bleeding, blood in stool, constipation, diarrhea, nausea, rectal pain and vomiting.   Endocrine: Negative for cold intolerance, heat intolerance, polydipsia, polyphagia and polyuria.   Genitourinary: Negative for decreased urine volume, difficulty urinating, discharge, dysuria, enuresis, flank pain, frequency, genital sores, hematuria, penile pain, penile swelling, scrotal swelling, testicular pain and urgency.   Musculoskeletal: Negative for arthralgias, back pain, gait problem, joint swelling, myalgias, neck pain and neck stiffness.   Skin: Negative for color change, pallor, rash and wound.   Allergic/Immunologic: Negative for environmental allergies, food allergies and immunocompromised state.   Neurological: Positive for weakness. Negative for dizziness, tremors, seizures, syncope, facial asymmetry, speech difficulty, light-headedness, numbness and headaches.   Hematological: Negative for adenopathy. Does not bruise/bleed easily.   Psychiatric/Behavioral: Positive for dysphoric mood. Negative for agitation, behavioral problems, confusion, decreased concentration, hallucinations, self-injury, sleep disturbance and suicidal ideas. The patient is nervous/anxious. The patient is not hyperactive.        Objective:      Physical Exam   Constitutional: He is oriented to person, place, and time. He appears well-developed and well-nourished. He appears distressed.   HENT:   Head: Normocephalic.   Right Ear: External ear normal.   Left Ear: External ear normal.   Nose: Nose normal. Right sinus exhibits no maxillary sinus tenderness and no frontal sinus tenderness. Left sinus exhibits no maxillary sinus tenderness and no frontal sinus tenderness.   Mouth/Throat: Oropharynx is clear and moist. No oropharyngeal exudate.   Eyes: EOM and lids are normal. Pupils are equal, round, and reactive to light. Right eye exhibits no discharge. Left eye exhibits no discharge. Right conjunctiva is not injected. Right  conjunctiva has no hemorrhage. Left conjunctiva is not injected. Left conjunctiva has no hemorrhage. No scleral icterus. Right eye exhibits normal extraocular motion. Left eye exhibits normal extraocular motion.   Neck: Normal range of motion. Neck supple. No JVD present. No tracheal deviation present. No thyromegaly present.   Cardiovascular: Normal rate and regular rhythm.   Pulmonary/Chest: Effort normal. No stridor. No respiratory distress.   Abdominal: Soft. He exhibits no mass. There is no hepatosplenomegaly, splenomegaly or hepatomegaly. There is no tenderness.   Musculoskeletal: Normal range of motion. He exhibits no edema or tenderness.   Lymphadenopathy:        Head (right side): No posterior auricular and no occipital adenopathy present.        Head (left side): No posterior auricular and no occipital adenopathy present.     He has no cervical adenopathy.        Right cervical: No superficial cervical, no deep cervical and no posterior cervical adenopathy present.       Left cervical: No superficial cervical, no deep cervical and no posterior cervical adenopathy present.     He has no axillary adenopathy.        Right: No supraclavicular adenopathy present.        Left: No supraclavicular adenopathy present.   Neurological: He is alert and oriented to person, place, and time. He has normal strength. No cranial nerve deficit. Coordination normal.   Skin: Skin is dry. No rash noted. He is not diaphoretic. No cyanosis or erythema. Nails show no clubbing.   Psychiatric: His behavior is normal. Judgment and thought content normal. His mood appears anxious. Cognition and memory are normal. He exhibits a depressed mood.   Vitals reviewed.          Assessment:      1. Multiple myeloma not having achieved remission    2. Metastatic bone cancer    3. Coronary artery disease involving native coronary artery of native heart without angina pectoris           Plan:     Multiple myeloma remains in remission on  maintenance Pomalyst no evidence of progression very concerned over progressive atherosclerotic cardiovascular disease status post AC bypass at this point I have offered the patient 2nd cardiology opinion he can make arrangements to be seen with interventional cardiology [facility] Josiane Blake for review continue with current treatment return 6 weeks chronic stable narcotic use secondary to metastatic disease to bone        Rodolfo Solo Jr, MD FACP

## 2018-10-24 ENCOUNTER — TELEPHONE (OUTPATIENT)
Dept: HEMATOLOGY/ONCOLOGY | Facility: CLINIC | Age: 73
End: 2018-10-24

## 2018-10-24 LAB
ALBUMIN SERPL ELPH-MCNC: 3.82 G/DL
ALPHA1 GLOB SERPL ELPH-MCNC: 0.37 G/DL
ALPHA2 GLOB SERPL ELPH-MCNC: 0.8 G/DL
B-GLOBULIN SERPL ELPH-MCNC: 1.04 G/DL
GAMMA GLOB SERPL ELPH-MCNC: 1.87 G/DL
PROT SERPL-MCNC: 7.9 G/DL

## 2018-10-24 NOTE — TELEPHONE ENCOUNTER
----- Message from Ani Lara sent at 10/24/2018 11:46 AM CDT -----  Pt daughter(Ms Nichole) at [phone]//states she is wanting you to call her to discuss her Dad's last visit//please call//ratna/cassandra

## 2018-10-24 NOTE — TELEPHONE ENCOUNTER
Spoke with patients daughter she stated that patient does not need to see [facility] cardiology and cardiovasular surgeon that her dad will be following up with his current cardiologist Dr. Carrero at [facility]. Stated her dad was confused about passed appointment with Dr. Carrero and misunderstood information about medication side effects. Stated she will now be accompanying him to all other follow-up appointments and that she canceled appointments for today. Confirmed upcoming appointments with Dr. Solo.

## 2018-10-24 NOTE — TELEPHONE ENCOUNTER
Spoke with patient, confirmed cardiology appointment with Dr. Simpson and cardiovascular surgery appointment with Dr. Mayes for this afternoon. Confirmed appointment times and location.

## 2018-10-25 LAB
KAPPA LC SER QL IA: 9.01 MG/DL
KAPPA LC/LAMBDA SER IA: 2.19
LAMBDA LC SER QL IA: 4.11 MG/DL
PATHOLOGIST INTERPRETATION SPE: NORMAL

## 2018-10-26 LAB
INTERPRETATION SERPL IFE-IMP: NORMAL
PATHOLOGIST INTERPRETATION IFE: NORMAL

## 2018-12-20 DIAGNOSIS — Z79.4 TYPE 2 DIABETES MELLITUS WITH HYPERGLYCEMIA, WITH LONG-TERM CURRENT USE OF INSULIN: ICD-10-CM

## 2018-12-20 DIAGNOSIS — Z79.4 INSULIN LONG-TERM USE: Chronic | ICD-10-CM

## 2018-12-20 DIAGNOSIS — I10 ESSENTIAL HYPERTENSION: Chronic | ICD-10-CM

## 2018-12-20 DIAGNOSIS — E11.9 TYPE 2 DIABETES MELLITUS WITHOUT COMPLICATION, WITH LONG-TERM CURRENT USE OF INSULIN: Chronic | ICD-10-CM

## 2018-12-20 DIAGNOSIS — E11.65 TYPE 2 DIABETES MELLITUS WITH HYPERGLYCEMIA, WITH LONG-TERM CURRENT USE OF INSULIN: ICD-10-CM

## 2018-12-20 DIAGNOSIS — Z79.52 LONG TERM CURRENT USE OF SYSTEMIC STEROIDS: ICD-10-CM

## 2018-12-20 DIAGNOSIS — E78.5 HYPERLIPIDEMIA, UNSPECIFIED HYPERLIPIDEMIA TYPE: Chronic | ICD-10-CM

## 2018-12-20 DIAGNOSIS — Z79.4 TYPE 2 DIABETES MELLITUS WITHOUT COMPLICATION, WITH LONG-TERM CURRENT USE OF INSULIN: Chronic | ICD-10-CM

## 2018-12-21 RX ORDER — INSULIN ASPART 100 [IU]/ML
INJECTION, SOLUTION INTRAVENOUS; SUBCUTANEOUS
Qty: 15 ML | Refills: 1 | Status: SHIPPED | OUTPATIENT
Start: 2018-12-21 | End: 2019-07-07 | Stop reason: SDUPTHER

## 2018-12-24 DIAGNOSIS — C90.00 MULTIPLE MYELOMA NOT HAVING ACHIEVED REMISSION: ICD-10-CM

## 2018-12-24 RX ORDER — OXYCODONE HYDROCHLORIDE 10 MG/1
10 TABLET ORAL EVERY 6 HOURS PRN
Qty: 90 TABLET | Refills: 0 | Status: SHIPPED | OUTPATIENT
Start: 2018-12-24 | End: 2019-01-23 | Stop reason: SDUPTHER

## 2018-12-24 RX ORDER — OXYCODONE HCL 20 MG/1
20 TABLET, FILM COATED, EXTENDED RELEASE ORAL EVERY 12 HOURS
Qty: 60 EACH | Refills: 0 | Status: SHIPPED | OUTPATIENT
Start: 2018-12-24 | End: 2019-01-23 | Stop reason: SDUPTHER

## 2018-12-24 NOTE — TELEPHONE ENCOUNTER
----- Message from Sanjiv Ortiz sent at 12/24/2018 10:57 AM CST -----  Contact: Pt. ham[place]   Pt daughter is calling regarding requesting a script refill on both  oxyCODONE (ROXICODONE) 10 mg Tab immediate release tablet and oxyCODONE (OXYCONTIN) 20 mg 12 hr tablet. .[phone] (home)   Pt is calling regarding aso to have nurse call pt.     .  [facility] [address] [place] [address]  Phone: [phone] Fax: [phone]    Thank you  Jessica Ortiz

## 2019-01-23 ENCOUNTER — OFFICE VISIT (OUTPATIENT)
Dept: HEMATOLOGY/ONCOLOGY | Facility: CLINIC | Age: 74
End: 2019-01-23
Payer: MEDICARE

## 2019-01-23 ENCOUNTER — LAB VISIT (OUTPATIENT)
Dept: LAB | Facility: HOSPITAL | Age: 74
End: 2019-01-23
Attending: INTERNAL MEDICINE
Payer: MEDICARE

## 2019-01-23 VITALS
SYSTOLIC BLOOD PRESSURE: 138 MMHG | OXYGEN SATURATION: 95 % | HEIGHT: 70 IN | DIASTOLIC BLOOD PRESSURE: 61 MMHG | HEART RATE: 72 BPM | WEIGHT: 176.38 LBS | TEMPERATURE: 99 F | BODY MASS INDEX: 25.25 KG/M2

## 2019-01-23 DIAGNOSIS — I70.0 CALCIFICATION OF ABDOMINAL AORTA: Chronic | ICD-10-CM

## 2019-01-23 DIAGNOSIS — C90.00 MULTIPLE MYELOMA NOT HAVING ACHIEVED REMISSION: ICD-10-CM

## 2019-01-23 DIAGNOSIS — I25.10 CORONARY ARTERY DISEASE INVOLVING NATIVE CORONARY ARTERY OF NATIVE HEART WITHOUT ANGINA PECTORIS: ICD-10-CM

## 2019-01-23 DIAGNOSIS — C90.01 MULTIPLE MYELOMA IN REMISSION: Primary | ICD-10-CM

## 2019-01-23 DIAGNOSIS — D49.89 NEOPLASM OF ABDOMEN: ICD-10-CM

## 2019-01-23 DIAGNOSIS — I25.118 CORONARY ARTERY DISEASE OF NATIVE ARTERY OF NATIVE HEART WITH STABLE ANGINA PECTORIS: Chronic | ICD-10-CM

## 2019-01-23 LAB
ALBUMIN SERPL BCP-MCNC: 3.7 G/DL
ALP SERPL-CCNC: 59 U/L
ALT SERPL W/O P-5'-P-CCNC: 16 U/L
ANION GAP SERPL CALC-SCNC: 8 MMOL/L
AST SERPL-CCNC: 16 U/L
BASOPHILS # BLD AUTO: 0.01 K/UL
BASOPHILS NFR BLD: 0.3 %
BILIRUB SERPL-MCNC: 0.5 MG/DL
BUN SERPL-MCNC: 23 MG/DL
CALCIUM SERPL-MCNC: 9.3 MG/DL
CHLORIDE SERPL-SCNC: 104 MMOL/L
CO2 SERPL-SCNC: 25 MMOL/L
CREAT SERPL-MCNC: 1.5 MG/DL
DIFFERENTIAL METHOD: ABNORMAL
EOSINOPHIL # BLD AUTO: 0.1 K/UL
EOSINOPHIL NFR BLD: 1.7 %
ERYTHROCYTE [DISTWIDTH] IN BLOOD BY AUTOMATED COUNT: 14.4 %
EST. GFR  (AFRICAN AMERICAN): 52 ML/MIN/1.73 M^2
EST. GFR  (NON AFRICAN AMERICAN): 45 ML/MIN/1.73 M^2
GLUCOSE SERPL-MCNC: 183 MG/DL
HCT VFR BLD AUTO: 37.4 %
HGB BLD-MCNC: 12.5 G/DL
LYMPHOCYTES # BLD AUTO: 1.5 K/UL
LYMPHOCYTES NFR BLD: 49.8 %
MCH RBC QN AUTO: 33.6 PG
MCHC RBC AUTO-ENTMCNC: 33.4 G/DL
MCV RBC AUTO: 101 FL
MONOCYTES # BLD AUTO: 0.4 K/UL
MONOCYTES NFR BLD: 13.2 %
NEUTROPHILS # BLD AUTO: 1.1 K/UL
NEUTROPHILS NFR BLD: 35 %
PLATELET # BLD AUTO: 196 K/UL
PMV BLD AUTO: 9.8 FL
POTASSIUM SERPL-SCNC: 4.4 MMOL/L
PROT SERPL-MCNC: 8.3 G/DL
RBC # BLD AUTO: 3.72 M/UL
SODIUM SERPL-SCNC: 137 MMOL/L
WBC # BLD AUTO: 3.03 K/UL

## 2019-01-23 PROCEDURE — 3078F PR MOST RECENT DIASTOLIC BLOOD PRESSURE < 80 MM HG: ICD-10-PCS | Mod: CPTII,S$GLB,, | Performed by: INTERNAL MEDICINE

## 2019-01-23 PROCEDURE — 1101F PT FALLS ASSESS-DOCD LE1/YR: CPT | Mod: CPTII,S$GLB,, | Performed by: INTERNAL MEDICINE

## 2019-01-23 PROCEDURE — 86334 PATHOLOGIST INTERPRETATION IFE: ICD-10-PCS | Mod: 26,,, | Performed by: PATHOLOGY

## 2019-01-23 PROCEDURE — 3075F PR MOST RECENT SYSTOLIC BLOOD PRESS GE 130-139MM HG: ICD-10-PCS | Mod: CPTII,S$GLB,, | Performed by: INTERNAL MEDICINE

## 2019-01-23 PROCEDURE — 99999 PR PBB SHADOW E&M-EST. PATIENT-LVL III: ICD-10-PCS | Mod: PBBFAC,,, | Performed by: INTERNAL MEDICINE

## 2019-01-23 PROCEDURE — 99214 PR OFFICE/OUTPT VISIT, EST, LEVL IV, 30-39 MIN: ICD-10-PCS | Mod: S$GLB,,, | Performed by: INTERNAL MEDICINE

## 2019-01-23 PROCEDURE — 84165 PROTEIN E-PHORESIS SERUM: CPT | Mod: 26,,, | Performed by: PATHOLOGY

## 2019-01-23 PROCEDURE — 99999 PR PBB SHADOW E&M-EST. PATIENT-LVL III: CPT | Mod: PBBFAC,,, | Performed by: INTERNAL MEDICINE

## 2019-01-23 PROCEDURE — 1101F PR PT FALLS ASSESS DOC 0-1 FALLS W/OUT INJ PAST YR: ICD-10-PCS | Mod: CPTII,S$GLB,, | Performed by: INTERNAL MEDICINE

## 2019-01-23 PROCEDURE — 85025 COMPLETE CBC W/AUTO DIFF WBC: CPT

## 2019-01-23 PROCEDURE — 86334 IMMUNOFIX E-PHORESIS SERUM: CPT | Mod: 26,,, | Performed by: PATHOLOGY

## 2019-01-23 PROCEDURE — 36415 COLL VENOUS BLD VENIPUNCTURE: CPT

## 2019-01-23 PROCEDURE — 3078F DIAST BP <80 MM HG: CPT | Mod: CPTII,S$GLB,, | Performed by: INTERNAL MEDICINE

## 2019-01-23 PROCEDURE — 3075F SYST BP GE 130 - 139MM HG: CPT | Mod: CPTII,S$GLB,, | Performed by: INTERNAL MEDICINE

## 2019-01-23 PROCEDURE — 84165 PATHOLOGIST INTERPRETATION SPE: ICD-10-PCS | Mod: 26,,, | Performed by: PATHOLOGY

## 2019-01-23 PROCEDURE — 80053 COMPREHEN METABOLIC PANEL: CPT

## 2019-01-23 PROCEDURE — 83520 IMMUNOASSAY QUANT NOS NONAB: CPT | Mod: 59

## 2019-01-23 PROCEDURE — 84165 PROTEIN E-PHORESIS SERUM: CPT

## 2019-01-23 PROCEDURE — 86334 IMMUNOFIX E-PHORESIS SERUM: CPT

## 2019-01-23 PROCEDURE — 99214 OFFICE O/P EST MOD 30 MIN: CPT | Mod: S$GLB,,, | Performed by: INTERNAL MEDICINE

## 2019-01-23 RX ORDER — OXYCODONE HYDROCHLORIDE 10 MG/1
10 TABLET ORAL EVERY 6 HOURS PRN
Qty: 90 TABLET | Refills: 0 | Status: SHIPPED | OUTPATIENT
Start: 2019-01-23 | End: 2019-02-28

## 2019-01-23 RX ORDER — OXYCODONE HCL 20 MG/1
20 TABLET, FILM COATED, EXTENDED RELEASE ORAL EVERY 12 HOURS
Qty: 60 EACH | Refills: 0 | Status: SHIPPED | OUTPATIENT
Start: 2019-01-23 | End: 2019-02-28

## 2019-01-23 NOTE — PROGRESS NOTES
Subjective:       Patient ID: Familia Durbin Jr. is a 73 y.o. male.    Chief Complaint: Results; Multiple Myeloma; and Pain    HPI 73-year-old male history of multiple myeloma currently on maintenance therapy with Pomalyst 3 mg a day through [facility] days 1 through 21 of a 28 day cycle patient reports increasing pain involving his left chest last month ECOG status 2    Past Medical History:   Diagnosis Date    CAD (coronary artery disease)     galileaikart    Diabetes mellitus, type 2 1979    eye dr rocha    Hearing impaired     Hyperlipidemia     Hypertension     Lupus     Discoid    Multiple myeloma     Old MI (myocardial infarction) 2012    Tracheostomy tube present      Family History   Problem Relation Age of Onset    Diabetes Mother     Diabetes Father     Prostate cancer Father     Pancreatic cancer Sister     No Known Problems Brother     Diabetes Maternal Uncle     Diabetes Maternal Grandmother     No Known Problems Maternal Grandfather     No Known Problems Paternal Grandmother     No Known Problems Paternal Grandfather      Social History     Socioeconomic History    Marital status: Single     Spouse name: Not on file    Number of children: 9    Years of education: Not on file    Highest education level: Not on file   Social Needs    Financial resource strain: Not on file    Food insecurity - worry: Not on file    Food insecurity - inability: Not on file    Transportation needs - medical: Not on file    Transportation needs - non-medical: Not on file   Occupational History    Not on file   Tobacco Use    Smoking status: Never Smoker    Smokeless tobacco: Never Used   Substance and Sexual Activity    Alcohol use: No    Drug use: No    Sexual activity: Yes     Partners: Female   Other Topics Concern    Not on file   Social History Narrative    Not on file     Past Surgical History:   Procedure Laterality Date    CARDIAC SURGERY       COLONOSCOPY      CORONARY ARTERY BYPASS GRAFT      HAND SURGERY      KNEE SURGERY      TRACHEOSTOMY TUBE PLACEMENT      WRIST FUSION         Labs:  Lab Results   Component Value Date    WBC 3.03 (L) 01/23/2019    HGB 12.5 (L) 01/23/2019    HCT 37.4 (L) 01/23/2019     (H) 01/23/2019     01/23/2019     BMP  Lab Results   Component Value Date     10/23/2018    K 3.9 10/23/2018     10/23/2018    CO2 25 10/23/2018    BUN 18 10/23/2018    CREATININE 1.8 (H) 10/23/2018    CALCIUM 9.2 10/23/2018    ANIONGAP 8 10/23/2018    ESTGFRAFRICA 42 (A) 10/23/2018    EGFRNONAA 36 (A) 10/23/2018     Lab Results   Component Value Date    ALT 13 10/23/2018    AST 18 10/23/2018    ALKPHOS 50 (L) 10/23/2018    BILITOT 0.6 10/23/2018       Lab Results   Component Value Date    IRON 81 07/21/2016    TIBC 302 07/21/2016    FERRITIN 324 (H) 07/21/2016     No results found for: [id number]  No results found for: FOLATE  Lab Results   Component Value Date    TSH 0.683 01/22/2018         Review of Systems   Constitutional: Positive for activity change, appetite change and fatigue. Negative for chills, diaphoresis, fever and unexpected weight change.   HENT: Negative for congestion, dental problem, drooling, ear discharge, ear pain, facial swelling, hearing loss, mouth sores, nosebleeds, postnasal drip, rhinorrhea, sinus pressure, sneezing, sore throat, tinnitus, trouble swallowing and voice change.    Eyes: Negative for photophobia, pain, discharge, redness, itching and visual disturbance.   Respiratory: Negative for apnea, cough, choking, chest tightness, shortness of breath, wheezing and stridor.    Cardiovascular: Positive for chest pain. Negative for palpitations and leg swelling.   Gastrointestinal: Negative for abdominal distention, abdominal pain, anal bleeding, blood in stool, constipation, diarrhea, nausea, rectal pain and vomiting.   Endocrine: Negative for cold intolerance, heat intolerance, polydipsia,  polyphagia and polyuria.   Genitourinary: Negative for decreased urine volume, difficulty urinating, discharge, dysuria, enuresis, flank pain, frequency, genital sores, hematuria, penile pain, penile swelling, scrotal swelling, testicular pain and urgency.   Musculoskeletal: Negative for arthralgias, back pain, gait problem, joint swelling, myalgias, neck pain and neck stiffness.   Skin: Negative for color change, pallor, rash and wound.   Allergic/Immunologic: Negative for environmental allergies, food allergies and immunocompromised state.   Neurological: Positive for weakness. Negative for dizziness, tremors, seizures, syncope, facial asymmetry, speech difficulty, light-headedness, numbness and headaches.   Hematological: Negative for adenopathy. Does not bruise/bleed easily.   Psychiatric/Behavioral: Positive for dysphoric mood. Negative for agitation, behavioral problems, confusion, decreased concentration, hallucinations, self-injury, sleep disturbance and suicidal ideas. The patient is nervous/anxious. The patient is not hyperactive.        Objective:      Physical Exam   Constitutional: He is oriented to person, place, and time. He appears well-developed and well-nourished. He has a sickly appearance. He appears ill. He appears distressed.   HENT:   Head: Normocephalic.   Right Ear: External ear normal.   Left Ear: External ear normal.   Nose: Nose normal. Right sinus exhibits no maxillary sinus tenderness and no frontal sinus tenderness. Left sinus exhibits no maxillary sinus tenderness and no frontal sinus tenderness.   Mouth/Throat: Oropharynx is clear and moist. No oropharyngeal exudate.   Eyes: EOM and lids are normal. Pupils are equal, round, and reactive to light. Right eye exhibits no discharge. Left eye exhibits no discharge. Right conjunctiva is not injected. Right conjunctiva has no hemorrhage. Left conjunctiva is not injected. Left conjunctiva has no hemorrhage. No scleral icterus. Right eye  exhibits normal extraocular motion. Left eye exhibits normal extraocular motion.   Neck: Normal range of motion. Neck supple. No JVD present. No tracheal deviation present. No thyromegaly present.   Cardiovascular: Normal rate and regular rhythm.   Pulmonary/Chest: Effort normal. No stridor. No respiratory distress.   Abdominal: Soft. He exhibits no mass. There is no hepatosplenomegaly, splenomegaly or hepatomegaly. There is no tenderness.   Musculoskeletal: Normal range of motion. He exhibits no edema or tenderness.   Lymphadenopathy:        Head (right side): No posterior auricular and no occipital adenopathy present.        Head (left side): No posterior auricular and no occipital adenopathy present.     He has no cervical adenopathy.        Right cervical: No superficial cervical, no deep cervical and no posterior cervical adenopathy present.       Left cervical: No superficial cervical, no deep cervical and no posterior cervical adenopathy present.     He has no axillary adenopathy.        Right: No supraclavicular adenopathy present.        Left: No supraclavicular adenopathy present.   Neurological: He is alert and oriented to person, place, and time. He has normal strength. No cranial nerve deficit. Coordination normal.   Skin: Skin is dry. No rash noted. He is not diaphoretic. No cyanosis or erythema. Nails show no clubbing.   Psychiatric: His behavior is normal. Judgment and thought content normal. His mood appears anxious. Cognition and memory are normal. He exhibits a depressed mood.   Vitals reviewed.          Assessment:      1. Multiple myeloma in remission    2. Metastatic bone cancer    3. Calcification of abdominal aorta    4. Coronary artery disease of native artery of native heart with stable angina pectoris    5. Multiple myeloma not having achieved remission    6. Neoplasm of abdomen           Plan:   Increasing pain results her studies any pain in chest CT chest pelvis return for review  follow-up after scans are completed laboratory studies results pending today refill prescriptions chronic stable narcotic          Rodolfo Solo Jr, MD FACP

## 2019-01-24 LAB
ALBUMIN SERPL ELPH-MCNC: 3.79 G/DL
ALPHA1 GLOB SERPL ELPH-MCNC: 0.31 G/DL
ALPHA2 GLOB SERPL ELPH-MCNC: 0.73 G/DL
B-GLOBULIN SERPL ELPH-MCNC: 1.11 G/DL
GAMMA GLOB SERPL ELPH-MCNC: 2.06 G/DL
KAPPA LC SER QL IA: 10.11 MG/DL
KAPPA LC/LAMBDA SER IA: 2.16
LAMBDA LC SER QL IA: 4.68 MG/DL
PATHOLOGIST INTERPRETATION SPE: NORMAL
PROT SERPL-MCNC: 8 G/DL

## 2019-01-25 LAB
INTERPRETATION SERPL IFE-IMP: NORMAL
PATHOLOGIST INTERPRETATION IFE: NORMAL

## 2019-01-30 ENCOUNTER — TELEPHONE (OUTPATIENT)
Dept: RADIOLOGY | Facility: HOSPITAL | Age: 74
End: 2019-01-30

## 2019-01-31 ENCOUNTER — HOSPITAL ENCOUNTER (OUTPATIENT)
Dept: RADIOLOGY | Facility: HOSPITAL | Age: 74
Discharge: HOME | End: 2019-01-31
Attending: INTERNAL MEDICINE
Payer: MEDICARE

## 2019-01-31 ENCOUNTER — OFFICE VISIT (OUTPATIENT)
Dept: HEMATOLOGY/ONCOLOGY | Facility: CLINIC | Age: 74
End: 2019-01-31
Payer: MEDICARE

## 2019-01-31 VITALS
SYSTOLIC BLOOD PRESSURE: 160 MMHG | HEIGHT: 70 IN | OXYGEN SATURATION: 100 % | HEART RATE: 55 BPM | WEIGHT: 178.56 LBS | TEMPERATURE: 98 F | BODY MASS INDEX: 25.56 KG/M2 | DIASTOLIC BLOOD PRESSURE: 100 MMHG | RESPIRATION RATE: 18 BRPM

## 2019-01-31 DIAGNOSIS — D49.89 NEOPLASM OF ABDOMEN: ICD-10-CM

## 2019-01-31 DIAGNOSIS — C90.00 MULTIPLE MYELOMA NOT HAVING ACHIEVED REMISSION: Primary | ICD-10-CM

## 2019-01-31 PROCEDURE — 74177 CT ABD & PELVIS W/CONTRAST: CPT | Mod: 26,,, | Performed by: RADIOLOGY

## 2019-01-31 PROCEDURE — 99999 PR PBB SHADOW E&M-EST. PATIENT-LVL IV: ICD-10-PCS | Mod: PBBFAC,,, | Performed by: INTERNAL MEDICINE

## 2019-01-31 PROCEDURE — 3080F PR MOST RECENT DIASTOLIC BLOOD PRESSURE >= 90 MM HG: ICD-10-PCS | Mod: CPTII,S$GLB,, | Performed by: INTERNAL MEDICINE

## 2019-01-31 PROCEDURE — 71260 CT CHEST WITH CONTRAST: ICD-10-PCS | Mod: 26,,, | Performed by: RADIOLOGY

## 2019-01-31 PROCEDURE — 1101F PT FALLS ASSESS-DOCD LE1/YR: CPT | Mod: CPTII,S$GLB,, | Performed by: INTERNAL MEDICINE

## 2019-01-31 PROCEDURE — 99999 PR PBB SHADOW E&M-EST. PATIENT-LVL IV: CPT | Mod: PBBFAC,,, | Performed by: INTERNAL MEDICINE

## 2019-01-31 PROCEDURE — 74177 CT ABD & PELVIS W/CONTRAST: CPT | Mod: TC

## 2019-01-31 PROCEDURE — 99214 PR OFFICE/OUTPT VISIT, EST, LEVL IV, 30-39 MIN: ICD-10-PCS | Mod: S$GLB,,, | Performed by: INTERNAL MEDICINE

## 2019-01-31 PROCEDURE — 3077F SYST BP >= 140 MM HG: CPT | Mod: CPTII,S$GLB,, | Performed by: INTERNAL MEDICINE

## 2019-01-31 PROCEDURE — 74177 CT ABDOMEN PELVIS WITH CONTRAST: ICD-10-PCS | Mod: 26,,, | Performed by: RADIOLOGY

## 2019-01-31 PROCEDURE — 25500020 PHARM REV CODE 255: Performed by: INTERNAL MEDICINE

## 2019-01-31 PROCEDURE — 1101F PR PT FALLS ASSESS DOC 0-1 FALLS W/OUT INJ PAST YR: ICD-10-PCS | Mod: CPTII,S$GLB,, | Performed by: INTERNAL MEDICINE

## 2019-01-31 PROCEDURE — 99214 OFFICE O/P EST MOD 30 MIN: CPT | Mod: S$GLB,,, | Performed by: INTERNAL MEDICINE

## 2019-01-31 PROCEDURE — 71260 CT THORAX DX C+: CPT | Mod: TC

## 2019-01-31 PROCEDURE — 3077F PR MOST RECENT SYSTOLIC BLOOD PRESSURE >= 140 MM HG: ICD-10-PCS | Mod: CPTII,S$GLB,, | Performed by: INTERNAL MEDICINE

## 2019-01-31 PROCEDURE — 3080F DIAST BP >= 90 MM HG: CPT | Mod: CPTII,S$GLB,, | Performed by: INTERNAL MEDICINE

## 2019-01-31 PROCEDURE — 71260 CT THORAX DX C+: CPT | Mod: 26,,, | Performed by: RADIOLOGY

## 2019-01-31 RX ADMIN — IOHEXOL 75 ML: 350 INJECTION, SOLUTION INTRAVENOUS at 01:01

## 2019-01-31 RX ADMIN — IOHEXOL 16 ML: 350 INJECTION, SOLUTION INTRAVENOUS at 12:01

## 2019-01-31 NOTE — PROGRESS NOTES
Subjective:       Patient ID: Familia Durbin Jr. is a 73 y.o. male.    Chief Complaint: Follow-up; Results; Pain; and Multiple Myeloma    HPI 73-year-old male returns patient having severe pain involving his left chest wall patient returns after CT chest abdomen pelvis for review    Past Medical History:   Diagnosis Date    CAD (coronary artery disease)     tyree    Diabetes mellitus, type 2 1979    eye dr rocha    Hearing impaired     Hyperlipidemia     Hypertension     Lupus     Discoid    Multiple myeloma     Old MI (myocardial infarction) 2012    Tracheostomy tube present      Family History   Problem Relation Age of Onset    Diabetes Mother     Diabetes Father     Prostate cancer Father     Pancreatic cancer Sister     No Known Problems Brother     Diabetes Maternal Uncle     Diabetes Maternal Grandmother     No Known Problems Maternal Grandfather     No Known Problems Paternal Grandmother     No Known Problems Paternal Grandfather      Social History     Socioeconomic History    Marital status: Single     Spouse name: Not on file    Number of children: 9    Years of education: Not on file    Highest education level: Not on file   Social Needs    Financial resource strain: Not on file    Food insecurity - worry: Not on file    Food insecurity - inability: Not on file    Transportation needs - medical: Not on file    Transportation needs - non-medical: Not on file   Occupational History    Not on file   Tobacco Use    Smoking status: Never Smoker    Smokeless tobacco: Never Used   Substance and Sexual Activity    Alcohol use: No    Drug use: No    Sexual activity: Yes     Partners: Female   Other Topics Concern    Not on file   Social History Narrative    Not on file     Past Surgical History:   Procedure Laterality Date    CARDIAC SURGERY      COLONOSCOPY      CORONARY ARTERY BYPASS GRAFT      HAND SURGERY      KNEE SURGERY      TRACHEOSTOMY TUBE PLACEMENT       WRIST FUSION         Labs:  Lab Results   Component Value Date    WBC 3.03 (L) 01/23/2019    HGB 12.5 (L) 01/23/2019    HCT 37.4 (L) 01/23/2019     (H) 01/23/2019     01/23/2019     BMP  Lab Results   Component Value Date     01/23/2019    K 4.4 01/23/2019     01/23/2019    CO2 25 01/23/2019    BUN 23 01/23/2019    CREATININE 1.5 (H) 01/23/2019    CALCIUM 9.3 01/23/2019    ANIONGAP 8 01/23/2019    ESTGFRAFRICA 52 (A) 01/23/2019    EGFRNONAA 45 (A) 01/23/2019     Lab Results   Component Value Date    ALT 16 01/23/2019    AST 16 01/23/2019    ALKPHOS 59 01/23/2019    BILITOT 0.5 01/23/2019       Lab Results   Component Value Date    IRON 81 07/21/2016    TIBC 302 07/21/2016    FERRITIN 324 (H) 07/21/2016     No results found for: [id number]  No results found for: FOLATE  Lab Results   Component Value Date    TSH 0.683 01/22/2018         Review of Systems   Constitutional: Negative for activity change, appetite change, chills, diaphoresis, fatigue, fever and unexpected weight change.   HENT: Negative for congestion, dental problem, drooling, ear discharge, ear pain, facial swelling, hearing loss, mouth sores, nosebleeds, postnasal drip, rhinorrhea, sinus pressure, sneezing, sore throat, tinnitus, trouble swallowing and voice change.    Eyes: Negative for photophobia, pain, discharge, redness, itching and visual disturbance.   Respiratory: Negative for apnea, cough, choking, chest tightness, shortness of breath, wheezing and stridor.    Cardiovascular: Positive for chest pain. Negative for palpitations and leg swelling.   Gastrointestinal: Negative for abdominal distention, abdominal pain, anal bleeding, blood in stool, constipation, diarrhea, nausea, rectal pain and vomiting.   Endocrine: Negative for cold intolerance, heat intolerance, polydipsia, polyphagia and polyuria.   Genitourinary: Negative for decreased urine volume, difficulty urinating, discharge, dysuria, enuresis, flank pain,  frequency, genital sores, hematuria, penile pain, penile swelling, scrotal swelling, testicular pain and urgency.   Musculoskeletal: Negative for arthralgias, back pain, gait problem, joint swelling, myalgias, neck pain and neck stiffness.   Skin: Negative for color change, pallor, rash and wound.   Allergic/Immunologic: Negative for environmental allergies, food allergies and immunocompromised state.   Neurological: Negative for dizziness, tremors, seizures, syncope, facial asymmetry, speech difficulty, weakness, light-headedness, numbness and headaches.   Hematological: Negative for adenopathy. Does not bruise/bleed easily.   Psychiatric/Behavioral: Positive for dysphoric mood. Negative for agitation, behavioral problems, confusion, decreased concentration, hallucinations, self-injury, sleep disturbance and suicidal ideas. The patient is nervous/anxious. The patient is not hyperactive.        Objective:      Physical Exam   Constitutional: He is oriented to person, place, and time. He appears well-developed and well-nourished. He appears distressed.   HENT:   Head: Normocephalic.   Right Ear: External ear normal.   Left Ear: External ear normal.   Nose: Nose normal. Right sinus exhibits no maxillary sinus tenderness and no frontal sinus tenderness. Left sinus exhibits no maxillary sinus tenderness and no frontal sinus tenderness.   Mouth/Throat: Oropharynx is clear and moist. No oropharyngeal exudate.   Eyes: EOM and lids are normal. Pupils are equal, round, and reactive to light. Right eye exhibits no discharge. Left eye exhibits no discharge. Right conjunctiva is not injected. Right conjunctiva has no hemorrhage. Left conjunctiva is not injected. Left conjunctiva has no hemorrhage. No scleral icterus. Right eye exhibits normal extraocular motion. Left eye exhibits normal extraocular motion.   Neck: Normal range of motion. Neck supple. No JVD present. No tracheal deviation present. No thyromegaly present.    Cardiovascular: Normal rate and regular rhythm.   Pulmonary/Chest: Effort normal. No stridor. No respiratory distress.       Abdominal: Soft. He exhibits no mass. There is no hepatosplenomegaly, splenomegaly or hepatomegaly. There is no tenderness.   Musculoskeletal: Normal range of motion. He exhibits no edema or tenderness.   Lymphadenopathy:        Head (right side): No posterior auricular and no occipital adenopathy present.        Head (left side): No posterior auricular and no occipital adenopathy present.     He has no cervical adenopathy.        Right cervical: No superficial cervical, no deep cervical and no posterior cervical adenopathy present.       Left cervical: No superficial cervical, no deep cervical and no posterior cervical adenopathy present.     He has no axillary adenopathy.        Right: No supraclavicular adenopathy present.        Left: No supraclavicular adenopathy present.   Neurological: He is alert and oriented to person, place, and time. He has normal strength. No cranial nerve deficit. Coordination normal.   Skin: Skin is dry. No rash noted. He is not diaphoretic. No cyanosis or erythema. Nails show no clubbing.   Psychiatric: He has a normal mood and affect. His behavior is normal. Judgment and thought content normal. Cognition and memory are normal.   Vitals reviewed.          Assessment:      1. Multiple myeloma not having achieved remission    2. Metastatic bone cancer           Plan:     I reviewed the CT scan personally demonstrating that there is metastatic disease in ribs examination demonstrated reproducing pain by touching his lateral aspect of his left chest wall.  Will refer to Radiation Oncology for palliative radiation therapy.  Reviewed other CT findings very small nonspecific abnormalities will continue follow-up in lung no clear evidence of pneumonia.  At this point follow-up as detailed above    Rodolfo Solo Jr, MD FACP

## 2019-02-01 ENCOUNTER — TELEPHONE (OUTPATIENT)
Dept: RADIATION ONCOLOGY | Facility: CLINIC | Age: 74
End: 2019-02-01

## 2019-02-01 NOTE — TELEPHONE ENCOUNTER
Made call to schedule consult appt in radiation oncology. Patient stated he would call me back when he speaks with his daughters to make that appt. Gave contact information and phone number. Patient verbalized understanding.

## 2019-02-07 ENCOUNTER — TELEPHONE (OUTPATIENT)
Dept: RADIATION ONCOLOGY | Facility: CLINIC | Age: 74
End: 2019-02-07

## 2019-02-07 NOTE — TELEPHONE ENCOUNTER
Made call again in an attempt to schedule consult appt in radiation oncology. Spoke with patient's daughter who stated her father was being treated at [facility] and would not be making an appt right now. Daughter requested that medical records be sent to [facility] and I advised her to have them send over a release of information and records would then be provided. Daughter verbalized understanding.

## 2019-02-21 ENCOUNTER — OFFICE VISIT (OUTPATIENT)
Dept: OPHTHALMOLOGY | Facility: CLINIC | Age: 74
End: 2019-02-21
Payer: MEDICARE

## 2019-02-21 DIAGNOSIS — H18.30 CORNEAL EPITHELIAL DEFECT: Primary | ICD-10-CM

## 2019-02-21 DIAGNOSIS — H20.9 IRITIS OF RIGHT EYE: ICD-10-CM

## 2019-02-21 DIAGNOSIS — H54.61 VISION LOSS OF RIGHT EYE: ICD-10-CM

## 2019-02-21 DIAGNOSIS — B40.9 BLASTOMYCOSIS: ICD-10-CM

## 2019-02-21 DIAGNOSIS — Z96.1 PSEUDOPHAKIA OF BOTH EYES: ICD-10-CM

## 2019-02-21 DIAGNOSIS — E11.21 TYPE 2 DIABETES MELLITUS WITH DIABETIC NEPHROPATHY, WITHOUT LONG-TERM CURRENT USE OF INSULIN: ICD-10-CM

## 2019-02-21 PROCEDURE — 99999 PR PBB SHADOW E&M-EST. PATIENT-LVL II: CPT | Mod: PBBFAC,,, | Performed by: OPHTHALMOLOGY

## 2019-02-21 PROCEDURE — 92004 COMPRE OPH EXAM NEW PT 1/>: CPT | Mod: S$GLB,,, | Performed by: OPHTHALMOLOGY

## 2019-02-21 PROCEDURE — 99999 PR PBB SHADOW E&M-EST. PATIENT-LVL II: ICD-10-PCS | Mod: PBBFAC,,, | Performed by: OPHTHALMOLOGY

## 2019-02-21 PROCEDURE — 92004 PR EYE EXAM, NEW PATIENT,COMPREHESV: ICD-10-PCS | Mod: S$GLB,,, | Performed by: OPHTHALMOLOGY

## 2019-02-21 RX ORDER — PREDNISOLONE ACETATE 10 MG/ML
1 SUSPENSION/ DROPS OPHTHALMIC 3 TIMES DAILY
Qty: 10 ML | Refills: 3 | Status: SHIPPED | OUTPATIENT
Start: 2019-02-21 | End: 2019-09-16

## 2019-02-21 RX ORDER — ALPRAZOLAM 0.25 MG/1
TABLET ORAL
Qty: 1 TABLET | Refills: 0 | Status: SHIPPED | OUTPATIENT
Start: 2019-02-21 | End: 2019-07-16

## 2019-02-21 NOTE — PROGRESS NOTES
SUBJECTIVE:   Familia Durbin Jr. is a 73 y.o. male   Uncorrected distance visual acuity was 20/400 in the right eye and 20/25 in the left eye.   No chief complaint on file.       HPI:  HPI     The patient states he had a FB removed Friday 2/15/19 by  and he   went back yesterday and they told him they couldn't figure out why he   couldn't see out his right eye. The patient states he is here for a second   opinion on his right eye vision today. Pt has been using ciprofloxacin   drops     Seen by [facility] on 4/26/17    1. +DM  2. LUPUS  3. PCIOL OU  4. Refractive Error  DX w/ Multiple Myeloma 2016    Last edited by Rustam Lange MD on 2/21/2019 11:12 AM.   (History)        Assessment /Plan :  1. 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 Small infiltrate inferior OD with low grade iritis OD. Add PA 1 % OD tid and continue Ciloxan OD tid. Add lube OD prn   2. Iritis of right eye    3. Pseudophakia of both eyes    4. Type 2 diabetes mellitus with diabetic nephropathy, without long-term current use of insulin    5. Vision loss of right eye   Pt discovered lower vision OD 2 days ago when covering his eyes with the FB in OD and thought it might be related but also understands it may have been preexisting and just noticed when covering his eyes individually. MOCT and optic nerve look normal and no anterior segment pathology to explain vision loss OD.. Will refer to Dr Balderas Monday to evaluate retina for 20/300 OD and will need to be checked for RAPD and will order a MRI of orbit and brain to R/o other etiologies ie Multiple Myeloma.

## 2019-02-22 ENCOUNTER — TELEPHONE (OUTPATIENT)
Dept: OPHTHALMOLOGY | Facility: CLINIC | Age: 74
End: 2019-02-22

## 2019-02-22 NOTE — TELEPHONE ENCOUNTER
The patient has been notified of his appointment for blood work on Monday and his appointment scheduled for the MRI on 3/11/19.

## 2019-02-25 ENCOUNTER — TELEPHONE (OUTPATIENT)
Dept: OPHTHALMOLOGY | Facility: CLINIC | Age: 74
End: 2019-02-25

## 2019-02-25 ENCOUNTER — OFFICE VISIT (OUTPATIENT)
Dept: OPHTHALMOLOGY | Facility: CLINIC | Age: 74
End: 2019-02-25
Payer: MEDICARE

## 2019-02-25 ENCOUNTER — LAB VISIT (OUTPATIENT)
Dept: LAB | Facility: HOSPITAL | Age: 74
End: 2019-02-25
Attending: OPHTHALMOLOGY
Payer: MEDICARE

## 2019-02-25 DIAGNOSIS — C90.00 MULTIPLE MYELOMA NOT HAVING ACHIEVED REMISSION: ICD-10-CM

## 2019-02-25 DIAGNOSIS — H54.61 VISION LOSS OF RIGHT EYE: ICD-10-CM

## 2019-02-25 DIAGNOSIS — H54.61 VISION LOSS OF RIGHT EYE: Primary | ICD-10-CM

## 2019-02-25 DIAGNOSIS — H46.8 ISCHEMIC OPTIC NEURITIS OF RIGHT EYE: ICD-10-CM

## 2019-02-25 PROBLEM — H34.11 CENTRAL RETINAL ARTERY OCCLUSION OF RIGHT EYE: Status: ACTIVE | Noted: 2019-02-25

## 2019-02-25 LAB
CREAT SERPL-MCNC: 1.4 MG/DL
EST. GFR  (AFRICAN AMERICAN): 56.8 ML/MIN/1.73 M^2
EST. GFR  (NON AFRICAN AMERICAN): 49.1 ML/MIN/1.73 M^2

## 2019-02-25 PROCEDURE — 99999 PR PBB SHADOW E&M-EST. PATIENT-LVL II: ICD-10-PCS | Mod: PBBFAC,,, | Performed by: OPHTHALMOLOGY

## 2019-02-25 PROCEDURE — 92014 COMPRE OPH EXAM EST PT 1/>: CPT | Mod: S$GLB,,, | Performed by: OPHTHALMOLOGY

## 2019-02-25 PROCEDURE — 92235 FLUORESCEIN ANGRPH MLTIFRAME: CPT | Mod: S$GLB,,, | Performed by: OPHTHALMOLOGY

## 2019-02-25 PROCEDURE — 92014 PR EYE EXAM, EST PATIENT,COMPREHESV: ICD-10-PCS | Mod: S$GLB,,, | Performed by: OPHTHALMOLOGY

## 2019-02-25 PROCEDURE — 82565 ASSAY OF CREATININE: CPT

## 2019-02-25 PROCEDURE — 36415 COLL VENOUS BLD VENIPUNCTURE: CPT

## 2019-02-25 PROCEDURE — 92235 FLUORESCEIN ANGIOGRAPHY - OU - BOTH EYES: ICD-10-PCS | Mod: S$GLB,,, | Performed by: OPHTHALMOLOGY

## 2019-02-25 PROCEDURE — 99999 PR PBB SHADOW E&M-EST. PATIENT-LVL II: CPT | Mod: PBBFAC,,, | Performed by: OPHTHALMOLOGY

## 2019-02-25 RX ORDER — CIPROFLOXACIN HYDROCHLORIDE 3 MG/ML
SOLUTION/ DROPS OPHTHALMIC
COMMUNITY
Start: 2019-02-20 | End: 2019-07-16

## 2019-02-25 NOTE — PROGRESS NOTES
===============================  02/25/2019   Familia Durbin Jr.,   73 y.o. male   Last visit [facility]: :Visit date not found   [facility]  Uncorrected distance visual acuity was CF at 3' in the right eye and 20/25 in the left eye.  Tonometry     Tonometry (Applanation, 11:36 AM)       Right Left    Pressure 17 17               Not recorded         Not recorded        Chief Complaint   Patient presents with    Diabetes     RETINAL EVAL PER  [facility]    corneal epithelial defect     Ophthalmic Medications     Ophthalmic - Anti-inflammatory, Glucocorticoids Start End    prednisoLONE acetate (PRED FORTE) 1 % DrpS 2/21/2019     Sig: Place 1 drop into the right eye 3 (three) times daily.    Route: Right Eye         HPI     Diabetes      Additional comments: RETINAL EVAL PER DR. CPG              Comments     Seen by [facility] [place] on 4/26/17    1. +DM 1980  2. LUPUS-  3. PCIOL OU  4. Refractive Error  FB removed OD 2/15/19 @ [facility]          Last edited by Juliette Ram on 2/25/2019 11:27 AM. (History)          ________________  2/25/2019  Problem List Items Addressed This Visit        Eye/Vision problems    Vision loss of right eye - Primary  Agree with MRI, needs to move up to sooner date, Nahomi is working on this.  + strong red desaturation  75% relative light dif.  moct wnl  Strong vasculopathic disthiasis     Relevant Orders    Flourescein Angiography - OU - Both Eyes (Completed)    Ischemic optic neuritis of right eye  Fa nl  + strong red desaturation  75% relative light dif.       Other    Multiple myeloma    Metastatic bone cancer        Do a5 y   od [facility] +rel br  +red desat   +vasupathy  ivfa no   rtc next  NO GTT   checl puil check colro check vf   prsumtive od  isehn ic neutis    rtc Undilated, check pupil, do vf     .        ===========================

## 2019-02-25 NOTE — TELEPHONE ENCOUNTER
The patient was not able to make his appointment for the MRI on 3/11/19. He is rescheduled for the MRI on 3/8/19 at 1:00 at the [facility] on [address] on the first floor. The patient has been notified.

## 2019-02-28 ENCOUNTER — PATIENT MESSAGE (OUTPATIENT)
Dept: HEMATOLOGY/ONCOLOGY | Facility: CLINIC | Age: 74
End: 2019-02-28

## 2019-02-28 DIAGNOSIS — C90.00 MULTIPLE MYELOMA NOT HAVING ACHIEVED REMISSION: ICD-10-CM

## 2019-02-28 RX ORDER — OXYCODONE HYDROCHLORIDE 10 MG/1
10 TABLET ORAL EVERY 6 HOURS PRN
Qty: 90 TABLET | Refills: 0 | Status: SHIPPED | OUTPATIENT
Start: 2019-02-28 | End: 2019-04-09 | Stop reason: SDUPTHER

## 2019-02-28 RX ORDER — OXYCODONE HCL 20 MG/1
20 TABLET, FILM COATED, EXTENDED RELEASE ORAL EVERY 12 HOURS
Qty: 60 EACH | Refills: 0 | Status: SHIPPED | OUTPATIENT
Start: 2019-02-28 | End: 2019-04-09 | Stop reason: SDUPTHER

## 2019-03-01 ENCOUNTER — OFFICE VISIT (OUTPATIENT)
Dept: OPHTHALMOLOGY | Facility: CLINIC | Age: 74
End: 2019-03-01
Payer: MEDICARE

## 2019-03-01 DIAGNOSIS — H54.61 VISION LOSS OF RIGHT EYE: Primary | ICD-10-CM

## 2019-03-01 DIAGNOSIS — C41.9 MALIGNANT NEOPLASM OF BONE AND ARTICULAR CARTILAGE, UNSPECIFIED: ICD-10-CM

## 2019-03-01 DIAGNOSIS — H46.8 ISCHEMIC OPTIC NEURITIS OF RIGHT EYE: ICD-10-CM

## 2019-03-01 PROCEDURE — 92012 INTRM OPH EXAM EST PATIENT: CPT | Mod: S$GLB,,, | Performed by: OPHTHALMOLOGY

## 2019-03-01 PROCEDURE — 99999 PR PBB SHADOW E&M-EST. PATIENT-LVL II: ICD-10-PCS | Mod: PBBFAC,,, | Performed by: OPHTHALMOLOGY

## 2019-03-01 PROCEDURE — 92012 PR EYE EXAM, EST PATIENT,INTERMED: ICD-10-PCS | Mod: S$GLB,,, | Performed by: OPHTHALMOLOGY

## 2019-03-01 PROCEDURE — 99999 PR PBB SHADOW E&M-EST. PATIENT-LVL II: CPT | Mod: PBBFAC,,, | Performed by: OPHTHALMOLOGY

## 2019-03-01 NOTE — PROGRESS NOTES
"    ===============================  03/01/2019   Familia Durbin Jr.,   73 y.o. male   Last visit [facility]: :2/25/2019   [facility]:  Uncorrected distance visual acuity was 20/400 in the right eye and 20/20 in the left eye.   Not recorded         Not recorded         Not recorded        Chief Complaint   Patient presents with    vision loss of right eye     REV HVF/ CHECK PUPILS, pt states vision is no better od     Ophthalmic Medications     Ophthalmic - Anti-inflammatory, Glucocorticoids Start End    prednisoLONE acetate (PRED FORTE) 1 % DrpS 2/21/2019     Sig: Place 1 drop into the right eye 3 (three) times daily.    Route: Right Eye         HPI     vision loss of right eye      Additional comments: REV HVF/ CHECK PUPILS, pt states vision is no   better od              Comments     Seen by [facility] on 4/26/17    1. +DM 1980  2. LUPUS-  3. PCIOL OU  4. Refractive Error  FB removed OD 2/15/19 @ [facility]          Last edited by ECTOR Banuelos on 3/1/2019 11:34 AM. 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    Consider second opinion with Dr. Kowalski, wait for MRI results       ===========================    "

## 2019-03-08 ENCOUNTER — HOSPITAL ENCOUNTER (OUTPATIENT)
Dept: RADIOLOGY | Facility: HOSPITAL | Age: 74
Discharge: HOME | End: 2019-03-08
Attending: OPHTHALMOLOGY
Payer: MEDICARE

## 2019-03-08 DIAGNOSIS — H54.61 VISION LOSS OF RIGHT EYE: ICD-10-CM

## 2019-03-08 PROCEDURE — 70543 MRI ORBT/FAC/NCK W/O &W/DYE: CPT | Mod: TC

## 2019-03-08 PROCEDURE — 70553 MRI BRAIN STEM W/O & W/DYE: CPT | Mod: TC

## 2019-03-08 PROCEDURE — 25500020 PHARM REV CODE 255: Performed by: OPHTHALMOLOGY

## 2019-03-08 PROCEDURE — A9585 GADOBUTROL INJECTION: HCPCS | Performed by: OPHTHALMOLOGY

## 2019-03-08 RX ORDER — GADOBUTROL 604.72 MG/ML
8 INJECTION INTRAVENOUS
Status: COMPLETED | OUTPATIENT
Start: 2019-03-08 | End: 2019-03-08

## 2019-03-08 RX ADMIN — GADOBUTROL 8 ML: 604.72 INJECTION INTRAVENOUS at 02:03

## 2019-03-11 ENCOUNTER — TELEPHONE (OUTPATIENT)
Dept: OPHTHALMOLOGY | Facility: CLINIC | Age: 74
End: 2019-03-11

## 2019-03-11 NOTE — TELEPHONE ENCOUNTER
called pt spoke with daughter Ms. Ana Luisa Nichole to schedule an appointment with Dr. Kowalski per Dr. Balderas, she stated that he is in the [facility], will be getting out on Wednesday have our number will call us back to schedule by Wednesday.

## 2019-03-11 NOTE — TELEPHONE ENCOUNTER
----- Message from DONAL Balderas MD sent at 3/11/2019  1:16 PM CDT -----  Please contact patient to schedule.  I am texting Dr. Kowalski with details.  Thanks.

## 2019-03-26 ENCOUNTER — TELEPHONE (OUTPATIENT)
Dept: RADIOLOGY | Facility: HOSPITAL | Age: 74
End: 2019-03-26

## 2019-04-09 ENCOUNTER — OFFICE VISIT (OUTPATIENT)
Dept: HEMATOLOGY/ONCOLOGY | Facility: CLINIC | Age: 74
End: 2019-04-09
Payer: MEDICARE

## 2019-04-09 ENCOUNTER — HOSPITAL ENCOUNTER (OUTPATIENT)
Dept: RADIOLOGY | Facility: HOSPITAL | Age: 74
Discharge: HOME | End: 2019-04-09
Attending: NURSE PRACTITIONER
Payer: MEDICARE

## 2019-04-09 ENCOUNTER — OFFICE VISIT (OUTPATIENT)
Dept: INTERNAL MEDICINE | Facility: CLINIC | Age: 74
End: 2019-04-09
Payer: MEDICARE

## 2019-04-09 ENCOUNTER — TELEPHONE (OUTPATIENT)
Dept: OPHTHALMOLOGY | Facility: CLINIC | Age: 74
End: 2019-04-09

## 2019-04-09 ENCOUNTER — PATIENT MESSAGE (OUTPATIENT)
Dept: HEMATOLOGY/ONCOLOGY | Facility: CLINIC | Age: 74
End: 2019-04-09

## 2019-04-09 VITALS
HEART RATE: 50 BPM | RESPIRATION RATE: 18 BRPM | BODY MASS INDEX: 24.68 KG/M2 | TEMPERATURE: 98 F | OXYGEN SATURATION: 90 % | WEIGHT: 172.38 LBS | SYSTOLIC BLOOD PRESSURE: 108 MMHG | DIASTOLIC BLOOD PRESSURE: 53 MMHG | HEIGHT: 70 IN

## 2019-04-09 VITALS
TEMPERATURE: 97 F | HEART RATE: 74 BPM | DIASTOLIC BLOOD PRESSURE: 46 MMHG | BODY MASS INDEX: 24.59 KG/M2 | WEIGHT: 171.75 LBS | OXYGEN SATURATION: 99 % | SYSTOLIC BLOOD PRESSURE: 112 MMHG | HEIGHT: 70 IN

## 2019-04-09 DIAGNOSIS — M79.644 PAIN OF FINGER OF RIGHT HAND: ICD-10-CM

## 2019-04-09 DIAGNOSIS — Z79.4 TYPE 2 DIABETES MELLITUS WITH DIABETIC NEPHROPATHY, WITH LONG-TERM CURRENT USE OF INSULIN: Chronic | ICD-10-CM

## 2019-04-09 DIAGNOSIS — I25.118 CORONARY ARTERY DISEASE OF NATIVE ARTERY OF NATIVE HEART WITH STABLE ANGINA PECTORIS: Chronic | ICD-10-CM

## 2019-04-09 DIAGNOSIS — I10 ESSENTIAL HYPERTENSION: Chronic | ICD-10-CM

## 2019-04-09 DIAGNOSIS — Z09 HOSPITAL DISCHARGE FOLLOW-UP: Primary | ICD-10-CM

## 2019-04-09 DIAGNOSIS — E11.21 TYPE 2 DIABETES MELLITUS WITH DIABETIC NEPHROPATHY, WITH LONG-TERM CURRENT USE OF INSULIN: Chronic | ICD-10-CM

## 2019-04-09 DIAGNOSIS — E78.5 HYPERLIPIDEMIA, UNSPECIFIED HYPERLIPIDEMIA TYPE: Chronic | ICD-10-CM

## 2019-04-09 DIAGNOSIS — C90.00 MULTIPLE MYELOMA NOT HAVING ACHIEVED REMISSION: Primary | ICD-10-CM

## 2019-04-09 PROCEDURE — 3074F SYST BP LT 130 MM HG: CPT | Mod: CPTII,S$GLB,, | Performed by: NURSE PRACTITIONER

## 2019-04-09 PROCEDURE — 1101F PR PT FALLS ASSESS DOC 0-1 FALLS W/OUT INJ PAST YR: ICD-10-PCS | Mod: CPTII,S$GLB,, | Performed by: NURSE PRACTITIONER

## 2019-04-09 PROCEDURE — 99214 PR OFFICE/OUTPT VISIT, EST, LEVL IV, 30-39 MIN: ICD-10-PCS | Mod: S$GLB,,, | Performed by: NURSE PRACTITIONER

## 2019-04-09 PROCEDURE — 3074F SYST BP LT 130 MM HG: CPT | Mod: CPTII,S$GLB,, | Performed by: INTERNAL MEDICINE

## 2019-04-09 PROCEDURE — 99214 OFFICE O/P EST MOD 30 MIN: CPT | Mod: S$GLB,,, | Performed by: INTERNAL MEDICINE

## 2019-04-09 PROCEDURE — 3078F DIAST BP <80 MM HG: CPT | Mod: CPTII,S$GLB,, | Performed by: NURSE PRACTITIONER

## 2019-04-09 PROCEDURE — 73140 XR FINGER 2 OR MORE VIEWS LEFT: ICD-10-PCS | Mod: 26,F3,, | Performed by: RADIOLOGY

## 2019-04-09 PROCEDURE — 99214 PR OFFICE/OUTPT VISIT, EST, LEVL IV, 30-39 MIN: ICD-10-PCS | Mod: S$GLB,,, | Performed by: INTERNAL MEDICINE

## 2019-04-09 PROCEDURE — 1101F PR PT FALLS ASSESS DOC 0-1 FALLS W/OUT INJ PAST YR: ICD-10-PCS | Mod: CPTII,S$GLB,, | Performed by: INTERNAL MEDICINE

## 2019-04-09 PROCEDURE — 1101F PT FALLS ASSESS-DOCD LE1/YR: CPT | Mod: CPTII,S$GLB,, | Performed by: NURSE PRACTITIONER

## 2019-04-09 PROCEDURE — 99999 PR PBB SHADOW E&M-EST. PATIENT-LVL III: ICD-10-PCS | Mod: PBBFAC,,, | Performed by: INTERNAL MEDICINE

## 2019-04-09 PROCEDURE — 73140 X-RAY EXAM OF FINGER(S): CPT | Mod: TC,LT

## 2019-04-09 PROCEDURE — 3044F PR MOST RECENT HEMOGLOBIN A1C LEVEL <7.0%: ICD-10-PCS | Mod: CPTII,S$GLB,, | Performed by: NURSE PRACTITIONER

## 2019-04-09 PROCEDURE — 3078F DIAST BP <80 MM HG: CPT | Mod: CPTII,S$GLB,, | Performed by: INTERNAL MEDICINE

## 2019-04-09 PROCEDURE — 3078F PR MOST RECENT DIASTOLIC BLOOD PRESSURE < 80 MM HG: ICD-10-PCS | Mod: CPTII,S$GLB,, | Performed by: INTERNAL MEDICINE

## 2019-04-09 PROCEDURE — 99999 PR PBB SHADOW E&M-EST. PATIENT-LVL V: CPT | Mod: PBBFAC,,, | Performed by: NURSE PRACTITIONER

## 2019-04-09 PROCEDURE — 3074F PR MOST RECENT SYSTOLIC BLOOD PRESSURE < 130 MM HG: ICD-10-PCS | Mod: CPTII,S$GLB,, | Performed by: NURSE PRACTITIONER

## 2019-04-09 PROCEDURE — 3044F HG A1C LEVEL LT 7.0%: CPT | Mod: CPTII,S$GLB,, | Performed by: NURSE PRACTITIONER

## 2019-04-09 PROCEDURE — 3078F PR MOST RECENT DIASTOLIC BLOOD PRESSURE < 80 MM HG: ICD-10-PCS | Mod: CPTII,S$GLB,, | Performed by: NURSE PRACTITIONER

## 2019-04-09 PROCEDURE — 99214 OFFICE O/P EST MOD 30 MIN: CPT | Mod: S$GLB,,, | Performed by: NURSE PRACTITIONER

## 2019-04-09 PROCEDURE — 73140 X-RAY EXAM OF FINGER(S): CPT | Mod: 26,F3,, | Performed by: RADIOLOGY

## 2019-04-09 PROCEDURE — 3074F PR MOST RECENT SYSTOLIC BLOOD PRESSURE < 130 MM HG: ICD-10-PCS | Mod: CPTII,S$GLB,, | Performed by: INTERNAL MEDICINE

## 2019-04-09 PROCEDURE — 99999 PR PBB SHADOW E&M-EST. PATIENT-LVL V: ICD-10-PCS | Mod: PBBFAC,,, | Performed by: NURSE PRACTITIONER

## 2019-04-09 PROCEDURE — 1101F PT FALLS ASSESS-DOCD LE1/YR: CPT | Mod: CPTII,S$GLB,, | Performed by: INTERNAL MEDICINE

## 2019-04-09 PROCEDURE — 99999 PR PBB SHADOW E&M-EST. PATIENT-LVL III: CPT | Mod: PBBFAC,,, | Performed by: INTERNAL MEDICINE

## 2019-04-09 RX ORDER — OXYCODONE HYDROCHLORIDE 10 MG/1
10 TABLET ORAL EVERY 6 HOURS PRN
Qty: 90 TABLET | Refills: 0 | Status: SHIPPED | OUTPATIENT
Start: 2019-04-09 | End: 2019-05-10

## 2019-04-09 RX ORDER — TRIAMCINOLONE ACETONIDE 1 MG/G
CREAM TOPICAL 2 TIMES DAILY
Qty: 28.4 G | Refills: 1 | Status: SHIPPED | OUTPATIENT
Start: 2019-04-09

## 2019-04-09 RX ORDER — FLAXSEED OIL 1000 MG
1 CAPSULE ORAL DAILY PRN
COMMUNITY

## 2019-04-09 RX ORDER — OXYCODONE HCL 20 MG/1
20 TABLET, FILM COATED, EXTENDED RELEASE ORAL EVERY 12 HOURS
Qty: 60 EACH | Refills: 0 | Status: SHIPPED | OUTPATIENT
Start: 2019-04-09 | End: 2019-05-10

## 2019-04-09 RX ORDER — GINGER ROOT 550 MG
1 CAPSULE ORAL
COMMUNITY

## 2019-04-09 NOTE — PROGRESS NOTES
Subjective:       Patient ID: Familia Durbin Jr. is a 73 y.o. male.    Chief Complaint: Follow-up (HOSP)    Patient presents with for a hospital follow up.   Was admitted on 04/01/2019 at [facility] for chest pain.  Reports having some indigestion.  Had LHC.  Was told no stents placed.  Symptoms improved.  Denies shortness of breath or chest discomfort.    Cardiologist: Dr. Valles- 04/18/2019 (follow up appointment).  Reports some pain and stiffness to the right 4 th finger.  Denies any injury or trauma.  Sometimes as decrease in ROM.    Review of Systems   Constitutional: Negative for chills and fatigue.   Respiratory: Negative for cough and shortness of breath.    Cardiovascular: Negative for chest pain.   Musculoskeletal: Positive for arthralgias (right 4th finger ).   Skin: Negative for color change and wound.   Psychiatric/Behavioral: Negative for agitation and confusion.       Objective:      Physical Exam   Constitutional: He is oriented to person, place, and time. Vital signs are normal. He appears well-developed and well-nourished.   HENT:   Head: Normocephalic and atraumatic.   Neck: Normal range of motion.   Cardiovascular: Normal rate and regular rhythm.   Pulmonary/Chest: Effort normal and breath sounds normal.   Musculoskeletal:        Hands:  Decrease ROM.  No redness.  Mild discomfort.    Neurological: He is alert and oriented to person, place, and time.   Skin: Skin is warm.   Psychiatric: His behavior is normal.       Assessment:       1. Hospital discharge follow-up    2. Coronary artery disease of native artery of native heart with stable angina pectoris    3. Type 2 diabetes mellitus with diabetic nephropathy, with long-term current use of insulin    4. Hyperlipidemia, unspecified hyperlipidemia type    5. Essential hypertension    6. Pain of finger of right hand        Plan:         Hospital discharge follow-up    Coronary artery disease of native artery of native heart with stable angina  pectoris    Type 2 diabetes mellitus with diabetic nephropathy, with long-term current use of insulin  -     Hemoglobin A1c; Future; Expected date: 04/09/2019  -     Lipid panel; Future; Expected date: 04/09/2019    Hyperlipidemia, unspecified hyperlipidemia type  -     Lipid panel; Future; Expected date: 04/09/2019    Essential hypertension    Pain of finger of right hand  -     X-Ray Finger 2 or More Views Left; Future; Expected date: 04/09/2019    Other orders  -     triamcinolone acetonide 0.1% (KENALOG) 0.1 % cream; Apply topically 2 (two) times daily. For two weeks  Dispense: 28.4 g; Refill: 1        X-ray today. Will schedule labs and follow up with Dr. Elkins in 3 months.  Follow up sooner if needed

## 2019-04-09 NOTE — PROGRESS NOTES
Subjective:       Patient ID: Familia Durbin Jr. is a 73 y.o. male.    Chief Complaint: Follow-up; Results; and Multiple Myeloma    HPI 73-year-old male history of multiple myeloma currently being and pains on treatment at [facility].  The patient is here for chronic stable narcotic prescriptions for metastatic disease to bone ECOG status 2    Past Medical History:   Diagnosis Date    CAD (coronary artery disease)     [email or web address]    Diabetes mellitus, type 2 1979    eye dr rocha    Hearing impaired     Hyperlipidemia     Hypertension     Lupus     Discoid    Multiple myeloma     Old MI (myocardial infarction) 2012    Tracheostomy tube present      Family History   Problem Relation Age of Onset    Diabetes Mother     Diabetes Father     Prostate cancer Father     Pancreatic cancer Sister     No Known Problems Brother     Diabetes Maternal Uncle     Diabetes Maternal Grandmother     No Known Problems Maternal Grandfather     No Known Problems Paternal Grandmother     No Known Problems Paternal Grandfather      Social History     Socioeconomic History    Marital status: Single     Spouse name: Not on file    Number of children: 9    Years of education: Not on file    Highest education level: Not on file   Occupational History    Not on file   Social Needs    Financial resource strain: Not on file    Food insecurity:     Worry: Not on file     Inability: Not on file    Transportation needs:     Medical: Not on file     Non-medical: Not on file   Tobacco Use    Smoking status: Never Smoker    Smokeless tobacco: Never Used   Substance and Sexual Activity    Alcohol use: No    Drug use: No    Sexual activity: Yes     Partners: Female   Lifestyle    Physical activity:     Days per week: Not on file     Minutes per session: Not on file    Stress: Not on file   Relationships    Social connections:     Talks on phone: Not on file     Gets together: Not on file      Attends Jew service: Not on file     Active member of club or organization: Not on file     Attends meetings of clubs or organizations: Not on file     Relationship status: Not on file   Other Topics Concern    Not on file   Social History Narrative    Not on file     Past Surgical History:   Procedure Laterality Date    CARDIAC SURGERY      CATARACT EXTRACTION      COLONOSCOPY      CORONARY ARTERY BYPASS GRAFT      HAND SURGERY      KNEE SURGERY      TRACHEOSTOMY TUBE PLACEMENT      WRIST FUSION         Labs:  Lab Results   Component Value Date    WBC 3.03 (L) 01/23/2019    HGB 12.5 (L) 01/23/2019    HCT 37.4 (L) 01/23/2019     (H) 01/23/2019     01/23/2019     BMP  Lab Results   Component Value Date     01/23/2019    K 4.4 01/23/2019     01/23/2019    CO2 25 01/23/2019    BUN 23 01/23/2019    CREATININE 1.4 02/25/2019    CALCIUM 9.3 01/23/2019    ANIONGAP 8 01/23/2019    ESTGFRAFRICA 56.8 (A) 02/25/2019    EGFRNONAA 49.1 (A) 02/25/2019     Lab Results   Component Value Date    ALT 16 01/23/2019    AST 16 01/23/2019    ALKPHOS 59 01/23/2019    BILITOT 0.5 01/23/2019       Lab Results   Component Value Date    IRON 81 07/21/2016    TIBC 302 07/21/2016    FERRITIN 324 (H) 07/21/2016     No results found for: [id number]  No results found for: FOLATE  Lab Results   Component Value Date    TSH 0.683 01/22/2018         Review of Systems   Constitutional: Positive for activity change, appetite change and fatigue. Negative for chills, diaphoresis, fever and unexpected weight change.   HENT: Negative for congestion, dental problem, drooling, ear discharge, ear pain, facial swelling, hearing loss, mouth sores, nosebleeds, postnasal drip, rhinorrhea, sinus pressure, sneezing, sore throat, tinnitus, trouble swallowing and voice change.    Eyes: Positive for visual disturbance. Negative for photophobia, pain, discharge, redness and itching.   Respiratory: Negative for apnea, cough,  choking, chest tightness, shortness of breath, wheezing and stridor.    Cardiovascular: Negative for chest pain, palpitations and leg swelling.   Gastrointestinal: Negative for abdominal distention, abdominal pain, anal bleeding, blood in stool, constipation, diarrhea, nausea, rectal pain and vomiting.   Endocrine: Negative for cold intolerance, heat intolerance, polydipsia, polyphagia and polyuria.   Genitourinary: Negative for decreased urine volume, difficulty urinating, discharge, dysuria, enuresis, flank pain, frequency, genital sores, hematuria, penile pain, penile swelling, scrotal swelling, testicular pain and urgency.   Musculoskeletal: Positive for arthralgias, back pain and myalgias. Negative for gait problem, joint swelling, neck pain and neck stiffness.   Skin: Negative for color change, pallor, rash and wound.   Allergic/Immunologic: Negative for environmental allergies, food allergies and immunocompromised state.   Neurological: Positive for weakness. Negative for dizziness, tremors, seizures, syncope, facial asymmetry, speech difficulty, light-headedness, numbness and headaches.   Hematological: Negative for adenopathy. Does not bruise/bleed easily.   Psychiatric/Behavioral: Positive for dysphoric mood. Negative for agitation, behavioral problems, confusion, decreased concentration, hallucinations, self-injury, sleep disturbance and suicidal ideas. The patient is nervous/anxious. The patient is not hyperactive.        Objective:      Physical Exam   Constitutional: He is oriented to person, place, and time. He appears well-developed and well-nourished. He appears distressed.   HENT:   Head: Normocephalic.   Right Ear: External ear normal.   Left Ear: External ear normal.   Nose: Nose normal. Right sinus exhibits no maxillary sinus tenderness and no frontal sinus tenderness. Left sinus exhibits no maxillary sinus tenderness and no frontal sinus tenderness.   Mouth/Throat: Oropharynx is clear and  moist. No oropharyngeal exudate.   Eyes: Pupils are equal, round, and reactive to light. EOM and lids are normal. Right eye exhibits no discharge. Left eye exhibits no discharge. Right conjunctiva is not injected. Right conjunctiva has no hemorrhage. Left conjunctiva is not injected. Left conjunctiva has no hemorrhage. No scleral icterus. Right eye exhibits normal extraocular motion. Left eye exhibits normal extraocular motion.   Neck: Normal range of motion. Neck supple. No JVD present. No tracheal deviation present. No thyromegaly present.   Cardiovascular: Normal rate and regular rhythm.   Pulmonary/Chest: Effort normal. No stridor. No respiratory distress.   Abdominal: Soft. He exhibits no mass. There is no hepatosplenomegaly, splenomegaly or hepatomegaly. There is no tenderness.   Musculoskeletal: Normal range of motion. He exhibits no edema or tenderness.   Lymphadenopathy:        Head (right side): No posterior auricular and no occipital adenopathy present.        Head (left side): No posterior auricular and no occipital adenopathy present.     He has no cervical adenopathy.        Right cervical: No superficial cervical, no deep cervical and no posterior cervical adenopathy present.       Left cervical: No superficial cervical, no deep cervical and no posterior cervical adenopathy present.     He has no axillary adenopathy.        Right: No supraclavicular adenopathy present.        Left: No supraclavicular adenopathy present.   Neurological: He is alert and oriented to person, place, and time. He has normal strength. No cranial nerve deficit. Coordination normal.   Skin: Skin is dry. No rash noted. He is not diaphoretic. No cyanosis or erythema. Nails show no clubbing.   Psychiatric: His behavior is normal. Judgment and thought content normal. His mood appears anxious. Cognition and memory are normal. He exhibits a depressed mood.   Vitals reviewed.          Assessment:      1. Multiple myeloma not having  achieved remission    2. Metastatic bone cancer           Plan:     Last seen in January of told I do need to see every time when prescriptions are written for narcotics.  State  M checked.  Laboratory studies drawn today again in 6 weeks.  Prescription sent [facility] for review and distribution        Rodolfo Solo Jr, MD FACP

## 2019-04-09 NOTE — TELEPHONE ENCOUNTER
----- Message from Roberta Hendricks sent at 4/9/2019  1:07 PM CDT -----  Contact: pt  Patient wants to schedule appt for MRI f/u. Please call pt back.

## 2019-05-10 DIAGNOSIS — C90.00 MULTIPLE MYELOMA NOT HAVING ACHIEVED REMISSION: ICD-10-CM

## 2019-05-10 RX ORDER — OXYCODONE HCL 20 MG/1
20 TABLET, FILM COATED, EXTENDED RELEASE ORAL EVERY 12 HOURS
Qty: 60 EACH | Refills: 0 | Status: SHIPPED | OUTPATIENT
Start: 2019-05-10 | End: 2019-06-13 | Stop reason: SDUPTHER

## 2019-05-10 RX ORDER — OXYCODONE HYDROCHLORIDE 10 MG/1
10 TABLET ORAL EVERY 6 HOURS PRN
Qty: 90 TABLET | Refills: 0 | Status: SHIPPED | OUTPATIENT
Start: 2019-05-10 | End: 2019-06-06

## 2019-05-27 ENCOUNTER — TELEPHONE (OUTPATIENT)
Dept: HEMATOLOGY/ONCOLOGY | Facility: CLINIC | Age: 74
End: 2019-05-27

## 2019-05-27 NOTE — TELEPHONE ENCOUNTER
Attempted to contact patient X2 about missed appointment today with Dr. Solo. No answer, left voicemail with callback number.

## 2019-06-06 DIAGNOSIS — C90.00 MULTIPLE MYELOMA NOT HAVING ACHIEVED REMISSION: ICD-10-CM

## 2019-06-06 RX ORDER — OXYCODONE HYDROCHLORIDE 10 MG/1
10 TABLET ORAL EVERY 6 HOURS PRN
Qty: 90 TABLET | Refills: 0 | Status: SHIPPED | OUTPATIENT
Start: 2019-06-06 | End: 2019-06-13 | Stop reason: SDUPTHER

## 2019-06-06 RX ORDER — OXYCODONE HCL 20 MG/1
20 TABLET, FILM COATED, EXTENDED RELEASE ORAL EVERY 12 HOURS
Qty: 60 EACH | Refills: 0 | OUTPATIENT
Start: 2019-06-06

## 2019-06-13 ENCOUNTER — OFFICE VISIT (OUTPATIENT)
Dept: HEMATOLOGY/ONCOLOGY | Facility: CLINIC | Age: 74
End: 2019-06-13
Payer: MEDICARE

## 2019-06-13 ENCOUNTER — LAB VISIT (OUTPATIENT)
Dept: LAB | Facility: HOSPITAL | Age: 74
End: 2019-06-13
Attending: INTERNAL MEDICINE
Payer: MEDICARE

## 2019-06-13 VITALS
RESPIRATION RATE: 18 BRPM | OXYGEN SATURATION: 100 % | BODY MASS INDEX: 24.55 KG/M2 | DIASTOLIC BLOOD PRESSURE: 53 MMHG | TEMPERATURE: 97 F | WEIGHT: 171.5 LBS | HEIGHT: 70 IN | HEART RATE: 61 BPM | SYSTOLIC BLOOD PRESSURE: 112 MMHG

## 2019-06-13 DIAGNOSIS — C90.00 MULTIPLE MYELOMA, REMISSION STATUS UNSPECIFIED: Primary | ICD-10-CM

## 2019-06-13 DIAGNOSIS — C90.00 MULTIPLE MYELOMA NOT HAVING ACHIEVED REMISSION: ICD-10-CM

## 2019-06-13 LAB
ALBUMIN SERPL BCP-MCNC: 3.5 G/DL (ref 3.5–5.2)
ALP SERPL-CCNC: 60 U/L (ref 55–135)
ALT SERPL W/O P-5'-P-CCNC: 15 U/L (ref 10–44)
ANION GAP SERPL CALC-SCNC: 9 MMOL/L (ref 8–16)
AST SERPL-CCNC: 18 U/L (ref 10–40)
BASOPHILS # BLD AUTO: 0.03 K/UL (ref 0–0.2)
BASOPHILS NFR BLD: 1 % (ref 0–1.9)
BILIRUB SERPL-MCNC: 0.5 MG/DL (ref 0.1–1)
BUN SERPL-MCNC: 17 MG/DL (ref 8–23)
CALCIUM SERPL-MCNC: 9.2 MG/DL (ref 8.7–10.5)
CHLORIDE SERPL-SCNC: 107 MMOL/L (ref 95–110)
CO2 SERPL-SCNC: 23 MMOL/L (ref 23–29)
CREAT SERPL-MCNC: 1.5 MG/DL (ref 0.5–1.4)
DIFFERENTIAL METHOD: ABNORMAL
EOSINOPHIL # BLD AUTO: 0.3 K/UL (ref 0–0.5)
EOSINOPHIL NFR BLD: 8.7 % (ref 0–8)
ERYTHROCYTE [DISTWIDTH] IN BLOOD BY AUTOMATED COUNT: 15.2 % (ref 11.5–14.5)
EST. GFR  (AFRICAN AMERICAN): 52 ML/MIN/1.73 M^2
EST. GFR  (NON AFRICAN AMERICAN): 45 ML/MIN/1.73 M^2
GLUCOSE SERPL-MCNC: 108 MG/DL (ref 70–110)
HCT VFR BLD AUTO: 33.6 % (ref 40–54)
HGB BLD-MCNC: 10.8 G/DL (ref 14–18)
IMM GRANULOCYTES # BLD AUTO: 0.01 K/UL (ref 0–0.04)
IMM GRANULOCYTES NFR BLD AUTO: 0.3 % (ref 0–0.5)
LYMPHOCYTES # BLD AUTO: 1 K/UL (ref 1–4.8)
LYMPHOCYTES NFR BLD: 34.8 % (ref 18–48)
MCH RBC QN AUTO: 34 PG (ref 27–31)
MCHC RBC AUTO-ENTMCNC: 32.1 G/DL (ref 32–36)
MCV RBC AUTO: 106 FL (ref 82–98)
MONOCYTES # BLD AUTO: 0.5 K/UL (ref 0.3–1)
MONOCYTES NFR BLD: 18.1 % (ref 4–15)
NEUTROPHILS # BLD AUTO: 1.1 K/UL (ref 1.8–7.7)
NEUTROPHILS NFR BLD: 37.4 % (ref 38–73)
NRBC BLD-RTO: 0 /100 WBC
PLATELET # BLD AUTO: 149 K/UL (ref 150–350)
PMV BLD AUTO: 9.9 FL (ref 9.2–12.9)
POTASSIUM SERPL-SCNC: 3.8 MMOL/L (ref 3.5–5.1)
PROT SERPL-MCNC: 8.2 G/DL (ref 6–8.4)
RBC # BLD AUTO: 3.18 M/UL (ref 4.6–6.2)
SODIUM SERPL-SCNC: 139 MMOL/L (ref 136–145)
WBC # BLD AUTO: 2.87 K/UL (ref 3.9–12.7)

## 2019-06-13 PROCEDURE — 36415 COLL VENOUS BLD VENIPUNCTURE: CPT

## 2019-06-13 PROCEDURE — 85025 COMPLETE CBC W/AUTO DIFF WBC: CPT

## 2019-06-13 PROCEDURE — 3078F DIAST BP <80 MM HG: CPT | Mod: CPTII,S$GLB,, | Performed by: INTERNAL MEDICINE

## 2019-06-13 PROCEDURE — 3078F PR MOST RECENT DIASTOLIC BLOOD PRESSURE < 80 MM HG: ICD-10-PCS | Mod: CPTII,S$GLB,, | Performed by: INTERNAL MEDICINE

## 2019-06-13 PROCEDURE — 3074F SYST BP LT 130 MM HG: CPT | Mod: CPTII,S$GLB,, | Performed by: INTERNAL MEDICINE

## 2019-06-13 PROCEDURE — 82232 ASSAY OF BETA-2 PROTEIN: CPT

## 2019-06-13 PROCEDURE — 80053 COMPREHEN METABOLIC PANEL: CPT

## 2019-06-13 PROCEDURE — 99214 OFFICE O/P EST MOD 30 MIN: CPT | Mod: S$GLB,,, | Performed by: INTERNAL MEDICINE

## 2019-06-13 PROCEDURE — 99999 PR PBB SHADOW E&M-EST. PATIENT-LVL III: ICD-10-PCS | Mod: PBBFAC,,, | Performed by: INTERNAL MEDICINE

## 2019-06-13 PROCEDURE — 84165 PROTEIN E-PHORESIS SERUM: CPT

## 2019-06-13 PROCEDURE — 1101F PR PT FALLS ASSESS DOC 0-1 FALLS W/OUT INJ PAST YR: ICD-10-PCS | Mod: CPTII,S$GLB,, | Performed by: INTERNAL MEDICINE

## 2019-06-13 PROCEDURE — 1101F PT FALLS ASSESS-DOCD LE1/YR: CPT | Mod: CPTII,S$GLB,, | Performed by: INTERNAL MEDICINE

## 2019-06-13 PROCEDURE — 3074F PR MOST RECENT SYSTOLIC BLOOD PRESSURE < 130 MM HG: ICD-10-PCS | Mod: CPTII,S$GLB,, | Performed by: INTERNAL MEDICINE

## 2019-06-13 PROCEDURE — 84165 PATHOLOGIST INTERPRETATION SPE: ICD-10-PCS | Mod: 26,,, | Performed by: PATHOLOGY

## 2019-06-13 PROCEDURE — 83520 IMMUNOASSAY QUANT NOS NONAB: CPT | Mod: 59

## 2019-06-13 PROCEDURE — 99999 PR PBB SHADOW E&M-EST. PATIENT-LVL III: CPT | Mod: PBBFAC,,, | Performed by: INTERNAL MEDICINE

## 2019-06-13 PROCEDURE — 84165 PROTEIN E-PHORESIS SERUM: CPT | Mod: 26,,, | Performed by: PATHOLOGY

## 2019-06-13 PROCEDURE — 99214 PR OFFICE/OUTPT VISIT, EST, LEVL IV, 30-39 MIN: ICD-10-PCS | Mod: S$GLB,,, | Performed by: INTERNAL MEDICINE

## 2019-06-13 RX ORDER — OXYCODONE HYDROCHLORIDE 10 MG/1
10 TABLET ORAL EVERY 6 HOURS PRN
Qty: 90 TABLET | Refills: 0 | Status: SHIPPED | OUTPATIENT
Start: 2019-06-13 | End: 2019-07-12 | Stop reason: SDUPTHER

## 2019-06-13 RX ORDER — OXYCODONE HCL 20 MG/1
20 TABLET, FILM COATED, EXTENDED RELEASE ORAL EVERY 12 HOURS
Qty: 60 EACH | Refills: 0 | Status: SHIPPED | OUTPATIENT
Start: 2019-06-13 | End: 2019-07-11

## 2019-06-13 NOTE — PROGRESS NOTES
Subjective:       Patient ID: Familia Durbin Jr. is a 73 y.o. male.    Chief Complaint: Follow-up; Results; and Multiple Myeloma    HPI 73-year-old male history of multiple myeloma being treated at [facility] currently on Pomalyst 3 mg days 1 through 21 on a 28 day cycle patient's reported to be in remission has chronic pain syndrome secondary to metastatic disease to bone    Past Medical History:   Diagnosis Date    CAD (coronary artery disease)     [email or web address]    Diabetes mellitus, type 2 1979    eye dr rocha    Hearing impaired     Hyperlipidemia     Hypertension     Lupus     Discoid    Multiple myeloma     Old MI (myocardial infarction) 2012    Tracheostomy tube present      Family History   Problem Relation Age of Onset    Diabetes Mother     Diabetes Father     Prostate cancer Father     Pancreatic cancer Sister     No Known Problems Brother     Diabetes Maternal Uncle     Diabetes Maternal Grandmother     No Known Problems Maternal Grandfather     No Known Problems Paternal Grandmother     No Known Problems Paternal Grandfather      Social History     Socioeconomic History    Marital status: Single     Spouse name: Not on file    Number of children: 9    Years of education: Not on file    Highest education level: Not on file   Occupational History    Not on file   Social Needs    Financial resource strain: Not on file    Food insecurity:     Worry: Not on file     Inability: Not on file    Transportation needs:     Medical: Not on file     Non-medical: Not on file   Tobacco Use    Smoking status: Never Smoker    Smokeless tobacco: Never Used   Substance and Sexual Activity    Alcohol use: No    Drug use: No    Sexual activity: Yes     Partners: Female   Lifestyle    Physical activity:     Days per week: Not on file     Minutes per session: Not on file    Stress: Not on file   Relationships    Social connections:     Talks on phone: Not on file      Gets together: Not on file     Attends Mandaen service: Not on file     Active member of club or organization: Not on file     Attends meetings of clubs or organizations: Not on file     Relationship status: Not on file   Other Topics Concern    Not on file   Social History Narrative    Not on file     Past Surgical History:   Procedure Laterality Date    CARDIAC SURGERY      CATARACT EXTRACTION      COLONOSCOPY      CORONARY ARTERY BYPASS GRAFT      HAND SURGERY      KNEE SURGERY      TRACHEOSTOMY TUBE PLACEMENT      WRIST FUSION         Labs:  Lab Results   Component Value Date    WBC 2.87 (L) 06/13/2019    HGB 10.8 (L) 06/13/2019    HCT 33.6 (L) 06/13/2019     (H) 06/13/2019     (L) 06/13/2019     BMP  Lab Results   Component Value Date     06/13/2019    K 3.8 06/13/2019     06/13/2019    CO2 23 06/13/2019    BUN 17 06/13/2019    CREATININE 1.5 (H) 06/13/2019    CALCIUM 9.2 06/13/2019    ANIONGAP 9 06/13/2019    ESTGFRAFRICA 52 (A) 06/13/2019    EGFRNONAA 45 (A) 06/13/2019     Lab Results   Component Value Date    ALT 15 06/13/2019    AST 18 06/13/2019    ALKPHOS 60 06/13/2019    BILITOT 0.5 06/13/2019       Lab Results   Component Value Date    IRON 81 07/21/2016    TIBC 302 07/21/2016    FERRITIN 324 (H) 07/21/2016     No results found for: [id number]  No results found for: FOLATE  Lab Results   Component Value Date    TSH 0.683 01/22/2018         Review of Systems   Constitutional: Negative for activity change, appetite change, chills, diaphoresis, fatigue, fever and unexpected weight change.   HENT: Negative for congestion, dental problem, drooling, ear discharge, ear pain, facial swelling, hearing loss, mouth sores, nosebleeds, postnasal drip, rhinorrhea, sinus pressure, sneezing, sore throat, tinnitus, trouble swallowing and voice change.    Eyes: Negative for photophobia, pain, discharge, redness, itching and visual disturbance.   Respiratory: Negative for apnea,  cough, choking, chest tightness, shortness of breath, wheezing and stridor.    Cardiovascular: Negative for chest pain, palpitations and leg swelling.   Gastrointestinal: Negative for abdominal distention, abdominal pain, anal bleeding, blood in stool, constipation, diarrhea, nausea, rectal pain and vomiting.   Endocrine: Negative for cold intolerance, heat intolerance, polydipsia, polyphagia and polyuria.   Genitourinary: Negative for decreased urine volume, difficulty urinating, discharge, dysuria, enuresis, flank pain, frequency, genital sores, hematuria, penile pain, penile swelling, scrotal swelling, testicular pain and urgency.   Musculoskeletal: Positive for arthralgias, back pain and myalgias. Negative for gait problem, joint swelling, neck pain and neck stiffness.   Skin: Negative for color change, pallor, rash and wound.   Allergic/Immunologic: Negative for environmental allergies, food allergies and immunocompromised state.   Neurological: Negative for dizziness, tremors, seizures, syncope, facial asymmetry, speech difficulty, weakness, light-headedness, numbness and headaches.   Hematological: Negative for adenopathy. Does not bruise/bleed easily.   Psychiatric/Behavioral: Negative for agitation, behavioral problems, confusion, decreased concentration, dysphoric mood, hallucinations, self-injury, sleep disturbance and suicidal ideas. The patient is not nervous/anxious and is not hyperactive.        Objective:      Physical Exam   Constitutional: He is oriented to person, place, and time. He appears well-developed and well-nourished. He appears distressed.   HENT:   Head: Normocephalic.   Right Ear: External ear normal.   Left Ear: External ear normal.   Nose: Nose normal. Right sinus exhibits no maxillary sinus tenderness and no frontal sinus tenderness. Left sinus exhibits no maxillary sinus tenderness and no frontal sinus tenderness.   Mouth/Throat: Oropharynx is clear and moist. No oropharyngeal  exudate.   Eyes: Pupils are equal, round, and reactive to light. EOM and lids are normal. Right eye exhibits no discharge. Left eye exhibits no discharge. Right conjunctiva is not injected. Right conjunctiva has no hemorrhage. Left conjunctiva is not injected. Left conjunctiva has no hemorrhage. No scleral icterus. Right eye exhibits normal extraocular motion. Left eye exhibits normal extraocular motion.   Neck: Normal range of motion. Neck supple. No JVD present. No tracheal deviation present. No thyromegaly present.   Cardiovascular: Normal rate and regular rhythm.   Pulmonary/Chest: Effort normal. No stridor. No respiratory distress.   Abdominal: Soft. He exhibits no mass. There is no hepatosplenomegaly, splenomegaly or hepatomegaly. There is no tenderness.   Musculoskeletal: Normal range of motion. He exhibits no edema or tenderness.   Lymphadenopathy:        Head (right side): No posterior auricular and no occipital adenopathy present.        Head (left side): No posterior auricular and no occipital adenopathy present.     He has no cervical adenopathy.        Right cervical: No superficial cervical, no deep cervical and no posterior cervical adenopathy present.       Left cervical: No superficial cervical, no deep cervical and no posterior cervical adenopathy present.     He has no axillary adenopathy.        Right: No supraclavicular adenopathy present.        Left: No supraclavicular adenopathy present.   Neurological: He is alert and oriented to person, place, and time. He has normal strength. No cranial nerve deficit. Coordination normal.   Skin: Skin is dry. No rash noted. He is not diaphoretic. No cyanosis or erythema. Nails show no clubbing.   Psychiatric: He has a normal mood and affect. His behavior is normal. Judgment and thought content normal. Cognition and memory are normal.   Vitals reviewed.          Assessment:      1. Multiple myeloma, remission status unspecified    2. Metastatic bone cancer     3. Multiple myeloma not having achieved remission           Plan:   Multiple myeloma laboratory studies pending I have never received results from [facility] we definitely have diagnosis state  M checked continue with chronic stable narcotic dosing with follow-up as requested information from [facility]          Rodolfo Solo Jr, MD FACP

## 2019-06-14 LAB
ALBUMIN SERPL ELPH-MCNC: 3.7 G/DL (ref 3.35–5.55)
ALPHA1 GLOB SERPL ELPH-MCNC: 0.39 G/DL (ref 0.17–0.41)
ALPHA2 GLOB SERPL ELPH-MCNC: 0.73 G/DL (ref 0.43–0.99)
B-GLOBULIN SERPL ELPH-MCNC: 1.08 G/DL (ref 0.5–1.1)
B2 MICROGLOB SERPL-MCNC: 3.3 UG/ML (ref 0–2.5)
GAMMA GLOB SERPL ELPH-MCNC: 1.8 G/DL (ref 0.67–1.58)
KAPPA LC SER QL IA: 17.53 MG/DL (ref 0.33–1.94)
KAPPA LC/LAMBDA SER IA: 2.64 (ref 0.26–1.65)
LAMBDA LC SER QL IA: 6.65 MG/DL (ref 0.57–2.63)
PATHOLOGIST INTERPRETATION SPE: NORMAL
PROT SERPL-MCNC: 7.7 G/DL (ref 6–8.4)

## 2019-07-07 DIAGNOSIS — Z79.4 TYPE 2 DIABETES MELLITUS WITHOUT COMPLICATION, WITH LONG-TERM CURRENT USE OF INSULIN: Chronic | ICD-10-CM

## 2019-07-07 DIAGNOSIS — Z79.52 LONG TERM CURRENT USE OF SYSTEMIC STEROIDS: ICD-10-CM

## 2019-07-07 DIAGNOSIS — Z79.4 INSULIN LONG-TERM USE: Chronic | ICD-10-CM

## 2019-07-07 DIAGNOSIS — Z79.4 TYPE 2 DIABETES MELLITUS WITH HYPERGLYCEMIA, WITH LONG-TERM CURRENT USE OF INSULIN: ICD-10-CM

## 2019-07-07 DIAGNOSIS — E11.9 TYPE 2 DIABETES MELLITUS WITHOUT COMPLICATION, WITH LONG-TERM CURRENT USE OF INSULIN: Chronic | ICD-10-CM

## 2019-07-07 DIAGNOSIS — E78.5 HYPERLIPIDEMIA, UNSPECIFIED HYPERLIPIDEMIA TYPE: Chronic | ICD-10-CM

## 2019-07-07 DIAGNOSIS — E11.65 TYPE 2 DIABETES MELLITUS WITH HYPERGLYCEMIA, WITH LONG-TERM CURRENT USE OF INSULIN: ICD-10-CM

## 2019-07-07 DIAGNOSIS — I10 ESSENTIAL HYPERTENSION: Chronic | ICD-10-CM

## 2019-07-08 RX ORDER — ROSUVASTATIN CALCIUM 40 MG/1
TABLET, COATED ORAL
Qty: 90 TABLET | Refills: 1 | Status: SHIPPED | OUTPATIENT
Start: 2019-07-08 | End: 2019-12-15 | Stop reason: SDUPTHER

## 2019-07-08 RX ORDER — INSULIN ASPART 100 [IU]/ML
INJECTION, SOLUTION INTRAVENOUS; SUBCUTANEOUS
Qty: 15 ML | Refills: 1 | Status: SHIPPED | OUTPATIENT
Start: 2019-07-08 | End: 2019-12-15 | Stop reason: SDUPTHER

## 2019-07-08 RX ORDER — INSULIN GLARGINE 100 [IU]/ML
INJECTION, SOLUTION SUBCUTANEOUS
Qty: 15 ML | Refills: 1 | Status: SHIPPED | OUTPATIENT
Start: 2019-07-08 | End: 2020-03-20 | Stop reason: SDUPTHER

## 2019-07-11 ENCOUNTER — PATIENT MESSAGE (OUTPATIENT)
Dept: HEMATOLOGY/ONCOLOGY | Facility: CLINIC | Age: 74
End: 2019-07-11

## 2019-07-11 ENCOUNTER — TELEPHONE (OUTPATIENT)
Dept: HEMATOLOGY/ONCOLOGY | Facility: CLINIC | Age: 74
End: 2019-07-11

## 2019-07-11 DIAGNOSIS — C90.00 MULTIPLE MYELOMA NOT HAVING ACHIEVED REMISSION: ICD-10-CM

## 2019-07-11 DIAGNOSIS — C90.00 MULTIPLE MYELOMA, REMISSION STATUS UNSPECIFIED: ICD-10-CM

## 2019-07-11 RX ORDER — OXYCODONE HCL 20 MG/1
20 TABLET, FILM COATED, EXTENDED RELEASE ORAL EVERY 12 HOURS
Qty: 60 EACH | Refills: 0 | Status: SHIPPED | OUTPATIENT
Start: 2019-07-11 | End: 2019-07-12 | Stop reason: SDUPTHER

## 2019-07-11 NOTE — TELEPHONE ENCOUNTER
Attempted to contact patient about missed appointment today with Dr. Solo. No answer and unable to leave voicemail.

## 2019-07-12 ENCOUNTER — OFFICE VISIT (OUTPATIENT)
Dept: HEMATOLOGY/ONCOLOGY | Facility: CLINIC | Age: 74
End: 2019-07-12
Payer: MEDICARE

## 2019-07-12 ENCOUNTER — LAB VISIT (OUTPATIENT)
Dept: LAB | Facility: HOSPITAL | Age: 74
End: 2019-07-12
Attending: INTERNAL MEDICINE
Payer: MEDICARE

## 2019-07-12 VITALS
HEART RATE: 61 BPM | WEIGHT: 168.44 LBS | SYSTOLIC BLOOD PRESSURE: 123 MMHG | DIASTOLIC BLOOD PRESSURE: 58 MMHG | TEMPERATURE: 98 F | RESPIRATION RATE: 20 BRPM | HEIGHT: 70 IN | BODY MASS INDEX: 24.11 KG/M2 | OXYGEN SATURATION: 97 %

## 2019-07-12 DIAGNOSIS — C90.01 MULTIPLE MYELOMA IN REMISSION: ICD-10-CM

## 2019-07-12 DIAGNOSIS — C90.00 MULTIPLE MYELOMA NOT HAVING ACHIEVED REMISSION: ICD-10-CM

## 2019-07-12 DIAGNOSIS — I25.118 CORONARY ARTERY DISEASE OF NATIVE ARTERY OF NATIVE HEART WITH STABLE ANGINA PECTORIS: Chronic | ICD-10-CM

## 2019-07-12 DIAGNOSIS — D49.89 NEOPLASM OF ABDOMEN: ICD-10-CM

## 2019-07-12 DIAGNOSIS — I70.0 CALCIFICATION OF ABDOMINAL AORTA: Chronic | ICD-10-CM

## 2019-07-12 DIAGNOSIS — C90.00 MULTIPLE MYELOMA, REMISSION STATUS UNSPECIFIED: Primary | ICD-10-CM

## 2019-07-12 LAB
ALBUMIN SERPL BCP-MCNC: 3.5 G/DL (ref 3.5–5.2)
ALP SERPL-CCNC: 60 U/L (ref 55–135)
ALT SERPL W/O P-5'-P-CCNC: 12 U/L (ref 10–44)
ANION GAP SERPL CALC-SCNC: 10 MMOL/L (ref 8–16)
AST SERPL-CCNC: 18 U/L (ref 10–40)
BASOPHILS # BLD AUTO: 0.03 K/UL (ref 0–0.2)
BASOPHILS NFR BLD: 1 % (ref 0–1.9)
BILIRUB SERPL-MCNC: 0.6 MG/DL (ref 0.1–1)
BUN SERPL-MCNC: 21 MG/DL (ref 8–23)
CALCIUM SERPL-MCNC: 9.6 MG/DL (ref 8.7–10.5)
CHLORIDE SERPL-SCNC: 105 MMOL/L (ref 95–110)
CO2 SERPL-SCNC: 26 MMOL/L (ref 23–29)
CREAT SERPL-MCNC: 1.7 MG/DL (ref 0.5–1.4)
DIFFERENTIAL METHOD: ABNORMAL
EOSINOPHIL # BLD AUTO: 0.1 K/UL (ref 0–0.5)
EOSINOPHIL NFR BLD: 3.5 % (ref 0–8)
ERYTHROCYTE [DISTWIDTH] IN BLOOD BY AUTOMATED COUNT: 14.6 % (ref 11.5–14.5)
EST. GFR  (AFRICAN AMERICAN): 45 ML/MIN/1.73 M^2
EST. GFR  (NON AFRICAN AMERICAN): 39 ML/MIN/1.73 M^2
GLUCOSE SERPL-MCNC: 112 MG/DL (ref 70–110)
HCT VFR BLD AUTO: 33.2 % (ref 40–54)
HGB BLD-MCNC: 11.2 G/DL (ref 14–18)
LDH SERPL L TO P-CCNC: 110 U/L (ref 110–260)
LYMPHOCYTES # BLD AUTO: 1 K/UL (ref 1–4.8)
LYMPHOCYTES NFR BLD: 33.9 % (ref 18–48)
MCH RBC QN AUTO: 33.6 PG (ref 27–31)
MCHC RBC AUTO-ENTMCNC: 33.7 G/DL (ref 32–36)
MCV RBC AUTO: 100 FL (ref 82–98)
MONOCYTES # BLD AUTO: 0.6 K/UL (ref 0.3–1)
MONOCYTES NFR BLD: 20.3 % (ref 4–15)
NEUTROPHILS # BLD AUTO: 1.2 K/UL (ref 1.8–7.7)
NEUTROPHILS NFR BLD: 41.6 % (ref 38–73)
PLATELET # BLD AUTO: 169 K/UL (ref 150–350)
PMV BLD AUTO: 10.1 FL (ref 9.2–12.9)
POTASSIUM SERPL-SCNC: 3.9 MMOL/L (ref 3.5–5.1)
PROT SERPL-MCNC: 8.4 G/DL (ref 6–8.4)
RBC # BLD AUTO: 3.33 M/UL (ref 4.6–6.2)
SODIUM SERPL-SCNC: 141 MMOL/L (ref 136–145)
WBC # BLD AUTO: 2.86 K/UL (ref 3.9–12.7)

## 2019-07-12 PROCEDURE — 99999 PR PBB SHADOW E&M-EST. PATIENT-LVL III: ICD-10-PCS | Mod: PBBFAC,,, | Performed by: INTERNAL MEDICINE

## 2019-07-12 PROCEDURE — 1101F PR PT FALLS ASSESS DOC 0-1 FALLS W/OUT INJ PAST YR: ICD-10-PCS | Mod: CPTII,S$GLB,, | Performed by: INTERNAL MEDICINE

## 2019-07-12 PROCEDURE — 36415 COLL VENOUS BLD VENIPUNCTURE: CPT

## 2019-07-12 PROCEDURE — 99215 OFFICE O/P EST HI 40 MIN: CPT | Mod: S$GLB,,, | Performed by: INTERNAL MEDICINE

## 2019-07-12 PROCEDURE — 84165 PROTEIN E-PHORESIS SERUM: CPT

## 2019-07-12 PROCEDURE — 84165 PROTEIN E-PHORESIS SERUM: CPT | Mod: 26,,, | Performed by: PATHOLOGY

## 2019-07-12 PROCEDURE — 3078F DIAST BP <80 MM HG: CPT | Mod: CPTII,S$GLB,, | Performed by: INTERNAL MEDICINE

## 2019-07-12 PROCEDURE — 80053 COMPREHEN METABOLIC PANEL: CPT

## 2019-07-12 PROCEDURE — 85025 COMPLETE CBC W/AUTO DIFF WBC: CPT

## 2019-07-12 PROCEDURE — 3078F PR MOST RECENT DIASTOLIC BLOOD PRESSURE < 80 MM HG: ICD-10-PCS | Mod: CPTII,S$GLB,, | Performed by: INTERNAL MEDICINE

## 2019-07-12 PROCEDURE — 84165 PATHOLOGIST INTERPRETATION SPE: ICD-10-PCS | Mod: 26,,, | Performed by: PATHOLOGY

## 2019-07-12 PROCEDURE — 1101F PT FALLS ASSESS-DOCD LE1/YR: CPT | Mod: CPTII,S$GLB,, | Performed by: INTERNAL MEDICINE

## 2019-07-12 PROCEDURE — 99215 PR OFFICE/OUTPT VISIT, EST, LEVL V, 40-54 MIN: ICD-10-PCS | Mod: S$GLB,,, | Performed by: INTERNAL MEDICINE

## 2019-07-12 PROCEDURE — 3074F PR MOST RECENT SYSTOLIC BLOOD PRESSURE < 130 MM HG: ICD-10-PCS | Mod: CPTII,S$GLB,, | Performed by: INTERNAL MEDICINE

## 2019-07-12 PROCEDURE — 3074F SYST BP LT 130 MM HG: CPT | Mod: CPTII,S$GLB,, | Performed by: INTERNAL MEDICINE

## 2019-07-12 PROCEDURE — 83520 IMMUNOASSAY QUANT NOS NONAB: CPT | Mod: 59

## 2019-07-12 PROCEDURE — 99999 PR PBB SHADOW E&M-EST. PATIENT-LVL III: CPT | Mod: PBBFAC,,, | Performed by: INTERNAL MEDICINE

## 2019-07-12 PROCEDURE — 83615 LACTATE (LD) (LDH) ENZYME: CPT

## 2019-07-12 RX ORDER — OXYCODONE HCL 20 MG/1
20 TABLET, FILM COATED, EXTENDED RELEASE ORAL EVERY 12 HOURS
Qty: 60 EACH | Refills: 0 | Status: SHIPPED | OUTPATIENT
Start: 2019-07-12 | End: 2019-08-20 | Stop reason: SDUPTHER

## 2019-07-12 RX ORDER — OXYCODONE HYDROCHLORIDE 10 MG/1
10 TABLET ORAL EVERY 6 HOURS PRN
Qty: 90 TABLET | Refills: 0 | Status: SHIPPED | OUTPATIENT
Start: 2019-07-12 | End: 2019-08-20 | Stop reason: SDUPTHER

## 2019-07-12 RX ORDER — PROCHLORPERAZINE MALEATE 5 MG
5 TABLET ORAL 4 TIMES DAILY PRN
Qty: 40 TABLET | Refills: 11 | Status: SHIPPED | OUTPATIENT
Start: 2019-07-12 | End: 2021-11-11 | Stop reason: SDUPTHER

## 2019-07-12 RX ORDER — ONDANSETRON 4 MG/1
4 TABLET, FILM COATED ORAL EVERY 8 HOURS PRN
Qty: 40 TABLET | Refills: 11 | Status: SHIPPED | OUTPATIENT
Start: 2019-07-12

## 2019-07-12 NOTE — PROGRESS NOTES
Subjective:       Patient ID: Familia Durbin Jr. is a 73 y.o. male.    Chief Complaint: Results and Multiple Myeloma    HPI 73-year-old male history of multiple myeloma currently on Pomalyst 3 mg days 1 through 21 on a 28 day cycle maintenance care for by [facility] continue on chronic stable narcotic use state  M follow-up ECOG status 2    Past Medical History:   Diagnosis Date    CAD (coronary artery disease)     luikart    Diabetes mellitus, type 2 1979    eye dr rocha    Hearing impaired     Hyperlipidemia     Hypertension     Lupus     Discoid    Multiple myeloma     Old MI (myocardial infarction) 2012    Tracheostomy tube present      Family History   Problem Relation Age of Onset    Diabetes Mother     Diabetes Father     Prostate cancer Father     Pancreatic cancer Sister     No Known Problems Brother     Diabetes Maternal Uncle     Diabetes Maternal Grandmother     No Known Problems Maternal Grandfather     No Known Problems Paternal Grandmother     No Known Problems Paternal Grandfather      Social History     Socioeconomic History    Marital status: Single     Spouse name: Not on file    Number of children: 9    Years of education: Not on file    Highest education level: Not on file   Occupational History    Not on file   Social Needs    Financial resource strain: Not on file    Food insecurity:     Worry: Not on file     Inability: Not on file    Transportation needs:     Medical: Not on file     Non-medical: Not on file   Tobacco Use    Smoking status: Never Smoker    Smokeless tobacco: Never Used   Substance and Sexual Activity    Alcohol use: No    Drug use: No    Sexual activity: Yes     Partners: Female   Lifestyle    Physical activity:     Days per week: Not on file     Minutes per session: Not on file    Stress: Not on file   Relationships    Social connections:     Talks on phone: Not on file     Gets together: Not on file      Attends Jew service: Not on file     Active member of club or organization: Not on file     Attends meetings of clubs or organizations: Not on file     Relationship status: Not on file   Other Topics Concern    Not on file   Social History Narrative    Not on file     Past Surgical History:   Procedure Laterality Date    CARDIAC SURGERY      CATARACT EXTRACTION      COLONOSCOPY      CORONARY ARTERY BYPASS GRAFT      HAND SURGERY      KNEE SURGERY      TRACHEOSTOMY TUBE PLACEMENT      WRIST FUSION         Labs:  Lab Results   Component Value Date    WBC 2.86 (L) 07/12/2019    HGB 11.2 (L) 07/12/2019    HCT 33.2 (L) 07/12/2019     (H) 07/12/2019     07/12/2019     BMP  Lab Results   Component Value Date     06/13/2019    K 3.8 06/13/2019     06/13/2019    CO2 23 06/13/2019    BUN 17 06/13/2019    CREATININE 1.5 (H) 06/13/2019    CALCIUM 9.2 06/13/2019    ANIONGAP 9 06/13/2019    ESTGFRAFRICA 52 (A) 06/13/2019    EGFRNONAA 45 (A) 06/13/2019     Lab Results   Component Value Date    ALT 15 06/13/2019    AST 18 06/13/2019    ALKPHOS 60 06/13/2019    BILITOT 0.5 06/13/2019       Lab Results   Component Value Date    IRON 81 07/21/2016    TIBC 302 07/21/2016    FERRITIN 324 (H) 07/21/2016     No results found for: [id number]  No results found for: FOLATE  Lab Results   Component Value Date    TSH 0.683 01/22/2018         Review of Systems   Constitutional: Negative for activity change, appetite change, chills, diaphoresis, fatigue, fever and unexpected weight change.   HENT: Negative for congestion, dental problem, drooling, ear discharge, ear pain, facial swelling, hearing loss, mouth sores, nosebleeds, postnasal drip, rhinorrhea, sinus pressure, sneezing, sore throat, tinnitus, trouble swallowing and voice change.    Eyes: Negative for photophobia, pain, discharge, redness, itching and visual disturbance.   Respiratory: Negative for apnea, cough, choking, chest tightness,  shortness of breath, wheezing and stridor.    Cardiovascular: Negative for chest pain, palpitations and leg swelling.   Gastrointestinal: Positive for nausea. Negative for abdominal distention, abdominal pain, anal bleeding, blood in stool, constipation, diarrhea, rectal pain and vomiting.   Endocrine: Negative for cold intolerance, heat intolerance, polydipsia, polyphagia and polyuria.   Genitourinary: Negative for decreased urine volume, difficulty urinating, discharge, dysuria, enuresis, flank pain, frequency, genital sores, hematuria, penile pain, penile swelling, scrotal swelling, testicular pain and urgency.   Musculoskeletal: Negative for arthralgias, back pain, gait problem, joint swelling, myalgias, neck pain and neck stiffness.   Skin: Negative for color change, pallor, rash and wound.   Allergic/Immunologic: Negative for environmental allergies, food allergies and immunocompromised state.   Neurological: Positive for weakness. Negative for dizziness, tremors, seizures, syncope, facial asymmetry, speech difficulty, light-headedness, numbness and headaches.   Hematological: Negative for adenopathy. Does not bruise/bleed easily.   Psychiatric/Behavioral: Positive for dysphoric mood. Negative for agitation, behavioral problems, confusion, decreased concentration, hallucinations, self-injury, sleep disturbance and suicidal ideas. The patient is nervous/anxious. The patient is not hyperactive.        Objective:      Physical Exam   Constitutional: He is oriented to person, place, and time. He appears well-developed and well-nourished. He appears distressed.   HENT:   Head: Normocephalic.   Right Ear: External ear normal.   Left Ear: External ear normal.   Nose: Nose normal. Right sinus exhibits no maxillary sinus tenderness and no frontal sinus tenderness. Left sinus exhibits no maxillary sinus tenderness and no frontal sinus tenderness.   Mouth/Throat: Oropharynx is clear and moist. No oropharyngeal exudate.    Eyes: Pupils are equal, round, and reactive to light. EOM and lids are normal. Right eye exhibits no discharge. Left eye exhibits no discharge. Right conjunctiva is not injected. Right conjunctiva has no hemorrhage. Left conjunctiva is not injected. Left conjunctiva has no hemorrhage. No scleral icterus. Right eye exhibits normal extraocular motion. Left eye exhibits normal extraocular motion.   Neck: Normal range of motion. Neck supple. No JVD present. No tracheal deviation present. No thyromegaly present.   Cardiovascular: Normal rate and regular rhythm.   Pulmonary/Chest: Effort normal. No stridor. No respiratory distress.   Abdominal: Soft. He exhibits no mass. There is no hepatosplenomegaly, splenomegaly or hepatomegaly. There is no tenderness.   Musculoskeletal: Normal range of motion. He exhibits no edema or tenderness.   Lymphadenopathy:        Head (right side): No posterior auricular and no occipital adenopathy present.        Head (left side): No posterior auricular and no occipital adenopathy present.     He has no cervical adenopathy.        Right cervical: No superficial cervical, no deep cervical and no posterior cervical adenopathy present.       Left cervical: No superficial cervical, no deep cervical and no posterior cervical adenopathy present.     He has no axillary adenopathy.        Right: No supraclavicular adenopathy present.        Left: No supraclavicular adenopathy present.   Neurological: He is alert and oriented to person, place, and time. He has normal strength. No cranial nerve deficit. Coordination normal.   Skin: Skin is dry. No rash noted. He is not diaphoretic. No cyanosis or erythema. Nails show no clubbing.   Psychiatric: He has a normal mood and affect. His behavior is normal. Judgment and thought content normal. Cognition and memory are normal.   Vitals reviewed.          Assessment:      1. Multiple myeloma, remission status unspecified    2. Metastatic bone cancer    3.  Multiple myeloma not having achieved remission           Plan:   Refill prescriptions for chronic stable narcotic use continued follow-up every 6-8 weeks next appointment in September 2019 with [facility] in [place] laboratory studies pending today communicate results through portal state  M checked narcotics sent to [facility] pharmacy          Rodolfo Solo Jr, MD FACP

## 2019-07-15 LAB
ALBUMIN SERPL ELPH-MCNC: 3.75 G/DL (ref 3.35–5.55)
ALPHA1 GLOB SERPL ELPH-MCNC: 0.36 G/DL (ref 0.17–0.41)
ALPHA2 GLOB SERPL ELPH-MCNC: 0.79 G/DL (ref 0.43–0.99)
B-GLOBULIN SERPL ELPH-MCNC: 1.14 G/DL (ref 0.5–1.1)
GAMMA GLOB SERPL ELPH-MCNC: 1.86 G/DL (ref 0.67–1.58)
KAPPA LC SER QL IA: 15.4 MG/DL (ref 0.33–1.94)
KAPPA LC/LAMBDA SER IA: 2.08 (ref 0.26–1.65)
LAMBDA LC SER QL IA: 7.4 MG/DL (ref 0.57–2.63)
PATHOLOGIST INTERPRETATION SPE: NORMAL
PROT SERPL-MCNC: 7.9 G/DL (ref 6–8.4)

## 2019-07-16 ENCOUNTER — OFFICE VISIT (OUTPATIENT)
Dept: INTERNAL MEDICINE | Facility: CLINIC | Age: 74
End: 2019-07-16
Payer: MEDICARE

## 2019-07-16 VITALS
BODY MASS INDEX: 24.27 KG/M2 | SYSTOLIC BLOOD PRESSURE: 118 MMHG | TEMPERATURE: 99 F | WEIGHT: 169.56 LBS | DIASTOLIC BLOOD PRESSURE: 52 MMHG | HEIGHT: 70 IN

## 2019-07-16 DIAGNOSIS — N28.9 RENAL INSUFFICIENCY: ICD-10-CM

## 2019-07-16 DIAGNOSIS — E11.21 TYPE 2 DIABETES MELLITUS WITH DIABETIC NEPHROPATHY, WITH LONG-TERM CURRENT USE OF INSULIN: Primary | Chronic | ICD-10-CM

## 2019-07-16 DIAGNOSIS — C90.00 MULTIPLE MYELOMA, REMISSION STATUS UNSPECIFIED: ICD-10-CM

## 2019-07-16 DIAGNOSIS — I10 ESSENTIAL HYPERTENSION: Chronic | ICD-10-CM

## 2019-07-16 DIAGNOSIS — Z79.4 TYPE 2 DIABETES MELLITUS WITH DIABETIC NEPHROPATHY, WITH LONG-TERM CURRENT USE OF INSULIN: Primary | Chronic | ICD-10-CM

## 2019-07-16 DIAGNOSIS — I25.118 CORONARY ARTERY DISEASE OF NATIVE ARTERY OF NATIVE HEART WITH STABLE ANGINA PECTORIS: Chronic | ICD-10-CM

## 2019-07-16 PROCEDURE — 3074F PR MOST RECENT SYSTOLIC BLOOD PRESSURE < 130 MM HG: ICD-10-PCS | Mod: CPTII,S$GLB,, | Performed by: FAMILY MEDICINE

## 2019-07-16 PROCEDURE — 99214 OFFICE O/P EST MOD 30 MIN: CPT | Mod: S$GLB,,, | Performed by: FAMILY MEDICINE

## 2019-07-16 PROCEDURE — 1101F PT FALLS ASSESS-DOCD LE1/YR: CPT | Mod: CPTII,S$GLB,, | Performed by: FAMILY MEDICINE

## 2019-07-16 PROCEDURE — 99999 PR PBB SHADOW E&M-EST. PATIENT-LVL III: ICD-10-PCS | Mod: PBBFAC,,, | Performed by: FAMILY MEDICINE

## 2019-07-16 PROCEDURE — 3078F DIAST BP <80 MM HG: CPT | Mod: CPTII,S$GLB,, | Performed by: FAMILY MEDICINE

## 2019-07-16 PROCEDURE — 3074F SYST BP LT 130 MM HG: CPT | Mod: CPTII,S$GLB,, | Performed by: FAMILY MEDICINE

## 2019-07-16 PROCEDURE — 99999 PR PBB SHADOW E&M-EST. PATIENT-LVL III: CPT | Mod: PBBFAC,,, | Performed by: FAMILY MEDICINE

## 2019-07-16 PROCEDURE — 3078F PR MOST RECENT DIASTOLIC BLOOD PRESSURE < 80 MM HG: ICD-10-PCS | Mod: CPTII,S$GLB,, | Performed by: FAMILY MEDICINE

## 2019-07-16 PROCEDURE — 1101F PR PT FALLS ASSESS DOC 0-1 FALLS W/OUT INJ PAST YR: ICD-10-PCS | Mod: CPTII,S$GLB,, | Performed by: FAMILY MEDICINE

## 2019-07-16 PROCEDURE — 99214 PR OFFICE/OUTPT VISIT, EST, LEVL IV, 30-39 MIN: ICD-10-PCS | Mod: S$GLB,,, | Performed by: FAMILY MEDICINE

## 2019-07-16 NOTE — PATIENT INSTRUCTIONS
Please let me know if you are taking methotrexate once a week (I took this off you medication list)

## 2019-07-29 NOTE — PROGRESS NOTES
Subjective:       Patient ID: Familia Durbin Jr. is a o. male.    Chief Complaint: Multiple issues see below    HPI Type 2 diabetes mellitus without complication, with long-term current use of insulin w nephrop:ba1c due no low sugars  Mult myeloma sees Dr Solo    Coronary artery disease: up to date with cardiology. Hypertension: blood pressures  Typically normal. Just took med this amTolerating medicine.     States not on mtx    Past Medical History   Diagnosis Date    CAD (coronary artery disease)      luikart    Diabetes mellitus, type 2      eye dr rocha    Hearing impaired     Hyperlipidemia     Hypertension     Lupus      Discoid    Old MI (myocardial infarction) 2012    Tracheostomy tube present      Past Surgical History   Procedure Laterality Date    Coronary artery bypass graft      Knee surgery      Colonoscopy      Cardiac surgery      Wrist fusion      Hand surgery      Tracheostomy tube placement       Family History   Problem Relation Age of Onset    Diabetes Mother     Diabetes Father     Prostate cancer Father     Pancreatic cancer Sister     No Known Problems Brother     Diabetes Maternal Uncle     Diabetes Maternal Grandmother     No Known Problems Maternal Grandfather     No Known Problems Paternal Grandmother     No Known Problems Paternal Grandfather      Social History     Social History    Marital status: Single     Spouse name: N/A    Number of children: 9    Years of education: N/A     Social History Main Topics    Smoking status: Never Smoker    Smokeless tobacco: Never Used    Alcohol use No    Drug use: No    Sexual activity: Yes     Partners: Female     Other Topics Concern    None     Social History Narrative         Review of Systems  no exertional cp no sob  Objective:    Alejandrina's sister/patients daugh here today  Physical Exam   Constitutional: He is oriented to person, place, and time. He appears well-developed and well-nourished.   HENT:    Head: Normocephalic and atraumatic.   Right Ear: External ear normal.   Left Ear: External ear normal.   Nose: Nose normal.   Mouth/Throat: Oropharynx is clear and moist. No oropharyngeal exudate.   Nl tms   Eyes: Conjunctivae and EOM are normal. Pupils are equal, round, and reactive to light.   Neck: Normal range of motion. Neck supple. Carotid bruit is not present.   Cardiovascular: Normal rate and regular rhythm.    Pulmonary/Chest: Effort normal and breath sounds normal.   Lymphadenopathy:     He has no cervical adenopathy.   Neurological: He is alert and oriented to person, place, and time.   Skin: Skin is warm and dry.   Psychiatric: He has a normal mood and affect. His behavior is normal. Judgment and thought content normal.       Assessment:       1. Type 2 diabetes mellitus without complication, with long-term current use of insulin    2. Essential hypertension    3. CAD; s/p MI; s/p CABG    4. Multiple myeloma, remission status unspecified        Plan:       *  Dr Solo as sched  F/u card when due  Add a1c lipid to sept lab  F/u sigifredo 6 months    F/u nephrol (arsh)  He will confirm not on methotrex  Type 2 diabetes mellitus with diabetic nephropathy, with long-term current use of insulin  -     Hemoglobin A1c; Future; Expected date: 07/16/2019  -     Lipid panel; Future; Expected date: 07/16/2019    Coronary artery disease of native artery of native heart with stable angina pectoris    Essential hypertension    Multiple myeloma, remission status unspecified    Renal insufficiency

## 2019-08-16 DIAGNOSIS — C90.00 MULTIPLE MYELOMA NOT HAVING ACHIEVED REMISSION: ICD-10-CM

## 2019-08-16 DIAGNOSIS — C90.00 MULTIPLE MYELOMA, REMISSION STATUS UNSPECIFIED: ICD-10-CM

## 2019-08-19 RX ORDER — OXYCODONE HCL 20 MG/1
20 TABLET, FILM COATED, EXTENDED RELEASE ORAL EVERY 12 HOURS
Qty: 60 EACH | Refills: 0 | OUTPATIENT
Start: 2019-08-19

## 2019-08-19 RX ORDER — OXYCODONE HYDROCHLORIDE 10 MG/1
10 TABLET ORAL EVERY 6 HOURS PRN
Qty: 90 TABLET | Refills: 0 | OUTPATIENT
Start: 2019-08-19

## 2019-08-20 ENCOUNTER — OFFICE VISIT (OUTPATIENT)
Dept: HEMATOLOGY/ONCOLOGY | Facility: CLINIC | Age: 74
End: 2019-08-20
Payer: MEDICARE

## 2019-08-20 VITALS
TEMPERATURE: 98 F | HEIGHT: 70 IN | RESPIRATION RATE: 17 BRPM | DIASTOLIC BLOOD PRESSURE: 52 MMHG | OXYGEN SATURATION: 98 % | HEART RATE: 69 BPM | SYSTOLIC BLOOD PRESSURE: 119 MMHG | BODY MASS INDEX: 23.26 KG/M2 | WEIGHT: 162.5 LBS

## 2019-08-20 DIAGNOSIS — C90.00 MULTIPLE MYELOMA NOT HAVING ACHIEVED REMISSION: Primary | ICD-10-CM

## 2019-08-20 DIAGNOSIS — C90.00 MULTIPLE MYELOMA, REMISSION STATUS UNSPECIFIED: ICD-10-CM

## 2019-08-20 PROCEDURE — 99214 OFFICE O/P EST MOD 30 MIN: CPT | Mod: S$GLB,,, | Performed by: INTERNAL MEDICINE

## 2019-08-20 PROCEDURE — 99999 PR PBB SHADOW E&M-EST. PATIENT-LVL III: CPT | Mod: PBBFAC,,, | Performed by: INTERNAL MEDICINE

## 2019-08-20 PROCEDURE — 3074F SYST BP LT 130 MM HG: CPT | Mod: CPTII,S$GLB,, | Performed by: INTERNAL MEDICINE

## 2019-08-20 PROCEDURE — 99214 PR OFFICE/OUTPT VISIT, EST, LEVL IV, 30-39 MIN: ICD-10-PCS | Mod: S$GLB,,, | Performed by: INTERNAL MEDICINE

## 2019-08-20 PROCEDURE — 1101F PR PT FALLS ASSESS DOC 0-1 FALLS W/OUT INJ PAST YR: ICD-10-PCS | Mod: CPTII,S$GLB,, | Performed by: INTERNAL MEDICINE

## 2019-08-20 PROCEDURE — 3074F PR MOST RECENT SYSTOLIC BLOOD PRESSURE < 130 MM HG: ICD-10-PCS | Mod: CPTII,S$GLB,, | Performed by: INTERNAL MEDICINE

## 2019-08-20 PROCEDURE — 1101F PT FALLS ASSESS-DOCD LE1/YR: CPT | Mod: CPTII,S$GLB,, | Performed by: INTERNAL MEDICINE

## 2019-08-20 PROCEDURE — 3078F PR MOST RECENT DIASTOLIC BLOOD PRESSURE < 80 MM HG: ICD-10-PCS | Mod: CPTII,S$GLB,, | Performed by: INTERNAL MEDICINE

## 2019-08-20 PROCEDURE — 3078F DIAST BP <80 MM HG: CPT | Mod: CPTII,S$GLB,, | Performed by: INTERNAL MEDICINE

## 2019-08-20 PROCEDURE — 99999 PR PBB SHADOW E&M-EST. PATIENT-LVL III: ICD-10-PCS | Mod: PBBFAC,,, | Performed by: INTERNAL MEDICINE

## 2019-08-20 RX ORDER — OXYCODONE HCL 20 MG/1
20 TABLET, FILM COATED, EXTENDED RELEASE ORAL EVERY 12 HOURS
Qty: 60 EACH | Refills: 0 | Status: SHIPPED | OUTPATIENT
Start: 2019-08-20 | End: 2019-09-16 | Stop reason: SDUPTHER

## 2019-08-20 RX ORDER — OXYCODONE HYDROCHLORIDE 10 MG/1
10 TABLET ORAL EVERY 6 HOURS PRN
Qty: 90 TABLET | Refills: 0 | Status: SHIPPED | OUTPATIENT
Start: 2019-08-20 | End: 2019-09-16 | Stop reason: SDUPTHER

## 2019-08-20 NOTE — PROGRESS NOTES
Subjective:       Patient ID: Familia Durbin Jr. is a 73 y.o. male.    Chief Complaint: Results and Multiple Myeloma    HPI 73-year-old male history multiple myeloma patient returns for review patient's continue be treated at [facility] next follow-up with him in a few weeks.  Chronic stable narcotic use returns for evaluation ECOG status 1    Past Medical History:   Diagnosis Date    CAD (coronary artery disease)     luikart    Diabetes mellitus, type 2 1979    eye dr rocha    Hearing impaired     Hyperlipidemia     Hypertension     Lupus     Discoid    Multiple myeloma     Old MI (myocardial infarction) 2012    Renal insufficiency     Tracheostomy tube present      Family History   Problem Relation Age of Onset    Diabetes Mother     Diabetes Father     Prostate cancer Father     Pancreatic cancer Sister     No Known Problems Brother     Diabetes Maternal Uncle     Diabetes Maternal Grandmother     No Known Problems Maternal Grandfather     No Known Problems Paternal Grandmother     No Known Problems Paternal Grandfather      Social History     Socioeconomic History    Marital status: Single     Spouse name: Not on file    Number of children: 9    Years of education: Not on file    Highest education level: Not on file   Occupational History    Not on file   Social Needs    Financial resource strain: Not on file    Food insecurity:     Worry: Not on file     Inability: Not on file    Transportation needs:     Medical: Not on file     Non-medical: Not on file   Tobacco Use    Smoking status: Never Smoker    Smokeless tobacco: Never Used   Substance and Sexual Activity    Alcohol use: No    Drug use: No    Sexual activity: Yes     Partners: Female   Lifestyle    Physical activity:     Days per week: Not on file     Minutes per session: Not on file    Stress: Not on file   Relationships    Social connections:     Talks on phone: Not on file     Gets  together: Not on file     Attends [employer] service: Not on file     Active member of club or organization: Not on file     Attends meetings of clubs or organizations: Not on file     Relationship status: Not on file   Other Topics Concern    Not on file   Social History Narrative    Not on file     Past Surgical History:   Procedure Laterality Date    CARDIAC SURGERY      CATARACT EXTRACTION      COLONOSCOPY      CORONARY ARTERY BYPASS GRAFT      HAND SURGERY      KNEE SURGERY      TRACHEOSTOMY TUBE PLACEMENT      WRIST FUSION         Labs:  Lab Results   Component Value Date    WBC 2.86 (L) 07/12/2019    HGB 11.2 (L) 07/12/2019    HCT 33.2 (L) 07/12/2019     (H) 07/12/2019     07/12/2019     BMP  Lab Results   Component Value Date     07/12/2019    K 3.9 07/12/2019     07/12/2019    CO2 26 07/12/2019    BUN 21 07/12/2019    CREATININE 1.7 (H) 07/12/2019    CALCIUM 9.6 07/12/2019    ANIONGAP 10 07/12/2019    ESTGFRAFRICA 45 (A) 07/12/2019    EGFRNONAA 39 (A) 07/12/2019     Lab Results   Component Value Date    ALT 12 07/12/2019    AST 18 07/12/2019    ALKPHOS 60 07/12/2019    BILITOT 0.6 07/12/2019       Lab Results   Component Value Date    IRON 81 07/21/2016    TIBC 302 07/21/2016    FERRITIN 324 (H) 07/21/2016     No results found for: [id number]  No results found for: FOLATE  Lab Results   Component Value Date    TSH 0.683 01/22/2018         Review of Systems   Constitutional: Positive for fatigue. Negative for activity change, appetite change, chills, diaphoresis, fever and unexpected weight change.   HENT: Negative for congestion, dental problem, drooling, ear discharge, ear pain, facial swelling, hearing loss, mouth sores, nosebleeds, postnasal drip, rhinorrhea, sinus pressure, sneezing, sore throat, tinnitus, trouble swallowing and voice change.    Eyes: Negative for photophobia, pain, discharge, redness, itching and visual disturbance.   Respiratory: Negative for apnea,  cough, choking, chest tightness, shortness of breath, wheezing and stridor.    Cardiovascular: Negative for chest pain, palpitations and leg swelling.   Gastrointestinal: Negative for abdominal distention, abdominal pain, anal bleeding, blood in stool, constipation, diarrhea, nausea, rectal pain and vomiting.   Endocrine: Negative for cold intolerance, heat intolerance, polydipsia, polyphagia and polyuria.   Genitourinary: Negative for decreased urine volume, difficulty urinating, discharge, dysuria, enuresis, flank pain, frequency, genital sores, hematuria, penile pain, penile swelling, scrotal swelling, testicular pain and urgency.   Musculoskeletal: Negative for arthralgias, back pain, gait problem, joint swelling, myalgias, neck pain and neck stiffness.   Skin: Negative for color change, pallor, rash and wound.   Allergic/Immunologic: Negative for environmental allergies, food allergies and immunocompromised state.   Neurological: Positive for weakness. Negative for dizziness, tremors, seizures, syncope, facial asymmetry, speech difficulty, light-headedness, numbness and headaches.   Hematological: Negative for adenopathy. Does not bruise/bleed easily.   Psychiatric/Behavioral: Positive for dysphoric mood. Negative for agitation, behavioral problems, confusion, decreased concentration, hallucinations, self-injury, sleep disturbance and suicidal ideas. The patient is nervous/anxious. The patient is not hyperactive.        Objective:      Physical Exam   Constitutional: He is oriented to person, place, and time. He appears well-developed and well-nourished. He appears distressed.   HENT:   Head: Normocephalic.   Right Ear: External ear normal.   Left Ear: External ear normal.   Nose: Nose normal. Right sinus exhibits no maxillary sinus tenderness and no frontal sinus tenderness. Left sinus exhibits no maxillary sinus tenderness and no frontal sinus tenderness.   Mouth/Throat: Oropharynx is clear and moist. No  oropharyngeal exudate.   Eyes: Pupils are equal, round, and reactive to light. EOM and lids are normal. Right eye exhibits no discharge. Left eye exhibits no discharge. Right conjunctiva is not injected. Right conjunctiva has no hemorrhage. Left conjunctiva is not injected. Left conjunctiva has no hemorrhage. No scleral icterus. Right eye exhibits normal extraocular motion. Left eye exhibits normal extraocular motion.   Neck: Normal range of motion. Neck supple. No JVD present. No tracheal deviation present. No thyromegaly present.   Cardiovascular: Normal rate and regular rhythm.   Pulmonary/Chest: Effort normal. No stridor. No respiratory distress.   Abdominal: Soft. He exhibits no mass. There is no hepatosplenomegaly, splenomegaly or hepatomegaly. There is no tenderness.   Musculoskeletal: Normal range of motion. He exhibits no edema or tenderness.   Lymphadenopathy:        Head (right side): No posterior auricular and no occipital adenopathy present.        Head (left side): No posterior auricular and no occipital adenopathy present.     He has no cervical adenopathy.        Right cervical: No superficial cervical, no deep cervical and no posterior cervical adenopathy present.       Left cervical: No superficial cervical, no deep cervical and no posterior cervical adenopathy present.     He has no axillary adenopathy.        Right: No supraclavicular adenopathy present.        Left: No supraclavicular adenopathy present.   Neurological: He is alert and oriented to person, place, and time. He has normal strength. No cranial nerve deficit. Coordination normal.   Skin: Skin is dry. No rash noted. He is not diaphoretic. No cyanosis or erythema. Nails show no clubbing.   Psychiatric: He has a normal mood and affect. His behavior is normal. Judgment and thought content normal. Cognition and memory are normal.   Vitals reviewed.          Assessment:      1. Multiple myeloma not having achieved remission    2.  Metastatic bone cancer    3. Multiple myeloma, remission status unspecified           Plan:     Chronic stable narcotic use history of multiple myeloma relapse continuing on Pomalyst as outlined return in 4-5 weeks with laboratory studies done prior continue treatment through [facility] narcotics sent to [facility] pharmacy needs more than 7 day supply state  M checked        Rodolfo Solo Jr, MD FACP

## 2019-09-04 ENCOUNTER — TELEPHONE (OUTPATIENT)
Dept: HEMATOLOGY/ONCOLOGY | Facility: CLINIC | Age: 74
End: 2019-09-04

## 2019-09-04 NOTE — TELEPHONE ENCOUNTER
Attempted to call the patient to get him rescheduled for his appt  he no showed on today. Patient did not answer, so I left a message for a ret urn phone call.

## 2019-09-05 ENCOUNTER — TELEPHONE (OUTPATIENT)
Dept: HEMATOLOGY/ONCOLOGY | Facility: CLINIC | Age: 74
End: 2019-09-05

## 2019-09-05 NOTE — TELEPHONE ENCOUNTER
Spoke to the patient he has rescheduled his missed appts from yesterday. Patient verbalized understanding of appt and timnes that were given.

## 2019-09-16 ENCOUNTER — OFFICE VISIT (OUTPATIENT)
Dept: HEMATOLOGY/ONCOLOGY | Facility: CLINIC | Age: 74
End: 2019-09-16
Payer: MEDICARE

## 2019-09-16 ENCOUNTER — LAB VISIT (OUTPATIENT)
Dept: LAB | Facility: HOSPITAL | Age: 74
End: 2019-09-16
Attending: INTERNAL MEDICINE
Payer: MEDICARE

## 2019-09-16 VITALS
RESPIRATION RATE: 18 BRPM | TEMPERATURE: 98 F | SYSTOLIC BLOOD PRESSURE: 145 MMHG | WEIGHT: 163.56 LBS | HEART RATE: 80 BPM | DIASTOLIC BLOOD PRESSURE: 60 MMHG | BODY MASS INDEX: 23.42 KG/M2 | OXYGEN SATURATION: 98 % | HEIGHT: 70 IN

## 2019-09-16 DIAGNOSIS — C90.00 MULTIPLE MYELOMA, REMISSION STATUS UNSPECIFIED: ICD-10-CM

## 2019-09-16 DIAGNOSIS — C90.00 MULTIPLE MYELOMA NOT HAVING ACHIEVED REMISSION: Primary | ICD-10-CM

## 2019-09-16 DIAGNOSIS — C90.00 MULTIPLE MYELOMA NOT HAVING ACHIEVED REMISSION: ICD-10-CM

## 2019-09-16 LAB
ALBUMIN SERPL BCP-MCNC: 3.5 G/DL (ref 3.5–5.2)
ALP SERPL-CCNC: 60 U/L (ref 55–135)
ALT SERPL W/O P-5'-P-CCNC: 16 U/L (ref 10–44)
ANION GAP SERPL CALC-SCNC: 10 MMOL/L (ref 8–16)
AST SERPL-CCNC: 20 U/L (ref 10–40)
B2 MICROGLOB SERPL-MCNC: 3.3 UG/ML (ref 0–2.5)
BASOPHILS # BLD AUTO: 0.07 K/UL (ref 0–0.2)
BASOPHILS NFR BLD: 2 % (ref 0–1.9)
BILIRUB SERPL-MCNC: 0.5 MG/DL (ref 0.1–1)
BUN SERPL-MCNC: 24 MG/DL (ref 8–23)
CALCIUM SERPL-MCNC: 9.4 MG/DL (ref 8.7–10.5)
CHLORIDE SERPL-SCNC: 110 MMOL/L (ref 95–110)
CO2 SERPL-SCNC: 21 MMOL/L (ref 23–29)
CREAT SERPL-MCNC: 1.9 MG/DL (ref 0.5–1.4)
DIFFERENTIAL METHOD: ABNORMAL
EOSINOPHIL # BLD AUTO: 0.1 K/UL (ref 0–0.5)
EOSINOPHIL NFR BLD: 2 % (ref 0–8)
ERYTHROCYTE [DISTWIDTH] IN BLOOD BY AUTOMATED COUNT: 15.9 % (ref 11.5–14.5)
EST. GFR  (AFRICAN AMERICAN): 39 ML/MIN/1.73 M^2
EST. GFR  (NON AFRICAN AMERICAN): 34 ML/MIN/1.73 M^2
GLUCOSE SERPL-MCNC: 161 MG/DL (ref 70–110)
HCT VFR BLD AUTO: 35.5 % (ref 40–54)
HGB BLD-MCNC: 11.4 G/DL (ref 14–18)
IMM GRANULOCYTES # BLD AUTO: 0 K/UL (ref 0–0.04)
IMM GRANULOCYTES NFR BLD AUTO: 0 % (ref 0–0.5)
LYMPHOCYTES # BLD AUTO: 0.9 K/UL (ref 1–4.8)
LYMPHOCYTES NFR BLD: 27 % (ref 18–48)
MCH RBC QN AUTO: 33.5 PG (ref 27–31)
MCHC RBC AUTO-ENTMCNC: 32.1 G/DL (ref 32–36)
MCV RBC AUTO: 104 FL (ref 82–98)
MONOCYTES # BLD AUTO: 0.8 K/UL (ref 0.3–1)
MONOCYTES NFR BLD: 21.6 % (ref 4–15)
NEUTROPHILS # BLD AUTO: 1.7 K/UL (ref 1.8–7.7)
NEUTROPHILS NFR BLD: 47.4 % (ref 38–73)
NRBC BLD-RTO: 0 /100 WBC
PLATELET # BLD AUTO: 231 K/UL (ref 150–350)
PMV BLD AUTO: 9.6 FL (ref 9.2–12.9)
POTASSIUM SERPL-SCNC: 4.3 MMOL/L (ref 3.5–5.1)
PROT SERPL-MCNC: 8.8 G/DL (ref 6–8.4)
RBC # BLD AUTO: 3.4 M/UL (ref 4.6–6.2)
SODIUM SERPL-SCNC: 141 MMOL/L (ref 136–145)
WBC # BLD AUTO: 3.48 K/UL (ref 3.9–12.7)

## 2019-09-16 PROCEDURE — 99214 OFFICE O/P EST MOD 30 MIN: CPT | Mod: S$GLB,,, | Performed by: INTERNAL MEDICINE

## 2019-09-16 PROCEDURE — 3077F SYST BP >= 140 MM HG: CPT | Mod: CPTII,S$GLB,, | Performed by: INTERNAL MEDICINE

## 2019-09-16 PROCEDURE — 86334 IMMUNOFIX E-PHORESIS SERUM: CPT | Mod: 26,,, | Performed by: PATHOLOGY

## 2019-09-16 PROCEDURE — 85025 COMPLETE CBC W/AUTO DIFF WBC: CPT

## 2019-09-16 PROCEDURE — 3078F PR MOST RECENT DIASTOLIC BLOOD PRESSURE < 80 MM HG: ICD-10-PCS | Mod: CPTII,S$GLB,, | Performed by: INTERNAL MEDICINE

## 2019-09-16 PROCEDURE — 83520 IMMUNOASSAY QUANT NOS NONAB: CPT | Mod: 59

## 2019-09-16 PROCEDURE — 86334 PATHOLOGIST INTERPRETATION IFE: ICD-10-PCS | Mod: 26,,, | Performed by: PATHOLOGY

## 2019-09-16 PROCEDURE — 1101F PR PT FALLS ASSESS DOC 0-1 FALLS W/OUT INJ PAST YR: ICD-10-PCS | Mod: CPTII,S$GLB,, | Performed by: INTERNAL MEDICINE

## 2019-09-16 PROCEDURE — 86334 IMMUNOFIX E-PHORESIS SERUM: CPT

## 2019-09-16 PROCEDURE — 84165 PROTEIN E-PHORESIS SERUM: CPT | Mod: 26,,, | Performed by: PATHOLOGY

## 2019-09-16 PROCEDURE — 36415 COLL VENOUS BLD VENIPUNCTURE: CPT

## 2019-09-16 PROCEDURE — 3077F PR MOST RECENT SYSTOLIC BLOOD PRESSURE >= 140 MM HG: ICD-10-PCS | Mod: CPTII,S$GLB,, | Performed by: INTERNAL MEDICINE

## 2019-09-16 PROCEDURE — 3078F DIAST BP <80 MM HG: CPT | Mod: CPTII,S$GLB,, | Performed by: INTERNAL MEDICINE

## 2019-09-16 PROCEDURE — 99999 PR PBB SHADOW E&M-EST. PATIENT-LVL III: ICD-10-PCS | Mod: PBBFAC,,, | Performed by: INTERNAL MEDICINE

## 2019-09-16 PROCEDURE — 1101F PT FALLS ASSESS-DOCD LE1/YR: CPT | Mod: CPTII,S$GLB,, | Performed by: INTERNAL MEDICINE

## 2019-09-16 PROCEDURE — 84165 PATHOLOGIST INTERPRETATION SPE: ICD-10-PCS | Mod: 26,,, | Performed by: PATHOLOGY

## 2019-09-16 PROCEDURE — 82232 ASSAY OF BETA-2 PROTEIN: CPT

## 2019-09-16 PROCEDURE — 99999 PR PBB SHADOW E&M-EST. PATIENT-LVL III: CPT | Mod: PBBFAC,,, | Performed by: INTERNAL MEDICINE

## 2019-09-16 PROCEDURE — 99214 PR OFFICE/OUTPT VISIT, EST, LEVL IV, 30-39 MIN: ICD-10-PCS | Mod: S$GLB,,, | Performed by: INTERNAL MEDICINE

## 2019-09-16 PROCEDURE — 80053 COMPREHEN METABOLIC PANEL: CPT

## 2019-09-16 PROCEDURE — 84165 PROTEIN E-PHORESIS SERUM: CPT

## 2019-09-16 RX ORDER — OXYCODONE HCL 20 MG/1
20 TABLET, FILM COATED, EXTENDED RELEASE ORAL EVERY 12 HOURS
Qty: 60 EACH | Refills: 0 | Status: SHIPPED | OUTPATIENT
Start: 2019-09-16 | End: 2019-10-24 | Stop reason: SDUPTHER

## 2019-09-16 RX ORDER — OXYCODONE HYDROCHLORIDE 10 MG/1
10 TABLET ORAL EVERY 6 HOURS PRN
Qty: 90 TABLET | Refills: 0 | Status: SHIPPED | OUTPATIENT
Start: 2019-09-16 | End: 2019-10-24 | Stop reason: SDUPTHER

## 2019-09-16 NOTE — PROGRESS NOTES
Subjective:       Patient ID: Familia Durbin Jr. is a 74 y.o. male.    Chief Complaint: Results; Multiple Myeloma; and Pain    HPI 74-year-old male history of multiple myeloma currently on Pomalyst 3 mg days 1 through 21 a 28 day cycle cared for at [facility] continue chronic stable narcotic use    Past Medical History:   Diagnosis Date    CAD (coronary artery disease)     luikart    Diabetes mellitus, type 2 1979    eye dr rocha    Hearing impaired     Hyperlipidemia     Hypertension     Lupus     Discoid    Multiple myeloma     Old MI (myocardial infarction) 2012    Renal insufficiency     Tracheostomy tube present      Family History   Problem Relation Age of Onset    Diabetes Mother     Diabetes Father     Prostate cancer Father     Pancreatic cancer Sister     No Known Problems Brother     Diabetes Maternal Uncle     Diabetes Maternal Grandmother     No Known Problems Maternal Grandfather     No Known Problems Paternal Grandmother     No Known Problems Paternal Grandfather      Social History     Socioeconomic History    Marital status: Single     Spouse name: Not on file    Number of children: 9    Years of education: Not on file    Highest education level: Not on file   Occupational History    Not on file   Social Needs    Financial resource strain: Not on file    Food insecurity:     Worry: Not on file     Inability: Not on file    Transportation needs:     Medical: Not on file     Non-medical: Not on file   Tobacco Use    Smoking status: Never Smoker    Smokeless tobacco: Never Used   Substance and Sexual Activity    Alcohol use: No    Drug use: No    Sexual activity: Yes     Partners: Female   Lifestyle    Physical activity:     Days per week: Not on file     Minutes per session: Not on file    Stress: Not on file   Relationships    Social connections:     Talks on phone: Not on file     Gets together: Not on file     Attends  Voodoo service: Not on file     Active member of club or organization: Not on file     Attends meetings of clubs or organizations: Not on file     Relationship status: Not on file   Other Topics Concern    Not on file   Social History Narrative    Not on file     Past Surgical History:   Procedure Laterality Date    CARDIAC SURGERY      CATARACT EXTRACTION      COLONOSCOPY      CORONARY ARTERY BYPASS GRAFT      HAND SURGERY      KNEE SURGERY      TRACHEOSTOMY TUBE PLACEMENT      WRIST FUSION         Labs:  Lab Results   Component Value Date    WBC 3.48 (L) 09/16/2019    HGB 11.4 (L) 09/16/2019    HCT 35.5 (L) 09/16/2019     (H) 09/16/2019     09/16/2019     BMP  Lab Results   Component Value Date     09/16/2019    K 4.3 09/16/2019     09/16/2019    CO2 21 (L) 09/16/2019    BUN 24 (H) 09/16/2019    CREATININE 1.9 (H) 09/16/2019    CALCIUM 9.4 09/16/2019    ANIONGAP 10 09/16/2019    ESTGFRAFRICA 39 (A) 09/16/2019    EGFRNONAA 34 (A) 09/16/2019     Lab Results   Component Value Date    ALT 16 09/16/2019    AST 20 09/16/2019    ALKPHOS 60 09/16/2019    BILITOT 0.5 09/16/2019       Lab Results   Component Value Date    IRON 81 07/21/2016    TIBC 302 07/21/2016    FERRITIN 324 (H) 07/21/2016     No results found for: [id number]  No results found for: FOLATE  Lab Results   Component Value Date    TSH 0.683 01/22/2018         Review of Systems   Constitutional: Positive for activity change, appetite change and fatigue. Negative for chills, diaphoresis, fever and unexpected weight change.   HENT: Negative for congestion, dental problem, drooling, ear discharge, ear pain, facial swelling, hearing loss, mouth sores, nosebleeds, postnasal drip, rhinorrhea, sinus pressure, sneezing, sore throat, tinnitus, trouble swallowing and voice change.    Eyes: Negative for photophobia, pain, discharge, redness, itching and visual disturbance.   Respiratory: Negative for apnea, cough, choking, chest  tightness, shortness of breath, wheezing and stridor.    Cardiovascular: Negative for chest pain, palpitations and leg swelling.   Gastrointestinal: Negative for abdominal distention, abdominal pain, anal bleeding, blood in stool, constipation, diarrhea, nausea, rectal pain and vomiting.   Endocrine: Negative for cold intolerance, heat intolerance, polydipsia, polyphagia and polyuria.   Genitourinary: Negative for decreased urine volume, difficulty urinating, discharge, dysuria, enuresis, flank pain, frequency, genital sores, hematuria, penile pain, penile swelling, scrotal swelling, testicular pain and urgency.   Musculoskeletal: Positive for arthralgias, back pain and myalgias. Negative for gait problem, joint swelling, neck pain and neck stiffness.   Skin: Negative for color change, pallor, rash and wound.   Allergic/Immunologic: Negative for environmental allergies, food allergies and immunocompromised state.   Neurological: Positive for weakness. Negative for dizziness, tremors, seizures, syncope, facial asymmetry, speech difficulty, light-headedness, numbness and headaches.   Hematological: Negative for adenopathy. Does not bruise/bleed easily.   Psychiatric/Behavioral: Positive for dysphoric mood. Negative for agitation, behavioral problems, confusion, decreased concentration, hallucinations, self-injury, sleep disturbance and suicidal ideas. The patient is nervous/anxious. The patient is not hyperactive.        Objective:      Physical Exam   Constitutional: He is oriented to person, place, and time. He appears well-developed and well-nourished. He appears distressed.   HENT:   Head: Normocephalic.   Right Ear: External ear normal.   Left Ear: External ear normal.   Nose: Nose normal. Right sinus exhibits no maxillary sinus tenderness and no frontal sinus tenderness. Left sinus exhibits no maxillary sinus tenderness and no frontal sinus tenderness.   Mouth/Throat: Oropharynx is clear and moist. No  oropharyngeal exudate.   Eyes: Pupils are equal, round, and reactive to light. EOM and lids are normal. Right eye exhibits no discharge. Left eye exhibits no discharge. Right conjunctiva is not injected. Right conjunctiva has no hemorrhage. Left conjunctiva is not injected. Left conjunctiva has no hemorrhage. No scleral icterus. Right eye exhibits normal extraocular motion. Left eye exhibits normal extraocular motion.   Neck: Normal range of motion. Neck supple. No JVD present. No tracheal deviation present. No thyromegaly present.   Cardiovascular: Normal rate and regular rhythm.   Pulmonary/Chest: Effort normal. No stridor. No respiratory distress.   Abdominal: Soft. He exhibits no mass. There is no hepatosplenomegaly, splenomegaly or hepatomegaly. There is no tenderness.   Musculoskeletal: Normal range of motion. He exhibits no edema or tenderness.   Lymphadenopathy:        Head (right side): No posterior auricular and no occipital adenopathy present.        Head (left side): No posterior auricular and no occipital adenopathy present.     He has no cervical adenopathy.        Right cervical: No superficial cervical, no deep cervical and no posterior cervical adenopathy present.       Left cervical: No superficial cervical, no deep cervical and no posterior cervical adenopathy present.     He has no axillary adenopathy.        Right: No supraclavicular adenopathy present.        Left: No supraclavicular adenopathy present.   Neurological: He is alert and oriented to person, place, and time. He has normal strength. No cranial nerve deficit. Coordination normal.   Skin: Skin is dry. No rash noted. He is not diaphoretic. No cyanosis or erythema. Nails show no clubbing.   Psychiatric: He has a normal mood and affect. His behavior is normal. Judgment and thought content normal. Cognition and memory are normal.   Vitals reviewed.          Assessment:      1. Multiple myeloma not having achieved remission    2.  Metastatic bone cancer    3. Multiple myeloma, remission status unspecified           Plan:   Continue to monitor locally having never received any evaluation and follow-up through [facility] I know patient has attended.  Does have diagnosis multiple myeloma.  Continue with follow-up in 6-8 weeks with clinical follow-up prescriptions chronic stable narcotics sent to pharmacy          Rodolfo Solo Jr, MD FACP

## 2019-09-17 LAB
ALBUMIN SERPL ELPH-MCNC: 3.79 G/DL (ref 3.35–5.55)
ALPHA1 GLOB SERPL ELPH-MCNC: 0.39 G/DL (ref 0.17–0.41)
ALPHA2 GLOB SERPL ELPH-MCNC: 0.91 G/DL (ref 0.43–0.99)
B-GLOBULIN SERPL ELPH-MCNC: 1.18 G/DL (ref 0.5–1.1)
GAMMA GLOB SERPL ELPH-MCNC: 2.13 G/DL (ref 0.67–1.58)
KAPPA LC SER QL IA: 10.31 MG/DL (ref 0.33–1.94)
KAPPA LC/LAMBDA SER IA: 1.8 (ref 0.26–1.65)
LAMBDA LC SER QL IA: 5.73 MG/DL (ref 0.57–2.63)
PROT SERPL-MCNC: 8.4 G/DL (ref 6–8.4)

## 2019-09-18 LAB — PATHOLOGIST INTERPRETATION SPE: NORMAL

## 2019-09-19 LAB — INTERPRETATION SERPL IFE-IMP: NORMAL

## 2019-09-20 LAB — PATHOLOGIST INTERPRETATION IFE: NORMAL

## 2019-10-15 DIAGNOSIS — I10 ESSENTIAL HYPERTENSION: ICD-10-CM

## 2019-10-16 RX ORDER — METOPROLOL TARTRATE 50 MG/1
TABLET ORAL
Qty: 180 TABLET | Refills: 3 | Status: SHIPPED | OUTPATIENT
Start: 2019-10-16 | End: 2020-10-26

## 2019-10-17 DIAGNOSIS — C90.00 MULTIPLE MYELOMA, REMISSION STATUS UNSPECIFIED: ICD-10-CM

## 2019-10-17 DIAGNOSIS — C90.00 MULTIPLE MYELOMA NOT HAVING ACHIEVED REMISSION: ICD-10-CM

## 2019-10-17 RX ORDER — OXYCODONE HYDROCHLORIDE 10 MG/1
10 TABLET ORAL EVERY 6 HOURS PRN
Qty: 90 TABLET | Refills: 0 | Status: CANCELLED | OUTPATIENT
Start: 2019-10-17

## 2019-10-17 RX ORDER — OXYCODONE HCL 20 MG/1
20 TABLET, FILM COATED, EXTENDED RELEASE ORAL EVERY 12 HOURS
Qty: 60 EACH | Refills: 0 | Status: CANCELLED | OUTPATIENT
Start: 2019-10-17

## 2019-10-18 DIAGNOSIS — C90.00 MULTIPLE MYELOMA NOT HAVING ACHIEVED REMISSION: ICD-10-CM

## 2019-10-18 DIAGNOSIS — C90.00 MULTIPLE MYELOMA, REMISSION STATUS UNSPECIFIED: ICD-10-CM

## 2019-10-18 RX ORDER — OXYCODONE HYDROCHLORIDE 10 MG/1
10 TABLET ORAL EVERY 6 HOURS PRN
Qty: 90 TABLET | Refills: 0 | Status: CANCELLED | OUTPATIENT
Start: 2019-10-18

## 2019-10-18 RX ORDER — OXYCODONE HCL 20 MG/1
20 TABLET, FILM COATED, EXTENDED RELEASE ORAL EVERY 12 HOURS
Qty: 60 EACH | Refills: 0 | Status: CANCELLED | OUTPATIENT
Start: 2019-10-18

## 2019-10-21 ENCOUNTER — TELEPHONE (OUTPATIENT)
Dept: HEMATOLOGY/ONCOLOGY | Facility: CLINIC | Age: 74
End: 2019-10-21

## 2019-10-22 ENCOUNTER — PATIENT MESSAGE (OUTPATIENT)
Dept: HEMATOLOGY/ONCOLOGY | Facility: CLINIC | Age: 74
End: 2019-10-22

## 2019-10-22 DIAGNOSIS — C90.00 MULTIPLE MYELOMA NOT HAVING ACHIEVED REMISSION: ICD-10-CM

## 2019-10-22 DIAGNOSIS — I10 ESSENTIAL HYPERTENSION: ICD-10-CM

## 2019-10-22 DIAGNOSIS — C90.00 MULTIPLE MYELOMA, REMISSION STATUS UNSPECIFIED: ICD-10-CM

## 2019-10-22 RX ORDER — OXYCODONE HYDROCHLORIDE 10 MG/1
10 TABLET ORAL EVERY 6 HOURS PRN
Qty: 90 TABLET | Refills: 0 | OUTPATIENT
Start: 2019-10-22

## 2019-10-22 RX ORDER — OXYCODONE HCL 20 MG/1
20 TABLET, FILM COATED, EXTENDED RELEASE ORAL EVERY 12 HOURS
Qty: 60 EACH | Refills: 0 | OUTPATIENT
Start: 2019-10-22

## 2019-10-22 RX ORDER — METOPROLOL TARTRATE 50 MG/1
50 TABLET ORAL 2 TIMES DAILY
Qty: 180 TABLET | Refills: 3 | Status: CANCELLED | OUTPATIENT
Start: 2019-10-22

## 2019-10-23 ENCOUNTER — TELEPHONE (OUTPATIENT)
Dept: HEMATOLOGY/ONCOLOGY | Facility: CLINIC | Age: 74
End: 2019-10-23

## 2019-10-23 NOTE — TELEPHONE ENCOUNTER
Patient daughter called and has rescheduled his appt from Monday to today. Patient daughter verbalized her father is out of pain meds and is in great pain due to him having myeloma.

## 2019-10-24 ENCOUNTER — LAB VISIT (OUTPATIENT)
Dept: LAB | Facility: HOSPITAL | Age: 74
End: 2019-10-24
Attending: INTERNAL MEDICINE
Payer: MEDICARE

## 2019-10-24 ENCOUNTER — OFFICE VISIT (OUTPATIENT)
Dept: HEMATOLOGY/ONCOLOGY | Facility: CLINIC | Age: 74
End: 2019-10-24
Payer: MEDICARE

## 2019-10-24 ENCOUNTER — IMMUNIZATION (OUTPATIENT)
Dept: INTERNAL MEDICINE | Facility: CLINIC | Age: 74
End: 2019-10-24
Payer: MEDICARE

## 2019-10-24 VITALS
HEART RATE: 66 BPM | SYSTOLIC BLOOD PRESSURE: 126 MMHG | TEMPERATURE: 98 F | RESPIRATION RATE: 18 BRPM | DIASTOLIC BLOOD PRESSURE: 50 MMHG | HEIGHT: 70 IN | OXYGEN SATURATION: 98 % | WEIGHT: 164 LBS | BODY MASS INDEX: 23.48 KG/M2

## 2019-10-24 DIAGNOSIS — C90.00 MULTIPLE MYELOMA NOT HAVING ACHIEVED REMISSION: ICD-10-CM

## 2019-10-24 DIAGNOSIS — C90.00 MULTIPLE MYELOMA, REMISSION STATUS UNSPECIFIED: ICD-10-CM

## 2019-10-24 LAB
ALBUMIN SERPL BCP-MCNC: 3.7 G/DL (ref 3.5–5.2)
ALP SERPL-CCNC: 68 U/L (ref 55–135)
ALT SERPL W/O P-5'-P-CCNC: 13 U/L (ref 10–44)
ANION GAP SERPL CALC-SCNC: 9 MMOL/L (ref 8–16)
AST SERPL-CCNC: 16 U/L (ref 10–40)
BASOPHILS # BLD AUTO: 0.02 K/UL (ref 0–0.2)
BASOPHILS NFR BLD: 0.5 % (ref 0–1.9)
BILIRUB SERPL-MCNC: 0.5 MG/DL (ref 0.1–1)
BUN SERPL-MCNC: 17 MG/DL (ref 8–23)
CALCIUM SERPL-MCNC: 9.9 MG/DL (ref 8.7–10.5)
CHLORIDE SERPL-SCNC: 103 MMOL/L (ref 95–110)
CO2 SERPL-SCNC: 27 MMOL/L (ref 23–29)
CREAT SERPL-MCNC: 1.7 MG/DL (ref 0.5–1.4)
DIFFERENTIAL METHOD: ABNORMAL
EOSINOPHIL # BLD AUTO: 0 K/UL (ref 0–0.5)
EOSINOPHIL NFR BLD: 1 % (ref 0–8)
ERYTHROCYTE [DISTWIDTH] IN BLOOD BY AUTOMATED COUNT: 16.4 % (ref 11.5–14.5)
EST. GFR  (AFRICAN AMERICAN): 45 ML/MIN/1.73 M^2
EST. GFR  (NON AFRICAN AMERICAN): 39 ML/MIN/1.73 M^2
GLUCOSE SERPL-MCNC: 214 MG/DL (ref 70–110)
HCT VFR BLD AUTO: 35 % (ref 40–54)
HGB BLD-MCNC: 11.1 G/DL (ref 14–18)
IMM GRANULOCYTES # BLD AUTO: 0.01 K/UL (ref 0–0.04)
IMM GRANULOCYTES NFR BLD AUTO: 0.3 % (ref 0–0.5)
LDH SERPL L TO P-CCNC: 118 U/L (ref 110–260)
LYMPHOCYTES # BLD AUTO: 0.6 K/UL (ref 1–4.8)
LYMPHOCYTES NFR BLD: 15.2 % (ref 18–48)
MCH RBC QN AUTO: 33.1 PG (ref 27–31)
MCHC RBC AUTO-ENTMCNC: 31.7 G/DL (ref 32–36)
MCV RBC AUTO: 105 FL (ref 82–98)
MONOCYTES # BLD AUTO: 0.3 K/UL (ref 0.3–1)
MONOCYTES NFR BLD: 7 % (ref 4–15)
NEUTROPHILS # BLD AUTO: 3 K/UL (ref 1.8–7.7)
NEUTROPHILS NFR BLD: 76.3 % (ref 38–73)
NRBC BLD-RTO: 0 /100 WBC
PLATELET # BLD AUTO: 196 K/UL (ref 150–350)
PMV BLD AUTO: 10.7 FL (ref 9.2–12.9)
POTASSIUM SERPL-SCNC: 4.2 MMOL/L (ref 3.5–5.1)
PROT SERPL-MCNC: 8.8 G/DL (ref 6–8.4)
RBC # BLD AUTO: 3.35 M/UL (ref 4.6–6.2)
SODIUM SERPL-SCNC: 139 MMOL/L (ref 136–145)
WBC # BLD AUTO: 3.88 K/UL (ref 3.9–12.7)

## 2019-10-24 PROCEDURE — 84165 PROTEIN E-PHORESIS SERUM: CPT | Mod: 26,,, | Performed by: PATHOLOGY

## 2019-10-24 PROCEDURE — 86334 IMMUNOFIX E-PHORESIS SERUM: CPT | Mod: 26,,, | Performed by: PATHOLOGY

## 2019-10-24 PROCEDURE — 85025 COMPLETE CBC W/AUTO DIFF WBC: CPT

## 2019-10-24 PROCEDURE — 90662 IIV NO PRSV INCREASED AG IM: CPT | Mod: S$GLB,,, | Performed by: FAMILY MEDICINE

## 2019-10-24 PROCEDURE — 83615 LACTATE (LD) (LDH) ENZYME: CPT

## 2019-10-24 PROCEDURE — 80053 COMPREHEN METABOLIC PANEL: CPT

## 2019-10-24 PROCEDURE — 99214 OFFICE O/P EST MOD 30 MIN: CPT | Mod: S$GLB,,, | Performed by: INTERNAL MEDICINE

## 2019-10-24 PROCEDURE — 1101F PT FALLS ASSESS-DOCD LE1/YR: CPT | Mod: CPTII,S$GLB,, | Performed by: INTERNAL MEDICINE

## 2019-10-24 PROCEDURE — 99999 PR PBB SHADOW E&M-EST. PATIENT-LVL III: ICD-10-PCS | Mod: PBBFAC,,, | Performed by: INTERNAL MEDICINE

## 2019-10-24 PROCEDURE — G0008 FLU VACCINE - HIGH DOSE (65+) PRESERVATIVE FREE IM: ICD-10-PCS | Mod: S$GLB,,, | Performed by: FAMILY MEDICINE

## 2019-10-24 PROCEDURE — 84165 PROTEIN E-PHORESIS SERUM: CPT

## 2019-10-24 PROCEDURE — 3078F DIAST BP <80 MM HG: CPT | Mod: CPTII,S$GLB,, | Performed by: INTERNAL MEDICINE

## 2019-10-24 PROCEDURE — 99214 PR OFFICE/OUTPT VISIT, EST, LEVL IV, 30-39 MIN: ICD-10-PCS | Mod: S$GLB,,, | Performed by: INTERNAL MEDICINE

## 2019-10-24 PROCEDURE — 86334 PATHOLOGIST INTERPRETATION IFE: ICD-10-PCS | Mod: 26,,, | Performed by: PATHOLOGY

## 2019-10-24 PROCEDURE — 3074F SYST BP LT 130 MM HG: CPT | Mod: CPTII,S$GLB,, | Performed by: INTERNAL MEDICINE

## 2019-10-24 PROCEDURE — 84165 PATHOLOGIST INTERPRETATION SPE: ICD-10-PCS | Mod: 26,,, | Performed by: PATHOLOGY

## 2019-10-24 PROCEDURE — 86334 IMMUNOFIX E-PHORESIS SERUM: CPT

## 2019-10-24 PROCEDURE — 90662 FLU VACCINE - HIGH DOSE (65+) PRESERVATIVE FREE IM: ICD-10-PCS | Mod: S$GLB,,, | Performed by: FAMILY MEDICINE

## 2019-10-24 PROCEDURE — 3078F PR MOST RECENT DIASTOLIC BLOOD PRESSURE < 80 MM HG: ICD-10-PCS | Mod: CPTII,S$GLB,, | Performed by: INTERNAL MEDICINE

## 2019-10-24 PROCEDURE — 83520 IMMUNOASSAY QUANT NOS NONAB: CPT | Mod: 59

## 2019-10-24 PROCEDURE — 36415 COLL VENOUS BLD VENIPUNCTURE: CPT

## 2019-10-24 PROCEDURE — 1101F PR PT FALLS ASSESS DOC 0-1 FALLS W/OUT INJ PAST YR: ICD-10-PCS | Mod: CPTII,S$GLB,, | Performed by: INTERNAL MEDICINE

## 2019-10-24 PROCEDURE — 3074F PR MOST RECENT SYSTOLIC BLOOD PRESSURE < 130 MM HG: ICD-10-PCS | Mod: CPTII,S$GLB,, | Performed by: INTERNAL MEDICINE

## 2019-10-24 PROCEDURE — 99999 PR PBB SHADOW E&M-EST. PATIENT-LVL III: CPT | Mod: PBBFAC,,, | Performed by: INTERNAL MEDICINE

## 2019-10-24 PROCEDURE — G0008 ADMIN INFLUENZA VIRUS VAC: HCPCS | Mod: S$GLB,,, | Performed by: FAMILY MEDICINE

## 2019-10-24 RX ORDER — OXYCODONE HCL 20 MG/1
20 TABLET, FILM COATED, EXTENDED RELEASE ORAL EVERY 12 HOURS
Qty: 60 EACH | Refills: 0 | Status: SHIPPED | OUTPATIENT
Start: 2019-10-24 | End: 2019-11-22 | Stop reason: SDUPTHER

## 2019-10-24 RX ORDER — OXYCODONE HYDROCHLORIDE 10 MG/1
10 TABLET ORAL EVERY 6 HOURS PRN
Qty: 90 TABLET | Refills: 0 | Status: SHIPPED | OUTPATIENT
Start: 2019-10-24 | End: 2019-11-22 | Stop reason: SDUPTHER

## 2019-10-24 NOTE — PROGRESS NOTES
Subjective:       Patient ID: Familia Durbin Jr. is a 74 y.o. male.    Chief Complaint: Results and Multiple Myeloma    HPI 74-year-old male history of multiple myeloma complains of back pain is here for refill of narcotics.  Cared for by [facility] next visit in December of 2019.  ECOG status 2    Past Medical History:   Diagnosis Date    CAD (coronary artery disease)     luikart    Diabetes mellitus, type 2 1979    eye dr rocha    Hearing impaired     Hyperlipidemia     Hypertension     Lupus     Discoid    Multiple myeloma     Old MI (myocardial infarction) 2012    Renal insufficiency     Tracheostomy tube present      Family History   Problem Relation Age of Onset    Diabetes Mother     Diabetes Father     Prostate cancer Father     Pancreatic cancer Sister     No Known Problems Brother     Diabetes Maternal Uncle     Diabetes Maternal Grandmother     No Known Problems Maternal Grandfather     No Known Problems Paternal Grandmother     No Known Problems Paternal Grandfather      Social History     Socioeconomic History    Marital status: Single     Spouse name: Not on file    Number of children: 9    Years of education: Not on file    Highest education level: Not on file   Occupational History    Not on file   Social Needs    Financial resource strain: Not on file    Food insecurity:     Worry: Not on file     Inability: Not on file    Transportation needs:     Medical: Not on file     Non-medical: Not on file   Tobacco Use    Smoking status: Never Smoker    Smokeless tobacco: Never Used   Substance and Sexual Activity    Alcohol use: No    Drug use: No    Sexual activity: Yes     Partners: Female   Lifestyle    Physical activity:     Days per week: Not on file     Minutes per session: Not on file    Stress: Not on file   Relationships    Social connections:     Talks on phone: Not on file     Gets together: Not on file     Attends [employer]  service: Not on file     Active member of club or organization: Not on file     Attends meetings of clubs or organizations: Not on file     Relationship status: Not on file   Other Topics Concern    Not on file   Social History Narrative    Not on file     Past Surgical History:   Procedure Laterality Date    CARDIAC SURGERY      CATARACT EXTRACTION      COLONOSCOPY      CORONARY ARTERY BYPASS GRAFT      HAND SURGERY      KNEE SURGERY      TRACHEOSTOMY TUBE PLACEMENT      WRIST FUSION         Labs:  Lab Results   Component Value Date    WBC 3.48 (L) 09/16/2019    HGB 11.4 (L) 09/16/2019    HCT 35.5 (L) 09/16/2019     (H) 09/16/2019     09/16/2019     BMP  Lab Results   Component Value Date     09/16/2019    K 4.3 09/16/2019     09/16/2019    CO2 21 (L) 09/16/2019    BUN 24 (H) 09/16/2019    CREATININE 1.9 (H) 09/16/2019    CALCIUM 9.4 09/16/2019    ANIONGAP 10 09/16/2019    ESTGFRAFRICA 39 (A) 09/16/2019    EGFRNONAA 34 (A) 09/16/2019     Lab Results   Component Value Date    ALT 16 09/16/2019    AST 20 09/16/2019    ALKPHOS 60 09/16/2019    BILITOT 0.5 09/16/2019       Lab Results   Component Value Date    IRON 81 07/21/2016    TIBC 302 07/21/2016    FERRITIN 324 (H) 07/21/2016     No results found for: [id number]  No results found for: FOLATE  Lab Results   Component Value Date    TSH 0.683 01/22/2018         Review of Systems   Constitutional: Negative for activity change, appetite change, chills, diaphoresis, fatigue, fever and unexpected weight change.   HENT: Negative for congestion, dental problem, drooling, ear discharge, ear pain, facial swelling, hearing loss, mouth sores, nosebleeds, postnasal drip, rhinorrhea, sinus pressure, sneezing, sore throat, tinnitus, trouble swallowing and voice change.    Eyes: Negative for photophobia, pain, discharge, redness, itching and visual disturbance.   Respiratory: Negative for apnea, cough, choking, chest tightness, shortness of  breath, wheezing and stridor.    Cardiovascular: Negative for chest pain, palpitations and leg swelling.   Gastrointestinal: Negative for abdominal distention, abdominal pain, anal bleeding, blood in stool, constipation, diarrhea, nausea, rectal pain and vomiting.   Endocrine: Negative for cold intolerance, heat intolerance, polydipsia, polyphagia and polyuria.   Genitourinary: Negative for decreased urine volume, difficulty urinating, discharge, dysuria, enuresis, flank pain, frequency, genital sores, hematuria, penile pain, penile swelling, scrotal swelling, testicular pain and urgency.   Musculoskeletal: Positive for back pain. Negative for arthralgias, gait problem, joint swelling, myalgias, neck pain and neck stiffness.   Skin: Negative for color change, pallor, rash and wound.   Allergic/Immunologic: Negative for environmental allergies, food allergies and immunocompromised state.   Neurological: Negative for dizziness, tremors, seizures, syncope, facial asymmetry, speech difficulty, weakness, light-headedness, numbness and headaches.   Hematological: Negative for adenopathy. Does not bruise/bleed easily.   Psychiatric/Behavioral: Positive for dysphoric mood. Negative for agitation, behavioral problems, confusion, decreased concentration, hallucinations, self-injury, sleep disturbance and suicidal ideas. The patient is not nervous/anxious and is not hyperactive.        Objective:      Physical Exam   Constitutional: He is oriented to person, place, and time. He appears well-developed and well-nourished. He appears distressed.   HENT:   Head: Normocephalic.   Right Ear: External ear normal.   Left Ear: External ear normal.   Nose: Nose normal. Right sinus exhibits no maxillary sinus tenderness and no frontal sinus tenderness. Left sinus exhibits no maxillary sinus tenderness and no frontal sinus tenderness.   Mouth/Throat: Oropharynx is clear and moist. No oropharyngeal exudate.   Eyes: Pupils are equal, round,  and reactive to light. EOM and lids are normal. Right eye exhibits no discharge. Left eye exhibits no discharge. Right conjunctiva is not injected. Right conjunctiva has no hemorrhage. Left conjunctiva is not injected. Left conjunctiva has no hemorrhage. No scleral icterus. Right eye exhibits normal extraocular motion. Left eye exhibits normal extraocular motion.   Neck: Normal range of motion. Neck supple. No JVD present. No tracheal deviation present. No thyromegaly present.   Cardiovascular: Normal rate and regular rhythm.   Pulmonary/Chest: Effort normal. No stridor. No respiratory distress.   Abdominal: Soft. He exhibits no mass. There is no hepatosplenomegaly, splenomegaly or hepatomegaly. There is no tenderness.   Musculoskeletal: Normal range of motion. He exhibits no edema or tenderness.   Lymphadenopathy:        Head (right side): No posterior auricular and no occipital adenopathy present.        Head (left side): No posterior auricular and no occipital adenopathy present.     He has no cervical adenopathy.        Right cervical: No superficial cervical, no deep cervical and no posterior cervical adenopathy present.       Left cervical: No superficial cervical, no deep cervical and no posterior cervical adenopathy present.     He has no axillary adenopathy.        Right: No supraclavicular adenopathy present.        Left: No supraclavicular adenopathy present.   Neurological: He is alert and oriented to person, place, and time. He has normal strength. No cranial nerve deficit. Coordination abnormal.   Skin: Skin is dry. No rash noted. He is not diaphoretic. No cyanosis or erythema. Nails show no clubbing.   Psychiatric: He has a normal mood and affect. His behavior is normal. Judgment and thought content normal. Cognition and memory are normal.   Vitals reviewed.          Assessment:      1. Metastatic bone cancer    2. Multiple myeloma not having achieved remission    3. Multiple myeloma, remission status  unspecified           Plan:     Increasing low back pain refill chronic stable narcotics be patient will have laboratory studies today to attest stability of myeloma.  Which has been well controlled.  Will have MRI of lumbar sacral spine patient points to pain tenderness in lower lumbar    Rodolfo Solo Jr, MD FACP

## 2019-10-25 LAB
ALBUMIN SERPL ELPH-MCNC: 3.83 G/DL (ref 3.35–5.55)
ALPHA1 GLOB SERPL ELPH-MCNC: 0.4 G/DL (ref 0.17–0.41)
ALPHA2 GLOB SERPL ELPH-MCNC: 0.79 G/DL (ref 0.43–0.99)
B-GLOBULIN SERPL ELPH-MCNC: 1.16 G/DL (ref 0.5–1.1)
GAMMA GLOB SERPL ELPH-MCNC: 2.03 G/DL (ref 0.67–1.58)
KAPPA LC SER QL IA: 12.49 MG/DL (ref 0.33–1.94)
KAPPA LC/LAMBDA SER IA: 1.58 (ref 0.26–1.65)
LAMBDA LC SER QL IA: 7.89 MG/DL (ref 0.57–2.63)
PROT SERPL-MCNC: 8.2 G/DL (ref 6–8.4)

## 2019-10-31 RX ORDER — PANTOPRAZOLE SODIUM 40 MG/1
TABLET, DELAYED RELEASE ORAL
Qty: 30 TABLET | Refills: 0 | Status: SHIPPED | OUTPATIENT
Start: 2019-10-31 | End: 2020-11-23 | Stop reason: SDUPTHER

## 2019-11-06 ENCOUNTER — TELEPHONE (OUTPATIENT)
Dept: RADIOLOGY | Facility: HOSPITAL | Age: 74
End: 2019-11-06

## 2019-11-21 ENCOUNTER — PATIENT OUTREACH (OUTPATIENT)
Dept: ADMINISTRATIVE | Facility: HOSPITAL | Age: 74
End: 2019-11-21

## 2019-11-21 DIAGNOSIS — C90.00 MULTIPLE MYELOMA, REMISSION STATUS UNSPECIFIED: ICD-10-CM

## 2019-11-21 DIAGNOSIS — C90.00 MULTIPLE MYELOMA NOT HAVING ACHIEVED REMISSION: ICD-10-CM

## 2019-11-21 RX ORDER — OXYCODONE HYDROCHLORIDE 10 MG/1
10 TABLET ORAL EVERY 6 HOURS PRN
Qty: 90 TABLET | Refills: 0 | OUTPATIENT
Start: 2019-11-21

## 2019-11-21 RX ORDER — OXYCODONE HCL 20 MG/1
20 TABLET, FILM COATED, EXTENDED RELEASE ORAL EVERY 12 HOURS
Qty: 60 EACH | Refills: 0 | OUTPATIENT
Start: 2019-11-21

## 2019-11-21 NOTE — PROGRESS NOTES
Called pt to schedule repeat HA1C. Talked to daughter. Agreed to schedule labs in the morning with already scheduled appt. Lab appt scheduled.

## 2019-11-22 ENCOUNTER — TELEPHONE (OUTPATIENT)
Dept: HEMATOLOGY/ONCOLOGY | Facility: CLINIC | Age: 74
End: 2019-11-22

## 2019-11-22 ENCOUNTER — LAB VISIT (OUTPATIENT)
Dept: LAB | Facility: HOSPITAL | Age: 74
End: 2019-11-22
Attending: FAMILY MEDICINE
Payer: MEDICARE

## 2019-11-22 ENCOUNTER — OFFICE VISIT (OUTPATIENT)
Dept: HEMATOLOGY/ONCOLOGY | Facility: CLINIC | Age: 74
End: 2019-11-22
Payer: MEDICARE

## 2019-11-22 VITALS
HEIGHT: 70 IN | DIASTOLIC BLOOD PRESSURE: 54 MMHG | WEIGHT: 158.75 LBS | SYSTOLIC BLOOD PRESSURE: 129 MMHG | BODY MASS INDEX: 22.73 KG/M2 | TEMPERATURE: 98 F | OXYGEN SATURATION: 98 % | HEART RATE: 85 BPM

## 2019-11-22 DIAGNOSIS — C90.00 MULTIPLE MYELOMA, REMISSION STATUS UNSPECIFIED: ICD-10-CM

## 2019-11-22 DIAGNOSIS — Z79.4 TYPE 2 DIABETES MELLITUS WITH DIABETIC NEPHROPATHY, WITH LONG-TERM CURRENT USE OF INSULIN: Chronic | ICD-10-CM

## 2019-11-22 DIAGNOSIS — E11.21 TYPE 2 DIABETES MELLITUS WITH DIABETIC NEPHROPATHY, WITH LONG-TERM CURRENT USE OF INSULIN: Chronic | ICD-10-CM

## 2019-11-22 DIAGNOSIS — C90.00 MULTIPLE MYELOMA NOT HAVING ACHIEVED REMISSION: Primary | ICD-10-CM

## 2019-11-22 DIAGNOSIS — C90.00 MULTIPLE MYELOMA NOT HAVING ACHIEVED REMISSION: ICD-10-CM

## 2019-11-22 LAB
CHOLEST SERPL-MCNC: 130 MG/DL (ref 120–199)
CHOLEST/HDLC SERPL: 2.8 {RATIO} (ref 2–5)
ESTIMATED AVG GLUCOSE: 163 MG/DL (ref 68–131)
HBA1C MFR BLD HPLC: 7.3 % (ref 4–5.6)
HDLC SERPL-MCNC: 47 MG/DL (ref 40–75)
HDLC SERPL: 36.2 % (ref 20–50)
LDLC SERPL CALC-MCNC: 67.2 MG/DL (ref 63–159)
NONHDLC SERPL-MCNC: 83 MG/DL
TRIGL SERPL-MCNC: 79 MG/DL (ref 30–150)

## 2019-11-22 PROCEDURE — 1159F MED LIST DOCD IN RCRD: CPT | Mod: S$GLB,,, | Performed by: NURSE PRACTITIONER

## 2019-11-22 PROCEDURE — 3074F PR MOST RECENT SYSTOLIC BLOOD PRESSURE < 130 MM HG: ICD-10-PCS | Mod: CPTII,S$GLB,, | Performed by: NURSE PRACTITIONER

## 2019-11-22 PROCEDURE — 3074F SYST BP LT 130 MM HG: CPT | Mod: CPTII,S$GLB,, | Performed by: NURSE PRACTITIONER

## 2019-11-22 PROCEDURE — 36415 COLL VENOUS BLD VENIPUNCTURE: CPT

## 2019-11-22 PROCEDURE — 99999 PR PBB SHADOW E&M-EST. PATIENT-LVL III: ICD-10-PCS | Mod: PBBFAC,,, | Performed by: NURSE PRACTITIONER

## 2019-11-22 PROCEDURE — 99214 OFFICE O/P EST MOD 30 MIN: CPT | Mod: S$GLB,,, | Performed by: NURSE PRACTITIONER

## 2019-11-22 PROCEDURE — 1126F AMNT PAIN NOTED NONE PRSNT: CPT | Mod: S$GLB,,, | Performed by: NURSE PRACTITIONER

## 2019-11-22 PROCEDURE — 1101F PT FALLS ASSESS-DOCD LE1/YR: CPT | Mod: CPTII,S$GLB,, | Performed by: NURSE PRACTITIONER

## 2019-11-22 PROCEDURE — 99999 PR PBB SHADOW E&M-EST. PATIENT-LVL III: CPT | Mod: PBBFAC,,, | Performed by: NURSE PRACTITIONER

## 2019-11-22 PROCEDURE — 3078F DIAST BP <80 MM HG: CPT | Mod: CPTII,S$GLB,, | Performed by: NURSE PRACTITIONER

## 2019-11-22 PROCEDURE — 1126F PR PAIN SEVERITY QUANTIFIED, NO PAIN PRESENT: ICD-10-PCS | Mod: S$GLB,,, | Performed by: NURSE PRACTITIONER

## 2019-11-22 PROCEDURE — 80061 LIPID PANEL: CPT

## 2019-11-22 PROCEDURE — 99214 PR OFFICE/OUTPT VISIT, EST, LEVL IV, 30-39 MIN: ICD-10-PCS | Mod: S$GLB,,, | Performed by: NURSE PRACTITIONER

## 2019-11-22 PROCEDURE — 3078F PR MOST RECENT DIASTOLIC BLOOD PRESSURE < 80 MM HG: ICD-10-PCS | Mod: CPTII,S$GLB,, | Performed by: NURSE PRACTITIONER

## 2019-11-22 PROCEDURE — 1101F PR PT FALLS ASSESS DOC 0-1 FALLS W/OUT INJ PAST YR: ICD-10-PCS | Mod: CPTII,S$GLB,, | Performed by: NURSE PRACTITIONER

## 2019-11-22 PROCEDURE — 1159F PR MEDICATION LIST DOCUMENTED IN MEDICAL RECORD: ICD-10-PCS | Mod: S$GLB,,, | Performed by: NURSE PRACTITIONER

## 2019-11-22 PROCEDURE — 83036 HEMOGLOBIN GLYCOSYLATED A1C: CPT

## 2019-11-22 RX ORDER — OXYCODONE HYDROCHLORIDE 10 MG/1
10 TABLET ORAL EVERY 6 HOURS PRN
Qty: 90 TABLET | Refills: 0 | Status: SHIPPED | OUTPATIENT
Start: 2019-11-22 | End: 2019-12-06 | Stop reason: SDUPTHER

## 2019-11-22 RX ORDER — OXYCODONE HCL 20 MG/1
20 TABLET, FILM COATED, EXTENDED RELEASE ORAL EVERY 12 HOURS
Qty: 60 EACH | Refills: 0 | Status: SHIPPED | OUTPATIENT
Start: 2019-11-22 | End: 2019-12-06 | Stop reason: SDUPTHER

## 2019-11-22 RX ORDER — OXYCODONE HCL 20 MG/1
20 TABLET, FILM COATED, EXTENDED RELEASE ORAL EVERY 12 HOURS
Qty: 60 EACH | Refills: 0 | Status: CANCELLED | OUTPATIENT
Start: 2019-11-22

## 2019-11-22 RX ORDER — OXYCODONE HYDROCHLORIDE 10 MG/1
10 TABLET ORAL EVERY 6 HOURS PRN
Qty: 90 TABLET | Refills: 0 | Status: CANCELLED | OUTPATIENT
Start: 2019-11-22

## 2019-11-22 NOTE — PROGRESS NOTES
Subjective:       Patient ID: Familia Durbin Jr. is a 74 y.o. male.    Chief Complaint: F/U multiple myeloma. Medication refill    HPI: 74 y.o male with h/o multiple myeloma not having achieved remission currently being treated with Pomalyst 3 mg days 1 through 21 of every 28 day cycle. He reports feeling well overall except persistent back pain. MRI lumbar previously ordered per Dr. Solo. Patient has not yet had done. He continues to follow up with [facility] in [place]. Reports upcoming appointment 12/10/19. He denies any fever, chills, night sweats, diarrhea, N/V. Does report intermittent constipation controlled with diet.   Social History     Socioeconomic History    Marital status: Single     Spouse name: Not on file    Number of children: 9    Years of education: Not on file    Highest education level: Not on file   Occupational History    Not on file   Social Needs    Financial resource strain: Not on file    Food insecurity:     Worry: Not on file     Inability: Not on file    Transportation needs:     Medical: Not on file     Non-medical: Not on file   Tobacco Use    Smoking status: Never Smoker    Smokeless tobacco: Never Used   Substance and Sexual Activity    Alcohol use: No    Drug use: No    Sexual activity: Yes     Partners: Female   Lifestyle    Physical activity:     Days per week: Not on file     Minutes per session: Not on file    Stress: Not on file   Relationships    Social connections:     Talks on phone: Not on file     Gets together: Not on file     Attends Holiness service: Not on file     Active member of club or organization: Not on file     Attends meetings of clubs or organizations: Not on file     Relationship status: Not on file   Other Topics Concern    Not on file   Social History Narrative    Not on file       Past Medical History:   Diagnosis Date    CAD (coronary artery disease)     tyree    Diabetes mellitus, type 2 1979    eye   rocha    Hearing impaired     Hyperlipidemia     Hypertension     Lupus     Discoid    Multiple myeloma     Old MI (myocardial infarction) 2012    Renal insufficiency     Tracheostomy tube present        Family History   Problem Relation Age of Onset    Diabetes Mother     Diabetes Father     Prostate cancer Father     Pancreatic cancer Sister     No Known Problems Brother     Diabetes Maternal Uncle     Diabetes Maternal Grandmother     No Known Problems Maternal Grandfather     No Known Problems Paternal Grandmother     No Known Problems Paternal Grandfather        Past Surgical History:   Procedure Laterality Date    CARDIAC SURGERY      CATARACT EXTRACTION      COLONOSCOPY      CORONARY ARTERY BYPASS GRAFT      HAND SURGERY      KNEE SURGERY      TRACHEOSTOMY TUBE PLACEMENT      WRIST FUSION         Review of Systems   Constitutional: Negative for activity change, appetite change, chills, diaphoresis, fatigue, fever and unexpected weight change.   HENT: Negative for congestion, mouth sores, nosebleeds, sore throat, trouble swallowing and voice change.    Eyes: Negative for photophobia and visual disturbance.   Respiratory: Negative for cough, chest tightness, shortness of breath and wheezing.    Cardiovascular: Negative for chest pain and leg swelling.   Gastrointestinal: Negative for abdominal distention, abdominal pain, anal bleeding, blood in stool, constipation, diarrhea, nausea and vomiting.   Genitourinary: Negative for difficulty urinating, dysuria and hematuria.   Musculoskeletal: Positive for back pain. Negative for arthralgias and myalgias.   Skin: Negative for pallor, rash and wound.   Neurological: Negative for dizziness, syncope, weakness and headaches.   Hematological: Negative for adenopathy. Does not bruise/bleed easily.   Psychiatric/Behavioral: The patient is nervous/anxious.          Medication List with Changes/Refills   Current Medications    ASPIRIN 81 MG CHEW  "   Take 1 tablet (81 mg total) by mouth once daily.    BD ULTRA-FINE DAVE PEN NEEDLE 32 GAUGE X 5/32" NDLE    USE  4 TIMES DAILY WITH MEALS AND AT BEDTIME    BLOOD-GLUCOSE METER KIT    Use as instructed    BRILINTA 90 MG TABLET    Take 1 tablet by mouth once daily.    CHOLECALCIFEROL, VITAMIN D3, 5,000 UNIT TAB    Take 5,000 Units by mouth once daily.    DOCOSAHEXANOIC ACID ORAL    Take 1 capsule by mouth once daily.    DOCUSATE SODIUM (COLACE) 100 MG CAPSULE    Take 100 mg by mouth 2 (two) times daily.    FLAXSEED OIL 1,000 MG CAP    Take 1 capsule by mouth.    GINGER ROOT (GINGER, ZINGIBER OFFICINALIS,) 550 MG CAP    Take 1 capsule by mouth.    INSULIN (LANTUS SOLOSTAR U-100 INSULIN) GLARGINE 100 UNITS/ML (3ML) SUBQ PEN    INJECT 15 UNITS SUBCUTANEOUSLY ONCE DAILY    ISOSORBIDE MONONITRATE (IMDUR) 30 MG 24 HR TABLET    Take by mouth.    LACTULOSE (CHRONULAC) 10 GRAM/15 ML SOLUTION    Take 30 mL by mouth twice daily as needed for constipation.    LISINOPRIL (PRINIVIL,ZESTRIL) 2.5 MG TABLET        METOPROLOL TARTRATE (LOPRESSOR) 50 MG TABLET    TAKE 1 TABLET BY MOUTH TWICE DAILY    MULTIVIT-MIN-FA-LYCOPEN-LUTEIN 300-600-300 MCG TAB    Take 1 tablet by mouth.    NITROSTAT 0.4 MG SL TABLET        NOVOLOG FLEXPEN U-100 INSULIN 100 UNIT/ML (3 ML) INPN PEN    INJECT 20 UNITS SUBCUTANEOUSLY THREE TIMES DAILY BEFORE MEAL(S)    OMEGA 3-DHA-EPA-FISH -1,000 MG CAP    Take by mouth.    ONDANSETRON (ZOFRAN) 4 MG TABLET    Take 1 tablet (4 mg total) by mouth every 8 (eight) hours as needed for Nausea.    ONDANSETRON (ZOFRAN-ODT) 4 MG TBDL    Dissolve 1 tab under the tongue every 4-6 hours as needed for nausea.    OXYCODONE (OXYCONTIN) 20 MG 12 HR TABLET    Take 1 tablet (20 mg total) by mouth every 12 (twelve) hours.    OXYCODONE (ROXICODONE) 10 MG TAB IMMEDIATE RELEASE TABLET    Take 1 tablet (10 mg total) by mouth every 6 (six) hours as needed for Pain.    PANTOPRAZOLE (PROTONIX) 40 MG TABLET    Take 1 tablet by mouth " daily    POMALIDOMIDE 3 MG CAP    Take by mouth.    PROCHLORPERAZINE (COMPAZINE) 5 MG TABLET    Take 1 tablet (5 mg total) by mouth 4 (four) times daily as needed for Nausea.    RANOLAZINE (RANEXA) 1,000 MG TB12    Take 1,000 mg by mouth 2 (two) times daily.    RANOLAZINE (RANEXA) 1,000 MG TB12    Take 1 tablet by mouth twice daily    ROSUVASTATIN (CRESTOR) 40 MG TAB    TAKE 1 TABLET BY MOUTH IN THE EVENING    SENNA (SENOKOT) 8.6 MG TABLET    Take 1 tablet by mouth daily    TRIAMCINOLONE ACETONIDE 0.1% (KENALOG) 0.1 % CREAM    Apply topically 2 (two) times daily. For two weeks    TRUE METRIX GLUCOSE TEST STRIP STRP    USE  STRIP TO CHECK GLUCOSE SIX TIMES DAILY    TRUEPLUS LANCETS 33 GAUGE MISC    USE   TO CHECK GLUCOSE SIX TIMES DAILY    VALACYCLOVIR (VALTREX) 500 MG TABLET    Take by mouth.    VITAMIN B COMPLEX ORAL    Take 1 capsule by mouth.     Objective:     Vitals:    11/22/19 1003   BP: (!) 129/54   Pulse: 85   Temp: 98.1 °F (36.7 °C)     Lab Results   Component Value Date    WBC 2.95 (L) 11/22/2019    HGB 11.8 (L) 11/22/2019    HCT 37.1 (L) 11/22/2019     (H) 11/22/2019     11/22/2019       BMP  Lab Results   Component Value Date     11/22/2019    K 4.1 11/22/2019     11/22/2019    CO2 27 11/22/2019    BUN 21 11/22/2019    CREATININE 1.6 (H) 11/22/2019    CALCIUM 10.5 11/22/2019    ANIONGAP 9 11/22/2019    ESTGFRAFRICA 48 (A) 11/22/2019    EGFRNONAA 42 (A) 11/22/2019     Lab Results   Component Value Date    ALT 14 11/22/2019    AST 17 11/22/2019    ALKPHOS 69 11/22/2019    BILITOT 0.4 11/22/2019         Physical Exam   Constitutional: He is oriented to person, place, and time. He appears well-developed and well-nourished. He is cooperative.   HENT:   Head: Normocephalic.   Right Ear: Decreased hearing is noted.   Left Ear: Decreased hearing is noted.   Nose: Nose normal.   Mouth/Throat: Oropharynx is clear and moist.   Eyes: Conjunctivae, EOM and lids are normal. Right eye  exhibits no discharge. Left eye exhibits no discharge. No scleral icterus.   Neck: Normal range of motion. No thyroid mass present.   Cardiovascular: Normal rate, regular rhythm and normal heart sounds.   No murmur heard.  Pulmonary/Chest: Effort normal and breath sounds normal. No respiratory distress. He has no wheezes. He has no rhonchi. He has no rales.   Abdominal: Soft. Bowel sounds are normal. He exhibits no distension. There is no tenderness.   Genitourinary:   Genitourinary Comments: deferred   Musculoskeletal: Normal range of motion. He exhibits no edema.   Lymphadenopathy:        Head (right side): No submandibular, no preauricular and no posterior auricular adenopathy present.        Head (left side): No submandibular, no preauricular and no posterior auricular adenopathy present.        Right cervical: No superficial cervical adenopathy present.       Left cervical: No superficial cervical adenopathy present.   Neurological: He is alert and oriented to person, place, and time.   Skin: Skin is warm, dry and intact.   Psychiatric: His speech is normal and behavior is normal. Thought content normal. His mood appears anxious.   Vitals reviewed.       Assessment:     Problem List Items Addressed This Visit        Oncology    Multiple myeloma - Primary     --Repeat labs to assess status of multiple myeloma on the way out  --Continue treatment with f/u per [facility] in [place]  -- reviewed. Message sent to Dr. Solo for narcotic medication refill  --Reschedule MRI lumbar for c/o back pain  --f/u 1 week with Dr. Solo to discuss lab results and MRI results  --Discussed with patient S&S to report         Relevant Orders    CBC auto differential    Comprehensive metabolic panel    Lactate dehydrogenase    Protein electrophoresis, serum    Immunoglobulin free LT chains blood    Immunofixation electrophoresis    Beta 2 Microglobulin, Serum            Plan:     Multiple myeloma not having  achieved remission  -     CBC auto differential; Future; Expected date: 11/22/2019  -     Comprehensive metabolic panel; Future; Expected date: 11/22/2019  -     Lactate dehydrogenase; Future; Expected date: 11/22/2019  -     Protein electrophoresis, serum; Future; Expected date: 11/22/2019  -     Immunoglobulin free LT chains blood; Future; Expected date: 11/22/2019  -     Immunofixation electrophoresis; Future; Expected date: 11/22/2019  -     Beta 2 Microglobulin, Serum; Future; Expected date: 11/22/2019          I will review assessment/plan with collaborating physician Dr. Rodolfo Mccord, KIMBERLYP-C

## 2019-11-22 NOTE — ASSESSMENT & PLAN NOTE
--Repeat labs to assess status of multiple myeloma on the way out  --Continue treatment with f/u per [facility] in [place]  -- reviewed. Message sent to Dr. Solo for narcotic medication refill  --Reschedule MRI lumbar for c/o back pain  --f/u 1 week with Dr. Solo to discuss lab results and MRI results  --Discussed with patient S&S to report

## 2019-11-25 ENCOUNTER — TELEPHONE (OUTPATIENT)
Dept: HEMATOLOGY/ONCOLOGY | Facility: CLINIC | Age: 74
End: 2019-11-25

## 2019-11-25 RX ORDER — DIAZEPAM 10 MG/1
10 TABLET ORAL ONCE
Qty: 1 TABLET | Refills: 0 | Status: SHIPPED | OUTPATIENT
Start: 2019-11-25 | End: 2019-11-25

## 2019-11-25 NOTE — TELEPHONE ENCOUNTER
"Called to address pt's distress screening completed Friday, score 5/10. Spoke to pt's daughter Alejandrina. An involvement in care form is in Media tab; she says she usually takes care of things for pt and her phone number is listed as primary.     Primary issue with pt at this time is uncontrolled pain. She said his current pain regimen is not working to control his pain. It is noted that pt has a f/u with Dr. Solo on 12/6. Offered an earlier appt but she said pt likely won't want to come earlier. MRI appt noted; she said pt is "petrified" of MRI (claustrophobic)--this may be why he did not get one done previously. Will also bring this to attention of Dr. Solo in case there is something that can be done to take the edge off in order for the patient to get his MRI done.     Someone will accompany pt to his appointment on 12/6. Will escalate concerns to Dr. Solo and f/u further as advised/as needed/as requested.   "

## 2019-11-26 ENCOUNTER — TELEPHONE (OUTPATIENT)
Dept: HEMATOLOGY/ONCOLOGY | Facility: CLINIC | Age: 74
End: 2019-11-26

## 2019-11-26 ENCOUNTER — OFFICE VISIT (OUTPATIENT)
Dept: HEMATOLOGY/ONCOLOGY | Facility: CLINIC | Age: 74
End: 2019-11-26
Payer: MEDICARE

## 2019-11-26 ENCOUNTER — HOSPITAL ENCOUNTER (OUTPATIENT)
Dept: RADIOLOGY | Facility: HOSPITAL | Age: 74
Discharge: HOME | End: 2019-11-26
Attending: NURSE PRACTITIONER
Payer: MEDICARE

## 2019-11-26 VITALS
TEMPERATURE: 97 F | DIASTOLIC BLOOD PRESSURE: 52 MMHG | OXYGEN SATURATION: 99 % | HEART RATE: 60 BPM | BODY MASS INDEX: 23.48 KG/M2 | WEIGHT: 164 LBS | SYSTOLIC BLOOD PRESSURE: 97 MMHG | RESPIRATION RATE: 19 BRPM | HEIGHT: 70 IN

## 2019-11-26 DIAGNOSIS — R22.1 MASS OF RIGHT SIDE OF NECK: ICD-10-CM

## 2019-11-26 DIAGNOSIS — C90.00 MULTIPLE MYELOMA NOT HAVING ACHIEVED REMISSION: Primary | ICD-10-CM

## 2019-11-26 PROCEDURE — 1159F MED LIST DOCD IN RCRD: CPT | Mod: S$GLB,,, | Performed by: NURSE PRACTITIONER

## 2019-11-26 PROCEDURE — 3078F PR MOST RECENT DIASTOLIC BLOOD PRESSURE < 80 MM HG: ICD-10-PCS | Mod: CPTII,S$GLB,, | Performed by: NURSE PRACTITIONER

## 2019-11-26 PROCEDURE — 3074F SYST BP LT 130 MM HG: CPT | Mod: CPTII,S$GLB,, | Performed by: NURSE PRACTITIONER

## 2019-11-26 PROCEDURE — 3074F PR MOST RECENT SYSTOLIC BLOOD PRESSURE < 130 MM HG: ICD-10-PCS | Mod: CPTII,S$GLB,, | Performed by: NURSE PRACTITIONER

## 2019-11-26 PROCEDURE — 99214 PR OFFICE/OUTPT VISIT, EST, LEVL IV, 30-39 MIN: ICD-10-PCS | Mod: S$GLB,,, | Performed by: NURSE PRACTITIONER

## 2019-11-26 PROCEDURE — 1125F PR PAIN SEVERITY QUANTIFIED, PAIN PRESENT: ICD-10-PCS | Mod: S$GLB,,, | Performed by: NURSE PRACTITIONER

## 2019-11-26 PROCEDURE — 1125F AMNT PAIN NOTED PAIN PRSNT: CPT | Mod: S$GLB,,, | Performed by: NURSE PRACTITIONER

## 2019-11-26 PROCEDURE — 1159F PR MEDICATION LIST DOCUMENTED IN MEDICAL RECORD: ICD-10-PCS | Mod: S$GLB,,, | Performed by: NURSE PRACTITIONER

## 2019-11-26 PROCEDURE — 1101F PR PT FALLS ASSESS DOC 0-1 FALLS W/OUT INJ PAST YR: ICD-10-PCS | Mod: CPTII,S$GLB,, | Performed by: NURSE PRACTITIONER

## 2019-11-26 PROCEDURE — 1101F PT FALLS ASSESS-DOCD LE1/YR: CPT | Mod: CPTII,S$GLB,, | Performed by: NURSE PRACTITIONER

## 2019-11-26 PROCEDURE — 99999 PR PBB SHADOW E&M-EST. PATIENT-LVL III: ICD-10-PCS | Mod: PBBFAC,,, | Performed by: NURSE PRACTITIONER

## 2019-11-26 PROCEDURE — 76536 US EXAM OF HEAD AND NECK: CPT | Mod: TC

## 2019-11-26 PROCEDURE — 99214 OFFICE O/P EST MOD 30 MIN: CPT | Mod: S$GLB,,, | Performed by: NURSE PRACTITIONER

## 2019-11-26 PROCEDURE — 99999 PR PBB SHADOW E&M-EST. PATIENT-LVL III: CPT | Mod: PBBFAC,,, | Performed by: NURSE PRACTITIONER

## 2019-11-26 PROCEDURE — 3078F DIAST BP <80 MM HG: CPT | Mod: CPTII,S$GLB,, | Performed by: NURSE PRACTITIONER

## 2019-11-26 NOTE — PROGRESS NOTES
Subjective:       Patient ID: Familia Durbin Jr. is a 74 y.o. male.    Chief Complaint: neck lump pain    HPI: 74 y.o male with h/o multiple myeloma not having achieved remission currently being treated with Pomalyst 3 mg days 1 through 21 of every 28 day cycle. He reports feeling well overall except persistent back pain. MRI lumbar previously ordered per Dr. Solo. Patient has not yet had done. He continues to follow up with [facility] in [place]. Reports upcoming appointment 12/10/19. He denies any fever, chills, night sweats, diarrhea, N/V. Does report intermittent constipation controlled with diet.     Today's visit:  Patient presents today as urgent care visit for c/o pain to right neck mass. 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 Patient denies any trouble breathing, swallowing difficulty or any other associated symptoms.   Social History     Socioeconomic History    Marital status: Single     Spouse name: Not on file    Number of children: 9    Years of education: Not on file    Highest education level: Not on file   Occupational History    Not on file   Social Needs    Financial resource strain: Not on file    Food insecurity:     Worry: Not on file     Inability: Not on file    Transportation needs:     Medical: Not on file     Non-medical: Not on file   Tobacco Use    Smoking status: Never Smoker    Smokeless tobacco: Never Used   Substance and Sexual Activity    Alcohol use: No    Drug use: No    Sexual activity: Yes     Partners: Female   Lifestyle    Physical activity:     Days per week: Not on file     Minutes per session: Not on file    Stress: Not on file   Relationships    Social connections:     Talks on phone: Not on file     Gets together: Not on file     Attends Bahai service: Not on file     Active member of club or organization: Not on file     Attends meetings of clubs or organizations: Not on  file     Relationship status: Not on file   Other Topics Concern    Not on file   Social History Narrative    Not on file       Past Medical History:   Diagnosis Date    CAD (coronary artery disease)     maevert    Diabetes mellitus, type 2 1979    eye dr rocha    Hearing impaired     Hyperlipidemia     Hypertension     Lupus     Discoid    Multiple myeloma     Old MI (myocardial infarction) 2012    Renal insufficiency     Tracheostomy tube present        Family History   Problem Relation Age of Onset    Diabetes Mother     Diabetes Father     Prostate cancer Father     Pancreatic cancer Sister     No Known Problems Brother     Diabetes Maternal Uncle     Diabetes Maternal Grandmother     No Known Problems Maternal Grandfather     No Known Problems Paternal Grandmother     No Known Problems Paternal Grandfather        Past Surgical History:   Procedure Laterality Date    CARDIAC SURGERY      CATARACT EXTRACTION      COLONOSCOPY      CORONARY ARTERY BYPASS GRAFT      HAND SURGERY      KNEE SURGERY      TRACHEOSTOMY TUBE PLACEMENT      WRIST FUSION         Review of Systems   Constitutional: Negative for activity change, appetite change, chills, diaphoresis, fatigue, fever and unexpected weight change.   HENT: Negative for congestion, mouth sores, nosebleeds, sore throat, trouble swallowing and voice change.    Eyes: Negative for photophobia and visual disturbance.   Respiratory: Negative for cough, chest tightness, shortness of breath and wheezing.    Cardiovascular: Negative for chest pain and leg swelling.   Gastrointestinal: Negative for abdominal distention, abdominal pain, anal bleeding, blood in stool, constipation, diarrhea, nausea and vomiting.   Genitourinary: Negative for difficulty urinating, dysuria and hematuria.   Musculoskeletal: Positive for back pain. Negative for arthralgias and myalgias.   Skin: Negative for pallor, rash and wound.   Neurological: Negative for  "dizziness, syncope, weakness and headaches.   Hematological: Negative for adenopathy. Does not bruise/bleed easily.   Psychiatric/Behavioral: The patient is nervous/anxious.          Medication List with Changes/Refills   Current Medications    ASPIRIN 81 MG CHEW    Take 1 tablet (81 mg total) by mouth once daily.    BD ULTRA-FINE DAVE PEN NEEDLE 32 GAUGE X 5/32" NDLE    USE  4 TIMES DAILY WITH MEALS AND AT BEDTIME    BLOOD-GLUCOSE METER KIT    Use as instructed    BRILINTA 90 MG TABLET    Take 1 tablet by mouth once daily.    CHOLECALCIFEROL, VITAMIN D3, 5,000 UNIT TAB    Take 5,000 Units by mouth once daily.    DIAZEPAM (VALIUM) 10 MG TAB    Take 1 tablet (10 mg total) by mouth once. Take 1 hour before planned imaging study for 1 dose    DOCOSAHEXANOIC ACID ORAL    Take 1 capsule by mouth once daily.    DOCUSATE SODIUM (COLACE) 100 MG CAPSULE    Take 100 mg by mouth 2 (two) times daily.    FLAXSEED OIL 1,000 MG CAP    Take 1 capsule by mouth.    GINGER ROOT (GINGER, ZINGIBER OFFICINALIS,) 550 MG CAP    Take 1 capsule by mouth.    INSULIN (LANTUS SOLOSTAR U-100 INSULIN) GLARGINE 100 UNITS/ML (3ML) SUBQ PEN    INJECT 15 UNITS SUBCUTANEOUSLY ONCE DAILY    ISOSORBIDE MONONITRATE (IMDUR) 30 MG 24 HR TABLET    Take by mouth.    LACTULOSE (CHRONULAC) 10 GRAM/15 ML SOLUTION    Take 30 mL by mouth twice daily as needed for constipation.    LISINOPRIL (PRINIVIL,ZESTRIL) 2.5 MG TABLET        METOPROLOL TARTRATE (LOPRESSOR) 50 MG TABLET    TAKE 1 TABLET BY MOUTH TWICE DAILY    MULTIVIT-MIN-FA-LYCOPEN-LUTEIN 300-600-300 MCG TAB    Take 1 tablet by mouth.    NITROSTAT 0.4 MG SL TABLET        NOVOLOG FLEXPEN U-100 INSULIN 100 UNIT/ML (3 ML) INPN PEN    INJECT 20 UNITS SUBCUTANEOUSLY THREE TIMES DAILY BEFORE MEAL(S)    OMEGA 3-DHA-EPA-FISH -1,000 MG CAP    Take by mouth.    ONDANSETRON (ZOFRAN) 4 MG TABLET    Take 1 tablet (4 mg total) by mouth every 8 (eight) hours as needed for Nausea.    ONDANSETRON (ZOFRAN-ODT) 4 MG " TBDL    Dissolve 1 tab under the tongue every 4-6 hours as needed for nausea.    OXYCODONE (OXYCONTIN) 20 MG 12 HR TABLET    Take 1 tablet (20 mg total) by mouth every 12 (twelve) hours.    OXYCODONE (ROXICODONE) 10 MG TAB IMMEDIATE RELEASE TABLET    Take 1 tablet (10 mg total) by mouth every 6 (six) hours as needed for Pain.    PANTOPRAZOLE (PROTONIX) 40 MG TABLET    Take 1 tablet by mouth daily    POMALIDOMIDE 3 MG CAP    Take by mouth.    PROCHLORPERAZINE (COMPAZINE) 5 MG TABLET    Take 1 tablet (5 mg total) by mouth 4 (four) times daily as needed for Nausea.    RANOLAZINE (RANEXA) 1,000 MG TB12    Take 1,000 mg by mouth 2 (two) times daily.    RANOLAZINE (RANEXA) 1,000 MG TB12    Take 1 tablet by mouth twice daily    ROSUVASTATIN (CRESTOR) 40 MG TAB    TAKE 1 TABLET BY MOUTH IN THE EVENING    SENNA (SENOKOT) 8.6 MG TABLET    Take 1 tablet by mouth daily    TRIAMCINOLONE ACETONIDE 0.1% (KENALOG) 0.1 % CREAM    Apply topically 2 (two) times daily. For two weeks    TRUE METRIX GLUCOSE TEST STRIP STRP    USE  STRIP TO CHECK GLUCOSE SIX TIMES DAILY    TRUEPLUS LANCETS 33 GAUGE MISC    USE   TO CHECK GLUCOSE SIX TIMES DAILY    VALACYCLOVIR (VALTREX) 500 MG TABLET    Take by mouth.    VITAMIN B COMPLEX ORAL    Take 1 capsule by mouth.     Objective:     Vitals:    11/26/19 1524   BP: (!) 97/52   Pulse: 60   Resp: 19   Temp: 97.2 °F (36.2 °C)     Lab Results   Component Value Date    WBC 2.95 (L) 11/22/2019    HGB 11.8 (L) 11/22/2019    HCT 37.1 (L) 11/22/2019     (H) 11/22/2019     11/22/2019       BMP  Lab Results   Component Value Date     11/22/2019    K 4.1 11/22/2019     11/22/2019    CO2 27 11/22/2019    BUN 21 11/22/2019    CREATININE 1.6 (H) 11/22/2019    CALCIUM 10.5 11/22/2019    ANIONGAP 9 11/22/2019    ESTGFRAFRICA 48 (A) 11/22/2019    EGFRNONAA 42 (A) 11/22/2019     Lab Results   Component Value Date    ALT 14 11/22/2019    AST 17 11/22/2019    ALKPHOS 69 11/22/2019    BILITOT 0.4  11/22/2019         Physical Exam   Constitutional: He is oriented to person, place, and time. He appears well-developed and well-nourished. He is cooperative.   HENT:   Head: Normocephalic.   Right Ear: Decreased hearing is noted.   Left Ear: Decreased hearing is noted.   Nose: Nose normal.   Mouth/Throat: Oropharynx is clear and moist.   Eyes: Conjunctivae, EOM and lids are normal. Right eye exhibits no discharge. Left eye exhibits no discharge. No scleral icterus.   Neck: Normal range of motion. No thyroid mass present.   Cardiovascular: Normal rate, regular rhythm and normal heart sounds.   No murmur heard.  Pulmonary/Chest: Effort normal and breath sounds normal. No respiratory distress. He has no wheezes. He has no rhonchi. He has no rales.   Abdominal: Soft. Bowel sounds are normal. He exhibits no distension. There is no tenderness.   Genitourinary:   Genitourinary Comments: deferred   Musculoskeletal: Normal range of motion. He exhibits no edema.   Lymphadenopathy:        Head (right side): No submandibular, no preauricular and no posterior auricular adenopathy present.        Head (left side): No submandibular, no preauricular and no posterior auricular adenopathy present.        Right cervical: No superficial cervical adenopathy present.       Left cervical: No superficial cervical adenopathy present.   Neurological: He is alert and oriented to person, place, and time.   Skin: Skin is warm, dry and intact.   Psychiatric: His speech is normal and behavior is normal. Thought content normal. His mood appears anxious.   Vitals reviewed.       Assessment:     Problem List Items Addressed This Visit        Oncology    Multiple myeloma - Primary     Patient with concerns of mass to right neck.     --Neck US today. F/u results  --Scheduled PET scan. F/u with Dr. Solo following PET to discuss results         Relevant Orders    NM PET CT Routine Skull to Mid Thigh      Other Visit Diagnoses     Mass of right side  of neck        Relevant Orders    US Soft Tissue Head Neck Thyroid            Plan:     Multiple myeloma not having achieved remission  -     NM PET CT Routine Skull to Mid Thigh; Future; Expected date: 11/26/2019    Mass of right side of neck  -     US Soft Tissue Head Neck Thyroid; Future; Expected date: 11/26/2019          I will review assessment/plan with collaborating physician Dr. Rodolfo Mccord, FNP-C

## 2019-11-26 NOTE — TELEPHONE ENCOUNTER
Called Alejandrina back per Dr. Solo as he prescribed Valium for the patient to take before his MRI.     Alejandrina said there is a new problem--patient complained last night of pain in collarbone and there is what appears to be a golf ball-sized cyst. Let her know he would likely need to be evaluated by Dr. Solo. Will send Dr. Solo/staff a note to see if patient is able to come in today for urgent visit. She asked about pain medications and encouraged her/patient to bring up with Dr. Solo in the visit.     SW will continue to follow the patient to provide ongoing support and assistance.      ADDENDUM:  Dr. Solo does not have availability today but pt will come in to see NP for evaluation. Spoke to Alejandrina and scheduled pt for 3pm at her request.

## 2019-12-04 ENCOUNTER — TELEPHONE (OUTPATIENT)
Dept: RADIOLOGY | Facility: HOSPITAL | Age: 74
End: 2019-12-04

## 2019-12-05 ENCOUNTER — HOSPITAL ENCOUNTER (OUTPATIENT)
Dept: RADIOLOGY | Facility: HOSPITAL | Age: 74
Discharge: HOME | End: 2019-12-05
Attending: NURSE PRACTITIONER
Payer: MEDICARE

## 2019-12-05 ENCOUNTER — TELEPHONE (OUTPATIENT)
Dept: RADIOLOGY | Facility: HOSPITAL | Age: 74
End: 2019-12-05

## 2019-12-05 DIAGNOSIS — C90.00 MULTIPLE MYELOMA NOT HAVING ACHIEVED REMISSION: ICD-10-CM

## 2019-12-05 PROCEDURE — 78816 NM PET CT WHOLE BODY: ICD-10-PCS | Mod: 26,PS,, | Performed by: RADIOLOGY

## 2019-12-05 PROCEDURE — 78816 PET IMAGE W/CT FULL BODY: CPT | Mod: 26,PS,, | Performed by: RADIOLOGY

## 2019-12-05 PROCEDURE — 78816 PET IMAGE W/CT FULL BODY: CPT | Mod: TC,PI

## 2019-12-06 ENCOUNTER — HOSPITAL ENCOUNTER (OUTPATIENT)
Dept: RADIOLOGY | Facility: HOSPITAL | Age: 74
Discharge: HOME | End: 2019-12-06
Attending: INTERNAL MEDICINE
Payer: MEDICARE

## 2019-12-06 ENCOUNTER — OFFICE VISIT (OUTPATIENT)
Dept: HEMATOLOGY/ONCOLOGY | Facility: CLINIC | Age: 74
End: 2019-12-06
Payer: MEDICARE

## 2019-12-06 VITALS
DIASTOLIC BLOOD PRESSURE: 49 MMHG | WEIGHT: 158.31 LBS | RESPIRATION RATE: 18 BRPM | TEMPERATURE: 92 F | SYSTOLIC BLOOD PRESSURE: 113 MMHG | OXYGEN SATURATION: 99 % | HEIGHT: 70 IN | HEART RATE: 66 BPM | BODY MASS INDEX: 22.66 KG/M2

## 2019-12-06 DIAGNOSIS — C90.00 MULTIPLE MYELOMA, REMISSION STATUS UNSPECIFIED: ICD-10-CM

## 2019-12-06 DIAGNOSIS — C90.00 MULTIPLE MYELOMA NOT HAVING ACHIEVED REMISSION: ICD-10-CM

## 2019-12-06 DIAGNOSIS — C90.00 MULTIPLE MYELOMA NOT HAVING ACHIEVED REMISSION: Primary | ICD-10-CM

## 2019-12-06 PROCEDURE — 99214 PR OFFICE/OUTPT VISIT, EST, LEVL IV, 30-39 MIN: ICD-10-PCS | Mod: S$GLB,,, | Performed by: INTERNAL MEDICINE

## 2019-12-06 PROCEDURE — 99999 PR PBB SHADOW E&M-EST. PATIENT-LVL III: ICD-10-PCS | Mod: PBBFAC,,, | Performed by: INTERNAL MEDICINE

## 2019-12-06 PROCEDURE — 3078F PR MOST RECENT DIASTOLIC BLOOD PRESSURE < 80 MM HG: ICD-10-PCS | Mod: CPTII,S$GLB,, | Performed by: INTERNAL MEDICINE

## 2019-12-06 PROCEDURE — 1101F PR PT FALLS ASSESS DOC 0-1 FALLS W/OUT INJ PAST YR: ICD-10-PCS | Mod: CPTII,S$GLB,, | Performed by: INTERNAL MEDICINE

## 2019-12-06 PROCEDURE — 99999 PR PBB SHADOW E&M-EST. PATIENT-LVL III: CPT | Mod: PBBFAC,,, | Performed by: INTERNAL MEDICINE

## 2019-12-06 PROCEDURE — 1101F PT FALLS ASSESS-DOCD LE1/YR: CPT | Mod: CPTII,S$GLB,, | Performed by: INTERNAL MEDICINE

## 2019-12-06 PROCEDURE — 25500020 PHARM REV CODE 255: Performed by: INTERNAL MEDICINE

## 2019-12-06 PROCEDURE — 72158 MRI LUMBAR SPINE W WO CONTRAST: ICD-10-PCS | Mod: 26,,, | Performed by: RADIOLOGY

## 2019-12-06 PROCEDURE — 3074F SYST BP LT 130 MM HG: CPT | Mod: CPTII,S$GLB,, | Performed by: INTERNAL MEDICINE

## 2019-12-06 PROCEDURE — 72158 MRI LUMBAR SPINE W/O & W/DYE: CPT | Mod: 26,,, | Performed by: RADIOLOGY

## 2019-12-06 PROCEDURE — A9585 GADOBUTROL INJECTION: HCPCS | Performed by: INTERNAL MEDICINE

## 2019-12-06 PROCEDURE — 1125F PR PAIN SEVERITY QUANTIFIED, PAIN PRESENT: ICD-10-PCS | Mod: S$GLB,,, | Performed by: INTERNAL MEDICINE

## 2019-12-06 PROCEDURE — 3078F DIAST BP <80 MM HG: CPT | Mod: CPTII,S$GLB,, | Performed by: INTERNAL MEDICINE

## 2019-12-06 PROCEDURE — 72158 MRI LUMBAR SPINE W/O & W/DYE: CPT | Mod: TC

## 2019-12-06 PROCEDURE — 99214 OFFICE O/P EST MOD 30 MIN: CPT | Mod: S$GLB,,, | Performed by: INTERNAL MEDICINE

## 2019-12-06 PROCEDURE — 3074F PR MOST RECENT SYSTOLIC BLOOD PRESSURE < 130 MM HG: ICD-10-PCS | Mod: CPTII,S$GLB,, | Performed by: INTERNAL MEDICINE

## 2019-12-06 PROCEDURE — 1125F AMNT PAIN NOTED PAIN PRSNT: CPT | Mod: S$GLB,,, | Performed by: INTERNAL MEDICINE

## 2019-12-06 PROCEDURE — 1159F MED LIST DOCD IN RCRD: CPT | Mod: S$GLB,,, | Performed by: INTERNAL MEDICINE

## 2019-12-06 PROCEDURE — 1159F PR MEDICATION LIST DOCUMENTED IN MEDICAL RECORD: ICD-10-PCS | Mod: S$GLB,,, | Performed by: INTERNAL MEDICINE

## 2019-12-06 RX ORDER — GADOBUTROL 604.72 MG/ML
7.5 INJECTION INTRAVENOUS
Status: COMPLETED | OUTPATIENT
Start: 2019-12-06 | End: 2019-12-06

## 2019-12-06 RX ORDER — DIAZEPAM 10 MG/1
10 TABLET ORAL ONCE
Qty: 1 TABLET | Refills: 0 | Status: SHIPPED | OUTPATIENT
Start: 2019-12-06 | End: 2020-08-14

## 2019-12-06 RX ORDER — OXYCODONE HCL 20 MG/1
20 TABLET, FILM COATED, EXTENDED RELEASE ORAL EVERY 12 HOURS
Qty: 60 EACH | Refills: 0 | Status: SHIPPED | OUTPATIENT
Start: 2019-12-06 | End: 2020-01-16

## 2019-12-06 RX ORDER — OXYCODONE HYDROCHLORIDE 10 MG/1
10 TABLET ORAL EVERY 6 HOURS PRN
Qty: 90 TABLET | Refills: 0 | Status: SHIPPED | OUTPATIENT
Start: 2019-12-06 | End: 2020-01-16

## 2019-12-06 RX ADMIN — GADOBUTROL 7.5 ML: 604.72 INJECTION INTRAVENOUS at 11:12

## 2019-12-06 NOTE — PROGRESS NOTES
Subjective:       Patient ID: Familia Durbin Jr. is a 74 y.o. male.    Chief Complaint: Follow-up; Results; and Multiple Myeloma    HPI 74-year-old male with multiple myeloma currently receiving pomalidomide dexamethasone through [facility].  Patient presents with repeat imaging studies accompanies by his daughter for review ECOG status 2    Past Medical History:   Diagnosis Date    CAD (coronary artery disease)     luikart    Diabetes mellitus, type 2 1979    eye dr rocha    Hearing impaired     Hyperlipidemia     Hypertension     Lupus     Discoid    Multiple myeloma     Old MI (myocardial infarction) 2012    Renal insufficiency     Tracheostomy tube present      Family History   Problem Relation Age of Onset    Diabetes Mother     Diabetes Father     Prostate cancer Father     Pancreatic cancer Sister     No Known Problems Brother     Diabetes Maternal Uncle     Diabetes Maternal Grandmother     No Known Problems Maternal Grandfather     No Known Problems Paternal Grandmother     No Known Problems Paternal Grandfather      Social History     Socioeconomic History    Marital status: Single     Spouse name: Not on file    Number of children: 9    Years of education: Not on file    Highest education level: Not on file   Occupational History    Not on file   Social Needs    Financial resource strain: Not on file    Food insecurity:     Worry: Not on file     Inability: Not on file    Transportation needs:     Medical: Not on file     Non-medical: Not on file   Tobacco Use    Smoking status: Never Smoker    Smokeless tobacco: Never Used   Substance and Sexual Activity    Alcohol use: No    Drug use: No    Sexual activity: Yes     Partners: Female   Lifestyle    Physical activity:     Days per week: Not on file     Minutes per session: Not on file    Stress: Not on file   Relationships    Social connections:     Talks on phone: Not on file     Gets  together: Not on file     Attends Che[employer] service: Not on file     Active member of club or organization: Not on file     Attends meetings of clubs or organizations: Not on file     Relationship status: Not on file   Other Topics Concern    Not on file   Social History Narrative    Not on file     Past Surgical History:   Procedure Laterality Date    CARDIAC SURGERY      CATARACT EXTRACTION      COLONOSCOPY      CORONARY ARTERY BYPASS GRAFT      HAND SURGERY      KNEE SURGERY      TRACHEOSTOMY TUBE PLACEMENT      WRIST FUSION         Labs:  Lab Results   Component Value Date    WBC 2.95 (L) 11/22/2019    HGB 11.8 (L) 11/22/2019    HCT 37.1 (L) 11/22/2019     (H) 11/22/2019     11/22/2019     BMP  Lab Results   Component Value Date     11/22/2019    K 4.1 11/22/2019     11/22/2019    CO2 27 11/22/2019    BUN 21 11/22/2019    CREATININE 1.6 (H) 11/22/2019    CALCIUM 10.5 11/22/2019    ANIONGAP 9 11/22/2019    ESTGFRAFRICA 48 (A) 11/22/2019    EGFRNONAA 42 (A) 11/22/2019     Lab Results   Component Value Date    ALT 14 11/22/2019    AST 17 11/22/2019    ALKPHOS 69 11/22/2019    BILITOT 0.4 11/22/2019       Lab Results   Component Value Date    IRON 81 07/21/2016    TIBC 302 07/21/2016    FERRITIN 324 (H) 07/21/2016     No results found for: [id number]  No results found for: FOLATE  Lab Results   Component Value Date    TSH 0.683 01/22/2018         Review of Systems   Constitutional: Positive for activity change and fatigue. Negative for appetite change, chills, diaphoresis, fever and unexpected weight change.   HENT: Negative for congestion, dental problem, drooling, ear discharge, ear pain, facial swelling, hearing loss, mouth sores, nosebleeds, postnasal drip, rhinorrhea, sinus pressure, sneezing, sore throat, tinnitus, trouble swallowing and voice change.    Eyes: Negative for photophobia, pain, discharge, redness, itching and visual disturbance.   Respiratory: Negative for  apnea, cough, choking, chest tightness, shortness of breath, wheezing and stridor.    Cardiovascular: Negative for chest pain, palpitations and leg swelling.   Gastrointestinal: Negative for abdominal distention, abdominal pain, anal bleeding, blood in stool, constipation, diarrhea, nausea, rectal pain and vomiting.   Endocrine: Negative for cold intolerance, heat intolerance, polydipsia, polyphagia and polyuria.   Genitourinary: Negative for decreased urine volume, difficulty urinating, discharge, dysuria, enuresis, flank pain, frequency, genital sores, hematuria, penile pain, penile swelling, scrotal swelling, testicular pain and urgency.   Musculoskeletal: Positive for back pain. Negative for arthralgias, gait problem, joint swelling, myalgias, neck pain and neck stiffness.   Skin: Negative for color change, pallor, rash and wound.   Allergic/Immunologic: Negative for environmental allergies, food allergies and immunocompromised state.   Neurological: Positive for weakness. Negative for dizziness, tremors, seizures, syncope, facial asymmetry, speech difficulty, light-headedness, numbness and headaches.   Hematological: Negative for adenopathy. Does not bruise/bleed easily.   Psychiatric/Behavioral: Positive for dysphoric mood. Negative for agitation, behavioral problems, confusion, decreased concentration, hallucinations, self-injury, sleep disturbance and suicidal ideas. The patient is nervous/anxious. The patient is not hyperactive.        Objective:      Physical Exam   Constitutional: He is oriented to person, place, and time. He appears well-developed and well-nourished. He appears distressed.   HENT:   Head: Normocephalic.   Right Ear: External ear normal.   Left Ear: External ear normal.   Nose: Nose normal. Right sinus exhibits no maxillary sinus tenderness and no frontal sinus tenderness. Left sinus exhibits no maxillary sinus tenderness and no frontal sinus tenderness.   Mouth/Throat: Oropharynx is  clear and moist. No oropharyngeal exudate.   Eyes: Pupils are equal, round, and reactive to light. EOM and lids are normal. Right eye exhibits no discharge. Left eye exhibits no discharge. Right conjunctiva is not injected. Right conjunctiva has no hemorrhage. Left conjunctiva is not injected. Left conjunctiva has no hemorrhage. No scleral icterus. Right eye exhibits normal extraocular motion. Left eye exhibits normal extraocular motion.   Neck: Normal range of motion. Neck supple. No JVD present. No tracheal deviation present. No thyromegaly present.   Cardiovascular: Normal rate and regular rhythm.   Pulmonary/Chest: Effort normal. No stridor. No respiratory distress.   Abdominal: Soft. He exhibits no mass. There is no hepatosplenomegaly, splenomegaly or hepatomegaly. There is no tenderness.   Musculoskeletal: Normal range of motion. He exhibits no edema or tenderness.   Lymphadenopathy:        Head (right side): No posterior auricular and no occipital adenopathy present.        Head (left side): No posterior auricular and no occipital adenopathy present.     He has no cervical adenopathy.        Right cervical: No superficial cervical, no deep cervical and no posterior cervical adenopathy present.       Left cervical: No superficial cervical, no deep cervical and no posterior cervical adenopathy present.     He has no axillary adenopathy.        Right: No supraclavicular adenopathy present.        Left: No supraclavicular adenopathy present.   Neurological: He is alert and oriented to person, place, and time. He has normal strength. No cranial nerve deficit. Coordination normal.   Skin: Skin is dry. No rash noted. He is not diaphoretic. No cyanosis or erythema. Nails show no clubbing.   Psychiatric: He has a normal mood and affect. His behavior is normal. Judgment and thought content normal. Cognition and memory are normal.   Vitals reviewed.          Assessment:      1. Multiple myeloma not having achieved  remission    2. Metastatic bone cancer    3. Multiple myeloma, remission status unspecified     chronic stable narcotic use      Plan:     Review of imaging studies today for full PET scan as well as MRI of spine demonstrates lytic lesions in bone very little activity.  However the area in his right sternoclavicular area does show some activity and he has increasing pain states he has tenderness in that area results of his laboratory studies demonstrate essentially stable findings on current Pomalyst and dexamethasone.  My recommendations are that palliative radiation be given to his right supraclavicular region where he has a what appears to be growing mass in lytic in his bone no clear evidence of progression otherwise on bones on my review answered questions will take information and travel to the [facility] will see patient back in 1 month state  IPM checked and refill chronic stable narcotic use 25 min face-to-face time with greater than 50% time spent face-to-face with family        Rodolfo Solo Jr, MD FACP

## 2019-12-15 DIAGNOSIS — Z79.4 TYPE 2 DIABETES MELLITUS WITHOUT COMPLICATION, WITH LONG-TERM CURRENT USE OF INSULIN: Chronic | ICD-10-CM

## 2019-12-15 DIAGNOSIS — E11.9 TYPE 2 DIABETES MELLITUS WITHOUT COMPLICATION, WITH LONG-TERM CURRENT USE OF INSULIN: Chronic | ICD-10-CM

## 2019-12-15 DIAGNOSIS — Z79.4 INSULIN LONG-TERM USE: Chronic | ICD-10-CM

## 2019-12-15 DIAGNOSIS — I10 ESSENTIAL HYPERTENSION: Chronic | ICD-10-CM

## 2019-12-15 DIAGNOSIS — Z79.52 LONG TERM CURRENT USE OF SYSTEMIC STEROIDS: ICD-10-CM

## 2019-12-15 DIAGNOSIS — Z79.4 TYPE 2 DIABETES MELLITUS WITH HYPERGLYCEMIA, WITH LONG-TERM CURRENT USE OF INSULIN: ICD-10-CM

## 2019-12-15 DIAGNOSIS — E78.5 HYPERLIPIDEMIA, UNSPECIFIED HYPERLIPIDEMIA TYPE: Chronic | ICD-10-CM

## 2019-12-15 DIAGNOSIS — E11.65 TYPE 2 DIABETES MELLITUS WITH HYPERGLYCEMIA, WITH LONG-TERM CURRENT USE OF INSULIN: ICD-10-CM

## 2019-12-16 ENCOUNTER — TELEPHONE (OUTPATIENT)
Dept: DIABETES | Facility: CLINIC | Age: 74
End: 2019-12-16

## 2019-12-16 RX ORDER — INSULIN ASPART 100 [IU]/ML
INJECTION, SOLUTION INTRAVENOUS; SUBCUTANEOUS
Qty: 15 ML | Refills: 0 | Status: SHIPPED | OUTPATIENT
Start: 2019-12-16 | End: 2020-02-06

## 2019-12-16 RX ORDER — ROSUVASTATIN CALCIUM 40 MG/1
TABLET, COATED ORAL
Qty: 30 TABLET | Refills: 0 | Status: SHIPPED | OUTPATIENT
Start: 2019-12-16 | End: 2020-02-06

## 2019-12-16 NOTE — TELEPHONE ENCOUNTER
Patient sent rx request to [facility] for Novolog and Crestor. Patient has not been seen since 10/2018. Patient to be alerted by staff that he needs to be seen prior to any more refills.     ROWAN

## 2020-01-02 ENCOUNTER — PATIENT OUTREACH (OUTPATIENT)
Dept: ADMINISTRATIVE | Facility: HOSPITAL | Age: 75
End: 2020-01-02

## 2020-01-16 DIAGNOSIS — C90.00 MULTIPLE MYELOMA, REMISSION STATUS UNSPECIFIED: ICD-10-CM

## 2020-01-16 DIAGNOSIS — C90.00 MULTIPLE MYELOMA NOT HAVING ACHIEVED REMISSION: ICD-10-CM

## 2020-01-16 RX ORDER — OXYCODONE HYDROCHLORIDE 10 MG/1
10 TABLET ORAL EVERY 6 HOURS PRN
Qty: 90 TABLET | Refills: 0 | Status: SHIPPED | OUTPATIENT
Start: 2020-01-16 | End: 2020-02-20 | Stop reason: SDUPTHER

## 2020-01-16 RX ORDER — OXYCODONE HYDROCHLORIDE 10 MG/1
10 TABLET ORAL EVERY 6 HOURS PRN
Qty: 90 TABLET | Refills: 0 | Status: CANCELLED | OUTPATIENT
Start: 2020-01-16

## 2020-01-16 RX ORDER — OXYCODONE HCL 20 MG/1
20 TABLET, FILM COATED, EXTENDED RELEASE ORAL EVERY 12 HOURS
Qty: 60 EACH | Refills: 0 | Status: CANCELLED | OUTPATIENT
Start: 2020-01-16

## 2020-01-16 RX ORDER — OXYCODONE HCL 20 MG/1
20 TABLET, FILM COATED, EXTENDED RELEASE ORAL EVERY 12 HOURS
Qty: 60 EACH | Refills: 0 | Status: SHIPPED | OUTPATIENT
Start: 2020-01-16 | End: 2020-02-20 | Stop reason: SDUPTHER

## 2020-02-05 DIAGNOSIS — I10 ESSENTIAL HYPERTENSION: Chronic | ICD-10-CM

## 2020-02-05 DIAGNOSIS — Z79.52 LONG TERM CURRENT USE OF SYSTEMIC STEROIDS: ICD-10-CM

## 2020-02-05 DIAGNOSIS — Z79.4 INSULIN LONG-TERM USE: Chronic | ICD-10-CM

## 2020-02-05 DIAGNOSIS — Z79.4 TYPE 2 DIABETES MELLITUS WITHOUT COMPLICATION, WITH LONG-TERM CURRENT USE OF INSULIN: Chronic | ICD-10-CM

## 2020-02-05 DIAGNOSIS — E11.9 TYPE 2 DIABETES MELLITUS WITHOUT COMPLICATION, WITH LONG-TERM CURRENT USE OF INSULIN: Chronic | ICD-10-CM

## 2020-02-05 DIAGNOSIS — E78.5 HYPERLIPIDEMIA, UNSPECIFIED HYPERLIPIDEMIA TYPE: Chronic | ICD-10-CM

## 2020-02-05 DIAGNOSIS — E11.65 TYPE 2 DIABETES MELLITUS WITH HYPERGLYCEMIA, WITH LONG-TERM CURRENT USE OF INSULIN: ICD-10-CM

## 2020-02-05 DIAGNOSIS — Z79.4 TYPE 2 DIABETES MELLITUS WITH HYPERGLYCEMIA, WITH LONG-TERM CURRENT USE OF INSULIN: ICD-10-CM

## 2020-02-06 RX ORDER — INSULIN ASPART 100 [IU]/ML
INJECTION, SOLUTION INTRAVENOUS; SUBCUTANEOUS
Qty: 15 ML | Refills: 0 | Status: SHIPPED | OUTPATIENT
Start: 2020-02-06 | End: 2020-03-31

## 2020-02-06 RX ORDER — ROSUVASTATIN CALCIUM 40 MG/1
TABLET, COATED ORAL
Qty: 30 TABLET | Refills: 0 | Status: SHIPPED | OUTPATIENT
Start: 2020-02-06 | End: 2020-03-10

## 2020-02-17 DIAGNOSIS — C90.00 MULTIPLE MYELOMA NOT HAVING ACHIEVED REMISSION: ICD-10-CM

## 2020-02-17 DIAGNOSIS — C90.00 MULTIPLE MYELOMA, REMISSION STATUS UNSPECIFIED: ICD-10-CM

## 2020-02-17 RX ORDER — OXYCODONE HYDROCHLORIDE 10 MG/1
10 TABLET ORAL EVERY 6 HOURS PRN
Qty: 90 TABLET | Refills: 0 | OUTPATIENT
Start: 2020-02-17

## 2020-02-17 RX ORDER — OXYCODONE HCL 20 MG/1
20 TABLET, FILM COATED, EXTENDED RELEASE ORAL EVERY 12 HOURS
Qty: 60 EACH | Refills: 0 | OUTPATIENT
Start: 2020-02-17

## 2020-02-19 ENCOUNTER — TELEPHONE (OUTPATIENT)
Dept: HEMATOLOGY/ONCOLOGY | Facility: CLINIC | Age: 75
End: 2020-02-19

## 2020-02-19 ENCOUNTER — TELEPHONE (OUTPATIENT)
Dept: INTERNAL MEDICINE | Facility: CLINIC | Age: 75
End: 2020-02-19

## 2020-02-19 DIAGNOSIS — C90.00 MULTIPLE MYELOMA, REMISSION STATUS UNSPECIFIED: ICD-10-CM

## 2020-02-19 DIAGNOSIS — C90.00 MULTIPLE MYELOMA NOT HAVING ACHIEVED REMISSION: ICD-10-CM

## 2020-02-19 RX ORDER — OXYCODONE HYDROCHLORIDE 10 MG/1
10 TABLET ORAL EVERY 6 HOURS PRN
Qty: 90 TABLET | Refills: 0 | Status: CANCELLED | OUTPATIENT
Start: 2020-02-19

## 2020-02-19 RX ORDER — OXYCODONE HCL 20 MG/1
20 TABLET, FILM COATED, EXTENDED RELEASE ORAL EVERY 12 HOURS
Qty: 60 EACH | Refills: 0 | Status: CANCELLED | OUTPATIENT
Start: 2020-02-19

## 2020-02-19 NOTE — TELEPHONE ENCOUNTER
----- Message from Ani Lara sent at 2/19/2020  3:41 PM CST -----  Type:  RX Refill Request    Who Called:  Pt daughter (Nikia Nichole)  Refill or New Rx:  Refill   RX Name and Strength:     Oxycodone  10mg   How is the patient currently taking it? (ex. 1XDay):  Takes 3 times daily  Is this a 30 day or 90 day RX:  30 day  Preferred Pharmacy with phone number:  [facility]  Local or Mail Order:  Local  Ordering Provider:  Dr Wong  Would the patient rather a call back or a response via [facility]?  Call back  Best Call Back Number:  [phone]  Additional Information:   States they had sent a refill request on Monday//and the pharmacy also sent a request//please call/ratna/cassandra

## 2020-02-19 NOTE — TELEPHONE ENCOUNTER
Spoke to the patient and his daughter the patient refuses for her dad to go without his medications all the way until next week. I have rescheduled the patient to see April on tomorrow for pain med refill. Patient and his daughter verbalized their understanding.

## 2020-02-20 ENCOUNTER — LAB VISIT (OUTPATIENT)
Dept: LAB | Facility: HOSPITAL | Age: 75
End: 2020-02-20
Attending: NURSE PRACTITIONER
Payer: MEDICARE

## 2020-02-20 ENCOUNTER — OFFICE VISIT (OUTPATIENT)
Dept: HEMATOLOGY/ONCOLOGY | Facility: CLINIC | Age: 75
End: 2020-02-20
Payer: MEDICARE

## 2020-02-20 VITALS
TEMPERATURE: 98 F | BODY MASS INDEX: 22.98 KG/M2 | WEIGHT: 160.5 LBS | HEIGHT: 70 IN | DIASTOLIC BLOOD PRESSURE: 63 MMHG | SYSTOLIC BLOOD PRESSURE: 127 MMHG | OXYGEN SATURATION: 96 % | HEART RATE: 70 BPM

## 2020-02-20 DIAGNOSIS — I25.118 CORONARY ARTERY DISEASE OF NATIVE ARTERY OF NATIVE HEART WITH STABLE ANGINA PECTORIS: Chronic | ICD-10-CM

## 2020-02-20 DIAGNOSIS — L93.0 DISCOID LUPUS: Chronic | ICD-10-CM

## 2020-02-20 DIAGNOSIS — C90.00 MULTIPLE MYELOMA NOT HAVING ACHIEVED REMISSION: Primary | ICD-10-CM

## 2020-02-20 DIAGNOSIS — I70.0 CALCIFICATION OF ABDOMINAL AORTA: Chronic | ICD-10-CM

## 2020-02-20 DIAGNOSIS — C90.00 MULTIPLE MYELOMA NOT HAVING ACHIEVED REMISSION: ICD-10-CM

## 2020-02-20 DIAGNOSIS — C90.00 MULTIPLE MYELOMA, REMISSION STATUS UNSPECIFIED: ICD-10-CM

## 2020-02-20 LAB
ALBUMIN SERPL BCP-MCNC: 3.7 G/DL (ref 3.5–5.2)
ALP SERPL-CCNC: 66 U/L (ref 55–135)
ALT SERPL W/O P-5'-P-CCNC: 13 U/L (ref 10–44)
ANION GAP SERPL CALC-SCNC: 9 MMOL/L (ref 8–16)
AST SERPL-CCNC: 18 U/L (ref 10–40)
B2 MICROGLOB SERPL-MCNC: 3.3 UG/ML (ref 0–2.5)
BASOPHILS # BLD AUTO: 0.03 K/UL (ref 0–0.2)
BASOPHILS NFR BLD: 0.8 % (ref 0–1.9)
BILIRUB SERPL-MCNC: 0.4 MG/DL (ref 0.1–1)
BUN SERPL-MCNC: 19 MG/DL (ref 8–23)
CALCIUM SERPL-MCNC: 9.5 MG/DL (ref 8.7–10.5)
CHLORIDE SERPL-SCNC: 103 MMOL/L (ref 95–110)
CO2 SERPL-SCNC: 28 MMOL/L (ref 23–29)
CREAT SERPL-MCNC: 1.7 MG/DL (ref 0.5–1.4)
DIFFERENTIAL METHOD: ABNORMAL
EOSINOPHIL # BLD AUTO: 0.1 K/UL (ref 0–0.5)
EOSINOPHIL NFR BLD: 3.4 % (ref 0–8)
ERYTHROCYTE [DISTWIDTH] IN BLOOD BY AUTOMATED COUNT: 17.2 % (ref 11.5–14.5)
EST. GFR  (AFRICAN AMERICAN): 45 ML/MIN/1.73 M^2
EST. GFR  (NON AFRICAN AMERICAN): 39 ML/MIN/1.73 M^2
GLUCOSE SERPL-MCNC: 111 MG/DL (ref 70–110)
HCT VFR BLD AUTO: 34.9 % (ref 40–54)
HGB BLD-MCNC: 11.1 G/DL (ref 14–18)
IMM GRANULOCYTES # BLD AUTO: 0 K/UL (ref 0–0.04)
IMM GRANULOCYTES NFR BLD AUTO: 0 % (ref 0–0.5)
LDH SERPL L TO P-CCNC: 130 U/L (ref 110–260)
LYMPHOCYTES # BLD AUTO: 0.8 K/UL (ref 1–4.8)
LYMPHOCYTES NFR BLD: 20.6 % (ref 18–48)
MCH RBC QN AUTO: 34.2 PG (ref 27–31)
MCHC RBC AUTO-ENTMCNC: 31.8 G/DL (ref 32–36)
MCV RBC AUTO: 107 FL (ref 82–98)
MONOCYTES # BLD AUTO: 0.4 K/UL (ref 0.3–1)
MONOCYTES NFR BLD: 11.1 % (ref 4–15)
NEUTROPHILS # BLD AUTO: 2.4 K/UL (ref 1.8–7.7)
NEUTROPHILS NFR BLD: 64.1 % (ref 38–73)
NRBC BLD-RTO: 0 /100 WBC
PATH REV BLD -IMP: NORMAL
PLATELET # BLD AUTO: 226 K/UL (ref 150–350)
PMV BLD AUTO: 10.3 FL (ref 9.2–12.9)
POTASSIUM SERPL-SCNC: 3.7 MMOL/L (ref 3.5–5.1)
PROT SERPL-MCNC: 8.8 G/DL (ref 6–8.4)
RBC # BLD AUTO: 3.25 M/UL (ref 4.6–6.2)
SODIUM SERPL-SCNC: 140 MMOL/L (ref 136–145)
WBC # BLD AUTO: 3.78 K/UL (ref 3.9–12.7)

## 2020-02-20 PROCEDURE — 3078F PR MOST RECENT DIASTOLIC BLOOD PRESSURE < 80 MM HG: ICD-10-PCS | Mod: CPTII,S$GLB,, | Performed by: NURSE PRACTITIONER

## 2020-02-20 PROCEDURE — 1125F PR PAIN SEVERITY QUANTIFIED, PAIN PRESENT: ICD-10-PCS | Mod: S$GLB,,, | Performed by: NURSE PRACTITIONER

## 2020-02-20 PROCEDURE — 36415 COLL VENOUS BLD VENIPUNCTURE: CPT

## 2020-02-20 PROCEDURE — 99215 OFFICE O/P EST HI 40 MIN: CPT | Mod: S$GLB,,, | Performed by: NURSE PRACTITIONER

## 2020-02-20 PROCEDURE — 3078F DIAST BP <80 MM HG: CPT | Mod: CPTII,S$GLB,, | Performed by: NURSE PRACTITIONER

## 2020-02-20 PROCEDURE — 83520 IMMUNOASSAY QUANT NOS NONAB: CPT

## 2020-02-20 PROCEDURE — 84165 PATHOLOGIST INTERPRETATION SPE: ICD-10-PCS | Mod: 26,,, | Performed by: PATHOLOGY

## 2020-02-20 PROCEDURE — 84165 PROTEIN E-PHORESIS SERUM: CPT | Mod: 26,,, | Performed by: PATHOLOGY

## 2020-02-20 PROCEDURE — 1101F PR PT FALLS ASSESS DOC 0-1 FALLS W/OUT INJ PAST YR: ICD-10-PCS | Mod: CPTII,S$GLB,, | Performed by: NURSE PRACTITIONER

## 2020-02-20 PROCEDURE — 83615 LACTATE (LD) (LDH) ENZYME: CPT

## 2020-02-20 PROCEDURE — 82728 ASSAY OF FERRITIN: CPT

## 2020-02-20 PROCEDURE — 85025 COMPLETE CBC W/AUTO DIFF WBC: CPT

## 2020-02-20 PROCEDURE — 99999 PR PBB SHADOW E&M-EST. PATIENT-LVL III: CPT | Mod: PBBFAC,,, | Performed by: NURSE PRACTITIONER

## 2020-02-20 PROCEDURE — 1125F AMNT PAIN NOTED PAIN PRSNT: CPT | Mod: S$GLB,,, | Performed by: NURSE PRACTITIONER

## 2020-02-20 PROCEDURE — 99999 PR PBB SHADOW E&M-EST. PATIENT-LVL III: ICD-10-PCS | Mod: PBBFAC,,, | Performed by: NURSE PRACTITIONER

## 2020-02-20 PROCEDURE — 3074F PR MOST RECENT SYSTOLIC BLOOD PRESSURE < 130 MM HG: ICD-10-PCS | Mod: CPTII,S$GLB,, | Performed by: NURSE PRACTITIONER

## 2020-02-20 PROCEDURE — 1159F PR MEDICATION LIST DOCUMENTED IN MEDICAL RECORD: ICD-10-PCS | Mod: S$GLB,,, | Performed by: NURSE PRACTITIONER

## 2020-02-20 PROCEDURE — 83540 ASSAY OF IRON: CPT

## 2020-02-20 PROCEDURE — 3074F SYST BP LT 130 MM HG: CPT | Mod: CPTII,S$GLB,, | Performed by: NURSE PRACTITIONER

## 2020-02-20 PROCEDURE — 84165 PROTEIN E-PHORESIS SERUM: CPT

## 2020-02-20 PROCEDURE — 86334 PATHOLOGIST INTERPRETATION IFE: ICD-10-PCS | Mod: 26,,, | Performed by: PATHOLOGY

## 2020-02-20 PROCEDURE — 82232 ASSAY OF BETA-2 PROTEIN: CPT

## 2020-02-20 PROCEDURE — 1159F MED LIST DOCD IN RCRD: CPT | Mod: S$GLB,,, | Performed by: NURSE PRACTITIONER

## 2020-02-20 PROCEDURE — 85060 BLOOD SMEAR INTERPRETATION: CPT | Mod: ,,, | Performed by: PATHOLOGY

## 2020-02-20 PROCEDURE — 86334 IMMUNOFIX E-PHORESIS SERUM: CPT | Mod: 26,,, | Performed by: PATHOLOGY

## 2020-02-20 PROCEDURE — 85060 PATHOLOGIST REVIEW: ICD-10-PCS | Mod: ,,, | Performed by: PATHOLOGY

## 2020-02-20 PROCEDURE — 99215 PR OFFICE/OUTPT VISIT, EST, LEVL V, 40-54 MIN: ICD-10-PCS | Mod: S$GLB,,, | Performed by: NURSE PRACTITIONER

## 2020-02-20 PROCEDURE — 80053 COMPREHEN METABOLIC PANEL: CPT

## 2020-02-20 PROCEDURE — 86334 IMMUNOFIX E-PHORESIS SERUM: CPT

## 2020-02-20 PROCEDURE — 1101F PT FALLS ASSESS-DOCD LE1/YR: CPT | Mod: CPTII,S$GLB,, | Performed by: NURSE PRACTITIONER

## 2020-02-20 RX ORDER — OXYCODONE HYDROCHLORIDE 10 MG/1
10 TABLET ORAL EVERY 6 HOURS PRN
Qty: 90 TABLET | Refills: 0 | Status: SHIPPED | OUTPATIENT
Start: 2020-02-20 | End: 2020-03-16

## 2020-02-20 RX ORDER — OXYCODONE HCL 20 MG/1
20 TABLET, FILM COATED, EXTENDED RELEASE ORAL EVERY 12 HOURS
Qty: 60 EACH | Refills: 0 | Status: SHIPPED | OUTPATIENT
Start: 2020-02-20 | End: 2020-03-16

## 2020-02-21 LAB
ALBUMIN SERPL ELPH-MCNC: 3.83 G/DL (ref 3.35–5.55)
ALPHA1 GLOB SERPL ELPH-MCNC: 0.43 G/DL (ref 0.17–0.41)
ALPHA2 GLOB SERPL ELPH-MCNC: 0.88 G/DL (ref 0.43–0.99)
B-GLOBULIN SERPL ELPH-MCNC: 1.15 G/DL (ref 0.5–1.1)
FERRITIN SERPL-MCNC: 163 NG/ML (ref 20–300)
GAMMA GLOB SERPL ELPH-MCNC: 2.01 G/DL (ref 0.67–1.58)
INTERPRETATION SERPL IFE-IMP: NORMAL
IRON SERPL-MCNC: 49 UG/DL (ref 45–160)
KAPPA LC SER QL IA: 13.56 MG/DL (ref 0.33–1.94)
KAPPA LC/LAMBDA SER IA: 1.71 (ref 0.26–1.65)
LAMBDA LC SER QL IA: 7.94 MG/DL (ref 0.57–2.63)
PATH REV BLD -IMP: NORMAL
PROT SERPL-MCNC: 8.3 G/DL (ref 6–8.4)
SATURATED IRON: 17 % (ref 20–50)
TOTAL IRON BINDING CAPACITY: 293 UG/DL (ref 250–450)
TRANSFERRIN SERPL-MCNC: 198 MG/DL (ref 200–375)

## 2020-02-21 NOTE — PROGRESS NOTES
"Subjective:       Patient ID: Familia Durbin Jr. is a 74 y.o. male.    Chief Complaint: Multiple Myeloma    74-year-old male with multiple myeloma currently receiving pomalidomide dexamethasone through [facility]. Patient takes narcotic pain medication due to bony involvement from Multiple Myeloma, imaging confirmed lesions to back, shoulder and clavicle. PMI reviewed, patient only receives pain medication through [facility] and picks up the medications at [facility] pharmacy.    Review of Systems   Constitutional: Positive for activity change and fatigue. Negative for appetite change, chills, diaphoresis, fever and unexpected weight change.   HENT: Negative for congestion, hearing loss, postnasal drip, sinus pressure and trouble swallowing.    Eyes: Negative for discharge and visual disturbance.   Respiratory: Negative for cough, chest tightness and shortness of breath.    Cardiovascular: Negative for chest pain, palpitations and leg swelling.   Gastrointestinal: Negative for constipation, diarrhea, nausea and vomiting.   Endocrine: Negative for cold intolerance and heat intolerance.   Genitourinary: Negative for difficulty urinating, dysuria, flank pain and hematuria.   Musculoskeletal: Positive for arthralgias, back pain and joint swelling. Negative for myalgias.   Skin: Negative.    Neurological: Negative for dizziness, weakness, light-headedness and headaches.   Hematological: Negative for adenopathy. Does not bruise/bleed easily.   Psychiatric/Behavioral: Negative for agitation, behavioral problems and confusion. The patient is nervous/anxious.        Medication List with Changes/Refills   Current Medications    ASPIRIN 81 MG CHEW    Take 1 tablet (81 mg total) by mouth once daily.    BD ULTRA-FINE DAVE PEN NEEDLE 32 GAUGE X 5/32" NDLE    USE  4 TIMES DAILY WITH MEALS AND AT BEDTIME    BLOOD-GLUCOSE METER KIT    Use as instructed    BRILINTA 90 MG TABLET    Take 1 tablet by mouth " once daily.    CHOLECALCIFEROL, VITAMIN D3, 5,000 UNIT TAB    Take 5,000 Units by mouth once daily.    DIAZEPAM (VALIUM) 10 MG TAB    Take 1 tablet (10 mg total) by mouth once. Take 1 hour before planned imaging study for 1 dose    DOCOSAHEXANOIC ACID ORAL    Take 1 capsule by mouth once daily.    DOCUSATE SODIUM (COLACE) 100 MG CAPSULE    Take 100 mg by mouth 2 (two) times daily.    FLAXSEED OIL 1,000 MG CAP    Take 1 capsule by mouth.    GINGER ROOT (GINGER, ZINGIBER OFFICINALIS,) 550 MG CAP    Take 1 capsule by mouth.    INSULIN (LANTUS SOLOSTAR U-100 INSULIN) GLARGINE 100 UNITS/ML (3ML) SUBQ PEN    INJECT 15 UNITS SUBCUTANEOUSLY ONCE DAILY    ISOSORBIDE MONONITRATE (IMDUR) 30 MG 24 HR TABLET    Take by mouth.    LACTULOSE (CHRONULAC) 10 GRAM/15 ML SOLUTION    Take 30 mL by mouth twice daily as needed for constipation.    LISINOPRIL (PRINIVIL,ZESTRIL) 2.5 MG TABLET        METOPROLOL TARTRATE (LOPRESSOR) 50 MG TABLET    TAKE 1 TABLET BY MOUTH TWICE DAILY    MULTIVIT-MIN-FA-LYCOPEN-LUTEIN 300-600-300 MCG TAB    Take 1 tablet by mouth.    NITROSTAT 0.4 MG SL TABLET        NOVOLOG FLEXPEN U-100 INSULIN 100 UNIT/ML (3 ML) INPN PEN    INJECT 20 UNITS SUBCUTANEOUSLY THREE TIMES DAILY BEFORE MEAL(S)    OMEGA 3-DHA-EPA-FISH -1,000 MG CAP    Take by mouth.    ONDANSETRON (ZOFRAN) 4 MG TABLET    Take 1 tablet (4 mg total) by mouth every 8 (eight) hours as needed for Nausea.    ONDANSETRON (ZOFRAN-ODT) 4 MG TBDL    Dissolve 1 tab under the tongue every 4-6 hours as needed for nausea.    PANTOPRAZOLE (PROTONIX) 40 MG TABLET    Take 1 tablet by mouth daily    POMALIDOMIDE 3 MG CAP    Take by mouth.    PROCHLORPERAZINE (COMPAZINE) 5 MG TABLET    Take 1 tablet (5 mg total) by mouth 4 (four) times daily as needed for Nausea.    RANOLAZINE (RANEXA) 1,000 MG TB12    Take 1,000 mg by mouth 2 (two) times daily.    RANOLAZINE (RANEXA) 1,000 MG TB12    Take 1 tablet by mouth twice daily    ROSUVASTATIN (CRESTOR) 40 MG TAB     TAKE 1 TABLET BY MOUTH IN THE EVENING    SENNA (SENOKOT) 8.6 MG TABLET    Take 1 tablet by mouth daily    TRIAMCINOLONE ACETONIDE 0.1% (KENALOG) 0.1 % CREAM    Apply topically 2 (two) times daily. For two weeks    TRUE METRIX GLUCOSE TEST STRIP STRP    USE  STRIP TO CHECK GLUCOSE SIX TIMES DAILY    TRUEPLUS LANCETS 33 GAUGE MISC    USE   TO CHECK GLUCOSE SIX TIMES DAILY    VALACYCLOVIR (VALTREX) 500 MG TABLET    Take by mouth.    VITAMIN B COMPLEX ORAL    Take 1 capsule by mouth.   Changed and/or Refilled Medications    Modified Medication Previous Medication    OXYCODONE (OXYCONTIN) 20 MG 12 HR TABLET oxyCODONE (OXYCONTIN) 20 mg 12 hr tablet       Take 1 tablet (20 mg total) by mouth every 12 (twelve) hours.    Take 1 tablet (20 mg total) by mouth every 12 (twelve) hours.    OXYCODONE (ROXICODONE) 10 MG TAB IMMEDIATE RELEASE TABLET oxyCODONE (ROXICODONE) 10 mg Tab immediate release tablet       Take 1 tablet (10 mg total) by mouth every 6 (six) hours as needed for Pain.    Take 1 tablet (10 mg total) by mouth every 6 (six) hours as needed for Pain.     Objective:     Vitals:    02/20/20 1133   BP: 127/63   Pulse: 70   Temp: 97.6 °F (36.4 °C)     Physical Exam   Constitutional: He is oriented to person, place, and time. He appears well-developed and well-nourished. No distress.   HENT:   Head: Normocephalic and atraumatic.   Right Ear: Hearing and external ear normal.   Left Ear: Hearing and external ear normal.   Nose: Nose normal. No mucosal edema or rhinorrhea. No epistaxis.   Mouth/Throat: Uvula is midline, oropharynx is clear and moist and mucous membranes are normal.   Eyes: Pupils are equal, round, and reactive to light. Conjunctivae and EOM are normal. Right eye exhibits no chemosis and no discharge. Left eye exhibits no chemosis and no discharge.   Neck: Trachea normal and normal range of motion. Neck supple. No thyroid mass and no thyromegaly present.   Cardiovascular: Normal rate, regular rhythm, normal  heart sounds and intact distal pulses.   No murmur heard.  Pulses:       Dorsalis pedis pulses are 2+ on the right side, and 2+ on the left side.   Pulmonary/Chest: Effort normal and breath sounds normal. No respiratory distress. He has no decreased breath sounds. He has no wheezes.   Abdominal: Soft. Bowel sounds are normal. He exhibits distension. There is no tenderness.   Musculoskeletal: Normal range of motion.        Thoracic back: He exhibits tenderness and bony tenderness.        Arms:  Lymphadenopathy:     He has no cervical adenopathy.     He has no axillary adenopathy.        Right: No supraclavicular adenopathy present.        Left: No supraclavicular adenopathy present.   Neurological: He is alert and oriented to person, place, and time. He has normal strength.   Skin: Skin is warm, dry and intact. Capillary refill takes less than 2 seconds. No rash noted. He is not diaphoretic.   Psychiatric: His speech is normal and behavior is normal. Judgment and thought content normal. His mood appears anxious. Cognition and memory are normal.   Vitals reviewed.      Assessment:       Problem List Items Addressed This Visit        Derm    Discoid lupus (Chronic)       Cardiac/Vascular    Calcification of abdominal aorta (Chronic)    Coronary artery disease of native artery of native heart with stable angina pectoris (Chronic)       Oncology    Multiple myeloma - Primary    Relevant Medications    oxyCODONE (OXYCONTIN) 20 mg 12 hr tablet    oxyCODONE (ROXICODONE) 10 mg Tab immediate release tablet    Other Relevant Orders    Comprehensive metabolic panel (Completed)    Ferritin (Completed)    Iron and TIBC (Completed)    Immunoglobulin free LT chains blood (Completed)    Lactate dehydrogenase (Completed)    Pathologist Interpretation Differential (Completed)    Protein electrophoresis, serum (Completed)    Immunofixation electrophoresis (Completed)    Beta 2 Microglobulin, Serum (Completed)    Metastatic bone cancer     Relevant Medications    oxyCODONE (OXYCONTIN) 20 mg 12 hr tablet    oxyCODONE (ROXICODONE) 10 mg Tab immediate release tablet    Other Relevant Orders    Comprehensive metabolic panel (Completed)    Ferritin (Completed)    Iron and TIBC (Completed)    Immunoglobulin free LT chains blood (Completed)    Lactate dehydrogenase (Completed)    Pathologist Interpretation Differential (Completed)    Protein electrophoresis, serum (Completed)    Immunofixation electrophoresis (Completed)    Beta 2 Microglobulin, Serum (Completed)          Plan:       Multiple Myeloma:  --Currently being treated at [facility]  --Pain management per [facility]  --refilled pain medications, no adjustment to regimen at this time, PMI reviewed

## 2020-02-21 NOTE — PATIENT INSTRUCTIONS
Nutrition During Chemotherapy     Drink plenty of liquids, such as water.     During chemotherapy, the energy provided by a healthy diet can help you rebuild normal cells. It can also help you keep up your strength and fight infection. As a result, you may feel better and be more able to cope with side effects. Ask your doctor about your nutrition needs.  Drink plenty of fluids  · Fluids help the body produce urine and decrease constipation. They help prevent kidney and bladder problems. They also help replace fluids lost from vomiting and diarrhea.  · Try water, unsweetened juices, and other flavored drinks without caffeine. They flush toxins from the body.  Get enough calories  · Calories are fuel. The body uses this fuel to perform all of its functions, including healing.  · Its OK to be lean, but be sure you are not underweight. If you are, try eating more calories.  · Eat calorie-dense foods such as avocados, peanut butter, eggs, and ice cream.  · If you need extra calories, add butter, gravy, and sauces to foods (if tolerated).  · If you don't need the extra calories, try to limit foods that are fried, greasy, or high in fat or added sugar.  Eat protein, fruits, and vegetables  · Protein builds muscle, bone, skin, and blood. It helps your body heal and fight infection. It also helps boost your energy level.  · Good protein choices include yogurt, eggs, chicken, lean meats, beans, and peanut butter.  · Fruits and vegetables are full of important vitamins, minerals, and fiber to help your body function properly.  · Try to eat a variety of vegetables, fruits, whole grains, and beans.  · Ask your doctor about instant protein powder or other supplements.  Eating right during treatment  Side effects may make it a little harder to eat well on some days. The following tips will help you continue to get the nutrition you need:  · Be open to new foods and recipes.  · Eat small portions often and slowly.  · Have a  healthy snack instead of a meal if you are not very hungry.  · Try eating in a new setting.  · Physical activity, such as walking, can help increase your appetite. Try to be active for at least 30 minutes each day.  · Boost your diet by getting the vitamins and minerals you need from fruits, vegetables, and whole grains.  · If you live alone and are not up to cooking, ask your healthcare provider about Meals on Wheels or other outreach programs.  · Sometimes, it is best to follow your appetitie. Eat when you are hungry, but when you ar enot, forcing yourself to eat can make you feel bad, nauseated, or even cause you to vomit.   Date Last Reviewed: 1/6/2016  © 1210-4796 [employer], [employer]. [address] [place] [address], [place] [address]. All rights reserved. This information is not intended as a substitute for professional medical care. Always follow your healthcare professional's instructions.

## 2020-02-24 LAB
PATHOLOGIST INTERPRETATION IFE: NORMAL
PATHOLOGIST INTERPRETATION SPE: NORMAL

## 2020-03-06 DIAGNOSIS — E78.5 HYPERLIPIDEMIA, UNSPECIFIED HYPERLIPIDEMIA TYPE: Chronic | ICD-10-CM

## 2020-03-06 DIAGNOSIS — I10 ESSENTIAL HYPERTENSION: Chronic | ICD-10-CM

## 2020-03-06 DIAGNOSIS — Z79.4 TYPE 2 DIABETES MELLITUS WITH HYPERGLYCEMIA, WITH LONG-TERM CURRENT USE OF INSULIN: ICD-10-CM

## 2020-03-06 DIAGNOSIS — E11.65 TYPE 2 DIABETES MELLITUS WITH HYPERGLYCEMIA, WITH LONG-TERM CURRENT USE OF INSULIN: ICD-10-CM

## 2020-03-06 DIAGNOSIS — Z79.4 TYPE 2 DIABETES MELLITUS WITHOUT COMPLICATION, WITH LONG-TERM CURRENT USE OF INSULIN: Chronic | ICD-10-CM

## 2020-03-06 DIAGNOSIS — Z79.52 LONG TERM CURRENT USE OF SYSTEMIC STEROIDS: ICD-10-CM

## 2020-03-06 DIAGNOSIS — Z79.4 INSULIN LONG-TERM USE: Chronic | ICD-10-CM

## 2020-03-06 DIAGNOSIS — E11.9 TYPE 2 DIABETES MELLITUS WITHOUT COMPLICATION, WITH LONG-TERM CURRENT USE OF INSULIN: Chronic | ICD-10-CM

## 2020-03-06 RX ORDER — ROSUVASTATIN CALCIUM 40 MG/1
TABLET, COATED ORAL
Refills: 0 | OUTPATIENT
Start: 2020-03-06

## 2020-03-06 NOTE — TELEPHONE ENCOUNTER
----- Message from Sonya Potter PA-C sent at 3/6/2020  4:32 PM CST -----  Needs appt to est care prior to statin refill.     Thank you

## 2020-03-09 DIAGNOSIS — Z79.4 TYPE 2 DIABETES MELLITUS WITH HYPERGLYCEMIA, WITH LONG-TERM CURRENT USE OF INSULIN: ICD-10-CM

## 2020-03-09 DIAGNOSIS — I10 ESSENTIAL HYPERTENSION: Chronic | ICD-10-CM

## 2020-03-09 DIAGNOSIS — Z79.4 TYPE 2 DIABETES MELLITUS WITHOUT COMPLICATION, WITH LONG-TERM CURRENT USE OF INSULIN: Chronic | ICD-10-CM

## 2020-03-09 DIAGNOSIS — E78.5 HYPERLIPIDEMIA, UNSPECIFIED HYPERLIPIDEMIA TYPE: Chronic | ICD-10-CM

## 2020-03-09 DIAGNOSIS — E11.9 TYPE 2 DIABETES MELLITUS WITHOUT COMPLICATION, WITH LONG-TERM CURRENT USE OF INSULIN: Chronic | ICD-10-CM

## 2020-03-09 DIAGNOSIS — Z79.52 LONG TERM CURRENT USE OF SYSTEMIC STEROIDS: ICD-10-CM

## 2020-03-09 DIAGNOSIS — Z79.4 INSULIN LONG-TERM USE: Chronic | ICD-10-CM

## 2020-03-09 DIAGNOSIS — E11.65 TYPE 2 DIABETES MELLITUS WITH HYPERGLYCEMIA, WITH LONG-TERM CURRENT USE OF INSULIN: ICD-10-CM

## 2020-03-10 RX ORDER — ROSUVASTATIN CALCIUM 40 MG/1
TABLET, COATED ORAL
Qty: 60 TABLET | Refills: 0 | Status: SHIPPED | OUTPATIENT
Start: 2020-03-10 | End: 2020-05-18

## 2020-03-16 DIAGNOSIS — C90.00 MULTIPLE MYELOMA, REMISSION STATUS UNSPECIFIED: ICD-10-CM

## 2020-03-16 DIAGNOSIS — C90.00 MULTIPLE MYELOMA NOT HAVING ACHIEVED REMISSION: ICD-10-CM

## 2020-03-17 RX ORDER — OXYCODONE HCL 20 MG/1
20 TABLET, FILM COATED, EXTENDED RELEASE ORAL EVERY 12 HOURS
Qty: 60 EACH | Refills: 0 | Status: SHIPPED | OUTPATIENT
Start: 2020-03-17 | End: 2020-04-17 | Stop reason: SDUPTHER

## 2020-03-17 RX ORDER — OXYCODONE HYDROCHLORIDE 10 MG/1
10 TABLET ORAL EVERY 6 HOURS PRN
Qty: 90 TABLET | Refills: 0 | Status: SHIPPED | OUTPATIENT
Start: 2020-03-17 | End: 2020-04-17 | Stop reason: SDUPTHER

## 2020-03-19 ENCOUNTER — TELEPHONE (OUTPATIENT)
Dept: INTERNAL MEDICINE | Facility: CLINIC | Age: 75
End: 2020-03-19

## 2020-03-19 ENCOUNTER — APPOINTMENT (OUTPATIENT)
Dept: INTERNAL MEDICINE | Facility: CLINIC | Age: 75
End: 2020-03-19
Payer: MEDICARE

## 2020-03-19 DIAGNOSIS — L93.0 DISCOID LUPUS: Primary | Chronic | ICD-10-CM

## 2020-03-19 DIAGNOSIS — N28.9 RENAL INSUFFICIENCY: ICD-10-CM

## 2020-03-19 DIAGNOSIS — R50.9 FEVER AND CHILLS: ICD-10-CM

## 2020-03-19 DIAGNOSIS — R06.02 SOB (SHORTNESS OF BREATH): ICD-10-CM

## 2020-03-19 DIAGNOSIS — R05.9 COUGH IN ADULT: ICD-10-CM

## 2020-03-19 DIAGNOSIS — R52 GENERALIZED BODY ACHES: ICD-10-CM

## 2020-03-19 LAB
CTP QC/QA: YES
POC MOLECULAR INFLUENZA A AGN: NEGATIVE
POC MOLECULAR INFLUENZA B AGN: NEGATIVE

## 2020-03-19 PROCEDURE — U0002 COVID-19 LAB TEST NON-CDC: HCPCS

## 2020-03-19 NOTE — TELEPHONE ENCOUNTER
----- Message from Christina Diamond sent at 3/19/2020  9:41 AM CDT -----  Contact: Taya Sal/daughter [phone]  Type:  Needs Medical Advice    Who Called: Alejandrina/daughter  Symptoms (please be specific):  Cough, runny nose, congestion, shortness of breath, fever 99, wheezing  How long has patient had these symptoms: a couple of days  Pharmacy name and phone #:      [facility] [address] [place], [place] - [address] [facility]  [address] [facility] [address]  Phone: [phone] Fax: [phone]    Would the patient rather a call back or a response via [facility]? Call back   Best Call Back Number:  [phone]  Additional Information: States that [place] the [place]. Is requesting an order to get tested for Covid 19

## 2020-03-19 NOTE — TELEPHONE ENCOUNTER
S/w patient's daughter. Advised daughter her and patient should go to the [place] location to be tested. Understanding was verbalized.

## 2020-03-19 NOTE — TELEPHONE ENCOUNTER
----- Message from Latricia Phelps MA sent at 3/19/2020 10:22 AM CDT -----  Contact: Taya Sal/daughter [phone]  The father also wants an order.   ----- Message -----  From: Christina Diamond  Sent: 3/19/2020   9:41 AM CDT  To: [facility] Staff    Type:  Needs Medical Advice    Who Called: Alejandrina/daughter  Symptoms (please be specific):  Cough, runny nose, congestion, shortness of breath, fever 99, wheezing  How long has patient had these symptoms: a couple of days  Pharmacy name and phone #:      [facility] [address] [place], [place] - [address] [facility]  [address] [facility] [address]  Phone: [phone] Fax: [phone]    Would the patient rather a call back or a response via [facility]? Call back   Best Call Back Number:  [phone]  Additional Information: States that pt went to the airport in [place]. Is requesting an order to get tested for Covid 19

## 2020-03-20 ENCOUNTER — PATIENT MESSAGE (OUTPATIENT)
Dept: HEMATOLOGY/ONCOLOGY | Facility: CLINIC | Age: 75
End: 2020-03-20

## 2020-03-20 DIAGNOSIS — E11.65 TYPE 2 DIABETES MELLITUS WITH HYPERGLYCEMIA, WITH LONG-TERM CURRENT USE OF INSULIN: ICD-10-CM

## 2020-03-20 DIAGNOSIS — Z79.52 LONG TERM CURRENT USE OF SYSTEMIC STEROIDS: ICD-10-CM

## 2020-03-20 DIAGNOSIS — Z79.4 TYPE 2 DIABETES MELLITUS WITHOUT COMPLICATION, WITH LONG-TERM CURRENT USE OF INSULIN: Chronic | ICD-10-CM

## 2020-03-20 DIAGNOSIS — Z79.4 INSULIN LONG-TERM USE: Chronic | ICD-10-CM

## 2020-03-20 DIAGNOSIS — E11.9 TYPE 2 DIABETES MELLITUS WITHOUT COMPLICATION, WITH LONG-TERM CURRENT USE OF INSULIN: Chronic | ICD-10-CM

## 2020-03-20 DIAGNOSIS — E78.5 HYPERLIPIDEMIA, UNSPECIFIED HYPERLIPIDEMIA TYPE: Chronic | ICD-10-CM

## 2020-03-20 DIAGNOSIS — I10 ESSENTIAL HYPERTENSION: Chronic | ICD-10-CM

## 2020-03-20 DIAGNOSIS — Z79.4 TYPE 2 DIABETES MELLITUS WITH HYPERGLYCEMIA, WITH LONG-TERM CURRENT USE OF INSULIN: ICD-10-CM

## 2020-03-20 RX ORDER — INSULIN GLARGINE 100 [IU]/ML
INJECTION, SOLUTION SUBCUTANEOUS
Qty: 15 ML | Refills: 1 | Status: SHIPPED | OUTPATIENT
Start: 2020-03-20 | End: 2020-07-31 | Stop reason: SDUPTHER

## 2020-03-20 RX ORDER — ROSUVASTATIN CALCIUM 40 MG/1
40 TABLET, COATED ORAL NIGHTLY
Qty: 60 TABLET | Refills: 0 | OUTPATIENT
Start: 2020-03-20

## 2020-03-22 LAB — SARS-COV-2 RNA RESP QL NAA+PROBE: NORMAL

## 2020-03-24 ENCOUNTER — TELEPHONE (OUTPATIENT)
Dept: INTERNAL MEDICINE | Facility: CLINIC | Age: 75
End: 2020-03-24

## 2020-03-25 ENCOUNTER — TELEPHONE (OUTPATIENT)
Dept: INTERNAL MEDICINE | Facility: CLINIC | Age: 75
End: 2020-03-25

## 2020-03-25 NOTE — TELEPHONE ENCOUNTER
· Stay home except to get medical care.  · Cover your coughs and sneezes.  · Clean your hands often.  · Monitor your symptoms. Seek prompt medical attention if your illness is worsening (e.g., difficulty breathing). Before seeking care, call your healthcare provider.  · If you have a medical emergency and need to call 911, notify the dispatch personnel that you have, or are being evaluated for COVID-19. If possible, put on a facemask before emergency medical services arrive.  · Discontinuing home isolation. Call your provider about guidance to discontinue home isolation.     Recommended precautions for household members, intimate partners, and caregivers in a nonhealthcare setting of a patient with symptomatic laboratory-confirmed COVID-19 or a patient under investigation.  Household members, intimate partners, and caregivers in a nonhealthcare setting may have close contact with a person with symptomatic, laboratory-confirmed COVID-19 or a person under investigation. Close contacts should monitor their health; they should call their healthcare provider right away if they develop symptoms suggestive of COVID-19 (e.g., fever, cough, shortness of breath).

## 2020-03-25 NOTE — TELEPHONE ENCOUNTER
Telephone patient with a negative Coban 19 results, given instructions to follow-up with primary care physician, advise go to ER if increased temperature, shortness of breath, chest pain, nausea, vomiting, and body aches.

## 2020-03-30 DIAGNOSIS — E78.5 HYPERLIPIDEMIA, UNSPECIFIED HYPERLIPIDEMIA TYPE: Chronic | ICD-10-CM

## 2020-03-30 DIAGNOSIS — Z79.4 TYPE 2 DIABETES MELLITUS WITHOUT COMPLICATION, WITH LONG-TERM CURRENT USE OF INSULIN: Chronic | ICD-10-CM

## 2020-03-30 DIAGNOSIS — Z79.52 LONG TERM CURRENT USE OF SYSTEMIC STEROIDS: ICD-10-CM

## 2020-03-30 DIAGNOSIS — Z79.4 INSULIN LONG-TERM USE: Chronic | ICD-10-CM

## 2020-03-30 DIAGNOSIS — E11.65 TYPE 2 DIABETES MELLITUS WITH HYPERGLYCEMIA, WITH LONG-TERM CURRENT USE OF INSULIN: ICD-10-CM

## 2020-03-30 DIAGNOSIS — E11.9 TYPE 2 DIABETES MELLITUS WITHOUT COMPLICATION, WITH LONG-TERM CURRENT USE OF INSULIN: Chronic | ICD-10-CM

## 2020-03-30 DIAGNOSIS — I10 ESSENTIAL HYPERTENSION: Chronic | ICD-10-CM

## 2020-03-30 DIAGNOSIS — Z79.4 TYPE 2 DIABETES MELLITUS WITH HYPERGLYCEMIA, WITH LONG-TERM CURRENT USE OF INSULIN: ICD-10-CM

## 2020-03-31 RX ORDER — INSULIN ASPART 100 [IU]/ML
INJECTION, SOLUTION INTRAVENOUS; SUBCUTANEOUS
Qty: 15 ML | Refills: 0 | Status: SHIPPED | OUTPATIENT
Start: 2020-03-31 | End: 2020-06-10 | Stop reason: SDUPTHER

## 2020-04-17 ENCOUNTER — OFFICE VISIT (OUTPATIENT)
Dept: HEMATOLOGY/ONCOLOGY | Facility: CLINIC | Age: 75
End: 2020-04-17
Payer: MEDICARE

## 2020-04-17 DIAGNOSIS — C90.00 MULTIPLE MYELOMA NOT HAVING ACHIEVED REMISSION: Primary | ICD-10-CM

## 2020-04-17 DIAGNOSIS — K59.03 DRUG-INDUCED CONSTIPATION: ICD-10-CM

## 2020-04-17 DIAGNOSIS — C90.00 MULTIPLE MYELOMA, REMISSION STATUS UNSPECIFIED: ICD-10-CM

## 2020-04-17 PROCEDURE — 1159F PR MEDICATION LIST DOCUMENTED IN MEDICAL RECORD: ICD-10-PCS | Mod: 95,,, | Performed by: INTERNAL MEDICINE

## 2020-04-17 PROCEDURE — 99214 PR OFFICE/OUTPT VISIT, EST, LEVL IV, 30-39 MIN: ICD-10-PCS | Mod: 95,,, | Performed by: INTERNAL MEDICINE

## 2020-04-17 PROCEDURE — 1101F PR PT FALLS ASSESS DOC 0-1 FALLS W/OUT INJ PAST YR: ICD-10-PCS | Mod: CPTII,95,, | Performed by: INTERNAL MEDICINE

## 2020-04-17 PROCEDURE — 1101F PT FALLS ASSESS-DOCD LE1/YR: CPT | Mod: CPTII,95,, | Performed by: INTERNAL MEDICINE

## 2020-04-17 PROCEDURE — 99214 OFFICE O/P EST MOD 30 MIN: CPT | Mod: 95,,, | Performed by: INTERNAL MEDICINE

## 2020-04-17 PROCEDURE — 1159F MED LIST DOCD IN RCRD: CPT | Mod: 95,,, | Performed by: INTERNAL MEDICINE

## 2020-04-17 RX ORDER — OXYCODONE HCL 20 MG/1
20 TABLET, FILM COATED, EXTENDED RELEASE ORAL EVERY 12 HOURS
Qty: 60 EACH | Refills: 0 | Status: SHIPPED | OUTPATIENT
Start: 2020-04-17 | End: 2020-05-21

## 2020-04-17 RX ORDER — OXYCODONE HYDROCHLORIDE 10 MG/1
10 TABLET ORAL EVERY 6 HOURS PRN
Qty: 90 TABLET | Refills: 0 | Status: SHIPPED | OUTPATIENT
Start: 2020-04-17 | End: 2020-05-21

## 2020-04-17 NOTE — PROGRESS NOTES
Subjective:       Patient ID: Familia Durbin Jr. is a 74 y.o. male.    Chief Complaint: Results; Pain; and Multiple Myeloma    HPI 74-year-old male with multiple myeloma currently receiving pomalidomide dexamethasone through [facility]. In [facility] ; patient is seen by video visit today during corona virus event.  Reports fatigue constipation    Past Medical History:   Diagnosis Date    CAD (coronary artery disease)     luikart    Diabetes mellitus, type 2 1979    eye dr rocha    Hearing impaired     Hyperlipidemia     Hypertension     Lupus     Discoid    Multiple myeloma     Old MI (myocardial infarction) 2012    Renal insufficiency     Tracheostomy tube present      Family History   Problem Relation Age of Onset    Diabetes Mother     Diabetes Father     Prostate cancer Father     Pancreatic cancer Sister     No Known Problems Brother     Diabetes Maternal Uncle     Diabetes Maternal Grandmother     No Known Problems Maternal Grandfather     No Known Problems Paternal Grandmother     No Known Problems Paternal Grandfather      Social History     Socioeconomic History    Marital status: Single     Spouse name: Not on file    Number of children: 9    Years of education: Not on file    Highest education level: Not on file   Occupational History    Not on file   Social Needs    Financial resource strain: Not on file    Food insecurity:     Worry: Not on file     Inability: Not on file    Transportation needs:     Medical: Not on file     Non-medical: Not on file   Tobacco Use    Smoking status: Never Smoker    Smokeless tobacco: Never Used   Substance and Sexual Activity    Alcohol use: No    Drug use: No    Sexual activity: Yes     Partners: Female   Lifestyle    Physical activity:     Days per week: Not on file     Minutes per session: Not on file    Stress: Not on file   Relationships    Social connections:     Talks on phone: Not on file      Gets together: Not on file     Attends Orthodoxy service: Not on file     Active member of club or organization: Not on file     Attends meetings of clubs or organizations: Not on file     Relationship status: Not on file   Other Topics Concern    Not on file   Social History Narrative    Not on file     Past Surgical History:   Procedure Laterality Date    CARDIAC SURGERY      CATARACT EXTRACTION      COLONOSCOPY      CORONARY ARTERY BYPASS GRAFT      HAND SURGERY      KNEE SURGERY      TRACHEOSTOMY TUBE PLACEMENT      WRIST FUSION         Labs:  Lab Results   Component Value Date    WBC 3.78 (L) 02/20/2020    HGB 11.1 (L) 02/20/2020    HCT 34.9 (L) 02/20/2020     (H) 02/20/2020     02/20/2020     BMP  Lab Results   Component Value Date     02/20/2020    K 3.7 02/20/2020     02/20/2020    CO2 28 02/20/2020    BUN 19 02/20/2020    CREATININE 1.7 (H) 02/20/2020    CALCIUM 9.5 02/20/2020    ANIONGAP 9 02/20/2020    ESTGFRAFRICA 45 (A) 02/20/2020    EGFRNONAA 39 (A) 02/20/2020     Lab Results   Component Value Date    ALT 13 02/20/2020    AST 18 02/20/2020    ALKPHOS 66 02/20/2020    BILITOT 0.4 02/20/2020       Lab Results   Component Value Date    IRON 49 02/20/2020    TIBC 293 02/20/2020    FERRITIN 163 02/20/2020     No results found for: [id number]  No results found for: FOLATE  Lab Results   Component Value Date    TSH 0.683 01/22/2018         Review of Systems   Constitutional: Positive for activity change, appetite change and fatigue.   Gastrointestinal: Positive for constipation.   Musculoskeletal: Positive for gait problem.   Neurological: Positive for weakness.   Psychiatric/Behavioral: Positive for dysphoric mood. The patient is nervous/anxious.        Objective:      Physical Exam   Constitutional: He is oriented to person, place, and time. He has a sickly appearance. He appears ill. He appears distressed.   Eyes: Pupils are equal, round, and reactive to light.    Pulmonary/Chest: Effort normal.   Musculoskeletal: Normal range of motion.   Neurological: He is alert and oriented to person, place, and time.   Psychiatric: His mood appears anxious. He exhibits a depressed mood.           Assessment:      1. Multiple myeloma not having achieved remission    2. Metastatic bone cancer    3. Multiple myeloma, remission status unspecified     drug-induced constipation      Plan:   The patient location is: HOME  The chief complaint leading to consultation is:  Fatigue constipation  Visit type:  Synchronous audio in video visit  Total time spent with patient: 25 mins  Each patient to whom he or she provides medical services by telemedicine is:  (1) informed of the relationship between the physician and patient and the respective role of any other health care provider with respect to management of the patient; and (2) notified that he or she may decline to receive medical services by telemedicine and may withdraw from such care at any time.    Notes:  Patient continues on current medicines for maintenance of multiple myeloma.  Fatigue weakness and constipation spoke with daughter present during video visit during corona virus event at this point have added lactulose to current medication last continue with chronic stable narcotics ask that laboratory studies be drawn will communicate results back to them follow-up in June of 2020 with laboratory studies continue with chronic stable narcotic use try to relieve constipation 25 min face-to-face time coordination of care greater than 50% time face-to-face with patient family          Rodolfo Solo Jr, MD FACP

## 2020-04-20 ENCOUNTER — LAB VISIT (OUTPATIENT)
Dept: LAB | Facility: HOSPITAL | Age: 75
End: 2020-04-20
Attending: INTERNAL MEDICINE
Payer: MEDICARE

## 2020-04-20 DIAGNOSIS — C90.00 MULTIPLE MYELOMA NOT HAVING ACHIEVED REMISSION: ICD-10-CM

## 2020-04-20 LAB
ALBUMIN SERPL BCP-MCNC: 3.9 G/DL (ref 3.5–5.2)
ALP SERPL-CCNC: 63 U/L (ref 55–135)
ALT SERPL W/O P-5'-P-CCNC: 19 U/L (ref 10–44)
ANION GAP SERPL CALC-SCNC: 10 MMOL/L (ref 8–16)
AST SERPL-CCNC: 17 U/L (ref 10–40)
B2 MICROGLOB SERPL-MCNC: 2.4 UG/ML (ref 0–2.5)
BASOPHILS # BLD AUTO: 0.05 K/UL (ref 0–0.2)
BASOPHILS NFR BLD: 1.8 % (ref 0–1.9)
BILIRUB SERPL-MCNC: 0.4 MG/DL (ref 0.1–1)
BUN SERPL-MCNC: 20 MG/DL (ref 8–23)
CALCIUM SERPL-MCNC: 9.9 MG/DL (ref 8.7–10.5)
CHLORIDE SERPL-SCNC: 106 MMOL/L (ref 95–110)
CO2 SERPL-SCNC: 25 MMOL/L (ref 23–29)
CREAT SERPL-MCNC: 1.8 MG/DL (ref 0.5–1.4)
DIFFERENTIAL METHOD: ABNORMAL
EOSINOPHIL # BLD AUTO: 0 K/UL (ref 0–0.5)
EOSINOPHIL NFR BLD: 1.4 % (ref 0–8)
ERYTHROCYTE [DISTWIDTH] IN BLOOD BY AUTOMATED COUNT: 16.2 % (ref 11.5–14.5)
EST. GFR  (AFRICAN AMERICAN): 42 ML/MIN/1.73 M^2
EST. GFR  (NON AFRICAN AMERICAN): 36 ML/MIN/1.73 M^2
GLUCOSE SERPL-MCNC: 184 MG/DL (ref 70–110)
HCT VFR BLD AUTO: 36.1 % (ref 40–54)
HGB BLD-MCNC: 11.6 G/DL (ref 14–18)
IMM GRANULOCYTES # BLD AUTO: 0.01 K/UL (ref 0–0.04)
IMM GRANULOCYTES NFR BLD AUTO: 0.4 % (ref 0–0.5)
LYMPHOCYTES # BLD AUTO: 0.7 K/UL (ref 1–4.8)
LYMPHOCYTES NFR BLD: 26.4 % (ref 18–48)
MCH RBC QN AUTO: 33.7 PG (ref 27–31)
MCHC RBC AUTO-ENTMCNC: 32.1 G/DL (ref 32–36)
MCV RBC AUTO: 105 FL (ref 82–98)
MONOCYTES # BLD AUTO: 0.5 K/UL (ref 0.3–1)
MONOCYTES NFR BLD: 16.8 % (ref 4–15)
NEUTROPHILS # BLD AUTO: 1.5 K/UL (ref 1.8–7.7)
NEUTROPHILS NFR BLD: 53.6 % (ref 38–73)
NRBC BLD-RTO: 0 /100 WBC
PLATELET # BLD AUTO: 230 K/UL (ref 150–350)
PMV BLD AUTO: 10 FL (ref 9.2–12.9)
POTASSIUM SERPL-SCNC: 4.6 MMOL/L (ref 3.5–5.1)
PROT SERPL-MCNC: 8.8 G/DL (ref 6–8.4)
RBC # BLD AUTO: 3.44 M/UL (ref 4.6–6.2)
SODIUM SERPL-SCNC: 141 MMOL/L (ref 136–145)
WBC # BLD AUTO: 2.8 K/UL (ref 3.9–12.7)

## 2020-04-20 PROCEDURE — 80053 COMPREHEN METABOLIC PANEL: CPT

## 2020-04-20 PROCEDURE — 83520 IMMUNOASSAY QUANT NOS NONAB: CPT | Mod: 59

## 2020-04-20 PROCEDURE — 84165 PROTEIN E-PHORESIS SERUM: CPT

## 2020-04-20 PROCEDURE — 85025 COMPLETE CBC W/AUTO DIFF WBC: CPT

## 2020-04-20 PROCEDURE — 82232 ASSAY OF BETA-2 PROTEIN: CPT

## 2020-04-20 PROCEDURE — 84165 PATHOLOGIST INTERPRETATION SPE: ICD-10-PCS | Mod: 26,,, | Performed by: PATHOLOGY

## 2020-04-20 PROCEDURE — 84165 PROTEIN E-PHORESIS SERUM: CPT | Mod: 26,,, | Performed by: PATHOLOGY

## 2020-04-20 PROCEDURE — 36415 COLL VENOUS BLD VENIPUNCTURE: CPT

## 2020-04-21 LAB
ALBUMIN SERPL ELPH-MCNC: 3.92 G/DL (ref 3.35–5.55)
ALPHA1 GLOB SERPL ELPH-MCNC: 0.39 G/DL (ref 0.17–0.41)
ALPHA2 GLOB SERPL ELPH-MCNC: 0.82 G/DL (ref 0.43–0.99)
B-GLOBULIN SERPL ELPH-MCNC: 1.18 G/DL (ref 0.5–1.1)
GAMMA GLOB SERPL ELPH-MCNC: 2.08 G/DL (ref 0.67–1.58)
KAPPA LC SER QL IA: 16.42 MG/DL (ref 0.33–1.94)
KAPPA LC/LAMBDA SER IA: 1.76 (ref 0.26–1.65)
LAMBDA LC SER QL IA: 9.32 MG/DL (ref 0.57–2.63)
PATHOLOGIST INTERPRETATION SPE: NORMAL
PROT SERPL-MCNC: 8.4 G/DL (ref 6–8.4)

## 2020-05-04 ENCOUNTER — PATIENT OUTREACH (OUTPATIENT)
Dept: ADMINISTRATIVE | Facility: OTHER | Age: 75
End: 2020-05-04

## 2020-05-05 ENCOUNTER — OFFICE VISIT (OUTPATIENT)
Dept: DIABETES | Facility: CLINIC | Age: 75
End: 2020-05-05
Payer: MEDICARE

## 2020-05-05 DIAGNOSIS — E11.21 TYPE 2 DIABETES MELLITUS WITH DIABETIC NEPHROPATHY, WITH LONG-TERM CURRENT USE OF INSULIN: Primary | ICD-10-CM

## 2020-05-05 DIAGNOSIS — H54.61 VISION LOSS OF RIGHT EYE: ICD-10-CM

## 2020-05-05 DIAGNOSIS — C90.00 MULTIPLE MYELOMA NOT HAVING ACHIEVED REMISSION: ICD-10-CM

## 2020-05-05 DIAGNOSIS — I10 ESSENTIAL HYPERTENSION: Chronic | ICD-10-CM

## 2020-05-05 DIAGNOSIS — Z79.4 TYPE 2 DIABETES MELLITUS WITH DIABETIC NEPHROPATHY, WITH LONG-TERM CURRENT USE OF INSULIN: Primary | ICD-10-CM

## 2020-05-05 DIAGNOSIS — I25.118 CORONARY ARTERY DISEASE OF NATIVE ARTERY OF NATIVE HEART WITH STABLE ANGINA PECTORIS: Chronic | ICD-10-CM

## 2020-05-05 DIAGNOSIS — N28.9 RENAL INSUFFICIENCY: ICD-10-CM

## 2020-05-05 DIAGNOSIS — E78.5 HYPERLIPIDEMIA, UNSPECIFIED HYPERLIPIDEMIA TYPE: Chronic | ICD-10-CM

## 2020-05-05 PROCEDURE — 99443 PR PHYSICIAN TELEPHONE EVALUATION 21-30 MIN: ICD-10-PCS | Mod: 95,,, | Performed by: PHYSICIAN ASSISTANT

## 2020-05-05 PROCEDURE — 99443 PR PHYSICIAN TELEPHONE EVALUATION 21-30 MIN: CPT | Mod: 95,,, | Performed by: PHYSICIAN ASSISTANT

## 2020-05-05 NOTE — PROGRESS NOTES
PCP: Andrea Elkins MD    Subjective:     Chief Complaint: Diabetes - Established Patient    Established Patient - Audio Only Telehealth Visit     The patient location is: Home  The chief complaint leading to consultation is: Diabetes follow up  Visit type: Virtual visit with audio only (telephone)  Total Time Spent with Patient: 30 minutes     The reason for the audio only service rather than synchronous audio and video virtual visit was related to technical difficulties or patient preference/necessity.     Each patient to whom I provide medical services by telemedicine is:  (1) informed of the relationship between the physician and patient and the respective role of any other health care provider with respect to management of the patient; and (2) notified that they may decline to receive medical services by telemedicine and may withdraw from such care at any time. Patient verbally consented to receive this service via voice-only telephone call.    This service was not originating from a related E/M service provided within the previous 7 days nor will  to an E/M service or procedure within the next 24 hours or my soonest available appointment.  Prevailing standard of care was able to be met in this audio-only visit.       HISTORY OF PRESENT ILLNESS: 74 y.o.   male presenting for diabetes management visit.   Patient has previously been established with the Diabetes Management Department and was last seen by Sanchez Rosario PA-C on 10/15/18.   Patient is new to me and is here for establishment of future care .   Patient has had Type II diabetes since 1985.  Pertinent to decision making is the following comorbidities: HTN, HLD, CAD and Renal Insufficiency, Vision loss of Right Eye, and Multiple Myeloma  Patient has the following Diabetes complications: with diabetic nephropathy  He  has attended diabetes education in the past.     Patient's most recent A1c of 7.3% was completed 6 months  ago.   Patient states since His last A1c His blood glucose levels have been both high and low throughout the day .   Patient monitors blood glucose 2 times per day with meter : Fasting and Before Dinner.   Patient blood glucose monitoring device will not be uploaded into Media Section today secondary to Telemedicine visit.   Per patient recall, blood sugars have been around 150 - 200 both in the am and after dinner.   Patient endorses the following diabetes related symptoms: Tingling in Lower Extremities. This is a chronic issue.   Patient is due today for the following diabetes-related health maintenance standards: Foot Exam  and Eye Exam. Patient denies urgent feet issues today. Patient denies changes in his vision recently.   He denies recent hospital admissions or emergency room visits.   He voices having hypoglycemia occasionally both day and night.   Patient's concerns today include glycemic control.   Patient medication regimen is as below.     CURRENT DM MEDICATIONS:    Lantus 12 - 15 units BID    Patient has failed the following Diabetes medications:    Novolog       Labs Reviewed.       Lab Results   Component Value Date    CPEPTIDE 1.4 11/15/2016     Lab Results   Component Value Date    GLUTAMICACID 0.00 11/15/2016          //   , There is no height or weight on file to calculate BMI.  His blood sugar in clinic today is:    Lab Results   Component Value Date    POCGLU 144 (A) 10/15/2018       Review of Systems   Constitutional: Negative for activity change, appetite change, chills and fever.   HENT: Negative for dental problem, mouth sores, nosebleeds, sore throat and trouble swallowing.    Eyes: Negative for pain and discharge.   Respiratory: Negative for shortness of breath, wheezing and stridor.    Cardiovascular: Negative for chest pain, palpitations and leg swelling.   Gastrointestinal: Negative for abdominal pain, diarrhea, nausea and vomiting.   Endocrine: Negative for polydipsia, polyphagia and  polyuria.   Genitourinary: Negative for dysuria, frequency and urgency.   Musculoskeletal: Negative for joint swelling and myalgias.   Skin: Negative for rash and wound.   Neurological: Positive for numbness. Negative for dizziness, syncope, weakness and headaches.   Psychiatric/Behavioral: Negative for behavioral problems and dysphoric mood.         Diabetes Management Status  Statin: Taking  ACE/ARB: Taking    Screening or Prevention Patient's value Goal Complete/Controlled?   HgA1C Testing and Control   Lab Results   Component Value Date    HGBA1C 7.3 (H) 11/22/2019      Annually/Less than 8% Yes   Lipid profile : 11/22/2019 Annually Yes   LDL control Lab Results   Component Value Date    LDLCALC 67.2 11/22/2019    Annually/Less than 100 mg/dl  Yes   Nephropathy screening Lab Results   Component Value Date    MICALBCREAT 29.7 09/21/2016     Lab Results   Component Value Date    PROTEINUA 1+ (A) 09/11/2018    Annually No   Blood pressure BP Readings from Last 1 Encounters:   02/20/20 127/63    Less than 140/90 Yes   Dilated retinal exam : 03/01/2019 Annually No    Foot exam   : 10/15/2018 Annually No     Social History     Socioeconomic History    Marital status: Single     Spouse name: Not on file    Number of children: 9    Years of education: Not on file    Highest education level: Not on file   Occupational History    Not on file   Social Needs    Financial resource strain: Not on file    Food insecurity:     Worry: Not on file     Inability: Not on file    Transportation needs:     Medical: Not on file     Non-medical: Not on file   Tobacco Use    Smoking status: Never Smoker    Smokeless tobacco: Never Used   Substance and Sexual Activity    Alcohol use: No    Drug use: No    Sexual activity: Yes     Partners: Female   Lifestyle    Physical activity:     Days per week: Not on file     Minutes per session: Not on file    Stress: Not on file   Relationships    Social connections:     Talks on  phone: Not on file     Gets together: Not on file     Attends Tenriism service: Not on file     Active member of club or organization: Not on file     Attends meetings of clubs or organizations: Not on file     Relationship status: Not on file   Other Topics Concern    Not on file   Social History Narrative    Not on file     Past Medical History:   Diagnosis Date    CAD (coronary artery disease)     tyree    Diabetes mellitus, type 2 1979    eye dr rocha    Hearing impaired     Hyperlipidemia     Hypertension     Lupus     Discoid    Multiple myeloma     Old MI (myocardial infarction) 2012    Renal insufficiency     Tracheostomy tube present        Objective:        Physical Exam   Constitutional: He is oriented to person, place, and time.   Neurological: He is alert and oriented to person, place, and time.   Psychiatric: He has a normal mood and affect. His behavior is normal. Judgment and thought content normal.       Physical Exam limited secondary to Telephone visit    Assessment / Plan:     Type 2 diabetes mellitus with diabetic nephropathy, with long-term current use of insulin  -     Hemoglobin A1C; Standing  -     Microalbumin/creatinine urine ratio; Future; Expected date: 05/05/2020  -     C-peptide; Future; Expected date: 05/05/2020  -     Glucose, fasting; Future; Expected date: 05/05/2020  -     Lipid Panel; Future; Expected date: 05/05/2020  -     TSH; Future; Expected date: 05/05/2020    Renal insufficiency    Essential hypertension    Hyperlipidemia, unspecified hyperlipidemia type    Coronary artery disease of native artery of native heart with stable angina pectoris    Vision loss of right eye    Multiple myeloma not having achieved remission      Additional Plan Details:    - POCT Glucose  - Encouraged continuation of lifestyle changes including regular exercise and limiting carbohydrates to 30-45 grams per meal threes times daily and 15 grams per snack with a limit of two  daily.   - Encouraged continued monitoring of blood glucose with an increase to 2 times daily at Fasting and Before Bed.   - Current DM Medication Regimen: Change Lantus 12 units BID. Will update recommendations following BG Log review.   - Health Maintenance standards addressed today: Foot Exam - deferred by patient today because Telephone visit and Eye Exam - will be completed within [facility] system and scheduled today  - Nursing Visit: Patient is deemed uncontrolled due to hypoglycemia and will need nursing visit by Phone in 1 week for BG log review and insulin adjustment since patient unable or unwilling to come in for physical visit.   - Follow up in 3 months with A1c prior.       Sonya Potter PA-C  [facility]    A total of 30 minutes was spent in face to face time, of which over 50 % was spent in counseling patient on disease process, complications, treatment, and side effects of medications.

## 2020-05-16 DIAGNOSIS — I10 ESSENTIAL HYPERTENSION: Chronic | ICD-10-CM

## 2020-05-16 DIAGNOSIS — E11.9 TYPE 2 DIABETES MELLITUS WITHOUT COMPLICATION, WITH LONG-TERM CURRENT USE OF INSULIN: Chronic | ICD-10-CM

## 2020-05-16 DIAGNOSIS — Z79.52 LONG TERM CURRENT USE OF SYSTEMIC STEROIDS: ICD-10-CM

## 2020-05-16 DIAGNOSIS — E78.5 HYPERLIPIDEMIA, UNSPECIFIED HYPERLIPIDEMIA TYPE: Chronic | ICD-10-CM

## 2020-05-16 DIAGNOSIS — Z79.4 TYPE 2 DIABETES MELLITUS WITH HYPERGLYCEMIA, WITH LONG-TERM CURRENT USE OF INSULIN: ICD-10-CM

## 2020-05-16 DIAGNOSIS — E11.65 TYPE 2 DIABETES MELLITUS WITH HYPERGLYCEMIA, WITH LONG-TERM CURRENT USE OF INSULIN: ICD-10-CM

## 2020-05-16 DIAGNOSIS — Z79.4 TYPE 2 DIABETES MELLITUS WITHOUT COMPLICATION, WITH LONG-TERM CURRENT USE OF INSULIN: Chronic | ICD-10-CM

## 2020-05-16 DIAGNOSIS — Z79.4 INSULIN LONG-TERM USE: Chronic | ICD-10-CM

## 2020-05-18 RX ORDER — ROSUVASTATIN CALCIUM 40 MG/1
TABLET, COATED ORAL
Qty: 90 TABLET | Refills: 3 | Status: SHIPPED | OUTPATIENT
Start: 2020-05-18 | End: 2020-05-19 | Stop reason: SDUPTHER

## 2020-05-19 DIAGNOSIS — Z79.4 TYPE 2 DIABETES MELLITUS WITH HYPERGLYCEMIA, WITH LONG-TERM CURRENT USE OF INSULIN: ICD-10-CM

## 2020-05-19 DIAGNOSIS — E11.9 TYPE 2 DIABETES MELLITUS WITHOUT COMPLICATION, WITH LONG-TERM CURRENT USE OF INSULIN: Chronic | ICD-10-CM

## 2020-05-19 DIAGNOSIS — E78.5 HYPERLIPIDEMIA, UNSPECIFIED HYPERLIPIDEMIA TYPE: Chronic | ICD-10-CM

## 2020-05-19 DIAGNOSIS — Z79.4 INSULIN LONG-TERM USE: Chronic | ICD-10-CM

## 2020-05-19 DIAGNOSIS — Z79.4 TYPE 2 DIABETES MELLITUS WITHOUT COMPLICATION, WITH LONG-TERM CURRENT USE OF INSULIN: Chronic | ICD-10-CM

## 2020-05-19 DIAGNOSIS — I10 ESSENTIAL HYPERTENSION: Chronic | ICD-10-CM

## 2020-05-19 DIAGNOSIS — Z79.52 LONG TERM CURRENT USE OF SYSTEMIC STEROIDS: ICD-10-CM

## 2020-05-19 DIAGNOSIS — E11.65 TYPE 2 DIABETES MELLITUS WITH HYPERGLYCEMIA, WITH LONG-TERM CURRENT USE OF INSULIN: ICD-10-CM

## 2020-05-20 RX ORDER — ROSUVASTATIN CALCIUM 40 MG/1
40 TABLET, COATED ORAL NIGHTLY
Qty: 90 TABLET | Refills: 3 | Status: SHIPPED | OUTPATIENT
Start: 2020-05-20 | End: 2021-04-05

## 2020-05-21 DIAGNOSIS — C90.00 MULTIPLE MYELOMA NOT HAVING ACHIEVED REMISSION: ICD-10-CM

## 2020-05-21 DIAGNOSIS — C90.00 MULTIPLE MYELOMA, REMISSION STATUS UNSPECIFIED: ICD-10-CM

## 2020-05-21 RX ORDER — OXYCODONE HCL 20 MG/1
20 TABLET, FILM COATED, EXTENDED RELEASE ORAL EVERY 12 HOURS
Qty: 60 EACH | Refills: 0 | Status: SHIPPED | OUTPATIENT
Start: 2020-05-21 | End: 2020-06-26 | Stop reason: SDUPTHER

## 2020-05-21 RX ORDER — OXYCODONE HYDROCHLORIDE 10 MG/1
10 TABLET ORAL EVERY 6 HOURS PRN
Qty: 90 TABLET | Refills: 0 | Status: CANCELLED | OUTPATIENT
Start: 2020-05-21

## 2020-05-21 RX ORDER — OXYCODONE HCL 20 MG/1
20 TABLET, FILM COATED, EXTENDED RELEASE ORAL EVERY 12 HOURS
Qty: 60 EACH | Refills: 0 | Status: CANCELLED | OUTPATIENT
Start: 2020-05-21

## 2020-05-21 RX ORDER — OXYCODONE HYDROCHLORIDE 10 MG/1
10 TABLET ORAL EVERY 6 HOURS PRN
Qty: 90 TABLET | Refills: 0 | Status: SHIPPED | OUTPATIENT
Start: 2020-05-21 | End: 2020-06-26 | Stop reason: SDUPTHER

## 2020-06-06 ENCOUNTER — NURSE TRIAGE (OUTPATIENT)
Dept: ADMINISTRATIVE | Facility: CLINIC | Age: 75
End: 2020-06-06

## 2020-06-06 NOTE — TELEPHONE ENCOUNTER
Reason for Disposition   No answer.  First attempt to contact caller.  Follow-up call scheduled within 15 minutes.   Second attempt to contact family AND no contact made. Phone number verified.    Protocols used: NO CONTACT OR DUPLICATE CONTACT CALL-A-[place]    No answer x 2 attempts.

## 2020-06-06 NOTE — TELEPHONE ENCOUNTER
"spoke with daughter:      Out of nov log refill syringe and has none in homeand pharmacy will not give emergency supply. Abbey last note states failed Novolog and is on Lantus BID. I called daughter back to clarify. No answer. Left vm for daughter to call me back.      Reason for Disposition   Caller has medication question only, adult not sick, and triager answers question    Additional Information   Negative: MORE THAN A DOUBLE DOSE of a prescription or over-the-counter (OTC) drug   Negative: [1] DOUBLE DOSE (an extra dose or lesser amount) of over-the-counter (OTC) drug AND [2] any symptoms (e.g., dizziness, nausea, pain, sleepiness)   Negative: [1] DOUBLE DOSE (an extra dose or lesser amount) of prescription drug AND [2] any symptoms (e.g., dizziness, nausea, pain, sleepiness)   Negative: Took another person's prescription drug   Negative: [1] DOUBLE DOSE (an extra dose or lesser amount) of prescription drug AND [2] NO symptoms (Exception: a double dose of antibiotics)   Negative: Diabetes drug error or overdose (e.g., insulin or extra dose)   Negative: [1] Request for URGENT new prescription or refill of "essential" medication (i.e., likelihood of harm to patient if not taken) AND [2] triager unable to fill per unit policy   Negative: [1] Prescription not at pharmacy AND [2] was prescribed today by PCP   Negative: Pharmacy calling with prescription questions and triager unable to answer question   Negative: Caller has urgent medication question about med that PCP prescribed and triager unable to answer question   Negative: Caller has NON-URGENT medication question about med that PCP prescribed and triager unable to answer question   Negative: Caller requesting a NON-URGENT new prescription or refill and triager unable to refill per unit policy   Negative: Caller has medication question about med not prescribed by PCP and triager unable to answer question (e.g., compatibility with other med, " storage)   Negative: [1] DOUBLE DOSE (an extra dose or lesser amount) of over-the-counter (OTC) drug AND [2] NO symptoms   Negative: [1] DOUBLE DOSE (an extra dose or lesser amount) of antibiotic drug AND [2] NO symptoms    Protocols used: MEDICATION QUESTION CALL-A-

## 2020-06-08 DIAGNOSIS — I10 ESSENTIAL HYPERTENSION: Chronic | ICD-10-CM

## 2020-06-08 DIAGNOSIS — Z79.4 TYPE 2 DIABETES MELLITUS WITHOUT COMPLICATION, WITH LONG-TERM CURRENT USE OF INSULIN: Chronic | ICD-10-CM

## 2020-06-08 DIAGNOSIS — E11.9 TYPE 2 DIABETES MELLITUS WITHOUT COMPLICATION, WITH LONG-TERM CURRENT USE OF INSULIN: Chronic | ICD-10-CM

## 2020-06-08 DIAGNOSIS — E11.65 TYPE 2 DIABETES MELLITUS WITH HYPERGLYCEMIA, WITH LONG-TERM CURRENT USE OF INSULIN: ICD-10-CM

## 2020-06-08 DIAGNOSIS — Z79.52 LONG TERM CURRENT USE OF SYSTEMIC STEROIDS: ICD-10-CM

## 2020-06-08 DIAGNOSIS — Z79.4 INSULIN LONG-TERM USE: Chronic | ICD-10-CM

## 2020-06-08 DIAGNOSIS — E78.5 HYPERLIPIDEMIA, UNSPECIFIED HYPERLIPIDEMIA TYPE: Chronic | ICD-10-CM

## 2020-06-08 DIAGNOSIS — Z79.4 TYPE 2 DIABETES MELLITUS WITH HYPERGLYCEMIA, WITH LONG-TERM CURRENT USE OF INSULIN: ICD-10-CM

## 2020-06-08 NOTE — TELEPHONE ENCOUNTER
----- Message from Abril Mcqueen sent at 6/8/2020  2:40 PM CDT -----  Contact: pt daughter-Karen  Patient has been waiting since Friday for a call back in regards to a refill for his insulin. The patient has been without medication since Friday. Please call his daughter Karen back at [phone]

## 2020-06-09 RX ORDER — INSULIN GLARGINE 100 [IU]/ML
INJECTION, SOLUTION SUBCUTANEOUS
Qty: 15 ML | Refills: 1 | OUTPATIENT
Start: 2020-06-09

## 2020-06-09 RX ORDER — INSULIN ASPART 100 [IU]/ML
INJECTION, SOLUTION INTRAVENOUS; SUBCUTANEOUS
Qty: 15 ML | Refills: 0 | OUTPATIENT
Start: 2020-06-09

## 2020-06-10 ENCOUNTER — OFFICE VISIT (OUTPATIENT)
Dept: DIABETES | Facility: CLINIC | Age: 75
End: 2020-06-10
Payer: MEDICARE

## 2020-06-10 ENCOUNTER — PATIENT OUTREACH (OUTPATIENT)
Dept: ADMINISTRATIVE | Facility: OTHER | Age: 75
End: 2020-06-10

## 2020-06-10 ENCOUNTER — TELEPHONE (OUTPATIENT)
Dept: DIABETES | Facility: CLINIC | Age: 75
End: 2020-06-10

## 2020-06-10 DIAGNOSIS — I25.118 CORONARY ARTERY DISEASE OF NATIVE ARTERY OF NATIVE HEART WITH STABLE ANGINA PECTORIS: ICD-10-CM

## 2020-06-10 DIAGNOSIS — I10 ESSENTIAL HYPERTENSION: Chronic | ICD-10-CM

## 2020-06-10 DIAGNOSIS — Z79.52 LONG TERM CURRENT USE OF SYSTEMIC STEROIDS: ICD-10-CM

## 2020-06-10 DIAGNOSIS — Z79.4 TYPE 2 DIABETES MELLITUS WITH HYPERGLYCEMIA, WITH LONG-TERM CURRENT USE OF INSULIN: Primary | ICD-10-CM

## 2020-06-10 DIAGNOSIS — H54.61 VISION LOSS OF RIGHT EYE: ICD-10-CM

## 2020-06-10 DIAGNOSIS — Z79.4 INSULIN LONG-TERM USE: Chronic | ICD-10-CM

## 2020-06-10 DIAGNOSIS — C90.00 MULTIPLE MYELOMA NOT HAVING ACHIEVED REMISSION: ICD-10-CM

## 2020-06-10 DIAGNOSIS — N28.9 RENAL INSUFFICIENCY: ICD-10-CM

## 2020-06-10 DIAGNOSIS — E11.65 TYPE 2 DIABETES MELLITUS WITH HYPERGLYCEMIA, WITH LONG-TERM CURRENT USE OF INSULIN: Primary | ICD-10-CM

## 2020-06-10 DIAGNOSIS — E78.5 HYPERLIPIDEMIA, UNSPECIFIED HYPERLIPIDEMIA TYPE: Chronic | ICD-10-CM

## 2020-06-10 PROCEDURE — 99214 OFFICE O/P EST MOD 30 MIN: CPT | Mod: 95,,, | Performed by: PHYSICIAN ASSISTANT

## 2020-06-10 PROCEDURE — 3051F HG A1C>EQUAL 7.0%<8.0%: CPT | Mod: CPTII,95,, | Performed by: PHYSICIAN ASSISTANT

## 2020-06-10 PROCEDURE — 1101F PR PT FALLS ASSESS DOC 0-1 FALLS W/OUT INJ PAST YR: ICD-10-PCS | Mod: CPTII,95,, | Performed by: PHYSICIAN ASSISTANT

## 2020-06-10 PROCEDURE — 99214 PR OFFICE/OUTPT VISIT, EST, LEVL IV, 30-39 MIN: ICD-10-PCS | Mod: 95,,, | Performed by: PHYSICIAN ASSISTANT

## 2020-06-10 PROCEDURE — 1159F MED LIST DOCD IN RCRD: CPT | Mod: 95,,, | Performed by: PHYSICIAN ASSISTANT

## 2020-06-10 PROCEDURE — 3051F PR MOST RECENT HEMOGLOBIN A1C LEVEL 7.0 - < 8.0%: ICD-10-PCS | Mod: CPTII,95,, | Performed by: PHYSICIAN ASSISTANT

## 2020-06-10 PROCEDURE — 1101F PT FALLS ASSESS-DOCD LE1/YR: CPT | Mod: CPTII,95,, | Performed by: PHYSICIAN ASSISTANT

## 2020-06-10 PROCEDURE — 1159F PR MEDICATION LIST DOCUMENTED IN MEDICAL RECORD: ICD-10-PCS | Mod: 95,,, | Performed by: PHYSICIAN ASSISTANT

## 2020-06-10 RX ORDER — INSULIN ASPART 100 [IU]/ML
15 INJECTION, SOLUTION INTRAVENOUS; SUBCUTANEOUS
Qty: 40.5 ML | Refills: 3 | Status: SHIPPED | OUTPATIENT
Start: 2020-06-10 | End: 2021-11-03

## 2020-06-10 NOTE — PROGRESS NOTES
Patient's chart was reviewed.   Immunizations reconciled.    Health Maintenance was updated.  A1c scheduled for 7/27/2020

## 2020-06-10 NOTE — PROGRESS NOTES
PCP: Andrea Elkins MD    Subjective:     Chief Complaint: Diabetes - Established Patient    TELEMEDICINE VISIT:     The patient location is: Home  The chief complaint leading to consultation is: Diabetes Follow up  Visit type: Virtual visit with synchronous audio and video  Total time spent with patient: 30  Each patient to whom he or she provides medical services by telemedicine is:  (1) informed of the relationship between the physician and patient and the respective role of any other health care provider with respect to management of the patient; and (2) notified that he or she may decline to receive medical services by telemedicine and may withdraw from such care at any time.    Notes: N/A      HISTORY OF PRESENT ILLNESS: 74 y.o.   male presenting for diabetes management visit.   Patient has previously been established with the Diabetes Management Department and was last seen by myself on 05/05/20.  Patient has had Type II diabetes since 1985.  Pertinent to decision making is the following comorbidities: HTN, HLD, CAD and Renal Insufficiency, Vision loss of Right Eye, and Multiple Myeloma  Patient has the following Diabetes complications: with diabetic nephropathy  He  has attended diabetes education in the past.     Patient's most recent A1c of 7.3% was completed 6 months ago.   Patient states since His last A1c His blood glucose levels have been both high and low throughout the day .   Patient monitors blood glucose 4 times per day with meter: Fasting, Before Lunch, Before Dinner and Before Bed.   Patient blood glucose monitoring device will not be uploaded into Media Section today secondary to Telemedicine visit.   Per patient recall, blood sugars have been around 150 - 200 fasting and after dinner.   Patient endorses the following diabetes related symptoms: Tingling in Lower Extremities. This is a chronic issue.   Patient is due today for the following diabetes-related health maintenance  standards: Foot Exam , Eye Exam and A1c. Patient denies urgent feet issues today. Patient denies changes in his vision recently.   He denies recent hospital admissions or emergency room visits.   He voices having hypoglycemia during the mid day at times if he skips meals.   Patient's concerns today include glycemic control. Of note, patient is Hard of Hearing and has issues with understanding speech. Patient does not always make it clear that he can not understand what you are saying. Patient is also poor historian and is not sure what his medications including insulin are called.   Patient medication regimen is as below.     CURRENT DM MEDICATIONS:    Lantus 10 units qhs   Novolog 15 units with breakfast and with dinner    Patient has failed the following Diabetes medications:    Novolog       Labs Reviewed.       Lab Results   Component Value Date    CPEPTIDE 1.4 11/15/2016     Lab Results   Component Value Date    GLUTAMICACID 0.00 11/15/2016          //   , There is no height or weight on file to calculate BMI.  His blood sugar in clinic today is:    Lab Results   Component Value Date    POCGLU 144 (A) 10/15/2018       Review of Systems   Constitutional: Negative for activity change, appetite change, chills and fever.   HENT: Negative for dental problem, mouth sores, nosebleeds, sore throat and trouble swallowing.    Eyes: Negative for pain and discharge.   Respiratory: Negative for shortness of breath, wheezing and stridor.    Cardiovascular: Negative for chest pain, palpitations and leg swelling.   Gastrointestinal: Negative for abdominal pain, diarrhea, nausea and vomiting.   Endocrine: Negative for polydipsia, polyphagia and polyuria.   Genitourinary: Negative for dysuria, frequency and urgency.   Musculoskeletal: Negative for joint swelling and myalgias.   Skin: Negative for rash and wound.   Neurological: Positive for numbness. Negative for dizziness, syncope, weakness and headaches.    Psychiatric/Behavioral: Negative for behavioral problems and dysphoric mood.         Diabetes Management Status  Statin: Taking  ACE/ARB: Taking    Screening or Prevention Patient's value Goal Complete/Controlled?   HgA1C Testing and Control   Lab Results   Component Value Date    HGBA1C 7.3 (H) 11/22/2019      Annually/Less than 8% Yes   Lipid profile : 11/22/2019 Annually Yes   LDL control Lab Results   Component Value Date    LDLCALC 67.2 11/22/2019    Annually/Less than 100 mg/dl  Yes   Nephropathy screening Lab Results   Component Value Date    MICALBCREAT 29.7 09/21/2016     Lab Results   Component Value Date    PROTEINUA 1+ (A) 09/11/2018    Annually No   Blood pressure BP Readings from Last 1 Encounters:   02/20/20 127/63    Less than 140/90 Yes   Dilated retinal exam : 03/01/2019 Annually No    Foot exam   : 10/15/2018 Annually No     Social History     Socioeconomic History    Marital status: Single     Spouse name: Not on file    Number of children: 9    Years of education: Not on file    Highest education level: Not on file   Occupational History    Not on file   Social Needs    Financial resource strain: Not on file    Food insecurity:     Worry: Not on file     Inability: Not on file    Transportation needs:     Medical: Not on file     Non-medical: Not on file   Tobacco Use    Smoking status: Never Smoker    Smokeless tobacco: Never Used   Substance and Sexual Activity    Alcohol use: No    Drug use: No    Sexual activity: Yes     Partners: Female   Lifestyle    Physical activity:     Days per week: Not on file     Minutes per session: Not on file    Stress: Not on file   Relationships    Social connections:     Talks on phone: Not on file     Gets together: Not on file     Attends Yarsani service: Not on file     Active member of club or organization: Not on file     Attends meetings of clubs or organizations: Not on file     Relationship status: Not on file   Other Topics Concern     Not on file   Social History Narrative    Not on file     Past Medical History:   Diagnosis Date    CAD (coronary artery disease)     tyree    Diabetes mellitus, type 2 1979    eye dr rocha    Hearing impaired     Hyperlipidemia     Hypertension     Lupus     Discoid    Multiple myeloma     Old MI (myocardial infarction) 2012    Renal insufficiency     Tracheostomy tube present        Objective:        Physical Exam   Constitutional: He is oriented to person, place, and time. He appears well-developed and well-nourished. No distress.   HENT:   Head: Normocephalic and atraumatic.   Right Ear: External ear normal.   Left Ear: External ear normal.   Nose: Nose normal.   Eyes: EOM are normal. Right eye exhibits no discharge. Left eye exhibits no discharge.   Neck: Normal range of motion.   Pulmonary/Chest: Effort normal. No stridor. No respiratory distress.   Musculoskeletal: Normal range of motion.   Neurological: He is alert and oriented to person, place, and time. He exhibits normal muscle tone. Coordination normal.   Skin: He is not diaphoretic. No pallor.   Psychiatric: He has a normal mood and affect. His behavior is normal. Judgment and thought content normal.       Physical Exam limited secondary to Telemedicine visit    Assessment / Plan:     Type 2 diabetes mellitus with hyperglycemia, with long-term current use of insulin  -     insulin aspart U-100 (NOVOLOG) 100 unit/mL (3 mL) InPn pen; Inject 15 Units into the skin 3 (three) times daily with meals.  Dispense: 40.5 mL; Refill: 3    Renal insufficiency    Long term current use of systemic steroids    Hyperlipidemia, unspecified hyperlipidemia type    Essential hypertension    Insulin long-term use    Coronary artery disease of native artery of native heart with stable angina pectoris    Vision loss of right eye    Multiple myeloma not having achieved remission      Additional Plan Details:    - POCT Glucose  - Encouraged continuation of  lifestyle changes including regular exercise and limiting carbohydrates to 30-45 grams per meal threes times daily and 15 grams per snack with a limit of two daily.   - Encouraged continued monitoring of blood glucose with maintenance of 4 times daily at Fasting, Before Lunch, Before Dinner and Before Bed. Dexcom paperwork today.   - Current DM Medication Regimen: Continue regimen - will update following A1c or Dexcom Review.   - Health Maintenance standards addressed today: Foot Exam - deferred by patient today because Telephone visit and Eye Exam - will be completed within [facility] system and scheduled today  - Nursing Visit: deferred for now.   - Follow up in 6 weeks with A1c prior.       Blakeney McKnight, PA-C [facility]    A total of 30 minutes was spent in face to face time, of which over 50 % was spent in counseling patient on disease process, complications, treatment, and side effects of medications.

## 2020-06-26 DIAGNOSIS — C90.00 MULTIPLE MYELOMA, REMISSION STATUS UNSPECIFIED: ICD-10-CM

## 2020-06-26 DIAGNOSIS — C90.00 MULTIPLE MYELOMA NOT HAVING ACHIEVED REMISSION: ICD-10-CM

## 2020-06-26 RX ORDER — OXYCODONE HCL 20 MG/1
20 TABLET, FILM COATED, EXTENDED RELEASE ORAL EVERY 12 HOURS
Qty: 60 EACH | Refills: 0 | Status: SHIPPED | OUTPATIENT
Start: 2020-06-26 | End: 2020-07-31 | Stop reason: SDUPTHER

## 2020-06-26 RX ORDER — OXYCODONE HYDROCHLORIDE 10 MG/1
10 TABLET ORAL EVERY 6 HOURS PRN
Qty: 90 TABLET | Refills: 0 | Status: SHIPPED | OUTPATIENT
Start: 2020-06-26 | End: 2020-07-31 | Stop reason: SDUPTHER

## 2020-07-30 ENCOUNTER — PATIENT OUTREACH (OUTPATIENT)
Dept: ADMINISTRATIVE | Facility: OTHER | Age: 75
End: 2020-07-30

## 2020-07-31 ENCOUNTER — OFFICE VISIT (OUTPATIENT)
Dept: DIABETES | Facility: CLINIC | Age: 75
End: 2020-07-31
Payer: MEDICARE

## 2020-07-31 ENCOUNTER — TELEPHONE (OUTPATIENT)
Dept: DIABETES | Facility: CLINIC | Age: 75
End: 2020-07-31

## 2020-07-31 ENCOUNTER — LAB VISIT (OUTPATIENT)
Dept: LAB | Facility: HOSPITAL | Age: 75
End: 2020-07-31
Attending: PHYSICIAN ASSISTANT
Payer: MEDICARE

## 2020-07-31 VITALS
DIASTOLIC BLOOD PRESSURE: 72 MMHG | BODY MASS INDEX: 22.44 KG/M2 | WEIGHT: 156.75 LBS | HEIGHT: 70 IN | SYSTOLIC BLOOD PRESSURE: 130 MMHG

## 2020-07-31 DIAGNOSIS — I10 ESSENTIAL HYPERTENSION: ICD-10-CM

## 2020-07-31 DIAGNOSIS — C90.00 MULTIPLE MYELOMA NOT HAVING ACHIEVED REMISSION: ICD-10-CM

## 2020-07-31 DIAGNOSIS — E11.65 TYPE 2 DIABETES MELLITUS WITH HYPERGLYCEMIA, WITH LONG-TERM CURRENT USE OF INSULIN: Primary | ICD-10-CM

## 2020-07-31 DIAGNOSIS — Z79.4 TYPE 2 DIABETES MELLITUS WITH HYPERGLYCEMIA, WITH LONG-TERM CURRENT USE OF INSULIN: Primary | ICD-10-CM

## 2020-07-31 DIAGNOSIS — Z79.52 LONG TERM CURRENT USE OF SYSTEMIC STEROIDS: ICD-10-CM

## 2020-07-31 DIAGNOSIS — E11.21 TYPE 2 DIABETES MELLITUS WITH DIABETIC NEPHROPATHY, WITH LONG-TERM CURRENT USE OF INSULIN: ICD-10-CM

## 2020-07-31 DIAGNOSIS — I25.118 CORONARY ARTERY DISEASE OF NATIVE ARTERY OF NATIVE HEART WITH STABLE ANGINA PECTORIS: ICD-10-CM

## 2020-07-31 DIAGNOSIS — Z79.4 TYPE 2 DIABETES MELLITUS WITH DIABETIC NEPHROPATHY, WITH LONG-TERM CURRENT USE OF INSULIN: ICD-10-CM

## 2020-07-31 DIAGNOSIS — H54.61 VISION LOSS OF RIGHT EYE: ICD-10-CM

## 2020-07-31 DIAGNOSIS — E78.5 HYPERLIPIDEMIA, UNSPECIFIED HYPERLIPIDEMIA TYPE: ICD-10-CM

## 2020-07-31 DIAGNOSIS — N28.9 RENAL INSUFFICIENCY: ICD-10-CM

## 2020-07-31 LAB
CHOLEST SERPL-MCNC: 156 MG/DL (ref 120–199)
CHOLEST/HDLC SERPL: 2.8 {RATIO} (ref 2–5)
GLUCOSE SERPL-MCNC: 123 MG/DL (ref 70–110)
GLUCOSE SERPL-MCNC: 145 MG/DL (ref 70–110)
HDLC SERPL-MCNC: 55 MG/DL (ref 40–75)
HDLC SERPL: 35.3 % (ref 20–50)
LDLC SERPL CALC-MCNC: 86.2 MG/DL (ref 63–159)
NONHDLC SERPL-MCNC: 101 MG/DL
TRIGL SERPL-MCNC: 74 MG/DL (ref 30–150)
TSH SERPL DL<=0.005 MIU/L-ACNC: 0.78 UIU/ML (ref 0.4–4)

## 2020-07-31 PROCEDURE — 3051F HG A1C>EQUAL 7.0%<8.0%: CPT | Mod: CPTII,S$GLB,, | Performed by: PHYSICIAN ASSISTANT

## 2020-07-31 PROCEDURE — 80061 LIPID PANEL: CPT

## 2020-07-31 PROCEDURE — 99999 PR PBB SHADOW E&M-EST. PATIENT-LVL V: ICD-10-PCS | Mod: PBBFAC,,, | Performed by: PHYSICIAN ASSISTANT

## 2020-07-31 PROCEDURE — 82947 ASSAY GLUCOSE BLOOD QUANT: CPT

## 2020-07-31 PROCEDURE — 83036 HEMOGLOBIN GLYCOSYLATED A1C: CPT

## 2020-07-31 PROCEDURE — 99999 PR PBB SHADOW E&M-EST. PATIENT-LVL V: CPT | Mod: PBBFAC,,, | Performed by: PHYSICIAN ASSISTANT

## 2020-07-31 PROCEDURE — 3075F PR MOST RECENT SYSTOLIC BLOOD PRESS GE 130-139MM HG: ICD-10-PCS | Mod: CPTII,S$GLB,, | Performed by: PHYSICIAN ASSISTANT

## 2020-07-31 PROCEDURE — 82962 GLUCOSE BLOOD TEST: CPT | Mod: S$GLB,,, | Performed by: PHYSICIAN ASSISTANT

## 2020-07-31 PROCEDURE — 1126F AMNT PAIN NOTED NONE PRSNT: CPT | Mod: S$GLB,,, | Performed by: PHYSICIAN ASSISTANT

## 2020-07-31 PROCEDURE — 82962 POCT GLUCOSE, HAND-HELD DEVICE: ICD-10-PCS | Mod: S$GLB,,, | Performed by: PHYSICIAN ASSISTANT

## 2020-07-31 PROCEDURE — 3078F PR MOST RECENT DIASTOLIC BLOOD PRESSURE < 80 MM HG: ICD-10-PCS | Mod: CPTII,S$GLB,, | Performed by: PHYSICIAN ASSISTANT

## 2020-07-31 PROCEDURE — 3051F PR MOST RECENT HEMOGLOBIN A1C LEVEL 7.0 - < 8.0%: ICD-10-PCS | Mod: CPTII,S$GLB,, | Performed by: PHYSICIAN ASSISTANT

## 2020-07-31 PROCEDURE — 84443 ASSAY THYROID STIM HORMONE: CPT

## 2020-07-31 PROCEDURE — 99214 PR OFFICE/OUTPT VISIT, EST, LEVL IV, 30-39 MIN: ICD-10-PCS | Mod: S$GLB,,, | Performed by: PHYSICIAN ASSISTANT

## 2020-07-31 PROCEDURE — 3078F DIAST BP <80 MM HG: CPT | Mod: CPTII,S$GLB,, | Performed by: PHYSICIAN ASSISTANT

## 2020-07-31 PROCEDURE — 99214 OFFICE O/P EST MOD 30 MIN: CPT | Mod: S$GLB,,, | Performed by: PHYSICIAN ASSISTANT

## 2020-07-31 PROCEDURE — 36415 COLL VENOUS BLD VENIPUNCTURE: CPT | Mod: PO

## 2020-07-31 PROCEDURE — 1126F PR PAIN SEVERITY QUANTIFIED, NO PAIN PRESENT: ICD-10-PCS | Mod: S$GLB,,, | Performed by: PHYSICIAN ASSISTANT

## 2020-07-31 PROCEDURE — 1159F MED LIST DOCD IN RCRD: CPT | Mod: S$GLB,,, | Performed by: PHYSICIAN ASSISTANT

## 2020-07-31 PROCEDURE — 95251 PR GLUCOSE MONITOR, 72 HOUR, PHYS INTERP: ICD-10-PCS | Mod: S$GLB,,, | Performed by: PHYSICIAN ASSISTANT

## 2020-07-31 PROCEDURE — 1159F PR MEDICATION LIST DOCUMENTED IN MEDICAL RECORD: ICD-10-PCS | Mod: S$GLB,,, | Performed by: PHYSICIAN ASSISTANT

## 2020-07-31 PROCEDURE — 1101F PT FALLS ASSESS-DOCD LE1/YR: CPT | Mod: CPTII,S$GLB,, | Performed by: PHYSICIAN ASSISTANT

## 2020-07-31 PROCEDURE — 95251 CONT GLUC MNTR ANALYSIS I&R: CPT | Mod: S$GLB,,, | Performed by: PHYSICIAN ASSISTANT

## 2020-07-31 PROCEDURE — 84681 ASSAY OF C-PEPTIDE: CPT

## 2020-07-31 PROCEDURE — 3075F SYST BP GE 130 - 139MM HG: CPT | Mod: CPTII,S$GLB,, | Performed by: PHYSICIAN ASSISTANT

## 2020-07-31 PROCEDURE — 1101F PR PT FALLS ASSESS DOC 0-1 FALLS W/OUT INJ PAST YR: ICD-10-PCS | Mod: CPTII,S$GLB,, | Performed by: PHYSICIAN ASSISTANT

## 2020-07-31 RX ORDER — INSULIN GLARGINE 100 [IU]/ML
10 INJECTION, SOLUTION SUBCUTANEOUS NIGHTLY
Qty: 3 ML | Refills: 11 | Status: SHIPPED | OUTPATIENT
Start: 2020-07-31 | End: 2022-04-27 | Stop reason: SDUPTHER

## 2020-07-31 NOTE — PATIENT INSTRUCTIONS
Change Lantus to 10 units at night     Change Novolog 5 units with breakfast, 5 units with lunch, and 5 units with dinner - none before bed because can cause lows overnight

## 2020-07-31 NOTE — TELEPHONE ENCOUNTER
----- Message from Kate Haskins sent at 7/31/2020 11:09 AM CDT -----  Regarding: return call  [facility] is requesting call back in regards to discussing pt's medication           [facility] - [place] - [address]  [address]  [place] [address]  Phone: [phone] Fax: [phone]

## 2020-08-01 LAB
C PEPTIDE SERPL-MCNC: 1.51 NG/ML (ref 0.78–5.19)
ESTIMATED AVG GLUCOSE: 157 MG/DL (ref 68–131)
HBA1C MFR BLD HPLC: 7.1 % (ref 4–5.6)

## 2020-08-03 ENCOUNTER — TELEPHONE (OUTPATIENT)
Dept: INTERNAL MEDICINE | Facility: CLINIC | Age: 75
End: 2020-08-03

## 2020-08-03 NOTE — TELEPHONE ENCOUNTER
S/W pt, informed that we are not testing patients unless they are symptomatic. Pt verbalized understanding/jj

## 2020-08-03 NOTE — TELEPHONE ENCOUNTER
----- Message from Mara Fagan sent at 8/3/2020 12:25 PM CDT -----  Contact: Ramakrishna Bunn. Would like to have an order for the covid test have been expose please call back [phone]

## 2020-08-14 ENCOUNTER — CLINICAL SUPPORT (OUTPATIENT)
Dept: DIABETES | Facility: CLINIC | Age: 75
End: 2020-08-14
Payer: MEDICARE

## 2020-08-14 ENCOUNTER — TELEPHONE (OUTPATIENT)
Dept: OPHTHALMOLOGY | Facility: CLINIC | Age: 75
End: 2020-08-14

## 2020-08-14 ENCOUNTER — OFFICE VISIT (OUTPATIENT)
Dept: OPHTHALMOLOGY | Facility: CLINIC | Age: 75
End: 2020-08-14
Payer: MEDICARE

## 2020-08-14 ENCOUNTER — OFFICE VISIT (OUTPATIENT)
Dept: HEMATOLOGY/ONCOLOGY | Facility: CLINIC | Age: 75
End: 2020-08-14
Payer: MEDICARE

## 2020-08-14 VITALS
WEIGHT: 156.5 LBS | SYSTOLIC BLOOD PRESSURE: 129 MMHG | HEIGHT: 70 IN | OXYGEN SATURATION: 98 % | HEART RATE: 69 BPM | BODY MASS INDEX: 22.41 KG/M2 | DIASTOLIC BLOOD PRESSURE: 57 MMHG

## 2020-08-14 VITALS — WEIGHT: 157.44 LBS | HEIGHT: 70 IN | BODY MASS INDEX: 22.54 KG/M2

## 2020-08-14 DIAGNOSIS — C90.00 MULTIPLE MYELOMA, REMISSION STATUS UNSPECIFIED: ICD-10-CM

## 2020-08-14 DIAGNOSIS — C90.00 MULTIPLE MYELOMA NOT HAVING ACHIEVED REMISSION: Primary | ICD-10-CM

## 2020-08-14 DIAGNOSIS — Z79.4 TYPE 2 DIABETES MELLITUS WITH HYPERGLYCEMIA, WITH LONG-TERM CURRENT USE OF INSULIN: ICD-10-CM

## 2020-08-14 DIAGNOSIS — E11.65 TYPE 2 DIABETES MELLITUS WITH HYPERGLYCEMIA, WITH LONG-TERM CURRENT USE OF INSULIN: ICD-10-CM

## 2020-08-14 DIAGNOSIS — H91.93 BILATERAL HEARING LOSS, UNSPECIFIED HEARING LOSS TYPE: ICD-10-CM

## 2020-08-14 DIAGNOSIS — T15.02XA FOREIGN BODY OF LEFT CORNEA, INITIAL ENCOUNTER: Primary | ICD-10-CM

## 2020-08-14 PROCEDURE — 99999 PR PBB SHADOW E&M-EST. PATIENT-LVL III: CPT | Mod: PBBFAC,,, | Performed by: DIETITIAN, REGISTERED

## 2020-08-14 PROCEDURE — 1101F PR PT FALLS ASSESS DOC 0-1 FALLS W/OUT INJ PAST YR: ICD-10-PCS | Mod: CPTII,S$GLB,, | Performed by: INTERNAL MEDICINE

## 2020-08-14 PROCEDURE — 99214 PR OFFICE/OUTPT VISIT, EST, LEVL IV, 30-39 MIN: ICD-10-PCS | Mod: S$GLB,,, | Performed by: INTERNAL MEDICINE

## 2020-08-14 PROCEDURE — 3078F DIAST BP <80 MM HG: CPT | Mod: CPTII,S$GLB,, | Performed by: INTERNAL MEDICINE

## 2020-08-14 PROCEDURE — 1159F PR MEDICATION LIST DOCUMENTED IN MEDICAL RECORD: ICD-10-PCS | Mod: S$GLB,,, | Performed by: INTERNAL MEDICINE

## 2020-08-14 PROCEDURE — 1126F PR PAIN SEVERITY QUANTIFIED, NO PAIN PRESENT: ICD-10-PCS | Mod: S$GLB,,, | Performed by: INTERNAL MEDICINE

## 2020-08-14 PROCEDURE — 99999 PR PBB SHADOW E&M-EST. PATIENT-LVL III: ICD-10-PCS | Mod: PBBFAC,,, | Performed by: OPHTHALMOLOGY

## 2020-08-14 PROCEDURE — 3074F SYST BP LT 130 MM HG: CPT | Mod: CPTII,S$GLB,, | Performed by: INTERNAL MEDICINE

## 2020-08-14 PROCEDURE — 3074F PR MOST RECENT SYSTOLIC BLOOD PRESSURE < 130 MM HG: ICD-10-PCS | Mod: CPTII,S$GLB,, | Performed by: INTERNAL MEDICINE

## 2020-08-14 PROCEDURE — 92012 INTRM OPH EXAM EST PATIENT: CPT | Mod: S$GLB,,, | Performed by: OPHTHALMOLOGY

## 2020-08-14 PROCEDURE — 1126F AMNT PAIN NOTED NONE PRSNT: CPT | Mod: S$GLB,,, | Performed by: INTERNAL MEDICINE

## 2020-08-14 PROCEDURE — 99214 OFFICE O/P EST MOD 30 MIN: CPT | Mod: S$GLB,,, | Performed by: INTERNAL MEDICINE

## 2020-08-14 PROCEDURE — 99999 PR PBB SHADOW E&M-EST. PATIENT-LVL III: CPT | Mod: PBBFAC,,, | Performed by: OPHTHALMOLOGY

## 2020-08-14 PROCEDURE — 99999 PR PBB SHADOW E&M-EST. PATIENT-LVL III: ICD-10-PCS | Mod: PBBFAC,,, | Performed by: DIETITIAN, REGISTERED

## 2020-08-14 PROCEDURE — 99999 PR PBB SHADOW E&M-EST. PATIENT-LVL V: ICD-10-PCS | Mod: PBBFAC,,, | Performed by: INTERNAL MEDICINE

## 2020-08-14 PROCEDURE — 99999 PR PBB SHADOW E&M-EST. PATIENT-LVL V: CPT | Mod: PBBFAC,,, | Performed by: INTERNAL MEDICINE

## 2020-08-14 PROCEDURE — 1159F MED LIST DOCD IN RCRD: CPT | Mod: S$GLB,,, | Performed by: INTERNAL MEDICINE

## 2020-08-14 PROCEDURE — 1101F PT FALLS ASSESS-DOCD LE1/YR: CPT | Mod: CPTII,S$GLB,, | Performed by: INTERNAL MEDICINE

## 2020-08-14 PROCEDURE — 92012 PR EYE EXAM, EST PATIENT,INTERMED: ICD-10-PCS | Mod: S$GLB,,, | Performed by: OPHTHALMOLOGY

## 2020-08-14 PROCEDURE — G0108 DIAB MANAGE TRN  PER INDIV: HCPCS | Mod: S$GLB,,, | Performed by: DIETITIAN, REGISTERED

## 2020-08-14 PROCEDURE — 3078F PR MOST RECENT DIASTOLIC BLOOD PRESSURE < 80 MM HG: ICD-10-PCS | Mod: CPTII,S$GLB,, | Performed by: INTERNAL MEDICINE

## 2020-08-14 PROCEDURE — G0108 PR DIAB MANAGE TRN  PER INDIV: ICD-10-PCS | Mod: S$GLB,,, | Performed by: DIETITIAN, REGISTERED

## 2020-08-14 RX ORDER — OXYCODONE HCL 20 MG/1
20 TABLET, FILM COATED, EXTENDED RELEASE ORAL EVERY 12 HOURS
Qty: 60 EACH | Refills: 0 | Status: SHIPPED | OUTPATIENT
Start: 2020-08-14 | End: 2020-09-30 | Stop reason: SDUPTHER

## 2020-08-14 RX ORDER — OXYCODONE HYDROCHLORIDE 10 MG/1
10 TABLET ORAL EVERY 6 HOURS PRN
Qty: 90 TABLET | Refills: 0 | Status: SHIPPED | OUTPATIENT
Start: 2020-08-14 | End: 2020-09-30 | Stop reason: SDUPTHER

## 2020-08-14 RX ORDER — NEOMYCIN SULFATE, POLYMYXIN B SULFATE, AND DEXAMETHASONE 3.5; 10000; 1 MG/G; [USP'U]/G; MG/G
OINTMENT OPHTHALMIC
Qty: 1 TUBE | Refills: 1 | Status: SHIPPED | OUTPATIENT
Start: 2020-08-14 | End: 2020-08-24

## 2020-08-14 NOTE — PROGRESS NOTES
===============================  Familia Durbin Jr.,  8/14/2020 today   74 y.o. male   Last visit [facility]: :3/1/2019   Last visit eye dept. Visit date not found  [facility]:  Uncorrected distance visual acuity was HM in the right eye and 20/20 in the left eye.  Tonometry     Tonometry (Applanation, 3:10 PM)       Right Left    Pressure 16 15               Not recorded         Not recorded         Not recorded        Chief Complaint   Patient presents with    something in left eye     pt states that this morning that wood chips and dust was blowing around him and got in his left eye. feels very irritated       ________________  8/14/2020 today  HPI     something in left eye      Additional comments: pt states that this morning that wood chips and   dust was blowing around him and got in his left eye. feels very irritated              Comments     Seen by [facility] on 4/26/17    1. +DM 1980  2. LUPUS-  3. PCIOL OU  4. Refractive Error  FB removed OD 2/15/19 @ [facility]          Last edited by Juliette Ram on 8/14/2020  2:26 PM. (History)      Problem List Items Addressed This Visit     None      Visit Diagnoses     Foreign body of left cornea, initial encounter    -  Primary    Relevant Medications    neomycin-polymyxin-dexamethasone (DEXACINE) 3.5 mg/g-10,000 unit/g-0.1 % Oint          .lid eversion normal  Inferior spk OS>OD  Foreign body OS removed  maxitrol naomi q2h  Call with any worsening or if not resolved      ===========================

## 2020-08-14 NOTE — LETTER
August 14, 2020        Sonya Potter PA-C  [address]  [place] [address]             [facility]  [address] [address]  Phone: [phone]  Fax: [phone]   Patient: Familia Durbin Jr.   MR Number: [id number]   YOB: 1945   Date of Visit: 8/14/2020       Dear Dr. Potter:    Thank you for referring Familia Durbin to me for evaluation. Below are the relevant portions of my assessment and plan of care.            If you have questions, please do not hesitate to call me. I look forward to following Familia along with you.    Sincerely,      Nikita Ly RD           CC  No Recipients

## 2020-08-14 NOTE — PATIENT INSTRUCTIONS
Take 10 units of Lantus at the same time each night    Take 5 units of Novolog 5-10 minutes before each meal    Eat 3 servings of carbohydrate in each meal. This equals 45 grams of carb.    Drink sugar free beverages - water, Diet soda, unsweet tea, Crystal Light

## 2020-08-14 NOTE — PROGRESS NOTES
Subjective:       Patient ID: Familia Durbin Jr. is a 74 y.o. male.    Chief Complaint: Results and Multiple Myeloma    HPI 74-year-old male history of multiple myeloma.  Currently on NINARLO treatment [facility] [place].  Patient has not been seen since April because of corona virus event continues with chronic stable narcotic use ECOG status 1    Past Medical History:   Diagnosis Date    CAD (coronary artery disease)     luikart    Diabetes mellitus, type 2 1979    eye dr rocha    Hearing impaired     Hyperlipidemia     Hypertension     Lupus     Discoid    Multiple myeloma     Old MI (myocardial infarction) 2012    Renal insufficiency     Tracheostomy tube present      Family History   Problem Relation Age of Onset    Diabetes Mother     Diabetes Father     Prostate cancer Father     Pancreatic cancer Sister     No Known Problems Brother     Diabetes Maternal Uncle     Diabetes Maternal Grandmother     No Known Problems Maternal Grandfather     No Known Problems Paternal Grandmother     No Known Problems Paternal Grandfather      Social History     Socioeconomic History    Marital status: Single     Spouse name: Not on file    Number of children: 9    Years of education: Not on file    Highest education level: Not on file   Occupational History    Not on file   Social Needs    Financial resource strain: Not on file    Food insecurity     Worry: Not on file     Inability: Not on file    Transportation needs     Medical: Not on file     Non-medical: Not on file   Tobacco Use    Smoking status: Never Smoker    Smokeless tobacco: Never Used   Substance and Sexual Activity    Alcohol use: No    Drug use: No    Sexual activity: Yes     Partners: Female   Lifestyle    Physical activity     Days per week: Not on file     Minutes per session: Not on file    Stress: Not on file   Relationships    Social connections     Talks on phone: Not on file      Gets together: Not on file     Attends Sabianist service: Not on file     Active member of club or organization: Not on file     Attends meetings of clubs or organizations: Not on file     Relationship status: Not on file   Other Topics Concern    Not on file   Social History Narrative    Not on file     Past Surgical History:   Procedure Laterality Date    CARDIAC SURGERY      CATARACT EXTRACTION      COLONOSCOPY      CORONARY ARTERY BYPASS GRAFT      HAND SURGERY      KNEE SURGERY      TRACHEOSTOMY TUBE PLACEMENT      WRIST FUSION         Labs:  Lab Results   Component Value Date    WBC 2.80 (L) 04/20/2020    HGB 11.6 (L) 04/20/2020    HCT 36.1 (L) 04/20/2020     (H) 04/20/2020     04/20/2020     BMP  Lab Results   Component Value Date     04/20/2020    K 4.6 04/20/2020     04/20/2020    CO2 25 04/20/2020    BUN 20 04/20/2020    CREATININE 1.8 (H) 04/20/2020    CALCIUM 9.9 04/20/2020    ANIONGAP 10 04/20/2020    ESTGFRAFRICA 42 (A) 04/20/2020    EGFRNONAA 36 (A) 04/20/2020     Lab Results   Component Value Date    ALT 19 04/20/2020    AST 17 04/20/2020    ALKPHOS 63 04/20/2020    BILITOT 0.4 04/20/2020       Lab Results   Component Value Date    IRON 49 02/20/2020    TIBC 293 02/20/2020    FERRITIN 163 02/20/2020     No results found for: [id number]  No results found for: FOLATE  Lab Results   Component Value Date    TSH 0.782 07/31/2020         Review of Systems   Constitutional: Positive for activity change. Negative for appetite change, chills, diaphoresis, fatigue, fever and unexpected weight change.   HENT: Positive for hearing loss. Negative for congestion, dental problem, drooling, ear discharge, ear pain, facial swelling, mouth sores, nosebleeds, postnasal drip, rhinorrhea, sinus pressure, sneezing, sore throat, tinnitus, trouble swallowing and voice change.    Eyes: Negative for photophobia, pain, discharge, redness, itching and visual disturbance.   Respiratory:  Negative for apnea, cough, choking, chest tightness, shortness of breath, wheezing and stridor.    Cardiovascular: Negative for chest pain, palpitations and leg swelling.   Gastrointestinal: Negative for abdominal distention, abdominal pain, anal bleeding, blood in stool, constipation, diarrhea, nausea, rectal pain and vomiting.   Endocrine: Negative for cold intolerance, heat intolerance, polydipsia, polyphagia and polyuria.   Genitourinary: Negative for decreased urine volume, difficulty urinating, discharge, dysuria, enuresis, flank pain, frequency, genital sores, hematuria, penile pain, penile swelling, scrotal swelling, testicular pain and urgency.   Musculoskeletal: Negative for arthralgias, back pain, gait problem, joint swelling, myalgias, neck pain and neck stiffness.   Skin: Negative for color change, pallor, rash and wound.   Allergic/Immunologic: Negative for environmental allergies, food allergies and immunocompromised state.   Neurological: Positive for weakness. Negative for dizziness, tremors, seizures, syncope, facial asymmetry, speech difficulty, light-headedness, numbness and headaches.   Hematological: Negative for adenopathy. Does not bruise/bleed easily.   Psychiatric/Behavioral: Positive for dysphoric mood. Negative for agitation, behavioral problems, confusion, decreased concentration, hallucinations, self-injury, sleep disturbance and suicidal ideas. The patient is nervous/anxious. The patient is not hyperactive.        Objective:      Physical Exam  Vitals signs reviewed.   Constitutional:       General: He is not in acute distress.     Appearance: He is well-developed. He is not diaphoretic.   HENT:      Head: Normocephalic.      Right Ear: Tympanic membrane and external ear normal.      Left Ear: Tympanic membrane and external ear normal.      Nose: Nose normal.      Right Sinus: No maxillary sinus tenderness or frontal sinus tenderness.      Left Sinus: No maxillary sinus tenderness or  frontal sinus tenderness.      Mouth/Throat:      Pharynx: No oropharyngeal exudate.   Eyes:      General: Lids are normal. No scleral icterus.        Right eye: No discharge.         Left eye: No discharge.      Extraocular Movements:      Right eye: Normal extraocular motion.      Left eye: Normal extraocular motion.      Conjunctiva/sclera:      Right eye: Right conjunctiva is not injected. No hemorrhage.     Left eye: Left conjunctiva is not injected. No hemorrhage.     Pupils: Pupils are equal, round, and reactive to light.   Neck:      Musculoskeletal: Normal range of motion and neck supple.      Thyroid: No thyromegaly.      Vascular: No JVD.      Trachea: No tracheal deviation.   Cardiovascular:      Rate and Rhythm: Normal rate and regular rhythm.      Heart sounds: Normal heart sounds.   Pulmonary:      Effort: Pulmonary effort is normal. No respiratory distress.      Breath sounds: Normal breath sounds. No stridor.   Abdominal:      General: Bowel sounds are normal.      Palpations: Abdomen is soft. There is no hepatomegaly, splenomegaly or mass.      Tenderness: There is no abdominal tenderness.   Musculoskeletal: Normal range of motion.         General: No tenderness.   Lymphadenopathy:      Head:      Right side of head: No posterior auricular or occipital adenopathy.      Left side of head: No posterior auricular or occipital adenopathy.      Cervical: No cervical adenopathy.      Right cervical: No superficial, deep or posterior cervical adenopathy.     Left cervical: No superficial, deep or posterior cervical adenopathy.      Upper Body:      Right upper body: No supraclavicular adenopathy.      Left upper body: No supraclavicular adenopathy.   Skin:     General: Skin is dry.      Findings: No erythema or rash.      Nails: There is no clubbing.     Neurological:      Mental Status: He is alert and oriented to person, place, and time.      Cranial Nerves: No cranial nerve deficit.      Coordination:  Coordination normal.   Psychiatric:         Behavior: Behavior normal.         Thought Content: Thought content normal.         Judgment: Judgment normal.             Assessment:      1. Multiple myeloma not having achieved remission    2. Metastatic bone cancer    3. Multiple myeloma, remission status unspecified    4. Bilateral hearing loss, unspecified hearing loss type           Plan:     Extensive conversation with patient continues with chronic stable narcotics from initial diagnosis of multiple myeloma with widespread disease.  Refill prescriptions pain contract signed patient will see back in 2-3 months for clinical follow-up referred to ENT for hearing loss.  Baseline CBC CMP SPEP and free light chain ordered today and again in 2-3 months for clinical follow-up discussed implications        Rodolfo Solo Jr, MD FACP

## 2020-08-14 NOTE — PROGRESS NOTES
"Diabetes Education  Author: Nikita Ly RD  Date: 8/14/2020    Diabetes Care Management Summary  Diabetes Education Record Assessment/Progress: Initial  Current Diabetes Risk Level: Low     Referring Provider: Sonya Potter, *  74 y.o. male in clinic today with daughter for diabetes education.   He is currently using Dexcom CGM with no problems.     Weight: 71.4 kg (157 lb 6.5 oz)   Height: 5' 10" (177.8 cm)   Body mass index is 22.59 kg/m².    Lab Results   Component Value Date    HGBA1C 7.1 (H) 07/31/2020       Diabetes Type  Diabetes Type : Type II    Diabetes History  Diabetes Diagnosis: >10 years(dx 1985)  Current Treatment: Diet(Lantus, 6-8 units at night // Novolog, 10 units ac)  Reviewed Problem List with Patient: Yes   Insulin prescribed as 10 units Lantus and 5 units of Novolog ac  Pt is taking less Lantus and varying dose  Pt is taking more Novolog which has been causing hypoglycemia    Health Maintenance was reviewed today with patient. Discussed with patient importance of routine eye exams, foot exams/foot care, blood work (i.e.: A1c, microalbumin, and lipid), dental visits, yearly flu vaccine, and pneumonia vaccine as indicated by PCP. Patient verbalized understanding.     Health Maintenance Topics with due status: Not Due       Topic Last Completion Date    Colorectal Cancer Screening 10/31/2014    Influenza Vaccine 10/24/2019    High Dose Statin 07/31/2020    Aspirin/Antiplatelet Therapy 07/31/2020    Foot Exam 07/31/2020    Lipid Panel 07/31/2020    Hemoglobin A1c 07/31/2020     Health Maintenance Due   Topic Date Due    TETANUS VACCINE  08/21/1962    Shingles Vaccine (1 of 2) 08/21/1994    Eye Exam  03/01/2020       Nutrition  Meal Planning: 3 meals per day, water, drinks regular soda  What type of sweetener do you use?: none  What type of beverages do you drink?: regular soda/tea, milk, juice, water  Meal Plan 24 Hour Recall - Breakfast: egg, sausage, 1 sl bread, coffeee  Meal " "Plan 24 Hour Recall - Lunch: fried chicken breast, Ginger ale  Meal Plan 24 Hour Recall - Dinner: [employer] 6" club sandwich - only ate half of bread, few chips, 1/3 of  big cup Sprite  Meal Plan 24 Hour Recall - Snack: none    Monitoring   Self Monitoring : using CGM  Blood Glucose Logs: No  Do you use a personal continuous glucose monitor?: Yes  What kind of glucose monitor do you use?: Dexcom  In the last month, how often have you had a low blood sugar reaction?: more than once a week  How do you treat low blood sugar?: drink juice  Can you tell when your blood sugar is too high?: no    Exercise   Exercise Type: walking(100 yards 1-2 times a day)  Frequency: Daily    Current Diabetes Treatment   Current Treatment: Diet(Lantus, 6-8 units at night // Novolog, 10 units ac)    Social History  Primary Support: Self  Occupation: reitred  Smoking Status: Never a Smoker  Alcohol Use: Never            DDS-2 Score  ( > 3 = SIGNIFICANT DISTRESS): 1                   Barriers to Change  Barriers to Change: None  Learning Challenges : None, Hearing  Hearing - further explanation: hearing impaired(Have to speak loudly to patient and often repeat)    Readiness to Learn   Readiness to Learn : Eager    Cultural Influences  Cultural Influences: No    Diabetes Education Assessment/Progress  Diabetes Disease Process (diabetes disease process and treatment options): Not Covered/Deferred  Nutrition (Incorporating nutritional management into one's lifestyle): Demonstration, Discussion, Individual Session, Instructed, Comprehends Key Points, Demonstrates Understanding/Competency (verbalizes/demonstrates), Written Materials Provided  Physical Activity (incorporating physical activity into one's lifestyle): Discussion, Individual Session  Medications (states correct name, dose, onset, peak, duration, side effects & timing of meds): Discussion, Instructed, Individual Session, Demonstrates Understanding/Competency(verbalizes/demonstrates), " Written Materials Provided  Monitoring (monitoring blood glucose/other parameters & using results): Discussion, Individual Session, Demonstrates Understanding/Competency (verbalizes/demonstrates)  Acute Complications (preventing, detecting, and treating acute complications): Discussion, Individual Session, Demonstrates Understanding/Competency (verbalizes/demonstrates)  Chronic Complications (preventing, detecting, and treating chronic complications): Not Covered/Deferred  Clinical (diabetes, other pertinent medical history, and relevant comorbidities reviewed during visit): Discussion, Individual Session  Cognitive (knowledge of self-management skills, functional health literacy): Discussion, Individual Session  Psychosocial (emotional response to diabetes): Discussion, Individual Session  Diabetes Distress and Support Systems: Individual Session  Behavioral (readiness for change, lifestyle practices, self-care behaviors): Discussion, Individual Session    Clarity report in media:  avg B  Time in range: 87%   High:  5%  Very high:  1%  Low:  5%  Very low: 2%  Patterns found: none       Goals  Patient has selected/evaluated goals during today's session: Yes, selected  Healthy Eating: Set(Drink sugar free beverages - water, Diet soda, unsweet tea, Crystal Light // keep carb intake consistent at 45 grams per meal)  Start Date: 20  Target Date: 20  Medications: Set(Take 10 units Lantus at same time each night // Take 5 units Novolog before each meal)  Start Date: 20  Target Date: 20    Dexcom was set up by pt's daughter.   Reviewed the following:   ·  Overview:  5min glucose reading updates, trending arrows, BG graph screens. No calibration needed.   ·  Menus: trend Graph, start sensor, enter BG, events, Alerts, Settings, Shutdown, Stop Sensor.   · Changed alert settings as follows:     * Low alert: 80    * High alert: 250    * Rise rate: off    * Fall Rate: off    * Urgent  low:  ON    * Urgent low soon: on      Diabetes Care Plan/Intervention  Education Plan/Intervention: Individual Follow-Up DSMT   F/u in 8 wks  Pt to maintain f/u with Sonya monroe appt scheduled 9/14/20    Diabetes Meal Plan  Calories: 1800, 2000  Carbohydrate Per Meal: (45 grams per meal)  Carbohydrate Per Snack : 7-15g    Today's Self-Management Care Plan was developed with the patient's input and is based on barriers identified during today's assessment.    The long and short-term goals in the care plan were written with the patient/caregiver's input. The patient has agreed to work toward these goals to improve his overall diabetes control.      The patient received a copy of today's self-management plan and verbalized understanding of the care plan, goals, and all of today's instructions.      The patient was encouraged to communicate with his physician and care team regarding his condition(s) and treatment.  I provided the patient with my contact information today and encouraged him to contact me via phone or patient portal as needed.     Education Units of Time   Time Spent: 90 min

## 2020-08-18 ENCOUNTER — OFFICE VISIT (OUTPATIENT)
Dept: OTOLARYNGOLOGY | Facility: CLINIC | Age: 75
End: 2020-08-18
Payer: MEDICARE

## 2020-08-18 ENCOUNTER — LAB VISIT (OUTPATIENT)
Dept: LAB | Facility: HOSPITAL | Age: 75
End: 2020-08-18
Attending: INTERNAL MEDICINE
Payer: MEDICARE

## 2020-08-18 VITALS — TEMPERATURE: 98 F | WEIGHT: 156.94 LBS | BODY MASS INDEX: 22.52 KG/M2

## 2020-08-18 DIAGNOSIS — H91.90 PERCEIVED HEARING CHANGES: Primary | ICD-10-CM

## 2020-08-18 DIAGNOSIS — C90.00 MULTIPLE MYELOMA NOT HAVING ACHIEVED REMISSION: ICD-10-CM

## 2020-08-18 LAB
ALBUMIN SERPL BCP-MCNC: 3.8 G/DL (ref 3.5–5.2)
ALP SERPL-CCNC: 66 U/L (ref 55–135)
ALT SERPL W/O P-5'-P-CCNC: 16 U/L (ref 10–44)
ANION GAP SERPL CALC-SCNC: 10 MMOL/L (ref 8–16)
AST SERPL-CCNC: 18 U/L (ref 10–40)
BASOPHILS # BLD AUTO: 0.03 K/UL (ref 0–0.2)
BASOPHILS NFR BLD: 1.1 % (ref 0–1.9)
BILIRUB SERPL-MCNC: 0.4 MG/DL (ref 0.1–1)
BUN SERPL-MCNC: 23 MG/DL (ref 8–23)
CALCIUM SERPL-MCNC: 9.5 MG/DL (ref 8.7–10.5)
CHLORIDE SERPL-SCNC: 106 MMOL/L (ref 95–110)
CO2 SERPL-SCNC: 25 MMOL/L (ref 23–29)
CREAT SERPL-MCNC: 1.7 MG/DL (ref 0.5–1.4)
DIFFERENTIAL METHOD: ABNORMAL
EOSINOPHIL # BLD AUTO: 0 K/UL (ref 0–0.5)
EOSINOPHIL NFR BLD: 1.1 % (ref 0–8)
ERYTHROCYTE [DISTWIDTH] IN BLOOD BY AUTOMATED COUNT: 15.9 % (ref 11.5–14.5)
EST. GFR  (AFRICAN AMERICAN): 45 ML/MIN/1.73 M^2
EST. GFR  (NON AFRICAN AMERICAN): 39 ML/MIN/1.73 M^2
GLUCOSE SERPL-MCNC: 55 MG/DL (ref 70–110)
HCT VFR BLD AUTO: 37.3 % (ref 40–54)
HGB BLD-MCNC: 12 G/DL (ref 14–18)
IMM GRANULOCYTES # BLD AUTO: 0.02 K/UL (ref 0–0.04)
IMM GRANULOCYTES NFR BLD AUTO: 0.7 % (ref 0–0.5)
LYMPHOCYTES # BLD AUTO: 1.3 K/UL (ref 1–4.8)
LYMPHOCYTES NFR BLD: 45 % (ref 18–48)
MCH RBC QN AUTO: 34.3 PG (ref 27–31)
MCHC RBC AUTO-ENTMCNC: 32.2 G/DL (ref 32–36)
MCV RBC AUTO: 107 FL (ref 82–98)
MONOCYTES # BLD AUTO: 0.3 K/UL (ref 0.3–1)
MONOCYTES NFR BLD: 11.1 % (ref 4–15)
NEUTROPHILS # BLD AUTO: 1.2 K/UL (ref 1.8–7.7)
NEUTROPHILS NFR BLD: 41.7 % (ref 38–73)
NRBC BLD-RTO: 0 /100 WBC
PLATELET # BLD AUTO: 199 K/UL (ref 150–350)
PMV BLD AUTO: 10.5 FL (ref 9.2–12.9)
POTASSIUM SERPL-SCNC: 4.2 MMOL/L (ref 3.5–5.1)
PROT SERPL-MCNC: 8.7 G/DL (ref 6–8.4)
RBC # BLD AUTO: 3.5 M/UL (ref 4.6–6.2)
SODIUM SERPL-SCNC: 141 MMOL/L (ref 136–145)
WBC # BLD AUTO: 2.8 K/UL (ref 3.9–12.7)

## 2020-08-18 PROCEDURE — 80053 COMPREHEN METABOLIC PANEL: CPT

## 2020-08-18 PROCEDURE — 36415 COLL VENOUS BLD VENIPUNCTURE: CPT

## 2020-08-18 PROCEDURE — 86334 PATHOLOGIST INTERPRETATION IFE: ICD-10-PCS | Mod: 26,,, | Performed by: PATHOLOGY

## 2020-08-18 PROCEDURE — 1126F PR PAIN SEVERITY QUANTIFIED, NO PAIN PRESENT: ICD-10-PCS | Mod: S$GLB,,, | Performed by: PHYSICIAN ASSISTANT

## 2020-08-18 PROCEDURE — 1126F AMNT PAIN NOTED NONE PRSNT: CPT | Mod: S$GLB,,, | Performed by: PHYSICIAN ASSISTANT

## 2020-08-18 PROCEDURE — 99213 PR OFFICE/OUTPT VISIT, EST, LEVL III, 20-29 MIN: ICD-10-PCS | Mod: S$GLB,,, | Performed by: PHYSICIAN ASSISTANT

## 2020-08-18 PROCEDURE — 84165 PROTEIN E-PHORESIS SERUM: CPT | Mod: 26,,, | Performed by: PATHOLOGY

## 2020-08-18 PROCEDURE — 1101F PR PT FALLS ASSESS DOC 0-1 FALLS W/OUT INJ PAST YR: ICD-10-PCS | Mod: CPTII,S$GLB,, | Performed by: PHYSICIAN ASSISTANT

## 2020-08-18 PROCEDURE — 86334 IMMUNOFIX E-PHORESIS SERUM: CPT | Mod: 26,,, | Performed by: PATHOLOGY

## 2020-08-18 PROCEDURE — 84165 PROTEIN E-PHORESIS SERUM: CPT

## 2020-08-18 PROCEDURE — 84165 PATHOLOGIST INTERPRETATION SPE: ICD-10-PCS | Mod: 26,,, | Performed by: PATHOLOGY

## 2020-08-18 PROCEDURE — 99999 PR PBB SHADOW E&M-EST. PATIENT-LVL IV: CPT | Mod: PBBFAC,,, | Performed by: PHYSICIAN ASSISTANT

## 2020-08-18 PROCEDURE — 86334 IMMUNOFIX E-PHORESIS SERUM: CPT

## 2020-08-18 PROCEDURE — 85025 COMPLETE CBC W/AUTO DIFF WBC: CPT

## 2020-08-18 PROCEDURE — 1101F PT FALLS ASSESS-DOCD LE1/YR: CPT | Mod: CPTII,S$GLB,, | Performed by: PHYSICIAN ASSISTANT

## 2020-08-18 PROCEDURE — 1159F PR MEDICATION LIST DOCUMENTED IN MEDICAL RECORD: ICD-10-PCS | Mod: S$GLB,,, | Performed by: PHYSICIAN ASSISTANT

## 2020-08-18 PROCEDURE — 1159F MED LIST DOCD IN RCRD: CPT | Mod: S$GLB,,, | Performed by: PHYSICIAN ASSISTANT

## 2020-08-18 PROCEDURE — 83520 IMMUNOASSAY QUANT NOS NONAB: CPT | Mod: 59

## 2020-08-18 PROCEDURE — 99213 OFFICE O/P EST LOW 20 MIN: CPT | Mod: S$GLB,,, | Performed by: PHYSICIAN ASSISTANT

## 2020-08-18 PROCEDURE — 99999 PR PBB SHADOW E&M-EST. PATIENT-LVL IV: ICD-10-PCS | Mod: PBBFAC,,, | Performed by: PHYSICIAN ASSISTANT

## 2020-08-18 NOTE — PROGRESS NOTES
Subjective:   Patient ID: Familia Durbin Jr. is a 74 y.o. male.    Chief Complaint: No chief complaint on file.    Mr. Durbin is a very pleasant 75 yo male here to see me today with complaints if perceived hearing changes over many years but has noticed worsening over past several months. Denies any ear pain or drainage.     Review of patient's allergies indicates:   Allergen Reactions    Cephalexin     Fentanyl Nausea And Vomiting    Nsaids (non-steroidal anti-inflammatory drug)      Renal insuf           Review of Systems   Constitutional: Negative for fatigue, fever and unexpected weight change.   HENT: Positive for hearing loss. Negative for congestion, ear discharge, ear pain, facial swelling, nosebleeds, postnasal drip, rhinorrhea, sinus pressure, sneezing, sore throat, tinnitus, trouble swallowing and voice change.    Eyes: Negative for discharge, redness and itching.   Respiratory: Negative for cough, choking, shortness of breath and wheezing.    Cardiovascular: Negative for chest pain and palpitations.   Gastrointestinal: Positive for nausea. Negative for abdominal pain.        No reflux.   Musculoskeletal: Negative for neck pain.   Neurological: Negative for dizziness, facial asymmetry, light-headedness and headaches.   Hematological: Negative for adenopathy. Does not bruise/bleed easily.   Psychiatric/Behavioral: Negative for agitation, behavioral problems, confusion and decreased concentration.         Objective:   Temp 97.6 °F (36.4 °C)   Wt 71.2 kg (156 lb 15.5 oz)   BMI 22.52 kg/m²     Physical Exam  Constitutional:       General: He is not in acute distress.     Appearance: He is well-developed.   HENT:      Head: Normocephalic and atraumatic.      Jaw: No trismus.      Right Ear: Hearing, tympanic membrane, ear canal and external ear normal.      Left Ear: Hearing, tympanic membrane, ear canal and external ear normal.      Nose: Nose normal. No nasal deformity, septal deviation, mucosal  edema or rhinorrhea.      Mouth/Throat:      Dentition: Normal dentition.      Pharynx: Uvula midline. No oropharyngeal exudate or uvula swelling.   Eyes:      General: No scleral icterus.     Conjunctiva/sclera: Conjunctivae normal.      Right eye: Right conjunctiva is not injected. No chemosis.     Left eye: Left conjunctiva is not injected. No chemosis.     Pupils: Pupils are equal, round, and reactive to light.   Neck:      Thyroid: No thyroid mass or thyromegaly.      Trachea: Trachea and phonation normal. No tracheal tenderness or tracheal deviation.   Pulmonary:      Effort: Pulmonary effort is normal. No accessory muscle usage or respiratory distress.      Breath sounds: No stridor.   Lymphadenopathy:      Head:      Right side of head: No submental, submandibular, preauricular or posterior auricular adenopathy.      Left side of head: No submental, submandibular, preauricular or posterior auricular adenopathy.      Cervical: No cervical adenopathy.      Right cervical: No superficial or deep cervical adenopathy.     Left cervical: No superficial or deep cervical adenopathy.   Skin:     General: Skin is warm and dry.      Findings: No erythema or rash.   Neurological:      Mental Status: He is alert and oriented to person, place, and time.      Cranial Nerves: No cranial nerve deficit.   Psychiatric:         Behavior: Behavior normal.         Thought Content: Thought content normal.          Assessment:     1. Perceived hearing changes        Plan:     Perceived hearing changes      We will schedule him for an audiogram to evaluated hearing status. Will plan to follow up with results.

## 2020-08-18 NOTE — LETTER
August 18, 2020      Rodolfo Solo MD  [address] [facility] [address]           [facility]  [address]  [place] [address]  Phone: [phone]  Fax: [phone]          Patient: Familia Durbin Jr.   MR Number: [id number]   YOB: 1945   Date of Visit: 8/18/2020       Dear Dr. Rodolfo Solo:    Thank you for referring Familia Durbin to me for evaluation. Attached you will find relevant portions of my assessment and plan of care.    If you have questions, please do not hesitate to call me. I look forward to following Familia Durbin along with you.    Sincerely,    Juliette Villasenor PA-C    Enclosure  CC:  No Recipients    If you would like to receive this communication electronically, please contact [email or web address] or [phone] to request more information on [facility] access.    For providers and/or their staff who would like to refer a patient to [facility], please contact us through our one-stop-shop provider referral line, [facility], at [phone].    If you feel you have received this communication in error or would no longer like to receive these types of communications, please e-mail [email or web address]

## 2020-08-19 LAB
ALBUMIN SERPL ELPH-MCNC: 3.93 G/DL (ref 3.35–5.55)
ALPHA1 GLOB SERPL ELPH-MCNC: 0.36 G/DL (ref 0.17–0.41)
ALPHA2 GLOB SERPL ELPH-MCNC: 0.77 G/DL (ref 0.43–0.99)
B-GLOBULIN SERPL ELPH-MCNC: 1.09 G/DL (ref 0.5–1.1)
GAMMA GLOB SERPL ELPH-MCNC: 1.96 G/DL (ref 0.67–1.58)
KAPPA LC SER QL IA: 12.32 MG/DL (ref 0.33–1.94)
KAPPA LC/LAMBDA SER IA: 1.31 (ref 0.26–1.65)
LAMBDA LC SER QL IA: 9.43 MG/DL (ref 0.57–2.63)
PROT SERPL-MCNC: 8.1 G/DL (ref 6–8.4)

## 2020-08-20 LAB — PATHOLOGIST INTERPRETATION SPE: NORMAL

## 2020-08-21 LAB
INTERPRETATION SERPL IFE-IMP: NORMAL
PATHOLOGIST INTERPRETATION IFE: NORMAL

## 2020-08-28 ENCOUNTER — TELEPHONE (OUTPATIENT)
Dept: PHARMACY | Facility: CLINIC | Age: 75
End: 2020-08-28

## 2020-08-28 NOTE — TELEPHONE ENCOUNTER
Good Afternoon,     The prior authorization for Familia Durbin JrAmisha's Oxycontin 20mg prescription has been APPROVED FROM 8/28/2020 TO 12/31/2020 with copayment of $24.85.       We were unable to reach patient on 8/28/2020.  A voicemail was left for the patient of the prior authorization status along with an appropriate pharmacy phone number should he/she have questions.     If there are any additional questions or concerns, please contact me.    Thank You!   Zeynep Meneses CPhT, B.A  Patient Care Advocate   [facility]  Phone: [phone] Ext [phone]  Fax: [phone]

## 2020-09-02 ENCOUNTER — CLINICAL SUPPORT (OUTPATIENT)
Dept: AUDIOLOGY | Facility: CLINIC | Age: 75
End: 2020-09-02
Payer: MEDICARE

## 2020-09-02 DIAGNOSIS — H90.5 HEARING LOSS, SENSORINEURAL, COMBINED TYPES: Primary | ICD-10-CM

## 2020-09-02 PROCEDURE — 92567 TYMPANOMETRY: CPT | Mod: S$GLB,,, | Performed by: AUDIOLOGIST

## 2020-09-02 PROCEDURE — 92557 COMPREHENSIVE HEARING TEST: CPT | Mod: S$GLB,,, | Performed by: AUDIOLOGIST

## 2020-09-02 PROCEDURE — 92557 PR COMPREHENSIVE HEARING TEST: ICD-10-PCS | Mod: S$GLB,,, | Performed by: AUDIOLOGIST

## 2020-09-02 PROCEDURE — 92567 PR TYMPA2METRY: ICD-10-PCS | Mod: S$GLB,,, | Performed by: AUDIOLOGIST

## 2020-09-02 NOTE — PROGRESS NOTES
Familia Durbin JrAmisha was seen 09/02/2020 for an audiological evaluation.  Patient complains of bilateral gradual hearing loss. He reports tinnitus off and on. He denies any dizziness at this time. He has a history of noise exposure. He is interested in hearing aids.     Results reveal a mild-to-profound sensorineural hearing loss 250-8000 Hz for the right ear, and  mild-to-profound sensorineural hearing loss 250-8000 Hz for the left ear.   Speech Reception Thresholds were  55 dBHL for the right ear and 65 dBHL for the left ear.   Word recognition scores were poor for the right ear and poor for the left ear.   Tympanograms were Type A, normal for the right ear and Type A, normal for the left ear.    Patient was counseled on the above findings.    Recommendations include:    1.  ENT followup  2.  Hearing aid consult, through [employer]   3.  Wear hearing protective devices around loud noise  4.  Annual audiograms

## 2020-09-14 ENCOUNTER — TELEPHONE (OUTPATIENT)
Dept: OTOLARYNGOLOGY | Facility: CLINIC | Age: 75
End: 2020-09-14

## 2020-09-14 ENCOUNTER — PATIENT OUTREACH (OUTPATIENT)
Dept: ADMINISTRATIVE | Facility: OTHER | Age: 75
End: 2020-09-14

## 2020-09-14 NOTE — TELEPHONE ENCOUNTER
Spoke to the patient at the request of Diabetes Management and scheduled the patient with Sheila Villasenor tomorrow      ----- Message from Sonya Potter PA-C sent at 9/14/2020 12:11 PM CDT -----  Hello,     This patient was being evaluated in Aud/ENT for hearing loss. I saw he had audiogram done with instruction to follow up with ENT, but do not see follow up for him. Can someone reach out and schedule follow up? Patient currently not able to hear alarms alerting for low blood sugar on his continuous glucose monitor.     Thank you,     Sonya Potter PA-C  Diabetes Management

## 2020-09-15 ENCOUNTER — OFFICE VISIT (OUTPATIENT)
Dept: OTOLARYNGOLOGY | Facility: CLINIC | Age: 75
End: 2020-09-15
Payer: MEDICARE

## 2020-09-15 ENCOUNTER — PATIENT OUTREACH (OUTPATIENT)
Dept: ADMINISTRATIVE | Facility: OTHER | Age: 75
End: 2020-09-15

## 2020-09-15 VITALS
SYSTOLIC BLOOD PRESSURE: 103 MMHG | HEART RATE: 66 BPM | TEMPERATURE: 96 F | BODY MASS INDEX: 22.14 KG/M2 | DIASTOLIC BLOOD PRESSURE: 57 MMHG | WEIGHT: 154.31 LBS

## 2020-09-15 DIAGNOSIS — H90.3 SENSORINEURAL HEARING LOSS (SNHL) OF BOTH EARS: Primary | ICD-10-CM

## 2020-09-15 PROCEDURE — 1126F AMNT PAIN NOTED NONE PRSNT: CPT | Mod: S$GLB,,, | Performed by: PHYSICIAN ASSISTANT

## 2020-09-15 PROCEDURE — 99213 OFFICE O/P EST LOW 20 MIN: CPT | Mod: S$GLB,,, | Performed by: PHYSICIAN ASSISTANT

## 2020-09-15 PROCEDURE — 1101F PR PT FALLS ASSESS DOC 0-1 FALLS W/OUT INJ PAST YR: ICD-10-PCS | Mod: CPTII,S$GLB,, | Performed by: PHYSICIAN ASSISTANT

## 2020-09-15 PROCEDURE — 3074F SYST BP LT 130 MM HG: CPT | Mod: CPTII,S$GLB,, | Performed by: PHYSICIAN ASSISTANT

## 2020-09-15 PROCEDURE — 1101F PT FALLS ASSESS-DOCD LE1/YR: CPT | Mod: CPTII,S$GLB,, | Performed by: PHYSICIAN ASSISTANT

## 2020-09-15 PROCEDURE — 3078F PR MOST RECENT DIASTOLIC BLOOD PRESSURE < 80 MM HG: ICD-10-PCS | Mod: CPTII,S$GLB,, | Performed by: PHYSICIAN ASSISTANT

## 2020-09-15 PROCEDURE — 3078F DIAST BP <80 MM HG: CPT | Mod: CPTII,S$GLB,, | Performed by: PHYSICIAN ASSISTANT

## 2020-09-15 PROCEDURE — 1159F PR MEDICATION LIST DOCUMENTED IN MEDICAL RECORD: ICD-10-PCS | Mod: S$GLB,,, | Performed by: PHYSICIAN ASSISTANT

## 2020-09-15 PROCEDURE — 1126F PR PAIN SEVERITY QUANTIFIED, NO PAIN PRESENT: ICD-10-PCS | Mod: S$GLB,,, | Performed by: PHYSICIAN ASSISTANT

## 2020-09-15 PROCEDURE — 99999 PR PBB SHADOW E&M-EST. PATIENT-LVL IV: ICD-10-PCS | Mod: PBBFAC,,, | Performed by: PHYSICIAN ASSISTANT

## 2020-09-15 PROCEDURE — 99213 PR OFFICE/OUTPT VISIT, EST, LEVL III, 20-29 MIN: ICD-10-PCS | Mod: S$GLB,,, | Performed by: PHYSICIAN ASSISTANT

## 2020-09-15 PROCEDURE — 1159F MED LIST DOCD IN RCRD: CPT | Mod: S$GLB,,, | Performed by: PHYSICIAN ASSISTANT

## 2020-09-15 PROCEDURE — 99999 PR PBB SHADOW E&M-EST. PATIENT-LVL IV: CPT | Mod: PBBFAC,,, | Performed by: PHYSICIAN ASSISTANT

## 2020-09-15 PROCEDURE — 3074F PR MOST RECENT SYSTOLIC BLOOD PRESSURE < 130 MM HG: ICD-10-PCS | Mod: CPTII,S$GLB,, | Performed by: PHYSICIAN ASSISTANT

## 2020-09-15 NOTE — PROGRESS NOTES
Subjective:   Patient ID: Familia Durbin Jr. is a 75 y.o. male.    Chief Complaint: Follow-up    Mr. Durbin is here today to discuss recent audiogram findings. Denies ear pain or drainage.     Review of patient's allergies indicates:   Allergen Reactions    Cephalexin     Fentanyl Nausea And Vomiting    Nsaids (non-steroidal anti-inflammatory drug)      Renal insuf           Review of Systems   Constitutional: Negative for fatigue, fever and unexpected weight change.   HENT: Positive for hearing loss. Negative for congestion, ear discharge, ear pain, facial swelling, nosebleeds, postnasal drip, rhinorrhea, sinus pressure, sneezing, sore throat, tinnitus, trouble swallowing and voice change.    Eyes: Negative for discharge, redness and itching.   Respiratory: Negative for cough, choking, shortness of breath and wheezing.    Cardiovascular: Negative for chest pain and palpitations.   Gastrointestinal: Negative for abdominal pain.        No reflux.   Musculoskeletal: Negative for neck pain.   Neurological: Negative for dizziness, facial asymmetry, light-headedness and headaches.   Hematological: Negative for adenopathy. Does not bruise/bleed easily.   Psychiatric/Behavioral: Negative for agitation, behavioral problems, confusion and decreased concentration.         Objective:   BP (!) 103/57   Pulse 66   Temp 96.4 °F (35.8 °C) (Tympanic)   Wt 70 kg (154 lb 5.2 oz)   BMI 22.14 kg/m²     Physical Exam  Constitutional:       General: He is not in acute distress.     Appearance: He is well-developed.   HENT:      Head: Normocephalic and atraumatic.      Jaw: No trismus.      Right Ear: Hearing, tympanic membrane, ear canal and external ear normal.      Left Ear: Hearing, tympanic membrane, ear canal and external ear normal.      Nose: Nose normal. No nasal deformity, septal deviation, mucosal edema or rhinorrhea.      Mouth/Throat:      Dentition: Normal dentition.      Pharynx: Uvula midline. No oropharyngeal  exudate or uvula swelling.   Eyes:      General: No scleral icterus.     Conjunctiva/sclera: Conjunctivae normal.      Right eye: Right conjunctiva is not injected. No chemosis.     Left eye: Left conjunctiva is not injected. No chemosis.     Pupils: Pupils are equal, round, and reactive to light.   Neck:      Thyroid: No thyroid mass or thyromegaly.      Trachea: Trachea and phonation normal. No tracheal tenderness or tracheal deviation.   Pulmonary:      Effort: Pulmonary effort is normal. No accessory muscle usage or respiratory distress.      Breath sounds: No stridor.   Lymphadenopathy:      Head:      Right side of head: No submental, submandibular, preauricular or posterior auricular adenopathy.      Left side of head: No submental, submandibular, preauricular or posterior auricular adenopathy.      Cervical: No cervical adenopathy.      Right cervical: No superficial or deep cervical adenopathy.     Left cervical: No superficial or deep cervical adenopathy.   Skin:     General: Skin is warm and dry.      Findings: No erythema or rash.   Neurological:      Mental Status: He is alert and oriented to person, place, and time.      Cranial Nerves: No cranial nerve deficit.   Psychiatric:         Behavior: Behavior normal.         Thought Content: Thought content normal.          Imaging :   Recommendations include:     1.  ENT followup  2.  Hearing aid consult, through [employer]   3.  Wear hearing protective devices around loud noise  4.  Annual audiograms            Assessment:     1. Sensorineural hearing loss (SNHL) of both ears        Plan:     Sensorineural hearing loss (SNHL) of both ears      Sever SNHL. He is a candidate for hearing aids. He will call [employer] to discuss benefits. Copy of audiogram provided to patient.

## 2020-09-30 DIAGNOSIS — C90.00 MULTIPLE MYELOMA, REMISSION STATUS UNSPECIFIED: ICD-10-CM

## 2020-09-30 DIAGNOSIS — C90.00 MULTIPLE MYELOMA NOT HAVING ACHIEVED REMISSION: ICD-10-CM

## 2020-09-30 RX ORDER — OXYCODONE HCL 20 MG/1
20 TABLET, FILM COATED, EXTENDED RELEASE ORAL EVERY 12 HOURS
Qty: 60 EACH | Refills: 0 | Status: SHIPPED | OUTPATIENT
Start: 2020-09-30 | End: 2020-11-02 | Stop reason: SDUPTHER

## 2020-09-30 RX ORDER — OXYCODONE HCL 20 MG/1
20 TABLET, FILM COATED, EXTENDED RELEASE ORAL EVERY 12 HOURS
Qty: 60 EACH | Refills: 0 | Status: CANCELLED | OUTPATIENT
Start: 2020-09-30

## 2020-09-30 RX ORDER — OXYCODONE HYDROCHLORIDE 10 MG/1
10 TABLET ORAL EVERY 6 HOURS PRN
Qty: 90 TABLET | Refills: 0 | Status: SHIPPED | OUTPATIENT
Start: 2020-09-30 | End: 2020-11-02 | Stop reason: SDUPTHER

## 2020-09-30 RX ORDER — OXYCODONE HYDROCHLORIDE 10 MG/1
10 TABLET ORAL EVERY 6 HOURS PRN
Qty: 90 TABLET | Refills: 0 | Status: CANCELLED | OUTPATIENT
Start: 2020-09-30

## 2020-10-06 ENCOUNTER — PATIENT MESSAGE (OUTPATIENT)
Dept: ADMINISTRATIVE | Facility: HOSPITAL | Age: 75
End: 2020-10-06

## 2020-10-22 ENCOUNTER — PATIENT MESSAGE (OUTPATIENT)
Dept: DIABETES | Facility: CLINIC | Age: 75
End: 2020-10-22

## 2020-10-22 NOTE — TELEPHONE ENCOUNTER
Spoke to daughter about problems with Dexcom.   She used the new transmitter without adding the new transmitter ID to the . Advised that the ID is printed on the  itself and that should be added to the  in order for them to pair.   Ms Nichole expressed understading.

## 2020-10-26 ENCOUNTER — HOSPITAL ENCOUNTER (OUTPATIENT)
Dept: RADIOLOGY | Facility: HOSPITAL | Age: 75
Discharge: HOME | End: 2020-10-26
Attending: PHYSICIAN ASSISTANT
Payer: MEDICARE

## 2020-10-26 ENCOUNTER — OFFICE VISIT (OUTPATIENT)
Dept: INTERNAL MEDICINE | Facility: CLINIC | Age: 75
End: 2020-10-26
Payer: MEDICARE

## 2020-10-26 ENCOUNTER — PATIENT MESSAGE (OUTPATIENT)
Dept: INTERNAL MEDICINE | Facility: CLINIC | Age: 75
End: 2020-10-26

## 2020-10-26 VITALS
HEART RATE: 64 BPM | WEIGHT: 159.38 LBS | TEMPERATURE: 97 F | DIASTOLIC BLOOD PRESSURE: 58 MMHG | BODY MASS INDEX: 22.82 KG/M2 | HEIGHT: 70 IN | SYSTOLIC BLOOD PRESSURE: 118 MMHG | OXYGEN SATURATION: 96 %

## 2020-10-26 DIAGNOSIS — J18.9 PNEUMONIA OF LEFT LOWER LOBE DUE TO INFECTIOUS ORGANISM: Primary | ICD-10-CM

## 2020-10-26 DIAGNOSIS — R10.9 FLANK PAIN: ICD-10-CM

## 2020-10-26 DIAGNOSIS — I10 ESSENTIAL HYPERTENSION: Chronic | ICD-10-CM

## 2020-10-26 DIAGNOSIS — Z79.4 TYPE 2 DIABETES MELLITUS WITH DIABETIC NEPHROPATHY, WITH LONG-TERM CURRENT USE OF INSULIN: Primary | Chronic | ICD-10-CM

## 2020-10-26 DIAGNOSIS — J18.9 PNEUMONIA OF LEFT LOWER LOBE DUE TO INFECTIOUS ORGANISM: ICD-10-CM

## 2020-10-26 DIAGNOSIS — E11.21 TYPE 2 DIABETES MELLITUS WITH DIABETIC NEPHROPATHY, WITH LONG-TERM CURRENT USE OF INSULIN: Primary | Chronic | ICD-10-CM

## 2020-10-26 PROCEDURE — 3051F PR MOST RECENT HEMOGLOBIN A1C LEVEL 7.0 - < 8.0%: ICD-10-PCS | Mod: CPTII,S$GLB,, | Performed by: PHYSICIAN ASSISTANT

## 2020-10-26 PROCEDURE — 99214 OFFICE O/P EST MOD 30 MIN: CPT | Mod: S$GLB,,, | Performed by: PHYSICIAN ASSISTANT

## 2020-10-26 PROCEDURE — 99999 PR PBB SHADOW E&M-EST. PATIENT-LVL V: ICD-10-PCS | Mod: PBBFAC,,, | Performed by: PHYSICIAN ASSISTANT

## 2020-10-26 PROCEDURE — 1159F MED LIST DOCD IN RCRD: CPT | Mod: S$GLB,,, | Performed by: PHYSICIAN ASSISTANT

## 2020-10-26 PROCEDURE — 3078F DIAST BP <80 MM HG: CPT | Mod: CPTII,S$GLB,, | Performed by: PHYSICIAN ASSISTANT

## 2020-10-26 PROCEDURE — 1101F PR PT FALLS ASSESS DOC 0-1 FALLS W/OUT INJ PAST YR: ICD-10-PCS | Mod: CPTII,S$GLB,, | Performed by: PHYSICIAN ASSISTANT

## 2020-10-26 PROCEDURE — 3078F PR MOST RECENT DIASTOLIC BLOOD PRESSURE < 80 MM HG: ICD-10-PCS | Mod: CPTII,S$GLB,, | Performed by: PHYSICIAN ASSISTANT

## 2020-10-26 PROCEDURE — 74018 XR ABDOMEN AP 1 VIEW: ICD-10-PCS | Mod: 26,,, | Performed by: RADIOLOGY

## 2020-10-26 PROCEDURE — 74018 RADEX ABDOMEN 1 VIEW: CPT | Mod: TC

## 2020-10-26 PROCEDURE — 74018 RADEX ABDOMEN 1 VIEW: CPT | Mod: 26,,, | Performed by: RADIOLOGY

## 2020-10-26 PROCEDURE — 1125F PR PAIN SEVERITY QUANTIFIED, PAIN PRESENT: ICD-10-PCS | Mod: S$GLB,,, | Performed by: PHYSICIAN ASSISTANT

## 2020-10-26 PROCEDURE — 1159F PR MEDICATION LIST DOCUMENTED IN MEDICAL RECORD: ICD-10-PCS | Mod: S$GLB,,, | Performed by: PHYSICIAN ASSISTANT

## 2020-10-26 PROCEDURE — 99999 PR PBB SHADOW E&M-EST. PATIENT-LVL V: CPT | Mod: PBBFAC,,, | Performed by: PHYSICIAN ASSISTANT

## 2020-10-26 PROCEDURE — 99214 PR OFFICE/OUTPT VISIT, EST, LEVL IV, 30-39 MIN: ICD-10-PCS | Mod: S$GLB,,, | Performed by: PHYSICIAN ASSISTANT

## 2020-10-26 PROCEDURE — 3074F PR MOST RECENT SYSTOLIC BLOOD PRESSURE < 130 MM HG: ICD-10-PCS | Mod: CPTII,S$GLB,, | Performed by: PHYSICIAN ASSISTANT

## 2020-10-26 PROCEDURE — 1125F AMNT PAIN NOTED PAIN PRSNT: CPT | Mod: S$GLB,,, | Performed by: PHYSICIAN ASSISTANT

## 2020-10-26 PROCEDURE — 3051F HG A1C>EQUAL 7.0%<8.0%: CPT | Mod: CPTII,S$GLB,, | Performed by: PHYSICIAN ASSISTANT

## 2020-10-26 PROCEDURE — 1101F PT FALLS ASSESS-DOCD LE1/YR: CPT | Mod: CPTII,S$GLB,, | Performed by: PHYSICIAN ASSISTANT

## 2020-10-26 PROCEDURE — 3074F SYST BP LT 130 MM HG: CPT | Mod: CPTII,S$GLB,, | Performed by: PHYSICIAN ASSISTANT

## 2020-10-26 RX ORDER — AZITHROMYCIN 250 MG/1
TABLET, FILM COATED ORAL
Qty: 6 TABLET | Refills: 0 | Status: SHIPPED | OUTPATIENT
Start: 2020-10-26 | End: 2020-10-26 | Stop reason: SDUPTHER

## 2020-10-26 RX ORDER — AZITHROMYCIN 250 MG/1
TABLET, FILM COATED ORAL
Qty: 6 TABLET | Refills: 0 | Status: ON HOLD | OUTPATIENT
Start: 2020-10-26 | End: 2020-11-06 | Stop reason: HOSPADM

## 2020-10-26 NOTE — PROGRESS NOTES
"  Subjective:      Patient ID: Familia Durbin Jr. is a 75 y.o. male.    Chief Complaint: Back Pain    Back Pain  This is a new problem. Episode onset: 2-3 days ago. The problem has been waxing and waning since onset. The pain is present in the costovertebral angle (right flank). The quality of the pain is described as stabbing. The pain is at a severity of 8/10. The pain is the same all the time. Pertinent negatives include no abdominal pain, bladder incontinence, bowel incontinence, chest pain, dysuria, fever, headaches, leg pain, numbness, paresis, paresthesias, pelvic pain, perianal numbness, tingling, weakness or weight loss. (Nausea and vomiting)     Patient Active Problem List   Diagnosis    Type 2 diabetes mellitus with diabetic nephropathy    Hyperlipidemia    Hypertension    Insulin long-term use    GERD (gastroesophageal reflux disease)    Calcification of abdominal aorta    Coronary artery disease of native artery of native heart with stable angina pectoris    Discoid lupus    Constipation    Multiple myeloma    Metastatic bone cancer    Vision loss of right eye    Ischemic optic neuritis of right eye    Renal insufficiency    Drug-induced constipation       Current Outpatient Medications:     aspirin 81 MG Chew, Take 1 tablet (81 mg total) by mouth once daily., Disp: 100 tablet, Rfl: 12    azithromycin (ZITHROMAX Z-JERRI) 250 MG tablet, Take 2 tabs po on day 1 followed by 1 tab po from day 2-5, Disp: 6 tablet, Rfl: 0    BD ULTRA-FINE DAVE PEN NEEDLE 32 gauge x 5/32" Ndle, USE  4 TIMES DAILY WITH MEALS AND AT BEDTIME, Disp: 400 each, Rfl: 3    blood-glucose meter kit, Use as instructed, Disp: 1 each, Rfl: 0    BRILINTA 90 mg tablet, Take 1 tablet by mouth once daily., Disp: , Rfl:     cholecalciferol, vitamin D3, 5,000 unit Tab, Take 5,000 Units by mouth once daily., Disp: , Rfl:     DOCOSAHEXANOIC ACID ORAL, Take 1 capsule by mouth once daily., Disp: , Rfl:     docusate sodium " (COLACE) 100 MG capsule, Take 100 mg by mouth 2 (two) times daily., Disp: , Rfl:     flaxseed oil 1,000 mg Cap, Take 1 capsule by mouth., Disp: , Rfl:     ginger root (GINGER, ZINGIBER OFFICINALIS,) 550 mg Cap, Take 1 capsule by mouth., Disp: , Rfl:     insulin (LANTUS SOLOSTAR U-100 INSULIN) glargine 100 units/mL (3mL) SubQ pen, Inject 10 Units into the skin every evening. INJECT 15 UNITS SUBCUTANEOUSLY ONCE DAILY, Disp: 3 mL, Rfl: 11    insulin aspart U-100 (NOVOLOG) 100 unit/mL (3 mL) InPn pen, Inject 15 Units into the skin 3 (three) times daily with meals., Disp: 40.5 mL, Rfl: 3    isosorbide mononitrate (IMDUR) 30 MG 24 hr tablet, Take by mouth., Disp: , Rfl:     lactulose (CEPHULAC) 20 gram Pack, Mix and take 1 packet (20 g total) by mouth 3 (three) times daily., Disp: 90 packet, Rfl: 3    lactulose (CHRONULAC) 10 gram/15 mL solution, Take 30 mL by mouth twice daily as needed for constipation., Disp: 473 mL, Rfl: 3    lisinopril (PRINIVIL,ZESTRIL) 2.5 MG tablet, , Disp: , Rfl:     metoprolol tartrate (LOPRESSOR) 50 MG tablet, TAKE 1 TABLET BY MOUTH TWICE DAILY, Disp: 180 tablet, Rfl: 3    multivit-min-FA-lycopen-lutein 300-600-300 mcg Tab, Take 1 tablet by mouth., Disp: , Rfl:     NITROSTAT 0.4 mg SL tablet, , Disp: , Rfl:     omega 3-dha-epa-fish oil 300-1,000 mg Cap, Take by mouth., Disp: , Rfl:     ondansetron (ZOFRAN) 4 MG tablet, Take 1 tablet (4 mg total) by mouth every 8 (eight) hours as needed for Nausea., Disp: 40 tablet, Rfl: 11    ondansetron (ZOFRAN-ODT) 4 MG TbDL, Dissolve 1 tab under the tongue every 4-6 hours as needed for nausea., Disp: 120 tablet, Rfl: 0    oxyCODONE (OXYCONTIN) 20 mg 12 hr tablet, Take 1 tablet (20 mg total) by mouth every 12 (twelve) hours., Disp: 60 each, Rfl: 0    oxyCODONE (ROXICODONE) 10 mg Tab immediate release tablet, Take 1 tablet (10 mg total) by mouth every 6 (six) hours as needed for Pain., Disp: 90 tablet, Rfl: 0    pantoprazole (PROTONIX) 40 MG  tablet, Take 1 tablet by mouth daily, Disp: 30 tablet, Rfl: 0    pomalidomide 3 mg Cap, Take by mouth., Disp: , Rfl:     prochlorperazine (COMPAZINE) 5 MG tablet, Take 1 tablet (5 mg total) by mouth 4 (four) times daily as needed for Nausea., Disp: 40 tablet, Rfl: 11    ranolazine (RANEXA) 1,000 mg Tb12, Take 1,000 mg by mouth 2 (two) times daily., Disp: , Rfl:     rosuvastatin (CRESTOR) 40 MG Tab, Take 1 tablet (40 mg total) by mouth every evening., Disp: 90 tablet, Rfl: 3    senna (SENOKOT) 8.6 mg tablet, Take 1 tablet by mouth daily, Disp: 30 tablet, Rfl: 3    triamcinolone acetonide 0.1% (KENALOG) 0.1 % cream, Apply topically 2 (two) times daily. For two weeks, Disp: 28.4 g, Rfl: 1    TRUE METRIX GLUCOSE TEST STRIP Strp, USE  STRIP TO CHECK GLUCOSE SIX TIMES DAILY, Disp: 250 strip, Rfl: 11    TRUEPLUS LANCETS 33 gauge Misc, USE   TO CHECK GLUCOSE SIX TIMES DAILY, Disp: 100 each, Rfl: 11    valACYclovir (VALTREX) 500 MG tablet, Take by mouth., Disp: , Rfl:     VITAMIN B COMPLEX ORAL, Take 1 capsule by mouth., Disp: , Rfl:       Review of Systems   Constitutional: Negative for activity change, appetite change, chills, diaphoresis, fatigue, fever, unexpected weight change and weight loss.   HENT: Negative.  Negative for congestion, hearing loss, postnasal drip, rhinorrhea, sore throat, trouble swallowing and voice change.    Eyes: Negative.  Negative for visual disturbance.   Respiratory: Negative.  Negative for cough, choking, chest tightness and shortness of breath.    Cardiovascular: Negative for chest pain, palpitations and leg swelling.   Gastrointestinal: Negative for abdominal distention, abdominal pain, blood in stool, bowel incontinence, constipation, diarrhea, nausea and vomiting.   Endocrine: Negative for cold intolerance, heat intolerance, polydipsia and polyuria.   Genitourinary: Positive for flank pain. Negative for bladder incontinence, decreased urine volume, difficulty urinating,  "discharge, dysuria, enuresis, frequency, genital sores, hematuria, pelvic pain, penile pain and testicular pain.   Musculoskeletal: Positive for back pain. Negative for arthralgias, gait problem, joint swelling, myalgias, neck pain and neck stiffness.   Skin: Negative for color change, pallor, rash and wound.   Neurological: Negative for dizziness, tingling, tremors, weakness, light-headedness, numbness, headaches and paresthesias.   Hematological: Negative for adenopathy.   Psychiatric/Behavioral: Negative for behavioral problems, confusion, self-injury, sleep disturbance and suicidal ideas. The patient is not nervous/anxious.      Objective:   BP (!) 118/58 (BP Location: Left arm, Patient Position: Sitting, BP Method: Medium (Manual))   Pulse 64   Temp 96.6 °F (35.9 °C) (Tympanic)   Ht 5' 10" (1.778 m)   Wt 72.3 kg (159 lb 6.3 oz)   SpO2 96%   BMI 22.87 kg/m²     Physical Exam  Vitals signs reviewed.   Constitutional:       General: He is not in acute distress.     Appearance: Normal appearance. He is well-developed. He is not ill-appearing, toxic-appearing or diaphoretic.   HENT:      Head: Normocephalic and atraumatic.      Right Ear: External ear normal.      Left Ear: External ear normal.      Nose: Nose normal.   Eyes:      Conjunctiva/sclera: Conjunctivae normal.      Pupils: Pupils are equal, round, and reactive to light.   Neck:      Musculoskeletal: Normal range of motion and neck supple.   Cardiovascular:      Rate and Rhythm: Normal rate and regular rhythm.      Heart sounds: Normal heart sounds. No murmur. No friction rub. No gallop.    Pulmonary:      Effort: Pulmonary effort is normal. No respiratory distress.      Breath sounds: Normal breath sounds. No stridor. No wheezing, rhonchi or rales.   Chest:      Chest wall: No tenderness.   Abdominal:      General: There is no distension.      Palpations: Abdomen is soft.      Tenderness: There is no abdominal tenderness.   Musculoskeletal: Normal " range of motion.   Lymphadenopathy:      Cervical: No cervical adenopathy.   Skin:     General: Skin is warm and dry.   Neurological:      Mental Status: He is alert and oriented to person, place, and time.   Psychiatric:         Mood and Affect: Mood normal.         Behavior: Behavior normal.         Thought Content: Thought content normal.         Judgment: Judgment normal.     X-Ray Abdomen AP 1 View  Narrative: EXAMINATION:  XR ABDOMEN AP 1 VIEW    CLINICAL HISTORY:  Unspecified abdominal pain    TECHNIQUE:  AP View(s) of the abdomen was performed.    COMPARISON:  Abdominal radiograph July 20, 2017    FINDINGS:  Bowel gas pattern is nonobstructive.  Moderate volume stool is scattered throughout the colon.  No gross evidence of free intraperitoneal air on the supine radiographs.  No radiopaque focus to indicate presence of urinary tract stone.  Phleboliths are seen within the pelvis.    Left basilar opacity is seen which may represent atelectasis or developing pneumonia in the appropriate clinical context.    Median sternotomy wires and mediastinal surgical clips are noted.  Vertebroplasty changes are seen within the lumbar spine.  No acute osseous abnormality.  Impression: As above.    Electronically signed by: Zeferino De Souza  Date:    10/26/2020  Time:    14:26    Lab Results   Component Value Date    HGBA1C 7.1 (H) 07/31/2020       Assessment:     1. Type 2 diabetes mellitus with diabetic nephropathy, with long-term current use of insulin    2. Essential hypertension    3. Flank pain    4. Pneumonia of left lower lobe due to infectious organism      Plan:   Type 2 diabetes mellitus with diabetic nephropathy, with long-term current use of insulin  -Stable and controlled. Continue current treatment plan as previously prescribed with your PCP.     Essential hypertension  -Stable and controlled. Continue current treatment plan as previously prescribed with your PCP.     Flank pain  -     X-Ray Abdomen AP 1 View;  Future; Expected date: 10/26/2020  -     Urinalysis; Future; Expected date: 10/26/2020  -     Urine culture; Future  -pt also had a moderate volume of stool in his colon. Recommend miralax 1-2 times a day for 3 days.     Pneumonia of left lower lobe due to infectious organism  -zithromax  -repeat cxr in 1 month      Follow up if symptoms worsen or fail to improve.

## 2020-10-26 NOTE — PATIENT INSTRUCTIONS
Constipation (Adult)  Constipation means that you have bowel movements that are less frequent than usual. Stools often become very hard and difficult to pass.  Constipation is very common. At some point in life it affects almost everyone. Since everyone's bowel habits are different, what is constipation to one person may not be to another. Your healthcare provider may do tests to diagnose constipation. It depends on what he or she finds when evaluating you.    Symptoms of constipation include:  · Abdominal pain  · Bloating  · Vomiting  · Painful bowel movements  · Itching, swelling, bleeding, or pain around the anus  Causes  Constipation can have many causes. These include:  · Diet low in fiber  · Too much dairy  · Not drinking enough liquids  · Lack of exercise or physical activity. This is especially true for older adults.  · Changes in lifestyle or daily routine, including pregnancy, aging, work, and travel  · Frequent use or misuse of laxatives  · Ignoring the urge to have a bowel movement or delaying it until later  · Medicines, such as certain prescription pain medicines, iron supplements, antacids, certain antidepressants, and calcium supplements  · Diseases like irritable bowel syndrome, bowel obstructions, stroke, diabetes, thyroid disease, Parkinson disease, hemorrhoids, and colon cancer  Complications  Potential complications of constipation can include:  · Hemorrhoids  · Rectal bleeding from hemorrhoids or anal fissures (skin tears)  · Hernias  · Dependency on laxatives  · Chronic constipation  · Fecal impaction  · Bowel obstruction or perforation  Home care  All treatment should be done after talking with your healthcare provider. This is especially true if you have another medical problems, are taking prescription medicines, or are an older adult. Treatment most often involves lifestyle changes. You may also need medicines. Your healthcare provider will tell you which will work best for you. Follow  the advice below to help avoid this problem in the future.  Lifestyle changes  These lifestyle changes can help prevent constipation:  · Diet. Eat a high-fiber diet, with fresh fruit and vegetables, and reduce dairy intake, meats, and processed foods  · Fluids. It's important to get enough fluids each day. Drink plenty of water when you eat more fiber. If you are on diet that limits the amount of fluid you can have, talk about this with your healthcare provider.  · Regular exercise. Check with your healthcare provider first.  Medications  Take any medicines as directed. Some laxatives are safe to use only every now and then. Others can be taken on a regular basis. Talk with your doctor or pharmacist if you have questions.  Prescription pain medicines can cause constipation. If you are taking this kind of medicine, ask your healthcare provider if you should also take a stool softener.  Medicines you may take to treat constipation include:  · Fiber supplements  · Stool softeners  · Laxatives  · Enemas  · Rectal suppositories  Follow-up care  Follow up with your healthcare provider if symptoms don't get better in the next few days. You may need to have more tests or see a specialist.  Call 911  Call 911 if any of these occur:  · Trouble breathing  · Stiff, rigid abdomen that is severely painful to touch  · Confusion  · Fainting or loss of consciousness  · Rapid heart rate  · Chest pain  When to seek medical advice  Call your healthcare provider right away if any of these occur:  · Fever over 100.4°F (38°C)  · Failure to resume normal bowel movements  · Pain in your abdomen or back gets worse  · Nausea or vomiting  · Swelling in your abdomen  · Blood in the stool  · Black, tarry stool  · Involuntary weight loss  · Weakness  Date Last Reviewed: 12/30/2015  © 1783-9730 [employer]. [address] [place] [address], [place] [address]. All rights reserved. This information is not intended as a substitute for  professional medical care. Always follow your healthcare professional's instructions.        Eating a High-Fiber Diet  Fiber is what gives strength and structure to plants. Most grains, beans, vegetables, and fruits contain fiber. Foods rich in fiber are often low in calories and fat, and they fill you up more. They may also reduce your risks for certain health problems. To find out the amount of fiber in canned, packaged, or frozen foods, read the Nutrition Facts label. It tells you how much fiber is in a serving.    Types of fiber and their benefits  There are two types of fiber: insoluble and soluble. They both aid digestion and help you maintain a healthy weight.  · Insoluble fiber. This is found in whole grains, cereals, certain fruits and vegetables such as apple skin, corn, and carrots. Insoluble fiber may prevent constipation and reduce the risk for certain types of cancer.  · Soluble fiber. This type of fiber is in oats, beans, and certain fruits and vegetables such as strawberries and peas. Soluble fiber can reduce cholesterol, which may help lower the risk for heart disease. It also helps control blood sugar levels.  Look for high-fiber foods  Try these foods to add fiber to your diet:  · Whole-grain breads and cereals. Try to eat 6 to 8 ounces a day. Include wheat and oat bran cereals, whole-wheat muffins or toast, and corn tortillas in your meals.  · Fruits. Try to eat 2 cups a day. Apples, oranges, strawberries, pears, and bananas are good sources. (Note: Fruit juice is low in fiber.)  · Vegetables. Try to eat at least 2.5 cups a day. Add asparagus, carrots, broccoli, peas, and corn to your meals.  · Beans. One cup of cooked lentils gives you over 15 grams of fiber. Try navy beans, lentils, and chickpeas.  · Seeds. A small handful of seeds gives you about 3 grams of fiber. Try sunflower seeds.  Keep track of your fiber  Keep track of how much fiber you eat. Start by reading food labels. Then eat a  variety of foods high in fiber. As you begin to eat more fiber, ask your healthcare provider how much water you should be drinking to keep your digestive system working smoothly.  You should aim for a certain amount of fiber in your diet each day. If you are a woman, that amount is between 25 and 28 grams per day. Men should aim for 30 to 33 grams per day. After age 50, your daily fiber needs drop to 22 grams for women and 28 grams for men.  Before you reach for the fiber supplements, think about this. Fiber is found naturally in healthy whole foods. It gives you that feeling of fullness after you eat. Taking fiber supplements or eating fiber-enriched foods will not give you this full feeling.  Your fiber intake is a good measure for the quality of your overall diet. If you are missing out on your daily amount of fiber, you may be lacking other important nutrients as well.  Date Last Reviewed: 5/11/2015  © 5726-8536 [email or web address]. [address] [place] [address]. All rights reserved. This information is not intended as a substitute for professional medical care. Always follow your healthcare professional's instructions.        Pneumonia (Adult)  Pneumonia is an infection deep within the lungs. It is in the small air sacs (alveoli). Pneumonia may be caused by a virus or bacteria. Pneumonia caused by bacteria is usually treated with an antibiotic. Severe cases may need to be treated in the hospital. Milder cases can be treated at home. Symptoms usually start to get better during the first 2 days of treatment.    Home care  Follow these guidelines when caring for yourself at home:  · Rest at home for the first 2 to 3 days, or until you feel stronger. Dont let yourself get overly tired when you go back to your activities.  · Stay away from cigarette smoke - yours or other peoples.  · You may use acetaminophen or ibuprofen to control fever or pain, unless another medicine was prescribed. If you  have chronic liver or kidney disease, talk with your healthcare provider before using these medicines. Also talk with your provider if youve had a stomach ulcer or gastrointestinal bleeding. Dont give aspirin to anyone younger than 18 years of age who is ill with a fever. It may cause severe liver damage.  · Your appetite may be poor, so a light diet is fine.  · Drink 6 to 8 glasses of fluids every day to make sure you are getting enough fluids. Beverages can include water, sport drinks, sodas without caffeine, juices, tea, or soup. Fluids will help loosen secretions in the lung. This will make it easier for you to cough up the phlegm (sputum). If you also have heart or kidney disease, check with your healthcare provider before you drink extra fluids.  · Take antibiotic medicine prescribed until it is all gone, even if you are feeling better after a few days.  Follow-up care  Follow up with your healthcare provider in the next 2 to 3 days, or as advised. This is to be sure the medicine is helping you get better.  If you are 65 or older, you should get a pneumococcal vaccine and a yearly flu (influenza) shot. You should also get these vaccines if you have chronic lung disease like asthma, emphysema, or COPD. Recently, a second type of pneumonia vaccine has become available for everyone over 65 years old. This is in addition to the previous vaccine. Ask your provider about this.  When to seek medical advice  Call your healthcare provider right away if any of these occur:  · You dont get better within the first 48 hours of treatment  · Shortness of breath gets worse  · Rapid breathing (more than 25 breaths per minute)  · Coughing up blood  · Chest pain gets worse with breathing  · Fever of 100.4°F (38°C) or higher that doesnt get better with fever medicine  · Weakness, dizziness, or fainting that gets worse  · Thirst or dry mouth that gets worse  · Sinus pain, headache, or a stiff neck  · Chest pain not caused by  coughing  Date Last Reviewed: 1/1/2017  © 2973-4826 [employer]. [address] [place] [address], [place] [address]. All rights reserved. This information is not intended as a substitute for professional medical care. Always follow your healthcare professional's instructions.

## 2020-10-29 ENCOUNTER — TELEPHONE (OUTPATIENT)
Dept: HEMATOLOGY/ONCOLOGY | Facility: CLINIC | Age: 75
End: 2020-10-29

## 2020-10-29 DIAGNOSIS — C90.00 MULTIPLE MYELOMA, REMISSION STATUS UNSPECIFIED: ICD-10-CM

## 2020-10-29 DIAGNOSIS — C90.00 MULTIPLE MYELOMA NOT HAVING ACHIEVED REMISSION: ICD-10-CM

## 2020-10-29 RX ORDER — OXYCODONE HYDROCHLORIDE 10 MG/1
10 TABLET ORAL EVERY 6 HOURS PRN
Qty: 90 TABLET | Refills: 0 | OUTPATIENT
Start: 2020-10-29

## 2020-10-29 RX ORDER — OXYCODONE HCL 20 MG/1
20 TABLET, FILM COATED, EXTENDED RELEASE ORAL EVERY 12 HOURS
Qty: 60 EACH | Refills: 0 | OUTPATIENT
Start: 2020-10-29

## 2020-10-29 NOTE — TELEPHONE ENCOUNTER
----- Message from Rodolfo Solo MD sent at 10/29/2020  9:45 AM CDT -----  Please let patient know I need to see him back before narcotics prescriptions were sent

## 2020-11-02 ENCOUNTER — OFFICE VISIT (OUTPATIENT)
Dept: HEMATOLOGY/ONCOLOGY | Facility: CLINIC | Age: 75
DRG: 191 | End: 2020-11-02
Payer: MEDICARE

## 2020-11-02 DIAGNOSIS — C90.00 MULTIPLE MYELOMA, REMISSION STATUS UNSPECIFIED: ICD-10-CM

## 2020-11-02 DIAGNOSIS — C90.00 MULTIPLE MYELOMA NOT HAVING ACHIEVED REMISSION: Primary | ICD-10-CM

## 2020-11-02 PROCEDURE — 99214 OFFICE O/P EST MOD 30 MIN: CPT | Mod: 95,,, | Performed by: INTERNAL MEDICINE

## 2020-11-02 PROCEDURE — 1159F PR MEDICATION LIST DOCUMENTED IN MEDICAL RECORD: ICD-10-PCS | Mod: 95,,, | Performed by: INTERNAL MEDICINE

## 2020-11-02 PROCEDURE — 1159F MED LIST DOCD IN RCRD: CPT | Mod: 95,,, | Performed by: INTERNAL MEDICINE

## 2020-11-02 PROCEDURE — 99214 PR OFFICE/OUTPT VISIT, EST, LEVL IV, 30-39 MIN: ICD-10-PCS | Mod: 95,,, | Performed by: INTERNAL MEDICINE

## 2020-11-02 PROCEDURE — 1101F PT FALLS ASSESS-DOCD LE1/YR: CPT | Mod: CPTII,95,, | Performed by: INTERNAL MEDICINE

## 2020-11-02 PROCEDURE — 1101F PR PT FALLS ASSESS DOC 0-1 FALLS W/OUT INJ PAST YR: ICD-10-PCS | Mod: CPTII,95,, | Performed by: INTERNAL MEDICINE

## 2020-11-02 RX ORDER — OXYCODONE HCL 20 MG/1
20 TABLET, FILM COATED, EXTENDED RELEASE ORAL EVERY 12 HOURS
Qty: 60 EACH | Refills: 0 | Status: SHIPPED | OUTPATIENT
Start: 2020-11-02 | End: 2020-12-03 | Stop reason: SDUPTHER

## 2020-11-02 RX ORDER — OXYCODONE HYDROCHLORIDE 10 MG/1
10 TABLET ORAL EVERY 6 HOURS PRN
Qty: 90 TABLET | Refills: 0 | Status: ON HOLD | OUTPATIENT
Start: 2020-11-02 | End: 2020-11-06 | Stop reason: HOSPADM

## 2020-11-02 NOTE — PROGRESS NOTES
Subjective:       Patient ID: Familia Durbin Jr. is a 75 y.o. male.    Chief Complaint: Results and Multiple Myeloma    HPI 75-year-old male history of multiple myeloma.  Patient states that he recently had cough and is currently on Zithromax did not make his appointment ask for video visit    Past Medical History:   Diagnosis Date    CAD (coronary artery disease)     luikart    Diabetes mellitus, type 2 1979    eye dr rocha    Hearing impaired     Hyperlipidemia     Hypertension     Lupus     Discoid    Multiple myeloma     Old MI (myocardial infarction) 2012    Renal insufficiency     Tracheostomy tube present      Family History   Problem Relation Age of Onset    Diabetes Mother     Diabetes Father     Prostate cancer Father     Pancreatic cancer Sister     No Known Problems Brother     Diabetes Maternal Uncle     Diabetes Maternal Grandmother     No Known Problems Maternal Grandfather     No Known Problems Paternal Grandmother     No Known Problems Paternal Grandfather      Social History     Socioeconomic History    Marital status: Single     Spouse name: Not on file    Number of children: 9    Years of education: Not on file    Highest education level: Not on file   Occupational History    Not on file   Social Needs    Financial resource strain: Not on file    Food insecurity     Worry: Not on file     Inability: Not on file    Transportation needs     Medical: Not on file     Non-medical: Not on file   Tobacco Use    Smoking status: Never Smoker    Smokeless tobacco: Never Used   Substance and Sexual Activity    Alcohol use: No    Drug use: No    Sexual activity: Yes     Partners: Female   Lifestyle    Physical activity     Days per week: Not on file     Minutes per session: Not on file    Stress: Not on file   Relationships    Social connections     Talks on phone: Not on file     Gets together: Not on file     Attends Hoahaoism service: Not on file     Active member  of club or organization: Not on file     Attends meetings of clubs or organizations: Not on file     Relationship status: Not on file   Other Topics Concern    Not on file   Social History Narrative    Not on file     Past Surgical History:   Procedure Laterality Date    CARDIAC SURGERY      CATARACT EXTRACTION      COLONOSCOPY      CORONARY ARTERY BYPASS GRAFT      HAND SURGERY      KNEE SURGERY      TRACHEOSTOMY TUBE PLACEMENT      WRIST FUSION         Labs:  Lab Results   Component Value Date    WBC 2.80 (L) 08/18/2020    HGB 12.0 (L) 08/18/2020    HCT 37.3 (L) 08/18/2020     (H) 08/18/2020     08/18/2020     BMP  Lab Results   Component Value Date     08/18/2020    K 4.2 08/18/2020     08/18/2020    CO2 25 08/18/2020    BUN 23 08/18/2020    CREATININE 1.7 (H) 08/18/2020    CALCIUM 9.5 08/18/2020    ANIONGAP 10 08/18/2020    ESTGFRAFRICA 45 (A) 08/18/2020    EGFRNONAA 39 (A) 08/18/2020     Lab Results   Component Value Date    ALT 16 08/18/2020    AST 18 08/18/2020    ALKPHOS 66 08/18/2020    BILITOT 0.4 08/18/2020       Lab Results   Component Value Date    IRON 49 02/20/2020    TIBC 293 02/20/2020    FERRITIN 163 02/20/2020     No results found for: [id number]  No results found for: FOLATE  Lab Results   Component Value Date    TSH 0.782 07/31/2020         Review of Systems   Constitutional: Positive for fatigue.   Respiratory: Positive for cough.    Neurological: Positive for weakness.   Psychiatric/Behavioral: Positive for dysphoric mood. The patient is nervous/anxious.        Objective:      Physical Exam  Psychiatric:         Mood and Affect: Mood is anxious and depressed.             Assessment:      1. Multiple myeloma not having achieved remission    2. Metastatic bone cancer           Plan:     The patient location is:  home  The chief complaint leading to consultation is:  Multiple myeloma    Visit type:  Synchronous audio and video    Face to Face time with patient:  25 minutes of total time spent on the encounter, which includes face to face time and non-face to face time preparing to see the patient (eg, review of tests), Obtaining and/or reviewing separately obtained history, Documenting clinical information in the electronic or other health record, Independently interpreting results (not separately reported) and communicating results to the patient/family/caregiver, or Care coordination (not separately reported).         Each patient to whom he or she provides medical services by telemedicine is:  (1) informed of the relationship between the physician and patient and the respective role of any other health care provider with respect to management of the patient; and (2) notified that he or she may decline to receive medical services by telemedicine and may withdraw from such care at any time.    Notes: Extensive conversation with patient he is not have laboratory stents August of 2020.  Continues to be treated at [facility] with cough.  Started on Zithromax by primary care.  At this time will ask that laboratory studies be done and a chest x-ray done will review and communicate results through portal otherwise return for clinical follow-up in 6 weeks.  Prescription for chronic stable narcotic use sent to pharmacy state  M checked        Rodolfo Solo Jr, MD FACP

## 2020-11-03 ENCOUNTER — HOSPITAL ENCOUNTER (OUTPATIENT)
Dept: RADIOLOGY | Facility: HOSPITAL | Age: 75
Discharge: HOME | DRG: 191 | End: 2020-11-03
Attending: INTERNAL MEDICINE
Payer: MEDICARE

## 2020-11-03 ENCOUNTER — OFFICE VISIT (OUTPATIENT)
Dept: HEMATOLOGY/ONCOLOGY | Facility: CLINIC | Age: 75
DRG: 191 | End: 2020-11-03
Payer: MEDICARE

## 2020-11-03 ENCOUNTER — HOSPITAL ENCOUNTER (INPATIENT)
Facility: HOSPITAL | Age: 75
LOS: 3 days | Discharge: HOME HEALTH | DRG: 191 | End: 2020-11-06
Attending: EMERGENCY MEDICINE | Admitting: INTERNAL MEDICINE
Payer: MEDICARE

## 2020-11-03 DIAGNOSIS — C90.00 MULTIPLE MYELOMA NOT HAVING ACHIEVED REMISSION: ICD-10-CM

## 2020-11-03 DIAGNOSIS — J18.9 PNEUMONIA OF LEFT LOWER LOBE DUE TO INFECTIOUS ORGANISM: Primary | ICD-10-CM

## 2020-11-03 DIAGNOSIS — J98.11 ATELECTASIS OF BOTH LUNGS: ICD-10-CM

## 2020-11-03 DIAGNOSIS — R07.9 CHEST PAIN: ICD-10-CM

## 2020-11-03 DIAGNOSIS — J47.0 BRONCHIECTASIS WITH ACUTE LOWER RESPIRATORY INFECTION: Primary | ICD-10-CM

## 2020-11-03 DIAGNOSIS — C90.00 MULTIPLE MYELOMA, REMISSION STATUS UNSPECIFIED: ICD-10-CM

## 2020-11-03 PROBLEM — R91.8 PULMONARY NODULES: Status: ACTIVE | Noted: 2020-11-03

## 2020-11-03 PROBLEM — J34.89 NASAL DRAINAGE: Status: ACTIVE | Noted: 2020-11-03

## 2020-11-03 LAB
AORTIC ROOT ANNULUS: 2.75 CM
APTT BLDCRRT: 28.3 SEC (ref 21–32)
ASCENDING AORTA: 2.6 CM
AV INDEX (PROSTH): 0.51
AV MEAN GRADIENT: 11 MMHG
AV PEAK GRADIENT: 19 MMHG
AV VALVE AREA: 1.54 CM2
AV VELOCITY RATIO: 0.46
BNP SERPL-MCNC: 809 PG/ML (ref 0–99)
BSA FOR ECHO PROCEDURE: 1.91 M2
CV ECHO LV RWT: 0.56 CM
DOP CALC AO PEAK VEL: 2.17 M/S
DOP CALC AO VTI: 45.27 CM
DOP CALC LVOT AREA: 3 CM2
DOP CALC LVOT DIAMETER: 1.96 CM
DOP CALC LVOT PEAK VEL: 0.99 M/S
DOP CALC LVOT STROKE VOLUME: 69.66 CM3
DOP CALC RVOT PEAK VEL: 0.83 M/S
DOP CALC RVOT VTI: 17.16 CM
DOP CALCLVOT PEAK VEL VTI: 23.1 CM
E WAVE DECELERATION TIME: 297.97 MSEC
E/A RATIO: 0.75
E/E' RATIO: 8.71 M/S
ECHO LV POSTERIOR WALL: 1.38 CM (ref 0.6–1.1)
FRACTIONAL SHORTENING: 25 % (ref 28–44)
HCV AB SERPL QL IA: NEGATIVE
INFLUENZA A, MOLECULAR: NEGATIVE
INFLUENZA B, MOLECULAR: NEGATIVE
INR PPP: 1.1 (ref 0.8–1.2)
INTERVENTRICULAR SEPTUM: 1.4 CM (ref 0.6–1.1)
IVRT: 68.51 MSEC
LA MAJOR: 5.64 CM
LA MINOR: 4.53 CM
LACTATE SERPL-SCNC: 0.8 MMOL/L (ref 0.5–2.2)
LEFT ATRIUM SIZE: 4.27 CM
LEFT INTERNAL DIMENSION IN SYSTOLE: 3.69 CM (ref 2.1–4)
LEFT VENTRICLE DIASTOLIC VOLUME INDEX: 59.07 ML/M2
LEFT VENTRICLE DIASTOLIC VOLUME: 113.04 ML
LEFT VENTRICLE MASS INDEX: 146 G/M2
LEFT VENTRICLE SYSTOLIC VOLUME INDEX: 30.3 ML/M2
LEFT VENTRICLE SYSTOLIC VOLUME: 57.91 ML
LEFT VENTRICULAR INTERNAL DIMENSION IN DIASTOLE: 4.9 CM (ref 3.5–6)
LEFT VENTRICULAR MASS: 279.6 G
LV LATERAL E/E' RATIO: 7.4 M/S
LV SEPTAL E/E' RATIO: 10.57 M/S
MV PEAK A VEL: 0.99 M/S
MV PEAK E VEL: 0.74 M/S
MV STENOSIS PRESSURE HALF TIME: 86.41 MS
MV VALVE AREA P 1/2 METHOD: 2.55 CM2
PISA MRMAX VEL: 0.05 M/S
POCT GLUCOSE: 130 MG/DL (ref 70–110)
POCT GLUCOSE: 94 MG/DL (ref 70–110)
PROCALCITONIN SERPL IA-MCNC: 0.03 NG/ML
PROTHROMBIN TIME: 11.2 SEC (ref 9–12.5)
PV MEAN GRADIENT: 1.58 MMHG
RA MAJOR: 4.11 CM
RA PRESSURE: 3 MMHG
SARS-COV-2 RDRP RESP QL NAA+PROBE: NEGATIVE
SINUS: 2.9 CM
SPECIMEN SOURCE: NORMAL
STJ: 2.69 CM
TDI LATERAL: 0.1 M/S
TDI SEPTAL: 0.07 M/S
TDI: 0.09 M/S
TRICUSPID ANNULAR PLANE SYSTOLIC EXCURSION: 1.66 CM
TROPONIN I SERPL DL<=0.01 NG/ML-MCNC: 0.02 NG/ML (ref 0–0.03)
TROPONIN I SERPL DL<=0.01 NG/ML-MCNC: 0.03 NG/ML (ref 0–0.03)

## 2020-11-03 PROCEDURE — 36415 COLL VENOUS BLD VENIPUNCTURE: CPT

## 2020-11-03 PROCEDURE — 94761 N-INVAS EAR/PLS OXIMETRY MLT: CPT

## 2020-11-03 PROCEDURE — 84484 ASSAY OF TROPONIN QUANT: CPT | Mod: 91

## 2020-11-03 PROCEDURE — 84484 ASSAY OF TROPONIN QUANT: CPT

## 2020-11-03 PROCEDURE — 86803 HEPATITIS C AB TEST: CPT

## 2020-11-03 PROCEDURE — 99999 PR PBB SHADOW E&M-EST. PATIENT-LVL I: CPT | Mod: PBBFAC,,, | Performed by: INTERNAL MEDICINE

## 2020-11-03 PROCEDURE — 99203 PR OFFICE/OUTPT VISIT, NEW, LEVL III, 30-44 MIN: ICD-10-PCS | Mod: ,,, | Performed by: INTERNAL MEDICINE

## 2020-11-03 PROCEDURE — 93010 ELECTROCARDIOGRAM REPORT: CPT | Mod: ,,, | Performed by: INTERNAL MEDICINE

## 2020-11-03 PROCEDURE — S0030 INJECTION, METRONIDAZOLE: HCPCS | Performed by: NURSE PRACTITIONER

## 2020-11-03 PROCEDURE — 87040 BLOOD CULTURE FOR BACTERIA: CPT | Mod: 59

## 2020-11-03 PROCEDURE — 99999 PR PBB SHADOW E&M-EST. PATIENT-LVL I: ICD-10-PCS | Mod: PBBFAC,,, | Performed by: INTERNAL MEDICINE

## 2020-11-03 PROCEDURE — U0002 COVID-19 LAB TEST NON-CDC: HCPCS

## 2020-11-03 PROCEDURE — 85610 PROTHROMBIN TIME: CPT

## 2020-11-03 PROCEDURE — 63600175 PHARM REV CODE 636 W HCPCS: Performed by: EMERGENCY MEDICINE

## 2020-11-03 PROCEDURE — G0378 HOSPITAL OBSERVATION PER HR: HCPCS

## 2020-11-03 PROCEDURE — 1159F PR MEDICATION LIST DOCUMENTED IN MEDICAL RECORD: ICD-10-PCS | Mod: S$GLB,,, | Performed by: INTERNAL MEDICINE

## 2020-11-03 PROCEDURE — 63600175 PHARM REV CODE 636 W HCPCS: Performed by: NURSE PRACTITIONER

## 2020-11-03 PROCEDURE — 99215 OFFICE O/P EST HI 40 MIN: CPT | Mod: S$GLB,,, | Performed by: INTERNAL MEDICINE

## 2020-11-03 PROCEDURE — 25000003 PHARM REV CODE 250: Performed by: NURSE PRACTITIONER

## 2020-11-03 PROCEDURE — 99285 EMERGENCY DEPT VISIT HI MDM: CPT | Mod: 25

## 2020-11-03 PROCEDURE — 96372 THER/PROPH/DIAG INJ SC/IM: CPT | Mod: 59

## 2020-11-03 PROCEDURE — 87502 INFLUENZA DNA AMP PROBE: CPT

## 2020-11-03 PROCEDURE — 1101F PR PT FALLS ASSESS DOC 0-1 FALLS W/OUT INJ PAST YR: ICD-10-PCS | Mod: CPTII,S$GLB,, | Performed by: INTERNAL MEDICINE

## 2020-11-03 PROCEDURE — 99203 OFFICE O/P NEW LOW 30 MIN: CPT | Mod: ,,, | Performed by: INTERNAL MEDICINE

## 2020-11-03 PROCEDURE — 63600175 PHARM REV CODE 636 W HCPCS: Performed by: INTERNAL MEDICINE

## 2020-11-03 PROCEDURE — 85730 THROMBOPLASTIN TIME PARTIAL: CPT

## 2020-11-03 PROCEDURE — 21400001 HC TELEMETRY ROOM

## 2020-11-03 PROCEDURE — 96361 HYDRATE IV INFUSION ADD-ON: CPT

## 2020-11-03 PROCEDURE — 99900035 HC TECH TIME PER 15 MIN (STAT)

## 2020-11-03 PROCEDURE — 83880 ASSAY OF NATRIURETIC PEPTIDE: CPT

## 2020-11-03 PROCEDURE — 71046 X-RAY EXAM CHEST 2 VIEWS: CPT | Mod: TC

## 2020-11-03 PROCEDURE — 83036 HEMOGLOBIN GLYCOSYLATED A1C: CPT

## 2020-11-03 PROCEDURE — 1101F PT FALLS ASSESS-DOCD LE1/YR: CPT | Mod: CPTII,S$GLB,, | Performed by: INTERNAL MEDICINE

## 2020-11-03 PROCEDURE — C9399 UNCLASSIFIED DRUGS OR BIOLOG: HCPCS | Performed by: NURSE PRACTITIONER

## 2020-11-03 PROCEDURE — 93010 EKG 12-LEAD: ICD-10-PCS | Mod: ,,, | Performed by: INTERNAL MEDICINE

## 2020-11-03 PROCEDURE — 96375 TX/PRO/DX INJ NEW DRUG ADDON: CPT

## 2020-11-03 PROCEDURE — 1159F MED LIST DOCD IN RCRD: CPT | Mod: S$GLB,,, | Performed by: INTERNAL MEDICINE

## 2020-11-03 PROCEDURE — 93005 ELECTROCARDIOGRAM TRACING: CPT

## 2020-11-03 PROCEDURE — 84145 PROCALCITONIN (PCT): CPT

## 2020-11-03 PROCEDURE — 83605 ASSAY OF LACTIC ACID: CPT

## 2020-11-03 PROCEDURE — 96374 THER/PROPH/DIAG INJ IV PUSH: CPT

## 2020-11-03 PROCEDURE — 99215 PR OFFICE/OUTPT VISIT, EST, LEVL V, 40-54 MIN: ICD-10-PCS | Mod: S$GLB,,, | Performed by: INTERNAL MEDICINE

## 2020-11-03 PROCEDURE — 25000003 PHARM REV CODE 250: Performed by: INTERNAL MEDICINE

## 2020-11-03 RX ORDER — IPRATROPIUM BROMIDE AND ALBUTEROL SULFATE 2.5; .5 MG/3ML; MG/3ML
3 SOLUTION RESPIRATORY (INHALATION) EVERY 8 HOURS
Status: DISCONTINUED | OUTPATIENT
Start: 2020-11-04 | End: 2020-11-06 | Stop reason: HOSPADM

## 2020-11-03 RX ORDER — IBUPROFEN 200 MG
24 TABLET ORAL
Status: DISCONTINUED | OUTPATIENT
Start: 2020-11-03 | End: 2020-11-06 | Stop reason: HOSPADM

## 2020-11-03 RX ORDER — ACETAMINOPHEN 325 MG/1
650 TABLET ORAL EVERY 6 HOURS PRN
Status: DISCONTINUED | OUTPATIENT
Start: 2020-11-03 | End: 2020-11-06 | Stop reason: HOSPADM

## 2020-11-03 RX ORDER — OXYCODONE HCL 10 MG/1
20 TABLET, FILM COATED, EXTENDED RELEASE ORAL EVERY 12 HOURS
Status: DISCONTINUED | OUTPATIENT
Start: 2020-11-03 | End: 2020-11-06 | Stop reason: HOSPADM

## 2020-11-03 RX ORDER — METOPROLOL TARTRATE 50 MG/1
50 TABLET ORAL 2 TIMES DAILY
Status: DISCONTINUED | OUTPATIENT
Start: 2020-11-03 | End: 2020-11-06 | Stop reason: HOSPADM

## 2020-11-03 RX ORDER — CEFEPIME HYDROCHLORIDE 1 G/50ML
2 INJECTION, SOLUTION INTRAVENOUS
Status: DISCONTINUED | OUTPATIENT
Start: 2020-11-03 | End: 2020-11-06

## 2020-11-03 RX ORDER — RANOLAZINE 500 MG/1
1000 TABLET, EXTENDED RELEASE ORAL 2 TIMES DAILY
Status: DISCONTINUED | OUTPATIENT
Start: 2020-11-03 | End: 2020-11-06 | Stop reason: HOSPADM

## 2020-11-03 RX ORDER — ONDANSETRON 2 MG/ML
4 INJECTION INTRAMUSCULAR; INTRAVENOUS EVERY 8 HOURS PRN
Status: DISCONTINUED | OUTPATIENT
Start: 2020-11-03 | End: 2020-11-06 | Stop reason: HOSPADM

## 2020-11-03 RX ORDER — ROSUVASTATIN CALCIUM 10 MG/1
40 TABLET, COATED ORAL NIGHTLY
Status: DISCONTINUED | OUTPATIENT
Start: 2020-11-03 | End: 2020-11-06 | Stop reason: HOSPADM

## 2020-11-03 RX ORDER — BISACODYL 5 MG
10 TABLET, DELAYED RELEASE (ENTERIC COATED) ORAL ONCE
Status: COMPLETED | OUTPATIENT
Start: 2020-11-03 | End: 2020-11-03

## 2020-11-03 RX ORDER — AMOXICILLIN 250 MG
1 CAPSULE ORAL 2 TIMES DAILY
Status: DISCONTINUED | OUTPATIENT
Start: 2020-11-03 | End: 2020-11-06 | Stop reason: HOSPADM

## 2020-11-03 RX ORDER — ISOSORBIDE MONONITRATE 30 MG/1
30 TABLET, EXTENDED RELEASE ORAL DAILY
Status: DISCONTINUED | OUTPATIENT
Start: 2020-11-04 | End: 2020-11-06 | Stop reason: HOSPADM

## 2020-11-03 RX ORDER — LISINOPRIL 2.5 MG/1
2.5 TABLET ORAL DAILY
Status: DISCONTINUED | OUTPATIENT
Start: 2020-11-04 | End: 2020-11-06 | Stop reason: HOSPADM

## 2020-11-03 RX ORDER — ASPIRIN 81 MG/1
81 TABLET ORAL DAILY
Status: DISCONTINUED | OUTPATIENT
Start: 2020-11-04 | End: 2020-11-06 | Stop reason: HOSPADM

## 2020-11-03 RX ORDER — GLUCAGON 1 MG
1 KIT INJECTION
Status: DISCONTINUED | OUTPATIENT
Start: 2020-11-03 | End: 2020-11-06 | Stop reason: HOSPADM

## 2020-11-03 RX ORDER — INSULIN ASPART 100 [IU]/ML
1-10 INJECTION, SOLUTION INTRAVENOUS; SUBCUTANEOUS
Status: DISCONTINUED | OUTPATIENT
Start: 2020-11-03 | End: 2020-11-06 | Stop reason: HOSPADM

## 2020-11-03 RX ORDER — FAMOTIDINE 20 MG/1
20 TABLET, FILM COATED ORAL 2 TIMES DAILY
Status: DISCONTINUED | OUTPATIENT
Start: 2020-11-03 | End: 2020-11-03

## 2020-11-03 RX ORDER — SODIUM CHLORIDE 9 MG/ML
INJECTION, SOLUTION INTRAVENOUS CONTINUOUS
Status: DISCONTINUED | OUTPATIENT
Start: 2020-11-03 | End: 2020-11-04

## 2020-11-03 RX ORDER — PANTOPRAZOLE SODIUM 40 MG/1
40 TABLET, DELAYED RELEASE ORAL DAILY
Status: DISCONTINUED | OUTPATIENT
Start: 2020-11-04 | End: 2020-11-06 | Stop reason: HOSPADM

## 2020-11-03 RX ORDER — IBUPROFEN 200 MG
16 TABLET ORAL
Status: DISCONTINUED | OUTPATIENT
Start: 2020-11-03 | End: 2020-11-06 | Stop reason: HOSPADM

## 2020-11-03 RX ORDER — LEVOFLOXACIN 5 MG/ML
750 INJECTION, SOLUTION INTRAVENOUS
Status: DISCONTINUED | OUTPATIENT
Start: 2020-11-03 | End: 2020-11-03

## 2020-11-03 RX ORDER — OXYCODONE HYDROCHLORIDE 5 MG/1
10 TABLET ORAL EVERY 6 HOURS PRN
Status: DISCONTINUED | OUTPATIENT
Start: 2020-11-03 | End: 2020-11-06 | Stop reason: HOSPADM

## 2020-11-03 RX ORDER — METRONIDAZOLE 500 MG/100ML
500 INJECTION, SOLUTION INTRAVENOUS
Status: DISCONTINUED | OUTPATIENT
Start: 2020-11-03 | End: 2020-11-06

## 2020-11-03 RX ADMIN — ROSUVASTATIN 40 MG: 10 TABLET, FILM COATED ORAL at 09:11

## 2020-11-03 RX ADMIN — SODIUM CHLORIDE: 0.9 INJECTION, SOLUTION INTRAVENOUS at 05:11

## 2020-11-03 RX ADMIN — RANOLAZINE 1000 MG: 500 TABLET, EXTENDED RELEASE ORAL at 09:11

## 2020-11-03 RX ADMIN — VANCOMYCIN HYDROCHLORIDE 1500 MG: 1.5 INJECTION, POWDER, LYOPHILIZED, FOR SOLUTION INTRAVENOUS at 05:11

## 2020-11-03 RX ADMIN — INSULIN DETEMIR 10 UNITS: 100 INJECTION, SOLUTION SUBCUTANEOUS at 09:11

## 2020-11-03 RX ADMIN — BISACODYL 10 MG: 5 TABLET, COATED ORAL at 05:11

## 2020-11-03 RX ADMIN — LEVOFLOXACIN 750 MG: 750 INJECTION, SOLUTION INTRAVENOUS at 02:11

## 2020-11-03 RX ADMIN — OXYCODONE HYDROCHLORIDE 20 MG: 10 TABLET, FILM COATED, EXTENDED RELEASE ORAL at 09:11

## 2020-11-03 RX ADMIN — ONDANSETRON 4 MG: 2 INJECTION INTRAMUSCULAR; INTRAVENOUS at 09:11

## 2020-11-03 RX ADMIN — METRONIDAZOLE 500 MG: 500 INJECTION, SOLUTION INTRAVENOUS at 05:11

## 2020-11-03 RX ADMIN — STANDARDIZED SENNA CONCENTRATE AND DOCUSATE SODIUM 1 TABLET: 8.6; 5 TABLET ORAL at 09:11

## 2020-11-03 RX ADMIN — METOPROLOL TARTRATE 50 MG: 50 TABLET, FILM COATED ORAL at 09:11

## 2020-11-03 RX ADMIN — CEFEPIME HYDROCHLORIDE 2 G: 2 INJECTION, SOLUTION INTRAVENOUS at 05:11

## 2020-11-03 NOTE — ASSESSMENT & PLAN NOTE
With bone mets  currently being treated at [facility] being maintained on Pomalyst 3 mg days 1 through 21 on a 28 day cycle  Also, is followed by Dr. Solo, Oncology

## 2020-11-03 NOTE — H&P
"[facility] Hospital Medicine  History & Physical    Patient Name: Familia Durbin Jr.  MRN: [id number]  Admission Date: 11/3/2020  Attending Physician: Daniel Kirk MD   Primary Care Provider: Andrea Elkins MD         Patient information was obtained from patient, relative(s), past medical records and ER records.     Subjective:     Principal Problem:Bronchiectasis with acute lower respiratory infection    Chief Complaint:   Chief Complaint   Patient presents with    Cough     pt states, "I think I have pneumonia," pt c/o cough and chest pain that began yesterday; sent to ED by Dr. Solo for further eval        HPI: Pt is a 76 yo Jehovah Witness male with PMhx of CHF, asbestosis, Multiple myeloma with mets to the bone, CAD, renal insuffiencey, DM II, HTN and HPL who presents to the ED with c/o productive cough onset last week that has not improved despite completing a course of Azithromycin. Pt describes left sided chest pain, nasal drainage, SOB when speaking, malaise and ZAVALA. Also, pt reports constipation. He denies other symptoms.  V/S indicate temp 99.1, pulse 69, resp 20, B/P 112/56 and SpO2 95 %  Labs find WBC 2.42, H/H 10.9/34.0, INR 1.1, gluc 131, BNP = 809, lactate 0.8 and procal 0.03, negative COVID  CT imaging of chest finds bronchiectasis, RML air bronchograms. JEMMA bronchial plugging, RUL lung nodules  Pt is placed on Observation for further treatment/evaluation of pneumonia and bronchietasis.      Past Medical History:   Diagnosis Date    CAD (coronary artery disease)     luikart    Diabetes mellitus, type 2 1979    eye dr rocha    Hearing impaired     Hyperlipidemia     Hypertension     Lupus     Discoid    Multiple myeloma     Old MI (myocardial infarction) 2012    Renal insufficiency     Tracheostomy tube present        Past Surgical History:   Procedure Laterality Date    CARDIAC SURGERY      CATARACT EXTRACTION      COLONOSCOPY      CORONARY ARTERY " "BYPASS GRAFT      HAND SURGERY      KNEE SURGERY      TRACHEOSTOMY TUBE PLACEMENT      WRIST FUSION         Review of patient's allergies indicates:   Allergen Reactions    Cephalexin     Fentanyl Nausea And Vomiting    Nsaids (non-steroidal anti-inflammatory drug)      Renal insuf       No current facility-administered medications on file prior to encounter.      Current Outpatient Medications on File Prior to Encounter   Medication Sig    aspirin 81 MG Chew Take 1 tablet (81 mg total) by mouth once daily.    azithromycin (ZITHROMAX Z-JERRI) 250 MG tablet Take 2 tablets by mouth on day 1 followed by 1 tablet daily on days 2-5    BD ULTRA-FINE DAVE PEN NEEDLE 32 gauge x 5/32" Ndle USE  4 TIMES DAILY WITH MEALS AND AT BEDTIME    blood-glucose meter kit Use as instructed    BRILINTA 90 mg tablet Take 1 tablet by mouth once daily.    cholecalciferol, vitamin D3, 5,000 unit Tab Take 5,000 Units by mouth once daily.    DOCOSAHEXANOIC ACID ORAL Take 1 capsule by mouth once daily.    docusate sodium (COLACE) 100 MG capsule Take 100 mg by mouth 2 (two) times daily.    flaxseed oil 1,000 mg Cap Take 1 capsule by mouth.    ginger root (GINGER, ZINGIBER OFFICINALIS,) 550 mg Cap Take 1 capsule by mouth.    insulin (LANTUS SOLOSTAR U-100 INSULIN) glargine 100 units/mL (3mL) SubQ pen Inject 10 Units into the skin every evening. INJECT 15 UNITS SUBCUTANEOUSLY ONCE DAILY    insulin aspart U-100 (NOVOLOG) 100 unit/mL (3 mL) InPn pen Inject 15 Units into the skin 3 (three) times daily with meals.    isosorbide mononitrate (IMDUR) 30 MG 24 hr tablet Take by mouth.    lactulose (CEPHULAC) 20 gram Pack Mix and take 1 packet (20 g total) by mouth 3 (three) times daily.    lactulose (CHRONULAC) 10 gram/15 mL solution Take 30 mL by mouth twice daily as needed for constipation.    lisinopril (PRINIVIL,ZESTRIL) 2.5 MG tablet     metoprolol tartrate (LOPRESSOR) 50 MG tablet Take 1 tablet by mouth twice daily    " multivit-min-FA-lycopen-lutein 300-600-300 mcg Tab Take 1 tablet by mouth.    NITROSTAT 0.4 mg SL tablet     omega 3-dha-epa-fish oil 300-1,000 mg Cap Take by mouth.    ondansetron (ZOFRAN) 4 MG tablet Take 1 tablet (4 mg total) by mouth every 8 (eight) hours as needed for Nausea.    ondansetron (ZOFRAN-ODT) 4 MG TbDL Dissolve 1 tab under the tongue every 4-6 hours as needed for nausea.    oxyCODONE (OXYCONTIN) 20 mg 12 hr tablet Take 1 tablet (20 mg total) by mouth every 12 (twelve) hours.    oxyCODONE (ROXICODONE) 10 mg Tab immediate release tablet Take 1 tablet (10 mg total) by mouth every 6 (six) hours as needed for Pain.    pantoprazole (PROTONIX) 40 MG tablet Take 1 tablet by mouth daily    pomalidomide 3 mg Cap Take by mouth.    prochlorperazine (COMPAZINE) 5 MG tablet Take 1 tablet (5 mg total) by mouth 4 (four) times daily as needed for Nausea.    ranolazine (RANEXA) 1,000 mg Tb12 Take 1,000 mg by mouth 2 (two) times daily.    rosuvastatin (CRESTOR) 40 MG Tab Take 1 tablet (40 mg total) by mouth every evening.    senna (SENOKOT) 8.6 mg tablet Take 1 tablet by mouth daily    triamcinolone acetonide 0.1% (KENALOG) 0.1 % cream Apply topically 2 (two) times daily. For two weeks    TRUE METRIX GLUCOSE TEST STRIP Strp USE  STRIP TO CHECK GLUCOSE SIX TIMES DAILY    TRUEPLUS LANCETS 33 gauge Misc USE   TO CHECK GLUCOSE SIX TIMES DAILY    valACYclovir (VALTREX) 500 MG tablet Take by mouth.    VITAMIN B COMPLEX ORAL Take 1 capsule by mouth.     Family History     Problem Relation (Age of Onset)    Diabetes Mother, Father, Maternal Uncle, Maternal Grandmother    No Known Problems Brother, Maternal Grandfather, Paternal Grandmother, Paternal Grandfather    Pancreatic cancer Sister    Prostate cancer Father        Tobacco Use    Smoking status: Never Smoker    Smokeless tobacco: Never Used   Substance and Sexual Activity    Alcohol use: No    Drug use: No    Sexual activity: Yes     Partners:  Female     Review of Systems   Constitutional: Positive for fatigue. Negative for appetite change, chills, diaphoresis and fever.   HENT: Positive for congestion.    Respiratory: Positive for cough and shortness of breath.    Cardiovascular: Positive for chest pain. Negative for palpitations and leg swelling.   Gastrointestinal: Positive for constipation. Negative for abdominal pain, diarrhea, nausea and vomiting.   Genitourinary: Negative.    Musculoskeletal: Positive for arthralgias and back pain.   Skin: Negative for pallor, rash and wound.   Neurological: Positive for weakness. Negative for dizziness, light-headedness and headaches.   Psychiatric/Behavioral: Negative for decreased concentration.     Objective:     Vital Signs (Most Recent):  Temp: 98.5 °F (36.9 °C) (11/03/20 1556)  Pulse: 63 (11/03/20 1556)  Resp: 20 (11/03/20 1556)  BP: (!) 166/70 (11/03/20 1556)  SpO2: (!) 93 % (11/03/20 1556) Vital Signs (24h Range):  Temp:  [98.5 °F (36.9 °C)-99.1 °F (37.3 °C)] 98.5 °F (36.9 °C)  Pulse:  [63-69] 63  Resp:  [20] 20  SpO2:  [93 %-100 %] 93 %  BP: (112-166)/(56-70) 166/70     Weight: 73.9 kg (163 lb)  Body mass index is 23.39 kg/m².    Physical Exam  Vitals signs and nursing note reviewed.   Constitutional:       Appearance: He is well-developed. He is ill-appearing.   HENT:      Head: Normocephalic and atraumatic.      Nose: Nose normal.   Eyes:      General: No scleral icterus.     Conjunctiva/sclera: Conjunctivae normal.      Pupils: Pupils are equal, round, and reactive to light.   Neck:      Musculoskeletal: Normal range of motion and neck supple.   Cardiovascular:      Rate and Rhythm: Normal rate and regular rhythm.      Heart sounds: Normal heart sounds. No murmur. No friction rub. No gallop.    Pulmonary:      Effort: Tachypnea present.      Breath sounds: Decreased breath sounds and rales present.      Comments: Conversational dyspnea  Abdominal:      General: Bowel sounds are normal.      Palpations:  Abdomen is soft.   Musculoskeletal: Normal range of motion.         General: No tenderness.   Skin:     General: Skin is warm and dry.   Neurological:      Mental Status: He is alert and oriented to person, place, and time.   Psychiatric:         Behavior: Behavior normal.         Thought Content: Thought content normal.           CRANIAL NERVES     CN III, IV, VI   Pupils are equal, round, and reactive to light.       Significant Labs:   CBC:   Recent Labs   Lab 11/03/20  1018   WBC 2.42*   HGB 10.9*   HCT 34.0*        CMP:   Recent Labs   Lab 11/03/20  1018      K 4.4      CO2 28   *   BUN 17   CREATININE 1.4   CALCIUM 9.4   PROT 8.0   ALBUMIN 3.5   BILITOT 0.7   ALKPHOS 62   AST 13   ALT 14   ANIONGAP 6*   EGFRNONAA 48*     All pertinent labs within the past 24 hours have been reviewed.    Significant Imaging: I have reviewed all pertinent imaging results/findings within the past 24 hours.    Assessment/Plan:     * Bronchiectasis with acute lower respiratory infection  No improvement with taking Azithromyin  IV Vanco and Cefepime  Supplemental oxygen to maintain sats > 92 %  Sputum culture  CT imaging - volume loss of LLL - bronchiectasis and air bronchograms, tree in bud opacity - JEMMA, RUL- cluster of nodules in RUL  Continue Pulmonology regarding volume loss,bronchiectasis and possible need for bronchoscopy  [facility]      Multiple myeloma  With bone mets  currently being treated at [facility] being maintained on Pomalyst 3 mg days 1 through 21 on a 28 day cycle  Also, is followed by Dr. Solo, Oncology        Coronary artery disease of native artery of native heart with stable angina pectoris  Continue ASA, STATIN, Ranexa, metoprolol, Imdur and      Hypertension  Continue Imdur, lisinopril and metoprolol  Enalapril IV every 6 hours prn SBP > 170      Hyperlipidemia  Continue rosuvastatin      Type 2 diabetes mellitus with diabetic nephropathy  Lab Results    Component Value Date    HGBA1C 7.1 (H) 07/31/2020   continue long acting insulin  accuchecks  Sliding scale insulin        VTE Risk Mitigation (From admission, onward)    None             Zeynep Calle NP  Department of Hospital Medicine   [facility]

## 2020-11-03 NOTE — PROGRESS NOTES
Pharmacokinetic Initial Assessment: IV Vancomycin    Assessment/Plan:    Initiate intravenous vancomycin with loading dose of 1500 mg once followed by a maintenance dose of vancomycin 1250 mg IV every 24 hours. Desired empiric serum trough concentration is 15 to 20 mcg/mL. Draw vancomycin trough level 60 min prior to third dose on 11/5 at approximately 1630 .    Pharmacy will continue to follow and monitor vancomycin.      Please contact pharmacy at extension [phone] with any questions regarding this assessment.     Thank you for the consult,   Josh Bolden, PharmD 11/3/2020 5:33 PM         Patient brief summary:  Familia Durbin Jr. is a 75 y.o. male initiated on antimicrobial therapy with IV Vancomycin for treatment of suspected lower respiratory infection.    Drug Allergies:   Review of patient's allergies indicates:   Allergen Reactions    Cephalexin     Fentanyl Nausea And Vomiting    Nsaids (non-steroidal anti-inflammatory drug)      Renal insuf       Actual Body Weight:   73.9 kg    Renal Function:   Estimated Creatinine Clearance: 46.3 mL/min (based on SCr of 1.4 mg/dL).,     Dialysis Method (if applicable):  N/A    CBC (last 72 hours):  Recent Labs   Lab Result Units 11/03/20  1018   WBC K/uL 2.42*   Hemoglobin g/dL 10.9*   Hematocrit % 34.0*   Platelets K/uL 151   Gran % % 53.8   Lymph % % 31.0   Mono % % 12.8   Eosinophil % % 1.2   Basophil % % 0.8   Differential Method  Automated       Metabolic Panel (last 72 hours):  Recent Labs   Lab Result Units 11/03/20  1018   Sodium mmol/L 140   Potassium mmol/L 4.4   Chloride mmol/L 106   CO2 mmol/L 28   Glucose mg/dL 131*   BUN mg/dL 17   Creatinine mg/dL 1.4   Albumin g/dL 3.5   Total Bilirubin mg/dL 0.7   Alkaline Phosphatase U/L 62   AST U/L 13   ALT U/L 14       Drug levels (last 3 results):  No results for input(s): VANCOMYCINRA, VANCOMYCINPE, VANCOMYCINTR in the last 72 hours.    Microbiologic Results:  Microbiology Results (last 7 days)        Procedure Component Value Units Date/Time    Culture, Respiratory with Gram Stain [[id number]]     Order Status: No result Specimen: Respiratory from Sputum, Expectorated     Blood culture x two cultures. Draw prior to antibiotics. [[id number]] Collected: 11/03/20 1436    Order Status: Sent Specimen: Blood from Peripheral, Antecubital, Left Updated: 11/03/20 1456    Influenza A & B by Molecular [[id number]] Collected: 11/03/20 1230    Order Status: Completed Specimen: Nasopharyngeal Swab Updated: 11/03/20 1307     Influenza A, Molecular Negative     Influenza B, Molecular Negative     Flu A & B Source Nasal swab    Blood culture x two cultures. Draw prior to antibiotics. [[id number]] Collected: 11/03/20 1221    Order Status: Sent Specimen: Blood from Peripheral, Antecubital, Left Updated: 11/03/20 1234    Culture, Respiratory with Gram Stain [[id number]]     Order Status: Canceled Specimen: Respiratory from Sputum, Expectorated

## 2020-11-03 NOTE — PROGRESS NOTES
Pharmacist Renal Dose Adjustment Note    Familia Durbin Jr. is a 75 y.o. male being treated with the medication Levaquin.    Patient Data:    Vital Signs (Most Recent):  Temp: 99.1 °F (37.3 °C) (11/03/20 1132)  Pulse: 64 (11/03/20 1419)  Resp: 20 (11/03/20 1419)  BP: (!) 157/66 (11/03/20 1419)  SpO2: 100 % (11/03/20 1419)   Vital Signs (72h Range):  Temp:  [99.1 °F (37.3 °C)]   Pulse:  [64-69]   Resp:  [20]   BP: (112-157)/(56-66)   SpO2:  [95 %-100 %]      Recent Labs   Lab 11/03/20  1018   CREATININE 1.4     Serum creatinine: 1.4 mg/dL 11/03/20 1018  Estimated creatinine clearance: 46.3 mL/min    Levaquin 750 mg IV q24 h has been changed to 750 mg IV q48 h based on the renal dose adjustment protocol.    Pharmacist's Name: Josh Bolden, PharmD 11/3/2020 2:33 PM    Pharmacist's Extension: [phone]

## 2020-11-03 NOTE — HPI
Pt is a 74 yo Jehovah Witness male with PMhx of CHF, asbestosis, Multiple myeloma with mets to the bone, CAD, renal insuffiencey, DM II, HTN and HPL who presents to the ED with c/o productive cough onset last week that has not improved despite completing a course of Azithromycin. Pt describes left sided chest pain, nasal drainage, SOB when speaking, malaise and ZAVALA. Also, pt reports constipation. He denies other symptoms.  V/S indicate temp 99.1, pulse 69, resp 20, B/P 112/56 and SpO2 95 %  Labs find WBC 2.42, H/H 10.9/34.0, INR 1.1, gluc 131, BNP = 809, lactate 0.8 and procal 0.03, negative COVID  CT imaging of chest finds bronchiectasis, RML air bronchograms. JEMMA bronchial plugging, RUL lung nodules  Pt is placed on Observation for further treatment/evaluation of pneumonia and bronchietasis.

## 2020-11-03 NOTE — SUBJECTIVE & OBJECTIVE
Past Medical History:   Diagnosis Date    CAD (coronary artery disease)     galileajerome    Diabetes mellitus, type 2 1979    eye dr rocha    Hearing impaired     Hyperlipidemia     Hypertension     Lupus     Discoid    Multiple myeloma     Old MI (myocardial infarction) 2012    Renal insufficiency     Tracheostomy tube present        Past Surgical History:   Procedure Laterality Date    CARDIAC SURGERY      CATARACT EXTRACTION      COLONOSCOPY      CORONARY ARTERY BYPASS GRAFT      HAND SURGERY      KNEE SURGERY      TRACHEOSTOMY TUBE PLACEMENT      WRIST FUSION         Review of patient's allergies indicates:   Allergen Reactions    Cephalexin     Fentanyl Nausea And Vomiting    Nsaids (non-steroidal anti-inflammatory drug)      Renal insuf       Family History     Problem Relation (Age of Onset)    Diabetes Mother, Father, Maternal Uncle, Maternal Grandmother    No Known Problems Brother, Maternal Grandfather, Paternal Grandmother, Paternal Grandfather    Pancreatic cancer Sister    Prostate cancer Father        Tobacco Use    Smoking status: Never Smoker    Smokeless tobacco: Never Used   Substance and Sexual Activity    Alcohol use: No    Drug use: No    Sexual activity: Yes     Partners: Female         Review of Systems   Constitutional: Positive for fatigue. Negative for fever.   HENT: Positive for postnasal drip and rhinorrhea.    Respiratory: Positive for cough.      Objective:     Vital Signs (Most Recent):  Temp: 98.5 °F (36.9 °C) (11/03/20 1556)  Pulse: 63 (11/03/20 1556)  Resp: 20 (11/03/20 1556)  BP: (!) 166/70 (11/03/20 1556)  SpO2: (!) 93 % (11/03/20 1556) Vital Signs (24h Range):  Temp:  [98.5 °F (36.9 °C)-99.1 °F (37.3 °C)] 98.5 °F (36.9 °C)  Pulse:  [63-69] 63  Resp:  [20] 20  SpO2:  [93 %-100 %] 93 %  BP: (112-166)/(56-70) 166/70     Weight: 73.9 kg (163 lb)  Body mass index is 23.39 kg/m².    No intake or output data in the 24 hours ending 11/03/20 1751    Physical  Exam  Vitals signs and nursing note reviewed.   Constitutional:       Appearance: He is ill-appearing.      Interventions: Nasal cannula in place.       HENT:      Head: Normocephalic and atraumatic.      Nose: Nose normal.   Eyes:      Pupils: Pupils are equal, round, and reactive to light.   Neck:      Musculoskeletal: Normal range of motion.   Cardiovascular:      Rate and Rhythm: Normal rate and regular rhythm.      Pulses: Normal pulses.      Heart sounds: Murmur present.   Pulmonary:      Breath sounds: Examination of the left-lower field reveals decreased breath sounds. Decreased breath sounds present. No wheezing or rhonchi.       Abdominal:      General: Bowel sounds are normal.   Musculoskeletal: Normal range of motion.   Skin:     General: Skin is warm.   Neurological:      General: No focal deficit present.      Mental Status: He is alert and oriented to person, place, and time. Mental status is at baseline.         Vents:       Lines/Drains/Airways     Peripheral Intravenous Line                 Peripheral IV - Single Lumen 09/11/18 1409 Left Antecubital 784 days                Significant Labs:    CBC/Anemia Profile:  Recent Labs   Lab 11/03/20  1018   WBC 2.42*   HGB 10.9*   HCT 34.0*      *   RDW 16.5*        Chemistries:  Recent Labs   Lab 11/03/20  1018      K 4.4      CO2 28   BUN 17   CREATININE 1.4   CALCIUM 9.4   ALBUMIN 3.5   PROT 8.0   BILITOT 0.7   ALKPHOS 62   ALT 14   AST 13       ABGs: No results for input(s): PH, PCO2, HCO3, POCSATURATED, BE in the last 48 hours.  Blood Culture: No results for input(s): LABBLOO in the last 48 hours.  Cardiac Markers: No results for input(s): CKMB, TROPONINT, MYOGLOBIN in the last 48 hours.  POCT Glucose:   Recent Labs   Lab 11/03/20  1653   POCTGLUCOSE 94     Respiratory Culture: No results for input(s): GSRESP, RESPIRATORYC in the last 48 hours.  Urine Culture: No results for input(s): LABURIN in the last 48 hours.  All  pertinent labs within the past 24 hours have been reviewed.    Significant Imaging:   I have reviewed all pertinent imaging results/findings within the past 24 hours.     CXR  EXAMINATION:  XR CHEST PA AND LATERAL     CLINICAL HISTORY:  Multiple myeloma not having achieved remission     COMPARISON:  09/11/2018     FINDINGS:  Cardiac silhouette is mildly enlarged but stable.  Dense coronary disease.  Aorta demonstrates atherosclerotic disease.  Sternotomy wires are intact.  The lungs demonstrate no evidence of active disease.  Emphysematous changes are seen within the upper lobes.  Chronic interstitial lung disease and bronchiectasis seen within the left lower lobe.  No evidence of pleural effusion or pneumothorax.  Bones demonstrate moderate degenerative changes with multilevel spondylosis and diffuse osteopenia.    CHEST CT  EXAMINATION:  CT CHEST WITHOUT CONTRAST     CLINICAL HISTORY:  Pneumonia;     TECHNIQUE:  Chest CT was performed without contrast. All CT scans at this facility use dose modulation, iterative reconstruction, and/or weight based dosing when appropriate to reduce radiation dose to as low as reasonably achievable.     COMPARISON:  PET-CT scan from December 5, 2019.     FINDINGS:  Prior median sternotomy.  There has been progression of volume loss involving the majority of the left lower lobe with varicoid bronchiectasis and air bronchograms.  Volume loss involving the medial right lower lobe with air bronchograms.  Small focus of tree-in-bud opacity within the posterior aspect of the left upper lobe at the terminus of a bronchovascular which structure is slightly larger compared to the prior PET-CT scan.  It measures 11 mm craniocaudal by 17 mm AP x 9 mm transverse.  Cluster of nodules within the periphery of the right upper lobe on axial series 3, image 177 is unchanged.  Thickening of major fissure with somewhat more prominent nodule projecting posteriorly within the left lower lobe.  It  measures 5 mm and is best demonstrated on image 302.  6 mm nodule within the left apex on image 118 is not definitely changed.  No endobronchial or endotracheal lesions.  No pathologically enlarged mediastinal, hilar, or axillary lymph nodes. The heart is enlarged with coronary artery calcifications.  Profound osteopenia.  Limited images through the upper abdomen demonstrate no acute abnormality.     Impression:     1. Atelectasis of the near entirety of the left lower lobe with varicoid bronchiectasis.  This has progressed compared to the prior PET-CT scan.  There is also atelectasis involving the medial aspect of the right middle lobe where there are air bronchograms.  This has also progressed in the interval.  2. Somewhat linear focus of nodularity within the posterior aspect of the left upper lobe could relate to bronchial plugging.  It has increased in size compared to the prior PET-CT scan.  Close attention should be given to this region upon future follow-up.  3. There is more nodularity and thickening of the major fissure within adjacent nodule that is also new compared to the prior PET-CT scan.  4. Cluster of nodules within the periphery of the right upper lobe posteriorly has not definitely changed.  5. Prior median sternotomy with coronary artery calcifications.  6. Profound osteopenia.      ECHO  · There is left ventricular concentric hypertrophy.  · The left ventricle is normal in size with normal systolic function. The estimated ejection fraction is 55%.  · Indeterminate diastolic function.  · With low normal right ventricular systolic function.  · Mild-to-moderate aortic regurgitation.  · Mild-to-moderate aortic valve stenosis.  · Aortic valve area is 1.54 cm2; peak velocity is 2.17 m/s; mean gradient is 11 mmHg.  · Mild pulmonic regurgitation.  · The mitral valve is mildly sclerotic.  · Normal central venous pressure (3 mmHg).  · Mild mitral regurgitation.

## 2020-11-03 NOTE — HPI
Familia Ramakrishna Naranjo is 75 y.o.  Asked t see for abn CXR, chest CT  Known: Multiple myeloma on therapy : Pomalyst ( POMALIDOMIDE) 3 mg days 1 through 21 on a 28 day cycle.   Sent from Office : weak, cough, nasal drainage.  Recent treatment with Azithromycin  PMhx of CHF, asbestosis, Multiple myeloma with mets to the bone, CAD, renal insuffiencey, DM II, HTN and HPL   CT imaging of chest finds bronchiectasis, RML air bronchograms. 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        Diabetes mellitus (HCC)    GERD (gastroesophageal reflux disease)    Hearing loss    Hyperlipidemia    Hypertension    Myeloma (HCC) 2016    Myocardial infarction (HCC)    S/P wrist surgery

## 2020-11-03 NOTE — SUBJECTIVE & OBJECTIVE
"Past Medical History:   Diagnosis Date    CAD (coronary artery disease)     tyree    Diabetes mellitus, type 2 1979    eye dr rocha    Hearing impaired     Hyperlipidemia     Hypertension     Lupus     Discoid    Multiple myeloma     Old MI (myocardial infarction) 2012    Renal insufficiency     Tracheostomy tube present        Past Surgical History:   Procedure Laterality Date    CARDIAC SURGERY      CATARACT EXTRACTION      COLONOSCOPY      CORONARY ARTERY BYPASS GRAFT      HAND SURGERY      KNEE SURGERY      TRACHEOSTOMY TUBE PLACEMENT      WRIST FUSION         Review of patient's allergies indicates:   Allergen Reactions    Cephalexin     Fentanyl Nausea And Vomiting    Nsaids (non-steroidal anti-inflammatory drug)      Renal insuf       No current facility-administered medications on file prior to encounter.      Current Outpatient Medications on File Prior to Encounter   Medication Sig    aspirin 81 MG Chew Take 1 tablet (81 mg total) by mouth once daily.    azithromycin (ZITHROMAX Z-JERRI) 250 MG tablet Take 2 tablets by mouth on day 1 followed by 1 tablet daily on days 2-5    BD ULTRA-FINE DAVE PEN NEEDLE 32 gauge x 5/32" Ndle USE  4 TIMES DAILY WITH MEALS AND AT BEDTIME    blood-glucose meter kit Use as instructed    BRILINTA 90 mg tablet Take 1 tablet by mouth once daily.    cholecalciferol, vitamin D3, 5,000 unit Tab Take 5,000 Units by mouth once daily.    DOCOSAHEXANOIC ACID ORAL Take 1 capsule by mouth once daily.    docusate sodium (COLACE) 100 MG capsule Take 100 mg by mouth 2 (two) times daily.    flaxseed oil 1,000 mg Cap Take 1 capsule by mouth.    janae root (JANAE, ZINGIBER OFFICINALIS,) 550 mg Cap Take 1 capsule by mouth.    insulin (LANTUS SOLOSTAR U-100 INSULIN) glargine 100 units/mL (3mL) SubQ pen Inject 10 Units into the skin every evening. INJECT 15 UNITS SUBCUTANEOUSLY ONCE DAILY    insulin aspart U-100 (NOVOLOG) 100 unit/mL (3 mL) InPn pen Inject " 15 Units into the skin 3 (three) times daily with meals.    isosorbide mononitrate (IMDUR) 30 MG 24 hr tablet Take by mouth.    lactulose (CEPHULAC) 20 gram Pack Mix and take 1 packet (20 g total) by mouth 3 (three) times daily.    lactulose (CHRONULAC) 10 gram/15 mL solution Take 30 mL by mouth twice daily as needed for constipation.    lisinopril (PRINIVIL,ZESTRIL) 2.5 MG tablet     metoprolol tartrate (LOPRESSOR) 50 MG tablet Take 1 tablet by mouth twice daily    multivit-min-FA-lycopen-lutein 300-600-300 mcg Tab Take 1 tablet by mouth.    NITROSTAT 0.4 mg SL tablet     omega 3-dha-epa-fish oil 300-1,000 mg Cap Take by mouth.    ondansetron (ZOFRAN) 4 MG tablet Take 1 tablet (4 mg total) by mouth every 8 (eight) hours as needed for Nausea.    ondansetron (ZOFRAN-ODT) 4 MG TbDL Dissolve 1 tab under the tongue every 4-6 hours as needed for nausea.    oxyCODONE (OXYCONTIN) 20 mg 12 hr tablet Take 1 tablet (20 mg total) by mouth every 12 (twelve) hours.    oxyCODONE (ROXICODONE) 10 mg Tab immediate release tablet Take 1 tablet (10 mg total) by mouth every 6 (six) hours as needed for Pain.    pantoprazole (PROTONIX) 40 MG tablet Take 1 tablet by mouth daily    pomalidomide 3 mg Cap Take by mouth.    prochlorperazine (COMPAZINE) 5 MG tablet Take 1 tablet (5 mg total) by mouth 4 (four) times daily as needed for Nausea.    ranolazine (RANEXA) 1,000 mg Tb12 Take 1,000 mg by mouth 2 (two) times daily.    rosuvastatin (CRESTOR) 40 MG Tab Take 1 tablet (40 mg total) by mouth every evening.    senna (SENOKOT) 8.6 mg tablet Take 1 tablet by mouth daily    triamcinolone acetonide 0.1% (KENALOG) 0.1 % cream Apply topically 2 (two) times daily. For two weeks    TRUE METRIX GLUCOSE TEST STRIP Strp USE  STRIP TO CHECK GLUCOSE SIX TIMES DAILY    TRUEPLUS LANCETS 33 gauge Misc USE   TO CHECK GLUCOSE SIX TIMES DAILY    valACYclovir (VALTREX) 500 MG tablet Take by mouth.    VITAMIN B COMPLEX ORAL Take 1 capsule  by mouth.     Family History     Problem Relation (Age of Onset)    Diabetes Mother, Father, Maternal Uncle, Maternal Grandmother    No Known Problems Brother, Maternal Grandfather, Paternal Grandmother, Paternal Grandfather    Pancreatic cancer Sister    Prostate cancer Father        Tobacco Use    Smoking status: Never Smoker    Smokeless tobacco: Never Used   Substance and Sexual Activity    Alcohol use: No    Drug use: No    Sexual activity: Yes     Partners: Female     Review of Systems   Constitutional: Positive for fatigue. Negative for appetite change, chills, diaphoresis and fever.   HENT: Positive for congestion.    Respiratory: Positive for cough and shortness of breath.    Cardiovascular: Positive for chest pain. Negative for palpitations and leg swelling.   Gastrointestinal: Positive for constipation. Negative for abdominal pain, diarrhea, nausea and vomiting.   Genitourinary: Negative.    Musculoskeletal: Positive for arthralgias and back pain.   Skin: Negative for pallor, rash and wound.   Neurological: Positive for weakness. Negative for dizziness, light-headedness and headaches.   Psychiatric/Behavioral: Negative for decreased concentration.     Objective:     Vital Signs (Most Recent):  Temp: 98.5 °F (36.9 °C) (11/03/20 1556)  Pulse: 63 (11/03/20 1556)  Resp: 20 (11/03/20 1556)  BP: (!) 166/70 (11/03/20 1556)  SpO2: (!) 93 % (11/03/20 1556) Vital Signs (24h Range):  Temp:  [98.5 °F (36.9 °C)-99.1 °F (37.3 °C)] 98.5 °F (36.9 °C)  Pulse:  [63-69] 63  Resp:  [20] 20  SpO2:  [93 %-100 %] 93 %  BP: (112-166)/(56-70) 166/70     Weight: 73.9 kg (163 lb)  Body mass index is 23.39 kg/m².    Physical Exam  Vitals signs and nursing note reviewed.   Constitutional:       Appearance: He is well-developed. He is ill-appearing.   HENT:      Head: Normocephalic and atraumatic.      Nose: Nose normal.   Eyes:      General: No scleral icterus.     Conjunctiva/sclera: Conjunctivae normal.      Pupils: Pupils are  equal, round, and reactive to light.   Neck:      Musculoskeletal: Normal range of motion and neck supple.   Cardiovascular:      Rate and Rhythm: Normal rate and regular rhythm.      Heart sounds: Normal heart sounds. No murmur. No friction rub. No gallop.    Pulmonary:      Effort: Tachypnea present.      Breath sounds: Decreased breath sounds and rales present.      Comments: Conversational dyspnea  Abdominal:      General: Bowel sounds are normal.      Palpations: Abdomen is soft.   Musculoskeletal: Normal range of motion.         General: No tenderness.   Skin:     General: Skin is warm and dry.   Neurological:      Mental Status: He is alert and oriented to person, place, and time.   Psychiatric:         Behavior: Behavior normal.         Thought Content: Thought content normal.           CRANIAL NERVES     CN III, IV, VI   Pupils are equal, round, and reactive to light.       Significant Labs:   CBC:   Recent Labs   Lab 11/03/20  1018   WBC 2.42*   HGB 10.9*   HCT 34.0*        CMP:   Recent Labs   Lab 11/03/20  1018      K 4.4      CO2 28   *   BUN 17   CREATININE 1.4   CALCIUM 9.4   PROT 8.0   ALBUMIN 3.5   BILITOT 0.7   ALKPHOS 62   AST 13   ALT 14   ANIONGAP 6*   EGFRNONAA 48*     All pertinent labs within the past 24 hours have been reviewed.    Significant Imaging: I have reviewed all pertinent imaging results/findings within the past 24 hours.

## 2020-11-03 NOTE — PROGRESS NOTES
Pharmacist Renal Dose Adjustment Note    Familia Durbin Jr. is a 75 y.o. male being treated with the medication cefepime.     Patient Data:    Vital Signs (Most Recent):  Temp: 98.5 °F (36.9 °C) (11/03/20 1556)  Pulse: 63 (11/03/20 1556)  Resp: 20 (11/03/20 1556)  BP: (!) 166/70 (11/03/20 1556)  SpO2: (!) 93 % (11/03/20 1556)   Vital Signs (72h Range):  Temp:  [98.5 °F (36.9 °C)-99.1 °F (37.3 °C)]   Pulse:  [63-69]   Resp:  [20]   BP: (112-166)/(56-70)   SpO2:  [93 %-100 %]      Recent Labs   Lab 11/03/20  1018   CREATININE 1.4     Serum creatinine: 1.4 mg/dL 11/03/20 1018  Estimated creatinine clearance: 46.3 mL/min    Cefepime 1 g IV q12 h has been changed to 2 g IV q12 h based on the renal dose adjustment protocol.    Pharmacist's Name: Josh Bolden, PharmMAIA 11/3/2020 5:06 PM    Pharmacist's Extension: [phone]

## 2020-11-03 NOTE — HOSPITAL COURSE
11/03: seen and examined: daughter at bedside  11/04: seen and examined: [facility] 1.77, A febrile, feels better  11/05: seen: daughter at bedside, sleepy, improved, wbcc 2.50

## 2020-11-03 NOTE — ED PROVIDER NOTES
"SCRIBE #1 NOTE: I, Kami Stinson, am scribing for, and in the presence of, Queta Villasenor MD. I have scribed the entire note.       History     Chief Complaint   Patient presents with    Cough     pt states, "I think I have pneumonia," pt c/o cough and chest pain that began yesterday; sent to ED by Dr. Solo for further eval     Review of patient's allergies indicates:   Allergen Reactions    Cephalexin     Fentanyl Nausea And Vomiting    Nsaids (non-steroidal anti-inflammatory drug)      Renal insuf         History of Present Illness     HPI    11/3/2020, 11:37 AM  History obtained from the patient      History of Present Illness: Familia Durbin Jr. is a 75 y.o. male patient with a PMHx of lupus, HLD, HTN, CAD, hearing impairment, old MI, tracheostomy tube, DM type 2, multiple myeloma, renal insufficiency who presents to the Emergency Department for evaluation of a worsening, productive cough which onset gradually 1 day PTA. Pt is a poor historian. Pt was evaluated today by Dr. Solo who ordered a CBC chemistry, a comprehensive metabolic panel and a chest x-ray. Dr. Solo sent pt to the ED for further evaluation and possible hospital admission. Pt was prescribed a Z-carroll outpatient last week for pneumonia and reports that he will finish the Z-carroll in 3 days. Symptoms are constant and moderate in severity. No mitigating or exacerbating factors reported. Associated sxs include chest tightness. Patient denies any fever, chills, SOB, n/v/d, dysuria and all other sxs at this time. No further complaints or concerns at this time.       Arrival mode: Personal vehicle      PCP: Andrea Elkins MD        Past Medical History:  Past Medical History:   Diagnosis Date    CAD (coronary artery disease)     tyree    Diabetes mellitus, type 2 1979    eye dr rocha    Hearing impaired     Hyperlipidemia     Hypertension     Lupus     Discoid    Multiple myeloma     Old MI (myocardial infarction) 2012 "    Renal insufficiency     Tracheostomy tube present        Past Surgical History:  Past Surgical History:   Procedure Laterality Date    CARDIAC SURGERY      CATARACT EXTRACTION      COLONOSCOPY      CORONARY ARTERY BYPASS GRAFT      HAND SURGERY      KNEE SURGERY      TRACHEOSTOMY TUBE PLACEMENT      WRIST FUSION           Family History:  Family History   Problem Relation Age of Onset    Diabetes Mother     Diabetes Father     Prostate cancer Father     Pancreatic cancer Sister     No Known Problems Brother     Diabetes Maternal Uncle     Diabetes Maternal Grandmother     No Known Problems Maternal Grandfather     No Known Problems Paternal Grandmother     No Known Problems Paternal Grandfather        Social History:  Social History     Tobacco Use    Smoking status: Never Smoker    Smokeless tobacco: Never Used   Substance and Sexual Activity    Alcohol use: No    Drug use: No    Sexual activity: Yes     Partners: Female        Review of Systems     Review of Systems   Constitutional: Negative for chills and fever.   HENT: Negative for sore throat.    Respiratory: Positive for cough (worsening, productive) and chest tightness. Negative for shortness of breath.    Cardiovascular: Negative for chest pain.   Gastrointestinal: Negative for diarrhea, nausea and vomiting.   Genitourinary: Negative for dysuria.   Musculoskeletal: Negative for back pain.   Skin: Negative for rash.   Neurological: Negative for weakness.   Hematological: Does not bruise/bleed easily.   All other systems reviewed and are negative.       Physical Exam     Initial Vitals [11/03/20 1132]   BP Pulse Resp Temp SpO2   (!) 112/56 69 20 99.1 °F (37.3 °C) 95 %      MAP       --          Physical Exam  Nursing Notes and Vital Signs Reviewed.  Constitutional: Patient is in no acute distress. Elderly appearing.  Head: Atraumatic. Normocephalic.  Eyes: PERRL. EOM intact. Conjunctivae are not pale. No scleral icterus.  ENT:  "Mucous membranes are moist. Oropharynx is clear and symmetric.   Neck: Supple. Full ROM. No lymphadenopathy.  Cardiovascular: Regular rate. Regular rhythm. No murmurs, rubs, or gallops. Distal pulses are 2+ and symmetric.  Pulmonary/Chest: Actively coughing. Clear to auscultation bilaterally. No wheezing or rales.  Abdominal: Soft and non-distended.  There is no tenderness.  No rebound, guarding, or rigidity. Good bowel sounds.  Genitourinary: No CVA tenderness  Musculoskeletal: Moves all extremities. No obvious deformities. No edema. No calf tenderness.  Skin: Warm and dry.  Neurological:  Alert, awake, and appropriate.  Normal speech.  No acute focal neurological deficits are appreciated.  Psychiatric: Normal affect. Good eye contact. Appropriate in content.     ED Course   Procedures  ED Vital Signs:  Vitals:    11/03/20 1132 11/03/20 1224 11/03/20 1419 11/03/20 1422   BP: (!) 112/56  (!) 157/66    Pulse: 69 68 64    Resp: 20  20    Temp: 99.1 °F (37.3 °C)      TempSrc: Oral      SpO2: 95%  100%    Weight:    74.3 kg (163 lb 12.8 oz)   Height: 5' 10" (1.778 m)   5' 10" (1.778 m)       Abnormal Lab Results:  Labs Reviewed   B-TYPE NATRIURETIC PEPTIDE - Abnormal; Notable for the following components:       Result Value     (*)     All other components within normal limits   INFLUENZA A & B BY MOLECULAR   CULTURE, BLOOD   CULTURE, BLOOD   CULTURE, RESPIRATORY   HEPATITIS C ANTIBODY   LACTIC ACID, PLASMA   PROTIME-INR   APTT   TROPONIN I   PROCALCITONIN   SARS-COV-2 RNA AMPLIFICATION, QUAL    Narrative:     Possible admit        All Lab Results:  Results for orders placed or performed during the hospital encounter of 11/03/20   Influenza A & B by Molecular    Specimen: Nasopharyngeal Swab   Result Value Ref Range    Influenza A, Molecular Negative Negative    Influenza B, Molecular Negative Negative    Flu A & B Source Nasal swab    Hepatitis C antibody   Result Value Ref Range    Hepatitis C Ab Negative " Negative   Lactic acid, plasma #1   Result Value Ref Range    Lactate (Lactic Acid) 0.8 0.5 - 2.2 mmol/L   Protime-INR   Result Value Ref Range    Prothrombin Time 11.2 9.0 - 12.5 sec    INR 1.1 0.8 - 1.2   APTT   Result Value Ref Range    aPTT 28.3 21.0 - 32.0 sec   Brain natriuretic peptide   Result Value Ref Range     (H) 0 - 99 pg/mL   Troponin I   Result Value Ref Range    Troponin I 0.023 0.000 - 0.026 ng/mL   Procalcitonin   Result Value Ref Range    Procalcitonin 0.03 <0.25 ng/mL   COVID-19 Rapid Screening   Result Value Ref Range    SARS-CoV-2 RNA, Amplification, Qual Negative Negative         Pt's CBC chemistry and Comprehensive Metabolic Panel from visit with Dr. Solo 11/3/20:   Ref Range & Units 10:18 2mo ago   WBC 3.90 - 12.70 K/uL 2.42Low   2.80Low     RBC 4.60 - 6.20 M/uL 3.29Low   3.50Low     Hemoglobin 14.0 - 18.0 g/dL 10.9Low   12.0Low     Hematocrit 40.0 - 54.0 % 34.0Low   37.3Low     MCV 82 - 98 fL 103High   107High     MCH 27.0 - 31.0 pg 33.1High   34.3High     MCHC 32.0 - 36.0 g/dL 32.1  32.2    RDW 11.5 - 14.5 % 16.5High   15.9High     Platelets 150 - 350 K/uL 151  199    MPV 9.2 - 12.9 fL 10.1  10.5    Immature Granulocytes 0.0 - 0.5 % 0.4  0.7High     Gran # (ANC) 1.8 - 7.7 K/uL 1.3Low   1.2Low     Immature Grans (Abs) 0.00 - 0.04 K/uL 0.01  0.02 CM    Comment: Mild elevation in immature granulocytes is non specific and   can be seen in a variety of conditions including stress response,   acute inflammation, trauma and pregnancy. Correlation with other   laboratory and clinical findings is essential.    Lymph # 1.0 - 4.8 K/uL 0.8Low   1.3    Mono # 0.3 - 1.0 K/uL 0.3  0.3    Eos # 0.0 - 0.5 K/uL 0.0  0.0    Baso # 0.00 - 0.20 K/uL 0.02  0.03    nRBC 0 /100 WBC 0  0    Gran % 38.0 - 73.0 % 53.8  41.7    Lymph % 18.0 - 48.0 % 31.0  45.0    Mono % 4.0 - 15.0 % 12.8  11.1    Eosinophil % 0.0 - 8.0 % 1.2  1.1    Basophil % 0.0 - 1.9 % 0.8  1.1    Differential Method  Automated  Automated       Ref Range & Units 10:18 2mo ago    Sodium 136 - 145 mmol/L 140  141    Potassium 3.5 - 5.1 mmol/L 4.4  4.2    Chloride 95 - 110 mmol/L 106  106    CO2 23 - 29 mmol/L 28  25    Glucose 70 - 110 mg/dL 131High   55Low     BUN 8 - 23 mg/dL 17  23    Creatinine 0.5 - 1.4 mg/dL 1.4  1.7High     Calcium 8.7 - 10.5 mg/dL 9.4  9.5    Total Protein 6.0 - 8.4 g/dL 8.0  8.7High     Albumin 3.5 - 5.2 g/dL 3.5  3.8    Total Bilirubin 0.1 - 1.0 mg/dL 0.7  0.4 CM    Comment: For infants and newborns, interpretation of results should be based   on gestational age, weight and in agreement with clinical   observations.   Premature Infant recommended reference ranges:   Up to 24 hours.............<8.0 mg/dL   Up to 48 hours............<12.0 mg/dL   3-5 days..................<15.0 mg/dL   6-29 days.................<15.0 mg/dL    Alkaline Phosphatase 55 - 135 U/L 62  66    AST 10 - 40 U/L 13  18    ALT 10 - 44 U/L 14  16    Anion Gap 8 - 16 mmol/L 6Low   10    eGFR if African American >60 mL/min/1.73 m^2 56Abnormal   45Abnormal     eGFR if non African American >60 mL/min/1.73 m^2 48Abnormal   39Abnormal  CM          Imaging Results:  Imaging Results          CT Chest Without Contrast (Final result)  Result time 11/03/20 13:08:31    Final result by Tae Bazan Jr., MD (11/03/20 13:08:31)                 Impression:      1. Atelectasis of the near entirety of the left lower lobe with varicoid bronchiectasis.  This has progressed compared to the prior PET-CT scan.  There is also atelectasis involving the medial aspect of the right middle lobe where there are air bronchograms.  This has also progressed in the interval.  2. Somewhat linear focus of nodularity within the posterior aspect of the left upper lobe could relate to bronchial plugging.  It has increased in size compared to the prior PET-CT scan.  Close attention should be given to this region upon future follow-up.  3. There is more nodularity and thickening of the major  fissure within adjacent nodule that is also new compared to the prior PET-CT scan.  4. Cluster of nodules within the periphery of the right upper lobe posteriorly has not definitely changed.  5. Prior median sternotomy with coronary artery calcifications.  6. Profound osteopenia.  All CT scans at this facility use dose modulation, iterative reconstruction, and/or weight base dosing when appropriate to reduce radiation dose to as low as reasonably achievable.      Electronically signed by: Tae Bazan Jr., MD  Date:    11/03/2020  Time:    13:08             Narrative:    EXAMINATION:  CT CHEST WITHOUT CONTRAST    CLINICAL HISTORY:  Pneumonia;    TECHNIQUE:  Chest CT was performed without contrast. All CT scans at this facility use dose modulation, iterative reconstruction, and/or weight based dosing when appropriate to reduce radiation dose to as low as reasonably achievable.    COMPARISON:  PET-CT scan from December 5, 2019.    FINDINGS:  Prior median sternotomy.  There has been progression of volume loss involving the majority of the left lower lobe with varicoid bronchiectasis and air bronchograms.  Volume loss involving the medial right lower lobe with air bronchograms.  Small focus of tree-in-bud opacity within the posterior aspect of the left upper lobe at the terminus of a bronchovascular which structure is slightly larger compared to the prior PET-CT scan.  It measures 11 mm craniocaudal by 17 mm AP x 9 mm transverse.  Cluster of nodules within the periphery of the right upper lobe on axial series 3, image 177 is unchanged.  Thickening of major fissure with somewhat more prominent nodule projecting posteriorly within the left lower lobe.  It measures 5 mm and is best demonstrated on image 302.  6 mm nodule within the left apex on image 118 is not definitely changed.  No endobronchial or endotracheal lesions.  No pathologically enlarged mediastinal, hilar, or axillary lymph nodes. The heart is enlarged with  coronary artery calcifications.  Profound osteopenia.  Limited images through the upper abdomen demonstrate no acute abnormality.                               11:40 AM: Per Dr. Solo x-ray chest PA and Lateral read 11/3/20: Impression: No acute progress seen.      The EKG was ordered, reviewed, and independently interpreted by the ED provider.  Interpretation time: 11:40  Rate: 64 BPM  Rhythm: normal sinus rhythm  Interpretation: Possible left atrial enlargement. Cannot rule out inferior infarct. ST & T wave abnormality, consider lateral ischemia. No STEMI.             The Emergency Provider reviewed the vital signs and test results, which are outlined above.     ED Discussion       2:32 PM: Discussed case with Zeynep Calle NP (Hospital Medicine). Dr. Kirk agrees with current care and management of pt and accepts admission.   Admitting Service: Hospital Medicine  Admitting Physician: Dr. Kirk  Admit to: Obs tele    2:34 PM: Re-evaluated pt. I have discussed test results, shared treatment plan, and the need for admission with patient and family at bedside. Pt and family express understanding at this time and agree with all information. All questions answered. Pt and family have no further questions or concerns at this time. Pt is ready for admit.             Medical Decision Making:   Clinical Tests:   Lab Tests: Ordered and Reviewed  Radiological Study: Ordered and Reviewed  Medical Tests: Ordered and Reviewed           ED Medication(s):  Medications   levoFLOXacin 750 mg/150 mL IVPB 750 mg (has no administration in time range)       New Prescriptions    No medications on file               Scribe Attestation:   Scribe #1: I performed the above scribed service and the documentation accurately describes the services I performed. I attest to the accuracy of the note.     Attending:   Physician Attestation Statement for Scribe #1: I, Queta Villasenor MD, personally performed the services  described in this documentation, as scribed by Kami Stinson, in my presence, and it is both accurate and complete.           Clinical Impression       ICD-10-CM ICD-9-CM   1. Bronchiectasis with acute lower respiratory infection  J47.0 494.1   2. Chest pain  R07.9 786.50   3. Atelectasis of both lungs  J98.11 518.0   4. Multiple myeloma, remission status unspecified  C90.00 203.00       Disposition:   Disposition: Placed in Observation  Condition: Deirdre Villasenor MD  11/03/20 1546

## 2020-11-03 NOTE — PROGRESS NOTES
Subjective:       Patient ID: Familia Durbin Jr. is a 75 y.o. male.    Chief Complaint: Results and Multiple Myeloma    HPI 75-year-old male currently being treated at [facility] being maintained on Pomalyst 3 mg days 1 through 21 on a 28 day cycle.  Patient was seen in follow-up yesterday by video continues with chronic stable narcotic use.  The patient was treated last week with Zithromax and the outpatient setting for presumptive diagnosis of pneumonia.  Patient presents with daughter today feeling poorly in a wheelchair very short of breath chest x-ray taken an lower lobe pneumonia noted.  Patient is being evaluated for potential admission to the hospital    Past Medical History:   Diagnosis Date    CAD (coronary artery disease)     tyree    Diabetes mellitus, type 2 1979    eye dr rocha    Hearing impaired     Hyperlipidemia     Hypertension     Lupus     Discoid    Multiple myeloma     Old MI (myocardial infarction) 2012    Renal insufficiency     Tracheostomy tube present      Family History   Problem Relation Age of Onset    Diabetes Mother     Diabetes Father     Prostate cancer Father     Pancreatic cancer Sister     No Known Problems Brother     Diabetes Maternal Uncle     Diabetes Maternal Grandmother     No Known Problems Maternal Grandfather     No Known Problems Paternal Grandmother     No Known Problems Paternal Grandfather      Social History     Socioeconomic History    Marital status: Single     Spouse name: Not on file    Number of children: 9    Years of education: Not on file    Highest education level: Not on file   Occupational History    Not on file   Social Needs    Financial resource strain: Not on file    Food insecurity     Worry: Not on file     Inability: Not on file    Transportation needs     Medical: Not on file     Non-medical: Not on file   Tobacco Use    Smoking status: Never Smoker    Smokeless tobacco: Never Used    Substance and Sexual Activity    Alcohol use: No    Drug use: No    Sexual activity: Yes     Partners: Female   Lifestyle    Physical activity     Days per week: Not on file     Minutes per session: Not on file    Stress: Not on file   Relationships    Social connections     Talks on phone: Not on file     Gets together: Not on file     Attends Church service: Not on file     Active member of club or organization: Not on file     Attends meetings of clubs or organizations: Not on file     Relationship status: Not on file   Other Topics Concern    Not on file   Social History Narrative    Not on file     Past Surgical History:   Procedure Laterality Date    CARDIAC SURGERY      CATARACT EXTRACTION      COLONOSCOPY      CORONARY ARTERY BYPASS GRAFT      HAND SURGERY      KNEE SURGERY      TRACHEOSTOMY TUBE PLACEMENT      WRIST FUSION         Labs:  Lab Results   Component Value Date    WBC 2.42 (L) 11/03/2020    HGB 10.9 (L) 11/03/2020    HCT 34.0 (L) 11/03/2020     (H) 11/03/2020     11/03/2020     BMP  Lab Results   Component Value Date     11/03/2020    K 4.4 11/03/2020     11/03/2020    CO2 28 11/03/2020    BUN 17 11/03/2020    CREATININE 1.4 11/03/2020    CALCIUM 9.4 11/03/2020    ANIONGAP 6 (L) 11/03/2020    ESTGFRAFRICA 56 (A) 11/03/2020    EGFRNONAA 48 (A) 11/03/2020     Lab Results   Component Value Date    ALT 14 11/03/2020    AST 13 11/03/2020    ALKPHOS 62 11/03/2020    BILITOT 0.7 11/03/2020       Lab Results   Component Value Date    IRON 49 02/20/2020    TIBC 293 02/20/2020    FERRITIN 163 02/20/2020     No results found for: EBSOPGHR11  No results found for: FOLATE  Lab Results   Component Value Date    TSH 0.782 07/31/2020         Review of Systems   Constitutional: Positive for activity change, appetite change and fatigue. Negative for chills, diaphoresis, fever and unexpected weight change.   HENT: Negative for congestion, dental problem, drooling, ear  discharge, ear pain, facial swelling, hearing loss, mouth sores, nosebleeds, postnasal drip, rhinorrhea, sinus pressure, sneezing, sore throat, tinnitus, trouble swallowing and voice change.    Eyes: Negative for photophobia, pain, discharge, redness, itching and visual disturbance.   Respiratory: Positive for cough, chest tightness and shortness of breath. Negative for apnea, choking, wheezing and stridor.    Cardiovascular: Positive for chest pain. Negative for palpitations and leg swelling.   Gastrointestinal: Negative for abdominal distention, abdominal pain, anal bleeding, blood in stool, constipation, diarrhea, nausea, rectal pain and vomiting.   Endocrine: Negative for cold intolerance, heat intolerance, polydipsia, polyphagia and polyuria.   Genitourinary: Negative for decreased urine volume, difficulty urinating, discharge, dysuria, enuresis, flank pain, frequency, genital sores, hematuria, penile pain, penile swelling, scrotal swelling, testicular pain and urgency.   Musculoskeletal: Positive for arthralgias, back pain, gait problem and myalgias. Negative for joint swelling, neck pain and neck stiffness.   Skin: Negative for color change, pallor, rash and wound.   Allergic/Immunologic: Negative for environmental allergies, food allergies and immunocompromised state.   Neurological: Positive for weakness. Negative for dizziness, tremors, seizures, syncope, facial asymmetry, speech difficulty, light-headedness, numbness and headaches.   Hematological: Negative for adenopathy. Does not bruise/bleed easily.   Psychiatric/Behavioral: Positive for dysphoric mood. Negative for agitation, behavioral problems, confusion, decreased concentration, hallucinations, self-injury, sleep disturbance and suicidal ideas. The patient is nervous/anxious. The patient is not hyperactive.        Objective:      Physical Exam  Vitals signs reviewed.   Constitutional:       General: He is in acute distress.      Appearance: He is  well-developed. He is ill-appearing and toxic-appearing. He is not diaphoretic.   HENT:      Head: Normocephalic.      Right Ear: External ear normal.      Left Ear: External ear normal.      Nose: Nose normal.      Right Sinus: No maxillary sinus tenderness or frontal sinus tenderness.      Left Sinus: No maxillary sinus tenderness or frontal sinus tenderness.      Mouth/Throat:      Pharynx: No oropharyngeal exudate.   Eyes:      General: Lids are normal. No scleral icterus.        Right eye: No discharge.         Left eye: No discharge.      Extraocular Movements:      Right eye: Normal extraocular motion.      Left eye: Normal extraocular motion.      Conjunctiva/sclera:      Right eye: Right conjunctiva is not injected. No hemorrhage.     Left eye: Left conjunctiva is not injected. No hemorrhage.     Pupils: Pupils are equal, round, and reactive to light.   Neck:      Musculoskeletal: Normal range of motion and neck supple.      Thyroid: No thyromegaly.      Vascular: No JVD.      Trachea: No tracheal deviation.   Cardiovascular:      Rate and Rhythm: Normal rate and regular rhythm.      Heart sounds: Normal heart sounds.   Pulmonary:      Effort: Pulmonary effort is normal. No respiratory distress.      Breath sounds: Normal breath sounds. No stridor.      Comments: Crackles in left lower lobe  Abdominal:      General: Bowel sounds are normal.      Palpations: Abdomen is soft. There is no hepatomegaly, splenomegaly or mass.      Tenderness: There is no abdominal tenderness.   Musculoskeletal: Normal range of motion.         General: No tenderness.   Lymphadenopathy:      Head:      Right side of head: No posterior auricular or occipital adenopathy.      Left side of head: No posterior auricular or occipital adenopathy.      Cervical: No cervical adenopathy.      Right cervical: No superficial, deep or posterior cervical adenopathy.     Left cervical: No superficial, deep or posterior cervical adenopathy.       Upper Body:      Right upper body: No supraclavicular adenopathy.      Left upper body: No supraclavicular adenopathy.   Skin:     General: Skin is dry.      Findings: No erythema or rash.      Nails: There is no clubbing.     Neurological:      Mental Status: He is alert and oriented to person, place, and time.      Cranial Nerves: No cranial nerve deficit.      Coordination: Coordination normal.   Psychiatric:         Behavior: Behavior normal.         Thought Content: Thought content normal.         Judgment: Judgment normal.             Assessment:      1. Pneumonia of left lower lobe due to infectious organism    2. Multiple myeloma not having achieved remission    3. Metastatic bone cancer           Plan:   Patient seen urgently this morning chest x-ray laboratory studies pending.  At this point felt but with rapid clinical deterioration that patient should be seen and evaluated in the emergency room for potential admission to the hospital for IV antibiotics.  Partially treated pneumonia with Zithromax in the outpatient setting in an immunocompromised patient with multiple myeloma.  Discussed implications with daughter patient is brought over to the emergency room after discussing with nurse in charge appy in the emergency room.  40 min face-to-face time coordination care greater 50% time face-to-face with patient          Rodolfo Solo Jr, MD FACP

## 2020-11-03 NOTE — ASSESSMENT & PLAN NOTE
No improvement with taking Azithromyin  IV Vanco and Cefepime  Supplemental oxygen to maintain sats > 92 %  Sputum culture  CT imaging - volume loss of LLL - bronchiectasis and air bronchograms, tree in bud opacity - JEMMA, RUL- cluster of nodules in RUL  Continue Pulmonology regarding volume loss,bronchiectasis and possible need for bronchoscopy  Jessy

## 2020-11-04 PROBLEM — B96.20 E. COLI UTI: Status: ACTIVE | Noted: 2020-11-04

## 2020-11-04 PROBLEM — N39.0 E. COLI UTI: Status: ACTIVE | Noted: 2020-11-04

## 2020-11-04 PROBLEM — D61.818 PANCYTOPENIA: Status: ACTIVE | Noted: 2020-11-04

## 2020-11-04 LAB
ALBUMIN SERPL BCP-MCNC: 3.1 G/DL (ref 3.5–5.2)
ALP SERPL-CCNC: 46 U/L (ref 55–135)
ALT SERPL W/O P-5'-P-CCNC: 9 U/L (ref 10–44)
ANION GAP SERPL CALC-SCNC: 5 MMOL/L (ref 8–16)
AST SERPL-CCNC: 13 U/L (ref 10–40)
BASOPHILS NFR BLD: 1 % (ref 0–1.9)
BILIRUB SERPL-MCNC: 0.7 MG/DL (ref 0.1–1)
BUN SERPL-MCNC: 18 MG/DL (ref 8–23)
CALCIUM SERPL-MCNC: 8.6 MG/DL (ref 8.7–10.5)
CHLORIDE SERPL-SCNC: 105 MMOL/L (ref 95–110)
CO2 SERPL-SCNC: 27 MMOL/L (ref 23–29)
CREAT SERPL-MCNC: 1.4 MG/DL (ref 0.5–1.4)
DIFFERENTIAL METHOD: ABNORMAL
EOSINOPHIL NFR BLD: 1 % (ref 0–8)
ERYTHROCYTE [DISTWIDTH] IN BLOOD BY AUTOMATED COUNT: 16.9 % (ref 11.5–14.5)
EST. GFR  (AFRICAN AMERICAN): 56 ML/MIN/1.73 M^2
EST. GFR  (NON AFRICAN AMERICAN): 48 ML/MIN/1.73 M^2
ESTIMATED AVG GLUCOSE: 137 MG/DL (ref 68–131)
GLUCOSE SERPL-MCNC: 124 MG/DL (ref 70–110)
HBA1C MFR BLD HPLC: 6.4 % (ref 4–5.6)
HCT VFR BLD AUTO: 32 % (ref 40–54)
HGB BLD-MCNC: 9.9 G/DL (ref 14–18)
HYPOCHROMIA BLD QL SMEAR: ABNORMAL
IMM GRANULOCYTES # BLD AUTO: ABNORMAL K/UL (ref 0–0.04)
IMM GRANULOCYTES NFR BLD AUTO: ABNORMAL % (ref 0–0.5)
LYMPHOCYTES NFR BLD: 39 % (ref 18–48)
MAGNESIUM SERPL-MCNC: 1.7 MG/DL (ref 1.6–2.6)
MCH RBC QN AUTO: 32.5 PG (ref 27–31)
MCHC RBC AUTO-ENTMCNC: 30.9 G/DL (ref 32–36)
MCV RBC AUTO: 105 FL (ref 82–98)
MONOCYTES NFR BLD: 2 % (ref 4–15)
NEUTROPHILS NFR BLD: 55 % (ref 38–73)
NEUTS BAND NFR BLD MANUAL: 2 %
NRBC BLD-RTO: 1 /100 WBC
PLATELET # BLD AUTO: 132 K/UL (ref 150–350)
PLATELET BLD QL SMEAR: ABNORMAL
PMV BLD AUTO: 10.5 FL (ref 9.2–12.9)
POCT GLUCOSE: 100 MG/DL (ref 70–110)
POCT GLUCOSE: 162 MG/DL (ref 70–110)
POCT GLUCOSE: 95 MG/DL (ref 70–110)
POCT GLUCOSE: 98 MG/DL (ref 70–110)
POTASSIUM SERPL-SCNC: 4.6 MMOL/L (ref 3.5–5.1)
PROT SERPL-MCNC: 7.2 G/DL (ref 6–8.4)
RBC # BLD AUTO: 3.05 M/UL (ref 4.6–6.2)
SODIUM SERPL-SCNC: 137 MMOL/L (ref 136–145)
WBC # BLD AUTO: 1.77 K/UL (ref 3.9–12.7)

## 2020-11-04 PROCEDURE — 27000221 HC OXYGEN, UP TO 24 HOURS

## 2020-11-04 PROCEDURE — 36415 COLL VENOUS BLD VENIPUNCTURE: CPT

## 2020-11-04 PROCEDURE — 96361 HYDRATE IV INFUSION ADD-ON: CPT

## 2020-11-04 PROCEDURE — 27000646 HC AEROBIKA DEVICE

## 2020-11-04 PROCEDURE — 85007 BL SMEAR W/DIFF WBC COUNT: CPT

## 2020-11-04 PROCEDURE — 94664 DEMO&/EVAL PT USE INHALER: CPT

## 2020-11-04 PROCEDURE — 21400001 HC TELEMETRY ROOM

## 2020-11-04 PROCEDURE — S0030 INJECTION, METRONIDAZOLE: HCPCS | Performed by: NURSE PRACTITIONER

## 2020-11-04 PROCEDURE — 25000242 PHARM REV CODE 250 ALT 637 W/ HCPCS: Performed by: NURSE PRACTITIONER

## 2020-11-04 PROCEDURE — 94799 UNLISTED PULMONARY SVC/PX: CPT

## 2020-11-04 PROCEDURE — 96372 THER/PROPH/DIAG INJ SC/IM: CPT

## 2020-11-04 PROCEDURE — 99900035 HC TECH TIME PER 15 MIN (STAT)

## 2020-11-04 PROCEDURE — 99215 OFFICE O/P EST HI 40 MIN: CPT | Mod: ,,, | Performed by: INTERNAL MEDICINE

## 2020-11-04 PROCEDURE — 83735 ASSAY OF MAGNESIUM: CPT

## 2020-11-04 PROCEDURE — 63600175 PHARM REV CODE 636 W HCPCS: Performed by: INTERNAL MEDICINE

## 2020-11-04 PROCEDURE — 25000003 PHARM REV CODE 250: Performed by: NURSE PRACTITIONER

## 2020-11-04 PROCEDURE — 94640 AIRWAY INHALATION TREATMENT: CPT

## 2020-11-04 PROCEDURE — 80053 COMPREHEN METABOLIC PANEL: CPT

## 2020-11-04 PROCEDURE — 85027 COMPLETE CBC AUTOMATED: CPT

## 2020-11-04 PROCEDURE — G0378 HOSPITAL OBSERVATION PER HR: HCPCS

## 2020-11-04 PROCEDURE — 63600175 PHARM REV CODE 636 W HCPCS: Performed by: NURSE PRACTITIONER

## 2020-11-04 PROCEDURE — 99215 PR OFFICE/OUTPT VISIT, EST, LEVL V, 40-54 MIN: ICD-10-PCS | Mod: ,,, | Performed by: INTERNAL MEDICINE

## 2020-11-04 PROCEDURE — 96376 TX/PRO/DX INJ SAME DRUG ADON: CPT

## 2020-11-04 RX ORDER — ONDANSETRON 4 MG/1
4 TABLET, ORALLY DISINTEGRATING ORAL ONCE
Status: COMPLETED | OUTPATIENT
Start: 2020-11-04 | End: 2020-11-04

## 2020-11-04 RX ORDER — OXYCODONE HYDROCHLORIDE 5 MG/1
10 TABLET ORAL ONCE
Status: DISCONTINUED | OUTPATIENT
Start: 2020-11-04 | End: 2020-11-06 | Stop reason: HOSPADM

## 2020-11-04 RX ADMIN — IPRATROPIUM BROMIDE AND ALBUTEROL SULFATE 3 ML: .5; 3 SOLUTION RESPIRATORY (INHALATION) at 07:11

## 2020-11-04 RX ADMIN — CEFEPIME HYDROCHLORIDE 2 G: 2 INJECTION, SOLUTION INTRAVENOUS at 05:11

## 2020-11-04 RX ADMIN — ONDANSETRON 4 MG: 4 TABLET, ORALLY DISINTEGRATING ORAL at 09:11

## 2020-11-04 RX ADMIN — METOPROLOL TARTRATE 50 MG: 50 TABLET, FILM COATED ORAL at 09:11

## 2020-11-04 RX ADMIN — IPRATROPIUM BROMIDE AND ALBUTEROL SULFATE 3 ML: .5; 3 SOLUTION RESPIRATORY (INHALATION) at 12:11

## 2020-11-04 RX ADMIN — RANOLAZINE 1000 MG: 500 TABLET, EXTENDED RELEASE ORAL at 09:11

## 2020-11-04 RX ADMIN — METRONIDAZOLE 500 MG: 500 INJECTION, SOLUTION INTRAVENOUS at 05:11

## 2020-11-04 RX ADMIN — STANDARDIZED SENNA CONCENTRATE AND DOCUSATE SODIUM 1 TABLET: 8.6; 5 TABLET ORAL at 08:11

## 2020-11-04 RX ADMIN — ISOSORBIDE MONONITRATE 30 MG: 30 TABLET, EXTENDED RELEASE ORAL at 09:11

## 2020-11-04 RX ADMIN — TICAGRELOR 90 MG: 90 TABLET ORAL at 09:11

## 2020-11-04 RX ADMIN — RANOLAZINE 1000 MG: 500 TABLET, EXTENDED RELEASE ORAL at 08:11

## 2020-11-04 RX ADMIN — INSULIN DETEMIR 10 UNITS: 100 INJECTION, SOLUTION SUBCUTANEOUS at 08:11

## 2020-11-04 RX ADMIN — STANDARDIZED SENNA CONCENTRATE AND DOCUSATE SODIUM 1 TABLET: 8.6; 5 TABLET ORAL at 09:11

## 2020-11-04 RX ADMIN — VANCOMYCIN HYDROCHLORIDE 1250 MG: 1.25 INJECTION, POWDER, LYOPHILIZED, FOR SOLUTION INTRAVENOUS at 07:11

## 2020-11-04 RX ADMIN — METRONIDAZOLE 500 MG: 500 INJECTION, SOLUTION INTRAVENOUS at 09:11

## 2020-11-04 RX ADMIN — METRONIDAZOLE 500 MG: 500 INJECTION, SOLUTION INTRAVENOUS at 01:11

## 2020-11-04 RX ADMIN — LISINOPRIL 2.5 MG: 2.5 TABLET ORAL at 09:11

## 2020-11-04 RX ADMIN — ROSUVASTATIN 40 MG: 10 TABLET, FILM COATED ORAL at 08:11

## 2020-11-04 RX ADMIN — ASPIRIN 81 MG: 81 TABLET, COATED ORAL at 09:11

## 2020-11-04 RX ADMIN — OXYCODONE HYDROCHLORIDE 20 MG: 10 TABLET, FILM COATED, EXTENDED RELEASE ORAL at 10:11

## 2020-11-04 RX ADMIN — PANTOPRAZOLE SODIUM 40 MG: 40 TABLET, DELAYED RELEASE ORAL at 09:11

## 2020-11-04 NOTE — PROGRESS NOTES
[facility] Hospital Medicine  Progress Note    Patient Name: Familia Durbin Jr.  MRN: [id number]  Patient Class: OP- Observation   Admission Date: 11/3/2020  Length of Stay: 0 days  Attending Physician: Daniel Kirk MD  Primary Care Provider: Andrea Elkins MD        Subjective:     Principal Problem:Bronchiectasis with acute lower respiratory infection        HPI:  Pt is a 76 yo Jehovah Witness male with PMhx of CHF, asbestosis, Multiple myeloma with mets to the bone, CAD, renal insuffiencey, DM II, HTN and HPL who presents to the ED with c/o productive cough onset last week that has not improved despite completing a course of Azithromycin. Pt describes left sided chest pain, nasal drainage, SOB when speaking, malaise and ZAVALA. Also, pt reports constipation. He denies other symptoms.  V/S indicate temp 99.1, pulse 69, resp 20, B/P 112/56 and SpO2 95 %  Labs find WBC 2.42, H/H 10.9/34.0, INR 1.1, gluc 131, BNP = 809, lactate 0.8 and procal 0.03, negative COVID  CT imaging of chest finds bronchiectasis, RML air bronchograms. JEMMA bronchial plugging, RUL lung nodules  Pt is placed on Observation for further treatment/evaluation of pneumonia and bronchietasis.      Overview/Hospital Course:  Pt with MM admitted with pneumonia/bronchietasis noted on CT. Symptoms had worsened despite completing a 5 day course of Azithromycin. He was SOB, cough and left sided chest pain. Pt given supplemental oxygen, IV Vanco/Cefepime/Flagyl and DuoNebs. Also, pulmonary toilet of incentive spirometer and acapella provided. Pulmonology, Dr. Dutta, saw the patient due to bronchiectasis and lung volume loss in the LLL and recs for IV ABX received.     Interval History: Pt resting calmly in bed this AM. Reports improvement in SOB and left sided chest pain. Improvement in oxygen and breathing treatments. Continue current plan of care.     Review of Systems   Constitutional: Positive for fatigue. Negative for  appetite change, chills, diaphoresis and fever.   HENT: Negative for congestion.    Respiratory: Positive for cough and shortness of breath.    Cardiovascular: Positive for chest pain. Negative for palpitations and leg swelling.   Gastrointestinal: Positive for constipation. Negative for abdominal pain, diarrhea, nausea and vomiting.   Genitourinary: Negative.    Musculoskeletal: Positive for arthralgias and back pain.   Skin: Negative for pallor, rash and wound.   Neurological: Positive for weakness. Negative for dizziness, light-headedness and headaches.   Psychiatric/Behavioral: Negative for decreased concentration.     Objective:     Vital Signs (Most Recent):  Temp: 99.3 °F (37.4 °C) (11/04/20 0745)  Pulse: 63 (11/04/20 0745)  Resp: 20 (11/04/20 0745)  BP: (!) 117/52 (11/04/20 0745)  SpO2: 100 % (11/04/20 0745) Vital Signs (24h Range):  Temp:  [97.2 °F (36.2 °C)-99.3 °F (37.4 °C)] 99.3 °F (37.4 °C)  Pulse:  [62-69] 63  Resp:  [16-20] 20  SpO2:  [93 %-100 %] 100 %  BP: (110-166)/(47-70) 117/52     Weight: 75.3 kg (166 lb 0.1 oz)  Body mass index is 23.82 kg/m².    Intake/Output Summary (Last 24 hours) at 11/4/2020 0906  Last data filed at 11/3/2020 1843  Gross per 24 hour   Intake 647.5 ml   Output --   Net 647.5 ml      Physical Exam  Vitals signs and nursing note reviewed.   Constitutional:       Appearance: Normal appearance. He is well-developed.      Comments: Pt is hard of hearing   HENT:      Head: Normocephalic and atraumatic.      Nose: Nose normal.   Eyes:      General: No scleral icterus.     Conjunctiva/sclera: Conjunctivae normal.      Pupils: Pupils are equal, round, and reactive to light.   Neck:      Musculoskeletal: Normal range of motion and neck supple.   Cardiovascular:      Rate and Rhythm: Normal rate and regular rhythm.      Heart sounds: Normal heart sounds. No murmur. No friction rub. No gallop.    Pulmonary:      Effort: Pulmonary effort is normal.      Breath sounds: Examination of the  left-lower field reveals rhonchi. Rhonchi present.   Abdominal:      General: Bowel sounds are normal.      Palpations: Abdomen is soft.   Musculoskeletal: Normal range of motion.         General: No tenderness.   Skin:     General: Skin is warm and dry.   Neurological:      Mental Status: He is alert and oriented to person, place, and time.   Psychiatric:         Behavior: Behavior normal.         Thought Content: Thought content normal.         Significant Labs:   CBC:   Recent Labs   Lab 11/03/20  1018 11/04/20  0617   WBC 2.42* 1.77*   HGB 10.9* 9.9*   HCT 34.0* 32.0*    132*     CMP:   Recent Labs   Lab 11/03/20  1018 11/04/20  0617    137   K 4.4 4.6    105   CO2 28 27   * 124*   BUN 17 18   CREATININE 1.4 1.4   CALCIUM 9.4 8.6*   PROT 8.0 7.2   ALBUMIN 3.5 3.1*   BILITOT 0.7 0.7   ALKPHOS 62 46*   AST 13 13   ALT 14 9*   ANIONGAP 6* 5*   EGFRNONAA 48* 48*     All pertinent labs within the past 24 hours have been reviewed.    Significant Imaging: I have reviewed all pertinent imaging results/findings within the past 24 hours.      Assessment/Plan:      * Bronchiectasis with acute lower respiratory infection  No improvement with taking Azithromyin  IV Vanco, Flagyl and Cefepime  Supplemental oxygen to maintain sats > 92 %  Sputum culture pending  CT imaging - volume loss of LLL - bronchiectasis and air bronchograms, tree in bud opacity - JEMMA, RUL- cluster of nodules in RUL  Continue Pulmonology regarding volume loss,bronchiectasis and possible need for bronchoscopy  DuoNebs  Incentive spirometer      Pancytopenia  WBC 1.77, H/H 9.9/32.0, platelets 132  Secondary to multiple myeloma and/or Pomalyst (treatment for MM)  Daily CBC   Monitor for fevers  Pt is Gnosticism and does not accept blood products          E. coli UTI  Urine culture from 10/26/2020  Pt was taking Azithromycin for his pneumonia - not sensitive  Continue Cefepime      Nasal drainage  Sinus xray - no fluid levels  to suggest acute sinusitis  Nasal drainage has resolved      Pulmonary nodules  Will need follow up with Pulmonology      Multiple myeloma  With bone mets  currently being treated at [facility] being maintained on Pomalyst 3 mg days 1 through 21 on a 28 day cycle  Also, is followed by Dr. Solo, Oncology - consult placed  POMALIDOMIDE has known pulmonary toxicity including increased risk for infection  Follow with Hematology              Coronary artery disease of native artery of native heart with stable angina pectoris  Continue ASA, STATIN, Ranexa, metoprolol, Imdur       Hypertension  B/P controlled  Continue Imdur, lisinopril and metoprolol  Enalapril IV every 6 hours prn SBP > 170      Hyperlipidemia  Continue rosuvastatin      Type 2 diabetes mellitus with diabetic nephropathy  Lab Results   Component Value Date    HGBA1C 7.1 (H) 07/31/2020   continue long acting insulin  accuchecks  Sliding scale insulin        VTE Risk Mitigation (From admission, onward)    None          Discharge Planning   CECELIA:      Code Status: Not on file   Is the patient medically ready for discharge?:     Reason for patient still in hospital (select all that apply): Patient trending condition and Treatment                     Zeynep Calle NP  Department of Hospital Medicine   [facility]

## 2020-11-04 NOTE — ASSESSMENT & PLAN NOTE
POMALIDOMIDE has known pulmonary toxicity including increased risk for infection  Follow with Hematology

## 2020-11-04 NOTE — ASSESSMENT & PLAN NOTE
Bronchodilators  Accapella  Sputum culture  Abx: Vancomycin + CEFEPIME+ FLAGYL  Flu vacination before DC  No current indication or plans for bronchoscopy: No bleeding reported

## 2020-11-04 NOTE — SUBJECTIVE & OBJECTIVE
Interval History: Pt resting calmly in bed this AM. Reports improvement in SOB and left sided chest pain. Improvement in oxygen and breathing treatments. Continue current plan of care.     Review of Systems   Constitutional: Positive for fatigue. Negative for appetite change, chills, diaphoresis and fever.   HENT: Negative for congestion.    Respiratory: Positive for cough and shortness of breath.    Cardiovascular: Positive for chest pain. Negative for palpitations and leg swelling.   Gastrointestinal: Positive for constipation. Negative for abdominal pain, diarrhea, nausea and vomiting.   Genitourinary: Negative.    Musculoskeletal: Positive for arthralgias and back pain.   Skin: Negative for pallor, rash and wound.   Neurological: Positive for weakness. Negative for dizziness, light-headedness and headaches.   Psychiatric/Behavioral: Negative for decreased concentration.     Objective:     Vital Signs (Most Recent):  Temp: 99.3 °F (37.4 °C) (11/04/20 0745)  Pulse: 63 (11/04/20 0745)  Resp: 20 (11/04/20 0745)  BP: (!) 117/52 (11/04/20 0745)  SpO2: 100 % (11/04/20 0745) Vital Signs (24h Range):  Temp:  [97.2 °F (36.2 °C)-99.3 °F (37.4 °C)] 99.3 °F (37.4 °C)  Pulse:  [62-69] 63  Resp:  [16-20] 20  SpO2:  [93 %-100 %] 100 %  BP: (110-166)/(47-70) 117/52     Weight: 75.3 kg (166 lb 0.1 oz)  Body mass index is 23.82 kg/m².    Intake/Output Summary (Last 24 hours) at 11/4/2020 0906  Last data filed at 11/3/2020 1843  Gross per 24 hour   Intake 647.5 ml   Output --   Net 647.5 ml      Physical Exam  Vitals signs and nursing note reviewed.   Constitutional:       Appearance: Normal appearance. He is well-developed.      Comments: Pt is hard of hearing   HENT:      Head: Normocephalic and atraumatic.      Nose: Nose normal.   Eyes:      General: No scleral icterus.     Conjunctiva/sclera: Conjunctivae normal.      Pupils: Pupils are equal, round, and reactive to light.   Neck:      Musculoskeletal: Normal range of motion  and neck supple.   Cardiovascular:      Rate and Rhythm: Normal rate and regular rhythm.      Heart sounds: Normal heart sounds. No murmur. No friction rub. No gallop.    Pulmonary:      Effort: Pulmonary effort is normal.      Breath sounds: Examination of the left-lower field reveals rhonchi. Rhonchi present.   Abdominal:      General: Bowel sounds are normal.      Palpations: Abdomen is soft.   Musculoskeletal: Normal range of motion.         General: No tenderness.   Skin:     General: Skin is warm and dry.   Neurological:      Mental Status: He is alert and oriented to person, place, and time.   Psychiatric:         Behavior: Behavior normal.         Thought Content: Thought content normal.         Significant Labs:   CBC:   Recent Labs   Lab 11/03/20  1018 11/04/20  0617   WBC 2.42* 1.77*   HGB 10.9* 9.9*   HCT 34.0* 32.0*    132*     CMP:   Recent Labs   Lab 11/03/20  1018 11/04/20  0617    137   K 4.4 4.6    105   CO2 28 27   * 124*   BUN 17 18   CREATININE 1.4 1.4   CALCIUM 9.4 8.6*   PROT 8.0 7.2   ALBUMIN 3.5 3.1*   BILITOT 0.7 0.7   ALKPHOS 62 46*   AST 13 13   ALT 14 9*   ANIONGAP 6* 5*   EGFRNONAA 48* 48*     All pertinent labs within the past 24 hours have been reviewed.    Significant Imaging: I have reviewed all pertinent imaging results/findings within the past 24 hours.

## 2020-11-04 NOTE — ASSESSMENT & PLAN NOTE
No improvement with taking Azithromyin  IV Vanco, Flagyl and Cefepime  Supplemental oxygen to maintain sats > 92 %  Sputum culture pending  CT imaging - volume loss of LLL - bronchiectasis and air bronchograms, tree in bud opacity - JEMMA, RUL- cluster of nodules in RUL  Continue Pulmonology regarding volume loss,bronchiectasis and possible need for bronchoscopy  [facility]  Incentive spirometer

## 2020-11-04 NOTE — CONSULTS
[facility] -   Hematology/Oncology  Consult Note    Patient Name: Familia Durbin Jr.  MRN: [id number]  Admission Date: 11/3/2020  Hospital Length of Stay: 0 days  Code Status: No Order   Attending Provider: Daniel Kirk MD  Consulting Provider: Jasmin Linn NP  Primary Care Physician: Andrea Elkins MD  Principal Problem:Bronchiectasis with acute lower respiratory infection    Inpatient consult to Hematology/Oncology  Consult performed by: Jasmin Linn NP  Consult ordered by: Zeynep Calle NP  Reason for consult: Multiple myeloma  Assessment/Recommendations: * Bronchiectasis with acute lower respiratory infection  Patient is immunocompromised due to multiple myeloma and treatment with Pomalyst.    --antibiotic regimen per primary team    Multiple myeloma  Patient currently treated with single agent Pomalyst, has overall tolerated well.  Primary oncologists, Dr. Solo.  Overall tolerated treatment well.  Patient was seen by his PCP last week and treated with azithromycin for possible pneumonia, daughter reports continued decline over the past week.  Patient was seen by Dr. Solo on 11/03/2020 for a virtual visit and due to physical condition was advised to go to the emergency room for evaluation and treatment.    --continue supportive care  --antibiotic regimen per primary team  --daily CBC CMP  --hold Pomalyst          Subjective:     HPI:  74 yo Jehovah Witness male with PMhx of CHF, asbestosis, Multiple myeloma with mets to the bone, currently treated with Pomalyst, CAD, renal insuffiencey, DM II, HTN and HPL who presents to the ED with c/o productive cough onset last week that has not improved despite completing a course of Azithromycin. Pt describes left sided chest pain, nasal drainage, SOB when speaking, malaise and ZAVALA. Also, pt reports constipation. He denies other symptoms.  V/S indicate temp 99.1, pulse 69, resp 20, B/P 112/56 and SpO2 95 %  Labs find WBC 2.42, H/H  10.9/34.0, INR 1.1, gluc 131, BNP = 809, lactate 0.8 and procal 0.03, negative COVID  CT imaging of chest finds bronchiectasis, RML air bronchograms. JEMMA bronchial plugging, RUL lung nodules  .    Oncology Treatment Plan:   [No treatment plan]    Medications:  Continuous Infusions:  Scheduled Meds:   albuterol-ipratropium  3 mL Nebulization Q8H    aspirin  81 mg Oral Daily    ceFEPime (MAXIPIME) IVPB  2 g Intravenous Q12H    insulin detemir U-100  10 Units Subcutaneous QHS    isosorbide mononitrate  30 mg Oral Daily    lisinopriL  2.5 mg Oral Daily    metoprolol tartrate  50 mg Oral BID    metronidazole  500 mg Intravenous Q8H    oxyCODONE  20 mg Oral Q12H    oxyCODONE  10 mg Oral Once    pantoprazole  40 mg Oral Daily    ranolazine  1,000 mg Oral BID    rosuvastatin  40 mg Oral QHS    senna-docusate 8.6-50 mg  1 tablet Oral BID    ticagrelor  90 mg Oral Daily    vancomycin (VANCOCIN) IVPB  1,250 mg Intravenous Q24H     PRN Meds:acetaminophen, dextrose 50%, dextrose 50%, glucagon (human recombinant), glucose, glucose, insulin aspart U-100, ondansetron, oxyCODONE     Review of patient's allergies indicates:   Allergen Reactions    Cephalexin      Pt tolerated cefepime 11/4/2020    Fentanyl Nausea And Vomiting    Nsaids (non-steroidal anti-inflammatory drug)      Renal insuf        Past Medical History:   Diagnosis Date    CAD (coronary artery disease)     luikart    Diabetes mellitus, type 2 1979    eye dr rocha    Hearing impaired     Hyperlipidemia     Hypertension     Lupus     Discoid    Multiple myeloma     Old MI (myocardial infarction) 2012    Renal insufficiency     Tracheostomy tube present      Past Surgical History:   Procedure Laterality Date    CARDIAC SURGERY      CATARACT EXTRACTION      COLONOSCOPY      CORONARY ARTERY BYPASS GRAFT      HAND SURGERY      KNEE SURGERY      TRACHEOSTOMY TUBE PLACEMENT      WRIST FUSION       Family History     Problem Relation  (Age of Onset)    Diabetes Mother, Father, Maternal Uncle, Maternal Grandmother    No Known Problems Brother, Maternal Grandfather, Paternal Grandmother, Paternal Grandfather    Pancreatic cancer Sister    Prostate cancer Father        Tobacco Use    Smoking status: Never Smoker    Smokeless tobacco: Never Used   Substance and Sexual Activity    Alcohol use: No    Drug use: No    Sexual activity: Yes     Partners: Female       Review of Systems   Constitutional: Positive for activity change, appetite change and fatigue. Negative for chills, diaphoresis, fever and unexpected weight change.   HENT: Negative for congestion, hearing loss, nosebleeds, postnasal drip, rhinorrhea and trouble swallowing.    Eyes: Negative for discharge and visual disturbance.   Respiratory: Positive for cough and shortness of breath. Negative for chest tightness.    Cardiovascular: Negative for chest pain, palpitations and leg swelling.   Gastrointestinal: Negative for abdominal distention, abdominal pain, constipation, diarrhea, nausea and vomiting.   Endocrine: Negative for cold intolerance and heat intolerance.   Genitourinary: Negative for difficulty urinating, dysuria, frequency and hematuria.   Musculoskeletal: Positive for arthralgias and back pain. Negative for gait problem and myalgias.   Skin: Negative.    Neurological: Negative for dizziness, weakness, light-headedness and headaches.   Hematological: Negative for adenopathy. Does not bruise/bleed easily.   Psychiatric/Behavioral: Negative for agitation, behavioral problems and confusion. The patient is not nervous/anxious.      Objective:     Vital Signs (Most Recent):  Temp: 98.1 °F (36.7 °C) (11/04/20 1058)  Pulse: 67 (11/04/20 1152)  Resp: 18 (11/04/20 1058)  BP: (!) 116/57 (11/04/20 1058)  SpO2: 95 % (11/04/20 1058) Vital Signs (24h Range):  Temp:  [97.2 °F (36.2 °C)-99.3 °F (37.4 °C)] 98.1 °F (36.7 °C)  Pulse:  [62-69] 67  Resp:  [16-20] 18  SpO2:  [93 %-100 %] 95  %  BP: (110-166)/(47-70) 116/57     Weight: 75.3 kg (166 lb 0.1 oz)  Body mass index is 23.82 kg/m².  Body surface area is 1.93 meters squared.      Intake/Output Summary (Last 24 hours) at 11/4/2020 1308  Last data filed at 11/3/2020 1843  Gross per 24 hour   Intake 647.5 ml   Output --   Net 647.5 ml       Physical Exam  Vitals signs and nursing note reviewed.   Constitutional:       General: He is not in acute distress.     Appearance: He is well-developed. He is not diaphoretic.   HENT:      Head: Normocephalic and atraumatic.      Right Ear: Hearing and external ear normal.      Left Ear: Hearing and external ear normal.      Nose: Nose normal. No mucosal edema or rhinorrhea.      Mouth/Throat:      Pharynx: Uvula midline.   Eyes:      General:         Right eye: No discharge.         Left eye: No discharge.      Conjunctiva/sclera: Conjunctivae normal.      Right eye: No chemosis.     Left eye: No chemosis.     Pupils: Pupils are equal, round, and reactive to light.   Neck:      Musculoskeletal: Normal range of motion and neck supple.      Thyroid: No thyroid mass or thyromegaly.      Trachea: Trachea normal.   Cardiovascular:      Rate and Rhythm: Normal rate and regular rhythm.      Pulses:           Dorsalis pedis pulses are 1+ on the right side and 1+ on the left side.      Heart sounds: Normal heart sounds. No murmur.   Pulmonary:      Effort: Pulmonary effort is normal. No respiratory distress.      Breath sounds: Examination of the right-middle field reveals rales. Examination of the left-middle field reveals rales. Examination of the right-lower field reveals rales. Examination of the left-lower field reveals rales. Rales present. No decreased breath sounds or wheezing.   Abdominal:      General: Bowel sounds are normal. There is no distension.      Palpations: Abdomen is soft.      Tenderness: There is no abdominal tenderness.   Musculoskeletal: Normal range of motion.   Lymphadenopathy:       Cervical: No cervical adenopathy.      Upper Body:      Right upper body: No supraclavicular adenopathy.      Left upper body: No supraclavicular adenopathy.   Skin:     General: Skin is warm and dry.      Capillary Refill: Capillary refill takes less than 2 seconds.      Findings: No rash.   Neurological:      Mental Status: He is alert and oriented to person, place, and time.   Psychiatric:         Mood and Affect: Mood is not anxious.         Speech: Speech normal.         Behavior: Behavior normal.         Thought Content: Thought content normal.         Judgment: Judgment normal.         Significant Labs:   CBC:   Recent Labs   Lab 11/03/20  1018 11/04/20  0617   WBC 2.42* 1.77*   HGB 10.9* 9.9*   HCT 34.0* 32.0*    132*    and CMP:   Recent Labs   Lab 11/03/20  1018 11/04/20  0617    137   K 4.4 4.6    105   CO2 28 27   * 124*   BUN 17 18   CREATININE 1.4 1.4   CALCIUM 9.4 8.6*   PROT 8.0 7.2   ALBUMIN 3.5 3.1*   BILITOT 0.7 0.7   ALKPHOS 62 46*   AST 13 13   ALT 14 9*   ANIONGAP 6* 5*   EGFRNONAA 48* 48*       Diagnostic Results:  I have reviewed all pertinent imaging results/findings within the past 24 hours.    Assessment/Plan:     * Bronchiectasis with acute lower respiratory infection  Patient is immunocompromised due to multiple myeloma and treatment with Pomalyst.    --antibiotic regimen per primary team    Multiple myeloma  Patient currently treated with single agent Pomalyst, has overall tolerated well.  Primary oncologists, Dr. Solo.  Overall tolerated treatment well.  Patient was seen by his PCP last week and treated with azithromycin for possible pneumonia, daughter reports continued decline over the past week.  Patient was seen by Dr. Solo on 11/03/2020 for a virtual visit and due to physical condition was advised to go to the emergency room for evaluation and treatment.    --continue supportive care  --antibiotic regimen per primary team  --daily CBC CMP  --hold  Cecilia        Thank you for your consult. I will follow-up with patient. Please contact us if you have any additional questions.    Jasmin Linn NP  Hematology/Oncology  [facility]

## 2020-11-04 NOTE — PLAN OF CARE
Patient had uneventful shift. Patient awake, alert and oriented x 4. VS stable. Patient denies SOB, c/o back pain, controlled with pain medication. Patient on telemetry, NSR on the monitor. Patient ambulates with assistance. Fall precautions in place. Bed alarm active and audible. Patient free from fall/injury. Plan of care reviewed with patient. Patient has no questions at this time. Will continue to monitor.

## 2020-11-04 NOTE — ASSESSMENT & PLAN NOTE
With bone mets  currently being treated at [facility] being maintained on Pomalyst 3 mg days 1 through 21 on a 28 day cycle  Also, is followed by Dr. Solo, Oncology - consult placed  POMALIDOMIDE has known pulmonary toxicity including increased risk for infection  Follow with Hematology

## 2020-11-04 NOTE — PLAN OF CARE
KAYLEE met with pt and pt's granddaughter to complete discharge assessment. Pt lives at home alone. Pt has a lot of help from his family. Pt granddaughter will pick him up when he is discharged. Pt is ambulatory and ADLs. Pt granddaughter asked for HH. She said she will let SW know the name of the company they want to go with. No other needs identified at this time. SW provided a transitional care folder, information on advanced directives, information on pharmacy bedside delivery, and discharge planning begins on admission with contact information for any needs/questions. Pt board updated with SW name and number.     Payor: HU[employer] MANAGED MEDICARE / Plan: HU[employer]NA MEDICARE PPO / Product Type: Medicare Advantage /   PCP: Andrea Elkins MD  Pharmacy:   [facility]  [id number] [place] [address]  Phone: [phone] Fax: [phone]    [facility] [address] [facility] [address] [place] [facility]  [address] [facility] [address]  Phone: [phone] Fax: [phone]  [facility] Delivery yes  MyChart: Active       11/04/20 1115   Discharge Assessment   Assessment Type Discharge Planning Assessment   Confirmed/corrected address and phone number on facesheet? Yes   Assessment information obtained from? Patient;Other  ([facility])   Expected Length of Stay (days)   (TBD)   Communicated expected length of stay with patient/caregiver yes   Prior to hospitilization cognitive status: Alert/Oriented   Prior to hospitalization functional status: Independent   Current cognitive status: Alert/Oriented   Current Functional Status: Independent   Facility Arrived From: home   Lives With alone   Able to Return to Prior Arrangements yes   Is patient able to care for self after discharge? Yes   Who are your caregiver(s) and their phone number(s)? Yoanna Diaz (daughter) [phone]   Patient's perception of discharge disposition home or selfcare   Readmission Within the Last 30 Days no previous  admission in last 30 days   Patient currently being followed by outpatient case management? No   Patient currently receives any other outside agency services? No   Equipment Currently Used at Home none   Part D Coverage n/a   Do you have any problems affording any of your prescribed medications? No   Is the patient taking medications as prescribed? yes   Does the patient have transportation home? Yes   Transportation Anticipated family or friend will provide   Dialysis Name and Scheduled days n/a   Does the patient receive services at the [facility]? No   Discharge Plan A Home   Discharge Plan B Home   DME Needed Upon Discharge  none   Patient/Family in Agreement with Plan yes       Justice Ivy LMSW 11/4/2020 2:50 PM

## 2020-11-04 NOTE — ASSESSMENT & PLAN NOTE
Patient is immunocompromised due to multiple myeloma and treatment with Pomalyst.    --antibiotic regimen per primary team

## 2020-11-04 NOTE — ASSESSMENT & PLAN NOTE
Patient currently treated with single agent Pomalyst, has overall tolerated well.  Primary oncologists, Dr. Solo.  Overall tolerated treatment well.  Patient was seen by his PCP last week and treated with azithromycin for possible pneumonia, daughter reports continued decline over the past week.  Patient was seen by Dr. Solo on 11/03/2020 for a virtual visit and due to physical condition was advised to go to the emergency room for evaluation and treatment.    --continue supportive care  --antibiotic regimen per primary team  --daily CBC CMP  --hold Pomalyst

## 2020-11-04 NOTE — SUBJECTIVE & OBJECTIVE
Oncology Treatment Plan:   [No treatment plan]    Medications:  Continuous Infusions:  Scheduled Meds:   albuterol-ipratropium  3 mL Nebulization Q8H    aspirin  81 mg Oral Daily    ceFEPime (MAXIPIME) IVPB  2 g Intravenous Q12H    insulin detemir U-100  10 Units Subcutaneous QHS    isosorbide mononitrate  30 mg Oral Daily    lisinopriL  2.5 mg Oral Daily    metoprolol tartrate  50 mg Oral BID    metronidazole  500 mg Intravenous Q8H    oxyCODONE  20 mg Oral Q12H    oxyCODONE  10 mg Oral Once    pantoprazole  40 mg Oral Daily    ranolazine  1,000 mg Oral BID    rosuvastatin  40 mg Oral QHS    senna-docusate 8.6-50 mg  1 tablet Oral BID    ticagrelor  90 mg Oral Daily    vancomycin (VANCOCIN) IVPB  1,250 mg Intravenous Q24H     PRN Meds:acetaminophen, dextrose 50%, dextrose 50%, glucagon (human recombinant), glucose, glucose, insulin aspart U-100, ondansetron, oxyCODONE     Review of patient's allergies indicates:   Allergen Reactions    Cephalexin      Pt tolerated cefepime 11/4/2020    Fentanyl Nausea And Vomiting    Nsaids (non-steroidal anti-inflammatory drug)      Renal insuf        Past Medical History:   Diagnosis Date    CAD (coronary artery disease)     tyree    Diabetes mellitus, type 2 1979    eye dr rocha    Hearing impaired     Hyperlipidemia     Hypertension     Lupus     Discoid    Multiple myeloma     Old MI (myocardial infarction) 2012    Renal insufficiency     Tracheostomy tube present      Past Surgical History:   Procedure Laterality Date    CARDIAC SURGERY      CATARACT EXTRACTION      COLONOSCOPY      CORONARY ARTERY BYPASS GRAFT      HAND SURGERY      KNEE SURGERY      TRACHEOSTOMY TUBE PLACEMENT      WRIST FUSION       Family History     Problem Relation (Age of Onset)    Diabetes Mother, Father, Maternal Uncle, Maternal Grandmother    No Known Problems Brother, Maternal Grandfather, Paternal Grandmother, Paternal Grandfather    Pancreatic cancer  Sister    Prostate cancer Father        Tobacco Use    Smoking status: Never Smoker    Smokeless tobacco: Never Used   Substance and Sexual Activity    Alcohol use: No    Drug use: No    Sexual activity: Yes     Partners: Female       Review of Systems   Constitutional: Positive for activity change, appetite change and fatigue. Negative for chills, diaphoresis, fever and unexpected weight change.   HENT: Negative for congestion, hearing loss, nosebleeds, postnasal drip, rhinorrhea and trouble swallowing.    Eyes: Negative for discharge and visual disturbance.   Respiratory: Positive for cough and shortness of breath. Negative for chest tightness.    Cardiovascular: Negative for chest pain, palpitations and leg swelling.   Gastrointestinal: Negative for abdominal distention, abdominal pain, constipation, diarrhea, nausea and vomiting.   Endocrine: Negative for cold intolerance and heat intolerance.   Genitourinary: Negative for difficulty urinating, dysuria, frequency and hematuria.   Musculoskeletal: Positive for arthralgias and back pain. Negative for gait problem and myalgias.   Skin: Negative.    Neurological: Negative for dizziness, weakness, light-headedness and headaches.   Hematological: Negative for adenopathy. Does not bruise/bleed easily.   Psychiatric/Behavioral: Negative for agitation, behavioral problems and confusion. The patient is not nervous/anxious.      Objective:     Vital Signs (Most Recent):  Temp: 98.1 °F (36.7 °C) (11/04/20 1058)  Pulse: 67 (11/04/20 1152)  Resp: 18 (11/04/20 1058)  BP: (!) 116/57 (11/04/20 1058)  SpO2: 95 % (11/04/20 1058) Vital Signs (24h Range):  Temp:  [97.2 °F (36.2 °C)-99.3 °F (37.4 °C)] 98.1 °F (36.7 °C)  Pulse:  [62-69] 67  Resp:  [16-20] 18  SpO2:  [93 %-100 %] 95 %  BP: (110-166)/(47-70) 116/57     Weight: 75.3 kg (166 lb 0.1 oz)  Body mass index is 23.82 kg/m².  Body surface area is 1.93 meters squared.      Intake/Output Summary (Last 24 hours) at 11/4/2020  1308  Last data filed at 11/3/2020 1843  Gross per 24 hour   Intake 647.5 ml   Output --   Net 647.5 ml       Physical Exam  Vitals signs and nursing note reviewed.   Constitutional:       General: He is not in acute distress.     Appearance: He is well-developed. He is not diaphoretic.   HENT:      Head: Normocephalic and atraumatic.      Right Ear: Hearing and external ear normal.      Left Ear: Hearing and external ear normal.      Nose: Nose normal. No mucosal edema or rhinorrhea.      Mouth/Throat:      Pharynx: Uvula midline.   Eyes:      General:         Right eye: No discharge.         Left eye: No discharge.      Conjunctiva/sclera: Conjunctivae normal.      Right eye: No chemosis.     Left eye: No chemosis.     Pupils: Pupils are equal, round, and reactive to light.   Neck:      Musculoskeletal: Normal range of motion and neck supple.      Thyroid: No thyroid mass or thyromegaly.      Trachea: Trachea normal.   Cardiovascular:      Rate and Rhythm: Normal rate and regular rhythm.      Pulses:           Dorsalis pedis pulses are 1+ on the right side and 1+ on the left side.      Heart sounds: Normal heart sounds. No murmur.   Pulmonary:      Effort: Pulmonary effort is normal. No respiratory distress.      Breath sounds: Examination of the right-middle field reveals rales. Examination of the left-middle field reveals rales. Examination of the right-lower field reveals rales. Examination of the left-lower field reveals rales. Rales present. No decreased breath sounds or wheezing.   Abdominal:      General: Bowel sounds are normal. There is no distension.      Palpations: Abdomen is soft.      Tenderness: There is no abdominal tenderness.   Musculoskeletal: Normal range of motion.   Lymphadenopathy:      Cervical: No cervical adenopathy.      Upper Body:      Right upper body: No supraclavicular adenopathy.      Left upper body: No supraclavicular adenopathy.   Skin:     General: Skin is warm and dry.       Capillary Refill: Capillary refill takes less than 2 seconds.      Findings: No rash.   Neurological:      Mental Status: He is alert and oriented to person, place, and time.   Psychiatric:         Mood and Affect: Mood is not anxious.         Speech: Speech normal.         Behavior: Behavior normal.         Thought Content: Thought content normal.         Judgment: Judgment normal.         Significant Labs:   CBC:   Recent Labs   Lab 11/03/20  1018 11/04/20  0617   WBC 2.42* 1.77*   HGB 10.9* 9.9*   HCT 34.0* 32.0*    132*    and CMP:   Recent Labs   Lab 11/03/20  1018 11/04/20  0617    137   K 4.4 4.6    105   CO2 28 27   * 124*   BUN 17 18   CREATININE 1.4 1.4   CALCIUM 9.4 8.6*   PROT 8.0 7.2   ALBUMIN 3.5 3.1*   BILITOT 0.7 0.7   ALKPHOS 62 46*   AST 13 13   ALT 14 9*   ANIONGAP 6* 5*   EGFRNONAA 48* 48*       Diagnostic Results:  I have reviewed all pertinent imaging results/findings within the past 24 hours.

## 2020-11-04 NOTE — HPI
74 yo Jehovah Witness male with PMhx of CHF, asbestosis, Multiple myeloma with mets to the bone, currently treated with Pomalyst, CAD, renal insuffiencey, DM II, HTN and HPL who presents to the ED with c/o productive cough onset last week that has not improved despite completing a course of Azithromycin. Pt describes left sided chest pain, nasal drainage, SOB when speaking, malaise and ZAVALA. Also, pt reports constipation. He denies other symptoms.  V/S indicate temp 99.1, pulse 69, resp 20, B/P 112/56 and SpO2 95 %  Labs find WBC 2.42, H/H 10.9/34.0, INR 1.1, gluc 131, BNP = 809, lactate 0.8 and procal 0.03, negative COVID  CT imaging of chest finds bronchiectasis, RML air bronchograms. JEMMA bronchial plugging, RUL lung nodules  .

## 2020-11-04 NOTE — HOSPITAL COURSE
Pt with MM admitted with pneumonia/bronchietasis noted on CT. Symptoms had worsened despite completing a 5 day course of Azithromycin. He has  SOB, cough and left sided chest pain. Pt given supplemental oxygen, IV Vanco/Cefepime/Flagyl and DuoNebs. Also, pulmonary toiletting   with incentive spirometer and acapella provided. Pulmonology, Dr. Dutta was consulted for  bronchiectasis and lung volume loss in the LLL and recommended to continue broad spectrum antibiotic for 48 to 72h then de escalate once pt shows clinical improvement and cultures are negative. Antibiotic transitioned to oral Levofloxacin. Overall hospital course was uncomplicated . O2 wean down to RA. Pt was evaluated for Home O2 and did not qualify . Pt was examined and deemed stable and suitable for discharge today on 11/6/20. Pt was set up with Home Health for PT/OT. Nebulizer kit and Nebulizer for home use provided on discharge. Pt to follow up with PCP , and Pulmonologist. Advised to keep appt with Dr. Solo at Heme/Onc clinic as scheduled.      No

## 2020-11-04 NOTE — PLAN OF CARE
Fall precautions maintained. Pt is resting in bed. IV fluids maintained. Tolerating IV antibiotics. O2 on 2L. Offered but refused pain medicine. NSR. All neds met. Will continue to monitor.

## 2020-11-04 NOTE — PLAN OF CARE
Pt AAO x 4.  VSS.  Pt remained afebrile throughout this shift.   IV antibiotics administered per order.   Pt remained free of falls this shift.   Pt denies pain this shift.  Blood glucose monitored, corrected via SSI.  Plan of care reviewed. Patient verbalizes understanding.   Patient continue on tele monitor.   Bed low, side rails up x 2, wheels locked, call light in reach.   Patient instructed to call for assistance.   Hourly rounding completed.   Will continue to monitor.

## 2020-11-04 NOTE — ASSESSMENT & PLAN NOTE
WBC 1.77, H/H 9.9/32.0, platelets 132  Secondary to multiple myeloma and/or Pomalyst (treatment for MM)  Daily CBC   Monitor for fevers  Pt is Jainism and does not accept blood products

## 2020-11-04 NOTE — ASSESSMENT & PLAN NOTE
Lab Results   Component Value Date    HGBA1C 7.1 (H) 07/31/2020   continue long acting insulin  accuchecks  Sliding scale insulin

## 2020-11-04 NOTE — ASSESSMENT & PLAN NOTE
B/P controlled  Continue Imdur, lisinopril and metoprolol  Enalapril IV every 6 hours prn SBP > 170

## 2020-11-04 NOTE — CONSULTS
[facility] -   Pulmonology  Consult Note    Patient Name: Familia Durbin Jr.  MRN: [id number]  Admission Date: 11/3/2020  Hospital Length of Stay: 0 days  Code Status: No Order  Attending Physician: Daniel Kirk MD  Primary Care Provider: Andrea Elkins MD   Principal Problem: Bronchiectasis with acute lower respiratory infection      Subjective:     HPI:  Familia Durbin Jr. is 75 y.o.  Asked t see for abn CXR, chest CT  Known: Multiple myeloma on therapy : Pomalyst ( POMALIDOMIDE) 3 mg days 1 through 21 on a 28 day cycle.   Sent from Office : weak, cough, nasal drainage.  Recent treatment with Azithromycin  PMhx of CHF, asbestosis, Multiple myeloma with mets to the bone, CAD, renal insuffiencey, DM II, HTN and HPL   CT imaging of chest finds bronchiectasis, RML air bronchograms. 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        Diabetes mellitus (HCC)    GERD (gastroesophageal reflux disease)    Hearing loss    Hyperlipidemia    Hypertension    Myeloma (HCC) 2016    Myocardial infarction (HCC)    S/P wrist surgery       Past Medical History:   Diagnosis Date    CAD (coronary artery disease)     luikart    Diabetes mellitus, type 2 1979    eye dr rocha    Hearing impaired     Hyperlipidemia     Hypertension     Lupus     Discoid    Multiple myeloma     Old MI (myocardial infarction) 2012    Renal insufficiency     Tracheostomy tube present        Past Surgical History:   Procedure Laterality Date    CARDIAC SURGERY      CATARACT EXTRACTION      COLONOSCOPY      CORONARY ARTERY BYPASS GRAFT      HAND SURGERY      KNEE SURGERY      TRACHEOSTOMY TUBE PLACEMENT      WRIST FUSION         Review of patient's allergies indicates:   Allergen Reactions    Cephalexin     Fentanyl Nausea And Vomiting    Nsaids (non-steroidal anti-inflammatory drug)      Renal insuf       Family History     Problem Relation (Age of  Onset)    Diabetes Mother, Father, Maternal Uncle, Maternal Grandmother    No Known Problems Brother, Maternal Grandfather, Paternal Grandmother, Paternal Grandfather    Pancreatic cancer Sister    Prostate cancer Father        Tobacco Use    Smoking status: Never Smoker    Smokeless tobacco: Never Used   Substance and Sexual Activity    Alcohol use: No    Drug use: No    Sexual activity: Yes     Partners: Female         Review of Systems   Constitutional: Positive for fatigue. Negative for fever.   HENT: Positive for postnasal drip and rhinorrhea.    Respiratory: Positive for cough.      Objective:     Vital Signs (Most Recent):  Temp: 98.5 °F (36.9 °C) (11/03/20 1556)  Pulse: 63 (11/03/20 1556)  Resp: 20 (11/03/20 1556)  BP: (!) 166/70 (11/03/20 1556)  SpO2: (!) 93 % (11/03/20 1556) Vital Signs (24h Range):  Temp:  [98.5 °F (36.9 °C)-99.1 °F (37.3 °C)] 98.5 °F (36.9 °C)  Pulse:  [63-69] 63  Resp:  [20] 20  SpO2:  [93 %-100 %] 93 %  BP: (112-166)/(56-70) 166/70     Weight: 73.9 kg (163 lb)  Body mass index is 23.39 kg/m².    No intake or output data in the 24 hours ending 11/03/20 1751    Physical Exam  Vitals signs and nursing note reviewed.   Constitutional:       Appearance: He is ill-appearing.      Interventions: Nasal cannula in place.       HENT:      Head: Normocephalic and atraumatic.      Nose: Nose normal.   Eyes:      Pupils: Pupils are equal, round, and reactive to light.   Neck:      Musculoskeletal: Normal range of motion.   Cardiovascular:      Rate and Rhythm: Normal rate and regular rhythm.      Pulses: Normal pulses.      Heart sounds: Murmur present.   Pulmonary:      Breath sounds: Examination of the left-lower field reveals decreased breath sounds. Decreased breath sounds present. No wheezing or rhonchi.       Abdominal:      General: Bowel sounds are normal.   Musculoskeletal: Normal range of motion.   Skin:     General: Skin is warm.   Neurological:      General: No focal deficit  present.      Mental Status: He is alert and oriented to person, place, and time. Mental status is at baseline.         Vents:       Lines/Drains/Airways     Peripheral Intravenous Line                 Peripheral IV - Single Lumen 09/11/18 1409 Left Antecubital 784 days                Significant Labs:    CBC/Anemia Profile:  Recent Labs   Lab 11/03/20  1018   WBC 2.42*   HGB 10.9*   HCT 34.0*      *   RDW 16.5*        Chemistries:  Recent Labs   Lab 11/03/20  1018      K 4.4      CO2 28   BUN 17   CREATININE 1.4   CALCIUM 9.4   ALBUMIN 3.5   PROT 8.0   BILITOT 0.7   ALKPHOS 62   ALT 14   AST 13       ABGs: No results for input(s): PH, PCO2, HCO3, POCSATURATED, BE in the last 48 hours.  Blood Culture: No results for input(s): LABBLOO in the last 48 hours.  Cardiac Markers: No results for input(s): CKMB, TROPONINT, MYOGLOBIN in the last 48 hours.  POCT Glucose:   Recent Labs   Lab 11/03/20  1653   POCTGLUCOSE 94     Respiratory Culture: No results for input(s): GSRESP, RESPIRATORYC in the last 48 hours.  Urine Culture: No results for input(s): LABURIN in the last 48 hours.  All pertinent labs within the past 24 hours have been reviewed.    Significant Imaging:   I have reviewed all pertinent imaging results/findings within the past 24 hours.     CXR  EXAMINATION:  XR CHEST PA AND LATERAL     CLINICAL HISTORY:  Multiple myeloma not having achieved remission     COMPARISON:  09/11/2018     FINDINGS:  Cardiac silhouette is mildly enlarged but stable.  Dense coronary disease.  Aorta demonstrates atherosclerotic disease.  Sternotomy wires are intact.  The lungs demonstrate no evidence of active disease.  Emphysematous changes are seen within the upper lobes.  Chronic interstitial lung disease and bronchiectasis seen within the left lower lobe.  No evidence of pleural effusion or pneumothorax.  Bones demonstrate moderate degenerative changes with multilevel spondylosis and diffuse  osteopenia.    CHEST CT  EXAMINATION:  CT CHEST WITHOUT CONTRAST     CLINICAL HISTORY:  Pneumonia;     TECHNIQUE:  Chest CT was performed without contrast. All CT scans at this facility use dose modulation, iterative reconstruction, and/or weight based dosing when appropriate to reduce radiation dose to as low as reasonably achievable.     COMPARISON:  PET-CT scan from December 5, 2019.     FINDINGS:  Prior median sternotomy.  There has been progression of volume loss involving the majority of the left lower lobe with varicoid bronchiectasis and air bronchograms.  Volume loss involving the medial right lower lobe with air bronchograms.  Small focus of tree-in-bud opacity within the posterior aspect of the left upper lobe at the terminus of a bronchovascular which structure is slightly larger compared to the prior PET-CT scan.  It measures 11 mm craniocaudal by 17 mm AP x 9 mm transverse.  Cluster of nodules within the periphery of the right upper lobe on axial series 3, image 177 is unchanged.  Thickening of major fissure with somewhat more prominent nodule projecting posteriorly within the left lower lobe.  It measures 5 mm and is best demonstrated on image 302.  6 mm nodule within the left apex on image 118 is not definitely changed.  No endobronchial or endotracheal lesions.  No pathologically enlarged mediastinal, hilar, or axillary lymph nodes. The heart is enlarged with coronary artery calcifications.  Profound osteopenia.  Limited images through the upper abdomen demonstrate no acute abnormality.     Impression:     1. Atelectasis of the near entirety of the left lower lobe with varicoid bronchiectasis.  This has progressed compared to the prior PET-CT scan.  There is also atelectasis involving the medial aspect of the right middle lobe where there are air bronchograms.  This has also progressed in the interval.  2. Somewhat linear focus of nodularity within the posterior aspect of the left upper lobe could  relate to bronchial plugging.  It has increased in size compared to the prior PET-CT scan.  Close attention should be given to this region upon future follow-up.  3. There is more nodularity and thickening of the major fissure within adjacent nodule that is also new compared to the prior PET-CT scan.  4. Cluster of nodules within the periphery of the right upper lobe posteriorly has not definitely changed.  5. Prior median sternotomy with coronary artery calcifications.  6. Profound osteopenia.      ECHO  · There is left ventricular concentric hypertrophy.  · The left ventricle is normal in size with normal systolic function. The estimated ejection fraction is 55%.  · Indeterminate diastolic function.  · With low normal right ventricular systolic function.  · Mild-to-moderate aortic regurgitation.  · Mild-to-moderate aortic valve stenosis.  · Aortic valve area is 1.54 cm2; peak velocity is 2.17 m/s; mean gradient is 11 mmHg.  · Mild pulmonic regurgitation.  · The mitral valve is mildly sclerotic.  · Normal central venous pressure (3 mmHg).  · Mild mitral regurgitation.           ABG  No results for input(s): PH, PO2, PCO2, HCO3, BE in the last 168 hours.  Assessment/Plan:     * Bronchiectasis with acute lower respiratory infection  Bronchodilators  Accapella  Sputum culture  Abx: Vancomycin + CEFEPIME+ FLAGYL  Flu vacination before DC  No current indication or plans for bronchoscopy: No bleeding reported    Nasal drainage  Xray sinus  May need CT sinus  Drainage doesn't appear purelent    Pulmonary nodules  LLL RUL  6 mm  Need followup in 3-6 months    Multiple myeloma  POMALIDOMIDE has known pulmonary toxicity including increased risk for infection  Follow with Hematology          Thank you for your consult. I will follow-up with patient. Please contact us if you have any additional questions.     Gerardo Dutta MD  Pulmonology  [facility] -

## 2020-11-05 LAB
ALBUMIN SERPL BCP-MCNC: 3.1 G/DL (ref 3.5–5.2)
ALP SERPL-CCNC: 48 U/L (ref 55–135)
ALT SERPL W/O P-5'-P-CCNC: 9 U/L (ref 10–44)
ANION GAP SERPL CALC-SCNC: 10 MMOL/L (ref 8–16)
ANISOCYTOSIS BLD QL SMEAR: SLIGHT
AST SERPL-CCNC: 16 U/L (ref 10–40)
BASOPHILS # BLD AUTO: 0.02 K/UL (ref 0–0.2)
BASOPHILS NFR BLD: 0.8 % (ref 0–1.9)
BILIRUB SERPL-MCNC: 0.7 MG/DL (ref 0.1–1)
BUN SERPL-MCNC: 21 MG/DL (ref 8–23)
CALCIUM SERPL-MCNC: 8.7 MG/DL (ref 8.7–10.5)
CHLORIDE SERPL-SCNC: 103 MMOL/L (ref 95–110)
CO2 SERPL-SCNC: 21 MMOL/L (ref 23–29)
CREAT SERPL-MCNC: 1.5 MG/DL (ref 0.5–1.4)
DACRYOCYTES BLD QL SMEAR: ABNORMAL
DIFFERENTIAL METHOD: ABNORMAL
EOSINOPHIL # BLD AUTO: 0.1 K/UL (ref 0–0.5)
EOSINOPHIL NFR BLD: 2 % (ref 0–8)
ERYTHROCYTE [DISTWIDTH] IN BLOOD BY AUTOMATED COUNT: 16.4 % (ref 11.5–14.5)
EST. GFR  (AFRICAN AMERICAN): 52 ML/MIN/1.73 M^2
EST. GFR  (NON AFRICAN AMERICAN): 45 ML/MIN/1.73 M^2
GLUCOSE SERPL-MCNC: 129 MG/DL (ref 70–110)
HCT VFR BLD AUTO: 31.1 % (ref 40–54)
HGB BLD-MCNC: 9.9 G/DL (ref 14–18)
IMM GRANULOCYTES # BLD AUTO: 0.03 K/UL (ref 0–0.04)
IMM GRANULOCYTES NFR BLD AUTO: 1.2 % (ref 0–0.5)
LYMPHOCYTES # BLD AUTO: 0.7 K/UL (ref 1–4.8)
LYMPHOCYTES NFR BLD: 26.4 % (ref 18–48)
MCH RBC QN AUTO: 33.4 PG (ref 27–31)
MCHC RBC AUTO-ENTMCNC: 31.8 G/DL (ref 32–36)
MCV RBC AUTO: 105 FL (ref 82–98)
MONOCYTES # BLD AUTO: 0.6 K/UL (ref 0.3–1)
MONOCYTES NFR BLD: 22.4 % (ref 4–15)
NEUTROPHILS # BLD AUTO: 1.2 K/UL (ref 1.8–7.7)
NEUTROPHILS NFR BLD: 47.2 % (ref 38–73)
NRBC BLD-RTO: 0 /100 WBC
OVALOCYTES BLD QL SMEAR: ABNORMAL
PLATELET # BLD AUTO: 127 K/UL (ref 150–350)
PLATELET BLD QL SMEAR: ABNORMAL
PMV BLD AUTO: 10.7 FL (ref 9.2–12.9)
POCT GLUCOSE: 107 MG/DL (ref 70–110)
POCT GLUCOSE: 121 MG/DL (ref 70–110)
POCT GLUCOSE: 160 MG/DL (ref 70–110)
POCT GLUCOSE: 188 MG/DL (ref 70–110)
POIKILOCYTOSIS BLD QL SMEAR: SLIGHT
POTASSIUM SERPL-SCNC: 4 MMOL/L (ref 3.5–5.1)
PROT SERPL-MCNC: 7.6 G/DL (ref 6–8.4)
RBC # BLD AUTO: 2.96 M/UL (ref 4.6–6.2)
SODIUM SERPL-SCNC: 134 MMOL/L (ref 136–145)
TARGETS BLD QL SMEAR: ABNORMAL
VANCOMYCIN TROUGH SERPL-MCNC: 10.8 UG/ML (ref 10–22)
WBC # BLD AUTO: 2.5 K/UL (ref 3.9–12.7)

## 2020-11-05 PROCEDURE — 94664 DEMO&/EVAL PT USE INHALER: CPT

## 2020-11-05 PROCEDURE — S0030 INJECTION, METRONIDAZOLE: HCPCS | Performed by: NURSE PRACTITIONER

## 2020-11-05 PROCEDURE — 21400001 HC TELEMETRY ROOM

## 2020-11-05 PROCEDURE — 94799 UNLISTED PULMONARY SVC/PX: CPT

## 2020-11-05 PROCEDURE — 99232 SBSQ HOSP IP/OBS MODERATE 35: CPT | Mod: ,,, | Performed by: INTERNAL MEDICINE

## 2020-11-05 PROCEDURE — 85025 COMPLETE CBC W/AUTO DIFF WBC: CPT

## 2020-11-05 PROCEDURE — 63600175 PHARM REV CODE 636 W HCPCS: Performed by: INTERNAL MEDICINE

## 2020-11-05 PROCEDURE — 87070 CULTURE OTHR SPECIMN AEROBIC: CPT

## 2020-11-05 PROCEDURE — 63600175 PHARM REV CODE 636 W HCPCS: Performed by: NURSE PRACTITIONER

## 2020-11-05 PROCEDURE — 80053 COMPREHEN METABOLIC PANEL: CPT

## 2020-11-05 PROCEDURE — 96372 THER/PROPH/DIAG INJ SC/IM: CPT

## 2020-11-05 PROCEDURE — 99900035 HC TECH TIME PER 15 MIN (STAT)

## 2020-11-05 PROCEDURE — 99213 OFFICE O/P EST LOW 20 MIN: CPT | Mod: ,,, | Performed by: INTERNAL MEDICINE

## 2020-11-05 PROCEDURE — 96376 TX/PRO/DX INJ SAME DRUG ADON: CPT

## 2020-11-05 PROCEDURE — 27000221 HC OXYGEN, UP TO 24 HOURS

## 2020-11-05 PROCEDURE — 99213 PR OFFICE/OUTPT VISIT, EST, LEVL III, 20-29 MIN: ICD-10-PCS | Mod: ,,, | Performed by: INTERNAL MEDICINE

## 2020-11-05 PROCEDURE — 94761 N-INVAS EAR/PLS OXIMETRY MLT: CPT

## 2020-11-05 PROCEDURE — 25000242 PHARM REV CODE 250 ALT 637 W/ HCPCS: Performed by: NURSE PRACTITIONER

## 2020-11-05 PROCEDURE — 94640 AIRWAY INHALATION TREATMENT: CPT

## 2020-11-05 PROCEDURE — 36415 COLL VENOUS BLD VENIPUNCTURE: CPT

## 2020-11-05 PROCEDURE — 80202 ASSAY OF VANCOMYCIN: CPT

## 2020-11-05 PROCEDURE — 25000003 PHARM REV CODE 250: Performed by: NURSE PRACTITIONER

## 2020-11-05 PROCEDURE — 99232 PR SUBSEQUENT HOSPITAL CARE,LEVL II: ICD-10-PCS | Mod: ,,, | Performed by: INTERNAL MEDICINE

## 2020-11-05 PROCEDURE — 25000003 PHARM REV CODE 250: Performed by: INTERNAL MEDICINE

## 2020-11-05 RX ADMIN — ROSUVASTATIN 40 MG: 10 TABLET, FILM COATED ORAL at 08:11

## 2020-11-05 RX ADMIN — RANOLAZINE 1000 MG: 500 TABLET, EXTENDED RELEASE ORAL at 08:11

## 2020-11-05 RX ADMIN — INSULIN ASPART 2 UNITS: 100 INJECTION, SOLUTION INTRAVENOUS; SUBCUTANEOUS at 11:11

## 2020-11-05 RX ADMIN — VANCOMYCIN HYDROCHLORIDE 500 MG: 500 INJECTION, POWDER, LYOPHILIZED, FOR SOLUTION INTRAVENOUS at 07:11

## 2020-11-05 RX ADMIN — ASPIRIN 81 MG: 81 TABLET, COATED ORAL at 08:11

## 2020-11-05 RX ADMIN — TICAGRELOR 90 MG: 90 TABLET ORAL at 08:11

## 2020-11-05 RX ADMIN — PANTOPRAZOLE SODIUM 40 MG: 40 TABLET, DELAYED RELEASE ORAL at 08:11

## 2020-11-05 RX ADMIN — METRONIDAZOLE 500 MG: 500 INJECTION, SOLUTION INTRAVENOUS at 10:11

## 2020-11-05 RX ADMIN — INSULIN DETEMIR 10 UNITS: 100 INJECTION, SOLUTION SUBCUTANEOUS at 08:11

## 2020-11-05 RX ADMIN — STANDARDIZED SENNA CONCENTRATE AND DOCUSATE SODIUM 1 TABLET: 8.6; 5 TABLET ORAL at 08:11

## 2020-11-05 RX ADMIN — VANCOMYCIN HYDROCHLORIDE 1250 MG: 1.25 INJECTION, POWDER, LYOPHILIZED, FOR SOLUTION INTRAVENOUS at 05:11

## 2020-11-05 RX ADMIN — CEFEPIME HYDROCHLORIDE 2 G: 2 INJECTION, SOLUTION INTRAVENOUS at 04:11

## 2020-11-05 RX ADMIN — METRONIDAZOLE 500 MG: 500 INJECTION, SOLUTION INTRAVENOUS at 05:11

## 2020-11-05 RX ADMIN — IPRATROPIUM BROMIDE AND ALBUTEROL SULFATE 3 ML: .5; 3 SOLUTION RESPIRATORY (INHALATION) at 12:11

## 2020-11-05 RX ADMIN — CEFEPIME HYDROCHLORIDE 2 G: 2 INJECTION, SOLUTION INTRAVENOUS at 05:11

## 2020-11-05 RX ADMIN — METRONIDAZOLE 500 MG: 500 INJECTION, SOLUTION INTRAVENOUS at 01:11

## 2020-11-05 RX ADMIN — IPRATROPIUM BROMIDE AND ALBUTEROL SULFATE 3 ML: .5; 3 SOLUTION RESPIRATORY (INHALATION) at 04:11

## 2020-11-05 RX ADMIN — IPRATROPIUM BROMIDE AND ALBUTEROL SULFATE 3 ML: .5; 3 SOLUTION RESPIRATORY (INHALATION) at 07:11

## 2020-11-05 NOTE — PROGRESS NOTES
[facility]  Pulmonology  Progress Note    Patient Name: Familia Durbin Jr.  MRN: [id number]  Admission Date: 11/3/2020  Hospital Length of Stay: 0 days  Code Status: No Order  Attending Provider: Daniel Kirk MD  Primary Care Provider: Andrea Elkins MD   Principal Problem: Bronchiectasis with acute lower respiratory infection    Subjective:     Interval History: *   11/04: seen and examined: WBCC 1.77, A febrile, feels better   11/05: seen: daughter at bedside, sleepy, improved, wbcc 2.50    Review of Systems   Respiratory: Negative for cough, hemoptysis, sputum production, shortness of breath and wheezing.    All other systems reviewed and are negative.       Objective:     Vital Signs (Most Recent):  Temp: 97 °F (36.1 °C) (11/04/20 1617)  Pulse: 62 (11/04/20 1617)  Resp: 18 (11/04/20 1617)  BP: (!) 102/51 (11/04/20 1617)  SpO2: 100 % (11/04/20 1617) Vital Signs (24h Range):  Temp:  [97 °F (36.1 °C)-99.3 °F (37.4 °C)] 97 °F (36.1 °C)  Pulse:  [62-69] 62  Resp:  [16-20] 18  SpO2:  [94 %-100 %] 100 %  BP: (102-118)/(47-57) 102/51     Weight: 75.3 kg (166 lb 0.1 oz)  Body mass index is 23.82 kg/m².      Intake/Output Summary (Last 24 hours) at 11/4/2020 1809  Last data filed at 11/4/2020 1637  Gross per 24 hour   Intake 784.5 ml   Output --   Net 784.5 ml       Physical Exam  Vitals signs and nursing note reviewed.   Constitutional:       Appearance: He is ill-appearing.      Interventions: Nasal cannula in place.       HENT:      Head: Normocephalic and atraumatic.      Nose: Nose normal.   Eyes:      Pupils: Pupils are equal, round, and reactive to light.   Neck:      Musculoskeletal: Normal range of motion.   Cardiovascular:      Rate and Rhythm: Normal rate and regular rhythm.      Pulses: Normal pulses.      Heart sounds: Murmur present.   Pulmonary:      Breath sounds: Examination of the left-lower field reveals decreased breath sounds. Decreased breath sounds present. No wheezing or  rhonchi.       Abdominal:      General: Bowel sounds are normal.   Musculoskeletal: Normal range of motion.   Skin:     General: Skin is warm.   Neurological:      General: No focal deficit present.      Mental Status: He is alert and oriented to person, place, and time. Mental status is at baseline.         Vents:  Oxygen Concentration (%): 28 (11/04/20 0718)    Lines/Drains/Airways     Peripheral Intravenous Line                 Peripheral IV - Single Lumen 09/11/18 1409 Left Antecubital 785 days                Significant Labs:    CBC/Anemia Profile:  Recent Labs   Lab 11/03/20  1018 11/04/20  0617   WBC 2.42* 1.77*   HGB 10.9* 9.9*   HCT 34.0* 32.0*    132*   * 105*   RDW 16.5* 16.9*        Chemistries:  Recent Labs   Lab 11/03/20  1018 11/04/20  0617    137   K 4.4 4.6    105   CO2 28 27   BUN 17 18   CREATININE 1.4 1.4   CALCIUM 9.4 8.6*   ALBUMIN 3.5 3.1*   PROT 8.0 7.2   BILITOT 0.7 0.7   ALKPHOS 62 46*   ALT 14 9*   AST 13 13   MG  --  1.7       Blood Culture:   Recent Labs   Lab 11/03/20  1221 11/03/20  1436   LABBLOO No Growth to date No Growth to date     POCT Glucose:   Recent Labs   Lab 11/04/20  0557 11/04/20  1143 11/04/20  1704   POCTGLUCOSE 98 95 100     Respiratory Culture: No results for input(s): GSRESP, RESPIRATORYC in the last 48 hours.  All pertinent labs within the past 24 hours have been reviewed.    Significant Imaging:  I have reviewed all pertinent imaging results/findings within the past 24 hours.     Xray sinus  Narrative & Impression     EXAMINATION:  XR SINUSES MIN 3 VIEWS     CLINICAL HISTORY:  XR SINUSES MIN 3 VIEWS     COMPARISON:  None     FINDINGS:  Three views of the sinuses were obtained.     No evidence of acute fracture or dislocation.  Diffuse osteopenia.  Heterogeneous marrow signal noted.  Soft tissues are unremarkable. Sinuses are grossly clear.  No fluid levels to suggest acute sinusitis.     Impression:              ABG  No results for  input(s): PH, PO2, PCO2, HCO3, BE in the last 168 hours.  Assessment/Plan:     * Bronchiectasis with acute lower respiratory infection  Bronchodilators  Accapella  Sputum culture  Abx: Vancomycin + CEFEPIME+ FLAGYL  Flu vacination before DC  No current indication or plans for bronchoscopy: No bleeding reported    Nasal drainage  Xray sinus Normal  Improved  Drainage doesn't appear purelent    Pulmonary nodules  LLL RUL  6 mm  Need followup in 3-6 months         Gerardo Dutta MD  Pulmonology  [facility]

## 2020-11-05 NOTE — PLAN OF CARE
Ongoing (interventions implemented as appropriate)  Pt is alert and oriented.   VSS  Pt able to make needs known.  Pt remained afebrile throughout this shift.   Pt remained free of falls this shift.   Pt denies pain this shift.  Plan of care reviewed. Patient verbalizes understanding.   Pt moving/turing independent. Frequent weight shifting encouraged.  Patient normal sinus rhythm on monitor.   Bed low, side rails up x 2, wheels locked, call light in reach.   Hourly rounding completed.   Will continue to monitor.

## 2020-11-05 NOTE — SUBJECTIVE & OBJECTIVE
Interval History: *   11/04: seen and examined: WBCC 1.77, A febrile, feels better   11/05: seen: daughter at bedside, sleepy, improved, wbcc 2.50    Review of Systems   Respiratory: Negative for cough, hemoptysis, sputum production, shortness of breath and wheezing.    All other systems reviewed and are negative.       Objective:     Vital Signs (Most Recent):  Temp: 97 °F (36.1 °C) (11/04/20 1617)  Pulse: 62 (11/04/20 1617)  Resp: 18 (11/04/20 1617)  BP: (!) 102/51 (11/04/20 1617)  SpO2: 100 % (11/04/20 1617) Vital Signs (24h Range):  Temp:  [97 °F (36.1 °C)-99.3 °F (37.4 °C)] 97 °F (36.1 °C)  Pulse:  [62-69] 62  Resp:  [16-20] 18  SpO2:  [94 %-100 %] 100 %  BP: (102-118)/(47-57) 102/51     Weight: 75.3 kg (166 lb 0.1 oz)  Body mass index is 23.82 kg/m².      Intake/Output Summary (Last 24 hours) at 11/4/2020 1809  Last data filed at 11/4/2020 1637  Gross per 24 hour   Intake 784.5 ml   Output --   Net 784.5 ml       Physical Exam  Vitals signs and nursing note reviewed.   Constitutional:       Appearance: He is ill-appearing.      Interventions: Nasal cannula in place.       HENT:      Head: Normocephalic and atraumatic.      Nose: Nose normal.   Eyes:      Pupils: Pupils are equal, round, and reactive to light.   Neck:      Musculoskeletal: Normal range of motion.   Cardiovascular:      Rate and Rhythm: Normal rate and regular rhythm.      Pulses: Normal pulses.      Heart sounds: Murmur present.   Pulmonary:      Breath sounds: Examination of the left-lower field reveals decreased breath sounds. Decreased breath sounds present. No wheezing or rhonchi.       Abdominal:      General: Bowel sounds are normal.   Musculoskeletal: Normal range of motion.   Skin:     General: Skin is warm.   Neurological:      General: No focal deficit present.      Mental Status: He is alert and oriented to person, place, and time. Mental status is at baseline.         Vents:  Oxygen Concentration (%): 28 (11/04/20  0718)    Lines/Drains/Airways     Peripheral Intravenous Line                 Peripheral IV - Single Lumen 09/11/18 1409 Left Antecubital 785 days                Significant Labs:    CBC/Anemia Profile:  Recent Labs   Lab 11/03/20  1018 11/04/20  0617   WBC 2.42* 1.77*   HGB 10.9* 9.9*   HCT 34.0* 32.0*    132*   * 105*   RDW 16.5* 16.9*        Chemistries:  Recent Labs   Lab 11/03/20  1018 11/04/20  0617    137   K 4.4 4.6    105   CO2 28 27   BUN 17 18   CREATININE 1.4 1.4   CALCIUM 9.4 8.6*   ALBUMIN 3.5 3.1*   PROT 8.0 7.2   BILITOT 0.7 0.7   ALKPHOS 62 46*   ALT 14 9*   AST 13 13   MG  --  1.7       Blood Culture:   Recent Labs   Lab 11/03/20  1221 11/03/20  1436   LABBLOO No Growth to date No Growth to date     POCT Glucose:   Recent Labs   Lab 11/04/20  0557 11/04/20  1143 11/04/20  1704   POCTGLUCOSE 98 95 100     Respiratory Culture: No results for input(s): GSRESP, RESPIRATORYC in the last 48 hours.  All pertinent labs within the past 24 hours have been reviewed.    Significant Imaging:  I have reviewed all pertinent imaging results/findings within the past 24 hours.     Xray sinus  Narrative & Impression     EXAMINATION:  XR SINUSES MIN 3 VIEWS     CLINICAL HISTORY:  XR SINUSES MIN 3 VIEWS     COMPARISON:  None     FINDINGS:  Three views of the sinuses were obtained.     No evidence of acute fracture or dislocation.  Diffuse osteopenia.  Heterogeneous marrow signal noted.  Soft tissues are unremarkable. Sinuses are grossly clear.  No fluid levels to suggest acute sinusitis.     Impression:

## 2020-11-05 NOTE — ASSESSMENT & PLAN NOTE
Urine culture from 10/26/2020  Pt was taking Azithromycin for his pneumonia - not sensitive  Continue Cefepime

## 2020-11-05 NOTE — ASSESSMENT & PLAN NOTE
No improvement with taking Azithromyin  IV Vanco, Flagyl and Cefepime  Supplemental oxygen to maintain sats > 92 %  Sputum culture pending  CT imaging - volume loss of LLL - bronchiectasis and air bronchograms, tree in bud opacity - JEMMA, RUL- cluster of nodules in RUL  Continue Pulmonology regarding volume loss,bronchiectasis and possible need for bronchoscopy  DuoNebs  Incentive spirometer  11/5/2020 - Day 3 of antibiotics. Stable for discharge on 11/6/2020 - will have received a full 3 days of IV ABX and starting day 4. Discharge home on Levaquin 750 mg. Will need to follow up with Dr. Dutta for repeat imaging.

## 2020-11-05 NOTE — SUBJECTIVE & OBJECTIVE
Interval History:  Negative for acute events overnight.  Continues on IV antibiotic coverage.     Oncology Treatment Plan:   [No treatment plan]    Medications:  Continuous Infusions:  Scheduled Meds:   albuterol-ipratropium  3 mL Nebulization Q8H    aspirin  81 mg Oral Daily    ceFEPime (MAXIPIME) IVPB  2 g Intravenous Q12H    insulin detemir U-100  10 Units Subcutaneous QHS    isosorbide mononitrate  30 mg Oral Daily    lisinopriL  2.5 mg Oral Daily    metoprolol tartrate  50 mg Oral BID    metronidazole  500 mg Intravenous Q8H    oxyCODONE  20 mg Oral Q12H    oxyCODONE  10 mg Oral Once    pantoprazole  40 mg Oral Daily    ranolazine  1,000 mg Oral BID    rosuvastatin  40 mg Oral QHS    senna-docusate 8.6-50 mg  1 tablet Oral BID    ticagrelor  90 mg Oral Daily    vancomycin (VANCOCIN) IVPB  1,250 mg Intravenous Q24H     PRN Meds:acetaminophen, dextrose 50%, dextrose 50%, glucagon (human recombinant), glucose, glucose, insulin aspart U-100, ondansetron, oxyCODONE     Review of Systems   Constitutional: Positive for activity change, appetite change and fatigue. Negative for chills, diaphoresis, fever and unexpected weight change.   HENT: Negative for congestion, hearing loss, nosebleeds, postnasal drip, rhinorrhea and trouble swallowing.    Eyes: Negative for discharge and visual disturbance.   Respiratory: Positive for cough and shortness of breath. Negative for chest tightness.    Cardiovascular: Negative for chest pain, palpitations and leg swelling.   Gastrointestinal: Negative for abdominal distention, abdominal pain, constipation, diarrhea, nausea and vomiting.   Endocrine: Negative for cold intolerance and heat intolerance.   Genitourinary: Negative for difficulty urinating, dysuria, frequency and hematuria.   Musculoskeletal: Positive for arthralgias and back pain. Negative for gait problem and myalgias.   Skin: Negative.    Neurological: Negative for dizziness, weakness, light-headedness  and headaches.   Hematological: Negative for adenopathy. Does not bruise/bleed easily.   Psychiatric/Behavioral: Negative for agitation, behavioral problems and confusion. The patient is not nervous/anxious.      Objective:     Vital Signs (Most Recent):  Temp: 98.4 °F (36.9 °C) (11/05/20 0726)  Pulse: 62 (11/05/20 0900)  Resp: 18 (11/05/20 0732)  BP: (!) 100/49 (11/05/20 0726)  SpO2: 99 % (11/05/20 0732) Vital Signs (24h Range):  Temp:  [97 °F (36.1 °C)-98.6 °F (37 °C)] 98.4 °F (36.9 °C)  Pulse:  [62-79] 62  Resp:  [18-20] 18  SpO2:  [95 %-100 %] 99 %  BP: ()/(47-60) 100/49     Weight: 76.2 kg (167 lb 15.9 oz)  Body mass index is 24.1 kg/m².  Body surface area is 1.94 meters squared.      Intake/Output Summary (Last 24 hours) at 11/5/2020 1058  Last data filed at 11/4/2020 2123  Gross per 24 hour   Intake 687 ml   Output 25 ml   Net 662 ml       Physical Exam  Vitals signs and nursing note reviewed.   Constitutional:       General: He is not in acute distress.     Appearance: He is well-developed. He is not diaphoretic.   HENT:      Head: Normocephalic and atraumatic.      Right Ear: Hearing and external ear normal.      Left Ear: Hearing and external ear normal.      Nose: Nose normal. No mucosal edema or rhinorrhea.      Mouth/Throat:      Pharynx: Uvula midline.   Eyes:      General:         Right eye: No discharge.         Left eye: No discharge.      Conjunctiva/sclera: Conjunctivae normal.      Right eye: No chemosis.     Left eye: No chemosis.     Pupils: Pupils are equal, round, and reactive to light.   Neck:      Musculoskeletal: Normal range of motion and neck supple.      Thyroid: No thyroid mass or thyromegaly.      Trachea: Trachea normal.   Cardiovascular:      Rate and Rhythm: Normal rate and regular rhythm.      Pulses:           Dorsalis pedis pulses are 1+ on the right side and 1+ on the left side.      Heart sounds: Normal heart sounds. No murmur.   Pulmonary:      Effort: Pulmonary effort  is normal. No respiratory distress.      Breath sounds: No decreased breath sounds, wheezing or rales.   Abdominal:      General: Bowel sounds are normal. There is no distension.      Palpations: Abdomen is soft.      Tenderness: There is no abdominal tenderness.   Musculoskeletal: Normal range of motion.   Lymphadenopathy:      Cervical: No cervical adenopathy.      Upper Body:      Right upper body: No supraclavicular adenopathy.      Left upper body: No supraclavicular adenopathy.   Skin:     General: Skin is warm and dry.      Capillary Refill: Capillary refill takes less than 2 seconds.      Findings: No rash.   Neurological:      Mental Status: He is alert and oriented to person, place, and time.   Psychiatric:         Mood and Affect: Mood is not anxious.         Speech: Speech normal.         Behavior: Behavior normal.         Thought Content: Thought content normal.         Judgment: Judgment normal.         Significant Labs:   CBC:   Recent Labs   Lab 11/04/20  0617 11/05/20  0643   WBC 1.77* 2.50*   HGB 9.9* 9.9*   HCT 32.0* 31.1*   * 127*    and CMP:   Recent Labs   Lab 11/04/20  0617 11/05/20  0643    134*   K 4.6 4.0    103   CO2 27 21*   * 129*   BUN 18 21   CREATININE 1.4 1.5*   CALCIUM 8.6* 8.7   PROT 7.2 7.6   ALBUMIN 3.1* 3.1*   BILITOT 0.7 0.7   ALKPHOS 46* 48*   AST 13 16   ALT 9* 9*   ANIONGAP 5* 10   EGFRNONAA 48* 45*       Diagnostic Results:  I have reviewed all pertinent imaging results/findings within the past 24 hours.

## 2020-11-05 NOTE — PROGRESS NOTES
[facility] Hospital Medicine  Progress Note    Patient Name: Familia Durbin Jr.  MRN: [id number]  Patient Class: OP- Observation   Admission Date: 11/3/2020  Length of Stay: 0 days  Attending Physician: Daniel Kirk MD  Primary Care Provider: Andrea Elkins MD        Subjective:     Principal Problem:Bronchiectasis with acute lower respiratory infection        HPI:  Pt is a 76 yo Jehov[employer] Witness male with PMhx of CHF, asbestosis, Multiple myeloma with mets to the bone, CAD, renal insuffiencey, DM II, HTN and HPL who presents to the ED with c/o productive cough onset last week that has not improved despite completing a course of Azithromycin. Pt describes left sided chest pain, nasal drainage, SOB when speaking, malaise and ZAVALA. Also, pt reports constipation. He denies other symptoms.  V/S indicate temp 99.1, pulse 69, resp 20, B/P 112/56 and SpO2 95 %  Labs find WBC 2.42, H/H 10.9/34.0, INR 1.1, gluc 131, BNP = 809, lactate 0.8 and procal 0.03, negative COVID  CT imaging of chest finds bronchiectasis, RML air bronchograms. JEMMA bronchial plugging, RUL lung nodules  Pt is placed on Observation for further treatment/evaluation of pneumonia and bronchietasis.      Overview/Hospital Course:  Pt with MM admitted with pneumonia/bronchietasis noted on CT. Symptoms had worsened despite completing a 5 day course of Azithromycin. He was SOB, cough and left sided chest pain. Pt given supplemental oxygen, IV Vanco/Cefepime/Flagyl and DuoNebs. Also, pulmonary toilet of incentive spirometer and acapella provided. Pulmonology, Dr. Dutta, saw the patient due to bronchiectasis and lung volume loss in the LLL and recs for IV ABX received.       Interval History: Sitting on side of bed, has eaten a small amount of breakfast. Pt is [place]. 11/5/2020 - Day 3 of IV ABX of IV Vanco, Cefepime and Flagyl. Sputum culture has not been collected. Pt describes productive cough with greenish sputum.     Review of  Systems   Constitutional: Positive for fatigue. Negative for appetite change, chills, diaphoresis and fever.   HENT: Negative for congestion.    Respiratory: Positive for cough. Negative for shortness of breath.    Cardiovascular: Negative for chest pain, palpitations and leg swelling.   Gastrointestinal: Positive for constipation. Negative for abdominal pain, diarrhea, nausea and vomiting.   Genitourinary: Negative.    Musculoskeletal: Positive for arthralgias and back pain.   Skin: Negative for pallor, rash and wound.   Neurological: Positive for weakness. Negative for dizziness, light-headedness and headaches.   Psychiatric/Behavioral: Negative for decreased concentration.     Objective:     Vital Signs (Most Recent):  Temp: 98.4 °F (36.9 °C) (11/05/20 0726)  Pulse: 62 (11/05/20 0900)  Resp: 18 (11/05/20 0732)  BP: (!) 100/49 (11/05/20 0726)  SpO2: 99 % (11/05/20 0732) Vital Signs (24h Range):  Temp:  [97 °F (36.1 °C)-98.6 °F (37 °C)] 98.4 °F (36.9 °C)  Pulse:  [62-79] 62  Resp:  [18-20] 18  SpO2:  [95 %-100 %] 99 %  BP: ()/(47-60) 100/49     Weight: 76.2 kg (167 lb 15.9 oz)  Body mass index is 24.1 kg/m².    Intake/Output Summary (Last 24 hours) at 11/5/2020 0929  Last data filed at 11/4/2020 2123  Gross per 24 hour   Intake 687 ml   Output 25 ml   Net 662 ml      Physical Exam  Vitals signs and nursing note reviewed.   Constitutional:       Appearance: Normal appearance. He is well-developed.      Comments: Pt is hard of hearing   HENT:      Head: Normocephalic and atraumatic.      Nose: Nose normal.   Eyes:      General: No scleral icterus.     Conjunctiva/sclera: Conjunctivae normal.      Pupils: Pupils are equal, round, and reactive to light.   Neck:      Musculoskeletal: Normal range of motion and neck supple.   Cardiovascular:      Rate and Rhythm: Normal rate and regular rhythm.      Heart sounds: Normal heart sounds. No murmur. No friction rub. No gallop.    Pulmonary:      Effort: Pulmonary effort  is normal.      Breath sounds: Examination of the left-lower field reveals rhonchi. Rhonchi present.   Abdominal:      General: Bowel sounds are normal.      Palpations: Abdomen is soft.   Musculoskeletal: Normal range of motion.         General: No tenderness.   Skin:     General: Skin is warm and dry.   Neurological:      Mental Status: He is alert and oriented to person, place, and time.   Psychiatric:         Behavior: Behavior normal.         Thought Content: Thought content normal.         Significant Labs:   CBC:   Recent Labs   Lab 11/03/20  1018 11/04/20  0617 11/05/20  0643   WBC 2.42* 1.77* 2.50*   HGB 10.9* 9.9* 9.9*   HCT 34.0* 32.0* 31.1*    132* 127*     CMP:   Recent Labs   Lab 11/03/20  1018 11/04/20  0617 11/05/20  0643    137 134*   K 4.4 4.6 4.0    105 103   CO2 28 27 21*   * 124* 129*   BUN 17 18 21   CREATININE 1.4 1.4 1.5*   CALCIUM 9.4 8.6* 8.7   PROT 8.0 7.2 7.6   ALBUMIN 3.5 3.1* 3.1*   BILITOT 0.7 0.7 0.7   ALKPHOS 62 46* 48*   AST 13 13 16   ALT 14 9* 9*   ANIONGAP 6* 5* 10   EGFRNONAA 48* 48* 45*     All pertinent labs within the past 24 hours have been reviewed.    Significant Imaging: I have reviewed all pertinent imaging results/findings within the past 24 hours.      Assessment/Plan:      * Bronchiectasis with acute lower respiratory infection  No improvement with taking Azithromyin  IV Vanco, Flagyl and Cefepime  Supplemental oxygen to maintain sats > 92 %  Sputum culture pending  CT imaging - volume loss of LLL - bronchiectasis and air bronchograms, tree in bud opacity - JEMMA, RUL- cluster of nodules in RUL  Continue Pulmonology regarding volume loss,bronchiectasis and possible need for bronchoscopy  DuoNebs  Incentive spirometer  11/5/2020 - Day 3 of antibiotics. Stable for discharge on 11/6/2020 - will have received a full 3 days of IV ABX and starting day 4. Discharge home on Levaquin 750 mg. Will need to follow up with Dr. Dutta for repeat imaging.        Pancytopenia  WBC 1.77, H/H 9.9/32.0, platelets 132 -no significant changes  Secondary to multiple myeloma and/or Pomalyst (treatment for MM)  Daily CBC   Monitor for fevers  Pt is Religion and does not accept blood products          E. coli UTI  Urine culture from 10/26/2020  Pt was taking Azithromycin for his pneumonia - not sensitive  Continue Cefepime      Nasal drainage  Sinus xray - no fluid levels to suggest acute sinusitis  Nasal drainage has resolved      Pulmonary nodules  Will need follow up with Pulmonology      Multiple myeloma  With bone mets  currently being treated at [facility] being maintained on Pomalyst 3 mg days 1 through 21 on a 28 day cycle  Also, is followed by Dr. Solo, Oncology - consult placed  POMALIDOMIDE has known pulmonary toxicity including increased risk for infection  Follow with Hematology              Coronary artery disease of native artery of native heart with stable angina pectoris  Continue ASA, STATIN, Ranexa, metoprolol, Imdur       Hypertension  B/P controlled  Continue Imdur, lisinopril and metoprolol  Enalapril IV every 6 hours prn SBP > 170      Hyperlipidemia  Continue rosuvastatin      Type 2 diabetes mellitus with diabetic nephropathy  Lab Results   Component Value Date    HGBA1C 6.4 (H) 11/03/2020   continue long acting insulin  accuchecks  Sliding scale insulin        VTE Risk Mitigation (From admission, onward)    None          Discharge Planning   CECELIA:      Code Status: Not on file   Is the patient medically ready for discharge?:     Reason for patient still in hospital (select all that apply): Patient trending condition and Treatment  Discharge Plan A: Home                  Zeynep Calle NP  Department of Hospital Medicine   [facility]

## 2020-11-05 NOTE — PROGRESS NOTES
[facility] -   Hematology/Oncology  Progress Note    Patient Name: Familia Durbin Jr.  Admission Date: 11/3/2020  Hospital Length of Stay: 0 days  Code Status: No Order     Subjective:     HPI:  74 yo Jehovah Witness male with PMhx of CHF, asbestosis, Multiple myeloma with mets to the bone, currently treated with Pomalyst, CAD, renal insuffiencey, DM II, HTN and HPL who presents to the ED with c/o productive cough onset last week that has not improved despite completing a course of Azithromycin. Pt describes left sided chest pain, nasal drainage, SOB when speaking, malaise and ZAVALA. Also, pt reports constipation. He denies other symptoms.  V/S indicate temp 99.1, pulse 69, resp 20, B/P 112/56 and SpO2 95 %  Labs find WBC 2.42, H/H 10.9/34.0, INR 1.1, gluc 131, BNP = 809, lactate 0.8 and procal 0.03, negative COVID  CT imaging of chest finds bronchiectasis, RML air bronchograms. JEMMA bronchial plugging, RUL lung nodules  .    Interval History:  Negative for acute events overnight.  Continues on IV antibiotic coverage.     Oncology Treatment Plan:   [No treatment plan]    Medications:  Continuous Infusions:  Scheduled Meds:   albuterol-ipratropium  3 mL Nebulization Q8H    aspirin  81 mg Oral Daily    ceFEPime (MAXIPIME) IVPB  2 g Intravenous Q12H    insulin detemir U-100  10 Units Subcutaneous QHS    isosorbide mononitrate  30 mg Oral Daily    lisinopriL  2.5 mg Oral Daily    metoprolol tartrate  50 mg Oral BID    metronidazole  500 mg Intravenous Q8H    oxyCODONE  20 mg Oral Q12H    oxyCODONE  10 mg Oral Once    pantoprazole  40 mg Oral Daily    ranolazine  1,000 mg Oral BID    rosuvastatin  40 mg Oral QHS    senna-docusate 8.6-50 mg  1 tablet Oral BID    ticagrelor  90 mg Oral Daily    vancomycin (VANCOCIN) IVPB  1,250 mg Intravenous Q24H     PRN Meds:acetaminophen, dextrose 50%, dextrose 50%, glucagon (human recombinant), glucose, glucose, insulin aspart U-100, ondansetron, oxyCODONE      Review of Systems   Constitutional: Positive for activity change, appetite change and fatigue. Negative for chills, diaphoresis, fever and unexpected weight change.   HENT: Negative for congestion, hearing loss, nosebleeds, postnasal drip, rhinorrhea and trouble swallowing.    Eyes: Negative for discharge and visual disturbance.   Respiratory: Positive for cough and shortness of breath. Negative for chest tightness.    Cardiovascular: Negative for chest pain, palpitations and leg swelling.   Gastrointestinal: Negative for abdominal distention, abdominal pain, constipation, diarrhea, nausea and vomiting.   Endocrine: Negative for cold intolerance and heat intolerance.   Genitourinary: Negative for difficulty urinating, dysuria, frequency and hematuria.   Musculoskeletal: Positive for arthralgias and back pain. Negative for gait problem and myalgias.   Skin: Negative.    Neurological: Negative for dizziness, weakness, light-headedness and headaches.   Hematological: Negative for adenopathy. Does not bruise/bleed easily.   Psychiatric/Behavioral: Negative for agitation, behavioral problems and confusion. The patient is not nervous/anxious.      Objective:     Vital Signs (Most Recent):  Temp: 98.4 °F (36.9 °C) (11/05/20 0726)  Pulse: 62 (11/05/20 0900)  Resp: 18 (11/05/20 0732)  BP: (!) 100/49 (11/05/20 0726)  SpO2: 99 % (11/05/20 0732) Vital Signs (24h Range):  Temp:  [97 °F (36.1 °C)-98.6 °F (37 °C)] 98.4 °F (36.9 °C)  Pulse:  [62-79] 62  Resp:  [18-20] 18  SpO2:  [95 %-100 %] 99 %  BP: ()/(47-60) 100/49     Weight: 76.2 kg (167 lb 15.9 oz)  Body mass index is 24.1 kg/m².  Body surface area is 1.94 meters squared.      Intake/Output Summary (Last 24 hours) at 11/5/2020 1058  Last data filed at 11/4/2020 2123  Gross per 24 hour   Intake 687 ml   Output 25 ml   Net 662 ml       Physical Exam  Vitals signs and nursing note reviewed.   Constitutional:       General: He is not in acute distress.     Appearance:  He is well-developed. He is not diaphoretic.   HENT:      Head: Normocephalic and atraumatic.      Right Ear: Hearing and external ear normal.      Left Ear: Hearing and external ear normal.      Nose: Nose normal. No mucosal edema or rhinorrhea.      Mouth/Throat:      Pharynx: Uvula midline.   Eyes:      General:         Right eye: No discharge.         Left eye: No discharge.      Conjunctiva/sclera: Conjunctivae normal.      Right eye: No chemosis.     Left eye: No chemosis.     Pupils: Pupils are equal, round, and reactive to light.   Neck:      Musculoskeletal: Normal range of motion and neck supple.      Thyroid: No thyroid mass or thyromegaly.      Trachea: Trachea normal.   Cardiovascular:      Rate and Rhythm: Normal rate and regular rhythm.      Pulses:           Dorsalis pedis pulses are 1+ on the right side and 1+ on the left side.      Heart sounds: Normal heart sounds. No murmur.   Pulmonary:      Effort: Pulmonary effort is normal. No respiratory distress.      Breath sounds: No decreased breath sounds, wheezing or rales.   Abdominal:      General: Bowel sounds are normal. There is no distension.      Palpations: Abdomen is soft.      Tenderness: There is no abdominal tenderness.   Musculoskeletal: Normal range of motion.   Lymphadenopathy:      Cervical: No cervical adenopathy.      Upper Body:      Right upper body: No supraclavicular adenopathy.      Left upper body: No supraclavicular adenopathy.   Skin:     General: Skin is warm and dry.      Capillary Refill: Capillary refill takes less than 2 seconds.      Findings: No rash.   Neurological:      Mental Status: He is alert and oriented to person, place, and time.   Psychiatric:         Mood and Affect: Mood is not anxious.         Speech: Speech normal.         Behavior: Behavior normal.         Thought Content: Thought content normal.         Judgment: Judgment normal.         Significant Labs:   CBC:   Recent Labs   Lab 11/04/20  0617  11/05/20  0643   WBC 1.77* 2.50*   HGB 9.9* 9.9*   HCT 32.0* 31.1*   * 127*    and CMP:   Recent Labs   Lab 11/04/20  0617 11/05/20  0643    134*   K 4.6 4.0    103   CO2 27 21*   * 129*   BUN 18 21   CREATININE 1.4 1.5*   CALCIUM 8.6* 8.7   PROT 7.2 7.6   ALBUMIN 3.1* 3.1*   BILITOT 0.7 0.7   ALKPHOS 46* 48*   AST 13 16   ALT 9* 9*   ANIONGAP 5* 10   EGFRNONAA 48* 45*       Diagnostic Results:  I have reviewed all pertinent imaging results/findings within the past 24 hours.    Assessment/Plan:     * Bronchiectasis with acute lower respiratory infection  Patient is immunocompromised due to multiple myeloma and treatment with Pomalyst.    --antibiotic regimen per primary team    Multiple myeloma  Patient currently treated with single agent Pomalyst, has overall tolerated well.  Primary oncologists, Dr. Solo.  Overall tolerated treatment well.  Patient was seen by his PCP last week and treated with azithromycin for possible pneumonia, daughter reports continued decline over the past week.  Patient was seen by Dr. Solo on 11/03/2020 for a virtual visit and due to physical condition was advised to go to the emergency room for evaluation and treatment.    --continue supportive care  --antibiotic regimen per primary team  --daily CBC CMP  --hold Pomalyst        Thank you for your consult. I will follow-up with patient. Please contact us if you have any additional questions.     Jasmin Linn NP  Hematology/Oncology  [facility]

## 2020-11-05 NOTE — ASSESSMENT & PLAN NOTE
WBC 1.77, H/H 9.9/32.0, platelets 132 -no significant changes  Secondary to multiple myeloma and/or Pomalyst (treatment for MM)  Daily CBC   Monitor for fevers  Pt is [employer]ian[employer] and does not accept blood products

## 2020-11-05 NOTE — ASSESSMENT & PLAN NOTE
Lab Results   Component Value Date    HGBA1C 6.4 (H) 11/03/2020   continue long acting insulin  accuchecks  Sliding scale insulin

## 2020-11-05 NOTE — SUBJECTIVE & OBJECTIVE
Interval History: Sitting on side of bed, has eaten a small amount of breakfast. Pt is [place]. 11/5/2020 - Day 3 of IV ABX of IV Vanco, Cefepime and Flagyl. Sputum culture has not been collected. Pt describes productive cough with greenish sputum.     Review of Systems   Constitutional: Positive for fatigue. Negative for appetite change, chills, diaphoresis and fever.   HENT: Negative for congestion.    Respiratory: Positive for cough. Negative for shortness of breath.    Cardiovascular: Negative for chest pain, palpitations and leg swelling.   Gastrointestinal: Positive for constipation. Negative for abdominal pain, diarrhea, nausea and vomiting.   Genitourinary: Negative.    Musculoskeletal: Positive for arthralgias and back pain.   Skin: Negative for pallor, rash and wound.   Neurological: Positive for weakness. Negative for dizziness, light-headedness and headaches.   Psychiatric/Behavioral: Negative for decreased concentration.     Objective:     Vital Signs (Most Recent):  Temp: 98.4 °F (36.9 °C) (11/05/20 0726)  Pulse: 62 (11/05/20 0900)  Resp: 18 (11/05/20 0732)  BP: (!) 100/49 (11/05/20 0726)  SpO2: 99 % (11/05/20 0732) Vital Signs (24h Range):  Temp:  [97 °F (36.1 °C)-98.6 °F (37 °C)] 98.4 °F (36.9 °C)  Pulse:  [62-79] 62  Resp:  [18-20] 18  SpO2:  [95 %-100 %] 99 %  BP: ()/(47-60) 100/49     Weight: 76.2 kg (167 lb 15.9 oz)  Body mass index is 24.1 kg/m².    Intake/Output Summary (Last 24 hours) at 11/5/2020 0929  Last data filed at 11/4/2020 2123  Gross per 24 hour   Intake 687 ml   Output 25 ml   Net 662 ml      Physical Exam  Vitals signs and nursing note reviewed.   Constitutional:       Appearance: Normal appearance. He is well-developed.      Comments: Pt is hard of hearing   HENT:      Head: Normocephalic and atraumatic.      Nose: Nose normal.   Eyes:      General: No scleral icterus.     Conjunctiva/sclera: Conjunctivae normal.      Pupils: Pupils are equal, round, and reactive to light.    Neck:      Musculoskeletal: Normal range of motion and neck supple.   Cardiovascular:      Rate and Rhythm: Normal rate and regular rhythm.      Heart sounds: Normal heart sounds. No murmur. No friction rub. No gallop.    Pulmonary:      Effort: Pulmonary effort is normal.      Breath sounds: Examination of the left-lower field reveals rhonchi. Rhonchi present.   Abdominal:      General: Bowel sounds are normal.      Palpations: Abdomen is soft.   Musculoskeletal: Normal range of motion.         General: No tenderness.   Skin:     General: Skin is warm and dry.   Neurological:      Mental Status: He is alert and oriented to person, place, and time.   Psychiatric:         Behavior: Behavior normal.         Thought Content: Thought content normal.         Significant Labs:   CBC:   Recent Labs   Lab 11/03/20  1018 11/04/20  0617 11/05/20  0643   WBC 2.42* 1.77* 2.50*   HGB 10.9* 9.9* 9.9*   HCT 34.0* 32.0* 31.1*    132* 127*     CMP:   Recent Labs   Lab 11/03/20  1018 11/04/20  0617 11/05/20  0643    137 134*   K 4.4 4.6 4.0    105 103   CO2 28 27 21*   * 124* 129*   BUN 17 18 21   CREATININE 1.4 1.4 1.5*   CALCIUM 9.4 8.6* 8.7   PROT 8.0 7.2 7.6   ALBUMIN 3.5 3.1* 3.1*   BILITOT 0.7 0.7 0.7   ALKPHOS 62 46* 48*   AST 13 13 16   ALT 14 9* 9*   ANIONGAP 6* 5* 10   EGFRNONAA 48* 48* 45*     All pertinent labs within the past 24 hours have been reviewed.    Significant Imaging: I have reviewed all pertinent imaging results/findings within the past 24 hours.

## 2020-11-05 NOTE — PLAN OF CARE
Plan of care reviewed with patient, he verbalized understanding.  Pt remains free from falls this shift, fall precautions in place.  Pt remains free from skin breakdown, he turns and repositions independently while in bed.  AAOx4, VSS, NAD noted at this time.  Pt remained afebrile.  2L O2 via [place]c.  NSR on the tele monitor.  Blood glucose monitoring AC/HS.  Pt admitted for PNA; IV abx.  Pt currently resting comfortably in bed.  Hourly rounding complete.  Will continue to monitor.

## 2020-11-06 ENCOUNTER — TELEPHONE (OUTPATIENT)
Dept: PULMONOLOGY | Facility: CLINIC | Age: 75
End: 2020-11-06

## 2020-11-06 ENCOUNTER — TELEPHONE (OUTPATIENT)
Dept: INTERNAL MEDICINE | Facility: CLINIC | Age: 75
End: 2020-11-06

## 2020-11-06 VITALS
SYSTOLIC BLOOD PRESSURE: 111 MMHG | BODY MASS INDEX: 23.86 KG/M2 | TEMPERATURE: 98 F | WEIGHT: 166.69 LBS | OXYGEN SATURATION: 95 % | DIASTOLIC BLOOD PRESSURE: 56 MMHG | HEART RATE: 62 BPM | RESPIRATION RATE: 20 BRPM | HEIGHT: 70 IN

## 2020-11-06 DIAGNOSIS — C90.00 MULTIPLE MYELOMA, REMISSION STATUS UNSPECIFIED: ICD-10-CM

## 2020-11-06 DIAGNOSIS — C90.00 MULTIPLE MYELOMA NOT HAVING ACHIEVED REMISSION: ICD-10-CM

## 2020-11-06 LAB
ALBUMIN SERPL BCP-MCNC: 2.6 G/DL (ref 3.5–5.2)
ALP SERPL-CCNC: 42 U/L (ref 55–135)
ALT SERPL W/O P-5'-P-CCNC: 11 U/L (ref 10–44)
ANION GAP SERPL CALC-SCNC: 8 MMOL/L (ref 8–16)
AST SERPL-CCNC: 19 U/L (ref 10–40)
BASOPHILS # BLD AUTO: 0.03 K/UL (ref 0–0.2)
BASOPHILS NFR BLD: 1.2 % (ref 0–1.9)
BILIRUB SERPL-MCNC: 0.5 MG/DL (ref 0.1–1)
BUN SERPL-MCNC: 19 MG/DL (ref 8–23)
CALCIUM SERPL-MCNC: 8.4 MG/DL (ref 8.7–10.5)
CHLORIDE SERPL-SCNC: 107 MMOL/L (ref 95–110)
CO2 SERPL-SCNC: 23 MMOL/L (ref 23–29)
CREAT SERPL-MCNC: 1.3 MG/DL (ref 0.5–1.4)
DIFFERENTIAL METHOD: ABNORMAL
EOSINOPHIL # BLD AUTO: 0.1 K/UL (ref 0–0.5)
EOSINOPHIL NFR BLD: 3.6 % (ref 0–8)
ERYTHROCYTE [DISTWIDTH] IN BLOOD BY AUTOMATED COUNT: 16.5 % (ref 11.5–14.5)
EST. GFR  (AFRICAN AMERICAN): >60 ML/MIN/1.73 M^2
EST. GFR  (NON AFRICAN AMERICAN): 53 ML/MIN/1.73 M^2
GLUCOSE SERPL-MCNC: 104 MG/DL (ref 70–110)
HCT VFR BLD AUTO: 28.4 % (ref 40–54)
HGB BLD-MCNC: 9.1 G/DL (ref 14–18)
IMM GRANULOCYTES # BLD AUTO: 0.02 K/UL (ref 0–0.04)
IMM GRANULOCYTES NFR BLD AUTO: 0.8 % (ref 0–0.5)
LYMPHOCYTES # BLD AUTO: 0.7 K/UL (ref 1–4.8)
LYMPHOCYTES NFR BLD: 26.9 % (ref 18–48)
MCH RBC QN AUTO: 33.5 PG (ref 27–31)
MCHC RBC AUTO-ENTMCNC: 32 G/DL (ref 32–36)
MCV RBC AUTO: 104 FL (ref 82–98)
MONOCYTES # BLD AUTO: 0.6 K/UL (ref 0.3–1)
MONOCYTES NFR BLD: 24.9 % (ref 4–15)
NEUTROPHILS # BLD AUTO: 1.1 K/UL (ref 1.8–7.7)
NEUTROPHILS NFR BLD: 42.6 % (ref 38–73)
NRBC BLD-RTO: 0 /100 WBC
PLATELET # BLD AUTO: 148 K/UL (ref 150–350)
PMV BLD AUTO: 10.8 FL (ref 9.2–12.9)
POCT GLUCOSE: 144 MG/DL (ref 70–110)
POCT GLUCOSE: 96 MG/DL (ref 70–110)
POTASSIUM SERPL-SCNC: 4.2 MMOL/L (ref 3.5–5.1)
PROT SERPL-MCNC: 6.8 G/DL (ref 6–8.4)
RBC # BLD AUTO: 2.72 M/UL (ref 4.6–6.2)
SODIUM SERPL-SCNC: 138 MMOL/L (ref 136–145)
WBC # BLD AUTO: 2.49 K/UL (ref 3.9–12.7)

## 2020-11-06 PROCEDURE — 94664 DEMO&/EVAL PT USE INHALER: CPT

## 2020-11-06 PROCEDURE — 25000242 PHARM REV CODE 250 ALT 637 W/ HCPCS: Performed by: NURSE PRACTITIONER

## 2020-11-06 PROCEDURE — 25000003 PHARM REV CODE 250: Performed by: INTERNAL MEDICINE

## 2020-11-06 PROCEDURE — 94761 N-INVAS EAR/PLS OXIMETRY MLT: CPT

## 2020-11-06 PROCEDURE — 99233 PR SUBSEQUENT HOSPITAL CARE,LEVL III: ICD-10-PCS | Mod: ,,, | Performed by: INTERNAL MEDICINE

## 2020-11-06 PROCEDURE — 99900035 HC TECH TIME PER 15 MIN (STAT)

## 2020-11-06 PROCEDURE — 27000646 HC AEROBIKA DEVICE

## 2020-11-06 PROCEDURE — 80053 COMPREHEN METABOLIC PANEL: CPT

## 2020-11-06 PROCEDURE — 25000003 PHARM REV CODE 250: Performed by: NURSE PRACTITIONER

## 2020-11-06 PROCEDURE — 85025 COMPLETE CBC W/AUTO DIFF WBC: CPT

## 2020-11-06 PROCEDURE — 99233 SBSQ HOSP IP/OBS HIGH 50: CPT | Mod: ,,, | Performed by: INTERNAL MEDICINE

## 2020-11-06 PROCEDURE — 94799 UNLISTED PULMONARY SVC/PX: CPT

## 2020-11-06 PROCEDURE — S0030 INJECTION, METRONIDAZOLE: HCPCS | Performed by: NURSE PRACTITIONER

## 2020-11-06 PROCEDURE — 94640 AIRWAY INHALATION TREATMENT: CPT

## 2020-11-06 PROCEDURE — 36415 COLL VENOUS BLD VENIPUNCTURE: CPT

## 2020-11-06 PROCEDURE — 27000221 HC OXYGEN, UP TO 24 HOURS

## 2020-11-06 PROCEDURE — 63600175 PHARM REV CODE 636 W HCPCS: Performed by: NURSE PRACTITIONER

## 2020-11-06 RX ORDER — BISACODYL 10 MG
10 SUPPOSITORY, RECTAL RECTAL ONCE
Status: COMPLETED | OUTPATIENT
Start: 2020-11-06 | End: 2020-11-06

## 2020-11-06 RX ORDER — IPRATROPIUM BROMIDE AND ALBUTEROL SULFATE 2.5; .5 MG/3ML; MG/3ML
3 SOLUTION RESPIRATORY (INHALATION) EVERY 8 HOURS
Qty: 270 ML | Refills: 0 | Status: SHIPPED | OUTPATIENT
Start: 2020-11-06 | End: 2022-06-23

## 2020-11-06 RX ORDER — OXYCODONE HYDROCHLORIDE 10 MG/1
10 TABLET ORAL EVERY 6 HOURS PRN
Qty: 45 TABLET | Refills: 0 | Status: SHIPPED | OUTPATIENT
Start: 2020-11-06 | End: 2020-11-29

## 2020-11-06 RX ORDER — OXYCODONE HYDROCHLORIDE 10 MG/1
10 TABLET ORAL EVERY 6 HOURS PRN
Qty: 90 TABLET | Refills: 0 | Status: CANCELLED | OUTPATIENT
Start: 2020-11-06

## 2020-11-06 RX ORDER — LEVOFLOXACIN 750 MG/1
750 TABLET ORAL
Status: DISCONTINUED | OUTPATIENT
Start: 2020-11-06 | End: 2020-11-06 | Stop reason: HOSPADM

## 2020-11-06 RX ORDER — LEVOFLOXACIN 750 MG/1
750 TABLET ORAL EVERY OTHER DAY
Qty: 3 TABLET | Refills: 0 | Status: SHIPPED | OUTPATIENT
Start: 2020-11-06 | End: 2020-11-12

## 2020-11-06 RX ORDER — LACTULOSE 10 G/15ML
20 SOLUTION ORAL ONCE
Status: COMPLETED | OUTPATIENT
Start: 2020-11-06 | End: 2020-11-06

## 2020-11-06 RX ADMIN — SODIUM PHOSPHATE, DIBASIC AND SODIUM PHOSPHATE, MONOBASIC 1 ENEMA: 7; 19 ENEMA RECTAL at 12:11

## 2020-11-06 RX ADMIN — TICAGRELOR 90 MG: 90 TABLET ORAL at 09:11

## 2020-11-06 RX ADMIN — STANDARDIZED SENNA CONCENTRATE AND DOCUSATE SODIUM 1 TABLET: 8.6; 5 TABLET ORAL at 09:11

## 2020-11-06 RX ADMIN — IPRATROPIUM BROMIDE AND ALBUTEROL SULFATE 3 ML: .5; 3 SOLUTION RESPIRATORY (INHALATION) at 08:11

## 2020-11-06 RX ADMIN — ONDANSETRON 4 MG: 2 INJECTION INTRAMUSCULAR; INTRAVENOUS at 09:11

## 2020-11-06 RX ADMIN — ISOSORBIDE MONONITRATE 30 MG: 30 TABLET, EXTENDED RELEASE ORAL at 09:11

## 2020-11-06 RX ADMIN — LEVOFLOXACIN 750 MG: 750 TABLET, FILM COATED ORAL at 11:11

## 2020-11-06 RX ADMIN — METOPROLOL TARTRATE 50 MG: 50 TABLET, FILM COATED ORAL at 09:11

## 2020-11-06 RX ADMIN — IPRATROPIUM BROMIDE AND ALBUTEROL SULFATE 3 ML: .5; 3 SOLUTION RESPIRATORY (INHALATION) at 12:11

## 2020-11-06 RX ADMIN — IPRATROPIUM BROMIDE AND ALBUTEROL SULFATE 3 ML: .5; 3 SOLUTION RESPIRATORY (INHALATION) at 03:11

## 2020-11-06 RX ADMIN — CEFEPIME HYDROCHLORIDE 2 G: 2 INJECTION, SOLUTION INTRAVENOUS at 04:11

## 2020-11-06 RX ADMIN — RANOLAZINE 1000 MG: 500 TABLET, EXTENDED RELEASE ORAL at 09:11

## 2020-11-06 RX ADMIN — METRONIDAZOLE 500 MG: 500 INJECTION, SOLUTION INTRAVENOUS at 09:11

## 2020-11-06 RX ADMIN — BISACODYL 10 MG: 10 SUPPOSITORY RECTAL at 10:11

## 2020-11-06 RX ADMIN — LISINOPRIL 2.5 MG: 2.5 TABLET ORAL at 09:11

## 2020-11-06 RX ADMIN — PANTOPRAZOLE SODIUM 40 MG: 40 TABLET, DELAYED RELEASE ORAL at 09:11

## 2020-11-06 RX ADMIN — METRONIDAZOLE 500 MG: 500 INJECTION, SOLUTION INTRAVENOUS at 02:11

## 2020-11-06 RX ADMIN — ASPIRIN 81 MG: 81 TABLET, COATED ORAL at 09:11

## 2020-11-06 RX ADMIN — OXYCODONE HYDROCHLORIDE 20 MG: 10 TABLET, FILM COATED, EXTENDED RELEASE ORAL at 09:11

## 2020-11-06 RX ADMIN — LACTULOSE 20 G: 20 SOLUTION ORAL at 10:11

## 2020-11-06 NOTE — ASSESSMENT & PLAN NOTE
Patient currently treated with single agent Pomalyst, has overall tolerated well.  Primary oncologists, Dr. Solo.  Overall tolerated treatment well.  Patient was seen by his PCP last week and treated with azithromycin for possible pneumonia, daughter reports continued decline over the past week.  Patient was seen by Dr. Solo on 11/03/2020 for a virtual visit and due to physical condition was advised to go to the emergency room for evaluation and treatment.    --continue supportive care  --antibiotic regimen per primary team  --daily CBC CMP  --hold Pomalyst until seen as outpatient by Dr. Solo

## 2020-11-06 NOTE — PROGRESS NOTES
[facility] -   Hematology/Oncology  Progress Note    Patient Name: Familia Durbin Jr.  Admission Date: 11/3/2020  Hospital Length of Stay: 1 days  Code Status: No Order     Subjective:     HPI:  76 yo Jehovah Witness male with PMhx of CHF, asbestosis, Multiple myeloma with mets to the bone, currently treated with Pomalyst, CAD, renal insuffiencey, DM II, HTN and HPL who presents to the ED with c/o productive cough onset last week that has not improved despite completing a course of Azithromycin. Pt describes left sided chest pain, nasal drainage, SOB when speaking, malaise and ZAVALA. Also, pt reports constipation. He denies other symptoms.  V/S indicate temp 99.1, pulse 69, resp 20, B/P 112/56 and SpO2 95 %  Labs find WBC 2.42, H/H 10.9/34.0, INR 1.1, gluc 131, BNP = 809, lactate 0.8 and procal 0.03, negative COVID  CT imaging of chest finds bronchiectasis, RML air bronchograms. JEMMA bronchial plugging, RUL lung nodules  .    Interval History: Negative for acute events overnight, Significant improvement in patient since admission.     Oncology Treatment Plan:   [No treatment plan]    Medications:  Continuous Infusions:  Scheduled Meds:   albuterol-ipratropium  3 mL Nebulization Q8H    aspirin  81 mg Oral Daily    ceFEPime (MAXIPIME) IVPB  2 g Intravenous Q12H    insulin detemir U-100  10 Units Subcutaneous QHS    isosorbide mononitrate  30 mg Oral Daily    lisinopriL  2.5 mg Oral Daily    metoprolol tartrate  50 mg Oral BID    metronidazole  500 mg Intravenous Q8H    oxyCODONE  20 mg Oral Q12H    oxyCODONE  10 mg Oral Once    pantoprazole  40 mg Oral Daily    ranolazine  1,000 mg Oral BID    rosuvastatin  40 mg Oral QHS    senna-docusate 8.6-50 mg  1 tablet Oral BID    ticagrelor  90 mg Oral Daily    vancomycin (VANCOCIN) IVPB  1,750 mg Intravenous Q24H     PRN Meds:acetaminophen, dextrose 50%, dextrose 50%, glucagon (human recombinant), glucose, glucose, insulin aspart U-100,  ondansetron, oxyCODONE     Review of Systems   Constitutional: Positive for activity change, appetite change and fatigue. Negative for chills, diaphoresis, fever and unexpected weight change.   HENT: Negative for congestion, hearing loss, nosebleeds, postnasal drip, rhinorrhea and trouble swallowing.    Eyes: Negative for discharge and visual disturbance.   Respiratory: Positive for cough and shortness of breath. Negative for chest tightness.    Cardiovascular: Negative for chest pain, palpitations and leg swelling.   Gastrointestinal: Negative for abdominal distention, abdominal pain, constipation, diarrhea, nausea and vomiting.   Endocrine: Negative for cold intolerance and heat intolerance.   Genitourinary: Negative for difficulty urinating, dysuria, frequency and hematuria.   Musculoskeletal: Positive for arthralgias and back pain. Negative for gait problem and myalgias.   Skin: Negative.    Neurological: Negative for dizziness, weakness, light-headedness and headaches.   Hematological: Negative for adenopathy. Does not bruise/bleed easily.   Psychiatric/Behavioral: Negative for agitation, behavioral problems and confusion. The patient is not nervous/anxious.      Objective:     Vital Signs (Most Recent):  Temp: 97.8 °F (36.6 °C) (11/06/20 0737)  Pulse: 94 (11/06/20 0822)  Resp: 20 (11/06/20 0915)  BP: (!) 125/57 (11/06/20 0737)  SpO2: 96 %(At rest after Home O2 Eval) (11/06/20 0822) Vital Signs (24h Range):  Temp:  [96.9 °F (36.1 °C)-98.5 °F (36.9 °C)] 97.8 °F (36.6 °C)  Pulse:  [64-94] 94  Resp:  [16-20] 20  SpO2:  [96 %-100 %] 96 %  BP: (107-143)/(49-62) 125/57     Weight: 75.6 kg (166 lb 10.7 oz)  Body mass index is 23.91 kg/m².  Body surface area is 1.93 meters squared.      Intake/Output Summary (Last 24 hours) at 11/6/2020 0938  Last data filed at 11/6/2020 0000  Gross per 24 hour   Intake 388 ml   Output 1750 ml   Net -1362 ml       Physical Exam  Vitals signs and nursing note reviewed.   Constitutional:        General: He is not in acute distress.     Appearance: He is well-developed. He is not diaphoretic.   HENT:      Head: Normocephalic and atraumatic.      Right Ear: Hearing and external ear normal.      Left Ear: Hearing and external ear normal.      Nose: Nose normal. No mucosal edema or rhinorrhea.      Mouth/Throat:      Pharynx: Uvula midline.   Eyes:      General:         Right eye: No discharge.         Left eye: No discharge.      Conjunctiva/sclera: Conjunctivae normal.      Right eye: No chemosis.     Left eye: No chemosis.     Pupils: Pupils are equal, round, and reactive to light.   Neck:      Musculoskeletal: Normal range of motion and neck supple.      Thyroid: No thyroid mass or thyromegaly.      Trachea: Trachea normal.   Cardiovascular:      Rate and Rhythm: Normal rate and regular rhythm.      Pulses:           Dorsalis pedis pulses are 1+ on the right side and 1+ on the left side.      Heart sounds: Normal heart sounds. No murmur.   Pulmonary:      Effort: Pulmonary effort is normal. No respiratory distress.      Breath sounds: No decreased breath sounds, wheezing or rales.   Abdominal:      General: Bowel sounds are normal. There is no distension.      Palpations: Abdomen is soft.      Tenderness: There is no abdominal tenderness.   Musculoskeletal: Normal range of motion.   Lymphadenopathy:      Cervical: No cervical adenopathy.      Upper Body:      Right upper body: No supraclavicular adenopathy.      Left upper body: No supraclavicular adenopathy.   Skin:     General: Skin is warm and dry.      Capillary Refill: Capillary refill takes less than 2 seconds.      Findings: No rash.   Neurological:      Mental Status: He is alert and oriented to person, place, and time.   Psychiatric:         Mood and Affect: Mood is not anxious.         Speech: Speech normal.         Behavior: Behavior normal.         Thought Content: Thought content normal.         Judgment: Judgment normal.          Significant Labs:   CBC:   Recent Labs   Lab 11/05/20  0643 11/06/20  0615   WBC 2.50* 2.49*   HGB 9.9* 9.1*   HCT 31.1* 28.4*   * 148*    and CMP:   Recent Labs   Lab 11/05/20  0643 11/06/20  0615   * 138   K 4.0 4.2    107   CO2 21* 23   * 104   BUN 21 19   CREATININE 1.5* 1.3   CALCIUM 8.7 8.4*   PROT 7.6 6.8   ALBUMIN 3.1* 2.6*   BILITOT 0.7 0.5   ALKPHOS 48* 42*   AST 16 19   ALT 9* 11   ANIONGAP 10 8   EGFRNONAA 45* 53*       Diagnostic Results:  I have reviewed all pertinent imaging results/findings within the past 24 hours.    Assessment/Plan:     * Bronchiectasis with acute lower respiratory infection  Patient is immunocompromised due to multiple myeloma and treatment with Pomalyst.    --antibiotic regimen per primary team    Multiple myeloma  Patient currently treated with single agent Pomalyst, has overall tolerated well.  Primary oncologists, Dr. Solo.  Overall tolerated treatment well.  Patient was seen by his PCP last week and treated with azithromycin for possible pneumonia, daughter reports continued decline over the past week.  Patient was seen by Dr. Solo on 11/03/2020 for a virtual visit and due to physical condition was advised to go to the emergency room for evaluation and treatment.    --continue supportive care  --antibiotic regimen per primary team  --daily CBC CMP  --hold Pomalyst until seen as outpatient by Dr. Solo        Thank you for your consult. I will follow-up with patient. Please contact us if you have any additional questions.     Jasmin Linn NP  Hematology/Oncology  [facility]

## 2020-11-06 NOTE — PROGRESS NOTES
Pharmacist Renal Dose Adjustment Note    Familia Durbin Jr. is a 75 y.o. male being treated with the medication Levaquin    Patient Data:    Vital Signs (Most Recent):  Temp: 97.8 °F (36.6 °C) (11/06/20 0737)  Pulse: 94 (11/06/20 0822)  Resp: 20 (11/06/20 0915)  BP: (!) 125/57 (11/06/20 0737)  SpO2: 96 %(At rest after Home O2 Eval) (11/06/20 0822)   Vital Signs (72h Range):  Temp:  [96.9 °F (36.1 °C)-99.3 °F (37.4 °C)]   Pulse:  [62-94]   Resp:  [16-20]   BP: ()/(47-70)   SpO2:  [93 %-100 %]      Recent Labs   Lab 11/04/20  0617 11/05/20  0643 11/06/20 0615   CREATININE 1.4 1.5* 1.3     Serum creatinine: 1.3 mg/dL 11/06/20 0615  Estimated creatinine clearance: 49.9 mL/min    Medication:Levaquin dose: 750mg frequency QD will be changed to medication:Levaquin dose:750mg frequency:q48    Pharmacist's Name: Jimena Blake  Pharmacist's Extension: [phone]

## 2020-11-06 NOTE — PLAN OF CARE
AAOx3, VSS, NADN, BS Monitored, NSR on monitor. Plan of care reviewed with patient, he verbalized understanding.Pt remains free from falls this shift, fall precautions in place.Pt remains free from skin breakdown, he turns and repositions independently while in bed.Pt remained afebrile.3L O2 via Nc.Pt admitted for PNA; IV abx.Pt currently resting comfortably in bed.Hourly rounding complete.Will continue to monitor.

## 2020-11-06 NOTE — PROGRESS NOTES
Oxygen Evaluation    1) Patient's O2 Sat on room air while at rest: 98%        If at rest Sat is 89% or above, continue.    2) Patient's O2 Sat on room air while exercisin%         If exercise Sat is 88% or below, continue.    3) Patient's O2 Sat while exercising on O2:  at  LPM         (Must show improvement from #2 for patients to qualify)    If exercise Sat on O2 shows improvement from #2, this patient qualifies for portable Oxygen.  If not, the patient does not qualify.

## 2020-11-06 NOTE — CONSULTS
SW received pt preference as first available. Referral sent to [facility]. Pt choice form signed.     Justice Ivy LMSW 11/6/2020 12:27 PM

## 2020-11-06 NOTE — PLAN OF CARE
11/06/20 1351   Post-Acute Status   Post-Acute Authorization Home Health;HME   HME Status Set-up Complete   Home Health Status Set-up Complete   Discharge Plan   Discharge Plan A Home with family;Home Health   Discharge Plan B Home with family;Home Health       Justice Ivy LMSW 11/6/2020 1:52 PM

## 2020-11-06 NOTE — PLAN OF CARE
11/06/20 1225   Post-Acute Status   Post-Acute Authorization Home Health   Home Health Status Referrals Sent   Discharge Plan   Discharge Plan A Home with family;Home Health   Discharge Plan B Home with family;Home Health       Justice Ivy LMSW 11/6/2020 12:26 PM

## 2020-11-06 NOTE — PROGRESS NOTES
Pharmacokinetic Assessment Follow Up: IV Vancomycin    Vancomycin serum concentration assessment(s):    The trough level was drawn correctly and can be used to guide therapy at this time. The measurement is below the desired definitive target range of 15 to 20 mcg/mL.    Vancomycin Regimen Plan:    An additional 500 mg will be given tonight following the 1250 mg dose that was given at 1720. Change regimen to Vancomycin 1750 mg IV every 24 hours with next serum trough concentration measured at 1800 prior to third new dose on 11/7.    Drug levels (last 3 results):  Recent Labs   Lab Result Units 11/05/20  1629   Vancomycin-Trough ug/mL 10.8       Pharmacy will continue to follow and monitor vancomycin.    Please contact pharmacy at extension [phone] for questions regarding this assessment.    Thank you for the consult,   Josh Bolden, GillD 11/5/2020 6:51 PM       Patient brief summary:  Familia Durbin Jr. is a 75 y.o. male initiated on antimicrobial therapy with IV Vancomycin for treatment of lower respiratory infection.    The patient's new regimen is vancomycin 1750 mg IV q24 h.    Drug Allergies:   Review of patient's allergies indicates:   Allergen Reactions    Cephalexin      Pt tolerated cefepime 11/4/2020    Fentanyl Nausea And Vomiting    Nsaids (non-steroidal anti-inflammatory drug)      Renal insuf       Actual Body Weight:   76.2 kg    Renal Function:   Estimated Creatinine Clearance: 43.3 mL/min (A) (based on SCr of 1.5 mg/dL (H)).,     Dialysis Method (if applicable):  Not on RRT    CBC (last 72 hours):  Recent Labs   Lab Result Units 11/03/20  1018 11/03/20  1904 11/04/20  0617 11/05/20  0643   WBC K/uL 2.42*  --  1.77* 2.50*   Hemoglobin g/dL 10.9*  --  9.9* 9.9*   Hemoglobin A1C %  --  6.4*  --   --    Hematocrit % 34.0*  --  32.0* 31.1*   Platelets K/uL 151  --  132* 127*   Gran % % 53.8  --  55.0 47.2   Lymph % % 31.0  --  39.0 26.4   Mono % % 12.8  --  2.0* 22.4*   Eosinophil % % 1.2  --   1.0 2.0   Basophil % % 0.8  --  1.0 0.8   Differential Method  Automated  --  Manual Automated       Metabolic Panel (last 72 hours):  Recent Labs   Lab Result Units 11/03/20  1018 11/04/20  0617 11/05/20  0643   Sodium mmol/L 140 137 134*   Potassium mmol/L 4.4 4.6 4.0   Chloride mmol/L 106 105 103   CO2 mmol/L 28 27 21*   Glucose mg/dL 131* 124* 129*   BUN mg/dL 17 18 21   Creatinine mg/dL 1.4 1.4 1.5*   Albumin g/dL 3.5  3.65 3.1* 3.1*   Total Bilirubin mg/dL 0.7 0.7 0.7   Alkaline Phosphatase U/L 62 46* 48*   AST U/L 13 13 16   ALT U/L 14 9* 9*   Magnesium mg/dL  --  1.7  --        Vancomycin Administrations:  vancomycin given in the last 96 hours                   vancomycin 1.25 g in dextrose 5% 250 mL IVPB (ready to mix) (mg) 1,250 mg New Bag 11/05/20 1723     1,250 mg New Bag 11/04/20 1919    vancomycin 1.5 g in dextrose 5 % 250 mL IVPB (ready to mix) (mg) 1,500 mg New Bag 11/03/20 1725                Microbiologic Results:  Microbiology Results (last 7 days)     Procedure Component Value Units Date/Time    Culture, Respiratory with Gram Stain [[id number]] Collected: 11/05/20 1725    Order Status: Sent Specimen: Respiratory from Sputum, Expectorated Updated: 11/05/20 1737    Culture, Respiratory with Gram Stain [[id number]] Collected: 11/05/20 1725    Order Status: Sent Specimen: Respiratory from Sputum, Expectorated Updated: 11/05/20 1725    Blood culture x two cultures. Draw prior to antibiotics. [[id number]] Collected: 11/03/20 1436    Order Status: Completed Specimen: Blood from Peripheral, Antecubital, Left Updated: 11/04/20 2212     Blood Culture, Routine No Growth to date      No Growth to date    Narrative:      Aerobic and anaerobic    Blood culture x two cultures. Draw prior to antibiotics. [[id number]] Collected: 11/03/20 1221    Order Status: Completed Specimen: Blood from Peripheral, Antecubital, Left Updated: 11/04/20 6041     Blood Culture, Routine No Growth to date      No Growth to date     Narrative:      Aerobic and anaerobic    Influenza A & B by Molecular [[id number]] Collected: 11/03/20 1230    Order Status: Completed Specimen: Nasopharyngeal Swab Updated: 11/03/20 1307     Influenza A, Molecular Negative     Influenza B, Molecular Negative     Flu A & B Source Nasal swab    Culture, Respiratory with Gram Stain [[id number]]     Order Status: Canceled Specimen: Respiratory from Sputum, Expectorated

## 2020-11-06 NOTE — PROGRESS NOTES
Pharmacy Vancomycin Consult Note    Therapy with Vancomycin complete and/or consult discontinued by provider.  Pharmacy will sign off, please re-consult as needed.    Gill Monroy D 11/6/2020 10:38 AM

## 2020-11-06 NOTE — TELEPHONE ENCOUNTER
----- Message from Elsa Villalba RN sent at 11/6/2020  2:04 PM CST -----  Pt is being discharged from the hospital today and will need a 3 day follow up appointment.  His number is [phone]. He is very hard of hearing.

## 2020-11-06 NOTE — SUBJECTIVE & OBJECTIVE
Interval History: Negative for acute events overnight, Significant improvement in patient since admission.     Oncology Treatment Plan:   [No treatment plan]    Medications:  Continuous Infusions:  Scheduled Meds:   albuterol-ipratropium  3 mL Nebulization Q8H    aspirin  81 mg Oral Daily    ceFEPime (MAXIPIME) IVPB  2 g Intravenous Q12H    insulin detemir U-100  10 Units Subcutaneous QHS    isosorbide mononitrate  30 mg Oral Daily    lisinopriL  2.5 mg Oral Daily    metoprolol tartrate  50 mg Oral BID    metronidazole  500 mg Intravenous Q8H    oxyCODONE  20 mg Oral Q12H    oxyCODONE  10 mg Oral Once    pantoprazole  40 mg Oral Daily    ranolazine  1,000 mg Oral BID    rosuvastatin  40 mg Oral QHS    senna-docusate 8.6-50 mg  1 tablet Oral BID    ticagrelor  90 mg Oral Daily    vancomycin (VANCOCIN) IVPB  1,750 mg Intravenous Q24H     PRN Meds:acetaminophen, dextrose 50%, dextrose 50%, glucagon (human recombinant), glucose, glucose, insulin aspart U-100, ondansetron, oxyCODONE     Review of Systems   Constitutional: Positive for activity change, appetite change and fatigue. Negative for chills, diaphoresis, fever and unexpected weight change.   HENT: Negative for congestion, hearing loss, nosebleeds, postnasal drip, rhinorrhea and trouble swallowing.    Eyes: Negative for discharge and visual disturbance.   Respiratory: Positive for cough and shortness of breath. Negative for chest tightness.    Cardiovascular: Negative for chest pain, palpitations and leg swelling.   Gastrointestinal: Negative for abdominal distention, abdominal pain, constipation, diarrhea, nausea and vomiting.   Endocrine: Negative for cold intolerance and heat intolerance.   Genitourinary: Negative for difficulty urinating, dysuria, frequency and hematuria.   Musculoskeletal: Positive for arthralgias and back pain. Negative for gait problem and myalgias.   Skin: Negative.    Neurological: Negative for dizziness, weakness,  light-headedness and headaches.   Hematological: Negative for adenopathy. Does not bruise/bleed easily.   Psychiatric/Behavioral: Negative for agitation, behavioral problems and confusion. The patient is not nervous/anxious.      Objective:     Vital Signs (Most Recent):  Temp: 97.8 °F (36.6 °C) (11/06/20 0737)  Pulse: 94 (11/06/20 0822)  Resp: 20 (11/06/20 0915)  BP: (!) 125/57 (11/06/20 0737)  SpO2: 96 %(At rest after Home O2 Eval) (11/06/20 0822) Vital Signs (24h Range):  Temp:  [96.9 °F (36.1 °C)-98.5 °F (36.9 °C)] 97.8 °F (36.6 °C)  Pulse:  [64-94] 94  Resp:  [16-20] 20  SpO2:  [96 %-100 %] 96 %  BP: (107-143)/(49-62) 125/57     Weight: 75.6 kg (166 lb 10.7 oz)  Body mass index is 23.91 kg/m².  Body surface area is 1.93 meters squared.      Intake/Output Summary (Last 24 hours) at 11/6/2020 0938  Last data filed at 11/6/2020 0000  Gross per 24 hour   Intake 388 ml   Output 1750 ml   Net -1362 ml       Physical Exam  Vitals signs and nursing note reviewed.   Constitutional:       General: He is not in acute distress.     Appearance: He is well-developed. He is not diaphoretic.   HENT:      Head: Normocephalic and atraumatic.      Right Ear: Hearing and external ear normal.      Left Ear: Hearing and external ear normal.      Nose: Nose normal. No mucosal edema or rhinorrhea.      Mouth/Throat:      Pharynx: Uvula midline.   Eyes:      General:         Right eye: No discharge.         Left eye: No discharge.      Conjunctiva/sclera: Conjunctivae normal.      Right eye: No chemosis.     Left eye: No chemosis.     Pupils: Pupils are equal, round, and reactive to light.   Neck:      Musculoskeletal: Normal range of motion and neck supple.      Thyroid: No thyroid mass or thyromegaly.      Trachea: Trachea normal.   Cardiovascular:      Rate and Rhythm: Normal rate and regular rhythm.      Pulses:           Dorsalis pedis pulses are 1+ on the right side and 1+ on the left side.      Heart sounds: Normal heart sounds.  No murmur.   Pulmonary:      Effort: Pulmonary effort is normal. No respiratory distress.      Breath sounds: No decreased breath sounds, wheezing or rales.   Abdominal:      General: Bowel sounds are normal. There is no distension.      Palpations: Abdomen is soft.      Tenderness: There is no abdominal tenderness.   Musculoskeletal: Normal range of motion.   Lymphadenopathy:      Cervical: No cervical adenopathy.      Upper Body:      Right upper body: No supraclavicular adenopathy.      Left upper body: No supraclavicular adenopathy.   Skin:     General: Skin is warm and dry.      Capillary Refill: Capillary refill takes less than 2 seconds.      Findings: No rash.   Neurological:      Mental Status: He is alert and oriented to person, place, and time.   Psychiatric:         Mood and Affect: Mood is not anxious.         Speech: Speech normal.         Behavior: Behavior normal.         Thought Content: Thought content normal.         Judgment: Judgment normal.         Significant Labs:   CBC:   Recent Labs   Lab 11/05/20  0643 11/06/20  0615   WBC 2.50* 2.49*   HGB 9.9* 9.1*   HCT 31.1* 28.4*   * 148*    and CMP:   Recent Labs   Lab 11/05/20  0643 11/06/20  0615   * 138   K 4.0 4.2    107   CO2 21* 23   * 104   BUN 21 19   CREATININE 1.5* 1.3   CALCIUM 8.7 8.4*   PROT 7.6 6.8   ALBUMIN 3.1* 2.6*   BILITOT 0.7 0.5   ALKPHOS 48* 42*   AST 16 19   ALT 9* 11   ANIONGAP 10 8   EGFRNONAA 45* 53*       Diagnostic Results:  I have reviewed all pertinent imaging results/findings within the past 24 hours.

## 2020-11-07 NOTE — TELEPHONE ENCOUNTER
"Hb 7.6 - likely concentrated and lower, received a liter of IVF in ED  Plan to transfuse 1 unit pRBC and recheck CBC - Hb 7.5  Had EGD 10/14 - did not show any evidence of bleeding, continue on PPI  Refused CLAIRE in ED, spoke to patient about colonoscopy and he said "Not right now"  Due to BM involvement per Hem/Onc  S/p 2 units pRBC and improving symptoms  " ----- Message from Tra Fagan sent at 11/13/2017 11:29 AM CST -----  Contact: lucio orlando - grand daughter   States she's calling rg the pharm waiting on authorization to fill pt's medication and is calling to see if it's been received. Pt grand daughter is unsure of the medicine and can be reached at [phone]//beverly/lazara     Pt pharm is   [facility]IGINIO WINTER  [address]  [address] [place] [address]  Phone: [phone] Fax: [phone]

## 2020-11-08 LAB
BACTERIA BLD CULT: NORMAL
BACTERIA BLD CULT: NORMAL

## 2020-11-08 NOTE — DISCHARGE SUMMARY
[facility] Hospital Medicine  Discharge Summary      Patient Name: Familia Durbin Jr.  MRN: [id number]  Admission Date: 11/3/2020  Hospital Length of Stay: 1 days  Discharge Date and Time: 11/6/2020  5:14 PM  Attending Physician: No att. providers found   Discharging Provider: Samra Erickson MD  Primary Care Provider: Andrea Elkins MD      HPI:   Pt is a 74 yo Jehovah Witness male with PMhx of CHF, asbestosis, Multiple myeloma with mets to the bone, CAD, renal insuffiencey, DM II, HTN and HPL who presents to the ED with c/o productive cough onset last week that has not improved despite completing a course of Azithromycin. Pt describes left sided chest pain, nasal drainage, SOB when speaking, malaise and ZAVALA. Also, pt reports constipation. He denies other symptoms.  V/S indicate temp 99.1, pulse 69, resp 20, B/P 112/56 and SpO2 95 %  Labs find WBC 2.42, H/H 10.9/34.0, INR 1.1, gluc 131, BNP = 809, lactate 0.8 and procal 0.03, negative COVID  CT imaging of chest finds bronchiectasis, RML air bronchograms. JEMMA bronchial plugging, RUL lung nodules  Pt is placed on Observation for further treatment/evaluation of pneumonia and bronchietasis.      * No surgery found *      Hospital Course:   Pt with MM admitted with pneumonia/bronchietasis noted on CT. Symptoms had worsened despite completing a 5 day course of Azithromycin. He has  SOB, cough and left sided chest pain. Pt given supplemental oxygen, IV Vanco/Cefepime/Flagyl and DuoNebs. Also, pulmonary toiletting   with incentive spirometer and acapella provided. Pulmonology, Dr. Dutta was consulted for  bronchiectasis and lung volume loss in the LLL and recommended to continue broad spectrum antibiotic for 48 to 72h then de escalate once pt shows clinical improvement and cultures are negative. Antibiotic transitioned to oral Levofloxacin. Overall hospital course was uncomplicated . O2 wean down to RA. Pt was evaluated for Home O2 and did not qualify  . Pt was examined and deemed stable and suitable for discharge today on 11/6/20. Pt was set up with Home Health for PT/OT. Nebulizer kit and Nebulizer for home use provided on discharge. Pt to follow up with PCP , and Pulmonologist. Advised to keep appt with Dr. Solo at Heme/Onc clinic as scheduled.        Consults:   Consults (From admission, onward)        Status Ordering Provider     Inpatient consult to Hematology/Oncology  Once     Provider:  Stanislaw Mosley MD    Completed JACINTO QUINTERO     Inpatient consult to Pulmonology  Once     Provider:  Gerardo Dutta MD    Completed JACINTO QUINTERO     Inpatient consult to Social Work/Case Management  Once     Provider:  (Not yet assigned)    Completed JUSTIN VENTURA          * Bronchiectasis with acute lower respiratory infection  No improvement with taking Azithromyin  IV Vanco, Flagyl and Cefepime  Supplemental oxygen to maintain sats > 92 %  Sputum culture pending  CT imaging - volume loss of LLL - bronchiectasis and air bronchograms, tree in bud opacity - JEMMA, RUL- cluster of nodules in RUL  Continue Pulmonology regarding volume loss,bronchiectasis and possible need for bronchoscopy  DuoNebs  Incentive spirometer  11/5/2020 - Day 3 of antibiotics. Stable for discharge on 11/6/2020 - will have received a full 3 days of IV ABX and starting day 4. Discharge home on Levaquin 750 mg. Will need to follow up with Dr. Dutta for repeat imaging.         Final Active Diagnoses:    Diagnosis Date Noted POA    PRINCIPAL PROBLEM:  Bronchiectasis with acute lower respiratory infection [J47.0] 11/03/2020 Yes    E. 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    Specialty: Family Medicine  Why: Discharge follow up   Contact information:  [address]  [place] [address]  [phone]             Gerardo Dutta MD. Schedule an appointment as soon as possible for a visit in 1 week.    Specialty: Pulmonary Disease  Why: Discharge follow up on Bronchiectasis   Contact information:  [address]  [place] [address]  [phone]             [facility]    Specialty: Home Health Services  Why: DME  Contact information:  [place]  [phone]             [facility]    Specialty: Home Health Services  Why: Home Health  Contact information:  [address]  [address]  [place] [facility] [address]  [phone]                 Patient Instructions:      NEBULIZER FOR HOME USE     Order Specific Question Answer Comments   Height: 5' 10" (1.778 m)    Weight: 75.6 kg (166 lb 10.7 oz)    Does patient have medical equipment at home? none    Length of need (1-99 months): 99    Vendor: [facility] DELIVERED TO ROOM   Expected Date of Delivery: 11/6/2020      NEBULIZER KIT (SUPPLIES) FOR HOME USE     Order Specific Question Answer Comments   Height: 5' 10" (1.778 m)    Weight: 75.6 kg (166 lb 10.7 oz)    Does patient have medical equipment at home? none    Length of need (1-99 months): 99    Mask or Mouthpiece? Mouthpiece    Vendor: [facility]    Expected Date of Delivery: 11/6/2020      Ambulatory referral/consult to Home Health   Standing Status: Future   Referral Priority: Routine Referral Type: Home Health   Referral Reason: " "Specialty Services Required   Requested Specialty: Home Health Services   Number of Visits Requested: 1     Diet diabetic     Activity as tolerated       Significant Diagnostic Studies: Labs:   BMP:   Recent Labs   Lab 11/06/20 0615         K 4.2      CO2 23   BUN 19   CREATININE 1.3   CALCIUM 8.4*   , CMP   Recent Labs   Lab 11/06/20  0615      K 4.2      CO2 23      BUN 19   CREATININE 1.3   CALCIUM 8.4*   PROT 6.8   ALBUMIN 2.6*   BILITOT 0.5   ALKPHOS 42*   AST 19   ALT 11   ANIONGAP 8   ESTGFRAFRICA >60   EGFRNONAA 53*    and CBC   Recent Labs   Lab 11/06/20 0615   WBC 2.49*   HGB 9.1*   HCT 28.4*   *       Pending Diagnostic Studies:     None         Medications:  Reconciled Home Medications:      Medication List      START taking these medications    albuterol-ipratropium 2.5 mg-0.5 mg/3 mL nebulizer solution  Commonly known as: DUO-NEB  Take 3 mLs by nebulization every 8 (eight) hours. For shortness of breath and wheezing     levoFLOXacin 750 MG tablet  Commonly known as: LEVAQUIN  Take 1 tablet (750 mg total) by mouth every other day. for 3 doses        CONTINUE taking these medications    aspirin 81 MG Chew  Take 1 tablet (81 mg total) by mouth once daily.     BD ULTRA-FINE DAVE PEN NEEDLE 32 gauge x 5/32" Ndle  Generic drug: pen needle, diabetic  USE  4 TIMES DAILY WITH MEALS AND AT BEDTIME     blood-glucose meter kit  Use as instructed     BRILINTA 90 mg tablet  Generic drug: ticagrelor  Take 1 tablet by mouth once daily.     cholecalciferol (vitamin D3) 125 mcg (5,000 unit) Tab  Take 5,000 Units by mouth once daily.     DOCOSAHEXANOIC ACID ORAL  Take 1 capsule by mouth once daily.     docusate sodium 100 MG capsule  Commonly known as: COLACE  Take 100 mg by mouth 2 (two) times daily.     flaxseed oil 1,000 mg Cap  Take 1 capsule by mouth.     janae (Zingiber officinalis) 550 mg Cap  Take 1 capsule by mouth.     insulin aspart U-100 100 unit/mL (3 mL) Inpn " pen  Commonly known as: NovoLOG  Inject 15 Units into the skin 3 (three) times daily with meals.     isosorbide mononitrate 30 MG 24 hr tablet  Commonly known as: IMDUR  Take by mouth.     * lactulose 10 gram/15 mL solution  Commonly known as: CHRONULAC  Take 30 mL by mouth twice daily as needed for constipation.     * KRISTALOSE 20 gram Pack  Generic drug: lactulose  Mix and take 1 packet (20 g total) by mouth 3 (three) times daily.     LANTUS SOLOSTAR U-100 INSULIN glargine 100 units/mL (3mL) SubQ pen  Generic drug: insulin  Inject 10 Units into the skin every evening. INJECT 15 UNITS SUBCUTANEOUSLY ONCE DAILY     lisinopriL 2.5 MG tablet  Commonly known as: PRINIVIL,ZESTRIL     metoprolol tartrate 50 MG tablet  Commonly known as: LOPRESSOR  Take 1 tablet by mouth twice daily     multivit-min-FA-lycopen-lutein 300-600-300 mcg Tab  Take 1 tablet by mouth.     NITROSTAT 0.4 MG SL tablet  Generic drug: nitroGLYCERIN     omega 3-dha-epa-fish oil 300-1,000 mg Cap  Take by mouth.     ondansetron 4 MG tablet  Commonly known as: ZOFRAN  Take 1 tablet (4 mg total) by mouth every 8 (eight) hours as needed for Nausea.     ondansetron 4 MG Tbdl  Commonly known as: ZOFRAN-ODT  Dissolve 1 tab under the tongue every 4-6 hours as needed for nausea.     * OxyCONTIN 20 mg 12 hr tablet  Generic drug: oxyCODONE  Take 1 tablet (20 mg total) by mouth every 12 (twelve) hours.     * oxyCODONE 10 mg Tab immediate release tablet  Commonly known as: ROXICODONE  Take 1 tablet (10 mg total) by mouth every 6 (six) hours as needed for Pain.     pantoprazole 40 MG tablet  Commonly known as: PROTONIX  Take 1 tablet by mouth daily     pomalidomide 3 mg Cap  Take by mouth.     prochlorperazine 5 MG tablet  Commonly known as: COMPAZINE  Take 1 tablet (5 mg total) by mouth 4 (four) times daily as needed for Nausea.     ranolazine 1,000 mg Tb12  Commonly known as: RANEXA  Take 1,000 mg by mouth 2 (two) times daily.     rosuvastatin 40 MG Tab  Commonly  known as: CRESTOR  Take 1 tablet (40 mg total) by mouth every evening.     SENNA LAXATIVE 8.6 mg tablet  Generic drug: senna  Take 1 tablet by mouth daily     triamcinolone acetonide 0.1% 0.1 % cream  Commonly known as: KENALOG  Apply topically 2 (two) times daily. For two weeks     TRUE METRIX GLUCOSE TEST STRIP Strp  Generic drug: blood sugar diagnostic  USE  STRIP TO CHECK GLUCOSE SIX TIMES DAILY     TRUEPLUS LANCETS 33 gauge Misc  Generic drug: lancets  USE   TO CHECK GLUCOSE SIX TIMES DAILY     valACYclovir 500 MG tablet  Commonly known as: VALTREX  Take by mouth.     VITAMIN B COMPLEX ORAL  Take 1 capsule by mouth.         * This list has 4 medication(s) that are the same as other medications prescribed for you. Read the directions carefully, and ask your doctor or other care provider to review them with you.            STOP taking these medications    azithromycin 250 MG tablet  Commonly known as: ZITHROMAX Z-JERRI            Indwelling Lines/Drains at time of discharge:   Lines/Drains/Airways     None                 Time spent on the discharge of patient:  40  minutes  Patient was seen and examined on the date of discharge and determined to be suitable for discharge.         Samra Erickson MD  Department of Hospital Medicine  [facility]

## 2020-11-09 LAB
BACTERIA SPEC AEROBE CULT: NORMAL
BACTERIA SPEC AEROBE CULT: NORMAL
GRAM STN SPEC: NORMAL

## 2020-11-12 ENCOUNTER — PATIENT OUTREACH (OUTPATIENT)
Dept: ADMINISTRATIVE | Facility: OTHER | Age: 75
End: 2020-11-12

## 2020-11-12 NOTE — PROGRESS NOTES
Health Maintenance Due   Topic Date Due    TETANUS VACCINE  08/21/1963    Shingles Vaccine (1 of 2) 08/21/1994    Influenza Vaccine (1) 08/01/2020     Updates were requested from care everywhere.  Chart was reviewed for overdue Proactive [facility] ([facility]) topics (CRS, Breast Cancer Screening, Eye exam)  Health Maintenance has been updated.  LINKS immunization registry triggered.  LINKS not responding.

## 2020-11-13 ENCOUNTER — EXTERNAL HOME HEALTH (OUTPATIENT)
Dept: HOME HEALTH SERVICES | Facility: HOSPITAL | Age: 75
End: 2020-11-13
Payer: MEDICARE

## 2020-11-13 ENCOUNTER — TELEPHONE (OUTPATIENT)
Dept: PULMONOLOGY | Facility: CLINIC | Age: 75
End: 2020-11-13

## 2020-11-13 NOTE — TELEPHONE ENCOUNTER
No answer [place] [facility] ----- Message from Nicki Can sent at 11/13/2020 11:46 AM CST -----  .Type:  Sooner Apoointment Request    Caller is requesting a sooner appointment.  Caller declined first available appointment listed below.  Caller will not accept being placed on the waitlist and is requesting a message be sent to doctor.  Name of Caller:Karen Dionisio  When is the first available appointment?12/16/20  Symptoms:hospital follow-up  Would the patient rather a call back or [facility]? Call back  Best Call Back Number:[phone]  Additional Information:

## 2020-11-16 ENCOUNTER — OFFICE VISIT (OUTPATIENT)
Dept: HEMATOLOGY/ONCOLOGY | Facility: CLINIC | Age: 75
End: 2020-11-16
Payer: MEDICARE

## 2020-11-16 VITALS
WEIGHT: 161.19 LBS | TEMPERATURE: 97 F | DIASTOLIC BLOOD PRESSURE: 71 MMHG | SYSTOLIC BLOOD PRESSURE: 170 MMHG | BODY MASS INDEX: 23.07 KG/M2 | HEIGHT: 70 IN | OXYGEN SATURATION: 92 % | HEART RATE: 68 BPM

## 2020-11-16 DIAGNOSIS — I70.0 CALCIFICATION OF ABDOMINAL AORTA: Chronic | ICD-10-CM

## 2020-11-16 DIAGNOSIS — C90.00 MULTIPLE MYELOMA NOT HAVING ACHIEVED REMISSION: Primary | ICD-10-CM

## 2020-11-16 DIAGNOSIS — J18.9 PNEUMONIA OF LEFT LOWER LOBE DUE TO INFECTIOUS ORGANISM: ICD-10-CM

## 2020-11-16 DIAGNOSIS — Z79.4 TYPE 2 DIABETES MELLITUS WITH DIABETIC NEPHROPATHY, WITH LONG-TERM CURRENT USE OF INSULIN: Chronic | ICD-10-CM

## 2020-11-16 DIAGNOSIS — E11.21 TYPE 2 DIABETES MELLITUS WITH DIABETIC NEPHROPATHY, WITH LONG-TERM CURRENT USE OF INSULIN: Chronic | ICD-10-CM

## 2020-11-16 PROBLEM — B96.20 E. COLI UTI: Status: RESOLVED | Noted: 2020-11-04 | Resolved: 2020-11-16

## 2020-11-16 PROBLEM — N39.0 E. COLI UTI: Status: RESOLVED | Noted: 2020-11-04 | Resolved: 2020-11-16

## 2020-11-16 PROCEDURE — 1101F PR PT FALLS ASSESS DOC 0-1 FALLS W/OUT INJ PAST YR: ICD-10-PCS | Mod: CPTII,S$GLB,, | Performed by: INTERNAL MEDICINE

## 2020-11-16 PROCEDURE — 1125F PR PAIN SEVERITY QUANTIFIED, PAIN PRESENT: ICD-10-PCS | Mod: S$GLB,,, | Performed by: INTERNAL MEDICINE

## 2020-11-16 PROCEDURE — 99214 OFFICE O/P EST MOD 30 MIN: CPT | Mod: S$GLB,,, | Performed by: INTERNAL MEDICINE

## 2020-11-16 PROCEDURE — 3078F PR MOST RECENT DIASTOLIC BLOOD PRESSURE < 80 MM HG: ICD-10-PCS | Mod: CPTII,S$GLB,, | Performed by: INTERNAL MEDICINE

## 2020-11-16 PROCEDURE — 99999 PR PBB SHADOW E&M-EST. PATIENT-LVL V: CPT | Mod: PBBFAC,,, | Performed by: INTERNAL MEDICINE

## 2020-11-16 PROCEDURE — 1159F MED LIST DOCD IN RCRD: CPT | Mod: S$GLB,,, | Performed by: INTERNAL MEDICINE

## 2020-11-16 PROCEDURE — 99999 PR PBB SHADOW E&M-EST. PATIENT-LVL V: ICD-10-PCS | Mod: PBBFAC,,, | Performed by: INTERNAL MEDICINE

## 2020-11-16 PROCEDURE — 3072F PR LOW RISK FOR RETINOPATHY: ICD-10-PCS | Mod: S$GLB,,, | Performed by: INTERNAL MEDICINE

## 2020-11-16 PROCEDURE — 3288F FALL RISK ASSESSMENT DOCD: CPT | Mod: CPTII,S$GLB,, | Performed by: INTERNAL MEDICINE

## 2020-11-16 PROCEDURE — 1101F PT FALLS ASSESS-DOCD LE1/YR: CPT | Mod: CPTII,S$GLB,, | Performed by: INTERNAL MEDICINE

## 2020-11-16 PROCEDURE — 3077F SYST BP >= 140 MM HG: CPT | Mod: CPTII,S$GLB,, | Performed by: INTERNAL MEDICINE

## 2020-11-16 PROCEDURE — 3077F PR MOST RECENT SYSTOLIC BLOOD PRESSURE >= 140 MM HG: ICD-10-PCS | Mod: CPTII,S$GLB,, | Performed by: INTERNAL MEDICINE

## 2020-11-16 PROCEDURE — 1125F AMNT PAIN NOTED PAIN PRSNT: CPT | Mod: S$GLB,,, | Performed by: INTERNAL MEDICINE

## 2020-11-16 PROCEDURE — 3078F DIAST BP <80 MM HG: CPT | Mod: CPTII,S$GLB,, | Performed by: INTERNAL MEDICINE

## 2020-11-16 PROCEDURE — 99214 PR OFFICE/OUTPT VISIT, EST, LEVL IV, 30-39 MIN: ICD-10-PCS | Mod: S$GLB,,, | Performed by: INTERNAL MEDICINE

## 2020-11-16 PROCEDURE — 3072F LOW RISK FOR RETINOPATHY: CPT | Mod: S$GLB,,, | Performed by: INTERNAL MEDICINE

## 2020-11-16 PROCEDURE — 1159F PR MEDICATION LIST DOCUMENTED IN MEDICAL RECORD: ICD-10-PCS | Mod: S$GLB,,, | Performed by: INTERNAL MEDICINE

## 2020-11-16 PROCEDURE — 3288F PR FALLS RISK ASSESSMENT DOCUMENTED: ICD-10-PCS | Mod: CPTII,S$GLB,, | Performed by: INTERNAL MEDICINE

## 2020-11-16 RX ORDER — BENZONATATE 100 MG/1
100 CAPSULE ORAL EVERY 6 HOURS PRN
Qty: 60 CAPSULE | Refills: 1 | Status: SHIPPED | OUTPATIENT
Start: 2020-11-16 | End: 2021-11-16

## 2020-11-16 NOTE — PROGRESS NOTES
Subjective:       Patient ID: Familia Durbin Jr. is a 75 y.o. male.    Chief Complaint: Results, Multiple Myeloma, and Pain    HPI 75-year-old male continues on Pomalyst through [facility] for maintenance patient recently hospitalized for pneumonia returns for follow-up in outpatient setting    Past Medical History:   Diagnosis Date    CAD (coronary artery disease)     luikart    Diabetes mellitus, type 2 1979    eye dr rocha    Hearing impaired     Hyperlipidemia     Hypertension     Lupus     Discoid    Multiple myeloma     Old MI (myocardial infarction) 2012    Renal insufficiency     Tracheostomy tube present      Family History   Problem Relation Age of Onset    Diabetes Mother     Diabetes Father     Prostate cancer Father     Pancreatic cancer Sister     No Known Problems Brother     Diabetes Maternal Uncle     Diabetes Maternal Grandmother     No Known Problems Maternal Grandfather     No Known Problems Paternal Grandmother     No Known Problems Paternal Grandfather      Social History     Socioeconomic History    Marital status: Single     Spouse name: Not on file    Number of children: 9    Years of education: Not on file    Highest education level: Not on file   Occupational History    Not on file   Social Needs    Financial resource strain: Not on file    Food insecurity     Worry: Not on file     Inability: Not on file    Transportation needs     Medical: Not on file     Non-medical: Not on file   Tobacco Use    Smoking status: Never Smoker    Smokeless tobacco: Never Used   Substance and Sexual Activity    Alcohol use: No    Drug use: No    Sexual activity: Yes     Partners: Female   Lifestyle    Physical activity     Days per week: Not on file     Minutes per session: Not on file    Stress: Not on file   Relationships    Social connections     Talks on phone: Not on file     Gets together: Not on file     Attends Bahai service: Not  on file     Active member of club or organization: Not on file     Attends meetings of clubs or organizations: Not on file     Relationship status: Not on file   Other Topics Concern    Not on file   Social History Narrative    Not on file     Past Surgical History:   Procedure Laterality Date    CARDIAC SURGERY      CATARACT EXTRACTION      COLONOSCOPY      CORONARY ARTERY BYPASS GRAFT      HAND SURGERY      KNEE SURGERY      TRACHEOSTOMY TUBE PLACEMENT      WRIST FUSION         Labs:  Lab Results   Component Value Date    WBC 2.49 (L) 11/06/2020    HGB 9.1 (L) 11/06/2020    HCT 28.4 (L) 11/06/2020     (H) 11/06/2020     (L) 11/06/2020     BMP  Lab Results   Component Value Date     11/06/2020    K 4.2 11/06/2020     11/06/2020    CO2 23 11/06/2020    BUN 19 11/06/2020    CREATININE 1.3 11/06/2020    CALCIUM 8.4 (L) 11/06/2020    ANIONGAP 8 11/06/2020    ESTGFRAFRICA >60 11/06/2020    EGFRNONAA 53 (A) 11/06/2020     Lab Results   Component Value Date    ALT 11 11/06/2020    AST 19 11/06/2020    ALKPHOS 42 (L) 11/06/2020    BILITOT 0.5 11/06/2020       Lab Results   Component Value Date    IRON 49 02/20/2020    TIBC 293 02/20/2020    FERRITIN 163 02/20/2020     No results found for: [id number]  No results found for: FOLATE  Lab Results   Component Value Date    TSH 0.782 07/31/2020         Review of Systems   Constitutional: Positive for fatigue. Negative for activity change, appetite change, chills, diaphoresis, fever and unexpected weight change.   HENT: Negative for congestion, dental problem, drooling, ear discharge, ear pain, facial swelling, hearing loss, mouth sores, nosebleeds, postnasal drip, rhinorrhea, sinus pressure, sneezing, sore throat, tinnitus, trouble swallowing and voice change.    Eyes: Negative for photophobia, pain, discharge, redness, itching and visual disturbance.   Respiratory: Negative for apnea, cough, choking, chest tightness, shortness of breath,  wheezing and stridor.    Cardiovascular: Negative for chest pain, palpitations and leg swelling.   Gastrointestinal: Negative for abdominal distention, abdominal pain, anal bleeding, blood in stool, constipation, diarrhea, nausea, rectal pain and vomiting.   Endocrine: Negative for cold intolerance, heat intolerance, polydipsia, polyphagia and polyuria.   Genitourinary: Negative for decreased urine volume, difficulty urinating, discharge, dysuria, enuresis, flank pain, frequency, genital sores, hematuria, penile pain, penile swelling, scrotal swelling, testicular pain and urgency.   Musculoskeletal: Negative for arthralgias, back pain, gait problem, joint swelling, myalgias, neck pain and neck stiffness.   Skin: Negative for color change, pallor, rash and wound.   Allergic/Immunologic: Negative for environmental allergies, food allergies and immunocompromised state.   Neurological: Positive for weakness. Negative for dizziness, tremors, seizures, syncope, facial asymmetry, speech difficulty, light-headedness, numbness and headaches.   Hematological: Negative for adenopathy. Does not bruise/bleed easily.   Psychiatric/Behavioral: Positive for dysphoric mood. Negative for agitation, behavioral problems, confusion, decreased concentration, hallucinations, self-injury, sleep disturbance and suicidal ideas. The patient is nervous/anxious. The patient is not hyperactive.        Objective:      Physical Exam  Vitals signs reviewed.   Constitutional:       General: He is not in acute distress.     Appearance: He is well-developed. He is not diaphoretic.   HENT:      Head: Normocephalic.      Right Ear: External ear normal.      Left Ear: External ear normal.      Nose: Nose normal.      Right Sinus: No maxillary sinus tenderness or frontal sinus tenderness.      Left Sinus: No maxillary sinus tenderness or frontal sinus tenderness.      Mouth/Throat:      Pharynx: No oropharyngeal exudate.   Eyes:      General: Lids are  normal. No scleral icterus.        Right eye: No discharge.         Left eye: No discharge.      Extraocular Movements:      Right eye: Normal extraocular motion.      Left eye: Normal extraocular motion.      Conjunctiva/sclera:      Right eye: Right conjunctiva is not injected. No hemorrhage.     Left eye: Left conjunctiva is not injected. No hemorrhage.     Pupils: Pupils are equal, round, and reactive to light.   Neck:      Musculoskeletal: Normal range of motion and neck supple.      Thyroid: No thyromegaly.      Vascular: No JVD.      Trachea: No tracheal deviation.   Cardiovascular:      Rate and Rhythm: Normal rate.   Pulmonary:      Effort: Pulmonary effort is normal. No respiratory distress.      Breath sounds: No stridor.   Abdominal:      General: Bowel sounds are normal.      Palpations: Abdomen is soft. There is no hepatomegaly, splenomegaly or mass.      Tenderness: There is no abdominal tenderness.   Musculoskeletal: Normal range of motion.         General: No tenderness.   Lymphadenopathy:      Head:      Right side of head: No posterior auricular or occipital adenopathy.      Left side of head: No posterior auricular or occipital adenopathy.      Cervical: No cervical adenopathy.      Right cervical: No superficial, deep or posterior cervical adenopathy.     Left cervical: No superficial, deep or posterior cervical adenopathy.      Upper Body:      Right upper body: No supraclavicular adenopathy.      Left upper body: No supraclavicular adenopathy.   Skin:     General: Skin is dry.      Findings: No erythema or rash.      Nails: There is no clubbing.     Neurological:      Mental Status: He is alert and oriented to person, place, and time.      Cranial Nerves: No cranial nerve deficit.      Coordination: Coordination normal.   Psychiatric:         Behavior: Behavior normal.         Thought Content: Thought content normal.         Judgment: Judgment normal.             Assessment:      1. Multiple  myeloma not having achieved remission    2. Pneumonia of left lower lobe due to infectious organism    3. Metastatic bone cancer    4. Type 2 diabetes mellitus with diabetic nephropathy, with long-term current use of insulin    5. Calcification of abdominal aorta           Plan:     Reviewed hospital course.  Patient appears to be remarkably improved than when I last saw him.  At this point will continue follow-up in 6 weeks with labs done prior continue with chronic stable narcotics.  Continue also with Tessalon Perles prescription given 25 min face-to-face time coordination of care greater than 50% time face-to-face with patient        Rodolfo Solo Jr, MD FACP

## 2020-11-18 ENCOUNTER — TELEPHONE (OUTPATIENT)
Dept: PULMONOLOGY | Facility: CLINIC | Age: 75
End: 2020-11-18

## 2020-11-18 ENCOUNTER — TELEPHONE (OUTPATIENT)
Dept: HEMATOLOGY/ONCOLOGY | Facility: CLINIC | Age: 75
End: 2020-11-18

## 2020-11-18 NOTE — TELEPHONE ENCOUNTER
----- Message from Eduarda Chambers sent at 11/18/2020 12:02 PM CST -----  Contact: Patient's daughter- Karen Nichole  Please call to work patient in for a  hospital follow up, the next available was 01/22/20. Please call at  [phone]

## 2020-11-18 NOTE — TELEPHONE ENCOUNTER
Harsha intern contacted pt to discuss the results of his recent distress screening. Pt reported that he hasn't been sleeping well, and has been having a hard time relaxing. Pt also reports heightened anxiety. Pt inquired about anxiety medication or muscle relaxer's , to help him sleep. Harsha intern told pt that she would discuss options with Dr. Solo and let him know what he thinks is the best form of treatment.

## 2020-11-18 NOTE — TELEPHONE ENCOUNTER
Sw intern called pt to discuss what Dr. Solo feels is an appropriate treatment plan . Pt did not answer at this time. Sw intern left a message for the pt, urging him to call back.

## 2020-11-20 ENCOUNTER — HOSPITAL ENCOUNTER (OUTPATIENT)
Facility: HOSPITAL | Age: 75
Discharge: HOME | End: 2020-11-22
Attending: EMERGENCY MEDICINE | Admitting: FAMILY MEDICINE
Payer: MEDICARE

## 2020-11-20 DIAGNOSIS — R07.9 CHEST PAIN: ICD-10-CM

## 2020-11-20 DIAGNOSIS — R10.13 EPIGASTRIC PAIN: ICD-10-CM

## 2020-11-20 DIAGNOSIS — R06.09 DOE (DYSPNEA ON EXERTION): Primary | ICD-10-CM

## 2020-11-20 DIAGNOSIS — R79.89 ELEVATED TROPONIN: ICD-10-CM

## 2020-11-20 PROBLEM — G89.3 NEOPLASM RELATED PAIN: Status: ACTIVE | Noted: 2020-11-20

## 2020-11-20 PROBLEM — Z51.5 ENCOUNTER FOR PALLIATIVE CARE: Status: ACTIVE | Noted: 2020-11-20

## 2020-11-20 LAB
ALBUMIN SERPL BCP-MCNC: 3.3 G/DL (ref 3.5–5.2)
ALP SERPL-CCNC: 68 U/L (ref 55–135)
ALT SERPL W/O P-5'-P-CCNC: 18 U/L (ref 10–44)
ANION GAP SERPL CALC-SCNC: 11 MMOL/L (ref 8–16)
AST SERPL-CCNC: 20 U/L (ref 10–40)
BACTERIA #/AREA URNS HPF: NORMAL /HPF
BASOPHILS # BLD AUTO: 0.02 K/UL (ref 0–0.2)
BASOPHILS NFR BLD: 0.5 % (ref 0–1.9)
BILIRUB SERPL-MCNC: 1 MG/DL (ref 0.1–1)
BILIRUB UR QL STRIP: NEGATIVE
BNP SERPL-MCNC: 2095 PG/ML (ref 0–99)
BUN SERPL-MCNC: 18 MG/DL (ref 8–23)
CALCIUM SERPL-MCNC: 8.4 MG/DL (ref 8.7–10.5)
CHLORIDE SERPL-SCNC: 104 MMOL/L (ref 95–110)
CK SERPL-CCNC: 52 U/L (ref 20–200)
CLARITY UR: CLEAR
CO2 SERPL-SCNC: 23 MMOL/L (ref 23–29)
COLOR UR: YELLOW
CREAT SERPL-MCNC: 1.4 MG/DL (ref 0.5–1.4)
DIFFERENTIAL METHOD: ABNORMAL
EOSINOPHIL # BLD AUTO: 0 K/UL (ref 0–0.5)
EOSINOPHIL NFR BLD: 0.2 % (ref 0–8)
ERYTHROCYTE [DISTWIDTH] IN BLOOD BY AUTOMATED COUNT: 17.2 % (ref 11.5–14.5)
EST. GFR  (AFRICAN AMERICAN): 56 ML/MIN/1.73 M^2
EST. GFR  (NON AFRICAN AMERICAN): 48 ML/MIN/1.73 M^2
GLUCOSE SERPL-MCNC: 180 MG/DL (ref 70–110)
GLUCOSE UR QL STRIP: ABNORMAL
HCT VFR BLD AUTO: 30.9 % (ref 40–54)
HGB BLD-MCNC: 10 G/DL (ref 14–18)
HGB UR QL STRIP: NEGATIVE
HYALINE CASTS #/AREA URNS LPF: 0 /LPF
IMM GRANULOCYTES # BLD AUTO: 0.02 K/UL (ref 0–0.04)
IMM GRANULOCYTES NFR BLD AUTO: 0.5 % (ref 0–0.5)
KETONES UR QL STRIP: NEGATIVE
LEUKOCYTE ESTERASE UR QL STRIP: NEGATIVE
LIPASE SERPL-CCNC: 13 U/L (ref 4–60)
LYMPHOCYTES # BLD AUTO: 0.8 K/UL (ref 1–4.8)
LYMPHOCYTES NFR BLD: 18.3 % (ref 18–48)
MCH RBC QN AUTO: 33.6 PG (ref 27–31)
MCHC RBC AUTO-ENTMCNC: 32.4 G/DL (ref 32–36)
MCV RBC AUTO: 104 FL (ref 82–98)
MICROSCOPIC COMMENT: NORMAL
MONOCYTES # BLD AUTO: 0.3 K/UL (ref 0.3–1)
MONOCYTES NFR BLD: 5.9 % (ref 4–15)
NEUTROPHILS # BLD AUTO: 3.2 K/UL (ref 1.8–7.7)
NEUTROPHILS NFR BLD: 74.6 % (ref 38–73)
NITRITE UR QL STRIP: NEGATIVE
NRBC BLD-RTO: 0 /100 WBC
PH UR STRIP: 7 [PH] (ref 5–8)
PLATELET # BLD AUTO: 224 K/UL (ref 150–350)
PMV BLD AUTO: 10.6 FL (ref 9.2–12.9)
POCT GLUCOSE: 112 MG/DL (ref 70–110)
POCT GLUCOSE: 152 MG/DL (ref 70–110)
POTASSIUM SERPL-SCNC: 3.9 MMOL/L (ref 3.5–5.1)
PROT SERPL-MCNC: 7.7 G/DL (ref 6–8.4)
PROT UR QL STRIP: ABNORMAL
RBC # BLD AUTO: 2.98 M/UL (ref 4.6–6.2)
RBC #/AREA URNS HPF: 0 /HPF (ref 0–4)
SARS-COV-2 RDRP RESP QL NAA+PROBE: NEGATIVE
SODIUM SERPL-SCNC: 138 MMOL/L (ref 136–145)
SP GR UR STRIP: 1.02 (ref 1–1.03)
TROPONIN I SERPL DL<=0.01 NG/ML-MCNC: 0.05 NG/ML (ref 0–0.03)
TROPONIN I SERPL DL<=0.01 NG/ML-MCNC: 0.06 NG/ML (ref 0–0.03)
TROPONIN I SERPL DL<=0.01 NG/ML-MCNC: 0.06 NG/ML (ref 0–0.03)
URN SPEC COLLECT METH UR: ABNORMAL
UROBILINOGEN UR STRIP-ACNC: NEGATIVE EU/DL
WBC # BLD AUTO: 4.26 K/UL (ref 3.9–12.7)
WBC #/AREA URNS HPF: 0 /HPF (ref 0–5)

## 2020-11-20 PROCEDURE — 96374 THER/PROPH/DIAG INJ IV PUSH: CPT

## 2020-11-20 PROCEDURE — U0002 COVID-19 LAB TEST NON-CDC: HCPCS

## 2020-11-20 PROCEDURE — 36415 COLL VENOUS BLD VENIPUNCTURE: CPT

## 2020-11-20 PROCEDURE — 25000003 PHARM REV CODE 250: Performed by: NURSE PRACTITIONER

## 2020-11-20 PROCEDURE — 25000003 PHARM REV CODE 250: Performed by: INTERNAL MEDICINE

## 2020-11-20 PROCEDURE — 96372 THER/PROPH/DIAG INJ SC/IM: CPT

## 2020-11-20 PROCEDURE — 85025 COMPLETE CBC W/AUTO DIFF WBC: CPT

## 2020-11-20 PROCEDURE — 93010 EKG 12-LEAD: ICD-10-PCS | Mod: ,,, | Performed by: INTERNAL MEDICINE

## 2020-11-20 PROCEDURE — 93010 ELECTROCARDIOGRAM REPORT: CPT | Mod: ,,, | Performed by: INTERNAL MEDICINE

## 2020-11-20 PROCEDURE — 99215 PR OFFICE/OUTPT VISIT, EST, LEVL V, 40-54 MIN: ICD-10-PCS | Mod: ,,, | Performed by: PHYSICIAN ASSISTANT

## 2020-11-20 PROCEDURE — 99285 EMERGENCY DEPT VISIT HI MDM: CPT | Mod: 25

## 2020-11-20 PROCEDURE — 63600175 PHARM REV CODE 636 W HCPCS: Performed by: NURSE PRACTITIONER

## 2020-11-20 PROCEDURE — 83880 ASSAY OF NATRIURETIC PEPTIDE: CPT

## 2020-11-20 PROCEDURE — G0378 HOSPITAL OBSERVATION PER HR: HCPCS

## 2020-11-20 PROCEDURE — 99220 PR INITIAL OBSERVATION CARE,LEVL III: ICD-10-PCS | Mod: 25,,, | Performed by: INTERNAL MEDICINE

## 2020-11-20 PROCEDURE — 96372 THER/PROPH/DIAG INJ SC/IM: CPT | Mod: 59

## 2020-11-20 PROCEDURE — 93005 ELECTROCARDIOGRAM TRACING: CPT

## 2020-11-20 PROCEDURE — 80053 COMPREHEN METABOLIC PANEL: CPT

## 2020-11-20 PROCEDURE — 99215 OFFICE O/P EST HI 40 MIN: CPT | Mod: ,,, | Performed by: PHYSICIAN ASSISTANT

## 2020-11-20 PROCEDURE — 84484 ASSAY OF TROPONIN QUANT: CPT | Mod: 91

## 2020-11-20 PROCEDURE — 96375 TX/PRO/DX INJ NEW DRUG ADDON: CPT

## 2020-11-20 PROCEDURE — 84484 ASSAY OF TROPONIN QUANT: CPT

## 2020-11-20 PROCEDURE — 25000003 PHARM REV CODE 250: Performed by: PHYSICIAN ASSISTANT

## 2020-11-20 PROCEDURE — 63600175 PHARM REV CODE 636 W HCPCS: Performed by: EMERGENCY MEDICINE

## 2020-11-20 PROCEDURE — 82550 ASSAY OF CK (CPK): CPT

## 2020-11-20 PROCEDURE — 99220 PR INITIAL OBSERVATION CARE,LEVL III: CPT | Mod: 25,,, | Performed by: INTERNAL MEDICINE

## 2020-11-20 PROCEDURE — 83690 ASSAY OF LIPASE: CPT

## 2020-11-20 PROCEDURE — 81000 URINALYSIS NONAUTO W/SCOPE: CPT

## 2020-11-20 RX ORDER — FUROSEMIDE 10 MG/ML
40 INJECTION INTRAMUSCULAR; INTRAVENOUS DAILY
Status: DISCONTINUED | OUTPATIENT
Start: 2020-11-21 | End: 2020-11-22 | Stop reason: HOSPADM

## 2020-11-20 RX ORDER — BENZONATATE 100 MG/1
100 CAPSULE ORAL EVERY 6 HOURS PRN
Status: DISCONTINUED | OUTPATIENT
Start: 2020-11-20 | End: 2020-11-22 | Stop reason: HOSPADM

## 2020-11-20 RX ORDER — OXYCODONE HCL 10 MG/1
20 TABLET, FILM COATED, EXTENDED RELEASE ORAL EVERY 12 HOURS
Status: DISCONTINUED | OUTPATIENT
Start: 2020-11-20 | End: 2020-11-22 | Stop reason: HOSPADM

## 2020-11-20 RX ORDER — PANTOPRAZOLE SODIUM 40 MG/1
40 TABLET, DELAYED RELEASE ORAL 2 TIMES DAILY
Status: DISCONTINUED | OUTPATIENT
Start: 2020-11-20 | End: 2020-11-22 | Stop reason: HOSPADM

## 2020-11-20 RX ORDER — GLUCAGON 1 MG
1 KIT INJECTION
Status: DISCONTINUED | OUTPATIENT
Start: 2020-11-20 | End: 2020-11-22 | Stop reason: HOSPADM

## 2020-11-20 RX ORDER — OXYCODONE HYDROCHLORIDE 5 MG/1
10 TABLET ORAL EVERY 4 HOURS PRN
Status: DISCONTINUED | OUTPATIENT
Start: 2020-11-20 | End: 2020-11-22 | Stop reason: HOSPADM

## 2020-11-20 RX ORDER — POLYETHYLENE GLYCOL 3350 17 G/17G
17 POWDER, FOR SOLUTION ORAL DAILY
Status: DISCONTINUED | OUTPATIENT
Start: 2020-11-21 | End: 2020-11-22 | Stop reason: HOSPADM

## 2020-11-20 RX ORDER — PANTOPRAZOLE SODIUM 40 MG/1
40 TABLET, DELAYED RELEASE ORAL DAILY
Status: DISCONTINUED | OUTPATIENT
Start: 2020-11-20 | End: 2020-11-20

## 2020-11-20 RX ORDER — IBUPROFEN 200 MG
16 TABLET ORAL
Status: DISCONTINUED | OUTPATIENT
Start: 2020-11-20 | End: 2020-11-22 | Stop reason: HOSPADM

## 2020-11-20 RX ORDER — ISOSORBIDE MONONITRATE 30 MG/1
30 TABLET, EXTENDED RELEASE ORAL DAILY
Status: DISCONTINUED | OUTPATIENT
Start: 2020-11-20 | End: 2020-11-20

## 2020-11-20 RX ORDER — LISINOPRIL 2.5 MG/1
2.5 TABLET ORAL DAILY
Status: DISCONTINUED | OUTPATIENT
Start: 2020-11-20 | End: 2020-11-22 | Stop reason: HOSPADM

## 2020-11-20 RX ORDER — SENNOSIDES 8.6 MG/1
17.2 TABLET ORAL NIGHTLY
Status: DISCONTINUED | OUTPATIENT
Start: 2020-11-21 | End: 2020-11-22 | Stop reason: HOSPADM

## 2020-11-20 RX ORDER — FUROSEMIDE 10 MG/ML
40 INJECTION INTRAMUSCULAR; INTRAVENOUS
Status: COMPLETED | OUTPATIENT
Start: 2020-11-20 | End: 2020-11-20

## 2020-11-20 RX ORDER — SYRING-NEEDL,DISP,INSUL,0.3 ML 29 G X1/2"
296 SYRINGE, EMPTY DISPOSABLE MISCELLANEOUS ONCE
Status: COMPLETED | OUTPATIENT
Start: 2020-11-20 | End: 2020-11-20

## 2020-11-20 RX ORDER — ONDANSETRON 2 MG/ML
4 INJECTION INTRAMUSCULAR; INTRAVENOUS
Status: COMPLETED | OUTPATIENT
Start: 2020-11-20 | End: 2020-11-20

## 2020-11-20 RX ORDER — RANOLAZINE 500 MG/1
1000 TABLET, EXTENDED RELEASE ORAL 2 TIMES DAILY
Status: DISCONTINUED | OUTPATIENT
Start: 2020-11-20 | End: 2020-11-22 | Stop reason: HOSPADM

## 2020-11-20 RX ORDER — INSULIN ASPART 100 [IU]/ML
0-5 INJECTION, SOLUTION INTRAVENOUS; SUBCUTANEOUS
Status: DISCONTINUED | OUTPATIENT
Start: 2020-11-20 | End: 2020-11-22 | Stop reason: HOSPADM

## 2020-11-20 RX ORDER — ACETAMINOPHEN 325 MG/1
650 TABLET ORAL EVERY 4 HOURS PRN
Status: DISCONTINUED | OUTPATIENT
Start: 2020-11-20 | End: 2020-11-22 | Stop reason: HOSPADM

## 2020-11-20 RX ORDER — NAPROXEN SODIUM 220 MG/1
81 TABLET, FILM COATED ORAL DAILY
Status: DISCONTINUED | OUTPATIENT
Start: 2020-11-21 | End: 2020-11-22 | Stop reason: HOSPADM

## 2020-11-20 RX ORDER — ENOXAPARIN SODIUM 100 MG/ML
40 INJECTION SUBCUTANEOUS EVERY 24 HOURS
Status: DISCONTINUED | OUTPATIENT
Start: 2020-11-20 | End: 2020-11-22 | Stop reason: HOSPADM

## 2020-11-20 RX ORDER — ISOSORBIDE MONONITRATE 60 MG/1
60 TABLET, EXTENDED RELEASE ORAL 2 TIMES DAILY
Status: DISCONTINUED | OUTPATIENT
Start: 2020-11-20 | End: 2020-11-22 | Stop reason: HOSPADM

## 2020-11-20 RX ORDER — METOPROLOL TARTRATE 50 MG/1
50 TABLET ORAL 2 TIMES DAILY
Status: DISCONTINUED | OUTPATIENT
Start: 2020-11-20 | End: 2020-11-22 | Stop reason: HOSPADM

## 2020-11-20 RX ORDER — SODIUM CHLORIDE 0.9 % (FLUSH) 0.9 %
10 SYRINGE (ML) INJECTION
Status: DISCONTINUED | OUTPATIENT
Start: 2020-11-20 | End: 2020-11-22 | Stop reason: HOSPADM

## 2020-11-20 RX ORDER — ROSUVASTATIN CALCIUM 10 MG/1
40 TABLET, COATED ORAL NIGHTLY
Status: DISCONTINUED | OUTPATIENT
Start: 2020-11-20 | End: 2020-11-22 | Stop reason: HOSPADM

## 2020-11-20 RX ORDER — IBUPROFEN 200 MG
24 TABLET ORAL
Status: DISCONTINUED | OUTPATIENT
Start: 2020-11-20 | End: 2020-11-22 | Stop reason: HOSPADM

## 2020-11-20 RX ADMIN — ROSUVASTATIN 40 MG: 10 TABLET, FILM COATED ORAL at 09:11

## 2020-11-20 RX ADMIN — TICAGRELOR 90 MG: 90 TABLET ORAL at 01:11

## 2020-11-20 RX ADMIN — METOPROLOL TARTRATE 50 MG: 50 TABLET, FILM COATED ORAL at 09:11

## 2020-11-20 RX ADMIN — ONDANSETRON 4 MG: 2 INJECTION INTRAMUSCULAR; INTRAVENOUS at 10:11

## 2020-11-20 RX ADMIN — MAGNESIUM CITRATE 296 ML: 1.75 LIQUID ORAL at 05:11

## 2020-11-20 RX ADMIN — SODIUM PHOSPHATE, DIBASIC AND SODIUM PHOSPHATE, MONOBASIC 1 ENEMA: 7; 19 ENEMA RECTAL at 06:11

## 2020-11-20 RX ADMIN — OXYCODONE HYDROCHLORIDE 20 MG: 10 TABLET, FILM COATED, EXTENDED RELEASE ORAL at 09:11

## 2020-11-20 RX ADMIN — RANOLAZINE 1000 MG: 500 TABLET, FILM COATED, EXTENDED RELEASE ORAL at 09:11

## 2020-11-20 RX ADMIN — FUROSEMIDE 40 MG: 10 INJECTION, SOLUTION INTRAMUSCULAR; INTRAVENOUS at 12:11

## 2020-11-20 RX ADMIN — PANTOPRAZOLE SODIUM 40 MG: 40 TABLET, DELAYED RELEASE ORAL at 01:11

## 2020-11-20 RX ADMIN — ISOSORBIDE MONONITRATE 30 MG: 30 TABLET, EXTENDED RELEASE ORAL at 01:11

## 2020-11-20 RX ADMIN — LISINOPRIL 2.5 MG: 2.5 TABLET ORAL at 01:11

## 2020-11-20 RX ADMIN — PANTOPRAZOLE SODIUM 40 MG: 40 TABLET, DELAYED RELEASE ORAL at 09:11

## 2020-11-20 RX ADMIN — ENOXAPARIN SODIUM 40 MG: 100 INJECTION SUBCUTANEOUS at 05:11

## 2020-11-20 RX ADMIN — ISOSORBIDE MONONITRATE 60 MG: 60 TABLET, EXTENDED RELEASE ORAL at 09:11

## 2020-11-20 NOTE — ASSESSMENT & PLAN NOTE
- Recent A1c 6.4  - Accu checks ACHS with SSI PRN  - Resume home long once tolerating po  - ADA diet  - Monitor

## 2020-11-20 NOTE — ED PROVIDER NOTES
SCRIBE #1 NOTE: I, Cass Rivas, am scribing for, and in the presence of, Blanco Ervin MD. I have scribed the entire note.       History     Chief Complaint   Patient presents with    Abdominal Pain     epigastric pain and nausea     Review of patient's allergies indicates:   Allergen Reactions    Cephalexin      Pt tolerated cefepime 11/4/2020    Fentanyl Nausea And Vomiting    Nsaids (non-steroidal anti-inflammatory drug)      Renal insuf         History of Present Illness     HPI    11/20/2020, 9:32 AM  History obtained from the patient      History of Present Illness: Familia Durbin Jr. is a 75 y.o. male patient with a h/o CAD, DM type 2, HLD, HTN, lupus, multiple myeloma, old MI, renal insufficiency, tracheostomy tube present, who presents to the Emergency Department for evaluation of epigastric abd pain which onset 6 days ago. Pt states that he has had intermittent CP, dyspnea on exertion, and orthopnea for the past 2 days. Symptoms are constant and moderate in severity. No mitigating or exacerbating factors reported. Associated sxs include nausea, emesis, constipation, intermittent CP, dyspnea on exertion, and orthopnea. Patient denies any fever, chills, fatigue, cough, wheezing, palpitations, blood in stool, dysuria,  hematuria, flank pain, light-headedness, HA, and all other sxs at this time. No prior Tx reported. No further complaints or concerns at this time.       Arrival mode: Personal transportation    PCP: Andrea Elkins MD      Past Medical History:  Past Medical History:   Diagnosis Date    CAD (coronary artery disease)     tyree    Diabetes mellitus, type 2 1979    eye dr rocha    Hearing impaired     Hyperlipidemia     Hypertension     Lupus     Discoid    Multiple myeloma     Old MI (myocardial infarction) 2012    Renal insufficiency     Tracheostomy tube present        Past Surgical History:  Past Surgical History:   Procedure Laterality Date    CARDIAC  SURGERY      CATARACT EXTRACTION      COLONOSCOPY      CORONARY ARTERY BYPASS GRAFT      HAND SURGERY      KNEE SURGERY      TRACHEOSTOMY TUBE PLACEMENT      WRIST FUSION           Family History:  Family History   Problem Relation Age of Onset    Diabetes Mother     Diabetes Father     Prostate cancer Father     Pancreatic cancer Sister     No Known Problems Brother     Diabetes Maternal Uncle     Diabetes Maternal Grandmother     No Known Problems Maternal Grandfather     No Known Problems Paternal Grandmother     No Known Problems Paternal Grandfather        Social History:   Social History     Tobacco Use    Smoking status: Never Smoker    Smokeless tobacco: Never Used   Substance and Sexual Activity    Alcohol use: No    Drug use: No    Sexual activity: Yes     Partners: Female        Review of Systems     Review of Systems   Constitutional: Negative for chills and fever.   HENT: Negative for congestion and sore throat.    Respiratory: Negative for cough and wheezing.         (+) orthopnea  (+) dyspnea on exertion   Cardiovascular: Positive for chest pain. Negative for palpitations.   Gastrointestinal: Positive for abdominal pain (epigastric), constipation, nausea and vomiting. Negative for blood in stool and diarrhea.   Genitourinary: Negative for dysuria, flank pain and hematuria.   Musculoskeletal: Negative for back pain.   Skin: Negative for rash.   Neurological: Negative for dizziness, weakness, light-headedness and headaches.   Hematological: Does not bruise/bleed easily.   All other systems reviewed and are negative.       Physical Exam     Initial Vitals [11/20/20 0942]   BP Pulse Resp Temp SpO2   (!) 187/85 69 -- -- 98 %      MAP       --          Physical Exam  Nursing Notes and Vital Signs Reviewed.  Constitutional: Elderly and frail.  Head: Atraumatic. Normocephalic.  Eyes: EOM intact. No scleral icterus.  ENT: Mucous membranes are moist. Oropharynx is clear and symmetric.     Neck: Supple. Full ROM. No lymphadenopathy.  Cardiovascular: Regular rate. Regular rhythm. No murmurs, rubs, or gallops. Distal pulses are 2+ and symmetric.  Pulmonary/Chest: No respiratory distress. Clear to auscultation bilaterally. No wheezing or rales.  Abdominal: Soft and non-distended. Mild epigastric tenderness to palpation.  No rebound, guarding, or rigidity.  Genitourinary: No CVA tenderness  Musculoskeletal: Moves all extremities. No obvious deformities. No calf tenderness.  Skin: Warm and dry.  Neurological:  Alert, awake, and appropriate.  Normal speech.  No acute focal neurological deficits are appreciated.  Psychiatric: Normal affect. Good eye contact. Appropriate in content.     ED Course   Procedures  ED Vital Signs:  Vitals:    11/20/20 0942 11/20/20 0950   BP: (!) 187/85    Pulse: 69 66   SpO2: 98%        Abnormal Lab Results:  Labs Reviewed   CBC W/ AUTO DIFFERENTIAL - Abnormal; Notable for the following components:       Result Value    RBC 2.98 (*)     Hemoglobin 10.0 (*)     Hematocrit 30.9 (*)      (*)     MCH 33.6 (*)     RDW 17.2 (*)     Lymph # 0.8 (*)     Gran % 74.6 (*)     All other components within normal limits   COMPREHENSIVE METABOLIC PANEL - Abnormal; Notable for the following components:    Glucose 180 (*)     Calcium 8.4 (*)     Albumin 3.3 (*)     eGFR if  56 (*)     eGFR if non  48 (*)     All other components within normal limits   B-TYPE NATRIURETIC PEPTIDE - Abnormal; Notable for the following components:    BNP 2,095 (*)     All other components within normal limits   TROPONIN I - Abnormal; Notable for the following components:    Troponin I 0.059 (*)     All other components within normal limits   LIPASE   CK   SARS-COV-2 RNA AMPLIFICATION, QUAL   URINALYSIS, REFLEX TO URINE CULTURE   TROPONIN I        All Lab Results:  Results for orders placed or performed during the hospital encounter of 11/20/20   CBC Auto Differential   Result  Value Ref Range    WBC 4.26 3.90 - 12.70 K/uL    RBC 2.98 (L) 4.60 - 6.20 M/uL    Hemoglobin 10.0 (L) 14.0 - 18.0 g/dL    Hematocrit 30.9 (L) 40.0 - 54.0 %     (H) 82 - 98 fL    MCH 33.6 (H) 27.0 - 31.0 pg    MCHC 32.4 32.0 - 36.0 g/dL    RDW 17.2 (H) 11.5 - 14.5 %    Platelets 224 150 - 350 K/uL    MPV 10.6 9.2 - 12.9 fL    Immature Granulocytes 0.5 0.0 - 0.5 %    Gran # (ANC) 3.2 1.8 - 7.7 K/uL    Immature Grans (Abs) 0.02 0.00 - 0.04 K/uL    Lymph # 0.8 (L) 1.0 - 4.8 K/uL    Mono # 0.3 0.3 - 1.0 K/uL    Eos # 0.0 0.0 - 0.5 K/uL    Baso # 0.02 0.00 - 0.20 K/uL    nRBC 0 0 /100 WBC    Gran % 74.6 (H) 38.0 - 73.0 %    Lymph % 18.3 18.0 - 48.0 %    Mono % 5.9 4.0 - 15.0 %    Eosinophil % 0.2 0.0 - 8.0 %    Basophil % 0.5 0.0 - 1.9 %    Differential Method Automated    Comprehensive Metabolic Panel   Result Value Ref Range    Sodium 138 136 - 145 mmol/L    Potassium 3.9 3.5 - 5.1 mmol/L    Chloride 104 95 - 110 mmol/L    CO2 23 23 - 29 mmol/L    Glucose 180 (H) 70 - 110 mg/dL    BUN 18 8 - 23 mg/dL    Creatinine 1.4 0.5 - 1.4 mg/dL    Calcium 8.4 (L) 8.7 - 10.5 mg/dL    Total Protein 7.7 6.0 - 8.4 g/dL    Albumin 3.3 (L) 3.5 - 5.2 g/dL    Total Bilirubin 1.0 0.1 - 1.0 mg/dL    Alkaline Phosphatase 68 55 - 135 U/L    AST 20 10 - 40 U/L    ALT 18 10 - 44 U/L    Anion Gap 11 8 - 16 mmol/L    eGFR if African American 56 (A) >60 mL/min/1.73 m^2    eGFR if non African American 48 (A) >60 mL/min/1.73 m^2   Lipase   Result Value Ref Range    Lipase 13 4 - 60 U/L   BNP   Result Value Ref Range    BNP 2,095 (H) 0 - 99 pg/mL   CK   Result Value Ref Range    CPK 52 20 - 200 U/L   Troponin I   Result Value Ref Range    Troponin I 0.059 (H) 0.000 - 0.026 ng/mL   COVID-19 Rapid Screening   Result Value Ref Range    SARS-CoV-2 RNA, Amplification, Qual Negative Negative         Imaging Results          X-Ray Abdomen Flat And Erect (In process)                X-Ray Chest AP Portable (Final result)  Result time 11/20/20 10:23:47     Final result by Tashi Nichole MD (11/20/20 10:23:47)                 Impression:      Abnormal chest x-ray.  See above.      Electronically signed by: Tashi Nichole MD  Date:    11/20/2020  Time:    10:23             Narrative:    EXAMINATION:  XR CHEST AP PORTABLE    CLINICAL HISTORY:  epigastric pain;    FINDINGS:  Comparison study 11/03/2020.  Normal size heart status post CABG with coronary artery calcifications/stent.  Aortic atherosclerosis.    The pulmonary vasculature is congested with interval development of generalized perihilar in lower lung haziness, question pulmonary edema.    There is blunting of bilateral costophrenic angles, new since the prior study, characteristic of bilateral pleural effusions small pleural effusions.    Again, there is dense consolidation in the retrocardiac left lower lung with associated bronchiectasis.  No pneumothorax.                                 The EKG was ordered, reviewed, and independently interpreted by the ED provider.  Interpretation time: 9:36  Rate: 69 BPM  Rhythm: NSR  Interpretation: Possible left atrial enlargement. ST & T wave abnormality, consider lateral ischemia. No STEMI.      The Emergency Provider reviewed the vital signs and test results, which are outlined above.     ED Discussion       11:29 AM: Discussed case with Rachel Adkins NP (Hospital Medicine). Dr. Abbott agrees with current care and management of pt and accepts admission.   Admitting Service: Hospital Medicine  Admit to: obs med/tele    Re-evaluated pt. I have discussed test results, shared treatment plan, and the need for admission with patient and family at bedside. Pt and family express understanding at this time and agree with all information. All questions answered. Pt and family have no further questions or concerns at this time. Pt is ready for admit.     MDM        Medical Decision Making:   Clinical Tests:   Lab Tests: Ordered and Reviewed  Radiological Study: Ordered and  Reviewed  Medical Tests: Ordered and Reviewed           ED Medication(s):  Medications   sodium chloride 0.9% flush 10 mL (has no administration in time range)   glucose chewable tablet 16 g (has no administration in time range)   glucose chewable tablet 24 g (has no administration in time range)   dextrose 50% injection 12.5 g (has no administration in time range)   dextrose 50% injection 25 g (has no administration in time range)   glucagon (human recombinant) injection 1 mg (has no administration in time range)   acetaminophen tablet 650 mg (has no administration in time range)   furosemide injection 40 mg (has no administration in time range)   ondansetron injection 4 mg (4 mg Intravenous Given 11/20/20 1042)   furosemide injection 40 mg (40 mg Intravenous Given 11/20/20 1200)       New Prescriptions    No medications on file               Scribe Attestation:   Scribe #1: I performed the above scribed service and the documentation accurately describes the services I performed. I attest to the accuracy of the note.     Attending:   Physician Attestation Statement for Scribe #1: I, Blanco Ervin MD, personally performed the services described in this documentation, as scribed by Cass Rivas, in my presence, and it is both accurate and complete.           Clinical Impression       ICD-10-CM ICD-9-CM   1. ZAVALA (dyspnea on exertion)  R06.00 786.09   2. Epigastric pain  R10.13 789.06   3. Elevated troponin  R77.8 790.6       Disposition:   Disposition: Placed in Observation  Condition: Fair         Blanco Ervin MD  11/20/20 1201

## 2020-11-20 NOTE — ASSESSMENT & PLAN NOTE
- Associated with epigastric pain  - Initial troponin 0.059, will trend  - Given ASA at home PTA  - Continue home ASA, Brilinta, Imdur, Metoprolol, Statin, and Ranexa  - Cardiology consult pending  - NPO for now pending cardiology recs

## 2020-11-20 NOTE — HPI
Familia Durbin Jr. is a 75 y.o. AAM with a PMHx of CAD, DM type 2, HLD, HTN, Lupus, Multiple myeloma with mets to the bone, old MI, Renal insufficiency, who presented to the Emergency Department today for evaluation of epigastric abdominal pain, which onset 6 days ago.  Patient states that he has had intermittent epigastric/CP, dyspnea on exertion, and orthopnea for the past 2 days.  Symptoms are constant and moderate in severity. No mitigating or exacerbating factors reported.  Associated sxs include nausea, emesis, intermittent cough, orthopnea, trace BLE edema, and drug induced constipation.  Patient denies any fever, chills, fatigue, wheezing, palpitations, blood in stool, dysuria, hematuria, flank pain, light-headedness, HA, and all other sxs at this time.  No prior Tx reported.  No further complaints or concerns at this time.  ER workup showed; elevated BP, otherwise stable VS.  CBC showed Hgb 10.0, Hct 30.9, otherwise unremarkable.  CMP showed , otherwise unremarkable.  BNP 2095.  Initial troponin 0.059.  ECHO on 11/3 showed EF of 55%, associated with mild-to-moderate aortic regurgitation and stenosis, with mild pulmonic and mitral regurgitation.  CXR consistent with FVO.  Patient was given Lasix 40 mg IVP in the ER and Hospital Medicine was contacted for admission.  Patient will be placed in observation.  Per his daughter Alejandrina who is his SDM ([phone]), patient is a DNR.

## 2020-11-20 NOTE — ASSESSMENT & PLAN NOTE
- Followed outpatient by Dr. Solo  - Per patient's wishes he is a DNR  - Hold home Pomalidomide for now and resume if ok with Hemoc

## 2020-11-20 NOTE — CONSULTS
[facility] Planning    Consult Note  Palliative Medicine      Consult Requested By: Rachel Adkins NP  Reason for Consult: Pain management    SUBJECTIVE:     History of Present Illness:  Familia Durbin Jr. is a 75 y.o. year old with a history of multiple myeloma, neoplasm related pain, CKD stage III, and CAD who presented to the emergency department complaining of epigastric pain and orthopnea. He has not had a BM since Sunday despite laxative use. He was admitted for further workup and Palliative Medicine was consulted to assist with pain and symptom control. I met Mr. Durbin without family present. He reports he is feeling okay but still having significant epigastric pain that radiates into his chest. He describes the pain as a tightness and 8-9/10 at its worst which is interfering with his sleep. He denies heartburn and reflux symptoms but takes Pepto or milk of magnesia frequently in addition to his home PPI. He has not had a BM since Sunday despite laxatives. He has been taking his home pain regimen (OxyContin 20mg BID and oxycodone 10mg TID) with decent relief but not controlling the spikes. He says prior to this acute incident his pain was relatively well controlled and he was active. In reviewing the XR I think that constipation and GERD are likely contributing if not causing his pain so would recommend treating both and then further evaluation if no relief. In regards to his home regimen he says that he does not wish to make a change. I spoke to his daughter Alejandrina as she told admitting team that his pain was not well controlled and requested medication adjustment. She says that he is barely independent at home and that his pain is uncontrolled but he is too nervous to ask for an increase. She feels that he would benefit from an adjusted regimen and was planning to set him up with pain management. I explained our role on the team and would happy to assume management of his pain going forth. I  recommended they speak this evening in hopes they could get on the same page as far as his pain report. She understands I have resumed his home regimen but increased the frequency of his oxycodone and if discharged over the weekend we will call next week to arrange follow up. Mr. Durbin elected DNR/ DNI on admission and confirmed to me today.    Past Medical History:   Diagnosis Date    CAD (coronary artery disease)     tyree    Diabetes mellitus, type 2 1979    eye dr rocha    Hearing impaired     Hyperlipidemia     Hypertension     Lupus     Discoid    Multiple myeloma     Old MI (myocardial infarction) 2012    Renal insufficiency     Tracheostomy tube present      Past Surgical History:   Procedure Laterality Date    CARDIAC SURGERY      CATARACT EXTRACTION      COLONOSCOPY      CORONARY ARTERY BYPASS GRAFT      HAND SURGERY      KNEE SURGERY      TRACHEOSTOMY TUBE PLACEMENT      WRIST FUSION       Family History   Problem Relation Age of Onset    Diabetes Mother     Diabetes Father     Prostate cancer Father     Pancreatic cancer Sister     No Known Problems Brother     Diabetes Maternal Uncle     Diabetes Maternal Grandmother     No Known Problems Maternal Grandfather     No Known Problems Paternal Grandmother     No Known Problems Paternal Grandfather      Social History     Socioeconomic History    Marital status: Single     Spouse name: Not on file    Number of children: 9    Years of education: Not on file    Highest education level: Not on file   Occupational History    Not on file   Social Needs    Financial resource strain: Not on file    Food insecurity     Worry: Not on file     Inability: Not on file    Transportation needs     Medical: Not on file     Non-medical: Not on file   Tobacco Use    Smoking status: Never Smoker    Smokeless tobacco: Never Used   Substance and Sexual Activity    Alcohol use: No    Drug use: No    Sexual activity: Yes      Partners: Female   Lifestyle    Physical activity     Days per week: Not on file     Minutes per session: Not on file    Stress: Not on file   Relationships    Social connections     Talks on phone: Not on file     Gets together: Not on file     Attends Gnosticist service: Not on file     Active member of club or organization: Not on file     Attends meetings of clubs or organizations: Not on file     Relationship status: Not on file   Other Topics Concern    Not on file   Social History Narrative    Not on file      Review of patient's allergies indicates:   Allergen Reactions    Cephalexin      Pt tolerated cefepime 11/4/2020    Fentanyl Nausea And Vomiting    Nsaids (non-steroidal anti-inflammatory drug)      Renal insuf       Medications:    Current Facility-Administered Medications:     acetaminophen tablet 650 mg, 650 mg, Oral, Q4H PRN, Rachel Adkins NP    [START ON 11/21/2020] aspirin chewable tablet 81 mg, 81 mg, Oral, Daily, Rachel Adkins NP    benzonatate capsule 100 mg, 100 mg, Oral, Q6H PRN, Rachel Adkins NP    dextrose 50% injection 12.5 g, 12.5 g, Intravenous, PRN, Rachel Adkins NP    dextrose 50% injection 25 g, 25 g, Intravenous, PRN, Rachel Adkins NP    enoxaparin injection 40 mg, 40 mg, Subcutaneous, Q24H, Rachel Adkins NP    [START ON 11/21/2020] furosemide injection 40 mg, 40 mg, Intravenous, Daily, Rachel Adkins NP    glucagon (human recombinant) injection 1 mg, 1 mg, Intramuscular, PRN, Rachel Adkins NP    glucose chewable tablet 16 g, 16 g, Oral, PRN, Rachel Adkins NP    glucose chewable tablet 24 g, 24 g, Oral, PRN, Rachel Adkins NP    influenza (QUADRIVALENT ADJUVANTED PF) vaccine 0.5 mL, 0.5 mL, Intramuscular, vaccine x 1 dose, Manjit Abbott MD    insulin aspart U-100 pen 0-5 Units, 0-5 Units, Subcutaneous, QID (AC + HS) PRN, Rachel Adkins NP    isosorbide mononitrate 24 hr tablet 30 mg, 30 mg, Oral,  Daily, Rachel Adkins NP, 30 mg at 11/20/20 1359    lisinopriL tablet 2.5 mg, 2.5 mg, Oral, Daily, Rachel Adkins NP, 2.5 mg at 11/20/20 1359    magnesium citrate solution 296 mL, 296 mL, Oral, Once, Lisa Jones PA-C    metoprolol tartrate (LOPRESSOR) tablet 50 mg, 50 mg, Oral, BID, Rachel Adkins NP    oxyCODONE 12 hr tablet 20 mg, 20 mg, Oral, Q12H, Lisa Jones PA-C    oxyCODONE immediate release tablet 10 mg, 10 mg, Oral, Q4H PRN, Lisa Jones PA-C    pantoprazole EC tablet 40 mg, 40 mg, Oral, Daily, Rachel Adkins NP, 40 mg at 11/20/20 1359    ranolazine 12 hr tablet 1,000 mg, 1,000 mg, Oral, BID, Rachel Adkins NP    rosuvastatin tablet 40 mg, 40 mg, Oral, QHS, Rachel Adkins NP    sodium chloride 0.9% flush 10 mL, 10 mL, Intravenous, PRN, Rachel Adkins NP    sodium phosphates 19-7 gram/118 mL enema 1 enema, 1 enema, Rectal, Once, Lisa Jones PA-C    ticagrelor tablet 90 mg, 90 mg, Oral, Daily, Rachel Adkins NP, 90 mg at 11/20/20 1359    ROS:  Review of Systems   Constitutional: Negative for activity change, appetite change and fever.   HENT: Negative for sore throat and trouble swallowing.    Eyes: Negative for photophobia and visual disturbance.   Respiratory: Negative for cough and shortness of breath.    Cardiovascular: Positive for chest pain. Negative for leg swelling.   Gastrointestinal: Positive for abdominal pain and constipation. Negative for abdominal distention, nausea and vomiting.   Endocrine: Negative for polydipsia, polyphagia and polyuria.   Genitourinary: Negative for difficulty urinating and dysuria.   Musculoskeletal: Negative for back pain and gait problem.   Skin: Negative for wound.   Neurological: Negative for weakness, numbness and headaches.   Psychiatric/Behavioral: Positive for sleep disturbance (d/t pain). Negative for confusion.       OBJECTIVE:     Physical Exam:  Vitals: Temp: 97.4 °F  (36.3 °C) (11/20/20 1424)  Pulse: 77 (11/20/20 1424)  Resp: (!) 22 (11/20/20 1424)  BP: (!) 189/73 (11/20/20 1424)  SpO2: 95 % (11/20/20 1424)    Gen: well-developed, well-nourished, NAD  Head: atraumatic, normocephalic  Eyes: conjuctiva and sclera clear  Ears: hearing grossly intact with no external abnormality  Mouth: no mucositis, good dentition  Respiratory: CTAB, no wheezing or rhonchi  Heart: RRR  Abdomen: soft, diffusely tender with worse in epigastric region, normoactive BS  Pulses: 2+ in DP  Extremities: no edema, full ROM of all joints  Neurologic: no focal deficits, CN II-XII grossly intact, normal coordination  Skin: no rashes or lesions  Psych: cooperative, normal mood and affect, normal attention span and concentration    Review of Symptoms    Symptom Assessment (ESAS 0-10 Scale)  Pain:  8  Dyspnea:  0  Anxiety:  0  Nausea:  0  Depression:  0  Anorexia:  5  Fatigue:  0  Insomnia:  10  Restlessness:  0  Agitation:  0     CAM / Delirium:  Negative  Constipation:  Positive  Diarrhea:  Negative    Bowel Management Plan (BMP):  Yes      Pain Assessment:  Location(s): abdomen           Quality:  Pressure-like       Chronicity:  Onset 6 day(s) ago, unchanged       Aggravating Factors:  Movement       Alleviating Factors:        Associated Symptoms:  Constipation and headache    ECOG Performance Status Grade:  2 - Ambulates, capable of self care only    Living Arrangements:  Lives with family    Advance Directives:   Living Will: No    LaPOST: No    Do Not Resuscitate Status: Yes    Medical Power of : No      Decision Making:  Patient answered questions and Family answered questions      Labs:  WBC   Date Value Ref Range Status   11/20/2020 4.26 3.90 - 12.70 K/uL Final     Hemoglobin   Date Value Ref Range Status   11/20/2020 10.0 (L) 14.0 - 18.0 g/dL Final     Hematocrit   Date Value Ref Range Status   11/20/2020 30.9 (L) 40.0 - 54.0 % Final     MCV   Date Value Ref Range Status   11/20/2020 104 (H)  82 - 98 fL Final     Platelets   Date Value Ref Range Status   11/20/2020 224 150 - 350 K/uL Final       BMP  Lab Results   Component Value Date     11/20/2020    K 3.9 11/20/2020     11/20/2020    CO2 23 11/20/2020    BUN 18 11/20/2020    CREATININE 1.4 11/20/2020    CALCIUM 8.4 (L) 11/20/2020    ANIONGAP 11 11/20/2020    ESTGFRAFRICA 56 (A) 11/20/2020    EGFRNONAA 48 (A) 11/20/2020       Lab Results   Component Value Date    AST 20 11/20/2020    ALKPHOS 68 11/20/2020    BILITOT 1.0 11/20/2020       Albumin   Date Value Ref Range Status   11/20/2020 3.3 (L) 3.5 - 5.2 g/dL Final       Radiology:I have reviewed all pertinent imaging results/findings within the past 24 hours.  - XR abdomen 11/20/20:  Nonobstructive bowel gas pattern.  Bilateral small pleural effusions.  Dense consolidation with air bronchograms/bronchiectasis retrocardiac left lower lobe    ASSESSMENT   Familia Durbin Jr. is a 75 y.o. year old with a history of multiple myeloma, neoplasm related pain, CKD stage III, and CAD who presented to the emergency department complaining of epigastric pain and orthopnea. He has not had a BM since Sunday despite laxative use. He was admitted for further workup and Palliative Medicine was consulted to assist with pain and symptom control.    PLAN   1. Neoplasm related pain  - Resume home regimen at his request  -- Oxycodone (OxyContin) ER 20mg BID  -- Oxycodone 10mg q4hr PRN pain (this is increased from his TID dosing)  - From his daughter's report he would benefit from dose adjustment such as increasing long acting to 30 mg BID  - Further adjustment of PRN dosing would depend on pain report after increasing long acting.  - I would also consider adding gabapentin   - I will hold on changing his regimen as he was not open today.     2. Constipation  - If not the source is likely contributing to abdominal pain  - Fleet enema and mag citrate x 1  - Start bowel regimen: Miralax daily and Senna 2 tabs HS  to start    3. GERD  - Increase PPI to BID dosing given report of constant Pepto usage and likely rebound reflux  - Correction of constipation will help with reflux as well    4. Multiple myeloma  - Well controlled per recent oncology note  - Defer to Dr. Solo    5. Encounter for Palliative Care  - Code status: DNR/ DNI  - Surrogate: daughter Alejandrina  - We will be happy to assume pain management going forward and will make an appointment if he is d/c over the weekend.  - ACP to be completed at another visit    6. CKD stage III  - Baseline Cr 1.4    Discussed case and visit details with Rachel Adkins NP and Dr. Abbott.     Thank you for allowing Palliative Medicine to be involved in the care of Familia Durbin Jr..         Medical decision making: HIGH based on high risk of death, untreated symptoms, high risk medications, management of more than one chronic illness in exacerbation, managing side effects of medications or polypharmacy.      Plan required increased review of medication choice, interaction, dosing, frequency, and route due to patient complexity. Patient complexity increased by: Presence of renal insufficiency, Continuous use of opioids    7 min ACP time spent discussing: code status      GEOVANI FritzC  Palliative Medicine

## 2020-11-20 NOTE — H&P
[facility] Hospital Medicine  History & Physical    Patient Name: Familia Durbin Jr.  MRN: [id number]  Admission Date: 11/20/2020  Attending Physician: Blanco Ervin MD   Primary Care Provider: Andrea Elkins MD         Patient information was obtained from patient, relative(s), past medical records and ER records.     Subjective:     Principal Problem:Epigastric pain    Chief Complaint:   Chief Complaint   Patient presents with    Abdominal Pain     epigastric pain and nausea        HPI: Familia Durbin Jr. is a 75 y.o. AAM with a PMHx of CAD, DM type 2, HLD, HTN, Lupus, Multiple myeloma with mets to the bone, old MI, Renal insufficiency, who presented to the Emergency Department today for evaluation of epigastric abdominal pain, which onset 6 days ago.  Patient states that he has had intermittent epigastric/CP, dyspnea on exertion, and orthopnea for the past 2 days.  Symptoms are constant and moderate in severity. No mitigating or exacerbating factors reported.  Associated sxs include nausea, emesis, intermittent cough, orthopnea, trace BLE edema, and drug induced constipation.  Patient denies any fever, chills, fatigue, wheezing, palpitations, blood in stool, dysuria, hematuria, flank pain, light-headedness, HA, and all other sxs at this time.  No prior Tx reported.  No further complaints or concerns at this time.  ER workup showed; elevated BP, otherwise stable VS.  CBC showed Hgb 10.0, Hct 30.9, otherwise unremarkable.  CMP showed , otherwise unremarkable.  BNP 2095.  Initial troponin 0.059.  ECHO on 11/3 showed EF of 55%, associated with mild-to-moderate aortic regurgitation and stenosis, with mild pulmonic and mitral regurgitation.  CXR consistent with FVO.  Patient was given Lasix 40 mg IVP in the ER and Hospital Medicine was contacted for admission.  Patient will be placed in observation.  Per his daughter Alejandrina who is his SDM ([phone]), patient is a DNR.    Past  Medical History:   Diagnosis Date    CAD (coronary artery disease)     tyree    Diabetes mellitus, type 2 1979    eye dr rocha    Hearing impaired     Hyperlipidemia     Hypertension     Lupus     Discoid    Multiple myeloma     Old MI (myocardial infarction) 2012    Renal insufficiency     Tracheostomy tube present        Past Surgical History:   Procedure Laterality Date    CARDIAC SURGERY      CATARACT EXTRACTION      COLONOSCOPY      CORONARY ARTERY BYPASS GRAFT      HAND SURGERY      KNEE SURGERY      TRACHEOSTOMY TUBE PLACEMENT      WRIST FUSION         Review of patient's allergies indicates:   Allergen Reactions    Cephalexin      Pt tolerated cefepime 11/4/2020    Fentanyl Nausea And Vomiting    Nsaids (non-steroidal anti-inflammatory drug)      Renal insuf       No current facility-administered medications on file prior to encounter.      Current Outpatient Medications on File Prior to Encounter   Medication Sig    albuterol-ipratropium (DUO-NEB) 2.5 mg-0.5 mg/3 mL nebulizer solution Take 3 mLs by nebulization every 8 (eight) hours. For shortness of breath and wheezing    aspirin 81 MG Chew Take 1 tablet (81 mg total) by mouth once daily.    benzonatate (TESSALON PERLES) 100 MG capsule Take 1 capsule (100 mg total) by mouth every 6 (six) hours as needed for Cough.    BRILINTA 90 mg tablet Take 1 tablet by mouth once daily.    cholecalciferol, vitamin D3, 5,000 unit Tab Take 5,000 Units by mouth once daily.    docusate sodium (COLACE) 100 MG capsule Take 100 mg by mouth 2 (two) times daily.    flaxseed oil 1,000 mg Cap Take 1 capsule by mouth daily as needed.     insulin (LANTUS SOLOSTAR U-100 INSULIN) glargine 100 units/mL (3mL) SubQ pen Inject 10 Units into the skin every evening. INJECT 15 UNITS SUBCUTANEOUSLY ONCE DAILY    insulin aspart U-100 (NOVOLOG) 100 unit/mL (3 mL) InPn pen Inject 15 Units into the skin 3 (three) times daily with meals.    isosorbide  "mononitrate (IMDUR) 30 MG 24 hr tablet Take 30 mg by mouth once daily.     lisinopril (PRINIVIL,ZESTRIL) 2.5 MG tablet Take 2.5 mg by mouth once daily.     metoprolol tartrate (LOPRESSOR) 50 MG tablet Take 1 tablet by mouth twice daily    multivit-min-FA-lycopen-lutein 300-600-300 mcg Tab Take 1 tablet by mouth.    omega 3-dha-epa-fish oil 300-1,000 mg Cap Take by mouth.    oxyCODONE (OXYCONTIN) 20 mg 12 hr tablet Take 1 tablet (20 mg total) by mouth every 12 (twelve) hours.    oxyCODONE (ROXICODONE) 10 mg Tab immediate release tablet Take 1 tablet (10 mg total) by mouth every 6 (six) hours as needed for Pain.    pantoprazole (PROTONIX) 40 MG tablet Take 1 tablet by mouth daily    pomalidomide 3 mg Cap Take 3 mg by mouth 3 mg daily x 21 days, then off for 7 days, then repeat..    prochlorperazine (COMPAZINE) 5 MG tablet Take 1 tablet (5 mg total) by mouth 4 (four) times daily as needed for Nausea.    ranolazine (RANEXA) 1,000 mg Tb12 Take 1,000 mg by mouth 2 (two) times daily.    rosuvastatin (CRESTOR) 40 MG Tab Take 1 tablet (40 mg total) by mouth every evening.    senna (SENOKOT) 8.6 mg tablet Take 1 tablet by mouth daily    VITAMIN B COMPLEX ORAL Take 1 capsule by mouth.    BD ULTRA-FINE DAVE PEN NEEDLE 32 gauge x 5/32" Ndle USE  4 TIMES DAILY WITH MEALS AND AT BEDTIME    blood-glucose meter kit Use as instructed    DOCOSAHEXANOIC ACID ORAL Take 1 capsule by mouth once daily.    ginger root (GINGER, ZINGIBER OFFICINALIS,) 550 mg Cap Take 1 capsule by mouth.    lactulose (CEPHULAC) 20 gram Pack Mix and take 1 packet (20 g total) by mouth 3 (three) times daily. (Patient taking differently: Take 20 g by mouth 3 (three) times daily as needed. )    lactulose (CHRONULAC) 10 gram/15 mL solution Take 30 mL by mouth twice daily as needed for constipation.    NITROSTAT 0.4 mg SL tablet     ondansetron (ZOFRAN) 4 MG tablet Take 1 tablet (4 mg total) by mouth every 8 (eight) hours as needed for Nausea. "    ondansetron (ZOFRAN-ODT) 4 MG TbDL Dissolve 1 tab under the tongue every 4-6 hours as needed for nausea.    triamcinolone acetonide 0.1% (KENALOG) 0.1 % cream Apply topically 2 (two) times daily. For two weeks    TRUE METRIX GLUCOSE TEST STRIP Strp USE  STRIP TO CHECK GLUCOSE SIX TIMES DAILY    TRUEPLUS LANCETS 33 gauge Misc USE   TO CHECK GLUCOSE SIX TIMES DAILY    [DISCONTINUED] valACYclovir (VALTREX) 500 MG tablet Take by mouth.     Family History     Problem Relation (Age of Onset)    Diabetes Mother, Father, Maternal Uncle, Maternal Grandmother    No Known Problems Brother, Maternal Grandfather, Paternal Grandmother, Paternal Grandfather    Pancreatic cancer Sister    Prostate cancer Father        Tobacco Use    Smoking status: Never Smoker    Smokeless tobacco: Never Used   Substance and Sexual Activity    Alcohol use: No    Drug use: No    Sexual activity: Yes     Partners: Female     Review of Systems   Constitutional: Positive for activity change. Negative for chills, diaphoresis, fatigue and fever.   HENT: Negative for hearing loss, mouth sores, sore throat, tinnitus and trouble swallowing.    Eyes: Negative for pain, discharge and redness.   Respiratory: Positive for chest tightness and shortness of breath. Negative for apnea, cough, choking, wheezing and stridor.         + ZAVALA and Orthopnea.   Cardiovascular: Positive for chest pain and leg swelling. Negative for palpitations.        C/o intermittent CP.   Gastrointestinal: Positive for abdominal pain. Negative for abdominal distention, blood in stool, constipation, diarrhea, nausea, rectal pain and vomiting.        C/o Epigastric abdominal pain.   Endocrine: Negative for cold intolerance, heat intolerance, polydipsia, polyphagia and polyuria.   Genitourinary: Positive for decreased urine volume and difficulty urinating. Negative for dysuria, flank pain, frequency, hematuria and urgency.   Musculoskeletal: Positive for arthralgias and back  pain. Negative for gait problem, joint swelling, neck pain and neck stiffness.   Skin: Negative for color change, rash and wound.   Allergic/Immunologic: Negative for food allergies.   Neurological: Negative for dizziness, tremors, seizures, syncope, speech difficulty, light-headedness and headaches.   Hematological: Negative for adenopathy. Does not bruise/bleed easily.   Psychiatric/Behavioral: Negative for agitation and confusion. The patient is not nervous/anxious.    All other systems reviewed and are negative.    Objective:     Vital Signs (Most Recent):  Pulse: 70 (11/20/20 1132)  Resp: (!) 21 (11/20/20 1132)  BP: (!) 177/84 (11/20/20 1132)  SpO2: (!) 94 % (11/20/20 1132) Vital Signs (24h Range):  Pulse:  [66-70] 70  Resp:  [21] 21  SpO2:  [94 %-98 %] 94 %  BP: (177-187)/(84-85) 177/84        There is no height or weight on file to calculate BMI.    Physical Exam  Vitals signs and nursing note reviewed.   Constitutional:       General: He is not in acute distress.     Appearance: He is well-developed. He is not diaphoretic.      Comments: Thin, AAM resting comfortably in bed in NAD with daughter at BS.   HENT:      Head: Normocephalic and atraumatic. Extremely [place].  Eyes:      Conjunctiva/sclera: Conjunctivae normal.      Pupils: Pupils are equal, round, and reactive to light.   Neck:      Musculoskeletal: Normal range of motion and neck supple.      Vascular: No JVD.   Cardiovascular:      Rate and Rhythm: Normal rate and regular rhythm.      Heart sounds: Murmur present. No friction rub. No gallop.    Pulmonary:      Effort: Pulmonary effort is normal. No respiratory distress.      Breath sounds: No stridor. Rales present. No wheezing.      Comments: Faint crackles to bases.  Comfortable on RA.  Chest:      Chest wall: No tenderness.   Abdominal:      General: Bowel sounds are normal. There is no distension.      Palpations: Abdomen is soft. There is no mass.      Tenderness: There is no abdominal  tenderness. There is no guarding or rebound.   Musculoskeletal: Normal range of motion.         General: No tenderness.   Skin:     General: Skin is warm and dry.      Findings: No erythema or rash.   Neurological:      Mental Status: He is alert and oriented to person, place, and time.      Cranial Nerves: No cranial nerve deficit.   Psychiatric:         Behavior: Behavior normal.         Thought Content: Thought content normal.         Judgment: Judgment normal.           CRANIAL NERVES     CN III, IV, VI   Pupils are equal, round, and reactive to light.       Significant Labs:   CBC:   Recent Labs   Lab 11/20/20  0950   WBC 4.26   HGB 10.0*   HCT 30.9*        CMP:   Recent Labs   Lab 11/20/20  0950      K 3.9      CO2 23   *   BUN 18   CREATININE 1.4   CALCIUM 8.4*   PROT 7.7   ALBUMIN 3.3*   BILITOT 1.0   ALKPHOS 68   AST 20   ALT 18   ANIONGAP 11   EGFRNONAA 48*     Cardiac Markers:   Recent Labs   Lab 11/20/20  0950   BNP 2,095*     Troponin:   Recent Labs   Lab 11/20/20  0950   TROPONINI 0.059*       Significant Imaging: I have reviewed all pertinent imaging results/findings within the past 24 hours.    Assessment/Plan:     * Epigastric pain  - Place in OBS  - KUB pending  - given h/o CAD with old MI, will need to r/o cardiac origin  - Cardiology consult pending (see below)  - PRN analgesics/antiemetics  - Keep NPO for now  - Monitor      Elevated troponin  - Associated with epigastric pain  - Initial troponin 0.059, will trend  - Given ASA at home PTA  - Continue home ASA, Brilinta, Imdur, Metoprolol, Statin, and Ranexa  - Cardiology consult pending  - NPO for now pending cardiology recs    Coronary artery disease of native artery of native heart with stable angina pectoris  - Followed outpatient by Dr. Ortega with [facility]  - See plan as per above      ZAVALA (dyspnea on exertion)  - No known h/o CHF  - BNP and CXR consistent with FVO  - Covid negative   - Recently completed a course of  "ABX for PNA/bronchiectasis  - ECHO on 11/3 showed EF of 55%, associated with mild-to-moderate aortic regurgitation and stenosis, with mild pulmonic and mitral regurgitation.    - CXR consistent with FVO  - Given Lasix 40 mg IVP in the ER, continue with daily dosing  - Continue home Metoprolol  - Cardiology consult pending  - NPO for now pending cardiology recs  - Strict I and O  - Daily weights  - Supplemental O2 prn  - Monitor         Drug-induced constipation  - Daughter reports home Lactulose, Docusate, and Senokot are "not working"  - Palliative Care consulted to assist      Metastatic bone cancer  - Palliative Care consult pending for better pain management      Multiple myeloma  - Followed outpatient by Dr. Solo  - Per patient's wishes he is a DNR  - Hold home Pomalidomide for now and resume if ok with Hemoc      GERD (gastroesophageal reflux disease)  - Continue PPI      Hypertension  - BP under fair control  - Continue home meds  - Monitor and adjust as needed      Hyperlipidemia  - Continue home Statin      Type 2 diabetes mellitus with diabetic nephropathy  - Recent A1c 6.4  - Accu checks ACHS with SSI PRN  - Resume home long once tolerating po  - ADA diet  - Monitor      VTE Risk Mitigation (From admission, onward)         Ordered     enoxaparin injection 40 mg  Every 24 hours      11/20/20 1241     IP VTE HIGH RISK PATIENT  Once      11/20/20 1136     Place sequential compression device  Until discontinued      11/20/20 1136                   Rachel Adkins, KENY, ACNP-BC  Department of Hospital Medicine   [facility]  "

## 2020-11-20 NOTE — ASSESSMENT & PLAN NOTE
- Place in OBS  - KUB pending  - given h/o CAD with old MI, will need to r/o cardiac origin  - Cardiology consult pending (see below)  - PRN analgesics/antiemetics  - Keep NPO for now  - Monitor

## 2020-11-20 NOTE — PLAN OF CARE
Pt AAO, no c/o's of epigastric pain @ this time. Sob upon exertion noted. Heart monitor 8612. Remains NPO. Pt. Home BG monitoring to ABD. Remains free from injury/incident. Call light in reach.

## 2020-11-20 NOTE — ASSESSMENT & PLAN NOTE
- No known h/o CHF  - BNP and CXR consistent with FVO  - Covid negative   - Recently completed a course of ABX for PNA/bronchiectasis  - ECHO on 11/3 showed EF of 55%, associated with mild-to-moderate aortic regurgitation and stenosis, with mild pulmonic and mitral regurgitation.    - CXR consistent with FVO  - Given Lasix 40 mg IVP in the ER, continue with daily dosing  - Continue home Metoprolol  - Cardiology consult pending  - NPO for now pending cardiology recs  - Strict I and O  - Daily weights  - Supplemental O2 prn  - Monitor

## 2020-11-20 NOTE — ASSESSMENT & PLAN NOTE
"- Daughter reports home Lactulose, Docusate, and Senokot are "not working"  - Palliative Care consulted to assist    "

## 2020-11-20 NOTE — SUBJECTIVE & OBJECTIVE
Past Medical History:   Diagnosis Date    CAD (coronary artery disease)     tyree    Diabetes mellitus, type 2 1979    eye dr rocha    Hearing impaired     Hyperlipidemia     Hypertension     Lupus     Discoid    Multiple myeloma     Old MI (myocardial infarction) 2012    Renal insufficiency     Tracheostomy tube present        Past Surgical History:   Procedure Laterality Date    CARDIAC SURGERY      CATARACT EXTRACTION      COLONOSCOPY      CORONARY ARTERY BYPASS GRAFT      HAND SURGERY      KNEE SURGERY      TRACHEOSTOMY TUBE PLACEMENT      WRIST FUSION         Review of patient's allergies indicates:   Allergen Reactions    Cephalexin      Pt tolerated cefepime 11/4/2020    Fentanyl Nausea And Vomiting    Nsaids (non-steroidal anti-inflammatory drug)      Renal insuf       No current facility-administered medications on file prior to encounter.      Current Outpatient Medications on File Prior to Encounter   Medication Sig    albuterol-ipratropium (DUO-NEB) 2.5 mg-0.5 mg/3 mL nebulizer solution Take 3 mLs by nebulization every 8 (eight) hours. For shortness of breath and wheezing    aspirin 81 MG Chew Take 1 tablet (81 mg total) by mouth once daily.    benzonatate (TESSALON PERLES) 100 MG capsule Take 1 capsule (100 mg total) by mouth every 6 (six) hours as needed for Cough.    BRILINTA 90 mg tablet Take 1 tablet by mouth once daily.    cholecalciferol, vitamin D3, 5,000 unit Tab Take 5,000 Units by mouth once daily.    docusate sodium (COLACE) 100 MG capsule Take 100 mg by mouth 2 (two) times daily.    flaxseed oil 1,000 mg Cap Take 1 capsule by mouth daily as needed.     insulin (LANTUS SOLOSTAR U-100 INSULIN) glargine 100 units/mL (3mL) SubQ pen Inject 10 Units into the skin every evening. INJECT 15 UNITS SUBCUTANEOUSLY ONCE DAILY    insulin aspart U-100 (NOVOLOG) 100 unit/mL (3 mL) InPn pen Inject 15 Units into the skin 3 (three) times daily with meals.    isosorbide  "mononitrate (IMDUR) 30 MG 24 hr tablet Take 30 mg by mouth once daily.     lisinopril (PRINIVIL,ZESTRIL) 2.5 MG tablet Take 2.5 mg by mouth once daily.     metoprolol tartrate (LOPRESSOR) 50 MG tablet Take 1 tablet by mouth twice daily    multivit-min-FA-lycopen-lutein 300-600-300 mcg Tab Take 1 tablet by mouth.    omega 3-dha-epa-fish oil 300-1,000 mg Cap Take by mouth.    oxyCODONE (OXYCONTIN) 20 mg 12 hr tablet Take 1 tablet (20 mg total) by mouth every 12 (twelve) hours.    oxyCODONE (ROXICODONE) 10 mg Tab immediate release tablet Take 1 tablet (10 mg total) by mouth every 6 (six) hours as needed for Pain.    pantoprazole (PROTONIX) 40 MG tablet Take 1 tablet by mouth daily    pomalidomide 3 mg Cap Take 3 mg by mouth 3 mg daily x 21 days, then off for 7 days, then repeat..    prochlorperazine (COMPAZINE) 5 MG tablet Take 1 tablet (5 mg total) by mouth 4 (four) times daily as needed for Nausea.    ranolazine (RANEXA) 1,000 mg Tb12 Take 1,000 mg by mouth 2 (two) times daily.    rosuvastatin (CRESTOR) 40 MG Tab Take 1 tablet (40 mg total) by mouth every evening.    senna (SENOKOT) 8.6 mg tablet Take 1 tablet by mouth daily    VITAMIN B COMPLEX ORAL Take 1 capsule by mouth.    BD ULTRA-FINE DAVE PEN NEEDLE 32 gauge x 5/32" Ndle USE  4 TIMES DAILY WITH MEALS AND AT BEDTIME    blood-glucose meter kit Use as instructed    DOCOSAHEXANOIC ACID ORAL Take 1 capsule by mouth once daily.    ginger root (GINGER, ZINGIBER OFFICINALIS,) 550 mg Cap Take 1 capsule by mouth.    lactulose (CEPHULAC) 20 gram Pack Mix and take 1 packet (20 g total) by mouth 3 (three) times daily. (Patient taking differently: Take 20 g by mouth 3 (three) times daily as needed. )    lactulose (CHRONULAC) 10 gram/15 mL solution Take 30 mL by mouth twice daily as needed for constipation.    NITROSTAT 0.4 mg SL tablet     ondansetron (ZOFRAN) 4 MG tablet Take 1 tablet (4 mg total) by mouth every 8 (eight) hours as needed for Nausea. "    ondansetron (ZOFRAN-ODT) 4 MG TbDL Dissolve 1 tab under the tongue every 4-6 hours as needed for nausea.    triamcinolone acetonide 0.1% (KENALOG) 0.1 % cream Apply topically 2 (two) times daily. For two weeks    TRUE METRIX GLUCOSE TEST STRIP Strp USE  STRIP TO CHECK GLUCOSE SIX TIMES DAILY    TRUEPLUS LANCETS 33 gauge Misc USE   TO CHECK GLUCOSE SIX TIMES DAILY    [DISCONTINUED] valACYclovir (VALTREX) 500 MG tablet Take by mouth.     Family History     Problem Relation (Age of Onset)    Diabetes Mother, Father, Maternal Uncle, Maternal Grandmother    No Known Problems Brother, Maternal Grandfather, Paternal Grandmother, Paternal Grandfather    Pancreatic cancer Sister    Prostate cancer Father        Tobacco Use    Smoking status: Never Smoker    Smokeless tobacco: Never Used   Substance and Sexual Activity    Alcohol use: No    Drug use: No    Sexual activity: Yes     Partners: Female     Review of Systems   Constitutional: Positive for activity change. Negative for chills, diaphoresis, fatigue and fever.   HENT: Negative for hearing loss, mouth sores, sore throat, tinnitus and trouble swallowing.    Eyes: Negative for pain, discharge and redness.   Respiratory: Positive for chest tightness and shortness of breath. Negative for apnea, cough, choking, wheezing and stridor.         + ZAVALA and Orthopnea.   Cardiovascular: Positive for chest pain and leg swelling. Negative for palpitations.        C/o intermittent CP.   Gastrointestinal: Positive for abdominal pain. Negative for abdominal distention, blood in stool, constipation, diarrhea, nausea, rectal pain and vomiting.        C/o Epigastric abdominal pain.   Endocrine: Negative for cold intolerance, heat intolerance, polydipsia, polyphagia and polyuria.   Genitourinary: Positive for decreased urine volume and difficulty urinating. Negative for dysuria, flank pain, frequency, hematuria and urgency.   Musculoskeletal: Positive for arthralgias and back  pain. Negative for gait problem, joint swelling, neck pain and neck stiffness.   Skin: Negative for color change, rash and wound.   Allergic/Immunologic: Negative for food allergies.   Neurological: Negative for dizziness, tremors, seizures, syncope, speech difficulty, light-headedness and headaches.   Hematological: Negative for adenopathy. Does not bruise/bleed easily.   Psychiatric/Behavioral: Negative for agitation and confusion. The patient is not nervous/anxious.    All other systems reviewed and are negative.    Objective:     Vital Signs (Most Recent):  Pulse: 70 (11/20/20 1132)  Resp: (!) 21 (11/20/20 1132)  BP: (!) 177/84 (11/20/20 1132)  SpO2: (!) 94 % (11/20/20 1132) Vital Signs (24h Range):  Pulse:  [66-70] 70  Resp:  [21] 21  SpO2:  [94 %-98 %] 94 %  BP: (177-187)/(84-85) 177/84        There is no height or weight on file to calculate BMI.    Physical Exam  Vitals signs and nursing note reviewed.   Constitutional:       General: He is not in acute distress.     Appearance: He is well-developed. He is not diaphoretic.      Comments: Thin, AAM resting comfortably in bed in NAD with daughter at BS.   HENT:      Head: Normocephalic and atraumatic.   Eyes:      Conjunctiva/sclera: Conjunctivae normal.      Pupils: Pupils are equal, round, and reactive to light.   Neck:      Musculoskeletal: Normal range of motion and neck supple.      Vascular: No JVD.   Cardiovascular:      Rate and Rhythm: Normal rate and regular rhythm.      Heart sounds: Murmur present. No friction rub. No gallop.    Pulmonary:      Effort: Pulmonary effort is normal. No respiratory distress.      Breath sounds: No stridor. Rales present. No wheezing.      Comments: Faint crackles to bases.  Comfortable on RA.  Chest:      Chest wall: No tenderness.   Abdominal:      General: Bowel sounds are normal. There is no distension.      Palpations: Abdomen is soft. There is no mass.      Tenderness: There is no abdominal tenderness. There is no  guarding or rebound.   Musculoskeletal: Normal range of motion.         General: No tenderness.   Skin:     General: Skin is warm and dry.      Findings: No erythema or rash.   Neurological:      Mental Status: He is alert and oriented to person, place, and time.      Cranial Nerves: No cranial nerve deficit.   Psychiatric:         Behavior: Behavior normal.         Thought Content: Thought content normal.         Judgment: Judgment normal.           CRANIAL NERVES     CN III, IV, VI   Pupils are equal, round, and reactive to light.       Significant Labs:   CBC:   Recent Labs   Lab 11/20/20  0950   WBC 4.26   HGB 10.0*   HCT 30.9*        CMP:   Recent Labs   Lab 11/20/20  0950      K 3.9      CO2 23   *   BUN 18   CREATININE 1.4   CALCIUM 8.4*   PROT 7.7   ALBUMIN 3.3*   BILITOT 1.0   ALKPHOS 68   AST 20   ALT 18   ANIONGAP 11   EGFRNONAA 48*     Cardiac Markers:   Recent Labs   Lab 11/20/20  0950   BNP 2,095*     Troponin:   Recent Labs   Lab 11/20/20  0950   TROPONINI 0.059*       Significant Imaging: I have reviewed all pertinent imaging results/findings within the past 24 hours.

## 2020-11-21 PROBLEM — I50.33 ACUTE ON CHRONIC DIASTOLIC CONGESTIVE HEART FAILURE: Status: ACTIVE | Noted: 2020-11-21

## 2020-11-21 LAB
ANION GAP SERPL CALC-SCNC: 10 MMOL/L (ref 8–16)
BASOPHILS # BLD AUTO: 0.04 K/UL (ref 0–0.2)
BASOPHILS NFR BLD: 1.1 % (ref 0–1.9)
BUN SERPL-MCNC: 20 MG/DL (ref 8–23)
CALCIUM SERPL-MCNC: 8.8 MG/DL (ref 8.7–10.5)
CHLORIDE SERPL-SCNC: 106 MMOL/L (ref 95–110)
CO2 SERPL-SCNC: 28 MMOL/L (ref 23–29)
CREAT SERPL-MCNC: 1.4 MG/DL (ref 0.5–1.4)
DIFFERENTIAL METHOD: ABNORMAL
EOSINOPHIL # BLD AUTO: 0 K/UL (ref 0–0.5)
EOSINOPHIL NFR BLD: 0.3 % (ref 0–8)
ERYTHROCYTE [DISTWIDTH] IN BLOOD BY AUTOMATED COUNT: 17.3 % (ref 11.5–14.5)
EST. GFR  (AFRICAN AMERICAN): 56 ML/MIN/1.73 M^2
EST. GFR  (NON AFRICAN AMERICAN): 48 ML/MIN/1.73 M^2
GLUCOSE SERPL-MCNC: 104 MG/DL (ref 70–110)
HCT VFR BLD AUTO: 29.9 % (ref 40–54)
HGB BLD-MCNC: 9.6 G/DL (ref 14–18)
IMM GRANULOCYTES # BLD AUTO: 0.02 K/UL (ref 0–0.04)
IMM GRANULOCYTES NFR BLD AUTO: 0.5 % (ref 0–0.5)
LYMPHOCYTES # BLD AUTO: 0.5 K/UL (ref 1–4.8)
LYMPHOCYTES NFR BLD: 13.3 % (ref 18–48)
MCH RBC QN AUTO: 33.4 PG (ref 27–31)
MCHC RBC AUTO-ENTMCNC: 32.1 G/DL (ref 32–36)
MCV RBC AUTO: 104 FL (ref 82–98)
MONOCYTES # BLD AUTO: 0.3 K/UL (ref 0.3–1)
MONOCYTES NFR BLD: 8.7 % (ref 4–15)
NEUTROPHILS # BLD AUTO: 2.8 K/UL (ref 1.8–7.7)
NEUTROPHILS NFR BLD: 76.1 % (ref 38–73)
NRBC BLD-RTO: 0 /100 WBC
PLATELET # BLD AUTO: 213 K/UL (ref 150–350)
PMV BLD AUTO: 11.1 FL (ref 9.2–12.9)
POCT GLUCOSE: 114 MG/DL (ref 70–110)
POCT GLUCOSE: 115 MG/DL (ref 70–110)
POCT GLUCOSE: 167 MG/DL (ref 70–110)
POTASSIUM SERPL-SCNC: 3.8 MMOL/L (ref 3.5–5.1)
PROCALCITONIN SERPL IA-MCNC: 0.04 NG/ML
RBC # BLD AUTO: 2.87 M/UL (ref 4.6–6.2)
SODIUM SERPL-SCNC: 144 MMOL/L (ref 136–145)
TROPONIN I SERPL DL<=0.01 NG/ML-MCNC: 0.05 NG/ML (ref 0–0.03)
WBC # BLD AUTO: 3.69 K/UL (ref 3.9–12.7)

## 2020-11-21 PROCEDURE — 99225 PR SUBSEQUENT OBSERVATION CARE,LEVEL II: ICD-10-PCS | Mod: 25,,, | Performed by: INTERNAL MEDICINE

## 2020-11-21 PROCEDURE — 96372 THER/PROPH/DIAG INJ SC/IM: CPT

## 2020-11-21 PROCEDURE — 36415 COLL VENOUS BLD VENIPUNCTURE: CPT

## 2020-11-21 PROCEDURE — 80048 BASIC METABOLIC PNL TOTAL CA: CPT

## 2020-11-21 PROCEDURE — 25000003 PHARM REV CODE 250: Performed by: PHYSICIAN ASSISTANT

## 2020-11-21 PROCEDURE — 96376 TX/PRO/DX INJ SAME DRUG ADON: CPT

## 2020-11-21 PROCEDURE — 25000003 PHARM REV CODE 250: Performed by: NURSE PRACTITIONER

## 2020-11-21 PROCEDURE — 84484 ASSAY OF TROPONIN QUANT: CPT

## 2020-11-21 PROCEDURE — 99214 PR OFFICE/OUTPT VISIT, EST, LEVL IV, 30-39 MIN: ICD-10-PCS | Mod: ,,, | Performed by: INTERNAL MEDICINE

## 2020-11-21 PROCEDURE — 85025 COMPLETE CBC W/AUTO DIFF WBC: CPT

## 2020-11-21 PROCEDURE — 99214 OFFICE O/P EST MOD 30 MIN: CPT | Mod: ,,, | Performed by: INTERNAL MEDICINE

## 2020-11-21 PROCEDURE — 99225 PR SUBSEQUENT OBSERVATION CARE,LEVEL II: CPT | Mod: 25,,, | Performed by: INTERNAL MEDICINE

## 2020-11-21 PROCEDURE — G0378 HOSPITAL OBSERVATION PER HR: HCPCS

## 2020-11-21 PROCEDURE — 25000003 PHARM REV CODE 250: Performed by: INTERNAL MEDICINE

## 2020-11-21 PROCEDURE — 94799 UNLISTED PULMONARY SVC/PX: CPT

## 2020-11-21 PROCEDURE — 63600175 PHARM REV CODE 636 W HCPCS: Performed by: NURSE PRACTITIONER

## 2020-11-21 PROCEDURE — 84145 PROCALCITONIN (PCT): CPT

## 2020-11-21 RX ORDER — CLONIDINE HYDROCHLORIDE 0.2 MG/1
0.2 TABLET ORAL EVERY 6 HOURS PRN
Status: DISCONTINUED | OUTPATIENT
Start: 2020-11-22 | End: 2020-11-22 | Stop reason: HOSPADM

## 2020-11-21 RX ORDER — LACTULOSE 10 G/15ML
20 SOLUTION ORAL EVERY 6 HOURS PRN
Status: DISCONTINUED | OUTPATIENT
Start: 2020-11-21 | End: 2020-11-22 | Stop reason: HOSPADM

## 2020-11-21 RX ADMIN — CLONIDINE HYDROCHLORIDE 0.2 MG: 0.2 TABLET ORAL at 11:11

## 2020-11-21 RX ADMIN — LACTULOSE 20 G: 20 SOLUTION ORAL at 11:11

## 2020-11-21 RX ADMIN — OXYCODONE HYDROCHLORIDE 20 MG: 10 TABLET, FILM COATED, EXTENDED RELEASE ORAL at 09:11

## 2020-11-21 RX ADMIN — ISOSORBIDE MONONITRATE 60 MG: 60 TABLET, EXTENDED RELEASE ORAL at 09:11

## 2020-11-21 RX ADMIN — GUAIFENESIN AND DEXTROMETHORPHAN HYDROBROMIDE 1 TABLET: 600; 30 TABLET, EXTENDED RELEASE ORAL at 08:11

## 2020-11-21 RX ADMIN — ROSUVASTATIN 40 MG: 10 TABLET, FILM COATED ORAL at 08:11

## 2020-11-21 RX ADMIN — POLYETHYLENE GLYCOL 3350 17 G: 17 POWDER, FOR SOLUTION ORAL at 09:11

## 2020-11-21 RX ADMIN — OXYCODONE HYDROCHLORIDE 20 MG: 10 TABLET, FILM COATED, EXTENDED RELEASE ORAL at 08:11

## 2020-11-21 RX ADMIN — ENOXAPARIN SODIUM 40 MG: 100 INJECTION SUBCUTANEOUS at 05:11

## 2020-11-21 RX ADMIN — PANTOPRAZOLE SODIUM 40 MG: 40 TABLET, DELAYED RELEASE ORAL at 08:11

## 2020-11-21 RX ADMIN — TICAGRELOR 90 MG: 90 TABLET ORAL at 09:11

## 2020-11-21 RX ADMIN — ISOSORBIDE MONONITRATE 60 MG: 60 TABLET, EXTENDED RELEASE ORAL at 08:11

## 2020-11-21 RX ADMIN — GUAIFENESIN AND DEXTROMETHORPHAN HYDROBROMIDE 1 TABLET: 600; 30 TABLET, EXTENDED RELEASE ORAL at 12:11

## 2020-11-21 RX ADMIN — LACTULOSE 20 G: 20 SOLUTION ORAL at 08:11

## 2020-11-21 RX ADMIN — PANTOPRAZOLE SODIUM 40 MG: 40 TABLET, DELAYED RELEASE ORAL at 09:11

## 2020-11-21 RX ADMIN — RANOLAZINE 1000 MG: 500 TABLET, FILM COATED, EXTENDED RELEASE ORAL at 08:11

## 2020-11-21 RX ADMIN — LISINOPRIL 2.5 MG: 2.5 TABLET ORAL at 09:11

## 2020-11-21 RX ADMIN — RANOLAZINE 1000 MG: 500 TABLET, FILM COATED, EXTENDED RELEASE ORAL at 09:11

## 2020-11-21 RX ADMIN — METOPROLOL TARTRATE 50 MG: 50 TABLET, FILM COATED ORAL at 09:11

## 2020-11-21 RX ADMIN — METOPROLOL TARTRATE 50 MG: 50 TABLET, FILM COATED ORAL at 08:11

## 2020-11-21 RX ADMIN — SENNOSIDES 17.2 MG: 8.6 TABLET, FILM COATED ORAL at 08:11

## 2020-11-21 RX ADMIN — ASPIRIN 81 MG: 81 TABLET, CHEWABLE ORAL at 09:11

## 2020-11-21 RX ADMIN — FUROSEMIDE 40 MG: 10 INJECTION, SOLUTION INTRAMUSCULAR; INTRAVENOUS at 09:11

## 2020-11-21 NOTE — PLAN OF CARE
Pt free from falls. Pt pain free. No complaints. Pt tolerating diabetic diet. Will continue to monitor. 12 hour chart checks.

## 2020-11-21 NOTE — HPI
Mr. Durbin is a 74 yo gentleman with multiple myeloma on Pomalyst who presented with intermittent epigastric/CP, dyspnea on exertion, and orthopnea for the past 2 days.  CXR showed FINDINGS:  Comparison study 11/03/2020.  Normal size heart status post CABG with coronary artery calcifications/stent.  Aortic atherosclerosis.     The pulmonary vasculature is congested with interval development of generalized perihilar in lower lung haziness, question pulmonary edema.     There is blunting of bilateral costophrenic angles, new since the prior study, characteristic of bilateral pleural effusions small pleural effusions.     Again, there is dense consolidation in the retrocardiac left lower lung with associated bronchiectasis.  No pneumothorax.    Has small troponin elevation.

## 2020-11-21 NOTE — CONSULTS
[facility]  Cardiology  Consult Note    Patient Name: Familia Durbin Jr.  MRN: [id number]  Admission Date: 11/20/2020  Hospital Length of Stay: 0 days  Code Status: DNR   Attending Provider: Manjit Abbott MD   Consulting Provider: Max Tristan MD  Primary Care Physician: Andrea Elkins MD  Principal Problem:Epigastric pain    Patient information was obtained from patient and ER records.     Inpatient consult to Cardiology  Consult performed by: Max Tristan MD  Consult ordered by: Rachel Adkins NP        Subjective:       HPI:    Familia Durbin Jr. is a 75 y.o. AAM with a PMHx of CAD, DM type 2, HLD, HTN, Lupus, Multiple myeloma with mets to the bone, old MI, Renal insufficiency, who presented to the Emergency Department today for evaluation of epigastric abdominal pain, which onset 6 days ago.  Patient states that he has had intermittent epigastric/CP, dyspnea on exertion, and orthopnea for the past 2 days.    ER workup showed; elevated BP, otherwise stable VS.  CBC showed Hgb 10.0, Hct 30.9, otherwise unremarkable.  CMP showed , otherwise unremarkable.  BNP 2095.  Initial troponin 0.059.  ECHO on 11/3 showed EF of 55%, associated with mild-to-moderate aortic regurgitation and stenosis, with mild pulmonic and mitral regurgitation.  CXR consistent with FVO.  Patient was given Lasix 40 mg IVP in the ER and Hospital Medicine was contacted for admission.  Patient will be placed in observation    Cardiology consult for elevated troponin and epigastric pain.  PMH CAD s/o cabg x2 in 2002 and few PCIs after cabg  The pain started 1 week ago and intermittently at rest. Similar to the pain when he had heart attack  Troponin slight elevation and flat  ekg NSR and nonspecific ctt change  Echo   · There is left ventricular concentric hypertrophy.  · The left ventricle is normal in size with normal systolic function. The estimated ejection fraction is 55%.  · Indeterminate diastolic function.  · With  low normal right ventricular systolic function.  · Mild-to-moderate aortic regurgitation.  · Mild-to-moderate aortic valve stenosis.  · Aortic valve area is 1.54 cm2; peak velocity is 2.17 m/s; mean gradient is 11 mmHg.  · Mild pulmonic regurgitation.  · The mitral valve is mildly sclerotic.  · Normal central venous pressure (3 mmHg).  · Mild mitral regurgitation.         Past Medical History:   Diagnosis Date    CAD (coronary artery disease)     luikart    Diabetes mellitus, type 2 1979    eye dr rocha    Hearing impaired     Hyperlipidemia     Hypertension     Lupus     Discoid    Multiple myeloma     Old MI (myocardial infarction) 2012    Renal insufficiency     Tracheostomy tube present        Past Surgical History:   Procedure Laterality Date    CARDIAC SURGERY      CATARACT EXTRACTION      COLONOSCOPY      CORONARY ARTERY BYPASS GRAFT      HAND SURGERY      KNEE SURGERY      TRACHEOSTOMY TUBE PLACEMENT      WRIST FUSION         Review of patient's allergies indicates:   Allergen Reactions    Cephalexin      Pt tolerated cefepime 11/4/2020    Fentanyl Nausea And Vomiting    Nsaids (non-steroidal anti-inflammatory drug)      Renal insuf       No current facility-administered medications on file prior to encounter.      Current Outpatient Medications on File Prior to Encounter   Medication Sig    albuterol-ipratropium (DUO-NEB) 2.5 mg-0.5 mg/3 mL nebulizer solution Take 3 mLs by nebulization every 8 (eight) hours. For shortness of breath and wheezing    aspirin 81 MG Chew Take 1 tablet (81 mg total) by mouth once daily.    benzonatate (TESSALON PERLES) 100 MG capsule Take 1 capsule (100 mg total) by mouth every 6 (six) hours as needed for Cough.    BRILINTA 90 mg tablet Take 1 tablet by mouth once daily.    cholecalciferol, vitamin D3, 5,000 unit Tab Take 5,000 Units by mouth once daily.    docusate sodium (COLACE) 100 MG capsule Take 100 mg by mouth 2 (two) times daily.     "flaxseed oil 1,000 mg Cap Take 1 capsule by mouth daily as needed.     insulin (LANTUS SOLOSTAR U-100 INSULIN) glargine 100 units/mL (3mL) SubQ pen Inject 10 Units into the skin every evening. INJECT 15 UNITS SUBCUTANEOUSLY ONCE DAILY    insulin aspart U-100 (NOVOLOG) 100 unit/mL (3 mL) InPn pen Inject 15 Units into the skin 3 (three) times daily with meals.    isosorbide mononitrate (IMDUR) 30 MG 24 hr tablet Take 30 mg by mouth once daily.     lisinopril (PRINIVIL,ZESTRIL) 2.5 MG tablet Take 2.5 mg by mouth once daily.     metoprolol tartrate (LOPRESSOR) 50 MG tablet Take 1 tablet by mouth twice daily    multivit-min-FA-lycopen-lutein 300-600-300 mcg Tab Take 1 tablet by mouth.    omega 3-dha-epa-fish oil 300-1,000 mg Cap Take by mouth.    oxyCODONE (OXYCONTIN) 20 mg 12 hr tablet Take 1 tablet (20 mg total) by mouth every 12 (twelve) hours.    oxyCODONE (ROXICODONE) 10 mg Tab immediate release tablet Take 1 tablet (10 mg total) by mouth every 6 (six) hours as needed for Pain.    pantoprazole (PROTONIX) 40 MG tablet Take 1 tablet by mouth daily    pomalidomide 3 mg Cap Take 3 mg by mouth 3 mg daily x 21 days, then off for 7 days, then repeat..    prochlorperazine (COMPAZINE) 5 MG tablet Take 1 tablet (5 mg total) by mouth 4 (four) times daily as needed for Nausea.    ranolazine (RANEXA) 1,000 mg Tb12 Take 1,000 mg by mouth 2 (two) times daily.    rosuvastatin (CRESTOR) 40 MG Tab Take 1 tablet (40 mg total) by mouth every evening.    senna (SENOKOT) 8.6 mg tablet Take 1 tablet by mouth daily    VITAMIN B COMPLEX ORAL Take 1 capsule by mouth.    BD ULTRA-FINE DAVE PEN NEEDLE 32 gauge x 5/32" Ndle USE  4 TIMES DAILY WITH MEALS AND AT BEDTIME    blood-glucose meter kit Use as instructed    DOCOSAHEXANOIC ACID ORAL Take 1 capsule by mouth once daily.    ginger root (GINGER, ZINGIBER OFFICINALIS,) 550 mg Cap Take 1 capsule by mouth.    lactulose (CEPHULAC) 20 gram Pack Mix and take 1 packet (20 g " total) by mouth 3 (three) times daily. (Patient taking differently: Take 20 g by mouth 3 (three) times daily as needed. )    lactulose (CHRONULAC) 10 gram/15 mL solution Take 30 mL by mouth twice daily as needed for constipation.    NITROSTAT 0.4 mg SL tablet     ondansetron (ZOFRAN) 4 MG tablet Take 1 tablet (4 mg total) by mouth every 8 (eight) hours as needed for Nausea.    ondansetron (ZOFRAN-ODT) 4 MG TbDL Dissolve 1 tab under the tongue every 4-6 hours as needed for nausea.    triamcinolone acetonide 0.1% (KENALOG) 0.1 % cream Apply topically 2 (two) times daily. For two weeks    TRUE METRIX GLUCOSE TEST STRIP Strp USE  STRIP TO CHECK GLUCOSE SIX TIMES DAILY    TRUEPLUS LANCETS 33 gauge Misc USE   TO CHECK GLUCOSE SIX TIMES DAILY    [DISCONTINUED] valACYclovir (VALTREX) 500 MG tablet Take by mouth.     Family History     Problem Relation (Age of Onset)    Diabetes Mother, Father, Maternal Uncle, Maternal Grandmother    No Known Problems Brother, Maternal Grandfather, Paternal Grandmother, Paternal Grandfather    Pancreatic cancer Sister    Prostate cancer Father        Tobacco Use    Smoking status: Never Smoker    Smokeless tobacco: Never Used   Substance and Sexual Activity    Alcohol use: No    Drug use: No    Sexual activity: Yes     Partners: Female     Review of Systems   Constitution: Positive for malaise/fatigue. Negative for decreased appetite, diaphoresis, fever and night sweats.   HENT: Negative for nosebleeds.    Eyes: Negative for blurred vision and double vision.   Cardiovascular: Positive for chest pain and dyspnea on exertion. Negative for claudication, irregular heartbeat, leg swelling, near-syncope, orthopnea, palpitations, paroxysmal nocturnal dyspnea and syncope.   Respiratory: Positive for shortness of breath. Negative for cough, sleep disturbances due to breathing, snoring, sputum production and wheezing.    Endocrine: Negative for cold intolerance and polyuria.    Hematologic/Lymphatic: Does not bruise/bleed easily.   Skin: Negative for rash.   Musculoskeletal: Negative for back pain, falls, joint pain, joint swelling and neck pain.   Gastrointestinal: Positive for abdominal pain. Negative for heartburn, nausea and vomiting.   Genitourinary: Negative for dysuria, frequency and hematuria.   Neurological: Negative for difficulty with concentration, dizziness, focal weakness, headaches, light-headedness, numbness, seizures and weakness.   Psychiatric/Behavioral: Negative for depression, memory loss and substance abuse. The patient does not have insomnia.    Allergic/Immunologic: Negative for HIV exposure and hives.     Objective:     Vital Signs (Most Recent):  Temp: 97.8 °F (36.6 °C) (11/20/20 1704)  Pulse: 65 (11/20/20 1704)  Resp: 18 (11/20/20 1704)  BP: (!) 155/67 (11/20/20 1704)  SpO2: 100 % (11/20/20 1704) Vital Signs (24h Range):  Temp:  [97.4 °F (36.3 °C)-97.8 °F (36.6 °C)] 97.8 °F (36.6 °C)  Pulse:  [64-77] 65  Resp:  [18-25] 18  SpO2:  [94 %-100 %] 100 %  BP: (152-189)/() 155/67     Weight: 67.7 kg (149 lb 4 oz)  Body mass index is 21.42 kg/m².    SpO2: 100 %       No intake or output data in the 24 hours ending 11/20/20 2008    Lines/Drains/Airways     Peripheral Intravenous Line                 Peripheral IV - Single Lumen 11/20/20 1041 20 G Right Antecubital less than 1 day                Physical Exam   Constitutional: He is oriented to person, place, and time. He appears well-nourished.   HENT:   Head: Normocephalic.   Eyes: Pupils are equal, round, and reactive to light.   Neck: Normal carotid pulses and no JVD present. Carotid bruit is not present. No thyromegaly present.   Cardiovascular: Normal rate, regular rhythm and normal pulses.  No extrasystoles are present. PMI is not displaced. Exam reveals no gallop and no S3.   Murmur (SM on bases and apex) heard.  Pulmonary/Chest: Breath sounds normal. No stridor. No respiratory distress.   Abdominal: Soft.  Bowel sounds are normal. There is no abdominal tenderness. There is no rebound.   Musculoskeletal: Normal range of motion.   Neurological: He is alert and oriented to person, place, and time.   Skin: Skin is intact. No rash noted.   Psychiatric: His behavior is normal.       Significant Labs:   ABG: No results for input(s): PH, PCO2, HCO3, POCSATURATED, BE in the last 48 hours., Blood Culture: No results for input(s): LABBLOO in the last 48 hours., BMP:   Recent Labs   Lab 11/20/20  0950   *      K 3.9      CO2 23   BUN 18   CREATININE 1.4   CALCIUM 8.4*   , CMP   Recent Labs   Lab 11/20/20  0950      K 3.9      CO2 23   *   BUN 18   CREATININE 1.4   CALCIUM 8.4*   PROT 7.7   ALBUMIN 3.3*   BILITOT 1.0   ALKPHOS 68   AST 20   ALT 18   ANIONGAP 11   ESTGFRAFRICA 56*   EGFRNONAA 48*   , CBC   Recent Labs   Lab 11/20/20  0950   WBC 4.26   HGB 10.0*   HCT 30.9*      , INR No results for input(s): INR, PROTIME in the last 48 hours., Lipid Panel No results for input(s): CHOL, HDL, LDLCALC, TRIG, CHOLHDL in the last 48 hours. and Troponin   Recent Labs   Lab 11/20/20  0950 11/20/20  1606   TROPONINI 0.059* 0.052*       Significant Imaging: EKG:      Assessment and Plan:     * Epigastric pain  Atypical CP and mild elevation of troponin    -will discuss with HM for the palliative care issue  -Continue conservative Rx now  -continue ASA Brilinta, BB acei statin and Ranexa  -increase Imdur to 60 mg bid  -DASH    Elevated troponin  See above note    Metastatic bone cancer  On palliative care    Coronary artery disease of native artery of native heart with stable angina pectoris  Cad s/p CABg    See above note        VTE Risk Mitigation (From admission, onward)         Ordered     enoxaparin injection 40 mg  Every 24 hours      11/20/20 1241     IP VTE HIGH RISK PATIENT  Once      11/20/20 1136     Place sequential compression device  Until discontinued      11/20/20 1136                 Thank you for your consult. I will follow-up with patient. Please contact us if you have any additional questions.    Max Tristan MD  Cardiology   [facility]

## 2020-11-21 NOTE — SUBJECTIVE & OBJECTIVE
Interval History: more comfortable after iv lasix.    Oncology Treatment Plan:   [No treatment plan]    Medications:  Continuous Infusions:  Scheduled Meds:   aspirin  81 mg Oral Daily    dextromethorphan-guaifenesin  mg  1 tablet Oral BID    enoxaparin  40 mg Subcutaneous Q24H    furosemide (LASIX) IV  40 mg Intravenous Daily    isosorbide mononitrate  60 mg Oral BID    lisinopriL  2.5 mg Oral Daily    metoprolol tartrate  50 mg Oral BID    oxyCODONE  20 mg Oral Q12H    pantoprazole  40 mg Oral BID    polyethylene glycol  17 g Oral Daily    ranolazine  1,000 mg Oral BID    rosuvastatin  40 mg Oral QHS    senna  17.2 mg Oral QHS    ticagrelor  90 mg Oral Daily     PRN Meds:acetaminophen, benzonatate, dextrose 50%, dextrose 50%, glucagon (human recombinant), glucose, glucose, influenza, insulin aspart U-100, lactulose, oxyCODONE, sodium chloride 0.9%     Review of Systems   Constitutional: Negative.    HENT: Negative.    Eyes: Negative.    Respiratory: Negative.    Cardiovascular: Negative.    Gastrointestinal: Negative.    Endocrine: Negative.    Genitourinary: Negative.    Musculoskeletal: Negative.    Skin: Negative.    Allergic/Immunologic: Negative.    Neurological: Negative.    Hematological: Negative.    Psychiatric/Behavioral: Negative.      Objective:     Vital Signs (Most Recent):  Temp: 96 °F (35.6 °C) (11/21/20 1644)  Pulse: (!) 58 (11/21/20 1644)  Resp: 18 (11/21/20 1644)  BP: (!) 141/63 (11/21/20 1644)  SpO2: (!) 92 % (11/21/20 1644) Vital Signs (24h Range):  Temp:  [96 °F (35.6 °C)-97.8 °F (36.6 °C)] 96 °F (35.6 °C)  Pulse:  [] 58  Resp:  [16-20] 18  SpO2:  [92 %-100 %] 92 %  BP: (124-171)/(63-77) 141/63     Weight: 67.7 kg (149 lb 4 oz)  Body mass index is 21.42 kg/m².  Body surface area is 1.83 meters squared.      Intake/Output Summary (Last 24 hours) at 11/21/2020 1648  Last data filed at 11/21/2020 1200  Gross per 24 hour   Intake --   Output 1140 ml   Net -1140 ml        Physical Exam  Vitals signs and nursing note reviewed.   Constitutional:       Appearance: He is well-developed.   HENT:      Head: Normocephalic and atraumatic.   Eyes:      Conjunctiva/sclera: Conjunctivae normal.   Neck:      Musculoskeletal: Normal range of motion and neck supple.   Cardiovascular:      Rate and Rhythm: Normal rate and regular rhythm.   Pulmonary:      Effort: Pulmonary effort is normal.      Breath sounds: Normal breath sounds.   Abdominal:      General: Bowel sounds are normal.      Palpations: Abdomen is soft.   Musculoskeletal: Normal range of motion.   Skin:     General: Skin is warm and dry.   Neurological:      Mental Status: He is alert and oriented to person, place, and time.   Psychiatric:         Behavior: Behavior normal.         Thought Content: Thought content normal.         Judgment: Judgment normal.         Significant Labs:   All pertinent labs from the last 24 hours have been reviewed.    Diagnostic Results:  I have reviewed all pertinent imaging results/findings within the past 24 hours.

## 2020-11-21 NOTE — ASSESSMENT & PLAN NOTE
Hold pomalyst until CHF is treated.  Discussed with Cardiology to do cardiac workup including stress test and possible cardiac catheterization.  If has CAD and need stenting, can do plavix.

## 2020-11-21 NOTE — ASSESSMENT & PLAN NOTE
Atypical CP and mild elevation of troponin    -will discuss with HM for the palliative care issue  -Continue conservative Rx now  -continue ASA Brilinta, BB acei statin and Ranexa  -increase Imdur to 60 mg bid  -DASH

## 2020-11-21 NOTE — HPI
Familia Durbin Jr. is a 75 y.o. AAM with a PMHx of CAD, DM type 2, HLD, HTN, Lupus, Multiple myeloma with mets to the bone, old MI, Renal insufficiency, who presented to the Emergency Department today for evaluation of epigastric abdominal pain, which onset 6 days ago.  Patient states that he has had intermittent epigastric/CP, dyspnea on exertion, and orthopnea for the past 2 days.    ER workup showed; elevated BP, otherwise stable VS.  CBC showed Hgb 10.0, Hct 30.9, otherwise unremarkable.  CMP showed , otherwise unremarkable.  BNP 2095.  Initial troponin 0.059.  ECHO on 11/3 showed EF of 55%, associated with mild-to-moderate aortic regurgitation and stenosis, with mild pulmonic and mitral regurgitation.  CXR consistent with FVO.  Patient was given Lasix 40 mg IVP in the ER and Hospital Medicine was contacted for admission.  Patient will be placed in observation    Cardiology consult for elevated troponin and epigastric pain.  PMH CAD s/o cabg x2 in 2002 and few PCIs after cabg  The pain started 1 week ago and intermittently at rest. Similar to the pain when he had heart attack  Troponin slight elevation and flat  ekg NSR and nonspecific ctt change  Echo   · There is left ventricular concentric hypertrophy.  · The left ventricle is normal in size with normal systolic function. The estimated ejection fraction is 55%.  · Indeterminate diastolic function.  · With low normal right ventricular systolic function.  · Mild-to-moderate aortic regurgitation.  · Mild-to-moderate aortic valve stenosis.  · Aortic valve area is 1.54 cm2; peak velocity is 2.17 m/s; mean gradient is 11 mmHg.  · Mild pulmonic regurgitation.  · The mitral valve is mildly sclerotic.  · Normal central venous pressure (3 mmHg).  · Mild mitral regurgitation.

## 2020-11-21 NOTE — PROGRESS NOTES
[facility] -   Hematology/Oncology  Progress Note    Patient Name: Familia Durbin Jr.  Admission Date: 11/20/2020  Hospital Length of Stay: 0 days  Code Status: DNR     Subjective:     HPI:  Mr. Durbin is a 76 yo gentleman with multiple myeloma on Pomalyst who presented with intermittent epigastric/CP, dyspnea on exertion, and orthopnea for the past 2 days.  CXR showed FINDINGS:  Comparison study 11/03/2020.  Normal size heart status post CABG with coronary artery calcifications/stent.  Aortic atherosclerosis.     The pulmonary vasculature is congested with interval development of generalized perihilar in lower lung haziness, question pulmonary edema.     There is blunting of bilateral costophrenic angles, new since the prior study, characteristic of bilateral pleural effusions small pleural effusions.     Again, there is dense consolidation in the retrocardiac left lower lung with associated bronchiectasis.  No pneumothorax.    Has small troponin elevation.    Interval History: more comfortable after iv lasix.    Oncology Treatment Plan:   [No treatment plan]    Medications:  Continuous Infusions:  Scheduled Meds:   aspirin  81 mg Oral Daily    dextromethorphan-guaifenesin  mg  1 tablet Oral BID    enoxaparin  40 mg Subcutaneous Q24H    furosemide (LASIX) IV  40 mg Intravenous Daily    isosorbide mononitrate  60 mg Oral BID    lisinopriL  2.5 mg Oral Daily    metoprolol tartrate  50 mg Oral BID    oxyCODONE  20 mg Oral Q12H    pantoprazole  40 mg Oral BID    polyethylene glycol  17 g Oral Daily    ranolazine  1,000 mg Oral BID    rosuvastatin  40 mg Oral QHS    senna  17.2 mg Oral QHS    ticagrelor  90 mg Oral Daily     PRN Meds:acetaminophen, benzonatate, dextrose 50%, dextrose 50%, glucagon (human recombinant), glucose, glucose, influenza, insulin aspart U-100, lactulose, oxyCODONE, sodium chloride 0.9%     Review of Systems   Constitutional: Negative.    HENT: Negative.     Eyes: Negative.    Respiratory: Negative.    Cardiovascular: Negative.    Gastrointestinal: Negative.    Endocrine: Negative.    Genitourinary: Negative.    Musculoskeletal: Negative.    Skin: Negative.    Allergic/Immunologic: Negative.    Neurological: Negative.    Hematological: Negative.    Psychiatric/Behavioral: Negative.      Objective:     Vital Signs (Most Recent):  Temp: 96 °F (35.6 °C) (11/21/20 1644)  Pulse: (!) 58 (11/21/20 1644)  Resp: 18 (11/21/20 1644)  BP: (!) 141/63 (11/21/20 1644)  SpO2: (!) 92 % (11/21/20 1644) Vital Signs (24h Range):  Temp:  [96 °F (35.6 °C)-97.8 °F (36.6 °C)] 96 °F (35.6 °C)  Pulse:  [] 58  Resp:  [16-20] 18  SpO2:  [92 %-100 %] 92 %  BP: (124-171)/(63-77) 141/63     Weight: 67.7 kg (149 lb 4 oz)  Body mass index is 21.42 kg/m².  Body surface area is 1.83 meters squared.      Intake/Output Summary (Last 24 hours) at 11/21/2020 1648  Last data filed at 11/21/2020 1200  Gross per 24 hour   Intake --   Output 1140 ml   Net -1140 ml       Physical Exam  Vitals signs and nursing note reviewed.   Constitutional:       Appearance: He is well-developed.   HENT:      Head: Normocephalic and atraumatic.   Eyes:      Conjunctiva/sclera: Conjunctivae normal.   Neck:      Musculoskeletal: Normal range of motion and neck supple.   Cardiovascular:      Rate and Rhythm: Normal rate and regular rhythm.   Pulmonary:      Effort: Pulmonary effort is normal.      Breath sounds: Normal breath sounds.   Abdominal:      General: Bowel sounds are normal.      Palpations: Abdomen is soft.   Musculoskeletal: Normal range of motion.   Skin:     General: Skin is warm and dry.   Neurological:      Mental Status: He is alert and oriented to person, place, and time.   Psychiatric:         Behavior: Behavior normal.         Thought Content: Thought content normal.         Judgment: Judgment normal.         Significant Labs:   All pertinent labs from the last 24 hours have been reviewed.    Diagnostic  Results:  I have reviewed all pertinent imaging results/findings within the past 24 hours.    Assessment/Plan:     Metastatic bone cancer  Hold pomalyst until CHF is treated.  Discussed with Cardiology to do cardiac workup including stress test and possible cardiac catheterization.  If has CAD and need stenting, can do plavix.        Thank you for your consult. I will follow-up with patient. Please contact us if you have any additional questions.     Dung Church MD  Hematology/Oncology  [facility]

## 2020-11-21 NOTE — SUBJECTIVE & OBJECTIVE
Past Medical History:   Diagnosis Date    CAD (coronary artery disease)     tyree    Diabetes mellitus, type 2 1979    eye dr rocha    Hearing impaired     Hyperlipidemia     Hypertension     Lupus     Discoid    Multiple myeloma     Old MI (myocardial infarction) 2012    Renal insufficiency     Tracheostomy tube present        Past Surgical History:   Procedure Laterality Date    CARDIAC SURGERY      CATARACT EXTRACTION      COLONOSCOPY      CORONARY ARTERY BYPASS GRAFT      HAND SURGERY      KNEE SURGERY      TRACHEOSTOMY TUBE PLACEMENT      WRIST FUSION         Review of patient's allergies indicates:   Allergen Reactions    Cephalexin      Pt tolerated cefepime 11/4/2020    Fentanyl Nausea And Vomiting    Nsaids (non-steroidal anti-inflammatory drug)      Renal insuf       No current facility-administered medications on file prior to encounter.      Current Outpatient Medications on File Prior to Encounter   Medication Sig    albuterol-ipratropium (DUO-NEB) 2.5 mg-0.5 mg/3 mL nebulizer solution Take 3 mLs by nebulization every 8 (eight) hours. For shortness of breath and wheezing    aspirin 81 MG Chew Take 1 tablet (81 mg total) by mouth once daily.    benzonatate (TESSALON PERLES) 100 MG capsule Take 1 capsule (100 mg total) by mouth every 6 (six) hours as needed for Cough.    BRILINTA 90 mg tablet Take 1 tablet by mouth once daily.    cholecalciferol, vitamin D3, 5,000 unit Tab Take 5,000 Units by mouth once daily.    docusate sodium (COLACE) 100 MG capsule Take 100 mg by mouth 2 (two) times daily.    flaxseed oil 1,000 mg Cap Take 1 capsule by mouth daily as needed.     insulin (LANTUS SOLOSTAR U-100 INSULIN) glargine 100 units/mL (3mL) SubQ pen Inject 10 Units into the skin every evening. INJECT 15 UNITS SUBCUTANEOUSLY ONCE DAILY    insulin aspart U-100 (NOVOLOG) 100 unit/mL (3 mL) InPn pen Inject 15 Units into the skin 3 (three) times daily with meals.    isosorbide  "mononitrate (IMDUR) 30 MG 24 hr tablet Take 30 mg by mouth once daily.     lisinopril (PRINIVIL,ZESTRIL) 2.5 MG tablet Take 2.5 mg by mouth once daily.     metoprolol tartrate (LOPRESSOR) 50 MG tablet Take 1 tablet by mouth twice daily    multivit-min-FA-lycopen-lutein 300-600-300 mcg Tab Take 1 tablet by mouth.    omega 3-dha-epa-fish oil 300-1,000 mg Cap Take by mouth.    oxyCODONE (OXYCONTIN) 20 mg 12 hr tablet Take 1 tablet (20 mg total) by mouth every 12 (twelve) hours.    oxyCODONE (ROXICODONE) 10 mg Tab immediate release tablet Take 1 tablet (10 mg total) by mouth every 6 (six) hours as needed for Pain.    pantoprazole (PROTONIX) 40 MG tablet Take 1 tablet by mouth daily    pomalidomide 3 mg Cap Take 3 mg by mouth 3 mg daily x 21 days, then off for 7 days, then repeat..    prochlorperazine (COMPAZINE) 5 MG tablet Take 1 tablet (5 mg total) by mouth 4 (four) times daily as needed for Nausea.    ranolazine (RANEXA) 1,000 mg Tb12 Take 1,000 mg by mouth 2 (two) times daily.    rosuvastatin (CRESTOR) 40 MG Tab Take 1 tablet (40 mg total) by mouth every evening.    senna (SENOKOT) 8.6 mg tablet Take 1 tablet by mouth daily    VITAMIN B COMPLEX ORAL Take 1 capsule by mouth.    BD ULTRA-FINE DAVE PEN NEEDLE 32 gauge x 5/32" Ndle USE  4 TIMES DAILY WITH MEALS AND AT BEDTIME    blood-glucose meter kit Use as instructed    DOCOSAHEXANOIC ACID ORAL Take 1 capsule by mouth once daily.    ginger root (GINGER, ZINGIBER OFFICINALIS,) 550 mg Cap Take 1 capsule by mouth.    lactulose (CEPHULAC) 20 gram Pack Mix and take 1 packet (20 g total) by mouth 3 (three) times daily. (Patient taking differently: Take 20 g by mouth 3 (three) times daily as needed. )    lactulose (CHRONULAC) 10 gram/15 mL solution Take 30 mL by mouth twice daily as needed for constipation.    NITROSTAT 0.4 mg SL tablet     ondansetron (ZOFRAN) 4 MG tablet Take 1 tablet (4 mg total) by mouth every 8 (eight) hours as needed for Nausea. "    ondansetron (ZOFRAN-ODT) 4 MG TbDL Dissolve 1 tab under the tongue every 4-6 hours as needed for nausea.    triamcinolone acetonide 0.1% (KENALOG) 0.1 % cream Apply topically 2 (two) times daily. For two weeks    TRUE METRIX GLUCOSE TEST STRIP Strp USE  STRIP TO CHECK GLUCOSE SIX TIMES DAILY    TRUEPLUS LANCETS 33 gauge Misc USE   TO CHECK GLUCOSE SIX TIMES DAILY    [DISCONTINUED] valACYclovir (VALTREX) 500 MG tablet Take by mouth.     Family History     Problem Relation (Age of Onset)    Diabetes Mother, Father, Maternal Uncle, Maternal Grandmother    No Known Problems Brother, Maternal Grandfather, Paternal Grandmother, Paternal Grandfather    Pancreatic cancer Sister    Prostate cancer Father        Tobacco Use    Smoking status: Never Smoker    Smokeless tobacco: Never Used   Substance and Sexual Activity    Alcohol use: No    Drug use: No    Sexual activity: Yes     Partners: Female     Review of Systems   Constitution: Positive for malaise/fatigue. Negative for decreased appetite, diaphoresis, fever and night sweats.   HENT: Negative for nosebleeds.    Eyes: Negative for blurred vision and double vision.   Cardiovascular: Positive for chest pain and dyspnea on exertion. Negative for claudication, irregular heartbeat, leg swelling, near-syncope, orthopnea, palpitations, paroxysmal nocturnal dyspnea and syncope.   Respiratory: Positive for shortness of breath. Negative for cough, sleep disturbances due to breathing, snoring, sputum production and wheezing.    Endocrine: Negative for cold intolerance and polyuria.   Hematologic/Lymphatic: Does not bruise/bleed easily.   Skin: Negative for rash.   Musculoskeletal: Negative for back pain, falls, joint pain, joint swelling and neck pain.   Gastrointestinal: Positive for abdominal pain. Negative for heartburn, nausea and vomiting.   Genitourinary: Negative for dysuria, frequency and hematuria.   Neurological: Negative for difficulty with concentration,  dizziness, focal weakness, headaches, light-headedness, numbness, seizures and weakness.   Psychiatric/Behavioral: Negative for depression, memory loss and substance abuse. The patient does not have insomnia.    Allergic/Immunologic: Negative for HIV exposure and hives.     Objective:     Vital Signs (Most Recent):  Temp: 97.8 °F (36.6 °C) (11/20/20 1704)  Pulse: 65 (11/20/20 1704)  Resp: 18 (11/20/20 1704)  BP: (!) 155/67 (11/20/20 1704)  SpO2: 100 % (11/20/20 1704) Vital Signs (24h Range):  Temp:  [97.4 °F (36.3 °C)-97.8 °F (36.6 °C)] 97.8 °F (36.6 °C)  Pulse:  [64-77] 65  Resp:  [18-25] 18  SpO2:  [94 %-100 %] 100 %  BP: (152-189)/() 155/67     Weight: 67.7 kg (149 lb 4 oz)  Body mass index is 21.42 kg/m².    SpO2: 100 %       No intake or output data in the 24 hours ending 11/20/20 2008    Lines/Drains/Airways     Peripheral Intravenous Line                 Peripheral IV - Single Lumen 11/20/20 1041 20 G Right Antecubital less than 1 day                Physical Exam   Constitutional: He is oriented to person, place, and time. He appears well-nourished.   HENT:   Head: Normocephalic.   Eyes: Pupils are equal, round, and reactive to light.   Neck: Normal carotid pulses and no JVD present. Carotid bruit is not present. No thyromegaly present.   Cardiovascular: Normal rate, regular rhythm and normal pulses.  No extrasystoles are present. PMI is not displaced. Exam reveals no gallop and no S3.   Murmur (SM on bases and apex) heard.  Pulmonary/Chest: Breath sounds normal. No stridor. No respiratory distress.   Abdominal: Soft. Bowel sounds are normal. There is no abdominal tenderness. There is no rebound.   Musculoskeletal: Normal range of motion.   Neurological: He is alert and oriented to person, place, and time.   Skin: Skin is intact. No rash noted.   Psychiatric: His behavior is normal.       Significant Labs:   ABG: No results for input(s): PH, PCO2, HCO3, POCSATURATED, BE in the last 48 hours., Blood  Culture: No results for input(s): LABBLOO in the last 48 hours., BMP:   Recent Labs   Lab 11/20/20  0950   *      K 3.9      CO2 23   BUN 18   CREATININE 1.4   CALCIUM 8.4*   , CMP   Recent Labs   Lab 11/20/20  0950      K 3.9      CO2 23   *   BUN 18   CREATININE 1.4   CALCIUM 8.4*   PROT 7.7   ALBUMIN 3.3*   BILITOT 1.0   ALKPHOS 68   AST 20   ALT 18   ANIONGAP 11   ESTGFRAFRICA 56*   EGFRNONAA 48*   , CBC   Recent Labs   Lab 11/20/20  0950   WBC 4.26   HGB 10.0*   HCT 30.9*      , INR No results for input(s): INR, PROTIME in the last 48 hours., Lipid Panel No results for input(s): CHOL, HDL, LDLCALC, TRIG, CHOLHDL in the last 48 hours. and Troponin   Recent Labs   Lab 11/20/20  0950 11/20/20  1606   TROPONINI 0.059* 0.052*       Significant Imaging: EKG:

## 2020-11-21 NOTE — PLAN OF CARE
Pt had no adverse events during shift. Pt free of falls. Call light in reach. Side rails x 2. Pt reports one small, loose stool this shift. Pain well controlled w/ prn meds. Pt repositions independently. Cardiac monitoring per orders. VTE prophylaxis- . VSS. Safety promoted. Chart reviewed, will continue to monitor.

## 2020-11-22 VITALS
RESPIRATION RATE: 20 BRPM | HEART RATE: 68 BPM | WEIGHT: 149.25 LBS | BODY MASS INDEX: 21.37 KG/M2 | TEMPERATURE: 97 F | HEIGHT: 70 IN | SYSTOLIC BLOOD PRESSURE: 119 MMHG | OXYGEN SATURATION: 97 % | DIASTOLIC BLOOD PRESSURE: 58 MMHG

## 2020-11-22 PROBLEM — I50.33 ACUTE ON CHRONIC DIASTOLIC CONGESTIVE HEART FAILURE: Status: RESOLVED | Noted: 2020-11-21 | Resolved: 2020-11-22

## 2020-11-22 PROBLEM — R06.09 DOE (DYSPNEA ON EXERTION): Status: RESOLVED | Noted: 2020-11-20 | Resolved: 2020-11-22

## 2020-11-22 PROBLEM — R10.13 EPIGASTRIC PAIN: Status: RESOLVED | Noted: 2020-11-20 | Resolved: 2020-11-22

## 2020-11-22 LAB
ANION GAP SERPL CALC-SCNC: 12 MMOL/L (ref 8–16)
BASOPHILS # BLD AUTO: 0.03 K/UL (ref 0–0.2)
BASOPHILS NFR BLD: 1 % (ref 0–1.9)
BUN SERPL-MCNC: 20 MG/DL (ref 8–23)
CALCIUM SERPL-MCNC: 9 MG/DL (ref 8.7–10.5)
CHLORIDE SERPL-SCNC: 100 MMOL/L (ref 95–110)
CO2 SERPL-SCNC: 28 MMOL/L (ref 23–29)
CREAT SERPL-MCNC: 1.5 MG/DL (ref 0.5–1.4)
DIFFERENTIAL METHOD: ABNORMAL
EOSINOPHIL # BLD AUTO: 0 K/UL (ref 0–0.5)
EOSINOPHIL NFR BLD: 0.3 % (ref 0–8)
ERYTHROCYTE [DISTWIDTH] IN BLOOD BY AUTOMATED COUNT: 17.2 % (ref 11.5–14.5)
EST. GFR  (AFRICAN AMERICAN): 52 ML/MIN/1.73 M^2
EST. GFR  (NON AFRICAN AMERICAN): 45 ML/MIN/1.73 M^2
GLUCOSE SERPL-MCNC: 107 MG/DL (ref 70–110)
HCT VFR BLD AUTO: 31.7 % (ref 40–54)
HGB BLD-MCNC: 9.9 G/DL (ref 14–18)
IMM GRANULOCYTES # BLD AUTO: 0.02 K/UL (ref 0–0.04)
IMM GRANULOCYTES NFR BLD AUTO: 0.7 % (ref 0–0.5)
LYMPHOCYTES # BLD AUTO: 0.6 K/UL (ref 1–4.8)
LYMPHOCYTES NFR BLD: 19.8 % (ref 18–48)
MCH RBC QN AUTO: 33 PG (ref 27–31)
MCHC RBC AUTO-ENTMCNC: 31.2 G/DL (ref 32–36)
MCV RBC AUTO: 106 FL (ref 82–98)
MONOCYTES # BLD AUTO: 0.3 K/UL (ref 0.3–1)
MONOCYTES NFR BLD: 10.4 % (ref 4–15)
NEUTROPHILS # BLD AUTO: 2 K/UL (ref 1.8–7.7)
NEUTROPHILS NFR BLD: 67.8 % (ref 38–73)
NRBC BLD-RTO: 0 /100 WBC
PLATELET # BLD AUTO: 195 K/UL (ref 150–350)
PMV BLD AUTO: 11.2 FL (ref 9.2–12.9)
POTASSIUM SERPL-SCNC: 3.5 MMOL/L (ref 3.5–5.1)
RBC # BLD AUTO: 3 M/UL (ref 4.6–6.2)
SODIUM SERPL-SCNC: 140 MMOL/L (ref 136–145)
WBC # BLD AUTO: 2.88 K/UL (ref 3.9–12.7)

## 2020-11-22 PROCEDURE — 25000003 PHARM REV CODE 250: Performed by: NURSE PRACTITIONER

## 2020-11-22 PROCEDURE — 85025 COMPLETE CBC W/AUTO DIFF WBC: CPT

## 2020-11-22 PROCEDURE — 99224 PR SUBSEQUENT OBSERVATION CARE,LEVEL I: ICD-10-PCS | Mod: 25,,, | Performed by: INTERNAL MEDICINE

## 2020-11-22 PROCEDURE — G0378 HOSPITAL OBSERVATION PER HR: HCPCS

## 2020-11-22 PROCEDURE — 63600175 PHARM REV CODE 636 W HCPCS: Performed by: NURSE PRACTITIONER

## 2020-11-22 PROCEDURE — 80048 BASIC METABOLIC PNL TOTAL CA: CPT

## 2020-11-22 PROCEDURE — 25000003 PHARM REV CODE 250: Performed by: PHYSICIAN ASSISTANT

## 2020-11-22 PROCEDURE — 99224 PR SUBSEQUENT OBSERVATION CARE,LEVEL I: CPT | Mod: 25,,, | Performed by: INTERNAL MEDICINE

## 2020-11-22 PROCEDURE — 36415 COLL VENOUS BLD VENIPUNCTURE: CPT

## 2020-11-22 PROCEDURE — 99900035 HC TECH TIME PER 15 MIN (STAT)

## 2020-11-22 PROCEDURE — 96376 TX/PRO/DX INJ SAME DRUG ADON: CPT

## 2020-11-22 RX ORDER — AMOXICILLIN 250 MG
1 CAPSULE ORAL DAILY PRN
Qty: 30 TABLET | Refills: 1 | Status: SHIPPED | OUTPATIENT
Start: 2020-11-22 | End: 2021-08-26

## 2020-11-22 RX ORDER — SYRING-NEEDL,DISP,INSUL,0.3 ML 29 G X1/2"
296 SYRINGE, EMPTY DISPOSABLE MISCELLANEOUS ONCE
Status: COMPLETED | OUTPATIENT
Start: 2020-11-22 | End: 2020-11-22

## 2020-11-22 RX ORDER — POLYETHYLENE GLYCOL 3350 17 G/17G
17 POWDER, FOR SOLUTION ORAL DAILY
Qty: 100 EACH | Refills: 1 | Status: SHIPPED | OUTPATIENT
Start: 2020-11-23

## 2020-11-22 RX ORDER — ONDANSETRON 2 MG/ML
4 INJECTION INTRAMUSCULAR; INTRAVENOUS EVERY 6 HOURS PRN
Status: DISCONTINUED | OUTPATIENT
Start: 2020-11-22 | End: 2020-11-22 | Stop reason: HOSPADM

## 2020-11-22 RX ORDER — ISOSORBIDE MONONITRATE 60 MG/1
60 TABLET, EXTENDED RELEASE ORAL 2 TIMES DAILY
Qty: 60 TABLET | Refills: 3 | Status: SHIPPED | OUTPATIENT
Start: 2020-11-22 | End: 2020-12-23 | Stop reason: SDUPTHER

## 2020-11-22 RX ADMIN — POLYETHYLENE GLYCOL 3350 17 G: 17 POWDER, FOR SOLUTION ORAL at 10:11

## 2020-11-22 RX ADMIN — PANTOPRAZOLE SODIUM 40 MG: 40 TABLET, DELAYED RELEASE ORAL at 10:11

## 2020-11-22 RX ADMIN — RANOLAZINE 1000 MG: 500 TABLET, FILM COATED, EXTENDED RELEASE ORAL at 10:11

## 2020-11-22 RX ADMIN — OXYCODONE HYDROCHLORIDE 20 MG: 10 TABLET, FILM COATED, EXTENDED RELEASE ORAL at 10:11

## 2020-11-22 RX ADMIN — ONDANSETRON 4 MG: 2 INJECTION INTRAMUSCULAR; INTRAVENOUS at 09:11

## 2020-11-22 RX ADMIN — FUROSEMIDE 40 MG: 10 INJECTION, SOLUTION INTRAMUSCULAR; INTRAVENOUS at 10:11

## 2020-11-22 RX ADMIN — MAGNESIUM CITRATE 296 ML: 1.75 LIQUID ORAL at 10:11

## 2020-11-22 RX ADMIN — GUAIFENESIN AND DEXTROMETHORPHAN HYDROBROMIDE 1 TABLET: 600; 30 TABLET, EXTENDED RELEASE ORAL at 10:11

## 2020-11-22 RX ADMIN — TICAGRELOR 90 MG: 90 TABLET ORAL at 10:11

## 2020-11-22 NOTE — PROGRESS NOTES
[facility] Hospital Medicine  Progress Note    Patient Name: Familia Durbin Jr.  MRN: [id number]  Patient Class: OP- Observation   Admission Date: 11/20/2020  Length of Stay: 0 days  Attending Physician: Manjit Abbott MD  Primary Care Provider: Andrea Elkins MD        Subjective:     Principal Problem:Epigastric pain        HPI:  Familia Durbin Jr. is a 75 y.o. AAM with a PMHx of CAD, DM type 2, HLD, HTN, Lupus, Multiple myeloma with mets to the bone, old MI, Renal insufficiency, who presented to the Emergency Department today for evaluation of epigastric abdominal pain, which onset 6 days ago.  Patient states that he has had intermittent epigastric/CP, dyspnea on exertion, and orthopnea for the past 2 days.  Symptoms are constant and moderate in severity. No mitigating or exacerbating factors reported.  Associated sxs include nausea, emesis, intermittent cough, orthopnea, trace BLE edema, and drug induced constipation.  Patient denies any fever, chills, fatigue, wheezing, palpitations, blood in stool, dysuria, hematuria, flank pain, light-headedness, HA, and all other sxs at this time.  No prior Tx reported.  No further complaints or concerns at this time.  ER workup showed; elevated BP, otherwise stable VS.  CBC showed Hgb 10.0, Hct 30.9, otherwise unremarkable.  CMP showed , otherwise unremarkable.  BNP 2095.  Initial troponin 0.059.  ECHO on 11/3 showed EF of 55%, associated with mild-to-moderate aortic regurgitation and stenosis, with mild pulmonic and mitral regurgitation.  CXR consistent with FVO.  Patient was given Lasix 40 mg IVP in the ER and Hospital Medicine was contacted for admission.  Patient will be placed in observation.  Per his daughter Alejandrina who is his SDM ([phone]), patient is a DNR.    Overview/Hospital Course:  11/21/20 The patient reports improvement in epigastric pain after having a BM. Will continue with laxative and encourage further BM. Cardiology consulted  for ischemic work up. Hem/onc following and recommends to hold PO chemo until CHF is improved.     Interval History: The patient reports improvement in epigastric pain after having a BM. Will continue with laxative and encourage further BM. Cardiology consulted for ischemic work up. Hem/onc following and recommends to hold PO chemo until CHF is improved.     Review of Systems   Constitutional: Positive for activity change. Negative for appetite change, chills, diaphoresis, fatigue, fever and unexpected weight change.   HENT: Negative for congestion, facial swelling, hearing loss, mouth sores, rhinorrhea, sinus pressure, sneezing, sore throat, tinnitus and trouble swallowing.    Eyes: Negative for pain, discharge, redness and visual disturbance.   Respiratory: Positive for chest tightness and shortness of breath. Negative for apnea, cough, choking, wheezing and stridor.         + ZAVALA and Orthopnea.   Cardiovascular: Positive for chest pain and leg swelling. Negative for palpitations.        C/o intermittent CP.   Gastrointestinal: Positive for abdominal pain. Negative for abdominal distention, anal bleeding, blood in stool, constipation, diarrhea, nausea, rectal pain and vomiting.        C/o Epigastric abdominal pain.   Endocrine: Negative for cold intolerance, heat intolerance, polydipsia, polyphagia and polyuria.   Genitourinary: Positive for decreased urine volume and difficulty urinating. Negative for discharge, dysuria, flank pain, frequency, hematuria and urgency.   Musculoskeletal: Positive for arthralgias and back pain. Negative for gait problem, joint swelling, myalgias, neck pain and neck stiffness.   Skin: Negative for color change, pallor, rash and wound.   Allergic/Immunologic: Negative for food allergies.   Neurological: Negative for dizziness, tremors, seizures, syncope, facial asymmetry, speech difficulty, weakness, light-headedness, numbness and headaches.   Hematological: Negative for adenopathy.  Does not bruise/bleed easily.   Psychiatric/Behavioral: Negative for agitation, behavioral problems, confusion, hallucinations and suicidal ideas. The patient is not nervous/anxious.    All other systems reviewed and are negative.    Objective:     Vital Signs (Most Recent):  Temp: 96 °F (35.6 °C) (11/21/20 1644)  Pulse: 68 (11/21/20 1732)  Resp: 18 (11/21/20 1644)  BP: (!) 141/63 (11/21/20 1644)  SpO2: (!) 92 % (11/21/20 1644) Vital Signs (24h Range):  Temp:  [96 °F (35.6 °C)-97.8 °F (36.6 °C)] 96 °F (35.6 °C)  Pulse:  [] 68  Resp:  [16-20] 18  SpO2:  [92 %-98 %] 92 %  BP: (124-171)/(63-77) 141/63     Weight: 67.7 kg (149 lb 4 oz)  Body mass index is 21.42 kg/m².    Intake/Output Summary (Last 24 hours) at 11/21/2020 1822  Last data filed at 11/21/2020 1200  Gross per 24 hour   Intake --   Output 1140 ml   Net -1140 ml      Physical Exam  Vitals signs and nursing note reviewed.   Constitutional:       General: He is not in acute distress.     Appearance: He is well-developed. He is not diaphoretic.      Comments: Thin, AAM resting comfortably in bed in NAD with daughter at BS.   HENT:      Head: Normocephalic and atraumatic.   Eyes:      Conjunctiva/sclera: Conjunctivae normal.      Pupils: Pupils are equal, round, and reactive to light.   Neck:      Musculoskeletal: Normal range of motion and neck supple.      Vascular: No JVD.   Cardiovascular:      Rate and Rhythm: Normal rate and regular rhythm.      Heart sounds: Murmur present. No friction rub. No gallop.    Pulmonary:      Effort: Pulmonary effort is normal. No respiratory distress.      Breath sounds: No stridor. Rales present. No wheezing.      Comments: Faint crackles to bases.  Comfortable on RA.  Chest:      Chest wall: No tenderness.   Abdominal:      General: Bowel sounds are normal. There is no distension.      Palpations: Abdomen is soft. There is no mass.      Tenderness: There is no abdominal tenderness. There is no guarding or rebound.    Musculoskeletal: Normal range of motion.         General: No tenderness or deformity.   Skin:     General: Skin is warm and dry.      Findings: No erythema or rash.   Neurological:      Mental Status: He is alert and oriented to person, place, and time.      Cranial Nerves: No cranial nerve deficit.   Psychiatric:         Behavior: Behavior normal.         Thought Content: Thought content normal.         Judgment: Judgment normal.         Significant Labs: All pertinent labs within the past 24 hours have been reviewed.    Significant Imaging:   Imaging Results          X-Ray Abdomen Flat And Erect (Final result)  Result time 11/20/20 12:11:59    Final result by Tashi Nichole MD (11/20/20 12:11:59)                 Impression:      Nonobstructive bowel gas pattern.  Bilateral small pleural effusions.  Dense consolidation with air bronchograms/bronchiectasis retrocardiac left lower lobe.      Electronically signed by: Tashi Nichole MD  Date:    11/20/2020  Time:    12:11             Narrative:    EXAMINATION:  XR ABDOMEN FLAT AND ERECT    CLINICAL HISTORY:  Abdominal pain;    FINDINGS:  Comparison study 10/26/2020.  Again, retrocardiac left lower lobe airspace consolidation with bronchiectasis/air bronchograms.  Involvement of pleural effusions with bilateral costophrenic angle blunting, left greater than right.  Normal size heart status post CABG with coronary calcification/stent.    There is no intraperitoneal free air.  Nonobstructive bowel gas pattern.  There is moderate stool distending the left colon.  Overall, the amount of stool has improved compared the prior study.    Pelvic phleboliths.    Degenerative thoracolumbar spine.  Bones are osteopenic.  Status post vertebroplasty L4.                               X-Ray Chest AP Portable (Final result)  Result time 11/20/20 10:23:47    Final result by Tashi Nichole MD (11/20/20 10:23:47)                 Impression:      Abnormal chest x-ray.  See  above.      Electronically signed by: Tashi Nichole MD  Date:    11/20/2020  Time:    10:23             Narrative:    EXAMINATION:  XR CHEST AP PORTABLE    CLINICAL HISTORY:  epigastric pain;    FINDINGS:  Comparison study 11/03/2020.  Normal size heart status post CABG with coronary artery calcifications/stent.  Aortic atherosclerosis.    The pulmonary vasculature is congested with interval development of generalized perihilar in lower lung haziness, question pulmonary edema.    There is blunting of bilateral costophrenic angles, new since the prior study, characteristic of bilateral pleural effusions small pleural effusions.    Again, there is dense consolidation in the retrocardiac left lower lung with associated bronchiectasis.  No pneumothorax.                                    Assessment/Plan:      * Epigastric pain  - Place in OBS  - KUB pending  - given h/o CAD with old MI, will need to r/o cardiac origin  - Cardiology consult pending (see below)  - PRN analgesics/antiemetics  - Keep NPO for now  - Monitor      Neoplasm related pain  - Analgesia PRN      Elevated troponin  - Associated with epigastric pain  - Initial troponin 0.059, will trend  - Given ASA at home PTA  - Continue home ASA, Brilinta, Imdur, Metoprolol, Statin, and Ranexa  - Cardiology consult pending  - NPO for now pending cardiology recs    ZAVALA (dyspnea on exertion)  - No known h/o CHF  - BNP and CXR consistent with FVO  - Covid negative   - Recently completed a course of ABX for PNA/bronchiectasis  - ECHO on 11/3 showed EF of 55%, associated with mild-to-moderate aortic regurgitation and stenosis, with mild pulmonic and mitral regurgitation.    - CXR consistent with FVO  - Given Lasix 40 mg IVP in the ER, continue with daily dosing  - Continue home Metoprolol  - Cardiology consult pending  - NPO for now pending cardiology recs  - Strict I and O  - Daily weights  - Supplemental O2 prn  - Monitor         Drug-induced constipation  - Daughter  "reports home Lactulose, Docusate, and Senokot are "not working"  - Palliative Care consulted to assist      Metastatic bone cancer  - Palliative Care consult pending for better pain management      Multiple myeloma  - Followed outpatient by Dr. Solo  - Per patient's wishes he is a DNR  - Hold home Pomalidomide for now and resume if ok with Hemoc      Coronary artery disease of native artery of native heart with stable angina pectoris  - Followed outpatient by Dr. Ortega with [facility]  - See plan as per above      GERD (gastroesophageal reflux disease)  - Continue PPI      Hypertension  - BP under fair control  - Continue home meds  - Monitor and adjust as needed      Hyperlipidemia  - Continue home Statin      Type 2 diabetes mellitus with diabetic nephropathy  - Recent A1c 6.4  - Accu checks ACHS with SSI PRN  - Resume home long once tolerating po  - ADA diet  - Monitor        VTE Risk Mitigation (From admission, onward)         Ordered     enoxaparin injection 40 mg  Every 24 hours      11/20/20 1241     IP VTE HIGH RISK PATIENT  Once      11/20/20 1136     Place sequential compression device  Until discontinued      11/20/20 1136                Discharge Planning   CECELIA:      Code Status: DNR   Is the patient medically ready for discharge?:     Reason for patient still in hospital (select all that apply): Patient trending condition, Laboratory test, Treatment and Consult recommendations                     Nando Garvin NP  Department of Hospital Medicine   [facility]  "

## 2020-11-22 NOTE — DISCHARGE SUMMARY
[facility] Hospital Medicine  Discharge Summary      Patient Name: Familia Durbin Jr.  MRN: [id number]  Admission Date: 11/20/2020  Hospital Length of Stay: 0 days  Discharge Date and Time:  11/22/2020 5:42 PM  Attending Physician: No att. providers found   Discharging Provider: Nando Garvin NP  Primary Care Provider: Andrea Elkins MD      HPI:   Familia Durbin Jr. is a 75 y.o. AAM with a PMHx of CAD, DM type 2, HLD, HTN, Lupus, Multiple myeloma with mets to the bone, old MI, Renal insufficiency, who presented to the Emergency Department today for evaluation of epigastric abdominal pain, which onset 6 days ago.  Patient states that he has had intermittent epigastric/CP, dyspnea on exertion, and orthopnea for the past 2 days.  Symptoms are constant and moderate in severity. No mitigating or exacerbating factors reported.  Associated sxs include nausea, emesis, intermittent cough, orthopnea, trace BLE edema, and drug induced constipation.  Patient denies any fever, chills, fatigue, wheezing, palpitations, blood in stool, dysuria, hematuria, flank pain, light-headedness, HA, and all other sxs at this time.  No prior Tx reported.  No further complaints or concerns at this time.  ER workup showed; elevated BP, otherwise stable VS.  CBC showed Hgb 10.0, Hct 30.9, otherwise unremarkable.  CMP showed , otherwise unremarkable.  BNP 2095.  Initial troponin 0.059.  ECHO on 11/3 showed EF of 55%, associated with mild-to-moderate aortic regurgitation and stenosis, with mild pulmonic and mitral regurgitation.  CXR consistent with FVO.  Patient was given Lasix 40 mg IVP in the ER and Hospital Medicine was contacted for admission.  Patient will be placed in observation.  Per his daughter Alejandrina who is his SDM ([phone]), patient is a DNR.    * No surgery found *      Hospital Course:   11/21/20 The patient reports improvement in epigastric pain after having a BM. Will continue with laxative and  encourage further BM. Cardiology consulted for ischemic work up. Hem/onc following and recommends to hold PO chemo until CHF is improved. 11/22/20 No acute issues overnight. The patient reports improvement in symptoms. Cardiology originally planned for a NM stress test today but the patient had coffee this morning. The case was discussed with cardiology today who felt the patient was stable from their standpoint and could complete the NM stress as an outpatient. The patient was seen and examined today and deemed stable for discharge. Will add Imdur 60 mg BID at discharge per Cardiology recommendations. The patient will follow up with his PCP, Cardiology and with Hem/onc.      Consults:   Consults (From admission, onward)        Status Ordering Provider     Inpatient consult to Cardiology  Once     Provider:  (Not yet assigned)    Completed SHAISTA RAGLAND     Inpatient consult to Palliative Care  Once     Provider:  (Not yet assigned)    Completed SHAISTA RAGLAND          No new Assessment & Plan notes have been filed under this hospital service since the last note was generated.  Service: Hospital Medicine    Final Active Diagnoses:    Diagnosis Date Noted POA    Elevated troponin [R77.8] 11/20/2020 Unknown    Encounter for palliative care [Z51.5] 11/20/2020 Not Applicable    Neoplasm related pain [G89.3] 11/20/2020 Yes    Drug-induced constipation [K59.03] 04/17/2020 Yes    Multiple myeloma [C90.00] 07/21/2016 Yes    Metastatic bone cancer [C41.9] 07/21/2016 Yes    Coronary artery disease of native artery of native heart with stable angina pectoris [I25.118] 03/01/2016 Yes     Chronic    GERD (gastroesophageal reflux disease) [K21.9] 10/31/2014 Yes     Chronic    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 Schedule an appointment as soon as possible for a visit in 3 days.    Specialty: Family Medicine  Contact information:  [address]  [place] [address] [phone]             Max Tristan MD. Schedule an appointment as soon as possible for a visit in 3 days.    Specialties: Cardiology, Cardiovascular Disease  Contact information:  [address] [facility] [address] [phone]                 Patient Instructions:   No discharge procedures on file.    Significant Diagnostic Studies:   Imaging Results          X-Ray Abdomen Flat And Erect (Final result)  Result time 11/20/20 12:11:59    Final result by Tashi Nichole MD (11/20/20 12:11:59)                 Impression:      Nonobstructive bowel gas pattern.  Bilateral small pleural effusions.  Dense consolidation with air bronchograms/bronchiectasis retrocardiac left lower lobe.      Electronically signed by: Tashi Nichole MD  Date:    11/20/2020  Time:    12:11             Narrative:    EXAMINATION:  XR ABDOMEN FLAT AND ERECT    CLINICAL HISTORY:  Abdominal pain;    FINDINGS:  Comparison study 10/26/2020.  Again, retrocardiac left lower lobe airspace consolidation with bronchiectasis/air bronchograms.  Involvement of pleural effusions with bilateral costophrenic angle blunting, left greater than right.  Normal size heart status post CABG with coronary calcification/stent.    There is no intraperitoneal free air.  Nonobstructive bowel gas pattern.  There is moderate stool distending the left colon.  Overall, the amount of stool has improved compared the prior  study.    Pelvic phleboliths.    Degenerative thoracolumbar spine.  Bones are osteopenic.  Status post vertebroplasty L4.                               X-Ray Chest AP Portable (Final result)  Result time 11/20/20 10:23:47    Final result by Tashi Nichole MD (11/20/20 10:23:47)                 Impression:      Abnormal chest x-ray.  See above.      Electronically signed by: Tashi Nichole MD  Date:    11/20/2020  Time:    10:23             Narrative:    EXAMINATION:  XR CHEST AP PORTABLE    CLINICAL HISTORY:  epigastric pain;    FINDINGS:  Comparison study 11/03/2020.  Normal size heart status post CABG with coronary artery calcifications/stent.  Aortic atherosclerosis.    The pulmonary vasculature is congested with interval development of generalized perihilar in lower lung haziness, question pulmonary edema.    There is blunting of bilateral costophrenic angles, new since the prior study, characteristic of bilateral pleural effusions small pleural effusions.    Again, there is dense consolidation in the retrocardiac left lower lung with associated bronchiectasis.  No pneumothorax.                                  Pending Diagnostic Studies:     None         Medications:  Reconciled Home Medications:      Medication List      START taking these medications    polyethylene glycol 17 gram Pwpk  Commonly known as: GLYCOLAX  Take 17 g by mouth once daily.  Start taking on: November 23, 2020     senna-docusate 8.6-50 mg 8.6-50 mg per tablet  Commonly known as: SENNA WITH DOCUSATE SODIUM  Take 1 tablet by mouth daily as needed for Constipation.        CHANGE how you take these medications    isosorbide mononitrate 60 MG 24 hr tablet  Commonly known as: IMDUR  Take 1 tablet (60 mg total) by mouth 2 (two) times a day.  What changed:   · medication strength  · how much to take  · when to take this     * lactulose 10 gram/15 mL solution  Commonly known as: CHRONULAC  Take 30 mL by mouth twice daily as needed for  "constipation.  What changed: Another medication with the same name was changed. Make sure you understand how and when to take each.     * KRISTALOSE 20 gram Pack  Generic drug: lactulose  Mix and take 1 packet (20 g total) by mouth 3 (three) times daily.  What changed:   · when to take this  · reasons to take this         * This list has 2 medication(s) that are the same as other medications prescribed for you. Read the directions carefully, and ask your doctor or other care provider to review them with you.            CONTINUE taking these medications    albuterol-ipratropium 2.5 mg-0.5 mg/3 mL nebulizer solution  Commonly known as: DUO-NEB  Take 3 mLs by nebulization every 8 (eight) hours. For shortness of breath and wheezing     aspirin 81 MG Chew  Take 1 tablet (81 mg total) by mouth once daily.     BD ULTRA-FINE DAVE PEN NEEDLE 32 gauge x 5/32" Ndle  Generic drug: pen needle, diabetic  USE  4 TIMES DAILY WITH MEALS AND AT BEDTIME     benzonatate 100 MG capsule  Commonly known as: TESSALON PERLES  Take 1 capsule (100 mg total) by mouth every 6 (six) hours as needed for Cough.     blood-glucose meter kit  Use as instructed     BRILINTA 90 mg tablet  Generic drug: ticagrelor  Take 1 tablet by mouth once daily.     cholecalciferol (vitamin D3) 125 mcg (5,000 unit) Tab  Take 5,000 Units by mouth once daily.     DOCOSAHEXANOIC ACID ORAL  Take 1 capsule by mouth once daily.     docusate sodium 100 MG capsule  Commonly known as: COLACE  Take 100 mg by mouth 2 (two) times daily.     flaxseed oil 1,000 mg Cap  Take 1 capsule by mouth daily as needed.     ginger (Zingiber officinalis) 550 mg Cap  Take 1 capsule by mouth.     insulin aspart U-100 100 unit/mL (3 mL) Inpn pen  Commonly known as: NovoLOG  Inject 15 Units into the skin 3 (three) times daily with meals.     LANTUS SOLOSTAR U-100 INSULIN glargine 100 units/mL (3mL) SubQ pen  Generic drug: insulin  Inject 10 Units into the skin every evening. INJECT 15 UNITS " SUBCUTANEOUSLY ONCE DAILY     lisinopriL 2.5 MG tablet  Commonly known as: PRINIVIL,ZESTRIL  Take 2.5 mg by mouth once daily.     metoprolol tartrate 50 MG tablet  Commonly known as: LOPRESSOR  Take 1 tablet by mouth twice daily     multivit-min-FA-lycopen-lutein 300-600-300 mcg Tab  Take 1 tablet by mouth.     NITROSTAT 0.4 MG SL tablet  Generic drug: nitroGLYCERIN     omega 3-dha-epa-fish oil 300-1,000 mg Cap  Take by mouth.     ondansetron 4 MG tablet  Commonly known as: ZOFRAN  Take 1 tablet (4 mg total) by mouth every 8 (eight) hours as needed for Nausea.     ondansetron 4 MG Tbdl  Commonly known as: ZOFRAN-ODT  Dissolve 1 tab under the tongue every 4-6 hours as needed for nausea.     * OxyCONTIN 20 mg 12 hr tablet  Generic drug: oxyCODONE  Take 1 tablet (20 mg total) by mouth every 12 (twelve) hours.     * oxyCODONE 10 mg Tab immediate release tablet  Commonly known as: ROXICODONE  Take 1 tablet (10 mg total) by mouth every 6 (six) hours as needed for Pain.     pantoprazole 40 MG tablet  Commonly known as: PROTONIX  Take 1 tablet by mouth daily     pomalidomide 3 mg Cap  Take 3 mg by mouth 3 mg daily x 21 days, then off for 7 days, then repeat..     prochlorperazine 5 MG tablet  Commonly known as: COMPAZINE  Take 1 tablet (5 mg total) by mouth 4 (four) times daily as needed for Nausea.     ranolazine 1,000 mg Tb12  Commonly known as: RANEXA  Take 1,000 mg by mouth 2 (two) times daily.     rosuvastatin 40 MG Tab  Commonly known as: CRESTOR  Take 1 tablet (40 mg total) by mouth every evening.     SENNA LAXATIVE 8.6 mg tablet  Generic drug: senna  Take 1 tablet by mouth daily     triamcinolone acetonide 0.1% 0.1 % cream  Commonly known as: KENALOG  Apply topically 2 (two) times daily. For two weeks     TRUE METRIX GLUCOSE TEST STRIP Strp  Generic drug: blood sugar diagnostic  USE  STRIP TO CHECK GLUCOSE SIX TIMES DAILY     TRUEPLUS LANCETS 33 gauge Misc  Generic drug: lancets  USE   TO CHECK GLUCOSE SIX TIMES  DAILY     VITAMIN B COMPLEX ORAL  Take 1 capsule by mouth.         * This list has 2 medication(s) that are the same as other medications prescribed for you. Read the directions carefully, and ask your doctor or other care provider to review them with you.                Indwelling Lines/Drains at time of discharge:   Lines/Drains/Airways     None                 Time spent on the discharge of patient: > 35 minutes  Patient was seen and examined on the date of discharge and determined to be suitable for discharge.         Nando Garvin NP  Department of Hospital Medicine  [facility]

## 2020-11-22 NOTE — SUBJECTIVE & OBJECTIVE
Review of Systems   Constitution: Positive for malaise/fatigue. Negative for decreased appetite, diaphoresis, fever and night sweats.   HENT: Negative for nosebleeds.    Eyes: Negative for blurred vision and double vision.   Cardiovascular: Positive for chest pain and dyspnea on exertion. Negative for claudication, irregular heartbeat, leg swelling, near-syncope, orthopnea, palpitations, paroxysmal nocturnal dyspnea and syncope.   Respiratory: Positive for shortness of breath. Negative for cough, sleep disturbances due to breathing, snoring, sputum production and wheezing.    Endocrine: Negative for cold intolerance and polyuria.   Hematologic/Lymphatic: Does not bruise/bleed easily.   Skin: Negative for rash.   Musculoskeletal: Negative for back pain, falls, joint pain, joint swelling and neck pain.   Gastrointestinal: Positive for abdominal pain. Negative for heartburn, nausea and vomiting.   Genitourinary: Negative for dysuria, frequency and hematuria.   Neurological: Negative for difficulty with concentration, dizziness, focal weakness, headaches, light-headedness, numbness, seizures and weakness.   Psychiatric/Behavioral: Negative for depression, memory loss and substance abuse. The patient does not have insomnia.    Allergic/Immunologic: Negative for HIV exposure and hives.     Objective:     Vital Signs (Most Recent):  Temp: 97.2 °F (36.2 °C) (11/22/20 0726)  Pulse: 68 (11/22/20 0824)  Resp: 20 (11/22/20 1013)  BP: (!) 119/58 (11/22/20 0824)  SpO2: 97 % (11/22/20 0824) Vital Signs (24h Range):  Temp:  [96 °F (35.6 °C)-97.9 °F (36.6 °C)] 97.2 °F (36.2 °C)  Pulse:  [58-79] 68  Resp:  [18-20] 20  SpO2:  [92 %-97 %] 97 %  BP: (119-189)/(58-77) 119/58     Weight: 67.7 kg (149 lb 4 oz)  Body mass index is 21.42 kg/m².     SpO2: 97 %  O2 Device (Oxygen Therapy): room air      Intake/Output Summary (Last 24 hours) at 11/22/2020 1336  Last data filed at 11/22/2020 1200  Gross per 24 hour   Intake 510 ml   Output  900 ml   Net -390 ml       Lines/Drains/Airways     Peripheral Intravenous Line                 Peripheral IV - Single Lumen 11/20/20 1041 20 G Right Antecubital 2 days                Physical Exam   Constitutional: He is oriented to person, place, and time. He appears well-nourished.   HENT:   Head: Normocephalic.   Eyes: Pupils are equal, round, and reactive to light.   Neck: Normal carotid pulses and no JVD present. Carotid bruit is not present. No thyromegaly present.   Cardiovascular: Normal rate, regular rhythm and normal pulses.  No extrasystoles are present. PMI is not displaced. Exam reveals no gallop and no S3.   Murmur (SM on bases and apex) heard.  Pulmonary/Chest: Breath sounds normal. No stridor. No respiratory distress.   Abdominal: Soft. Bowel sounds are normal. There is no abdominal tenderness. There is no rebound.   Musculoskeletal: Normal range of motion.   Neurological: He is alert and oriented to person, place, and time.   Skin: Skin is intact. No rash noted.   Psychiatric: His behavior is normal.       Significant Labs:   ABG: No results for input(s): PH, PCO2, HCO3, POCSATURATED, BE in the last 48 hours., Blood Culture: No results for input(s): LABBLOO in the last 48 hours., BMP:   Recent Labs   Lab 11/21/20  0344 11/22/20  0808    107    140   K 3.8 3.5    100   CO2 28 28   BUN 20 20   CREATININE 1.4 1.5*   CALCIUM 8.8 9.0   , CMP   Recent Labs   Lab 11/21/20  0344 11/22/20  0808    140   K 3.8 3.5    100   CO2 28 28    107   BUN 20 20   CREATININE 1.4 1.5*   CALCIUM 8.8 9.0   ANIONGAP 10 12   ESTGFRAFRICA 56* 52*   EGFRNONAA 48* 45*   , CBC   Recent Labs   Lab 11/21/20  0344 11/22/20  0808   WBC 3.69* 2.88*   HGB 9.6* 9.9*   HCT 29.9* 31.7*    195   , INR No results for input(s): INR, PROTIME in the last 48 hours., Lipid Panel No results for input(s): CHOL, HDL, LDLCALC, TRIG, CHOLHDL in the last 48 hours. and Troponin   Recent Labs   Lab  11/20/20  1606 11/20/20  2142 11/21/20  0344   TROPONINI 0.052* 0.058* 0.048*       Significant Imaging: EKG:

## 2020-11-22 NOTE — HOSPITAL COURSE
Familia Durbin Jr. is a 75 y.o. AAM with a PMHx of CAD, DM type 2, HLD, HTN, Lupus, Multiple myeloma with mets to the bone, old MI, Renal insufficiency, who presented to the Emergency Department today for evaluation of epigastric abdominal pain, which onset 6 days ago.  Patient states that he has had intermittent epigastric/CP, dyspnea on exertion, and orthopnea for the past 2 days.    S/l LHC done in 2018 at [facility], showed significant native three-vessel coronary artery disease as described above.2. Patent LIMA-LAD and SVG-RCA.    ER workup showed; elevated BP, otherwise stable VS.  CBC showed Hgb 10.0, Hct 30.9, otherwise unremarkable.  CMP showed , otherwise unremarkable.  BNP 2095.  Initial troponin 0.059.  ECHO on 11/3 showed EF of 55%, associated with mild-to-moderate aortic regurgitation and stenosis, with mild pulmonic and mitral regurgitation.  CXR consistent with FVO.  Patient was given Lasix 40 mg IVP in the ER and Hospital Medicine was contacted for admission.  Patient will be placed in observation    Cardiology consult for elevated troponin and epigastric pain.  PMH CAD s/o cabg x2 in 2002 and few PCIs after cabg  The pain started 1 week ago and intermittently at rest. Similar to the pain when he had heart attack  Troponin slight elevation and flat  ekg NSR and nonspecific ctt change  Echo   · There is left ventricular concentric hypertrophy.  · The left ventricle is normal in size with normal systolic function. The estimated ejection fraction is 55%.  · Indeterminate diastolic function.  · With low normal right ventricular systolic function.  · Mild-to-moderate aortic regurgitation.  · Mild-to-moderate aortic valve stenosis.  · Aortic valve area is 1.54 cm2; peak velocity is 2.17 m/s; mean gradient is 11 mmHg.  · Mild pulmonic regurgitation.  · The mitral valve is mildly sclerotic.  · Normal central venous pressure (3 mmHg).  · Mild mitral regurgitation.     11/21/2020 chest pain  epigastric pain improved, good uop after lasix added, troponin mild elevation and flat. BNP 2000 and CTX pulm edema    11/22/202 chest /epigastric pain resolved. VSS. Improved ZAVALA. NUKE stress test canceled due to having coffee in AM

## 2020-11-22 NOTE — ASSESSMENT & PLAN NOTE
Atypical CP and mild elevation of troponin    -will discuss with HM for the palliative care issue  -Continue conservative Rx now  -continue ASA Brilinta, BB acei statin and Ranexa  -increase Imdur to 60 mg bid  -DASH    11/21/2020   Chest pain improved  continue ASA Brilinta, BB acei statin and Ranexa,/Imdur to 60 mg bid  Continue Lasix   NUKE in AM

## 2020-11-22 NOTE — SUBJECTIVE & OBJECTIVE
Review of Systems   Constitution: Positive for malaise/fatigue. Negative for decreased appetite, diaphoresis, fever and night sweats.   HENT: Negative for nosebleeds.    Eyes: Negative for blurred vision and double vision.   Cardiovascular: Positive for chest pain and dyspnea on exertion. Negative for claudication, irregular heartbeat, leg swelling, near-syncope, orthopnea, palpitations, paroxysmal nocturnal dyspnea and syncope.   Respiratory: Positive for shortness of breath. Negative for cough, sleep disturbances due to breathing, snoring, sputum production and wheezing.    Endocrine: Negative for cold intolerance and polyuria.   Hematologic/Lymphatic: Does not bruise/bleed easily.   Skin: Negative for rash.   Musculoskeletal: Negative for back pain, falls, joint pain, joint swelling and neck pain.   Gastrointestinal: Positive for abdominal pain. Negative for heartburn, nausea and vomiting.   Genitourinary: Negative for dysuria, frequency and hematuria.   Neurological: Negative for difficulty with concentration, dizziness, focal weakness, headaches, light-headedness, numbness, seizures and weakness.   Psychiatric/Behavioral: Negative for depression, memory loss and substance abuse. The patient does not have insomnia.    Allergic/Immunologic: Negative for HIV exposure and hives.     Objective:     Vital Signs (Most Recent):  Temp: 97.6 °F (36.4 °C) (11/21/20 1946)  Pulse: 67 (11/21/20 1946)  Resp: 18 (11/21/20 2039)  BP: 137/65 (11/21/20 1946)  SpO2: 95 % (11/21/20 1946) Vital Signs (24h Range):  Temp:  [96 °F (35.6 °C)-97.8 °F (36.6 °C)] 97.6 °F (36.4 °C)  Pulse:  [] 67  Resp:  [16-20] 18  SpO2:  [92 %-97 %] 95 %  BP: (124-171)/(63-77) 137/65     Weight: 67.7 kg (149 lb 4 oz)  Body mass index is 21.42 kg/m².     SpO2: 95 %  O2 Device (Oxygen Therapy): room air      Intake/Output Summary (Last 24 hours) at 11/21/2020 6143  Last data filed at 11/21/2020 1200  Gross per 24 hour   Intake --   Output 1140 ml    Net -1140 ml       Lines/Drains/Airways     Peripheral Intravenous Line                 Peripheral IV - Single Lumen 11/20/20 1041 20 G Right Antecubital 1 day                Physical Exam   Constitutional: He is oriented to person, place, and time. He appears well-nourished.   HENT:   Head: Normocephalic.   Eyes: Pupils are equal, round, and reactive to light.   Neck: Normal carotid pulses and no JVD present. Carotid bruit is not present. No thyromegaly present.   Cardiovascular: Normal rate, regular rhythm and normal pulses.  No extrasystoles are present. PMI is not displaced. Exam reveals no gallop and no S3.   Murmur (SM on bases and apex) heard.  Pulmonary/Chest: Breath sounds normal. No stridor. No respiratory distress.   Abdominal: Soft. Bowel sounds are normal. There is no abdominal tenderness. There is no rebound.   Musculoskeletal: Normal range of motion.   Neurological: He is alert and oriented to person, place, and time.   Skin: Skin is intact. No rash noted.   Psychiatric: His behavior is normal.       Significant Labs:   ABG: No results for input(s): PH, PCO2, HCO3, POCSATURATED, BE in the last 48 hours., Blood Culture: No results for input(s): LABBLOO in the last 48 hours., BMP:   Recent Labs   Lab 11/20/20  0950 11/21/20  0344   * 104    144   K 3.9 3.8    106   CO2 23 28   BUN 18 20   CREATININE 1.4 1.4   CALCIUM 8.4* 8.8   , CMP   Recent Labs   Lab 11/20/20  0950 11/21/20  0344    144   K 3.9 3.8    106   CO2 23 28   * 104   BUN 18 20   CREATININE 1.4 1.4   CALCIUM 8.4* 8.8   PROT 7.7  --    ALBUMIN 3.3*  --    BILITOT 1.0  --    ALKPHOS 68  --    AST 20  --    ALT 18  --    ANIONGAP 11 10   ESTGFRAFRICA 56* 56*   EGFRNONAA 48* 48*   , CBC   Recent Labs   Lab 11/20/20  0950 11/21/20  0344   WBC 4.26 3.69*   HGB 10.0* 9.6*   HCT 30.9* 29.9*    213   , INR No results for input(s): INR, PROTIME in the last 48 hours., Lipid Panel No results for input(s):  CHOL, HDL, LDLCALC, TRIG, CHOLHDL in the last 48 hours. and Troponin   Recent Labs   Lab 11/20/20  1606 11/20/20  2142 11/21/20  0344   TROPONINI 0.052* 0.058* 0.048*       Significant Imaging: EKG:

## 2020-11-22 NOTE — PROGRESS NOTES
[facility] -   Cardiology  Progress Note    Patient Name: Familia Durbin Jr.  MRN: [id number]  Admission Date: 11/20/2020  Hospital Length of Stay: 0 days  Code Status: DNR   Attending Physician: Manjit Abbott MD   Primary Care Physician: Andrea Elkins MD  Expected Discharge Date:   Principal Problem:Epigastric pain    Subjective:     Hospital Course:    Familia Durbin Jr. is a 75 y.o. AAM with a PMHx of CAD, DM type 2, HLD, HTN, Lupus, Multiple myeloma with mets to the bone, old MI, Renal insufficiency, who presented to the Emergency Department today for evaluation of epigastric abdominal pain, which onset 6 days ago.  Patient states that he has had intermittent epigastric/CP, dyspnea on exertion, and orthopnea for the past 2 days.    S/l LHC done in 2018 at [facility], showed significant native three-vessel coronary artery disease as described above.2. Patent LIMA-LAD and SVG-RCA.    ER workup showed; elevated BP, otherwise stable VS.  CBC showed Hgb 10.0, Hct 30.9, otherwise unremarkable.  CMP showed , otherwise unremarkable.  BNP 2095.  Initial troponin 0.059.  ECHO on 11/3 showed EF of 55%, associated with mild-to-moderate aortic regurgitation and stenosis, with mild pulmonic and mitral regurgitation.  CXR consistent with FVO.  Patient was given Lasix 40 mg IVP in the ER and Hospital Medicine was contacted for admission.  Patient will be placed in observation    Cardiology consult for elevated troponin and epigastric pain.  PMH CAD s/o cabg x2 in 2002 and few PCIs after cabg  The pain started 1 week ago and intermittently at rest. Similar to the pain when he had heart attack  Troponin slight elevation and flat  ekg NSR and nonspecific ctt change  Echo   · There is left ventricular concentric hypertrophy.  · The left ventricle is normal in size with normal systolic function. The estimated ejection fraction is 55%.  · Indeterminate diastolic function.  · With low normal right ventricular  systolic function.  · Mild-to-moderate aortic regurgitation.  · Mild-to-moderate aortic valve stenosis.  · Aortic valve area is 1.54 cm2; peak velocity is 2.17 m/s; mean gradient is 11 mmHg.  · Mild pulmonic regurgitation.  · The mitral valve is mildly sclerotic.  · Normal central venous pressure (3 mmHg).  · Mild mitral regurgitation.     11/21/2020 chest pain epigastric pain improved, good uop after lasix added, troponin mild elevation and flat. BNP 2000 and CTX pulm edema        Review of Systems   Constitution: Positive for malaise/fatigue. Negative for decreased appetite, diaphoresis, fever and night sweats.   HENT: Negative for nosebleeds.    Eyes: Negative for blurred vision and double vision.   Cardiovascular: Positive for chest pain and dyspnea on exertion. Negative for claudication, irregular heartbeat, leg swelling, near-syncope, orthopnea, palpitations, paroxysmal nocturnal dyspnea and syncope.   Respiratory: Positive for shortness of breath. Negative for cough, sleep disturbances due to breathing, snoring, sputum production and wheezing.    Endocrine: Negative for cold intolerance and polyuria.   Hematologic/Lymphatic: Does not bruise/bleed easily.   Skin: Negative for rash.   Musculoskeletal: Negative for back pain, falls, joint pain, joint swelling and neck pain.   Gastrointestinal: Positive for abdominal pain. Negative for heartburn, nausea and vomiting.   Genitourinary: Negative for dysuria, frequency and hematuria.   Neurological: Negative for difficulty with concentration, dizziness, focal weakness, headaches, light-headedness, numbness, seizures and weakness.   Psychiatric/Behavioral: Negative for depression, memory loss and substance abuse. The patient does not have insomnia.    Allergic/Immunologic: Negative for HIV exposure and hives.     Objective:     Vital Signs (Most Recent):  Temp: 97.6 °F (36.4 °C) (11/21/20 1946)  Pulse: 67 (11/21/20 1946)  Resp: 18 (11/21/20 2039)  BP: 137/65 (11/21/20  1946)  SpO2: 95 % (11/21/20 1946) Vital Signs (24h Range):  Temp:  [96 °F (35.6 °C)-97.8 °F (36.6 °C)] 97.6 °F (36.4 °C)  Pulse:  [] 67  Resp:  [16-20] 18  SpO2:  [92 %-97 %] 95 %  BP: (124-171)/(63-77) 137/65     Weight: 67.7 kg (149 lb 4 oz)  Body mass index is 21.42 kg/m².     SpO2: 95 %  O2 Device (Oxygen Therapy): room air      Intake/Output Summary (Last 24 hours) at 11/21/2020 2253  Last data filed at 11/21/2020 1200  Gross per 24 hour   Intake --   Output 1140 ml   Net -1140 ml       Lines/Drains/Airways     Peripheral Intravenous Line                 Peripheral IV - Single Lumen 11/20/20 1041 20 G Right Antecubital 1 day                Physical Exam   Constitutional: He is oriented to person, place, and time. He appears well-nourished.   HENT:   Head: Normocephalic.   Eyes: Pupils are equal, round, and reactive to light.   Neck: Normal carotid pulses and no JVD present. Carotid bruit is not present. No thyromegaly present.   Cardiovascular: Normal rate, regular rhythm and normal pulses.  No extrasystoles are present. PMI is not displaced. Exam reveals no gallop and no S3.   Murmur (SM on bases and apex) heard.  Pulmonary/Chest: Breath sounds normal. No stridor. No respiratory distress.   Abdominal: Soft. Bowel sounds are normal. There is no abdominal tenderness. There is no rebound.   Musculoskeletal: Normal range of motion.   Neurological: He is alert and oriented to person, place, and time.   Skin: Skin is intact. No rash noted.   Psychiatric: His behavior is normal.       Significant Labs:   ABG: No results for input(s): PH, PCO2, HCO3, POCSATURATED, BE in the last 48 hours., Blood Culture: No results for input(s): LABBLOO in the last 48 hours., BMP:   Recent Labs   Lab 11/20/20  0950 11/21/20  0344   * 104    144   K 3.9 3.8    106   CO2 23 28   BUN 18 20   CREATININE 1.4 1.4   CALCIUM 8.4* 8.8   , CMP   Recent Labs   Lab 11/20/20  0950 11/21/20  0344    144   K 3.9 3.8     106   CO2 23 28   * 104   BUN 18 20   CREATININE 1.4 1.4   CALCIUM 8.4* 8.8   PROT 7.7  --    ALBUMIN 3.3*  --    BILITOT 1.0  --    ALKPHOS 68  --    AST 20  --    ALT 18  --    ANIONGAP 11 10   ESTGFRAFRICA 56* 56*   EGFRNONAA 48* 48*   , CBC   Recent Labs   Lab 11/20/20  0950 11/21/20  0344   WBC 4.26 3.69*   HGB 10.0* 9.6*   HCT 30.9* 29.9*    213   , INR No results for input(s): INR, PROTIME in the last 48 hours., Lipid Panel No results for input(s): CHOL, HDL, LDLCALC, TRIG, CHOLHDL in the last 48 hours. and Troponin   Recent Labs   Lab 11/20/20  1606 11/20/20  2142 11/21/20  0344   TROPONINI 0.052* 0.058* 0.048*       Significant Imaging: EKG:      Assessment and Plan:         * Epigastric pain  Atypical CP and mild elevation of troponin    -will discuss with  for the palliative care issue  -Continue conservative Rx now  -continue ASA Brilinta, BB acei statin and Ranexa  -increase Imdur to 60 mg bid  -DASH    11/21/2020   Chest pain improved  continue ASA Brilinta, BB acei statin and Ranexa,/Imdur to 60 mg bid  Continue Lasix   NUKE in AM      Acute on chronic diastolic congestive heart failure  Continue lasix  DASH    Elevated troponin  See above note    Metastatic bone cancer  On palliative care    Coronary artery disease of native artery of native heart with stable angina pectoris  Cad s/p CABg    See above note        VTE Risk Mitigation (From admission, onward)         Ordered     enoxaparin injection 40 mg  Every 24 hours      11/20/20 1241     IP VTE HIGH RISK PATIENT  Once      11/20/20 1136     Place sequential compression device  Until discontinued      11/20/20 1136                Max Tristan MD  Cardiology  [facility]

## 2020-11-22 NOTE — SUBJECTIVE & OBJECTIVE
Interval History: The patient reports improvement in epigastric pain after having a BM. Will continue with laxative and encourage further BM. Cardiology consulted for ischemic work up. Hem/onc following and recommends to hold PO chemo until CHF is improved.     Review of Systems   Constitutional: Positive for activity change. Negative for appetite change, chills, diaphoresis, fatigue, fever and unexpected weight change.   HENT: Negative for congestion, facial swelling, hearing loss, mouth sores, rhinorrhea, sinus pressure, sneezing, sore throat, tinnitus and trouble swallowing.    Eyes: Negative for pain, discharge, redness and visual disturbance.   Respiratory: Positive for chest tightness and shortness of breath. Negative for apnea, cough, choking, wheezing and stridor.         + ZAVALA and Orthopnea.   Cardiovascular: Positive for chest pain and leg swelling. Negative for palpitations.        C/o intermittent CP.   Gastrointestinal: Positive for abdominal pain. Negative for abdominal distention, anal bleeding, blood in stool, constipation, diarrhea, nausea, rectal pain and vomiting.        C/o Epigastric abdominal pain.   Endocrine: Negative for cold intolerance, heat intolerance, polydipsia, polyphagia and polyuria.   Genitourinary: Positive for decreased urine volume and difficulty urinating. Negative for discharge, dysuria, flank pain, frequency, hematuria and urgency.   Musculoskeletal: Positive for arthralgias and back pain. Negative for gait problem, joint swelling, myalgias, neck pain and neck stiffness.   Skin: Negative for color change, pallor, rash and wound.   Allergic/Immunologic: Negative for food allergies.   Neurological: Negative for dizziness, tremors, seizures, syncope, facial asymmetry, speech difficulty, weakness, light-headedness, numbness and headaches.   Hematological: Negative for adenopathy. Does not bruise/bleed easily.   Psychiatric/Behavioral: Negative for agitation, behavioral problems,  confusion, hallucinations and suicidal ideas. The patient is not nervous/anxious.    All other systems reviewed and are negative.    Objective:     Vital Signs (Most Recent):  Temp: 96 °F (35.6 °C) (11/21/20 1644)  Pulse: 68 (11/21/20 1732)  Resp: 18 (11/21/20 1644)  BP: (!) 141/63 (11/21/20 1644)  SpO2: (!) 92 % (11/21/20 1644) Vital Signs (24h Range):  Temp:  [96 °F (35.6 °C)-97.8 °F (36.6 °C)] 96 °F (35.6 °C)  Pulse:  [] 68  Resp:  [16-20] 18  SpO2:  [92 %-98 %] 92 %  BP: (124-171)/(63-77) 141/63     Weight: 67.7 kg (149 lb 4 oz)  Body mass index is 21.42 kg/m².    Intake/Output Summary (Last 24 hours) at 11/21/2020 1822  Last data filed at 11/21/2020 1200  Gross per 24 hour   Intake --   Output 1140 ml   Net -1140 ml      Physical Exam  Vitals signs and nursing note reviewed.   Constitutional:       General: He is not in acute distress.     Appearance: He is well-developed. He is not diaphoretic.      Comments: Thin, AAM resting comfortably in bed in NAD with daughter at BS.   HENT:      Head: Normocephalic and atraumatic.   Eyes:      Conjunctiva/sclera: Conjunctivae normal.      Pupils: Pupils are equal, round, and reactive to light.   Neck:      Musculoskeletal: Normal range of motion and neck supple.      Vascular: No JVD.   Cardiovascular:      Rate and Rhythm: Normal rate and regular rhythm.      Heart sounds: Murmur present. No friction rub. No gallop.    Pulmonary:      Effort: Pulmonary effort is normal. No respiratory distress.      Breath sounds: No stridor. Rales present. No wheezing.      Comments: Faint crackles to bases.  Comfortable on RA.  Chest:      Chest wall: No tenderness.   Abdominal:      General: Bowel sounds are normal. There is no distension.      Palpations: Abdomen is soft. There is no mass.      Tenderness: There is no abdominal tenderness. There is no guarding or rebound.   Musculoskeletal: Normal range of motion.         General: No tenderness or deformity.   Skin:      General: Skin is warm and dry.      Findings: No erythema or rash.   Neurological:      Mental Status: He is alert and oriented to person, place, and time.      Cranial Nerves: No cranial nerve deficit.   Psychiatric:         Behavior: Behavior normal.         Thought Content: Thought content normal.         Judgment: Judgment normal.         Significant Labs: All pertinent labs within the past 24 hours have been reviewed.    Significant Imaging:   Imaging Results          X-Ray Abdomen Flat And Erect (Final result)  Result time 11/20/20 12:11:59    Final result by Tashi Nichole MD (11/20/20 12:11:59)                 Impression:      Nonobstructive bowel gas pattern.  Bilateral small pleural effusions.  Dense consolidation with air bronchograms/bronchiectasis retrocardiac left lower lobe.      Electronically signed by: Tashi Nichole MD  Date:    11/20/2020  Time:    12:11             Narrative:    EXAMINATION:  XR ABDOMEN FLAT AND ERECT    CLINICAL HISTORY:  Abdominal pain;    FINDINGS:  Comparison study 10/26/2020.  Again, retrocardiac left lower lobe airspace consolidation with bronchiectasis/air bronchograms.  Involvement of pleural effusions with bilateral costophrenic angle blunting, left greater than right.  Normal size heart status post CABG with coronary calcification/stent.    There is no intraperitoneal free air.  Nonobstructive bowel gas pattern.  There is moderate stool distending the left colon.  Overall, the amount of stool has improved compared the prior study.    Pelvic phleboliths.    Degenerative thoracolumbar spine.  Bones are osteopenic.  Status post vertebroplasty L4.                               X-Ray Chest AP Portable (Final result)  Result time 11/20/20 10:23:47    Final result by Tashi Nichole MD (11/20/20 10:23:47)                 Impression:      Abnormal chest x-ray.  See above.      Electronically signed by: Tashi Nichole MD  Date:    11/20/2020  Time:    10:23             Narrative:     EXAMINATION:  XR CHEST AP PORTABLE    CLINICAL HISTORY:  epigastric pain;    FINDINGS:  Comparison study 11/03/2020.  Normal size heart status post CABG with coronary artery calcifications/stent.  Aortic atherosclerosis.    The pulmonary vasculature is congested with interval development of generalized perihilar in lower lung haziness, question pulmonary edema.    There is blunting of bilateral costophrenic angles, new since the prior study, characteristic of bilateral pleural effusions small pleural effusions.    Again, there is dense consolidation in the retrocardiac left lower lung with associated bronchiectasis.  No pneumothorax.

## 2020-11-22 NOTE — PROGRESS NOTES
[facility] -   Cardiology  Progress Note    Patient Name: Familia Durbin Jr.  MRN: [id number]  Admission Date: 11/20/2020  Hospital Length of Stay: 0 days  Code Status: DNR   Attending Physician: Manjit Abbott MD   Primary Care Physician: Andrea Elkins MD  Expected Discharge Date: 11/22/2020  Principal Problem:Epigastric pain    Subjective:     Hospital Course:    Familia Durbin Jr. is a 75 y.o. AAM with a PMHx of CAD, DM type 2, HLD, HTN, Lupus, Multiple myeloma with mets to the bone, old MI, Renal insufficiency, who presented to the Emergency Department today for evaluation of epigastric abdominal pain, which onset 6 days ago.  Patient states that he has had intermittent epigastric/CP, dyspnea on exertion, and orthopnea for the past 2 days.    S/l LHC done in 2018 at [facility], showed significant native three-vessel coronary artery disease as described above.2. Patent LIMA-LAD and SVG-RCA.    ER workup showed; elevated BP, otherwise stable VS.  CBC showed Hgb 10.0, Hct 30.9, otherwise unremarkable.  CMP showed , otherwise unremarkable.  BNP 2095.  Initial troponin 0.059.  ECHO on 11/3 showed EF of 55%, associated with mild-to-moderate aortic regurgitation and stenosis, with mild pulmonic and mitral regurgitation.  CXR consistent with FVO.  Patient was given Lasix 40 mg IVP in the ER and Hospital Medicine was contacted for admission.  Patient will be placed in observation    Cardiology consult for elevated troponin and epigastric pain.  PMH CAD s/o cabg x2 in 2002 and few PCIs after cabg  The pain started 1 week ago and intermittently at rest. Similar to the pain when he had heart attack  Troponin slight elevation and flat  ekg NSR and nonspecific ctt change  Echo   · There is left ventricular concentric hypertrophy.  · The left ventricle is normal in size with normal systolic function. The estimated ejection fraction is 55%.  · Indeterminate diastolic function.  · With low normal right  ventricular systolic function.  · Mild-to-moderate aortic regurgitation.  · Mild-to-moderate aortic valve stenosis.  · Aortic valve area is 1.54 cm2; peak velocity is 2.17 m/s; mean gradient is 11 mmHg.  · Mild pulmonic regurgitation.  · The mitral valve is mildly sclerotic.  · Normal central venous pressure (3 mmHg).  · Mild mitral regurgitation.     11/21/2020 chest pain epigastric pain improved, good uop after lasix added, troponin mild elevation and flat. BNP 2000 and CTX pulm edema    11/22/202 chest /epigastric pain resolved. VSS. Improved ZAVALA. NUKE stress test canceled due to having coffee in AM        Review of Systems   Constitution: Positive for malaise/fatigue. Negative for decreased appetite, diaphoresis, fever and night sweats.   HENT: Negative for nosebleeds.    Eyes: Negative for blurred vision and double vision.   Cardiovascular: Positive for chest pain and dyspnea on exertion. Negative for claudication, irregular heartbeat, leg swelling, near-syncope, orthopnea, palpitations, paroxysmal nocturnal dyspnea and syncope.   Respiratory: Positive for shortness of breath. Negative for cough, sleep disturbances due to breathing, snoring, sputum production and wheezing.    Endocrine: Negative for cold intolerance and polyuria.   Hematologic/Lymphatic: Does not bruise/bleed easily.   Skin: Negative for rash.   Musculoskeletal: Negative for back pain, falls, joint pain, joint swelling and neck pain.   Gastrointestinal: Positive for abdominal pain. Negative for heartburn, nausea and vomiting.   Genitourinary: Negative for dysuria, frequency and hematuria.   Neurological: Negative for difficulty with concentration, dizziness, focal weakness, headaches, light-headedness, numbness, seizures and weakness.   Psychiatric/Behavioral: Negative for depression, memory loss and substance abuse. The patient does not have insomnia.    Allergic/Immunologic: Negative for HIV exposure and hives.     Objective:     Vital Signs  (Most Recent):  Temp: 97.2 °F (36.2 °C) (11/22/20 0726)  Pulse: 68 (11/22/20 0824)  Resp: 20 (11/22/20 1013)  BP: (!) 119/58 (11/22/20 0824)  SpO2: 97 % (11/22/20 0824) Vital Signs (24h Range):  Temp:  [96 °F (35.6 °C)-97.9 °F (36.6 °C)] 97.2 °F (36.2 °C)  Pulse:  [58-79] 68  Resp:  [18-20] 20  SpO2:  [92 %-97 %] 97 %  BP: (119-189)/(58-77) 119/58     Weight: 67.7 kg (149 lb 4 oz)  Body mass index is 21.42 kg/m².     SpO2: 97 %  O2 Device (Oxygen Therapy): room air      Intake/Output Summary (Last 24 hours) at 11/22/2020 1336  Last data filed at 11/22/2020 1200  Gross per 24 hour   Intake 510 ml   Output 900 ml   Net -390 ml       Lines/Drains/Airways     Peripheral Intravenous Line                 Peripheral IV - Single Lumen 11/20/20 1041 20 G Right Antecubital 2 days                Physical Exam   Constitutional: He is oriented to person, place, and time. He appears well-nourished.   HENT:   Head: Normocephalic.   Eyes: Pupils are equal, round, and reactive to light.   Neck: Normal carotid pulses and no JVD present. Carotid bruit is not present. No thyromegaly present.   Cardiovascular: Normal rate, regular rhythm and normal pulses.  No extrasystoles are present. PMI is not displaced. Exam reveals no gallop and no S3.   Murmur (SM on bases and apex) heard.  Pulmonary/Chest: Breath sounds normal. No stridor. No respiratory distress.   Abdominal: Soft. Bowel sounds are normal. There is no abdominal tenderness. There is no rebound.   Musculoskeletal: Normal range of motion.   Neurological: He is alert and oriented to person, place, and time.   Skin: Skin is intact. No rash noted.   Psychiatric: His behavior is normal.       Significant Labs:   ABG: No results for input(s): PH, PCO2, HCO3, POCSATURATED, BE in the last 48 hours., Blood Culture: No results for input(s): LABBLOO in the last 48 hours., BMP:   Recent Labs   Lab 11/21/20  0344 11/22/20  0808    107    140   K 3.8 3.5    100   CO2 28 28    BUN 20 20   CREATININE 1.4 1.5*   CALCIUM 8.8 9.0   , CMP   Recent Labs   Lab 11/21/20  0344 11/22/20  0808    140   K 3.8 3.5    100   CO2 28 28    107   BUN 20 20   CREATININE 1.4 1.5*   CALCIUM 8.8 9.0   ANIONGAP 10 12   ESTGFRAFRICA 56* 52*   EGFRNONAA 48* 45*   , CBC   Recent Labs   Lab 11/21/20  0344 11/22/20  0808   WBC 3.69* 2.88*   HGB 9.6* 9.9*   HCT 29.9* 31.7*    195   , INR No results for input(s): INR, PROTIME in the last 48 hours., Lipid Panel No results for input(s): CHOL, HDL, LDLCALC, TRIG, CHOLHDL in the last 48 hours. and Troponin   Recent Labs   Lab 11/20/20  1606 11/20/20  2142 11/21/20  0344   TROPONINI 0.052* 0.058* 0.048*       Significant Imaging: EKG:      Assessment and Plan:         Elevated troponin  See above note    Metastatic bone cancer  On palliative care    Coronary artery disease of native artery of native heart with stable angina pectoris  Cad s/p CABg    See above note      F/u as OP for ischemia workup.    VTE Risk Mitigation (From admission, onward)         Ordered     enoxaparin injection 40 mg  Every 24 hours      11/20/20 1241     IP VTE HIGH RISK PATIENT  Once      11/20/20 1136     Place sequential compression device  Until discontinued      11/20/20 1136                Max Tristan MD  Cardiology  [facility]

## 2020-11-22 NOTE — PLAN OF CARE
Pt had no adverse events during shift. Pt free of falls. Call light in reach. Side rails x 2. Pain well controlled w/ prn meds. PRN lactulose given; no bm this shift. Pt repositions independently. NADN. VTE prophylaxis- . Safety promoted. Chart reviewed, will continue to monitor.

## 2020-11-22 NOTE — NURSING
Discharge instruction reviewed with pt and daughter. IV discontinued out 20g RAC. Pressure applied to site and secured with a drsg.

## 2020-11-22 NOTE — HOSPITAL COURSE
11/21/20 The patient reports improvement in epigastric pain after having a BM. Will continue with laxative and encourage further BM. Cardiology consulted for ischemic work up. Hem/onc following and recommends to hold PO chemo until CHF is improved. 11/22/20 No acute issues overnight. The patient reports improvement in symptoms. Cardiology originally planned for a NM stress test today but the patient had coffee this morning. The case was discussed with cardiology today who felt the patient was stable from their standpoint and could complete the NM stress as an outpatient. The patient was seen and examined today and deemed stable for discharge. Will add Imdur 60 mg BID at discharge per Cardiology recommendations. The patient will follow up with his PCP, Cardiology and with Hem/onc.

## 2020-11-22 NOTE — PLAN OF CARE
11/22/20 1216   Final Note   Assessment Type Final Discharge Note   Anticipated Discharge Disposition Home   Right Care Referral Info   Post Acute Recommendation No Care

## 2020-11-22 NOTE — PLAN OF CARE
Received bedside shift report and pt in withSOB upon exertion. Pt is pleasant gentleman [employer] but able to make needs known to this nurse. Primary night shift nurse reports pt has not had a bowel movement after being given several doses of laxative. Reviewed with pt with continue to monitor bowel sounds and give laxatives as ordered. Pt verbalizes understanding. Pt was given Zofran because pt voices concerns of being nauseous post breakfast. Daughter is present in room and verbalizes pt is being discharged post PCP making rounds.

## 2020-11-23 ENCOUNTER — OFFICE VISIT (OUTPATIENT)
Dept: CARDIOLOGY | Facility: CLINIC | Age: 75
End: 2020-11-23
Payer: MEDICARE

## 2020-11-23 VITALS
OXYGEN SATURATION: 97 % | HEART RATE: 66 BPM | SYSTOLIC BLOOD PRESSURE: 148 MMHG | WEIGHT: 150.69 LBS | DIASTOLIC BLOOD PRESSURE: 62 MMHG | BODY MASS INDEX: 21.62 KG/M2

## 2020-11-23 DIAGNOSIS — I35.9 AORTIC VALVE DISORDER: ICD-10-CM

## 2020-11-23 DIAGNOSIS — I50.32 CHRONIC DIASTOLIC CONGESTIVE HEART FAILURE: ICD-10-CM

## 2020-11-23 DIAGNOSIS — I10 ESSENTIAL HYPERTENSION: Chronic | ICD-10-CM

## 2020-11-23 DIAGNOSIS — E78.2 MIXED HYPERLIPIDEMIA: Chronic | ICD-10-CM

## 2020-11-23 DIAGNOSIS — R79.89 ELEVATED TROPONIN: ICD-10-CM

## 2020-11-23 DIAGNOSIS — R07.89 OTHER CHEST PAIN: Primary | ICD-10-CM

## 2020-11-23 DIAGNOSIS — I25.118 CORONARY ARTERY DISEASE OF NATIVE ARTERY OF NATIVE HEART WITH STABLE ANGINA PECTORIS: Chronic | ICD-10-CM

## 2020-11-23 DIAGNOSIS — I70.0 CALCIFICATION OF ABDOMINAL AORTA: Chronic | ICD-10-CM

## 2020-11-23 PROCEDURE — 99999 PR PBB SHADOW E&M-EST. PATIENT-LVL V: ICD-10-PCS | Mod: PBBFAC,,, | Performed by: INTERNAL MEDICINE

## 2020-11-23 PROCEDURE — 1159F MED LIST DOCD IN RCRD: CPT | Mod: S$GLB,,, | Performed by: INTERNAL MEDICINE

## 2020-11-23 PROCEDURE — 3072F PR LOW RISK FOR RETINOPATHY: ICD-10-PCS | Mod: S$GLB,,, | Performed by: INTERNAL MEDICINE

## 2020-11-23 PROCEDURE — 99999 PR PBB SHADOW E&M-EST. PATIENT-LVL V: CPT | Mod: PBBFAC,,, | Performed by: INTERNAL MEDICINE

## 2020-11-23 PROCEDURE — 3072F LOW RISK FOR RETINOPATHY: CPT | Mod: S$GLB,,, | Performed by: INTERNAL MEDICINE

## 2020-11-23 PROCEDURE — 3078F DIAST BP <80 MM HG: CPT | Mod: CPTII,S$GLB,, | Performed by: INTERNAL MEDICINE

## 2020-11-23 PROCEDURE — 3077F PR MOST RECENT SYSTOLIC BLOOD PRESSURE >= 140 MM HG: ICD-10-PCS | Mod: CPTII,S$GLB,, | Performed by: INTERNAL MEDICINE

## 2020-11-23 PROCEDURE — 3077F SYST BP >= 140 MM HG: CPT | Mod: CPTII,S$GLB,, | Performed by: INTERNAL MEDICINE

## 2020-11-23 PROCEDURE — 99215 PR OFFICE/OUTPT VISIT, EST, LEVL V, 40-54 MIN: ICD-10-PCS | Mod: S$GLB,,, | Performed by: INTERNAL MEDICINE

## 2020-11-23 PROCEDURE — 3078F PR MOST RECENT DIASTOLIC BLOOD PRESSURE < 80 MM HG: ICD-10-PCS | Mod: CPTII,S$GLB,, | Performed by: INTERNAL MEDICINE

## 2020-11-23 PROCEDURE — 1159F PR MEDICATION LIST DOCUMENTED IN MEDICAL RECORD: ICD-10-PCS | Mod: S$GLB,,, | Performed by: INTERNAL MEDICINE

## 2020-11-23 PROCEDURE — 99215 OFFICE O/P EST HI 40 MIN: CPT | Mod: S$GLB,,, | Performed by: INTERNAL MEDICINE

## 2020-11-23 RX ORDER — FUROSEMIDE 20 MG/1
20 TABLET ORAL DAILY
Qty: 30 TABLET | Refills: 3 | Status: SHIPPED | OUTPATIENT
Start: 2020-11-23 | End: 2020-12-23

## 2020-11-23 RX ORDER — AMLODIPINE BESYLATE 2.5 MG/1
2.5 TABLET ORAL DAILY
Qty: 30 TABLET | Refills: 11 | Status: SHIPPED | OUTPATIENT
Start: 2020-11-23 | End: 2020-12-23

## 2020-11-23 RX ORDER — NITROGLYCERIN 0.4 MG/1
0.4 TABLET SUBLINGUAL EVERY 5 MIN PRN
Qty: 50 TABLET | Refills: 12 | Status: SHIPPED | OUTPATIENT
Start: 2020-11-23 | End: 2021-12-28 | Stop reason: SDUPTHER

## 2020-11-23 RX ORDER — FUROSEMIDE 40 MG/1
40 TABLET ORAL DAILY
Qty: 30 TABLET | Refills: 11 | Status: SHIPPED | OUTPATIENT
Start: 2020-11-23 | End: 2020-11-23 | Stop reason: SDUPTHER

## 2020-11-23 RX ORDER — POTASSIUM CHLORIDE 20 MEQ/1
20 TABLET, EXTENDED RELEASE ORAL DAILY
Qty: 30 TABLET | Refills: 5 | Status: SHIPPED | OUTPATIENT
Start: 2020-11-23 | End: 2020-12-23 | Stop reason: SDUPTHER

## 2020-11-23 RX ORDER — PANTOPRAZOLE SODIUM 40 MG/1
TABLET, DELAYED RELEASE ORAL
Qty: 30 TABLET | Refills: 11 | Status: SHIPPED | OUTPATIENT
Start: 2020-11-23 | End: 2021-01-21

## 2020-11-23 NOTE — PROGRESS NOTES
Subjective:   Patient ID:  Familia Durbin Jr. is a 75 y.o. male who presents for follow up of Shortness of Breath and Chest Pain      75 y.o. AAM, discharge f/u  PMHx CAD s/o cabg x2 in 2002 and few PCIs after cabg, CHf pEF mid to mode AS/, DM type 2, HLD, HTN, Lupus, Multiple myeloma with mets to the bone, old MI, Renal insufficiency,S/l LHC done in 2018 at [facility], showed significant native three-vessel coronary artery disease, Patent LIMA-LAD and SVG-RCA.      [facility], which onset 6 days ago.  Patient states that he has had intermittent epigastric/CP, dyspnea on exertion, and orthopnea for the past 2 days. ER workup showed; elevated BP, otherwise stable VS.  CBC showed Hgb 10.0, Hct 30.9, otherwise unremarkable.  CMP showed , otherwise unremarkable.  BNP 2095.  Initial troponin 0.059.  ECHO on 11/3 showed EF of 55%, associated with mild-to-moderate aortic regurgitation and stenosis, with mild pulmonic and mitral regurgitation.  CXR consistent with FVO. ekg NSR and nonspecific ctt change  Echo   · There is left ventricular concentric hypertrophy.  · The left ventricle is normal in size with normal systolic function. The estimated ejection fraction is 55%.  · Indeterminate diastolic function.  · With low normal right ventricular systolic function.  · Mild-to-moderate aortic regurgitation.  · Mild-to-moderate aortic valve stenosis.  · Aortic valve area is 1.54 cm2; peak velocity is 2.17 m/s; mean gradient is 11 mmHg.  · Mild pulmonic regurgitation.  · The mitral valve is mildly sclerotic.  · Normal central venous pressure (3 mmHg).  · Mild mitral regurgitation.    Pt was Rx for angina pain and GERD. The pain improved. And had conversation with the oncologist Dr. Church, who believed pt had pretty good prognosis   Today the pain resolved. And ZAVALA improved.           Past Medical History:   Diagnosis Date    CAD (coronary artery disease)     tyree     Diabetes mellitus, type 2 1979    eye dr rocha    Hearing impaired     Hyperlipidemia     Hypertension     Lupus     Discoid    Multiple myeloma     Old MI (myocardial infarction) 2012    Renal insufficiency     Tracheostomy tube present        Past Surgical History:   Procedure Laterality Date    CARDIAC SURGERY      CATARACT EXTRACTION      COLONOSCOPY      CORONARY ARTERY BYPASS GRAFT      HAND SURGERY      KNEE SURGERY      TRACHEOSTOMY TUBE PLACEMENT      WRIST FUSION         Social History     Tobacco Use    Smoking status: Never Smoker    Smokeless tobacco: Never Used   Substance Use Topics    Alcohol use: No    Drug use: No       Family History   Problem Relation Age of Onset    Diabetes Mother     Diabetes Father     Prostate cancer Father     Pancreatic cancer Sister     No Known Problems Brother     Diabetes Maternal Uncle     Diabetes Maternal Grandmother     No Known Problems Maternal Grandfather     No Known Problems Paternal Grandmother     No Known Problems Paternal Grandfather          Review of Systems   Constitution: Positive for malaise/fatigue. Negative for decreased appetite, diaphoresis, fever and night sweats.   HENT: Negative for nosebleeds.    Eyes: Negative for blurred vision and double vision.   Cardiovascular: Positive for dyspnea on exertion. Negative for chest pain, claudication, irregular heartbeat, leg swelling, near-syncope, orthopnea, palpitations, paroxysmal nocturnal dyspnea and syncope.   Respiratory: Negative for cough, shortness of breath, sleep disturbances due to breathing, snoring, sputum production and wheezing.    Endocrine: Negative for cold intolerance and polyuria.   Hematologic/Lymphatic: Does not bruise/bleed easily.   Skin: Negative for rash.   Musculoskeletal: Positive for back pain. Negative for falls, joint pain, joint swelling and neck pain.   Gastrointestinal: Negative for abdominal pain, heartburn, nausea and vomiting.    Genitourinary: Negative for dysuria, frequency and hematuria.   Neurological: Negative for difficulty with concentration, dizziness, focal weakness, headaches, light-headedness, numbness, seizures and weakness.   Psychiatric/Behavioral: Negative for depression, memory loss and substance abuse. The patient does not have insomnia.    Allergic/Immunologic: Negative for HIV exposure and hives.       Objective:   Physical Exam   Constitutional: He is oriented to person, place, and time. He appears well-nourished.   HENT:   Head: Normocephalic.   Eyes: Pupils are equal, round, and reactive to light.   Neck: Normal carotid pulses and no JVD present. Carotid bruit is not present. No thyromegaly present.   Cardiovascular: Normal rate, regular rhythm and normal pulses.  No extrasystoles are present. PMI is not displaced. Exam reveals no gallop and no S3.   Murmur (ESM on bases) heard.  Pulmonary/Chest: Breath sounds normal. No stridor. No respiratory distress.   Abdominal: Soft. Bowel sounds are normal. There is no abdominal tenderness. There is no rebound.   Musculoskeletal: Normal range of motion.   Neurological: He is alert and oriented to person, place, and time.   Skin: Skin is intact. No rash noted.   Psychiatric: His behavior is normal.       Lab Results   Component Value Date    CHOL 156 07/31/2020    CHOL 130 11/22/2019    CHOL 114 (L) 01/22/2018     Lab Results   Component Value Date    HDL 55 07/31/2020    HDL 47 11/22/2019    HDL 38 (L) 01/22/2018     Lab Results   Component Value Date    LDLCALC 86.2 07/31/2020    LDLCALC 67.2 11/22/2019    LDLCALC 57.4 (L) 01/22/2018     Lab Results   Component Value Date    TRIG 74 07/31/2020    TRIG 79 11/22/2019    TRIG 93 01/22/2018     Lab Results   Component Value Date    CHOLHDL 35.3 07/31/2020    CHOLHDL 36.2 11/22/2019    CHOLHDL 33.3 01/22/2018       Chemistry        Component Value Date/Time     11/22/2020 0808    K 3.5 11/22/2020 0808     11/22/2020  0808    CO2 28 11/22/2020 0808    BUN 20 11/22/2020 0808    CREATININE 1.5 (H) 11/22/2020 0808     11/22/2020 0808        Component Value Date/Time    CALCIUM 9.0 11/22/2020 0808    ALKPHOS 68 11/20/2020 0950    AST 20 11/20/2020 0950    ALT 18 11/20/2020 0950    BILITOT 1.0 11/20/2020 0950    ESTGFRAFRICA 52 (A) 11/22/2020 0808    EGFRNONAA 45 (A) 11/22/2020 0808          Lab Results   Component Value Date    HGBA1C 6.4 (H) 11/03/2020     Lab Results   Component Value Date    TSH 0.782 07/31/2020     Lab Results   Component Value Date    INR 1.1 11/03/2020     Lab Results   Component Value Date    WBC 2.88 (L) 11/22/2020    HGB 9.9 (L) 11/22/2020    HCT 31.7 (L) 11/22/2020     (H) 11/22/2020     11/22/2020     BMP  Sodium   Date Value Ref Range Status   11/22/2020 140 136 - 145 mmol/L Final     Potassium   Date Value Ref Range Status   11/22/2020 3.5 3.5 - 5.1 mmol/L Final     Chloride   Date Value Ref Range Status   11/22/2020 100 95 - 110 mmol/L Final     CO2   Date Value Ref Range Status   11/22/2020 28 23 - 29 mmol/L Final     BUN   Date Value Ref Range Status   11/22/2020 20 8 - 23 mg/dL Final     Creatinine   Date Value Ref Range Status   11/22/2020 1.5 (H) 0.5 - 1.4 mg/dL Final     Calcium   Date Value Ref Range Status   11/22/2020 9.0 8.7 - 10.5 mg/dL Final     Anion Gap   Date Value Ref Range Status   11/22/2020 12 8 - 16 mmol/L Final     eGFR if    Date Value Ref Range Status   11/22/2020 52 (A) >60 mL/min/1.73 m^2 Final     eGFR if non    Date Value Ref Range Status   11/22/2020 45 (A) >60 mL/min/1.73 m^2 Final     Comment:     Calculation used to obtain the estimated glomerular filtration  rate (eGFR) is the CKD-EPI equation.        BNP  @LABRCNTIP(BNP,BNPTRIAGEBLO)@  @LABRCNTIP(troponini)@  Estimated Creatinine Clearance: 40.5 mL/min (A) (based on SCr of 1.5 mg/dL (H)).  No results found in the last 24 hours.  No results found in the last 24  hours.  No results found in the last 24 hours.    Assessment:      1. Other chest pain    2. Chronic diastolic congestive heart failure    3. Coronary artery disease of native artery of native heart with stable angina pectoris    4. Calcification of abdominal aorta    5. Elevated troponin    6. Mixed hyperlipidemia    7. Essential hypertension    8. Aortic valve disorder        Plan:   Add Amlodipine 2.5 mg daily  Resume lasix 20 mg daily prn and KCL 20 meq daily  Repeat BNP and BMP in 1 week  MPI for chest pain  Add NTG SL prn   Refill Protonix  Continue ASA Brilinta statin fish oil lisinopril Metoprolol and Imdur  DM Rx per PCP    Counseled DASH  Check Lipid profile in 6 months  Recommend heart-healthy diet, weight control and regular exercise.  Hitesh. Risk modification.   I have reviewed all pertinent labs and cardiac studies independently. Plans and recommendations have been formulated under my direct supervision. All questions answered and patient voiced understanding.   If symptoms persist go to the ED  RTC in 2 weeks

## 2020-11-24 ENCOUNTER — OFFICE VISIT (OUTPATIENT)
Dept: PULMONOLOGY | Facility: CLINIC | Age: 75
End: 2020-11-24
Payer: MEDICARE

## 2020-11-24 ENCOUNTER — HOSPITAL ENCOUNTER (OUTPATIENT)
Dept: RADIOLOGY | Facility: HOSPITAL | Age: 75
Discharge: HOME | End: 2020-11-24
Attending: PHYSICIAN ASSISTANT
Payer: MEDICARE

## 2020-11-24 VITALS
DIASTOLIC BLOOD PRESSURE: 78 MMHG | BODY MASS INDEX: 21.47 KG/M2 | HEART RATE: 54 BPM | HEIGHT: 70 IN | SYSTOLIC BLOOD PRESSURE: 120 MMHG | OXYGEN SATURATION: 97 % | WEIGHT: 149.94 LBS | RESPIRATION RATE: 18 BRPM

## 2020-11-24 DIAGNOSIS — J18.9 PNEUMONIA OF LEFT LOWER LOBE DUE TO INFECTIOUS ORGANISM: ICD-10-CM

## 2020-11-24 DIAGNOSIS — C90.00 MULTIPLE MYELOMA NOT HAVING ACHIEVED REMISSION: ICD-10-CM

## 2020-11-24 DIAGNOSIS — J47.9 BRONCHIECTASIS WITHOUT COMPLICATION: ICD-10-CM

## 2020-11-24 DIAGNOSIS — J30.89 NON-SEASONAL ALLERGIC RHINITIS DUE TO OTHER ALLERGIC TRIGGER: ICD-10-CM

## 2020-11-24 DIAGNOSIS — D61.818 PANCYTOPENIA: ICD-10-CM

## 2020-11-24 DIAGNOSIS — Z09 HOSPITAL DISCHARGE FOLLOW-UP: Primary | ICD-10-CM

## 2020-11-24 DIAGNOSIS — R91.1 SOLITARY PULMONARY NODULE: ICD-10-CM

## 2020-11-24 DIAGNOSIS — R91.8 PULMONARY NODULES: ICD-10-CM

## 2020-11-24 DIAGNOSIS — R06.02 SOB (SHORTNESS OF BREATH) ON EXERTION: ICD-10-CM

## 2020-11-24 DIAGNOSIS — I70.0 CALCIFICATION OF ABDOMINAL AORTA: Chronic | ICD-10-CM

## 2020-11-24 PROBLEM — J47.0 BRONCHIECTASIS WITH ACUTE LOWER RESPIRATORY INFECTION: Status: RESOLVED | Noted: 2020-11-03 | Resolved: 2020-11-24

## 2020-11-24 PROCEDURE — 3288F FALL RISK ASSESSMENT DOCD: CPT | Mod: CPTII,S$GLB,, | Performed by: NURSE PRACTITIONER

## 2020-11-24 PROCEDURE — 99214 PR OFFICE/OUTPT VISIT, EST, LEVL IV, 30-39 MIN: ICD-10-PCS | Mod: 25,S$GLB,, | Performed by: NURSE PRACTITIONER

## 2020-11-24 PROCEDURE — 3072F PR LOW RISK FOR RETINOPATHY: ICD-10-PCS | Mod: S$GLB,,, | Performed by: NURSE PRACTITIONER

## 2020-11-24 PROCEDURE — 99214 OFFICE O/P EST MOD 30 MIN: CPT | Mod: 25,S$GLB,, | Performed by: NURSE PRACTITIONER

## 2020-11-24 PROCEDURE — 90694 FLU VACCINE - QUADRIVALENT - ADJUVANTED: ICD-10-PCS | Mod: S$GLB,,, | Performed by: NURSE PRACTITIONER

## 2020-11-24 PROCEDURE — 3078F DIAST BP <80 MM HG: CPT | Mod: CPTII,S$GLB,, | Performed by: NURSE PRACTITIONER

## 2020-11-24 PROCEDURE — G0008 FLU VACCINE - QUADRIVALENT - ADJUVANTED: ICD-10-PCS | Mod: S$GLB,,, | Performed by: NURSE PRACTITIONER

## 2020-11-24 PROCEDURE — 1159F MED LIST DOCD IN RCRD: CPT | Mod: S$GLB,,, | Performed by: NURSE PRACTITIONER

## 2020-11-24 PROCEDURE — 90694 VACC AIIV4 NO PRSRV 0.5ML IM: CPT | Mod: S$GLB,,, | Performed by: NURSE PRACTITIONER

## 2020-11-24 PROCEDURE — G0008 ADMIN INFLUENZA VIRUS VAC: HCPCS | Mod: S$GLB,,, | Performed by: NURSE PRACTITIONER

## 2020-11-24 PROCEDURE — 3072F LOW RISK FOR RETINOPATHY: CPT | Mod: S$GLB,,, | Performed by: NURSE PRACTITIONER

## 2020-11-24 PROCEDURE — 3078F PR MOST RECENT DIASTOLIC BLOOD PRESSURE < 80 MM HG: ICD-10-PCS | Mod: CPTII,S$GLB,, | Performed by: NURSE PRACTITIONER

## 2020-11-24 PROCEDURE — 71046 X-RAY EXAM CHEST 2 VIEWS: CPT | Mod: 26,,, | Performed by: RADIOLOGY

## 2020-11-24 PROCEDURE — 3074F SYST BP LT 130 MM HG: CPT | Mod: CPTII,S$GLB,, | Performed by: NURSE PRACTITIONER

## 2020-11-24 PROCEDURE — 71046 XR CHEST PA AND LATERAL: ICD-10-PCS | Mod: 26,,, | Performed by: RADIOLOGY

## 2020-11-24 PROCEDURE — 1101F PT FALLS ASSESS-DOCD LE1/YR: CPT | Mod: CPTII,S$GLB,, | Performed by: NURSE PRACTITIONER

## 2020-11-24 PROCEDURE — 99999 PR PBB SHADOW E&M-EST. PATIENT-LVL V: ICD-10-PCS | Mod: PBBFAC,,, | Performed by: NURSE PRACTITIONER

## 2020-11-24 PROCEDURE — 3288F PR FALLS RISK ASSESSMENT DOCUMENTED: ICD-10-PCS | Mod: CPTII,S$GLB,, | Performed by: NURSE PRACTITIONER

## 2020-11-24 PROCEDURE — 1101F PR PT FALLS ASSESS DOC 0-1 FALLS W/OUT INJ PAST YR: ICD-10-PCS | Mod: CPTII,S$GLB,, | Performed by: NURSE PRACTITIONER

## 2020-11-24 PROCEDURE — 71046 X-RAY EXAM CHEST 2 VIEWS: CPT | Mod: TC

## 2020-11-24 PROCEDURE — 99999 PR PBB SHADOW E&M-EST. PATIENT-LVL V: CPT | Mod: PBBFAC,,, | Performed by: NURSE PRACTITIONER

## 2020-11-24 PROCEDURE — 3074F PR MOST RECENT SYSTOLIC BLOOD PRESSURE < 130 MM HG: ICD-10-PCS | Mod: CPTII,S$GLB,, | Performed by: NURSE PRACTITIONER

## 2020-11-24 PROCEDURE — 1159F PR MEDICATION LIST DOCUMENTED IN MEDICAL RECORD: ICD-10-PCS | Mod: S$GLB,,, | Performed by: NURSE PRACTITIONER

## 2020-11-24 RX ORDER — AZELASTINE 1 MG/ML
2 SPRAY, METERED NASAL 2 TIMES DAILY
Qty: 30 ML | Refills: 11 | Status: SHIPPED | OUTPATIENT
Start: 2020-11-24 | End: 2021-12-28 | Stop reason: SDUPTHER

## 2020-11-24 NOTE — PATIENT INSTRUCTIONS
Understanding Bronchiectasis  Bronchiectasis is a condition in which the airways of the lungs (bronchi and bronchioles) become wider than normal. Over time, the walls of the airways become thick and scarred. The damaged airways cant clear mucus as well. Because of this, mucus builds up in the airways. This increases the risk for lung infections. Bronchiectasis is a long-term (chronic) condition.  What causes bronchiectasis?  Doctors do not know exactly what causes this condition. It occurs in people with lung infections who have long-term damage to the airways from other health problems.  Smokers and those with long-term lung disease are more likely to develop bronchiectasis. Some of the conditions that increase the chance for bronchiectasis include:  · Cystic fibrosis  · Lung infections such as pneumonia, tuberculosis, or whooping cough  · Chronic obstructive pulmonary disease (COPD)  · Problems with the bodys defense (immune) system  · Allergic bronchopulmonary aspergillosis (ABPA)  · Long-term problem with inhaling food or liquids into the lungs (aspiration)  · Certain autoimmune diseases such as rheumatoid arthritis  · Blocked airway, such as from a tumor or inhaled object  · Lung problems that are present at birth (congenital)  What are the symptoms of bronchiectasis?  Damage to the airways often starts in childhood. You may not have symptoms until months or years after repeated lung infections. Some people have few or no symptoms. Others have daily symptoms that get worse over time. Common symptoms include:  · Long-term cough  · Coughing up a lot of thick mucus that may have blood in it  · Trouble breathing  · Inflammation of the nose and sinuses (rhinosinusitis)  · Inflammation of the covering of the lungs (pleurisy or pleuritis)  · Feeling tired  How is bronchiectasis diagnosed?  Your healthcare provider will ask about your past health and symptoms. Youll also have a physical exam. This includes  listening to your chest with a stethoscope. You may need tests to help with the diagnosis, including:  · Blood tests. These check for infection, immune system problems, and overall health.  · Sputum culture. This is a test of the mucus in your lungs. Its checked for a bacterial or fungal infection.  · Chest X-ray. This is done to get information about your heart and lungs.  · Chest CT scan. This gives detailed pictures of your airway. Its most often used to make the diagnosis.  · Lung function and exercise tests. They include spirometry and a 6-minute walk test. These tests show how well your lungs work and if you are able to get enough oxygen into your body, even when exerting yourself.  Date Last Reviewed: 3/1/2017  © 3106-4675 [employer] [address] [place] [address], [place] [address]. All rights reserved. This information is not intended as a substitute for professional medical care. Always follow your healthcare professional's instructions.

## 2020-11-24 NOTE — PROGRESS NOTES
Subjective:      Patient ID: Familia Durbin Jr. is a 75 y.o. male.    Patient Active Problem List   Diagnosis    Type 2 diabetes mellitus with diabetic nephropathy    Hyperlipidemia    Hypertension    Insulin long-term use    GERD (gastroesophageal reflux disease)    Calcification of abdominal aorta    Coronary artery disease of native artery of native heart with stable angina pectoris    Discoid lupus    Therapeutic opioid-induced constipation (OIC)    Multiple myeloma    Metastatic bone cancer    Vision loss of right eye    Ischemic optic neuritis of right eye    Renal insufficiency    Drug-induced constipation    Pneumonia due to infectious organism    Pulmonary nodules    Nasal drainage    Pancytopenia    Elevated troponin    Encounter for palliative care    Neoplasm related pain    Bronchiectasis without complication         Chief Complaint   Patient presents with    Hospital Follow Up    Bronchiectasis       Chief Complaint: Hospital Follow Up and Bronchiectasis      HPI:  Familia Durbin Jr. is a 75 y.o. male presents to pulmonary clinic initial evaluation related bronchiectasis and follow up hospital visit.   Hospital Visits 11/3/2020 and most recent 11/20/2020 when patient was noted to be and congestive heart failure with BNP 2,095 High.    Overall improved status post hospitalization.  Adherent to Lasix for congestive heart failure.  As residual fatigue easy fatigue and some intermittent nausea without vomiting.  Has Zofran and Compazine on hand.    bronchietasis noted on recent CT chest and chest xray imaging left lower lobe.    Symptomatic: exertional shortness of breath. No cough, no mucous production. No fever. No chills.    Current treatment:  Duo nebs twice daily.     Immunizations Due influenza vac, provided at this visit.     Previous Report Reviewed: ER records, lab reports, office notes and radiology reports     Past Medical History: The following portions of the  patient's history were reviewed and updated as appropriate:   He  has a past surgical history that includes Coronary artery bypass graft; Knee surgery; Colonoscopy; Cardiac surgery; Wrist fusion; Hand surgery; Tracheostomy tube placement; and Cataract extraction.  His family history includes Diabetes in his father, maternal grandmother, maternal uncle, and mother; No Known Problems in his brother, maternal grandfather, paternal grandfather, and paternal grandmother; Pancreatic cancer in his sister; Prostate cancer in his father.  He  reports that he has never smoked. He has never used smokeless tobacco. He reports that he does not drink alcohol or use drugs.  He has a current medication list which includes the following prescription(s): albuterol-ipratropium, amlodipine, aspirin, bd ultra-fine collin pen needle, benzonatate, blood-glucose meter, brilinta, cholecalciferol (vitamin d3), docosahexaenoic acid, docusate sodium, flaxseed oil, furosemide, janae (zingiber officinalis), lantus solostar u-100 insulin, insulin aspart u-100, isosorbide mononitrate, lactulose, lactulose, lisinopril, metoprolol tartrate, multivit-min-fa-lycopen-lutein, nitroglycerin, omega 3-dha-epa-fish oil, ondansetron, ondansetron, oxycodone, oxycodone, pantoprazole, polyethylene glycol, pomalidomide, potassium chloride sa, prochlorperazine, ranolazine, rosuvastatin, senna, senna-docusate 8.6-50 mg, triamcinolone acetonide 0.1%, true metrix glucose test strip, trueplus lancets, vitamin b complex, and azelastine.  He is allergic to cephalexin; fentanyl; and nsaids (non-steroidal anti-inflammatory drug)..    Review of Systems   Constitutional: Negative for fever, chills, weight loss, weight gain, activity change, appetite change, fatigue and night sweats.   HENT: Negative for postnasal drip, rhinorrhea, sinus pressure, voice change and congestion.    Eyes: Negative for redness and itching.   Respiratory: Negative for snoring, cough, sputum  "production, chest tightness, shortness of breath, wheezing, orthopnea, asthma nighttime symptoms, dyspnea on extertion, use of rescue inhaler and somnolence.    Cardiovascular: Negative.  Negative for chest pain, palpitations and leg swelling.   Genitourinary: Negative for difficulty urinating and hematuria.   Endocrine: Negative for cold intolerance and heat intolerance.    Musculoskeletal: Negative for arthralgias, gait problem, joint swelling and myalgias.   Skin: Negative.    Gastrointestinal: Negative for nausea, vomiting, abdominal pain and acid reflux.   Neurological: Negative for dizziness, weakness, light-headedness and headaches.   Hematological: Negative for adenopathy. No excessive bruising.   All other systems reviewed and are negative.       Objective:   /78   Pulse (!) 54   Resp 18   Ht 5' 10" (1.778 m)   Wt 68 kg (149 lb 14.6 oz)   SpO2 97%   BMI 21.51 kg/m²   Physical Exam  Vitals signs and nursing note reviewed.       Personal Diagnostic Review    X-Ray Chest PA And Lateral  Narrative: EXAMINATION:  XR CHEST PA AND LATERAL    CLINICAL HISTORY:  Pneumonia, unspecified organism    TECHNIQUE:  PA and lateral views of the chest were performed.    COMPARISON:  November 3, 2020    FINDINGS:  Persistent left basilar areas coalescent infiltrate and air bronchograms/bronchiectasis.  Posteromedial location with indistinctness of the hemidiaphragm and posterior CP angle noted.  Mild coarsening interstitial markings throughout the remainder of the lungs particularly in the right base.  Minimal right basilar subsegmental atelectasis.  No pneumothorax.  Heart and pulmonary vasculature stable.  Postsurgical changes CABG.  Trachea midline.  Degenerative change throughout the spine with minimal underlying scoliosis.  Impression: Persistent left basilar patchy coalescent infiltrate and or atelectasis with concomitant air bronchograms/bronchiectasis.    Slight increased coalescent infiltrate/atelectasis " suggested on the lateral view.    Right basilar scarring and or subsegmental atelectasis    Healed bilateral rib fractures and spondylosis with minimal underlying scoliosis.    Postsurgical changes CABG with fractured inferior sternal wire.    Additional findings as above.  Continue follow-up recommended to ensure clearance left basilar findings.    Electronically signed by: Valentín Patton MD  Date:    11/24/2020  Time:    09:41    Results for orders placed during the hospital encounter of 11/03/20   CT Chest Without Contrast    Narrative EXAMINATION:  CT CHEST WITHOUT CONTRAST    CLINICAL HISTORY:  Pneumonia;    TECHNIQUE:  Chest CT was performed without contrast. All CT scans at this facility use dose modulation, iterative reconstruction, and/or weight based dosing when appropriate to reduce radiation dose to as low as reasonably achievable.    COMPARISON:  PET-CT scan from December 5, 2019.    FINDINGS:  Prior median sternotomy.  There has been progression of volume loss involving the majority of the left lower lobe with varicoid bronchiectasis and air bronchograms.  Volume loss involving the medial right lower lobe with air bronchograms.  Small focus of tree-in-bud opacity within the posterior aspect of the left upper lobe at the terminus of a bronchovascular which structure is slightly larger compared to the prior PET-CT scan.  It measures 11 mm craniocaudal by 17 mm AP x 9 mm transverse.  Cluster of nodules within the periphery of the right upper lobe on axial series 3, image 177 is unchanged.  Thickening of major fissure with somewhat more prominent nodule projecting posteriorly within the left lower lobe.  It measures 5 mm and is best demonstrated on image 302.  6 mm nodule within the left apex on image 118 is not definitely changed.  No endobronchial or endotracheal lesions.  No pathologically enlarged mediastinal, hilar, or axillary lymph nodes. The heart is enlarged with coronary artery calcifications.   Profound osteopenia.  Limited images through the upper abdomen demonstrate no acute abnormality.      Impression 1. Atelectasis of the near entirety of the left lower lobe with varicoid bronchiectasis.  This has progressed compared to the prior PET-CT scan.  There is also atelectasis involving the medial aspect of the right middle lobe where there are air bronchograms.  This has also progressed in the interval.  2. Somewhat linear focus of nodularity within the posterior aspect of the left upper lobe could relate to bronchial plugging.  It has increased in size compared to the prior PET-CT scan.  Close attention should be given to this region upon future follow-up.  3. There is more nodularity and thickening of the major fissure within adjacent nodule that is also new compared to the prior PET-CT scan.  4. Cluster of nodules within the periphery of the right upper lobe posteriorly has not definitely changed.  5. Prior median sternotomy with coronary artery calcifications.  6. Profound osteopenia.  All CT scans at this facility use dose modulation, iterative reconstruction, and/or weight base dosing when appropriate to reduce radiation dose to as low as reasonably achievable.      Electronically signed by: Tae Bazan Jr., MD  Date:    11/03/2020  Time:    13:08           Assessment:     1. Hospital discharge follow-up    2. Bronchiectasis without complication    3. Non-seasonal allergic rhinitis due to other allergic trigger    4. SOB (shortness of breath) on exertion    5. Solitary pulmonary nodule    6. Pulmonary nodules    7. Pneumonia of left lower lobe due to infectious organism    8. Calcification of abdominal aorta    9. Metastatic bone cancer    10. Multiple myeloma not having achieved remission    11. Pancytopenia      Orders Placed This Encounter   Procedures    CT Chest Without Contrast     Standing Status:   Future     Standing Expiration Date:   11/24/2021     Order Specific Question:   May the  "Radiologist modify the order per protocol to meet the clinical needs of the patient?     Answer:   Yes    Influenza - Quadrivalent (Adjuvanted)    COVID-19 Routine Screening     Pre pulmonary PFT     Standing Status:   Future     Standing Expiration Date:   1/23/2022     Order Specific Question:   Is the patient symptomatic?     Answer:   No     Order Specific Question:   Is this needed for pre-procedure or pre-op testing?     Answer:   Yes     Order Specific Question:   Diagnosis:     Answer:   Pre-procedure lab exam [[id number]]    Complete PFT with bronchodilator     Standing Status:   Future     Standing Expiration Date:   11/24/2021    Stress test, pulmonary     Standing Status:   Future     Standing Expiration Date:   11/24/2021     Order Specific Question:   Reason for study     Answer:   Functional status    PULM - Arterial Blood Gases--in addition to PFT only     Standing Status:   Future     Standing Expiration Date:   11/24/2021     Medication List with Changes/Refills   New Medications    AZELASTINE (ASTELIN) 137 MCG (0.1 %) NASAL SPRAY    2 sprays (274 mcg total) by Nasal route 2 (two) times daily.   Current Medications    ALBUTEROL-IPRATROPIUM (DUO-NEB) 2.5 MG-0.5 MG/3 ML NEBULIZER SOLUTION    Take 3 mLs by nebulization every 8 (eight) hours. For shortness of breath and wheezing    AMLODIPINE (NORVASC) 2.5 MG TABLET    Take 1 tablet (2.5 mg total) by mouth once daily.    ASPIRIN 81 MG CHEW    Take 1 tablet (81 mg total) by mouth once daily.    BD ULTRA-FINE DAVE PEN NEEDLE 32 GAUGE X 5/32" NDLE    USE  4 TIMES DAILY WITH MEALS AND AT BEDTIME    BENZONATATE (TESSALON PERLES) 100 MG CAPSULE    Take 1 capsule (100 mg total) by mouth every 6 (six) hours as needed for Cough.    BLOOD-GLUCOSE METER KIT    Use as instructed    BRILINTA 90 MG TABLET    Take 1 tablet by mouth once daily.    CHOLECALCIFEROL, VITAMIN D3, 5,000 UNIT TAB    Take 5,000 Units by mouth once daily.    DOCOSAHEXANOIC ACID ORAL    Take " 1 capsule by mouth once daily.    DOCUSATE SODIUM (COLACE) 100 MG CAPSULE    Take 100 mg by mouth 2 (two) times daily.    FLAXSEED OIL 1,000 MG CAP    Take 1 capsule by mouth daily as needed.     FUROSEMIDE (LASIX) 20 MG TABLET    Take 1 tablet (20 mg total) by mouth once daily.    GINGER ROOT (GINGER, ZINGIBER OFFICINALIS,) 550 MG CAP    Take 1 capsule by mouth.    INSULIN (LANTUS SOLOSTAR U-100 INSULIN) GLARGINE 100 UNITS/ML (3ML) SUBQ PEN    Inject 10 Units into the skin every evening. INJECT 15 UNITS SUBCUTANEOUSLY ONCE DAILY    INSULIN ASPART U-100 (NOVOLOG) 100 UNIT/ML (3 ML) INPN PEN    Inject 15 Units into the skin 3 (three) times daily with meals.    ISOSORBIDE MONONITRATE (IMDUR) 60 MG 24 HR TABLET    Take 1 tablet (60 mg total) by mouth 2 (two) times a day.    LACTULOSE (CEPHULAC) 20 GRAM PACK    Mix and take 1 packet (20 g total) by mouth 3 (three) times daily.    LACTULOSE (CHRONULAC) 10 GRAM/15 ML SOLUTION    Take 30 mL by mouth twice daily as needed for constipation.    LISINOPRIL (PRINIVIL,ZESTRIL) 2.5 MG TABLET    Take 2.5 mg by mouth once daily.     METOPROLOL TARTRATE (LOPRESSOR) 50 MG TABLET    Take 1 tablet by mouth twice daily    MULTIVIT-MIN-FA-LYCOPEN-LUTEIN 300-600-300 MCG TAB    Take 1 tablet by mouth.    NITROGLYCERIN (NITROSTAT) 0.4 MG SL TABLET    Place 1 tablet (0.4 mg total) under the tongue every 5 (five) minutes as needed for Chest pain.    OMEGA 3-DHA-EPA-FISH -1,000 MG CAP    Take by mouth.    ONDANSETRON (ZOFRAN) 4 MG TABLET    Take 1 tablet (4 mg total) by mouth every 8 (eight) hours as needed for Nausea.    ONDANSETRON (ZOFRAN-ODT) 4 MG TBDL    Dissolve 1 tab under the tongue every 4-6 hours as needed for nausea.    OXYCODONE (OXYCONTIN) 20 MG 12 HR TABLET    Take 1 tablet (20 mg total) by mouth every 12 (twelve) hours.    OXYCODONE (ROXICODONE) 10 MG TAB IMMEDIATE RELEASE TABLET    Take 1 tablet (10 mg total) by mouth every 6 (six) hours as needed for Pain.     PANTOPRAZOLE (PROTONIX) 40 MG TABLET    Take 1 tablet by mouth daily    POLYETHYLENE GLYCOL (GLYCOLAX) 17 GRAM PWPK    Take 17 g by mouth once daily.    POMALIDOMIDE 3 MG CAP    Take 3 mg by mouth 3 mg daily x 21 days, then off for 7 days, then repeat..    POTASSIUM CHLORIDE SA (K-DUR,KLOR-CON) 20 MEQ TABLET    Take 1 tablet (20 mEq total) by mouth once daily.    PROCHLORPERAZINE (COMPAZINE) 5 MG TABLET    Take 1 tablet (5 mg total) by mouth 4 (four) times daily as needed for Nausea.    RANOLAZINE (RANEXA) 1,000 MG TB12    Take 1,000 mg by mouth 2 (two) times daily.    ROSUVASTATIN (CRESTOR) 40 MG TAB    Take 1 tablet (40 mg total) by mouth every evening.    SENNA (SENOKOT) 8.6 MG TABLET    Take 1 tablet by mouth daily    SENNA-DOCUSATE 8.6-50 MG (SENNA WITH DOCUSATE SODIUM) 8.6-50 MG PER TABLET    Take 1 tablet by mouth daily as needed for Constipation.    TRIAMCINOLONE ACETONIDE 0.1% (KENALOG) 0.1 % CREAM    Apply topically 2 (two) times daily. For two weeks    TRUE METRIX GLUCOSE TEST STRIP STRP    USE  STRIP TO CHECK GLUCOSE SIX TIMES DAILY    TRUEPLUS LANCETS 33 GAUGE MISC    USE   TO CHECK GLUCOSE SIX TIMES DAILY    VITAMIN B COMPLEX ORAL    Take 1 capsule by mouth.     Plan:   Discussed diagnosis, its evaluation, treatment and usual course. All questions answered.  Problem List Items Addressed This Visit     Pulmonary nodules    Current Assessment & Plan     11/3/2020 CT Chest without LLL RUL  6 mm  Never smoker   Need followup   6 month follow up CT chest scheduled May 2020            Pneumonia due to infectious organism    Current Assessment & Plan     Resolved on repeat imaging 11/20/2020         Pancytopenia    Multiple myeloma    Metastatic bone cancer    Calcification of abdominal aorta (Chronic)    Overview     5/26/14 Lspine: There is diffuse non-aneurysmal calcification of the distal aorta and common iliac arteries.           Current Assessment & Plan     Seen on imaging         Bronchiectasis  without complication    Current Assessment & Plan     Seen on CT imaging   Stable on duo nebs twice daily.  Never smoker  Proceed with complete PFT   Consideration to add on ICS inhaler or Pulmicort nebs if needed.         Relevant Orders    Complete PFT with bronchodilator    Stress test, pulmonary    PULM - Arterial Blood Gases--in addition to PFT only    COVID-19 Routine Screening    CT Chest Without Contrast      Other Visit Diagnoses     Hospital discharge follow-up    -  Primary    Non-seasonal allergic rhinitis due to other allergic trigger        Relevant Medications    azelastine (ASTELIN) 137 mcg (0.1 %) nasal spray    SOB (shortness of breath) on exertion        Relevant Orders    Complete PFT with bronchodilator    Stress test, pulmonary    PULM - Arterial Blood Gases--in addition to PFT only    Solitary pulmonary nodule        Relevant Orders    CT Chest Without Contrast        MEDICAL DECISION MAKING: Moderate to high complexity.  Overall, the multiple problems listed are of moderate to high severity that may impact quality of life and activities of daily living. Side effects of medications, treatment plan as well as options and alternatives reviewed and discussed with patient. There was counseling of patient concerning these issues.    Total time spent in face to face counseling and coordination of care 35 in face to face  discussion concerning diagnosis, prognosis, review of lab and test results, benefits of treatment as well as management of disease, counseling of patient and coordination of care between various health  care providers . Greater than half the time spent was used for coordination of care and counseling of patient. Discussion with other physicians or health care providers occurred.    Follow up for next available Bronchiectasis/sob review cpft/abg/6mwd. . CT chest follow up May 2021 or sooner if needed with oncology.

## 2020-11-24 NOTE — ASSESSMENT & PLAN NOTE
11/3/2020 CT Chest without LLL RUL  6 mm  Never smoker   Need followup   6 month follow up CT chest scheduled May 2020

## 2020-11-24 NOTE — ASSESSMENT & PLAN NOTE
Seen on CT imaging   Stable on duo nebs twice daily.  Never smoker  Proceed with complete PFT   Consideration to add on ICS inhaler or Pulmicort nebs if needed.

## 2020-11-28 PROBLEM — I35.9 AORTIC VALVE DISORDER: Status: ACTIVE | Noted: 2020-11-28

## 2020-12-03 DIAGNOSIS — C90.00 MULTIPLE MYELOMA, REMISSION STATUS UNSPECIFIED: ICD-10-CM

## 2020-12-03 DIAGNOSIS — C90.00 MULTIPLE MYELOMA NOT HAVING ACHIEVED REMISSION: ICD-10-CM

## 2020-12-03 RX ORDER — OXYCODONE HYDROCHLORIDE 10 MG/1
10 TABLET ORAL EVERY 6 HOURS PRN
Qty: 45 TABLET | Refills: 0 | Status: CANCELLED | OUTPATIENT
Start: 2020-12-03 | End: 2020-12-26

## 2020-12-03 RX ORDER — OXYCODONE HCL 20 MG/1
20 TABLET, FILM COATED, EXTENDED RELEASE ORAL EVERY 12 HOURS
Qty: 60 EACH | Refills: 0 | Status: SHIPPED | OUTPATIENT
Start: 2020-12-03 | End: 2020-12-04

## 2020-12-04 ENCOUNTER — OFFICE VISIT (OUTPATIENT)
Dept: PALLIATIVE MEDICINE | Facility: CLINIC | Age: 75
End: 2020-12-04
Payer: MEDICARE

## 2020-12-04 DIAGNOSIS — G89.3 NEOPLASM RELATED PAIN: ICD-10-CM

## 2020-12-04 PROCEDURE — 1159F MED LIST DOCD IN RCRD: CPT | Mod: 95,,, | Performed by: FAMILY MEDICINE

## 2020-12-04 PROCEDURE — 1159F PR MEDICATION LIST DOCUMENTED IN MEDICAL RECORD: ICD-10-PCS | Mod: 95,,, | Performed by: FAMILY MEDICINE

## 2020-12-04 PROCEDURE — 3072F PR LOW RISK FOR RETINOPATHY: ICD-10-PCS | Mod: 95,,, | Performed by: FAMILY MEDICINE

## 2020-12-04 PROCEDURE — 99215 OFFICE O/P EST HI 40 MIN: CPT | Mod: 95,,, | Performed by: FAMILY MEDICINE

## 2020-12-04 PROCEDURE — 3072F LOW RISK FOR RETINOPATHY: CPT | Mod: 95,,, | Performed by: FAMILY MEDICINE

## 2020-12-04 PROCEDURE — 99215 PR OFFICE/OUTPT VISIT, EST, LEVL V, 40-54 MIN: ICD-10-PCS | Mod: 95,,, | Performed by: FAMILY MEDICINE

## 2020-12-04 RX ORDER — OXYCODONE HYDROCHLORIDE 30 MG/1
30 TABLET, FILM COATED, EXTENDED RELEASE ORAL EVERY 12 HOURS
Qty: 60 EACH | Refills: 0 | Status: SHIPPED | OUTPATIENT
Start: 2020-12-04 | End: 2021-01-05 | Stop reason: SDUPTHER

## 2020-12-04 RX ORDER — OXYCODONE HYDROCHLORIDE 10 MG/1
10 TABLET ORAL EVERY 4 HOURS PRN
Qty: 120 TABLET | Refills: 0 | Status: SHIPPED | OUTPATIENT
Start: 2020-12-04 | End: 2021-01-05 | Stop reason: SDUPTHER

## 2020-12-04 NOTE — PROGRESS NOTES
Subjective:       Patient ID: Familia Durbin Jr. is a 75 y.o. male.    Chief Complaint: initial palliative consult/hospital f/u  The patient location is: [place]  The chief complaint leading to consultation is: hospital f/u    Visit type: audiovisual    Face to Face time with patient: 50  60 minutes of total time spent on the encounter, which includes face to face time and non-face to face time preparing to see the patient (eg, review of tests), Obtaining and/or reviewing separately obtained history, Documenting clinical information in the electronic or other health record, Independently interpreting results (not separately reported) and communicating results to the patient/family/caregiver, or Care coordination (not separately reported).         Each patient to whom he or she provides medical services by telemedicine is:  (1) informed of the relationship between the physician and patient and the respective role of any other health care provider with respect to management of the patient; and (2) notified that he or she may decline to receive medical services by telemedicine and may withdraw from such care at any time.    Notes:     74 yo male seen today for hospital palliative medicine consult f/u and ongoing pain managmeent. He has h/o multiple myeloma, CKD III, CAD. He reports that pain is fairly well controlled on his current regimen but continues to have frequent breakthrough pain describes as generalized pain. Abdominal pain noted during hospitalization has improvement. Constipation has resolved.     Reviewed palliative med hospital consult below:        History of Present Illness:  Familia Durbin Jr. is a 75 y.o. year old with a history of multiple myeloma, neoplasm related pain, CKD stage III, and CAD who presented to the emergency department complaining of epigastric pain and orthopnea. He has not had a BM since Sunday despite laxative use. He was admitted for further workup and Palliative Medicine was  consulted to assist with pain and symptom control. I met Mr. Durbin without family present. He reports he is feeling okay but still having significant epigastric pain that radiates into his chest. He describes the pain as a tightness and 8-9/10 at its worst which is interfering with his sleep. He denies heartburn and reflux symptoms but takes Pepto or milk of magnesia frequently in addition to his home PPI. He has not had a BM since Sunday despite laxatives. He has been taking his home pain regimen (OxyContin 20mg BID and oxycodone 10mg TID) with decent relief but not controlling the spikes. He says prior to this acute incident his pain was relatively well controlled and he was active. In reviewing the XR I think that constipation and GERD are likely contributing if not causing his pain so would recommend treating both and then further evaluation if no relief. In regards to his home regimen he says that he does not wish to make a change. I spoke to his daughter Alejandrina as she told admitting team that his pain was not well controlled and requested medication adjustment. She says that he is barely independent at home and that his pain is uncontrolled but he is too nervous to ask for an increase. She feels that he would benefit from an adjusted regimen and was planning to set him up with pain management. I explained our role on the team and would happy to assume management of his pain going forth. I recommended they speak this evening in hopes they could get on the same page as far as his pain report. She understands I have resumed his home regimen but increased the frequency of his oxycodone and if discharged over the weekend we will call next week to arrange follow up. Mr. Durbin elected DNR/ DNI on admission and confirmed to me today.    does not have any pertinent problems on file.  Past Medical History:   Diagnosis Date    CAD (coronary artery disease)     tyree    Diabetes mellitus, type 2 1979    eye   josefina    Hearing impaired     Hyperlipidemia     Hypertension     Lupus     Discoid    Multiple myeloma     Old MI (myocardial infarction) 2012    Renal insufficiency     Tracheostomy tube present      Past Surgical History:   Procedure Laterality Date    CARDIAC SURGERY      CATARACT EXTRACTION      COLONOSCOPY      CORONARY ARTERY BYPASS GRAFT      HAND SURGERY      KNEE SURGERY      TRACHEOSTOMY TUBE PLACEMENT      WRIST FUSION       Family History   Problem Relation Age of Onset    Diabetes Mother     Diabetes Father     Prostate cancer Father     Pancreatic cancer Sister     No Known Problems Brother     Diabetes Maternal Uncle     Diabetes Maternal Grandmother     No Known Problems Maternal Grandfather     No Known Problems Paternal Grandmother     No Known Problems Paternal Grandfather      Social History     Socioeconomic History    Marital status: Single     Spouse name: Not on file    Number of children: 9    Years of education: Not on file    Highest education level: Not on file   Occupational History    Not on file   Social Needs    Financial resource strain: Not on file    Food insecurity     Worry: Not on file     Inability: Not on file    Transportation needs     Medical: Not on file     Non-medical: Not on file   Tobacco Use    Smoking status: Never Smoker    Smokeless tobacco: Never Used   Substance and Sexual Activity    Alcohol use: No    Drug use: No    Sexual activity: Yes     Partners: Female   Lifestyle    Physical activity     Days per week: Not on file     Minutes per session: Not on file    Stress: Not on file   Relationships    Social connections     Talks on phone: Not on file     Gets together: Not on file     Attends [employer] service: Not on file     Active member of club or organization: Not on file     Attends meetings of clubs or organizations: Not on file     Relationship status: Not on file   Other Topics Concern    Not on file    Social History Narrative    Not on file     Review of Systems   A comprehensive 14-point review of systems was reviewed with patient and was negative other than as specified above.     Objective:   There were no vitals filed for this visit.     Physical Exam  Vitals signs and nursing note reviewed.   Constitutional:       Appearance: He is well-developed. He is ill-appearing.   HENT:      Head: Normocephalic and atraumatic.   Eyes:      Pupils: Pupils are equal, round, and reactive to light.   Neck:      Musculoskeletal: Normal range of motion and neck supple.   Cardiovascular:      Rate and Rhythm: Normal rate and regular rhythm.   Pulmonary:      Effort: Pulmonary effort is normal.      Breath sounds: Normal breath sounds.   Abdominal:      General: Bowel sounds are normal.      Palpations: Abdomen is soft.   Musculoskeletal: Normal range of motion.         General: No deformity.   Skin:     General: Skin is warm and dry.   Neurological:      Mental Status: He is alert and oriented to person, place, and time.   Psychiatric:         Behavior: Behavior normal.         Review of Symptoms    Symptom Assessment (ESAS 0-10 Scale)  Pain:  7  Dyspnea:  2  Anxiety:  2  Nausea:  0  Depression:  0  Anorexia:  0  Fatigue:  0  Insomnia:  0  Restlessness:  0  Agitation:  0     CAM / Delirium:  Negative  Constipation:  Negative  Diarrhea:  Negative          ECOG Performance Status Grade:  3 - Confined to bed or chair 50% of waking hours    Living Arrangements:  Lives with family    Advance Care Planning   Advance Directives:   Living Will: No    [place]POST: No    Do Not Resuscitate Status: Yes (was DNR on recent admission)    Agent's Name:  Alejandrina Nichole    Decision Making:  Patient answered questions         Assessment:       1. Metastatic bone cancer    2. Neoplasm related pain        Plan:           Problem List Items Addressed This Visit        Oncology    Metastatic bone cancer - Primary    Relevant Medications    oxyCODONE  (OXYCONTIN) 30 mg TR12 12 hr tablet    oxyCODONE (ROXICODONE) 10 mg Tab immediate release tablet    Neoplasm related pain    Relevant Medications    oxyCODONE (OXYCONTIN) 30 mg TR12 12 hr tablet    oxyCODONE (ROXICODONE) 10 mg Tab immediate release tablet        We will coordinate ACP doc completion at next in person visit. Oxycontin titrated up to 30 mg q12; explained rationale for long acting medication with hope of decreasing need for PRN usage of breakthrough med. RTC 1 month.    Complexity of decision making HIGH based on severity of patients illnesss, advanced age, chronic medical conditions. High risk medication use requires careful dosing and monitoring due to risk of serious side effects.     Thank you for involving me in the care of this patient. Will continue to follow.

## 2020-12-09 ENCOUNTER — HOSPITAL ENCOUNTER (OUTPATIENT)
Dept: RADIOLOGY | Facility: HOSPITAL | Age: 75
Discharge: HOME | End: 2020-12-09
Attending: INTERNAL MEDICINE
Payer: MEDICARE

## 2020-12-09 ENCOUNTER — HOSPITAL ENCOUNTER (OUTPATIENT)
Dept: CARDIOLOGY | Facility: HOSPITAL | Age: 75
Discharge: HOME | End: 2020-12-09
Attending: INTERNAL MEDICINE
Payer: MEDICARE

## 2020-12-09 DIAGNOSIS — R07.89 OTHER CHEST PAIN: ICD-10-CM

## 2020-12-09 PROCEDURE — 63600175 PHARM REV CODE 636 W HCPCS: Performed by: INTERNAL MEDICINE

## 2020-12-09 PROCEDURE — A9502 TC99M TETROFOSMIN: HCPCS

## 2020-12-09 PROCEDURE — 93016 STRESS TEST WITH MYOCARDIAL PERFUSION (CUPID ONLY): ICD-10-PCS | Mod: ,,, | Performed by: INTERNAL MEDICINE

## 2020-12-09 PROCEDURE — 93016 CV STRESS TEST SUPVJ ONLY: CPT | Mod: ,,, | Performed by: INTERNAL MEDICINE

## 2020-12-09 PROCEDURE — 93018 STRESS TEST WITH MYOCARDIAL PERFUSION (CUPID ONLY): ICD-10-PCS | Mod: ,,, | Performed by: INTERNAL MEDICINE

## 2020-12-09 PROCEDURE — 78452 STRESS TEST WITH MYOCARDIAL PERFUSION (CUPID ONLY): ICD-10-PCS | Mod: 26,,, | Performed by: INTERNAL MEDICINE

## 2020-12-09 PROCEDURE — 78452 HT MUSCLE IMAGE SPECT MULT: CPT | Mod: 26,,, | Performed by: INTERNAL MEDICINE

## 2020-12-09 PROCEDURE — 93018 CV STRESS TEST I&R ONLY: CPT | Mod: ,,, | Performed by: INTERNAL MEDICINE

## 2020-12-09 PROCEDURE — 93017 CV STRESS TEST TRACING ONLY: CPT

## 2020-12-09 RX ORDER — AMINOPHYLLINE 25 MG/ML
75 INJECTION, SOLUTION INTRAVENOUS ONCE
Status: COMPLETED | OUTPATIENT
Start: 2020-12-09 | End: 2020-12-09

## 2020-12-09 RX ORDER — REGADENOSON 0.08 MG/ML
0.4 INJECTION, SOLUTION INTRAVENOUS ONCE
Status: COMPLETED | OUTPATIENT
Start: 2020-12-09 | End: 2020-12-09

## 2020-12-09 RX ADMIN — AMINOPHYLLINE 75 MG: 25 INJECTION, SOLUTION INTRAVENOUS at 10:12

## 2020-12-09 RX ADMIN — REGADENOSON 0.4 MG: 0.08 INJECTION, SOLUTION INTRAVENOUS at 10:12

## 2020-12-10 LAB
CV STRESS BASE HR: 66 BPM
DIASTOLIC BLOOD PRESSURE: 66 MMHG
NUC REST EJECTION FRACTION: 49
OHS CV CPX 85 PERCENT MAX PREDICTED HEART RATE MALE: 122
OHS CV CPX ESTIMATED METS: 1
OHS CV CPX MAX PREDICTED HEART RATE: 144
OHS CV CPX PATIENT IS FEMALE: 0
OHS CV CPX PATIENT IS MALE: 1
OHS CV CPX PEAK DIASTOLIC BLOOD PRESSURE: 68 MMHG
OHS CV CPX PEAK HEAR RATE: 73 BPM
OHS CV CPX PEAK RATE PRESSURE PRODUCT: NORMAL
OHS CV CPX PEAK SYSTOLIC BLOOD PRESSURE: 150 MMHG
OHS CV CPX PERCENT MAX PREDICTED HEART RATE ACHIEVED: 51
OHS CV CPX RATE PRESSURE PRODUCT PRESENTING: 8844
STRESS ECHO POST EXERCISE DUR MIN: 1 MINUTES
STRESS ECHO POST EXERCISE DUR SEC: 30 SECONDS
SYSTOLIC BLOOD PRESSURE: 134 MMHG

## 2020-12-11 ENCOUNTER — OFFICE VISIT (OUTPATIENT)
Dept: CARDIOLOGY | Facility: CLINIC | Age: 75
End: 2020-12-11
Payer: MEDICARE

## 2020-12-11 ENCOUNTER — TELEPHONE (OUTPATIENT)
Dept: CARDIOLOGY | Facility: CLINIC | Age: 75
End: 2020-12-11

## 2020-12-11 VITALS
BODY MASS INDEX: 21.68 KG/M2 | OXYGEN SATURATION: 99 % | HEART RATE: 59 BPM | DIASTOLIC BLOOD PRESSURE: 60 MMHG | WEIGHT: 151.13 LBS | SYSTOLIC BLOOD PRESSURE: 118 MMHG

## 2020-12-11 DIAGNOSIS — I25.118 CORONARY ARTERY DISEASE OF NATIVE ARTERY OF NATIVE HEART WITH STABLE ANGINA PECTORIS: Primary | Chronic | ICD-10-CM

## 2020-12-11 DIAGNOSIS — I35.9 AORTIC VALVE DISORDER: ICD-10-CM

## 2020-12-11 DIAGNOSIS — I10 ESSENTIAL HYPERTENSION: Chronic | ICD-10-CM

## 2020-12-11 DIAGNOSIS — Z79.4 TYPE 2 DIABETES MELLITUS WITH DIABETIC NEPHROPATHY, WITH LONG-TERM CURRENT USE OF INSULIN: Chronic | ICD-10-CM

## 2020-12-11 DIAGNOSIS — E78.2 MIXED HYPERLIPIDEMIA: Chronic | ICD-10-CM

## 2020-12-11 DIAGNOSIS — Z95.1 HX OF CABG: ICD-10-CM

## 2020-12-11 DIAGNOSIS — I70.0 CALCIFICATION OF ABDOMINAL AORTA: Chronic | ICD-10-CM

## 2020-12-11 DIAGNOSIS — C90.00 MULTIPLE MYELOMA NOT HAVING ACHIEVED REMISSION: ICD-10-CM

## 2020-12-11 DIAGNOSIS — E11.21 TYPE 2 DIABETES MELLITUS WITH DIABETIC NEPHROPATHY, WITH LONG-TERM CURRENT USE OF INSULIN: Chronic | ICD-10-CM

## 2020-12-11 PROCEDURE — 99999 PR PBB SHADOW E&M-EST. PATIENT-LVL V: ICD-10-PCS | Mod: PBBFAC,,, | Performed by: INTERNAL MEDICINE

## 2020-12-11 PROCEDURE — 99214 OFFICE O/P EST MOD 30 MIN: CPT | Mod: S$GLB,,, | Performed by: INTERNAL MEDICINE

## 2020-12-11 PROCEDURE — 1126F PR PAIN SEVERITY QUANTIFIED, NO PAIN PRESENT: ICD-10-PCS | Mod: S$GLB,,, | Performed by: INTERNAL MEDICINE

## 2020-12-11 PROCEDURE — 3072F LOW RISK FOR RETINOPATHY: CPT | Mod: S$GLB,,, | Performed by: INTERNAL MEDICINE

## 2020-12-11 PROCEDURE — 3074F SYST BP LT 130 MM HG: CPT | Mod: CPTII,S$GLB,, | Performed by: INTERNAL MEDICINE

## 2020-12-11 PROCEDURE — 99214 PR OFFICE/OUTPT VISIT, EST, LEVL IV, 30-39 MIN: ICD-10-PCS | Mod: S$GLB,,, | Performed by: INTERNAL MEDICINE

## 2020-12-11 PROCEDURE — 1159F PR MEDICATION LIST DOCUMENTED IN MEDICAL RECORD: ICD-10-PCS | Mod: S$GLB,,, | Performed by: INTERNAL MEDICINE

## 2020-12-11 PROCEDURE — 1159F MED LIST DOCD IN RCRD: CPT | Mod: S$GLB,,, | Performed by: INTERNAL MEDICINE

## 2020-12-11 PROCEDURE — 99999 PR PBB SHADOW E&M-EST. PATIENT-LVL V: CPT | Mod: PBBFAC,,, | Performed by: INTERNAL MEDICINE

## 2020-12-11 PROCEDURE — 3078F DIAST BP <80 MM HG: CPT | Mod: CPTII,S$GLB,, | Performed by: INTERNAL MEDICINE

## 2020-12-11 PROCEDURE — 1126F AMNT PAIN NOTED NONE PRSNT: CPT | Mod: S$GLB,,, | Performed by: INTERNAL MEDICINE

## 2020-12-11 PROCEDURE — 3072F PR LOW RISK FOR RETINOPATHY: ICD-10-PCS | Mod: S$GLB,,, | Performed by: INTERNAL MEDICINE

## 2020-12-11 PROCEDURE — 3078F PR MOST RECENT DIASTOLIC BLOOD PRESSURE < 80 MM HG: ICD-10-PCS | Mod: CPTII,S$GLB,, | Performed by: INTERNAL MEDICINE

## 2020-12-11 PROCEDURE — 3074F PR MOST RECENT SYSTOLIC BLOOD PRESSURE < 130 MM HG: ICD-10-PCS | Mod: CPTII,S$GLB,, | Performed by: INTERNAL MEDICINE

## 2020-12-11 NOTE — PROGRESS NOTES
Subjective:   Patient ID:  Familia Durbin Jr. is a 75 y.o. male who presents for follow up of No chief complaint on file.        75 y.o. AAM, discharge f/u  PMHx CAD s/o cabg x2 in 2002 and few PCIs after cabg, CHf pEF mid to mode AS/, DM type 2, HLD, HTN, Lupus, Multiple myeloma with mets to the bone, old MI, Renal insufficiency,  S/p LHC done in 2018 at [facility], showed significant native three-vessel coronary artery disease, Patent LIMA-LAD and SVG-RCA.      admitted [facility] for epigastric abdominal pain, which onset 6 days ago.  Patient states that he has had intermittent epigastric/CP, dyspnea on exertion, and orthopnea for the past 2 days. ER workup showed; elevated BP, otherwise stable VS.  CBC showed Hgb 10.0, Hct 30.9, otherwise unremarkable.  CMP showed , otherwise unremarkable.  BNP 2095.  Initial troponin 0.059.  ECHO on 11/3 showed EF of 55%, associated with mild-to-moderate aortic regurgitation and stenosis, with mild pulmonic and mitral regurgitation.  CXR consistent with FVO. ekg NSR and nonspecific ctt change  Echo   · There is left ventricular concentric hypertrophy.  · The left ventricle is normal in size with normal systolic function. The estimated ejection fraction is 55%.  · Indeterminate diastolic function.  · With low normal right ventricular systolic function.  · Mild-to-moderate aortic regurgitation.  · Mild-to-moderate aortic valve stenosis.  · Aortic valve area is 1.54 cm2; peak velocity is 2.17 m/s; mean gradient is 11 mmHg.  · Mild pulmonic regurgitation.  · The mitral valve is mildly sclerotic.  · Normal central venous pressure (3 mmHg).  · Mild mitral regurgitation.    Pt was Rx for angina pain and GERD. The pain improved. And had conversation with the oncologist Dr. Church, who believed pt had pretty good prognosis   Today the pain resolved. And ZAVALA improved.    MPI showed scar in lnferolaetral wall        Past Medical History:   Diagnosis Date    CAD  (coronary artery disease)     tyree    Diabetes mellitus, type 2 1979    eye dr rocha    Hearing impaired     Hyperlipidemia     Hypertension     Lupus     Discoid    Multiple myeloma     Old MI (myocardial infarction) 2012    Renal insufficiency     Tracheostomy tube present        Past Surgical History:   Procedure Laterality Date    CARDIAC SURGERY      CATARACT EXTRACTION      COLONOSCOPY      CORONARY ARTERY BYPASS GRAFT      HAND SURGERY      KNEE SURGERY      TRACHEOSTOMY TUBE PLACEMENT      WRIST FUSION         Social History     Tobacco Use    Smoking status: Never Smoker    Smokeless tobacco: Never Used   Substance Use Topics    Alcohol use: No    Drug use: No       Family History   Problem Relation Age of Onset    Diabetes Mother     Diabetes Father     Prostate cancer Father     Pancreatic cancer Sister     No Known Problems Brother     Diabetes Maternal Uncle     Diabetes Maternal Grandmother     No Known Problems Maternal Grandfather     No Known Problems Paternal Grandmother     No Known Problems Paternal Grandfather          Review of Systems   Constitution: Positive for malaise/fatigue. Negative for decreased appetite, diaphoresis, fever and night sweats.   HENT: Negative for nosebleeds.    Eyes: Negative for blurred vision and double vision.   Cardiovascular: Positive for dyspnea on exertion. Negative for chest pain, claudication, irregular heartbeat, leg swelling, near-syncope, orthopnea, palpitations, paroxysmal nocturnal dyspnea and syncope.   Respiratory: Negative for cough, shortness of breath, sleep disturbances due to breathing, snoring, sputum production and wheezing.    Endocrine: Negative for cold intolerance and polyuria.   Hematologic/Lymphatic: Does not bruise/bleed easily.   Skin: Negative for rash.   Musculoskeletal: Positive for back pain. Negative for falls, joint pain, joint swelling and neck pain.   Gastrointestinal: Negative for abdominal  pain, heartburn, nausea and vomiting.   Genitourinary: Negative for dysuria, frequency and hematuria.   Neurological: Negative for difficulty with concentration, dizziness, focal weakness, headaches, light-headedness, numbness, seizures and weakness.   Psychiatric/Behavioral: Negative for depression, memory loss and substance abuse. The patient does not have insomnia.    Allergic/Immunologic: Negative for HIV exposure and hives.       Objective:   Physical Exam   Constitutional: He is oriented to person, place, and time. He appears well-nourished.   HENT:   Head: Normocephalic.   Eyes: Pupils are equal, round, and reactive to light.   Neck: Normal carotid pulses and no JVD present. Carotid bruit is not present. No thyromegaly present.   Cardiovascular: Normal rate, regular rhythm and normal pulses.  No extrasystoles are present. PMI is not displaced. Exam reveals no gallop and no S3.   Murmur (ESM on bases) heard.  Pulmonary/Chest: Breath sounds normal. No stridor. No respiratory distress.   Abdominal: Soft. Bowel sounds are normal. There is no abdominal tenderness. There is no rebound.   Musculoskeletal: Normal range of motion.   Neurological: He is alert and oriented to person, place, and time.   Skin: Skin is intact. No rash noted.   Psychiatric: His behavior is normal.       Lab Results   Component Value Date    CHOL 156 07/31/2020    CHOL 130 11/22/2019    CHOL 114 (L) 01/22/2018     Lab Results   Component Value Date    HDL 55 07/31/2020    HDL 47 11/22/2019    HDL 38 (L) 01/22/2018     Lab Results   Component Value Date    LDLCALC 86.2 07/31/2020    LDLCALC 67.2 11/22/2019    LDLCALC 57.4 (L) 01/22/2018     Lab Results   Component Value Date    TRIG 74 07/31/2020    TRIG 79 11/22/2019    TRIG 93 01/22/2018     Lab Results   Component Value Date    CHOLHDL 35.3 07/31/2020    CHOLHDL 36.2 11/22/2019    CHOLHDL 33.3 01/22/2018       Chemistry        Component Value Date/Time     11/22/2020 0808    K 3.5  11/22/2020 0808     11/22/2020 0808    CO2 28 11/22/2020 0808    BUN 20 11/22/2020 0808    CREATININE 1.5 (H) 11/22/2020 0808     11/22/2020 0808        Component Value Date/Time    CALCIUM 9.0 11/22/2020 0808    ALKPHOS 68 11/20/2020 0950    AST 20 11/20/2020 0950    ALT 18 11/20/2020 0950    BILITOT 1.0 11/20/2020 0950    ESTGFRAFRICA 52 (A) 11/22/2020 0808    EGFRNONAA 45 (A) 11/22/2020 0808          Lab Results   Component Value Date    HGBA1C 6.4 (H) 11/03/2020     Lab Results   Component Value Date    TSH 0.782 07/31/2020     Lab Results   Component Value Date    INR 1.1 11/03/2020     Lab Results   Component Value Date    WBC 2.88 (L) 11/22/2020    HGB 9.9 (L) 11/22/2020    HCT 31.7 (L) 11/22/2020     (H) 11/22/2020     11/22/2020     BMP  Sodium   Date Value Ref Range Status   11/22/2020 140 136 - 145 mmol/L Final     Potassium   Date Value Ref Range Status   11/22/2020 3.5 3.5 - 5.1 mmol/L Final     Chloride   Date Value Ref Range Status   11/22/2020 100 95 - 110 mmol/L Final     CO2   Date Value Ref Range Status   11/22/2020 28 23 - 29 mmol/L Final     BUN   Date Value Ref Range Status   11/22/2020 20 8 - 23 mg/dL Final     Creatinine   Date Value Ref Range Status   11/22/2020 1.5 (H) 0.5 - 1.4 mg/dL Final     Calcium   Date Value Ref Range Status   11/22/2020 9.0 8.7 - 10.5 mg/dL Final     Anion Gap   Date Value Ref Range Status   11/22/2020 12 8 - 16 mmol/L Final     eGFR if    Date Value Ref Range Status   11/22/2020 52 (A) >60 mL/min/1.73 m^2 Final     eGFR if non    Date Value Ref Range Status   11/22/2020 45 (A) >60 mL/min/1.73 m^2 Final     Comment:     Calculation used to obtain the estimated glomerular filtration  rate (eGFR) is the CKD-EPI equation.        BNP  @LABRCNTIP(BNP,BNPTRIAGEBLO)@  @LABRCNTIP(troponini)@  CrCl cannot be calculated (Patient's most recent lab result is older than the maximum 7 days allowed.).  No results found in  the last 24 hours.  No results found in the last 24 hours.  No results found in the last 24 hours.    Assessment:      1. Coronary artery disease of native artery of native heart with stable angina pectoris    2. Aortic valve disorder    3. Essential hypertension    4. Mixed hyperlipidemia    5. Calcification of abdominal aorta    6. Hx of CABG    7. Multiple myeloma not having achieved remission    8. Type 2 diabetes mellitus with diabetic nephropathy, with long-term current use of insulin        Plan:   Check Lipid profile in 6 months  Continue ASA Brilinta Imur ranexa amlodipine BB acei and statin/fish oil  DM Rx per PCP  Counseled DASH  Check Lipid profile in 6 months  Recommend heart-healthy diet, weight control and regular exercise.  Hitesh. Risk modification.   I have reviewed all pertinent labs and cardiac studies independently. Plans and recommendations have been formulated under my direct supervision. All questions answered and patient voiced understanding.   If symptoms persist go to the ED  RTC in 6 months

## 2020-12-11 NOTE — TELEPHONE ENCOUNTER
Patient was contacted and was advised that his     Stress test showed old infarct, the patient was informed that there is a small area of dead tissue.

## 2020-12-20 PROBLEM — Z95.1 HX OF CABG: Status: ACTIVE | Noted: 2020-12-20

## 2020-12-21 ENCOUNTER — TELEPHONE (OUTPATIENT)
Dept: INTERNAL MEDICINE | Facility: CLINIC | Age: 75
End: 2020-12-21

## 2020-12-21 NOTE — TELEPHONE ENCOUNTER
----- Message from Genia Crenshaw sent at 12/21/2020  1:03 PM CST -----  Contact: [facility](Tasha)-[phone]  Would like to consult with nurse regarding patient blood pressure(98/48). Please call back at [phone]. Thanks/ar

## 2020-12-22 ENCOUNTER — TELEPHONE (OUTPATIENT)
Dept: CARDIOLOGY | Facility: CLINIC | Age: 75
End: 2020-12-22

## 2020-12-22 NOTE — TELEPHONE ENCOUNTER
Patient contacted and was advised that  The  nurse gave a report. The patient's BP on 12/22/2020 was 100/70 sitting  98/70 standing. The medication has been held. The patient is scheduled to have a virtual visit on 12/23/2020.      ----- Message from Kina Austin sent at 12/22/2020  3:33 PM CST -----  Regarding: Patient advice  Contact: Tariq from [facility]  Tariq from [facility] nurse called in regards to pt being extremely dizzy and having low blood pressure       90/48 , 98/70 standing       Landon call back number [phone]

## 2020-12-23 ENCOUNTER — OFFICE VISIT (OUTPATIENT)
Dept: CARDIOLOGY | Facility: CLINIC | Age: 75
End: 2020-12-23
Payer: MEDICARE

## 2020-12-23 ENCOUNTER — LAB VISIT (OUTPATIENT)
Dept: LAB | Facility: HOSPITAL | Age: 75
End: 2020-12-23
Attending: INTERNAL MEDICINE
Payer: MEDICARE

## 2020-12-23 DIAGNOSIS — E11.21 TYPE 2 DIABETES MELLITUS WITH DIABETIC NEPHROPATHY, WITH LONG-TERM CURRENT USE OF INSULIN: Chronic | ICD-10-CM

## 2020-12-23 DIAGNOSIS — C90.00 MULTIPLE MYELOMA NOT HAVING ACHIEVED REMISSION: ICD-10-CM

## 2020-12-23 DIAGNOSIS — E78.2 MIXED HYPERLIPIDEMIA: Chronic | ICD-10-CM

## 2020-12-23 DIAGNOSIS — Z79.4 TYPE 2 DIABETES MELLITUS WITH DIABETIC NEPHROPATHY, WITH LONG-TERM CURRENT USE OF INSULIN: Chronic | ICD-10-CM

## 2020-12-23 DIAGNOSIS — I10 ESSENTIAL HYPERTENSION: Chronic | ICD-10-CM

## 2020-12-23 DIAGNOSIS — I25.118 CORONARY ARTERY DISEASE OF NATIVE ARTERY OF NATIVE HEART WITH STABLE ANGINA PECTORIS: Primary | Chronic | ICD-10-CM

## 2020-12-23 DIAGNOSIS — N17.9 ACUTE RENAL FAILURE, UNSPECIFIED ACUTE RENAL FAILURE TYPE: ICD-10-CM

## 2020-12-23 DIAGNOSIS — I50.32 CHRONIC DIASTOLIC CONGESTIVE HEART FAILURE: ICD-10-CM

## 2020-12-23 DIAGNOSIS — Z95.1 HX OF CABG: ICD-10-CM

## 2020-12-23 LAB
ALBUMIN SERPL BCP-MCNC: 3.7 G/DL (ref 3.5–5.2)
ALP SERPL-CCNC: 96 U/L (ref 55–135)
ALT SERPL W/O P-5'-P-CCNC: 27 U/L (ref 10–44)
ANION GAP SERPL CALC-SCNC: 9 MMOL/L (ref 8–16)
AST SERPL-CCNC: 21 U/L (ref 10–40)
BASOPHILS # BLD AUTO: 0.04 K/UL (ref 0–0.2)
BASOPHILS NFR BLD: 1.5 % (ref 0–1.9)
BILIRUB SERPL-MCNC: 0.5 MG/DL (ref 0.1–1)
BUN SERPL-MCNC: 32 MG/DL (ref 8–23)
CALCIUM SERPL-MCNC: 9.3 MG/DL (ref 8.7–10.5)
CHLORIDE SERPL-SCNC: 105 MMOL/L (ref 95–110)
CO2 SERPL-SCNC: 23 MMOL/L (ref 23–29)
CREAT SERPL-MCNC: 1.8 MG/DL (ref 0.5–1.4)
DIFFERENTIAL METHOD: ABNORMAL
EOSINOPHIL # BLD AUTO: 0.1 K/UL (ref 0–0.5)
EOSINOPHIL NFR BLD: 1.9 % (ref 0–8)
ERYTHROCYTE [DISTWIDTH] IN BLOOD BY AUTOMATED COUNT: 16.2 % (ref 11.5–14.5)
EST. GFR  (AFRICAN AMERICAN): 41 ML/MIN/1.73 M^2
EST. GFR  (NON AFRICAN AMERICAN): 36 ML/MIN/1.73 M^2
GLUCOSE SERPL-MCNC: 193 MG/DL (ref 70–110)
HCT VFR BLD AUTO: 36.8 % (ref 40–54)
HGB BLD-MCNC: 11.8 G/DL (ref 14–18)
IMM GRANULOCYTES # BLD AUTO: 0.03 K/UL (ref 0–0.04)
IMM GRANULOCYTES NFR BLD AUTO: 1.1 % (ref 0–0.5)
LYMPHOCYTES # BLD AUTO: 0.8 K/UL (ref 1–4.8)
LYMPHOCYTES NFR BLD: 29 % (ref 18–48)
MCH RBC QN AUTO: 33.3 PG (ref 27–31)
MCHC RBC AUTO-ENTMCNC: 32.1 G/DL (ref 32–36)
MCV RBC AUTO: 104 FL (ref 82–98)
MONOCYTES # BLD AUTO: 0.3 K/UL (ref 0.3–1)
MONOCYTES NFR BLD: 9.7 % (ref 4–15)
NEUTROPHILS # BLD AUTO: 1.5 K/UL (ref 1.8–7.7)
NEUTROPHILS NFR BLD: 56.8 % (ref 38–73)
NRBC BLD-RTO: 0 /100 WBC
PLATELET # BLD AUTO: 169 K/UL (ref 150–350)
PMV BLD AUTO: 10.9 FL (ref 9.2–12.9)
POTASSIUM SERPL-SCNC: 4.5 MMOL/L (ref 3.5–5.1)
PROT SERPL-MCNC: 8.5 G/DL (ref 6–8.4)
RBC # BLD AUTO: 3.54 M/UL (ref 4.6–6.2)
SODIUM SERPL-SCNC: 137 MMOL/L (ref 136–145)
WBC # BLD AUTO: 2.69 K/UL (ref 3.9–12.7)

## 2020-12-23 PROCEDURE — 36415 COLL VENOUS BLD VENIPUNCTURE: CPT

## 2020-12-23 PROCEDURE — 99214 PR OFFICE/OUTPT VISIT, EST, LEVL IV, 30-39 MIN: ICD-10-PCS | Mod: 95,,, | Performed by: INTERNAL MEDICINE

## 2020-12-23 PROCEDURE — 83520 IMMUNOASSAY QUANT NOS NONAB: CPT | Mod: 59

## 2020-12-23 PROCEDURE — 85025 COMPLETE CBC W/AUTO DIFF WBC: CPT

## 2020-12-23 PROCEDURE — 1159F PR MEDICATION LIST DOCUMENTED IN MEDICAL RECORD: ICD-10-PCS | Mod: 95,,, | Performed by: INTERNAL MEDICINE

## 2020-12-23 PROCEDURE — 99214 OFFICE O/P EST MOD 30 MIN: CPT | Mod: 95,,, | Performed by: INTERNAL MEDICINE

## 2020-12-23 PROCEDURE — 84165 PROTEIN E-PHORESIS SERUM: CPT | Mod: 26,,, | Performed by: PATHOLOGY

## 2020-12-23 PROCEDURE — 84165 PATHOLOGIST INTERPRETATION SPE: ICD-10-PCS | Mod: 26,,, | Performed by: PATHOLOGY

## 2020-12-23 PROCEDURE — 3072F PR LOW RISK FOR RETINOPATHY: ICD-10-PCS | Mod: 95,,, | Performed by: INTERNAL MEDICINE

## 2020-12-23 PROCEDURE — 84165 PROTEIN E-PHORESIS SERUM: CPT

## 2020-12-23 PROCEDURE — 1159F MED LIST DOCD IN RCRD: CPT | Mod: 95,,, | Performed by: INTERNAL MEDICINE

## 2020-12-23 PROCEDURE — 80053 COMPREHEN METABOLIC PANEL: CPT

## 2020-12-23 PROCEDURE — 3072F LOW RISK FOR RETINOPATHY: CPT | Mod: 95,,, | Performed by: INTERNAL MEDICINE

## 2020-12-23 RX ORDER — FUROSEMIDE 20 MG/1
20 TABLET ORAL
Qty: 15 TABLET | Refills: 5 | Status: SHIPPED | OUTPATIENT
Start: 2020-12-23 | End: 2021-03-11

## 2020-12-23 RX ORDER — ISOSORBIDE MONONITRATE 60 MG/1
60 TABLET, EXTENDED RELEASE ORAL 2 TIMES DAILY
Qty: 60 TABLET | Refills: 11 | Status: SHIPPED | OUTPATIENT
Start: 2020-12-23 | End: 2021-10-11

## 2020-12-23 RX ORDER — POTASSIUM CHLORIDE 20 MEQ/1
20 TABLET, EXTENDED RELEASE ORAL
Qty: 15 TABLET | Refills: 5 | Status: SHIPPED | OUTPATIENT
Start: 2020-12-23 | End: 2021-07-12

## 2020-12-23 NOTE — PROGRESS NOTES
Subjective:   Patient ID:  Familia Durbin Jr. is a 75 y.o. male who presents for follow up of No chief complaint on file.        75 y.o. AAM, discharge f/u  PMHx CAD s/o cabg x2 in 2002 and few PCIs after cabg, CHf pEF mid to mode AS/, DM type 2, HLD, HTN, Lupus, Multiple myeloma with mets to the bone, old MI, Renal insufficiency,  S/p LHC done in 2018 at [facility], showed significant native three-vessel coronary artery disease, Patent LIMA-LAD and SVG-RCA.      admitted [facility] for epigastric abdominal pain, which onset 6 days ago.  Patient states that he has had intermittent epigastric/CP, dyspnea on exertion, and orthopnea for the past 2 days. ER workup showed; elevated BP, otherwise stable VS.  CBC showed Hgb 10.0, Hct 30.9, otherwise unremarkable.  CMP showed , otherwise unremarkable.  BNP 2095.  Initial troponin 0.059.  ECHO on 11/3 showed EF of 55%, associated with mild-to-moderate aortic regurgitation and stenosis, with mild pulmonic and mitral regurgitation.  CXR consistent with FVO. ekg NSR and nonspecific ctt change  Echo   · There is left ventricular concentric hypertrophy.  · The left ventricle is normal in size with normal systolic function. The estimated ejection fraction is 55%.  · Indeterminate diastolic function.  · With low normal right ventricular systolic function.  · Mild-to-moderate aortic regurgitation.  · Mild-to-moderate aortic valve stenosis.  · Aortic valve area is 1.54 cm2; peak velocity is 2.17 m/s; mean gradient is 11 mmHg.  · Mild pulmonic regurgitation.  · The mitral valve is mildly sclerotic.  · Normal central venous pressure (3 mmHg).  · Mild mitral regurgitation.    Pt was Rx for angina pain and GERD. The pain improved. And had conversation with the oncologist Dr. Church, who believed pt had pretty good prognosis   Today the pain resolved. And ZAVALA improved.    MPI showed scar in lnferolaetral wall      Dizziness when eh stood up fast. No faint and  syncope. The  nurse gave a report. The patient's BP on 12/22/2020 was 100/70 sitting  98/70 standing yesterday. And Cr elevated to 1.8 and K 4.5 yesterday  After holding lasix 20 mg daily lisinopril  2.5 mg daily, amlodipine 2.5 mg daily 3 days, he felt better  Breathing is ok   three weeks ago  Today 138/77 mmHg and pulse 66  Plan to [facility] early next week      Past Medical History:   Diagnosis Date    CAD (coronary artery disease)     luikart    Diabetes mellitus, type 2 1979    eye dr rocha    Hearing impaired     Hyperlipidemia     Hypertension     Lupus     Discoid    Multiple myeloma     Old MI (myocardial infarction) 2012    Renal insufficiency     Tracheostomy tube present        Past Surgical History:   Procedure Laterality Date    CARDIAC SURGERY      CATARACT EXTRACTION      COLONOSCOPY      CORONARY ARTERY BYPASS GRAFT      HAND SURGERY      KNEE SURGERY      TRACHEOSTOMY TUBE PLACEMENT      WRIST FUSION         Social History     Tobacco Use    Smoking status: Never Smoker    Smokeless tobacco: Never Used   Substance Use Topics    Alcohol use: No    Drug use: No       Family History   Problem Relation Age of Onset    Diabetes Mother     Diabetes Father     Prostate cancer Father     Pancreatic cancer Sister     No Known Problems Brother     Diabetes Maternal Uncle     Diabetes Maternal Grandmother     No Known Problems Maternal Grandfather     No Known Problems Paternal Grandmother     No Known Problems Paternal Grandfather          Review of Systems   Constitution: Positive for malaise/fatigue. Negative for decreased appetite, diaphoresis, fever and night sweats.   HENT: Negative for nosebleeds.    Eyes: Negative for blurred vision and double vision.   Cardiovascular: Positive for dyspnea on exertion. Negative for chest pain, claudication, irregular heartbeat, leg swelling, near-syncope, orthopnea, palpitations, paroxysmal nocturnal dyspnea  and syncope.   Respiratory: Negative for cough, shortness of breath, sleep disturbances due to breathing, snoring, sputum production and wheezing.    Endocrine: Negative for cold intolerance and polyuria.   Hematologic/Lymphatic: Does not bruise/bleed easily.   Skin: Negative for rash.   Musculoskeletal: Positive for back pain. Negative for falls, joint pain, joint swelling and neck pain.   Gastrointestinal: Negative for abdominal pain, heartburn, nausea and vomiting.   Genitourinary: Negative for dysuria, frequency and hematuria.   Neurological: Positive for dizziness. Negative for difficulty with concentration, focal weakness, headaches, light-headedness, numbness, seizures and weakness.   Psychiatric/Behavioral: Negative for depression, memory loss and substance abuse. The patient does not have insomnia.    Allergic/Immunologic: Negative for HIV exposure and hives.       Objective:   Physical Exam    Lab Results   Component Value Date    CHOL 156 07/31/2020    CHOL 130 11/22/2019    CHOL 114 (L) 01/22/2018     Lab Results   Component Value Date    HDL 55 07/31/2020    HDL 47 11/22/2019    HDL 38 (L) 01/22/2018     Lab Results   Component Value Date    LDLCALC 86.2 07/31/2020    LDLCALC 67.2 11/22/2019    LDLCALC 57.4 (L) 01/22/2018     Lab Results   Component Value Date    TRIG 74 07/31/2020    TRIG 79 11/22/2019    TRIG 93 01/22/2018     Lab Results   Component Value Date    CHOLHDL 35.3 07/31/2020    CHOLHDL 36.2 11/22/2019    CHOLHDL 33.3 01/22/2018       Chemistry        Component Value Date/Time     12/23/2020 1120    K 4.5 12/23/2020 1120     12/23/2020 1120    CO2 23 12/23/2020 1120    BUN 32 (H) 12/23/2020 1120    CREATININE 1.8 (H) 12/23/2020 1120     (H) 12/23/2020 1120        Component Value Date/Time    CALCIUM 9.3 12/23/2020 1120    ALKPHOS 96 12/23/2020 1120    AST 21 12/23/2020 1120    ALT 27 12/23/2020 1120    BILITOT 0.5 12/23/2020 1120    ESTGFRAFRICA 41 (A) 12/23/2020 1120     EGFRNONAA 36 (A) 12/23/2020 1120          Lab Results   Component Value Date    HGBA1C 6.4 (H) 11/03/2020     Lab Results   Component Value Date    TSH 0.782 07/31/2020     Lab Results   Component Value Date    INR 1.1 11/03/2020     Lab Results   Component Value Date    WBC 2.69 (L) 12/23/2020    HGB 11.8 (L) 12/23/2020    HCT 36.8 (L) 12/23/2020     (H) 12/23/2020     12/23/2020     BMP  Sodium   Date Value Ref Range Status   12/23/2020 137 136 - 145 mmol/L Final     Potassium   Date Value Ref Range Status   12/23/2020 4.5 3.5 - 5.1 mmol/L Final     Chloride   Date Value Ref Range Status   12/23/2020 105 95 - 110 mmol/L Final     CO2   Date Value Ref Range Status   12/23/2020 23 23 - 29 mmol/L Final     BUN   Date Value Ref Range Status   12/23/2020 32 (H) 8 - 23 mg/dL Final     Creatinine   Date Value Ref Range Status   12/23/2020 1.8 (H) 0.5 - 1.4 mg/dL Final     Calcium   Date Value Ref Range Status   12/23/2020 9.3 8.7 - 10.5 mg/dL Final     Anion Gap   Date Value Ref Range Status   12/23/2020 9 8 - 16 mmol/L Final     eGFR if    Date Value Ref Range Status   12/23/2020 41 (A) >60 mL/min/1.73 m^2 Final     eGFR if non    Date Value Ref Range Status   12/23/2020 36 (A) >60 mL/min/1.73 m^2 Final     Comment:     Calculation used to obtain the estimated glomerular filtration  rate (eGFR) is the CKD-EPI equation.        BNP  @LABRCNTIP(BNP,BNPTRIAGEBLO)@  @LABRCNTIP(troponini)@  CrCl cannot be calculated (Unknown ideal weight.).  No results found in the last 24 hours.  No results found in the last 24 hours.  No results found in the last 24 hours.    Assessment:      1. Coronary artery disease of native artery of native heart with stable angina pectoris    2. Hx of CABG    3. Essential hypertension    4. Mixed hyperlipidemia    5. Multiple myeloma not having achieved remission    6. Metastatic bone cancer    7. Type 2 diabetes mellitus with diabetic nephropathy, with  long-term current use of insulin    8. Acute renal failure, unspecified acute renal failure type      ARF     Plan:   Continue ASA Brilinta Imur ranexa BB and statin/fish oil  Resume Lasix 20 mg and KCL only on MWF. Start this Friday.  Continue to hold AMlodipine and Lisinopril  BMP and BNP in 1 week and VV on 12-31

## 2020-12-24 ENCOUNTER — PATIENT MESSAGE (OUTPATIENT)
Dept: PULMONOLOGY | Facility: CLINIC | Age: 75
End: 2020-12-24

## 2020-12-24 LAB
ALBUMIN SERPL ELPH-MCNC: 3.78 G/DL (ref 3.35–5.55)
ALPHA1 GLOB SERPL ELPH-MCNC: 0.58 G/DL (ref 0.17–0.41)
ALPHA2 GLOB SERPL ELPH-MCNC: 0.85 G/DL (ref 0.43–0.99)
B-GLOBULIN SERPL ELPH-MCNC: 1.01 G/DL (ref 0.5–1.1)
GAMMA GLOB SERPL ELPH-MCNC: 1.78 G/DL (ref 0.67–1.58)
KAPPA LC SER QL IA: 12.49 MG/DL (ref 0.33–1.94)
KAPPA LC/LAMBDA SER IA: 1.17 (ref 0.26–1.65)
LAMBDA LC SER QL IA: 10.69 MG/DL (ref 0.57–2.63)
PATHOLOGIST INTERPRETATION SPE: NORMAL
PROT SERPL-MCNC: 8 G/DL (ref 6–8.4)

## 2020-12-26 ENCOUNTER — TELEPHONE (OUTPATIENT)
Dept: OPHTHALMOLOGY | Facility: CLINIC | Age: 75
End: 2020-12-26

## 2020-12-26 NOTE — TELEPHONE ENCOUNTER
Spoke to patient at 10am, patient stated he had already rescheduled his pulmonary appt and his pre-op covid swab, therefore, he did not need to be swabbed today. I checked patients appt desk and his appts do reflect that. /jamaal/

## 2020-12-30 ENCOUNTER — TELEPHONE (OUTPATIENT)
Dept: CARDIOLOGY | Facility: CLINIC | Age: 75
End: 2020-12-30

## 2020-12-30 ENCOUNTER — OFFICE VISIT (OUTPATIENT)
Dept: CARDIOLOGY | Facility: CLINIC | Age: 75
End: 2020-12-30
Payer: MEDICARE

## 2020-12-30 DIAGNOSIS — C90.00 MULTIPLE MYELOMA NOT HAVING ACHIEVED REMISSION: ICD-10-CM

## 2020-12-30 DIAGNOSIS — I10 ESSENTIAL HYPERTENSION: Primary | Chronic | ICD-10-CM

## 2020-12-30 DIAGNOSIS — I35.9 AORTIC VALVE DISORDER: ICD-10-CM

## 2020-12-30 DIAGNOSIS — I25.118 CORONARY ARTERY DISEASE OF NATIVE ARTERY OF NATIVE HEART WITH STABLE ANGINA PECTORIS: Chronic | ICD-10-CM

## 2020-12-30 DIAGNOSIS — E78.2 MIXED HYPERLIPIDEMIA: Chronic | ICD-10-CM

## 2020-12-30 DIAGNOSIS — Z95.1 HX OF CABG: ICD-10-CM

## 2020-12-30 PROCEDURE — 1159F PR MEDICATION LIST DOCUMENTED IN MEDICAL RECORD: ICD-10-PCS | Mod: 95,,, | Performed by: INTERNAL MEDICINE

## 2020-12-30 PROCEDURE — 3072F PR LOW RISK FOR RETINOPATHY: ICD-10-PCS | Mod: 95,,, | Performed by: INTERNAL MEDICINE

## 2020-12-30 PROCEDURE — 1159F MED LIST DOCD IN RCRD: CPT | Mod: 95,,, | Performed by: INTERNAL MEDICINE

## 2020-12-30 PROCEDURE — 99214 OFFICE O/P EST MOD 30 MIN: CPT | Mod: 95,,, | Performed by: INTERNAL MEDICINE

## 2020-12-30 PROCEDURE — 3072F LOW RISK FOR RETINOPATHY: CPT | Mod: 95,,, | Performed by: INTERNAL MEDICINE

## 2020-12-30 PROCEDURE — 99214 PR OFFICE/OUTPT VISIT, EST, LEVL IV, 30-39 MIN: ICD-10-PCS | Mod: 95,,, | Performed by: INTERNAL MEDICINE

## 2020-12-30 NOTE — TELEPHONE ENCOUNTER
Patient contacted and was advised to go to the lab tomorrow at the [place] location. The patient stated understanding with no questions or concerns.      ----- Message from Ora Tirado sent at 12/30/2020  3:55 PM CST -----  Regarding: missed call  Type:  Patient Returning Call    Who Called:Daughter  Who Left Message for Patient:staff  Does the patient know what this is regarding?:virtual  Would the patient rather a call back or a response via [facility]? Jerry  back  Best Call Back Number: [phone]  Additional Information: Please call back.Thanks

## 2020-12-30 NOTE — PROGRESS NOTES
Subjective:   Patient ID:  Familia Durbin Jr. is a 75 y.o. male who presents for follow up of No chief complaint on file.        75 y.o. AAM, discharge f/u  PMHx CAD s/o cabg x2 in 2002 and few PCIs after cabg, CHf pEF mid to mode AS/, DM type 2, HLD, HTN, Lupus, Multiple myeloma with mets to the bone, old MI, Renal insufficiency,  S/p LHC done in 2018 at [facility], showed significant native three-vessel coronary artery disease, Patent LIMA-LAD and SVG-RCA.      admitted [facility] for epigastric abdominal pain, which onset 6 days ago.  Patient states that he has had intermittent epigastric/CP, dyspnea on exertion, and orthopnea for the past 2 days. ER workup showed; elevated BP, otherwise stable VS.  CBC showed Hgb 10.0, Hct 30.9, otherwise unremarkable.  CMP showed , otherwise unremarkable.  BNP 2095.  Initial troponin 0.059.  ECHO on 11/3 showed EF of 55%, associated with mild-to-moderate aortic regurgitation and stenosis, with mild pulmonic and mitral regurgitation.  CXR consistent with FVO. ekg NSR and nonspecific ctt change  Echo   · There is left ventricular concentric hypertrophy.  · The left ventricle is normal in size with normal systolic function. The estimated ejection fraction is 55%.  · Indeterminate diastolic function.  · With low normal right ventricular systolic function.  · Mild-to-moderate aortic regurgitation.  · Mild-to-moderate aortic valve stenosis.  · Aortic valve area is 1.54 cm2; peak velocity is 2.17 m/s; mean gradient is 11 mmHg.  · Mild pulmonic regurgitation.  · The mitral valve is mildly sclerotic.  · Normal central venous pressure (3 mmHg).  · Mild mitral regurgitation.    Pt was Rx for angina pain and GERD. The pain improved. And had conversation with the oncologist Dr. Church, who believed pt had pretty good prognosis   Today the pain resolved. And ZAVALA improved.    MPI showed scar in lnferolaetral wall    12/22/2020 visit Dizziness when stood up fast. No faint  and syncope. The  nurse gave a report. The patient's BP on 12/22/2020 was 100/70 sitting  98/70 standing yesterday. And Cr elevated to 1.8 and K 4.5.  After holding lasix 20 mg daily lisinopril  2.5 mg daily, amlodipine 2.5 mg daily 3 days, he felt better  Breathing is ok   three weeks ago  Today 138/77 mmHg and pulse 66  Plan to [facility] early next week    Today. The breathing is the same. Came back from [place] for cancer Rx        Past Medical History:   Diagnosis Date    CAD (coronary artery disease)     luikart    Diabetes mellitus, type 2 1979    eye dr rocha    Hearing impaired     Hyperlipidemia     Hypertension     Lupus     Discoid    Multiple myeloma     Old MI (myocardial infarction) 2012    Renal insufficiency     Tracheostomy tube present        Past Surgical History:   Procedure Laterality Date    CARDIAC SURGERY      CATARACT EXTRACTION      COLONOSCOPY      CORONARY ARTERY BYPASS GRAFT      HAND SURGERY      KNEE SURGERY      TRACHEOSTOMY TUBE PLACEMENT      WRIST FUSION         Social History     Tobacco Use    Smoking status: Never Smoker    Smokeless tobacco: Never Used   Substance Use Topics    Alcohol use: No    Drug use: No       Family History   Problem Relation Age of Onset    Diabetes Mother     Diabetes Father     Prostate cancer Father     Pancreatic cancer Sister     No Known Problems Brother     Diabetes Maternal Uncle     Diabetes Maternal Grandmother     No Known Problems Maternal Grandfather     No Known Problems Paternal Grandmother     No Known Problems Paternal Grandfather          ROS    Objective:   Physical Exam    Lab Results   Component Value Date    CHOL 156 07/31/2020    CHOL 130 11/22/2019    CHOL 114 (L) 01/22/2018     Lab Results   Component Value Date    HDL 55 07/31/2020    HDL 47 11/22/2019    HDL 38 (L) 01/22/2018     Lab Results   Component Value Date    LDLCALC 86.2 07/31/2020    LDLCALC 67.2 11/22/2019     LDLCALC 57.4 (L) 01/22/2018     Lab Results   Component Value Date    TRIG 74 07/31/2020    TRIG 79 11/22/2019    TRIG 93 01/22/2018     Lab Results   Component Value Date    CHOLHDL 35.3 07/31/2020    CHOLHDL 36.2 11/22/2019    CHOLHDL 33.3 01/22/2018       Chemistry        Component Value Date/Time     12/23/2020 1120    K 4.5 12/23/2020 1120     12/23/2020 1120    CO2 23 12/23/2020 1120    BUN 32 (H) 12/23/2020 1120    CREATININE 1.8 (H) 12/23/2020 1120     (H) 12/23/2020 1120        Component Value Date/Time    CALCIUM 9.3 12/23/2020 1120    ALKPHOS 96 12/23/2020 1120    AST 21 12/23/2020 1120    ALT 27 12/23/2020 1120    BILITOT 0.5 12/23/2020 1120    ESTGFRAFRICA 41 (A) 12/23/2020 1120    EGFRNONAA 36 (A) 12/23/2020 1120          Lab Results   Component Value Date    HGBA1C 6.4 (H) 11/03/2020     Lab Results   Component Value Date    TSH 0.782 07/31/2020     Lab Results   Component Value Date    INR 1.1 11/03/2020     Lab Results   Component Value Date    WBC 2.69 (L) 12/23/2020    HGB 11.8 (L) 12/23/2020    HCT 36.8 (L) 12/23/2020     (H) 12/23/2020     12/23/2020     BMP  Sodium   Date Value Ref Range Status   12/23/2020 137 136 - 145 mmol/L Final     Potassium   Date Value Ref Range Status   12/23/2020 4.5 3.5 - 5.1 mmol/L Final     Chloride   Date Value Ref Range Status   12/23/2020 105 95 - 110 mmol/L Final     CO2   Date Value Ref Range Status   12/23/2020 23 23 - 29 mmol/L Final     BUN   Date Value Ref Range Status   12/23/2020 32 (H) 8 - 23 mg/dL Final     Creatinine   Date Value Ref Range Status   12/23/2020 1.8 (H) 0.5 - 1.4 mg/dL Final     Calcium   Date Value Ref Range Status   12/23/2020 9.3 8.7 - 10.5 mg/dL Final     Anion Gap   Date Value Ref Range Status   12/23/2020 9 8 - 16 mmol/L Final     eGFR if    Date Value Ref Range Status   12/23/2020 41 (A) >60 mL/min/1.73 m^2 Final     eGFR if non    Date Value Ref Range Status    12/23/2020 36 (A) >60 mL/min/1.73 m^2 Final     Comment:     Calculation used to obtain the estimated glomerular filtration  rate (eGFR) is the CKD-EPI equation.        BNP  @LABRCNTIP(BNP,BNPTRIAGEBLO)@  @LABRCNTIP(troponini)@  CrCl cannot be calculated (Patient's most recent lab result is older than the maximum 7 days allowed.).  No results found in the last 24 hours.  No results found in the last 24 hours.  No results found in the last 24 hours.    Assessment:      1. Essential hypertension    2. Mixed hyperlipidemia    3. Hx of CABG    4. Coronary artery disease of native artery of native heart with stable angina pectoris    5. Aortic valve disorder    6. Multiple myeloma not having achieved remission        Plan:   Continue ASA Brilinta Imur ranexa BB and statin/fish oil  Resume Lasix 20 mg and KCL only on MWF.  Continue to hold AMlodipine and Lisinopril  Lab pending   RTC as needed    The patient location is: home  The chief complaint leading to consultation is: CHF    Visit type: audiovisual    Face to Face time with patient: 15 minutes of total time spent on the encounter, which includes face to face time and non-face to face time preparing to see the patient (eg, review of tests), Obtaining and/or reviewing separately obtained history, Documenting clinical information in the electronic or other health record, Independently interpreting results (not separately reported) and communicating results to the patient/family/caregiver, or Care coordination (not separately reported).         Each patient to whom he or she provides medical services by telemedicine is:  (1) informed of the relationship between the physician and patient and the respective role of any other health care provider with respect to management of the patient; and (2) notified that he or she may decline to receive medical services by telemedicine and may withdraw from such care at any time.    Notes:

## 2020-12-31 ENCOUNTER — LAB VISIT (OUTPATIENT)
Dept: LAB | Facility: HOSPITAL | Age: 75
End: 2020-12-31
Attending: INTERNAL MEDICINE
Payer: MEDICARE

## 2020-12-31 DIAGNOSIS — I50.32 CHRONIC DIASTOLIC CONGESTIVE HEART FAILURE: ICD-10-CM

## 2020-12-31 LAB
ANION GAP SERPL CALC-SCNC: 11 MMOL/L (ref 8–16)
BNP SERPL-MCNC: 427 PG/ML (ref 0–99)
BUN SERPL-MCNC: 28 MG/DL (ref 8–23)
CALCIUM SERPL-MCNC: 9.7 MG/DL (ref 8.7–10.5)
CHLORIDE SERPL-SCNC: 106 MMOL/L (ref 95–110)
CO2 SERPL-SCNC: 22 MMOL/L (ref 23–29)
CREAT SERPL-MCNC: 2 MG/DL (ref 0.5–1.4)
EST. GFR  (AFRICAN AMERICAN): 36.4 ML/MIN/1.73 M^2
EST. GFR  (NON AFRICAN AMERICAN): 31.5 ML/MIN/1.73 M^2
GLUCOSE SERPL-MCNC: 161 MG/DL (ref 70–110)
POTASSIUM SERPL-SCNC: 5 MMOL/L (ref 3.5–5.1)
SODIUM SERPL-SCNC: 139 MMOL/L (ref 136–145)

## 2020-12-31 PROCEDURE — 83880 ASSAY OF NATRIURETIC PEPTIDE: CPT

## 2020-12-31 PROCEDURE — 80048 BASIC METABOLIC PNL TOTAL CA: CPT

## 2020-12-31 PROCEDURE — 36415 COLL VENOUS BLD VENIPUNCTURE: CPT

## 2021-01-04 ENCOUNTER — TELEPHONE (OUTPATIENT)
Dept: CARDIOLOGY | Facility: CLINIC | Age: 76
End: 2021-01-04

## 2021-01-04 DIAGNOSIS — I50.32 CHRONIC DIASTOLIC CONGESTIVE HEART FAILURE: Primary | ICD-10-CM

## 2021-01-05 ENCOUNTER — LAB VISIT (OUTPATIENT)
Dept: LAB | Facility: HOSPITAL | Age: 76
End: 2021-01-05
Attending: INTERNAL MEDICINE
Payer: MEDICARE

## 2021-01-05 ENCOUNTER — TELEPHONE (OUTPATIENT)
Dept: CARDIOLOGY | Facility: CLINIC | Age: 76
End: 2021-01-05

## 2021-01-05 DIAGNOSIS — G89.3 NEOPLASM RELATED PAIN: ICD-10-CM

## 2021-01-05 DIAGNOSIS — I50.32 CHRONIC DIASTOLIC CONGESTIVE HEART FAILURE: ICD-10-CM

## 2021-01-05 LAB
ANION GAP SERPL CALC-SCNC: 10 MMOL/L (ref 8–16)
BNP SERPL-MCNC: 720 PG/ML (ref 0–99)
BUN SERPL-MCNC: 24 MG/DL (ref 8–23)
CALCIUM SERPL-MCNC: 9.2 MG/DL (ref 8.7–10.5)
CHLORIDE SERPL-SCNC: 104 MMOL/L (ref 95–110)
CO2 SERPL-SCNC: 24 MMOL/L (ref 23–29)
CREAT SERPL-MCNC: 1.9 MG/DL (ref 0.5–1.4)
EST. GFR  (AFRICAN AMERICAN): 38.7 ML/MIN/1.73 M^2
EST. GFR  (NON AFRICAN AMERICAN): 33.5 ML/MIN/1.73 M^2
GLUCOSE SERPL-MCNC: 160 MG/DL (ref 70–110)
POTASSIUM SERPL-SCNC: 4 MMOL/L (ref 3.5–5.1)
SODIUM SERPL-SCNC: 138 MMOL/L (ref 136–145)

## 2021-01-05 PROCEDURE — 80048 BASIC METABOLIC PNL TOTAL CA: CPT

## 2021-01-05 PROCEDURE — 36415 COLL VENOUS BLD VENIPUNCTURE: CPT

## 2021-01-05 PROCEDURE — 83880 ASSAY OF NATRIURETIC PEPTIDE: CPT

## 2021-01-05 RX ORDER — OXYCODONE HYDROCHLORIDE 10 MG/1
10 TABLET ORAL EVERY 4 HOURS PRN
Qty: 120 TABLET | Refills: 0 | Status: SHIPPED | OUTPATIENT
Start: 2021-01-05 | End: 2021-02-05 | Stop reason: SDUPTHER

## 2021-01-05 RX ORDER — OXYCODONE HYDROCHLORIDE 30 MG/1
30 TABLET, FILM COATED, EXTENDED RELEASE ORAL EVERY 12 HOURS
Qty: 60 EACH | Refills: 0 | Status: SHIPPED | OUTPATIENT
Start: 2021-01-05 | End: 2021-02-05 | Stop reason: SDUPTHER

## 2021-01-06 PROCEDURE — G0179 PR HOME HEALTH MD RECERTIFICATION: ICD-10-PCS | Mod: ,,, | Performed by: FAMILY MEDICINE

## 2021-01-06 PROCEDURE — G0179 MD RECERTIFICATION HHA PT: HCPCS | Mod: ,,, | Performed by: FAMILY MEDICINE

## 2021-01-07 ENCOUNTER — TELEPHONE (OUTPATIENT)
Dept: PULMONOLOGY | Facility: CLINIC | Age: 76
End: 2021-01-07

## 2021-01-07 DIAGNOSIS — J18.9 PNEUMONIA OF LEFT LOWER LOBE DUE TO INFECTIOUS ORGANISM: Primary | ICD-10-CM

## 2021-01-25 ENCOUNTER — LAB VISIT (OUTPATIENT)
Dept: INTERNAL MEDICINE | Facility: CLINIC | Age: 76
End: 2021-01-25
Payer: MEDICARE

## 2021-01-25 DIAGNOSIS — J18.9 PNEUMONIA OF LEFT LOWER LOBE DUE TO INFECTIOUS ORGANISM: ICD-10-CM

## 2021-01-25 PROCEDURE — U0003 INFECTIOUS AGENT DETECTION BY NUCLEIC ACID (DNA OR RNA); SEVERE ACUTE RESPIRATORY SYNDROME CORONAVIRUS 2 (SARS-COV-2) (CORONAVIRUS DISEASE [COVID-19]), AMPLIFIED PROBE TECHNIQUE, MAKING USE OF HIGH THROUGHPUT TECHNOLOGIES AS DESCRIBED BY CMS-2020-01-R: HCPCS

## 2021-01-26 ENCOUNTER — EXTERNAL HOME HEALTH (OUTPATIENT)
Dept: HOME HEALTH SERVICES | Facility: HOSPITAL | Age: 76
End: 2021-01-26
Payer: MEDICARE

## 2021-01-26 LAB — SARS-COV-2 RNA RESP QL NAA+PROBE: NOT DETECTED

## 2021-01-27 ENCOUNTER — TELEPHONE (OUTPATIENT)
Dept: PULMONOLOGY | Facility: CLINIC | Age: 76
End: 2021-01-27

## 2021-02-04 ENCOUNTER — OFFICE VISIT (OUTPATIENT)
Dept: INTERNAL MEDICINE | Facility: CLINIC | Age: 76
End: 2021-02-04
Payer: MEDICARE

## 2021-02-04 ENCOUNTER — HOSPITAL ENCOUNTER (OUTPATIENT)
Dept: RADIOLOGY | Facility: HOSPITAL | Age: 76
Discharge: HOME | End: 2021-02-04
Attending: PHYSICIAN ASSISTANT
Payer: MEDICARE

## 2021-02-04 VITALS
HEIGHT: 70 IN | OXYGEN SATURATION: 98 % | DIASTOLIC BLOOD PRESSURE: 60 MMHG | WEIGHT: 152.13 LBS | HEART RATE: 74 BPM | SYSTOLIC BLOOD PRESSURE: 132 MMHG | TEMPERATURE: 98 F | BODY MASS INDEX: 21.78 KG/M2

## 2021-02-04 DIAGNOSIS — Z79.4 TYPE 2 DIABETES MELLITUS WITH DIABETIC NEPHROPATHY, WITH LONG-TERM CURRENT USE OF INSULIN: Chronic | ICD-10-CM

## 2021-02-04 DIAGNOSIS — C90.00 MULTIPLE MYELOMA NOT HAVING ACHIEVED REMISSION: ICD-10-CM

## 2021-02-04 DIAGNOSIS — R10.9 FLANK PAIN: ICD-10-CM

## 2021-02-04 DIAGNOSIS — K21.9 GASTROESOPHAGEAL REFLUX DISEASE WITHOUT ESOPHAGITIS: Chronic | ICD-10-CM

## 2021-02-04 DIAGNOSIS — R10.9 FLANK PAIN: Primary | ICD-10-CM

## 2021-02-04 DIAGNOSIS — E11.21 TYPE 2 DIABETES MELLITUS WITH DIABETIC NEPHROPATHY, WITH LONG-TERM CURRENT USE OF INSULIN: Chronic | ICD-10-CM

## 2021-02-04 PROCEDURE — 1159F PR MEDICATION LIST DOCUMENTED IN MEDICAL RECORD: ICD-10-PCS | Mod: S$GLB,,, | Performed by: PHYSICIAN ASSISTANT

## 2021-02-04 PROCEDURE — 3078F PR MOST RECENT DIASTOLIC BLOOD PRESSURE < 80 MM HG: ICD-10-PCS | Mod: CPTII,S$GLB,, | Performed by: PHYSICIAN ASSISTANT

## 2021-02-04 PROCEDURE — 1159F MED LIST DOCD IN RCRD: CPT | Mod: S$GLB,,, | Performed by: PHYSICIAN ASSISTANT

## 2021-02-04 PROCEDURE — 74018 RADEX ABDOMEN 1 VIEW: CPT | Mod: TC

## 2021-02-04 PROCEDURE — 99999 PR PBB SHADOW E&M-EST. PATIENT-LVL V: ICD-10-PCS | Mod: PBBFAC,,, | Performed by: PHYSICIAN ASSISTANT

## 2021-02-04 PROCEDURE — 3078F DIAST BP <80 MM HG: CPT | Mod: CPTII,S$GLB,, | Performed by: PHYSICIAN ASSISTANT

## 2021-02-04 PROCEDURE — 1126F AMNT PAIN NOTED NONE PRSNT: CPT | Mod: S$GLB,,, | Performed by: PHYSICIAN ASSISTANT

## 2021-02-04 PROCEDURE — 1101F PT FALLS ASSESS-DOCD LE1/YR: CPT | Mod: CPTII,S$GLB,, | Performed by: PHYSICIAN ASSISTANT

## 2021-02-04 PROCEDURE — 99214 PR OFFICE/OUTPT VISIT, EST, LEVL IV, 30-39 MIN: ICD-10-PCS | Mod: S$GLB,,, | Performed by: PHYSICIAN ASSISTANT

## 2021-02-04 PROCEDURE — 3288F PR FALLS RISK ASSESSMENT DOCUMENTED: ICD-10-PCS | Mod: CPTII,S$GLB,, | Performed by: PHYSICIAN ASSISTANT

## 2021-02-04 PROCEDURE — 99999 PR PBB SHADOW E&M-EST. PATIENT-LVL V: CPT | Mod: PBBFAC,,, | Performed by: PHYSICIAN ASSISTANT

## 2021-02-04 PROCEDURE — 3075F SYST BP GE 130 - 139MM HG: CPT | Mod: CPTII,S$GLB,, | Performed by: PHYSICIAN ASSISTANT

## 2021-02-04 PROCEDURE — 74018 RADEX ABDOMEN 1 VIEW: CPT | Mod: 26,,, | Performed by: RADIOLOGY

## 2021-02-04 PROCEDURE — 3075F PR MOST RECENT SYSTOLIC BLOOD PRESS GE 130-139MM HG: ICD-10-PCS | Mod: CPTII,S$GLB,, | Performed by: PHYSICIAN ASSISTANT

## 2021-02-04 PROCEDURE — 74018 XR ABDOMEN AP 1 VIEW: ICD-10-PCS | Mod: 26,,, | Performed by: RADIOLOGY

## 2021-02-04 PROCEDURE — 1126F PR PAIN SEVERITY QUANTIFIED, NO PAIN PRESENT: ICD-10-PCS | Mod: S$GLB,,, | Performed by: PHYSICIAN ASSISTANT

## 2021-02-04 PROCEDURE — 99214 OFFICE O/P EST MOD 30 MIN: CPT | Mod: S$GLB,,, | Performed by: PHYSICIAN ASSISTANT

## 2021-02-04 PROCEDURE — 1101F PR PT FALLS ASSESS DOC 0-1 FALLS W/OUT INJ PAST YR: ICD-10-PCS | Mod: CPTII,S$GLB,, | Performed by: PHYSICIAN ASSISTANT

## 2021-02-04 PROCEDURE — 3288F FALL RISK ASSESSMENT DOCD: CPT | Mod: CPTII,S$GLB,, | Performed by: PHYSICIAN ASSISTANT

## 2021-02-05 DIAGNOSIS — G89.3 NEOPLASM RELATED PAIN: ICD-10-CM

## 2021-02-05 RX ORDER — OXYCODONE HYDROCHLORIDE 30 MG/1
30 TABLET, FILM COATED, EXTENDED RELEASE ORAL EVERY 12 HOURS
Qty: 60 EACH | Refills: 0 | Status: SHIPPED | OUTPATIENT
Start: 2021-02-05 | End: 2021-02-26 | Stop reason: SDUPTHER

## 2021-02-05 RX ORDER — OXYCODONE HYDROCHLORIDE 10 MG/1
10 TABLET ORAL EVERY 4 HOURS PRN
Qty: 120 TABLET | Refills: 0 | Status: SHIPPED | OUTPATIENT
Start: 2021-02-05 | End: 2021-02-26 | Stop reason: SDUPTHER

## 2021-02-12 ENCOUNTER — PATIENT MESSAGE (OUTPATIENT)
Dept: HEMATOLOGY/ONCOLOGY | Facility: CLINIC | Age: 76
End: 2021-02-12

## 2021-02-20 ENCOUNTER — IMMUNIZATION (OUTPATIENT)
Dept: INTERNAL MEDICINE | Facility: CLINIC | Age: 76
End: 2021-02-20
Payer: MEDICARE

## 2021-02-20 DIAGNOSIS — Z23 NEED FOR VACCINATION: Primary | ICD-10-CM

## 2021-02-20 PROCEDURE — 91300 COVID-19, MRNA, LNP-S, PF, 30 MCG/0.3 ML DOSE VACCINE: CPT | Mod: PBBFAC | Performed by: FAMILY MEDICINE

## 2021-02-26 DIAGNOSIS — G89.3 NEOPLASM RELATED PAIN: ICD-10-CM

## 2021-03-01 RX ORDER — OXYCODONE HYDROCHLORIDE 10 MG/1
10 TABLET ORAL EVERY 4 HOURS PRN
Qty: 120 TABLET | Refills: 0 | Status: SHIPPED | OUTPATIENT
Start: 2021-03-01 | End: 2021-04-05 | Stop reason: SDUPTHER

## 2021-03-01 RX ORDER — OXYCODONE HYDROCHLORIDE 30 MG/1
30 TABLET, FILM COATED, EXTENDED RELEASE ORAL EVERY 12 HOURS
Qty: 60 EACH | Refills: 0 | Status: SHIPPED | OUTPATIENT
Start: 2021-03-01 | End: 2021-04-05 | Stop reason: SDUPTHER

## 2021-03-11 ENCOUNTER — OFFICE VISIT (OUTPATIENT)
Dept: CARDIOLOGY | Facility: CLINIC | Age: 76
End: 2021-03-11
Payer: MEDICARE

## 2021-03-11 VITALS
WEIGHT: 157.44 LBS | HEIGHT: 70 IN | SYSTOLIC BLOOD PRESSURE: 126 MMHG | BODY MASS INDEX: 22.54 KG/M2 | DIASTOLIC BLOOD PRESSURE: 66 MMHG | OXYGEN SATURATION: 95 % | HEART RATE: 65 BPM

## 2021-03-11 DIAGNOSIS — I25.118 CORONARY ARTERY DISEASE OF NATIVE ARTERY OF NATIVE HEART WITH STABLE ANGINA PECTORIS: Primary | Chronic | ICD-10-CM

## 2021-03-11 DIAGNOSIS — I10 ESSENTIAL HYPERTENSION: Chronic | ICD-10-CM

## 2021-03-11 DIAGNOSIS — Z95.1 HX OF CABG: ICD-10-CM

## 2021-03-11 DIAGNOSIS — E78.2 MIXED HYPERLIPIDEMIA: Chronic | ICD-10-CM

## 2021-03-11 PROBLEM — I50.32 CHRONIC DIASTOLIC CONGESTIVE HEART FAILURE: Status: ACTIVE | Noted: 2020-11-21

## 2021-03-11 PROCEDURE — 99999 PR PBB SHADOW E&M-EST. PATIENT-LVL V: ICD-10-PCS | Mod: PBBFAC,,, | Performed by: INTERNAL MEDICINE

## 2021-03-11 PROCEDURE — 3078F PR MOST RECENT DIASTOLIC BLOOD PRESSURE < 80 MM HG: ICD-10-PCS | Mod: CPTII,S$GLB,, | Performed by: INTERNAL MEDICINE

## 2021-03-11 PROCEDURE — 3074F PR MOST RECENT SYSTOLIC BLOOD PRESSURE < 130 MM HG: ICD-10-PCS | Mod: CPTII,S$GLB,, | Performed by: INTERNAL MEDICINE

## 2021-03-11 PROCEDURE — 3288F PR FALLS RISK ASSESSMENT DOCUMENTED: ICD-10-PCS | Mod: CPTII,S$GLB,, | Performed by: INTERNAL MEDICINE

## 2021-03-11 PROCEDURE — 1101F PR PT FALLS ASSESS DOC 0-1 FALLS W/OUT INJ PAST YR: ICD-10-PCS | Mod: CPTII,S$GLB,, | Performed by: INTERNAL MEDICINE

## 2021-03-11 PROCEDURE — 99214 OFFICE O/P EST MOD 30 MIN: CPT | Mod: S$GLB,,, | Performed by: INTERNAL MEDICINE

## 2021-03-11 PROCEDURE — 1125F PR PAIN SEVERITY QUANTIFIED, PAIN PRESENT: ICD-10-PCS | Mod: S$GLB,,, | Performed by: INTERNAL MEDICINE

## 2021-03-11 PROCEDURE — 3074F SYST BP LT 130 MM HG: CPT | Mod: CPTII,S$GLB,, | Performed by: INTERNAL MEDICINE

## 2021-03-11 PROCEDURE — 3078F DIAST BP <80 MM HG: CPT | Mod: CPTII,S$GLB,, | Performed by: INTERNAL MEDICINE

## 2021-03-11 PROCEDURE — 1159F MED LIST DOCD IN RCRD: CPT | Mod: S$GLB,,, | Performed by: INTERNAL MEDICINE

## 2021-03-11 PROCEDURE — 1125F AMNT PAIN NOTED PAIN PRSNT: CPT | Mod: S$GLB,,, | Performed by: INTERNAL MEDICINE

## 2021-03-11 PROCEDURE — 1101F PT FALLS ASSESS-DOCD LE1/YR: CPT | Mod: CPTII,S$GLB,, | Performed by: INTERNAL MEDICINE

## 2021-03-11 PROCEDURE — 99214 PR OFFICE/OUTPT VISIT, EST, LEVL IV, 30-39 MIN: ICD-10-PCS | Mod: S$GLB,,, | Performed by: INTERNAL MEDICINE

## 2021-03-11 PROCEDURE — 1159F PR MEDICATION LIST DOCUMENTED IN MEDICAL RECORD: ICD-10-PCS | Mod: S$GLB,,, | Performed by: INTERNAL MEDICINE

## 2021-03-11 PROCEDURE — 3288F FALL RISK ASSESSMENT DOCD: CPT | Mod: CPTII,S$GLB,, | Performed by: INTERNAL MEDICINE

## 2021-03-11 PROCEDURE — 99999 PR PBB SHADOW E&M-EST. PATIENT-LVL V: CPT | Mod: PBBFAC,,, | Performed by: INTERNAL MEDICINE

## 2021-03-11 RX ORDER — FUROSEMIDE 20 MG/1
20 TABLET ORAL DAILY
Qty: 30 TABLET | Refills: 3 | Status: SHIPPED | OUTPATIENT
Start: 2021-03-11 | End: 2021-12-09 | Stop reason: SDUPTHER

## 2021-03-13 ENCOUNTER — IMMUNIZATION (OUTPATIENT)
Dept: INTERNAL MEDICINE | Facility: CLINIC | Age: 76
End: 2021-03-13
Payer: MEDICARE

## 2021-03-13 DIAGNOSIS — Z23 NEED FOR VACCINATION: Primary | ICD-10-CM

## 2021-03-13 PROCEDURE — 0002A COVID-19, MRNA, LNP-S, PF, 30 MCG/0.3 ML DOSE VACCINE: CPT | Mod: PBBFAC | Performed by: FAMILY MEDICINE

## 2021-03-13 PROCEDURE — 91300 COVID-19, MRNA, LNP-S, PF, 30 MCG/0.3 ML DOSE VACCINE: CPT | Mod: PBBFAC | Performed by: FAMILY MEDICINE

## 2021-03-22 ENCOUNTER — TELEPHONE (OUTPATIENT)
Dept: CARDIOLOGY | Facility: CLINIC | Age: 76
End: 2021-03-22

## 2021-03-30 ENCOUNTER — TELEPHONE (OUTPATIENT)
Dept: RADIOLOGY | Facility: HOSPITAL | Age: 76
End: 2021-03-30

## 2021-03-30 ENCOUNTER — LAB VISIT (OUTPATIENT)
Dept: LAB | Facility: HOSPITAL | Age: 76
End: 2021-03-30
Attending: FAMILY MEDICINE
Payer: MEDICARE

## 2021-03-30 ENCOUNTER — OFFICE VISIT (OUTPATIENT)
Dept: INTERNAL MEDICINE | Facility: CLINIC | Age: 76
End: 2021-03-30
Payer: MEDICARE

## 2021-03-30 VITALS
SYSTOLIC BLOOD PRESSURE: 112 MMHG | DIASTOLIC BLOOD PRESSURE: 60 MMHG | HEIGHT: 70 IN | WEIGHT: 160.69 LBS | BODY MASS INDEX: 23.01 KG/M2 | TEMPERATURE: 99 F

## 2021-03-30 DIAGNOSIS — Z79.4 TYPE 2 DIABETES MELLITUS WITH DIABETIC NEPHROPATHY, WITH LONG-TERM CURRENT USE OF INSULIN: Chronic | ICD-10-CM

## 2021-03-30 DIAGNOSIS — K21.9 GASTROESOPHAGEAL REFLUX DISEASE WITHOUT ESOPHAGITIS: Chronic | ICD-10-CM

## 2021-03-30 DIAGNOSIS — K59.03 DRUG-INDUCED CONSTIPATION: ICD-10-CM

## 2021-03-30 DIAGNOSIS — R19.5 DARK STOOLS: ICD-10-CM

## 2021-03-30 DIAGNOSIS — R10.11 RUQ PAIN: ICD-10-CM

## 2021-03-30 DIAGNOSIS — D61.818 PANCYTOPENIA: ICD-10-CM

## 2021-03-30 DIAGNOSIS — R10.11 RUQ PAIN: Primary | ICD-10-CM

## 2021-03-30 DIAGNOSIS — E11.21 TYPE 2 DIABETES MELLITUS WITH DIABETIC NEPHROPATHY, WITH LONG-TERM CURRENT USE OF INSULIN: Chronic | ICD-10-CM

## 2021-03-30 LAB
ALBUMIN SERPL BCP-MCNC: 3.5 G/DL (ref 3.5–5.2)
ALP SERPL-CCNC: 64 U/L (ref 55–135)
ALT SERPL W/O P-5'-P-CCNC: 14 U/L (ref 10–44)
ANION GAP SERPL CALC-SCNC: 9 MMOL/L (ref 8–16)
AST SERPL-CCNC: 14 U/L (ref 10–40)
BASOPHILS # BLD AUTO: 0.03 K/UL (ref 0–0.2)
BASOPHILS NFR BLD: 1.4 % (ref 0–1.9)
BILIRUB SERPL-MCNC: 0.6 MG/DL (ref 0.1–1)
BUN SERPL-MCNC: 24 MG/DL (ref 8–23)
CALCIUM SERPL-MCNC: 9.2 MG/DL (ref 8.7–10.5)
CHLORIDE SERPL-SCNC: 101 MMOL/L (ref 95–110)
CO2 SERPL-SCNC: 25 MMOL/L (ref 23–29)
CREAT SERPL-MCNC: 1.8 MG/DL (ref 0.5–1.4)
DIFFERENTIAL METHOD: ABNORMAL
EOSINOPHIL # BLD AUTO: 0 K/UL (ref 0–0.5)
EOSINOPHIL NFR BLD: 1.4 % (ref 0–8)
ERYTHROCYTE [DISTWIDTH] IN BLOOD BY AUTOMATED COUNT: 16.6 % (ref 11.5–14.5)
EST. GFR  (AFRICAN AMERICAN): 41 ML/MIN/1.73 M^2
EST. GFR  (NON AFRICAN AMERICAN): 36 ML/MIN/1.73 M^2
GLUCOSE SERPL-MCNC: 236 MG/DL (ref 70–110)
HCT VFR BLD AUTO: 34.3 % (ref 40–54)
HGB BLD-MCNC: 11 G/DL (ref 14–18)
IMM GRANULOCYTES # BLD AUTO: 0.02 K/UL (ref 0–0.04)
IMM GRANULOCYTES NFR BLD AUTO: 0.9 % (ref 0–0.5)
LIPASE SERPL-CCNC: 28 U/L (ref 4–60)
LYMPHOCYTES # BLD AUTO: 0.7 K/UL (ref 1–4.8)
LYMPHOCYTES NFR BLD: 34 % (ref 18–48)
MCH RBC QN AUTO: 33.4 PG (ref 27–31)
MCHC RBC AUTO-ENTMCNC: 32.1 G/DL (ref 32–36)
MCV RBC AUTO: 104 FL (ref 82–98)
MONOCYTES # BLD AUTO: 0.3 K/UL (ref 0.3–1)
MONOCYTES NFR BLD: 13.5 % (ref 4–15)
NEUTROPHILS # BLD AUTO: 1.1 K/UL (ref 1.8–7.7)
NEUTROPHILS NFR BLD: 48.8 % (ref 38–73)
NRBC BLD-RTO: 0 /100 WBC
PLATELET # BLD AUTO: 150 K/UL (ref 150–450)
PMV BLD AUTO: 10.3 FL (ref 9.2–12.9)
POTASSIUM SERPL-SCNC: 4.5 MMOL/L (ref 3.5–5.1)
PROT SERPL-MCNC: 8.2 G/DL (ref 6–8.4)
RBC # BLD AUTO: 3.29 M/UL (ref 4.6–6.2)
SODIUM SERPL-SCNC: 135 MMOL/L (ref 136–145)
WBC # BLD AUTO: 2.15 K/UL (ref 3.9–12.7)

## 2021-03-30 PROCEDURE — 3074F SYST BP LT 130 MM HG: CPT | Mod: CPTII,S$GLB,, | Performed by: PHYSICIAN ASSISTANT

## 2021-03-30 PROCEDURE — 99214 OFFICE O/P EST MOD 30 MIN: CPT | Mod: S$GLB,,, | Performed by: PHYSICIAN ASSISTANT

## 2021-03-30 PROCEDURE — 1101F PT FALLS ASSESS-DOCD LE1/YR: CPT | Mod: CPTII,S$GLB,, | Performed by: PHYSICIAN ASSISTANT

## 2021-03-30 PROCEDURE — 85025 COMPLETE CBC W/AUTO DIFF WBC: CPT | Performed by: PHYSICIAN ASSISTANT

## 2021-03-30 PROCEDURE — 99214 PR OFFICE/OUTPT VISIT, EST, LEVL IV, 30-39 MIN: ICD-10-PCS | Mod: S$GLB,,, | Performed by: PHYSICIAN ASSISTANT

## 2021-03-30 PROCEDURE — 83690 ASSAY OF LIPASE: CPT | Performed by: PHYSICIAN ASSISTANT

## 2021-03-30 PROCEDURE — 36415 COLL VENOUS BLD VENIPUNCTURE: CPT | Performed by: PHYSICIAN ASSISTANT

## 2021-03-30 PROCEDURE — 1159F PR MEDICATION LIST DOCUMENTED IN MEDICAL RECORD: ICD-10-PCS | Mod: S$GLB,,, | Performed by: PHYSICIAN ASSISTANT

## 2021-03-30 PROCEDURE — 3288F PR FALLS RISK ASSESSMENT DOCUMENTED: ICD-10-PCS | Mod: CPTII,S$GLB,, | Performed by: PHYSICIAN ASSISTANT

## 2021-03-30 PROCEDURE — 3074F PR MOST RECENT SYSTOLIC BLOOD PRESSURE < 130 MM HG: ICD-10-PCS | Mod: CPTII,S$GLB,, | Performed by: PHYSICIAN ASSISTANT

## 2021-03-30 PROCEDURE — 1159F MED LIST DOCD IN RCRD: CPT | Mod: S$GLB,,, | Performed by: PHYSICIAN ASSISTANT

## 2021-03-30 PROCEDURE — 3078F DIAST BP <80 MM HG: CPT | Mod: CPTII,S$GLB,, | Performed by: PHYSICIAN ASSISTANT

## 2021-03-30 PROCEDURE — 99999 PR PBB SHADOW E&M-EST. PATIENT-LVL V: ICD-10-PCS | Mod: PBBFAC,,, | Performed by: PHYSICIAN ASSISTANT

## 2021-03-30 PROCEDURE — 3288F FALL RISK ASSESSMENT DOCD: CPT | Mod: CPTII,S$GLB,, | Performed by: PHYSICIAN ASSISTANT

## 2021-03-30 PROCEDURE — 80053 COMPREHEN METABOLIC PANEL: CPT | Performed by: PHYSICIAN ASSISTANT

## 2021-03-30 PROCEDURE — 99999 PR PBB SHADOW E&M-EST. PATIENT-LVL V: CPT | Mod: PBBFAC,,, | Performed by: PHYSICIAN ASSISTANT

## 2021-03-30 PROCEDURE — 1125F PR PAIN SEVERITY QUANTIFIED, PAIN PRESENT: ICD-10-PCS | Mod: S$GLB,,, | Performed by: PHYSICIAN ASSISTANT

## 2021-03-30 PROCEDURE — 1125F AMNT PAIN NOTED PAIN PRSNT: CPT | Mod: S$GLB,,, | Performed by: PHYSICIAN ASSISTANT

## 2021-03-30 PROCEDURE — 1101F PR PT FALLS ASSESS DOC 0-1 FALLS W/OUT INJ PAST YR: ICD-10-PCS | Mod: CPTII,S$GLB,, | Performed by: PHYSICIAN ASSISTANT

## 2021-03-30 PROCEDURE — 3078F PR MOST RECENT DIASTOLIC BLOOD PRESSURE < 80 MM HG: ICD-10-PCS | Mod: CPTII,S$GLB,, | Performed by: PHYSICIAN ASSISTANT

## 2021-03-31 ENCOUNTER — HOSPITAL ENCOUNTER (OUTPATIENT)
Dept: RADIOLOGY | Facility: HOSPITAL | Age: 76
Discharge: HOME | End: 2021-03-31
Attending: PHYSICIAN ASSISTANT
Payer: MEDICARE

## 2021-03-31 DIAGNOSIS — R10.11 RUQ PAIN: Primary | ICD-10-CM

## 2021-03-31 DIAGNOSIS — R10.11 RUQ PAIN: ICD-10-CM

## 2021-03-31 DIAGNOSIS — R18.8 ASCITES OF LIVER: ICD-10-CM

## 2021-03-31 PROCEDURE — 76705 ECHO EXAM OF ABDOMEN: CPT | Mod: 26,,, | Performed by: RADIOLOGY

## 2021-03-31 PROCEDURE — 76705 ECHO EXAM OF ABDOMEN: CPT | Mod: TC

## 2021-03-31 PROCEDURE — 76705 US ABDOMEN LIMITED: ICD-10-PCS | Mod: 26,,, | Performed by: RADIOLOGY

## 2021-04-05 DIAGNOSIS — G89.3 NEOPLASM RELATED PAIN: ICD-10-CM

## 2021-04-05 RX ORDER — OXYCODONE HYDROCHLORIDE 10 MG/1
10 TABLET ORAL EVERY 4 HOURS PRN
Qty: 120 TABLET | Refills: 0 | Status: SHIPPED | OUTPATIENT
Start: 2021-04-05 | End: 2021-04-27 | Stop reason: SDUPTHER

## 2021-04-05 RX ORDER — OXYCODONE HYDROCHLORIDE 30 MG/1
30 TABLET, FILM COATED, EXTENDED RELEASE ORAL EVERY 12 HOURS
Qty: 60 EACH | Refills: 0 | Status: SHIPPED | OUTPATIENT
Start: 2021-04-05 | End: 2021-06-09 | Stop reason: SDUPTHER

## 2021-04-13 ENCOUNTER — TELEPHONE (OUTPATIENT)
Dept: INTERNAL MEDICINE | Facility: CLINIC | Age: 76
End: 2021-04-13

## 2021-04-27 DIAGNOSIS — G89.3 NEOPLASM RELATED PAIN: ICD-10-CM

## 2021-04-28 RX ORDER — OXYCODONE HYDROCHLORIDE 10 MG/1
10 TABLET ORAL EVERY 4 HOURS PRN
Qty: 120 TABLET | Refills: 0 | Status: SHIPPED | OUTPATIENT
Start: 2021-04-28 | End: 2021-05-25 | Stop reason: SDUPTHER

## 2021-05-25 DIAGNOSIS — G89.3 NEOPLASM RELATED PAIN: ICD-10-CM

## 2021-05-26 RX ORDER — OXYCODONE HYDROCHLORIDE 30 MG/1
30 TABLET, FILM COATED, EXTENDED RELEASE ORAL EVERY 12 HOURS
Qty: 60 EACH | Refills: 0 | OUTPATIENT
Start: 2021-05-26

## 2021-06-09 ENCOUNTER — TELEPHONE (OUTPATIENT)
Dept: PALLIATIVE MEDICINE | Facility: CLINIC | Age: 76
End: 2021-06-09

## 2021-06-09 DIAGNOSIS — G89.3 NEOPLASM RELATED PAIN: ICD-10-CM

## 2021-06-09 RX ORDER — OXYCODONE HYDROCHLORIDE 30 MG/1
30 TABLET, FILM COATED, EXTENDED RELEASE ORAL EVERY 12 HOURS
Qty: 60 EACH | Refills: 0 | Status: CANCELLED | OUTPATIENT
Start: 2021-06-09

## 2021-06-15 RX ORDER — OXYCODONE HYDROCHLORIDE 10 MG/1
10 TABLET ORAL EVERY 4 HOURS PRN
Qty: 30 TABLET | Refills: 0 | Status: SHIPPED | OUTPATIENT
Start: 2021-06-15 | End: 2021-06-16

## 2021-06-15 RX ORDER — OXYCODONE HYDROCHLORIDE 30 MG/1
30 TABLET, FILM COATED, EXTENDED RELEASE ORAL EVERY 12 HOURS
Qty: 60 EACH | Refills: 0 | Status: SHIPPED | OUTPATIENT
Start: 2021-06-15 | End: 2021-06-16

## 2021-06-16 ENCOUNTER — OFFICE VISIT (OUTPATIENT)
Dept: PALLIATIVE MEDICINE | Facility: CLINIC | Age: 76
End: 2021-06-16
Payer: MEDICARE

## 2021-06-16 ENCOUNTER — TELEPHONE (OUTPATIENT)
Dept: PALLIATIVE MEDICINE | Facility: CLINIC | Age: 76
End: 2021-06-16

## 2021-06-16 DIAGNOSIS — G89.3 NEOPLASM RELATED PAIN: Primary | ICD-10-CM

## 2021-06-16 DIAGNOSIS — Z71.89 ACP (ADVANCE CARE PLANNING): ICD-10-CM

## 2021-06-16 PROCEDURE — 99215 PR OFFICE/OUTPT VISIT, EST, LEVL V, 40-54 MIN: ICD-10-PCS | Mod: 95,,, | Performed by: FAMILY MEDICINE

## 2021-06-16 PROCEDURE — 1159F MED LIST DOCD IN RCRD: CPT | Mod: 95,,, | Performed by: FAMILY MEDICINE

## 2021-06-16 PROCEDURE — 99215 OFFICE O/P EST HI 40 MIN: CPT | Mod: 95,,, | Performed by: FAMILY MEDICINE

## 2021-06-16 PROCEDURE — 1159F PR MEDICATION LIST DOCUMENTED IN MEDICAL RECORD: ICD-10-PCS | Mod: 95,,, | Performed by: FAMILY MEDICINE

## 2021-06-16 RX ORDER — OXYCODONE HYDROCHLORIDE 60 MG/1
60 TABLET, FILM COATED, EXTENDED RELEASE ORAL EVERY 12 HOURS
Qty: 60 EACH | Refills: 0 | Status: SHIPPED | OUTPATIENT
Start: 2021-06-16 | End: 2021-07-16 | Stop reason: SDUPTHER

## 2021-06-16 RX ORDER — OXYCODONE HYDROCHLORIDE 20 MG/1
20 TABLET ORAL EVERY 6 HOURS PRN
Qty: 120 TABLET | Refills: 0 | Status: SHIPPED | OUTPATIENT
Start: 2021-06-16 | End: 2021-07-16 | Stop reason: SDUPTHER

## 2021-07-08 DIAGNOSIS — I10 ESSENTIAL HYPERTENSION: ICD-10-CM

## 2021-07-09 RX ORDER — METOPROLOL TARTRATE 50 MG/1
50 TABLET ORAL 2 TIMES DAILY
Qty: 180 TABLET | Refills: 0 | Status: SHIPPED | OUTPATIENT
Start: 2021-07-09 | End: 2021-11-16

## 2021-07-16 DIAGNOSIS — G89.3 NEOPLASM RELATED PAIN: ICD-10-CM

## 2021-07-19 ENCOUNTER — PATIENT MESSAGE (OUTPATIENT)
Dept: PALLIATIVE MEDICINE | Facility: CLINIC | Age: 76
End: 2021-07-19

## 2021-07-20 DIAGNOSIS — G89.3 NEOPLASM RELATED PAIN: ICD-10-CM

## 2021-07-20 RX ORDER — OXYCODONE HYDROCHLORIDE 20 MG/1
20 TABLET ORAL EVERY 6 HOURS PRN
Qty: 120 TABLET | Refills: 0 | OUTPATIENT
Start: 2021-07-20

## 2021-07-20 RX ORDER — OXYCODONE HYDROCHLORIDE 60 MG/1
60 TABLET, FILM COATED, EXTENDED RELEASE ORAL EVERY 12 HOURS
Qty: 60 EACH | Refills: 0 | OUTPATIENT
Start: 2021-07-20

## 2021-07-20 RX ORDER — OXYCODONE HYDROCHLORIDE 20 MG/1
20 TABLET ORAL EVERY 6 HOURS PRN
Qty: 120 TABLET | Refills: 0 | Status: SHIPPED | OUTPATIENT
Start: 2021-07-20 | End: 2021-08-26

## 2021-07-20 RX ORDER — OXYCODONE HYDROCHLORIDE 60 MG/1
60 TABLET, FILM COATED, EXTENDED RELEASE ORAL EVERY 12 HOURS
Qty: 60 EACH | Refills: 0 | Status: SHIPPED | OUTPATIENT
Start: 2021-07-20 | End: 2021-08-26

## 2021-08-04 ENCOUNTER — PATIENT MESSAGE (OUTPATIENT)
Dept: ADMINISTRATIVE | Facility: HOSPITAL | Age: 76
End: 2021-08-04

## 2021-08-10 ENCOUNTER — OFFICE VISIT (OUTPATIENT)
Dept: CARDIOLOGY | Facility: CLINIC | Age: 76
End: 2021-08-10
Payer: MEDICARE

## 2021-08-10 VITALS
WEIGHT: 151 LBS | OXYGEN SATURATION: 97 % | BODY MASS INDEX: 21.62 KG/M2 | HEIGHT: 70 IN | DIASTOLIC BLOOD PRESSURE: 56 MMHG | HEART RATE: 86 BPM | SYSTOLIC BLOOD PRESSURE: 126 MMHG

## 2021-08-10 DIAGNOSIS — I25.118 CORONARY ARTERY DISEASE OF NATIVE ARTERY OF NATIVE HEART WITH STABLE ANGINA PECTORIS: Primary | Chronic | ICD-10-CM

## 2021-08-10 DIAGNOSIS — I70.0 CALCIFICATION OF ABDOMINAL AORTA: Chronic | ICD-10-CM

## 2021-08-10 DIAGNOSIS — E78.2 MIXED HYPERLIPIDEMIA: Chronic | ICD-10-CM

## 2021-08-10 DIAGNOSIS — Z95.1 HX OF CABG: ICD-10-CM

## 2021-08-10 DIAGNOSIS — I35.9 AORTIC VALVE DISORDER: ICD-10-CM

## 2021-08-10 DIAGNOSIS — I10 ESSENTIAL HYPERTENSION: Chronic | ICD-10-CM

## 2021-08-10 PROCEDURE — 1159F MED LIST DOCD IN RCRD: CPT | Mod: CPTII,S$GLB,, | Performed by: INTERNAL MEDICINE

## 2021-08-10 PROCEDURE — 1101F PT FALLS ASSESS-DOCD LE1/YR: CPT | Mod: CPTII,S$GLB,, | Performed by: INTERNAL MEDICINE

## 2021-08-10 PROCEDURE — 99213 PR OFFICE/OUTPT VISIT, EST, LEVL III, 20-29 MIN: ICD-10-PCS | Mod: S$GLB,,, | Performed by: INTERNAL MEDICINE

## 2021-08-10 PROCEDURE — 1159F PR MEDICATION LIST DOCUMENTED IN MEDICAL RECORD: ICD-10-PCS | Mod: CPTII,S$GLB,, | Performed by: INTERNAL MEDICINE

## 2021-08-10 PROCEDURE — 3078F DIAST BP <80 MM HG: CPT | Mod: CPTII,S$GLB,, | Performed by: INTERNAL MEDICINE

## 2021-08-10 PROCEDURE — 99999 PR PBB SHADOW E&M-EST. PATIENT-LVL V: ICD-10-PCS | Mod: PBBFAC,,, | Performed by: INTERNAL MEDICINE

## 2021-08-10 PROCEDURE — 3078F PR MOST RECENT DIASTOLIC BLOOD PRESSURE < 80 MM HG: ICD-10-PCS | Mod: CPTII,S$GLB,, | Performed by: INTERNAL MEDICINE

## 2021-08-10 PROCEDURE — 99999 PR PBB SHADOW E&M-EST. PATIENT-LVL V: CPT | Mod: PBBFAC,,, | Performed by: INTERNAL MEDICINE

## 2021-08-10 PROCEDURE — 3288F FALL RISK ASSESSMENT DOCD: CPT | Mod: CPTII,S$GLB,, | Performed by: INTERNAL MEDICINE

## 2021-08-10 PROCEDURE — 1126F AMNT PAIN NOTED NONE PRSNT: CPT | Mod: CPTII,S$GLB,, | Performed by: INTERNAL MEDICINE

## 2021-08-10 PROCEDURE — 99213 OFFICE O/P EST LOW 20 MIN: CPT | Mod: S$GLB,,, | Performed by: INTERNAL MEDICINE

## 2021-08-10 PROCEDURE — 1160F PR REVIEW ALL MEDS BY PRESCRIBER/CLIN PHARMACIST DOCUMENTED: ICD-10-PCS | Mod: CPTII,S$GLB,, | Performed by: INTERNAL MEDICINE

## 2021-08-10 PROCEDURE — 3074F SYST BP LT 130 MM HG: CPT | Mod: CPTII,S$GLB,, | Performed by: INTERNAL MEDICINE

## 2021-08-10 PROCEDURE — 3074F PR MOST RECENT SYSTOLIC BLOOD PRESSURE < 130 MM HG: ICD-10-PCS | Mod: CPTII,S$GLB,, | Performed by: INTERNAL MEDICINE

## 2021-08-10 PROCEDURE — 3288F PR FALLS RISK ASSESSMENT DOCUMENTED: ICD-10-PCS | Mod: CPTII,S$GLB,, | Performed by: INTERNAL MEDICINE

## 2021-08-10 PROCEDURE — 1160F RVW MEDS BY RX/DR IN RCRD: CPT | Mod: CPTII,S$GLB,, | Performed by: INTERNAL MEDICINE

## 2021-08-10 PROCEDURE — 1126F PR PAIN SEVERITY QUANTIFIED, NO PAIN PRESENT: ICD-10-PCS | Mod: CPTII,S$GLB,, | Performed by: INTERNAL MEDICINE

## 2021-08-10 PROCEDURE — 1101F PR PT FALLS ASSESS DOC 0-1 FALLS W/OUT INJ PAST YR: ICD-10-PCS | Mod: CPTII,S$GLB,, | Performed by: INTERNAL MEDICINE

## 2021-08-18 RX ORDER — TICAGRELOR 90 MG/1
90 TABLET ORAL DAILY
Qty: 30 TABLET | Refills: 6 | Status: SHIPPED | OUTPATIENT
Start: 2021-08-18 | End: 2022-04-28

## 2021-08-20 ENCOUNTER — TELEPHONE (OUTPATIENT)
Dept: PRIMARY CARE CLINIC | Facility: CLINIC | Age: 76
End: 2021-08-20

## 2021-08-26 ENCOUNTER — TELEPHONE (OUTPATIENT)
Dept: PRIMARY CARE CLINIC | Facility: CLINIC | Age: 76
End: 2021-08-26

## 2021-08-26 ENCOUNTER — DOCUMENTATION ONLY (OUTPATIENT)
Dept: HEMATOLOGY/ONCOLOGY | Facility: CLINIC | Age: 76
End: 2021-08-26

## 2021-08-26 ENCOUNTER — OFFICE VISIT (OUTPATIENT)
Dept: PRIMARY CARE CLINIC | Facility: CLINIC | Age: 76
End: 2021-08-26
Payer: MEDICARE

## 2021-08-26 VITALS
HEART RATE: 87 BPM | WEIGHT: 146.81 LBS | OXYGEN SATURATION: 95 % | DIASTOLIC BLOOD PRESSURE: 62 MMHG | BODY MASS INDEX: 21.02 KG/M2 | TEMPERATURE: 98 F | HEIGHT: 70 IN | SYSTOLIC BLOOD PRESSURE: 112 MMHG

## 2021-08-26 DIAGNOSIS — G89.3 NEOPLASM RELATED PAIN: ICD-10-CM

## 2021-08-26 DIAGNOSIS — K59.03 DRUG-INDUCED CONSTIPATION: Primary | ICD-10-CM

## 2021-08-26 DIAGNOSIS — Z71.89 ACP (ADVANCE CARE PLANNING): ICD-10-CM

## 2021-08-26 DIAGNOSIS — R06.09 DYSPNEA ON EXERTION: ICD-10-CM

## 2021-08-26 PROCEDURE — 3074F PR MOST RECENT SYSTOLIC BLOOD PRESSURE < 130 MM HG: ICD-10-PCS | Mod: CPTII,S$GLB,, | Performed by: NURSE PRACTITIONER

## 2021-08-26 PROCEDURE — 99497 PR ADVNCD CARE PLAN 30 MIN: ICD-10-PCS | Mod: S$GLB,,, | Performed by: NURSE PRACTITIONER

## 2021-08-26 PROCEDURE — 1159F PR MEDICATION LIST DOCUMENTED IN MEDICAL RECORD: ICD-10-PCS | Mod: CPTII,S$GLB,, | Performed by: NURSE PRACTITIONER

## 2021-08-26 PROCEDURE — 1125F AMNT PAIN NOTED PAIN PRSNT: CPT | Mod: CPTII,S$GLB,, | Performed by: NURSE PRACTITIONER

## 2021-08-26 PROCEDURE — 99214 OFFICE O/P EST MOD 30 MIN: CPT | Mod: S$GLB,,, | Performed by: NURSE PRACTITIONER

## 2021-08-26 PROCEDURE — 99497 ADVNCD CARE PLAN 30 MIN: CPT | Mod: S$GLB,,, | Performed by: NURSE PRACTITIONER

## 2021-08-26 PROCEDURE — 3288F FALL RISK ASSESSMENT DOCD: CPT | Mod: CPTII,S$GLB,, | Performed by: NURSE PRACTITIONER

## 2021-08-26 PROCEDURE — 99999 PR PBB SHADOW E&M-EST. PATIENT-LVL V: CPT | Mod: PBBFAC,,, | Performed by: NURSE PRACTITIONER

## 2021-08-26 PROCEDURE — 1159F MED LIST DOCD IN RCRD: CPT | Mod: CPTII,S$GLB,, | Performed by: NURSE PRACTITIONER

## 2021-08-26 PROCEDURE — 99214 PR OFFICE/OUTPT VISIT, EST, LEVL IV, 30-39 MIN: ICD-10-PCS | Mod: S$GLB,,, | Performed by: NURSE PRACTITIONER

## 2021-08-26 PROCEDURE — 1125F PR PAIN SEVERITY QUANTIFIED, PAIN PRESENT: ICD-10-PCS | Mod: CPTII,S$GLB,, | Performed by: NURSE PRACTITIONER

## 2021-08-26 PROCEDURE — 3078F PR MOST RECENT DIASTOLIC BLOOD PRESSURE < 80 MM HG: ICD-10-PCS | Mod: CPTII,S$GLB,, | Performed by: NURSE PRACTITIONER

## 2021-08-26 PROCEDURE — 3288F PR FALLS RISK ASSESSMENT DOCUMENTED: ICD-10-PCS | Mod: CPTII,S$GLB,, | Performed by: NURSE PRACTITIONER

## 2021-08-26 PROCEDURE — 3078F DIAST BP <80 MM HG: CPT | Mod: CPTII,S$GLB,, | Performed by: NURSE PRACTITIONER

## 2021-08-26 PROCEDURE — 3074F SYST BP LT 130 MM HG: CPT | Mod: CPTII,S$GLB,, | Performed by: NURSE PRACTITIONER

## 2021-08-26 PROCEDURE — 1101F PT FALLS ASSESS-DOCD LE1/YR: CPT | Mod: CPTII,S$GLB,, | Performed by: NURSE PRACTITIONER

## 2021-08-26 PROCEDURE — 1101F PR PT FALLS ASSESS DOC 0-1 FALLS W/OUT INJ PAST YR: ICD-10-PCS | Mod: CPTII,S$GLB,, | Performed by: NURSE PRACTITIONER

## 2021-08-26 PROCEDURE — 99999 PR PBB SHADOW E&M-EST. PATIENT-LVL V: ICD-10-PCS | Mod: PBBFAC,,, | Performed by: NURSE PRACTITIONER

## 2021-08-26 RX ORDER — OXYCODONE HYDROCHLORIDE 20 MG/1
20 TABLET ORAL EVERY 6 HOURS PRN
Qty: 120 TABLET | Refills: 0 | Status: SHIPPED | OUTPATIENT
Start: 2021-08-26 | End: 2021-09-08 | Stop reason: SDUPTHER

## 2021-08-26 RX ORDER — OXYCODONE HCL 20 MG/1
20 TABLET, FILM COATED, EXTENDED RELEASE ORAL EVERY 12 HOURS
Qty: 60 TABLET | Refills: 0 | Status: SHIPPED | OUTPATIENT
Start: 2021-08-26 | End: 2021-09-25

## 2021-08-26 RX ORDER — OXYCODONE HYDROCHLORIDE 60 MG/1
60 TABLET, FILM COATED, EXTENDED RELEASE ORAL EVERY 12 HOURS
Qty: 60 TABLET | Refills: 0 | Status: SHIPPED | OUTPATIENT
Start: 2021-08-26 | End: 2021-09-08 | Stop reason: SDUPTHER

## 2021-08-27 ENCOUNTER — DOCUMENTATION ONLY (OUTPATIENT)
Dept: PALLIATIVE MEDICINE | Facility: CLINIC | Age: 76
End: 2021-08-27

## 2021-09-08 ENCOUNTER — TELEPHONE (OUTPATIENT)
Dept: INTERNAL MEDICINE | Facility: CLINIC | Age: 76
End: 2021-09-08

## 2021-09-08 DIAGNOSIS — G89.3 NEOPLASM RELATED PAIN: ICD-10-CM

## 2021-09-08 RX ORDER — OXYCODONE HYDROCHLORIDE 60 MG/1
60 TABLET, FILM COATED, EXTENDED RELEASE ORAL EVERY 12 HOURS
Qty: 60 EACH | Refills: 0 | OUTPATIENT
Start: 2021-09-08

## 2021-09-08 RX ORDER — OXYCODONE HYDROCHLORIDE 60 MG/1
60 TABLET, FILM COATED, EXTENDED RELEASE ORAL EVERY 12 HOURS
Qty: 60 TABLET | Refills: 0 | Status: SHIPPED | OUTPATIENT
Start: 2021-09-08 | End: 2021-10-14 | Stop reason: SDUPTHER

## 2021-09-08 RX ORDER — OXYCODONE HCL 20 MG/1
20 TABLET, FILM COATED, EXTENDED RELEASE ORAL EVERY 12 HOURS
Qty: 60 TABLET | Refills: 0 | Status: CANCELLED | OUTPATIENT
Start: 2021-09-08 | End: 2021-10-08

## 2021-09-08 RX ORDER — OXYCODONE HYDROCHLORIDE 20 MG/1
20 TABLET ORAL EVERY 6 HOURS PRN
Qty: 120 TABLET | Refills: 0 | OUTPATIENT
Start: 2021-09-08

## 2021-09-08 RX ORDER — OXYCODONE HYDROCHLORIDE 20 MG/1
20 TABLET ORAL EVERY 6 HOURS PRN
Qty: 120 TABLET | Refills: 0 | Status: SHIPPED | OUTPATIENT
Start: 2021-09-08 | End: 2021-10-13 | Stop reason: SDUPTHER

## 2021-09-29 ENCOUNTER — PATIENT OUTREACH (OUTPATIENT)
Dept: ADMINISTRATIVE | Facility: OTHER | Age: 76
End: 2021-09-29

## 2021-09-29 DIAGNOSIS — E11.21 TYPE 2 DIABETES MELLITUS WITH DIABETIC NEPHROPATHY, WITH LONG-TERM CURRENT USE OF INSULIN: Primary | ICD-10-CM

## 2021-09-29 DIAGNOSIS — Z79.4 TYPE 2 DIABETES MELLITUS WITH DIABETIC NEPHROPATHY, WITH LONG-TERM CURRENT USE OF INSULIN: Primary | ICD-10-CM

## 2021-10-12 DIAGNOSIS — G89.3 NEOPLASM RELATED PAIN: ICD-10-CM

## 2021-10-13 DIAGNOSIS — G89.3 NEOPLASM RELATED PAIN: ICD-10-CM

## 2021-10-13 RX ORDER — OXYCODONE HYDROCHLORIDE 60 MG/1
60 TABLET, FILM COATED, EXTENDED RELEASE ORAL EVERY 12 HOURS
Qty: 60 TABLET | Refills: 0 | OUTPATIENT
Start: 2021-10-13 | End: 2021-11-12

## 2021-10-13 RX ORDER — OXYCODONE HYDROCHLORIDE 20 MG/1
20 TABLET ORAL EVERY 6 HOURS PRN
Qty: 120 TABLET | Refills: 0 | OUTPATIENT
Start: 2021-10-13

## 2021-10-13 RX ORDER — OXYCODONE HYDROCHLORIDE 20 MG/1
20 TABLET ORAL EVERY 6 HOURS PRN
Qty: 120 TABLET | Refills: 0 | Status: SHIPPED | OUTPATIENT
Start: 2021-10-13 | End: 2021-11-11 | Stop reason: SDUPTHER

## 2021-10-14 DIAGNOSIS — G89.3 NEOPLASM RELATED PAIN: ICD-10-CM

## 2021-10-14 RX ORDER — OXYCODONE HYDROCHLORIDE 60 MG/1
60 TABLET, FILM COATED, EXTENDED RELEASE ORAL EVERY 12 HOURS
Qty: 60 TABLET | Refills: 0 | Status: CANCELLED | OUTPATIENT
Start: 2021-10-14 | End: 2021-11-13

## 2021-10-14 RX ORDER — OXYCODONE HYDROCHLORIDE 60 MG/1
60 TABLET, FILM COATED, EXTENDED RELEASE ORAL EVERY 12 HOURS
Qty: 60 TABLET | Refills: 0 | Status: SHIPPED | OUTPATIENT
Start: 2021-10-14 | End: 2021-11-11 | Stop reason: SDUPTHER

## 2021-10-18 ENCOUNTER — PATIENT MESSAGE (OUTPATIENT)
Dept: ADMINISTRATIVE | Facility: HOSPITAL | Age: 76
End: 2021-10-18
Payer: MEDICARE

## 2021-11-03 DIAGNOSIS — E11.65 TYPE 2 DIABETES MELLITUS WITH HYPERGLYCEMIA, WITH LONG-TERM CURRENT USE OF INSULIN: ICD-10-CM

## 2021-11-03 DIAGNOSIS — Z79.4 TYPE 2 DIABETES MELLITUS WITH HYPERGLYCEMIA, WITH LONG-TERM CURRENT USE OF INSULIN: ICD-10-CM

## 2021-11-03 RX ORDER — INSULIN ASPART 100 [IU]/ML
INJECTION, SOLUTION INTRAVENOUS; SUBCUTANEOUS
Qty: 30 ML | Refills: 0 | Status: SHIPPED | OUTPATIENT
Start: 2021-11-03 | End: 2022-04-01

## 2021-11-11 ENCOUNTER — OFFICE VISIT (OUTPATIENT)
Dept: PALLIATIVE MEDICINE | Facility: CLINIC | Age: 76
End: 2021-11-11
Payer: MEDICARE

## 2021-11-11 DIAGNOSIS — C90.00 MULTIPLE MYELOMA, REMISSION STATUS UNSPECIFIED: ICD-10-CM

## 2021-11-11 DIAGNOSIS — G89.3 NEOPLASM RELATED PAIN: ICD-10-CM

## 2021-11-11 DIAGNOSIS — Z51.5 ENCOUNTER FOR PALLIATIVE CARE: Primary | ICD-10-CM

## 2021-11-11 PROCEDURE — 99215 PR OFFICE/OUTPT VISIT, EST, LEVL V, 40-54 MIN: ICD-10-PCS | Mod: ,,, | Performed by: NURSE PRACTITIONER

## 2021-11-11 PROCEDURE — 1160F RVW MEDS BY RX/DR IN RCRD: CPT | Mod: CPTII,,, | Performed by: NURSE PRACTITIONER

## 2021-11-11 PROCEDURE — 99215 OFFICE O/P EST HI 40 MIN: CPT | Mod: ,,, | Performed by: NURSE PRACTITIONER

## 2021-11-11 PROCEDURE — 1160F PR REVIEW ALL MEDS BY PRESCRIBER/CLIN PHARMACIST DOCUMENTED: ICD-10-PCS | Mod: CPTII,,, | Performed by: NURSE PRACTITIONER

## 2021-11-11 PROCEDURE — 1159F MED LIST DOCD IN RCRD: CPT | Mod: CPTII,,, | Performed by: NURSE PRACTITIONER

## 2021-11-11 PROCEDURE — 1159F PR MEDICATION LIST DOCUMENTED IN MEDICAL RECORD: ICD-10-PCS | Mod: CPTII,,, | Performed by: NURSE PRACTITIONER

## 2021-11-11 RX ORDER — OXYCODONE HYDROCHLORIDE 20 MG/1
20 TABLET ORAL EVERY 6 HOURS PRN
Qty: 120 TABLET | Refills: 0 | Status: SHIPPED | OUTPATIENT
Start: 2021-11-11 | End: 2021-12-17 | Stop reason: SDUPTHER

## 2021-11-11 RX ORDER — OXYCODONE HYDROCHLORIDE 60 MG/1
60 TABLET, FILM COATED, EXTENDED RELEASE ORAL EVERY 12 HOURS
Qty: 60 TABLET | Refills: 0 | Status: SHIPPED | OUTPATIENT
Start: 2021-11-11 | End: 2021-12-17 | Stop reason: SDUPTHER

## 2021-11-11 RX ORDER — PROCHLORPERAZINE MALEATE 5 MG
5 TABLET ORAL 4 TIMES DAILY PRN
Qty: 40 TABLET | Refills: 2 | Status: SHIPPED | OUTPATIENT
Start: 2021-11-11

## 2021-11-19 ENCOUNTER — TELEPHONE (OUTPATIENT)
Dept: PRIMARY CARE CLINIC | Facility: CLINIC | Age: 76
End: 2021-11-19
Payer: MEDICARE

## 2021-12-10 RX ORDER — FUROSEMIDE 20 MG/1
20 TABLET ORAL DAILY
Qty: 30 TABLET | Refills: 3 | Status: SHIPPED | OUTPATIENT
Start: 2021-12-10 | End: 2022-04-30 | Stop reason: SDUPTHER

## 2021-12-17 DIAGNOSIS — G89.3 NEOPLASM RELATED PAIN: ICD-10-CM

## 2021-12-21 ENCOUNTER — PATIENT MESSAGE (OUTPATIENT)
Dept: PALLIATIVE MEDICINE | Facility: CLINIC | Age: 76
End: 2021-12-21
Payer: MEDICARE

## 2021-12-21 DIAGNOSIS — G89.3 NEOPLASM RELATED PAIN: ICD-10-CM

## 2021-12-21 RX ORDER — OXYCODONE HYDROCHLORIDE 20 MG/1
20 TABLET ORAL EVERY 6 HOURS PRN
Qty: 120 TABLET | Refills: 0 | Status: SHIPPED | OUTPATIENT
Start: 2021-12-21 | End: 2022-02-02 | Stop reason: SDUPTHER

## 2021-12-21 RX ORDER — OXYCODONE HYDROCHLORIDE 60 MG/1
60 TABLET, FILM COATED, EXTENDED RELEASE ORAL EVERY 12 HOURS
Qty: 60 TABLET | Refills: 0 | Status: SHIPPED | OUTPATIENT
Start: 2021-12-21 | End: 2022-02-02 | Stop reason: SDUPTHER

## 2021-12-21 RX ORDER — OXYCODONE HYDROCHLORIDE 20 MG/1
20 TABLET ORAL EVERY 6 HOURS PRN
Qty: 120 TABLET | Refills: 0 | OUTPATIENT
Start: 2021-12-21 | End: 2022-01-20

## 2021-12-21 RX ORDER — OXYCODONE HYDROCHLORIDE 60 MG/1
60 TABLET, FILM COATED, EXTENDED RELEASE ORAL EVERY 12 HOURS
Qty: 60 TABLET | Refills: 0 | OUTPATIENT
Start: 2021-12-21 | End: 2022-01-20

## 2021-12-28 ENCOUNTER — PATIENT MESSAGE (OUTPATIENT)
Dept: CARDIOLOGY | Facility: CLINIC | Age: 76
End: 2021-12-28
Payer: MEDICARE

## 2022-01-06 ENCOUNTER — OFFICE VISIT (OUTPATIENT)
Dept: PALLIATIVE MEDICINE | Facility: CLINIC | Age: 77
End: 2022-01-06
Payer: MEDICARE

## 2022-01-06 VITALS
OXYGEN SATURATION: 97 % | DIASTOLIC BLOOD PRESSURE: 66 MMHG | SYSTOLIC BLOOD PRESSURE: 137 MMHG | BODY MASS INDEX: 21.23 KG/M2 | TEMPERATURE: 97 F | HEART RATE: 85 BPM | WEIGHT: 148.25 LBS | HEIGHT: 70 IN

## 2022-01-06 DIAGNOSIS — G89.3 NEOPLASM RELATED PAIN: ICD-10-CM

## 2022-01-06 DIAGNOSIS — C90.00 MULTIPLE MYELOMA, REMISSION STATUS UNSPECIFIED: ICD-10-CM

## 2022-01-06 DIAGNOSIS — Z51.5 ENCOUNTER FOR PALLIATIVE CARE: Primary | ICD-10-CM

## 2022-01-06 PROCEDURE — 3288F PR FALLS RISK ASSESSMENT DOCUMENTED: ICD-10-PCS | Mod: CPTII,S$GLB,, | Performed by: NURSE PRACTITIONER

## 2022-01-06 PROCEDURE — 1160F RVW MEDS BY RX/DR IN RCRD: CPT | Mod: CPTII,S$GLB,, | Performed by: NURSE PRACTITIONER

## 2022-01-06 PROCEDURE — 1159F PR MEDICATION LIST DOCUMENTED IN MEDICAL RECORD: ICD-10-PCS | Mod: CPTII,S$GLB,, | Performed by: NURSE PRACTITIONER

## 2022-01-06 PROCEDURE — 99999 PR PBB SHADOW E&M-EST. PATIENT-LVL V: ICD-10-PCS | Mod: PBBFAC,,, | Performed by: NURSE PRACTITIONER

## 2022-01-06 PROCEDURE — 3078F PR MOST RECENT DIASTOLIC BLOOD PRESSURE < 80 MM HG: ICD-10-PCS | Mod: CPTII,S$GLB,, | Performed by: NURSE PRACTITIONER

## 2022-01-06 PROCEDURE — 3288F FALL RISK ASSESSMENT DOCD: CPT | Mod: CPTII,S$GLB,, | Performed by: NURSE PRACTITIONER

## 2022-01-06 PROCEDURE — 1125F AMNT PAIN NOTED PAIN PRSNT: CPT | Mod: CPTII,S$GLB,, | Performed by: NURSE PRACTITIONER

## 2022-01-06 PROCEDURE — 3078F DIAST BP <80 MM HG: CPT | Mod: CPTII,S$GLB,, | Performed by: NURSE PRACTITIONER

## 2022-01-06 PROCEDURE — 1125F PR PAIN SEVERITY QUANTIFIED, PAIN PRESENT: ICD-10-PCS | Mod: CPTII,S$GLB,, | Performed by: NURSE PRACTITIONER

## 2022-01-06 PROCEDURE — 1159F MED LIST DOCD IN RCRD: CPT | Mod: CPTII,S$GLB,, | Performed by: NURSE PRACTITIONER

## 2022-01-06 PROCEDURE — 99999 PR PBB SHADOW E&M-EST. PATIENT-LVL V: CPT | Mod: PBBFAC,,, | Performed by: NURSE PRACTITIONER

## 2022-01-06 PROCEDURE — 3075F PR MOST RECENT SYSTOLIC BLOOD PRESS GE 130-139MM HG: ICD-10-PCS | Mod: CPTII,S$GLB,, | Performed by: NURSE PRACTITIONER

## 2022-01-06 PROCEDURE — 1101F PR PT FALLS ASSESS DOC 0-1 FALLS W/OUT INJ PAST YR: ICD-10-PCS | Mod: CPTII,S$GLB,, | Performed by: NURSE PRACTITIONER

## 2022-01-06 PROCEDURE — 1160F PR REVIEW ALL MEDS BY PRESCRIBER/CLIN PHARMACIST DOCUMENTED: ICD-10-PCS | Mod: CPTII,S$GLB,, | Performed by: NURSE PRACTITIONER

## 2022-01-06 PROCEDURE — 1123F PR ADV CARE PLAN DISCUSSED, PLAN OR SURROGATE DOCUMENTED: ICD-10-PCS | Mod: CPTII,S$GLB,, | Performed by: NURSE PRACTITIONER

## 2022-01-06 PROCEDURE — 99215 PR OFFICE/OUTPT VISIT, EST, LEVL V, 40-54 MIN: ICD-10-PCS | Mod: S$GLB,,, | Performed by: NURSE PRACTITIONER

## 2022-01-06 PROCEDURE — 3075F SYST BP GE 130 - 139MM HG: CPT | Mod: CPTII,S$GLB,, | Performed by: NURSE PRACTITIONER

## 2022-01-06 PROCEDURE — 1101F PT FALLS ASSESS-DOCD LE1/YR: CPT | Mod: CPTII,S$GLB,, | Performed by: NURSE PRACTITIONER

## 2022-01-06 PROCEDURE — 1123F ACP DISCUSS/DSCN MKR DOCD: CPT | Mod: CPTII,S$GLB,, | Performed by: NURSE PRACTITIONER

## 2022-01-06 PROCEDURE — 99215 OFFICE O/P EST HI 40 MIN: CPT | Mod: S$GLB,,, | Performed by: NURSE PRACTITIONER

## 2022-01-06 NOTE — PATIENT INSTRUCTIONS
Please bring living will to next appointment to review.    Please ask your cancer doctor in [place] to send us update.

## 2022-01-06 NOTE — PROGRESS NOTES
Progress Note  Palliative Care      Consult Requested By:  Dr. Solo, oncology  Reason for Consult: ACP and symptom management      ASSESSMENT/PLAN:     Plan/Recommendations:  Diagnoses and all orders for this visit:    Encounter for palliative care   -patient is decisional   -patient is accompanied by his daughter Alejandrina Nichole   -philosophy of palliative medicine reviewed with patient and family   -reviewed importance of ACP documents   -daughter reports she will bring to next visit or upload in the system through [employer]   -code status not specifically addressed today   -will plan to follow up at future encounters about code status    Multiple myeloma, remission status unspecified   -patient primary oncologist at [facility] in [place]   -followed locally by Dr. Solo   -currently on oral treatment, patient cannot remember name of medication    Neoplasm related pain   -patient reports pain is controlled on current regimen; although he is mostly sedentary   -refill Rx today   -bowel regimen to prevent OIC    Metastatic bone cancer   -see above      Understanding of illness/Prognosis:  Fair insight; is able to articulate cancer is not curable; prognosis deferred to oncology     Goals of care:  Life-prolonging    Follow up: 8 weeks      SUBJECTIVE:     History of Present Illness:  Patient is a 76 y.o. year old male presenting for follow with palliative medicine.  Patient has a history of metastatic multiple myeloma/ vertebral bone mets.  He previously followed by Dr. Solo (last visit 11/2020) with his primary oncologist at [facility] in [place], [place].  I have limited records but patient tells me he on a maintenance regimen of 3 weeks on/1 week off for treatment of his multiple myeloma. (?Revlamid).  He also tells me he see his oncologist next month in [place].    Although patient is new to me, he has been seen by palliative medicine.  He had a home-based referral but  "declined.  Today, he cannot recall palliative medicine and its philosophy so this was reviewed with both him and his daughter. Patient tells me he is mostly sedentary due to his pain.  He reports he hurts "all over" but especially painful with walking.  He feels his pain in controlled on his current pain regimen and rates his pain a "3" out of 10 during our visit today.  He is on a bowel regimen with last BM yesterday.  Otherwise, seems to be doing okay.    Interval History  01/06/2022  Patient had ED visit in December due to N/V and low blood sugar.    Patient tells me he is doing okay today.  Pain is controlled on current regimen.  His biggest issue is fatigue and decreased appetite.  He is ambulatory and still able to perform his own ADLs.  He lives independently.  He is due for follow up with oncology in a couple of weeks.  I will try to connect with his team at [facility] in [place], [place] to get details on his disease and treatment plan.  When I asked patient today if his disease was stable, he response was "I wouldn't say that but I am living with it".  It is difficult to discuss ACP and goals of care without input from oncology.  I reminded patient to bring copy of living will to next visit.  Overall, he is doing fairly well with no new issues.      HIGINIO LAMA reviewed and summarized:          Past Medical History:   Diagnosis Date    CAD (coronary artery disease)     tyree    Diabetes mellitus, type 2 1979    eye dr rocha    Hearing impaired     Hyperlipidemia     Hypertension     Lupus     Discoid    Multiple myeloma     Old MI (myocardial infarction) 2012    Renal insufficiency     Tracheostomy tube present      Past Surgical History:   Procedure Laterality Date    CARDIAC SURGERY      CATARACT EXTRACTION      COLONOSCOPY      CORONARY ARTERY BYPASS GRAFT      HAND SURGERY      KNEE SURGERY      TRACHEOSTOMY TUBE PLACEMENT      WRIST FUSION       Family History   Problem " "Relation Age of Onset    Diabetes Mother     Diabetes Father     Prostate cancer Father     Pancreatic cancer Sister     No Known Problems Brother     Diabetes Maternal Uncle     Diabetes Maternal Grandmother     No Known Problems Maternal Grandfather     No Known Problems Paternal Grandmother     No Known Problems Paternal Grandfather      Review of patient's allergies indicates:   Allergen Reactions    Cephalexin      Pt tolerated cefepime 11/4/2020    Fentanyl Nausea And Vomiting    Nsaids (non-steroidal anti-inflammatory drug)      Renal insuf       Medications:    Current Outpatient Medications:     aspirin 81 MG Chew, Take 1 tablet (81 mg total) by mouth once daily., Disp: 100 tablet, Rfl: 12    azelastine (ASTELIN) 137 mcg (0.1 %) nasal spray, 2 sprays (274 mcg total) by Nasal route 2 (two) times daily., Disp: 30 mL, Rfl: 11    BD ULTRA-FINE DAVE PEN NEEDLE 32 gauge x 5/32" Ndle, USE  4 TIMES DAILY WITH MEALS AND AT BEDTIME, Disp: 400 each, Rfl: 3    blood-glucose meter kit, Use as instructed, Disp: 1 each, Rfl: 0    BRILINTA 90 mg tablet, Take 1 tablet (90 mg total) by mouth once daily., Disp: 30 tablet, Rfl: 6    cholecalciferol, vitamin D3, 5,000 unit Tab, Take 5,000 Units by mouth once daily., Disp: , Rfl:     DOCOSAHEXANOIC ACID ORAL, Take 1 capsule by mouth once daily., Disp: , Rfl:     docusate sodium (COLACE) 100 MG capsule, Take 100 mg by mouth 2 (two) times daily., Disp: , Rfl:     flaxseed oil 1,000 mg Cap, Take 1 capsule by mouth daily as needed. , Disp: , Rfl:     furosemide (LASIX) 20 MG tablet, Take 1 tablet (20 mg total) by mouth once daily., Disp: 30 tablet, Rfl: 3    ginger root (GINGER, ZINGIBER OFFICINALIS,) 550 mg Cap, Take 1 capsule by mouth., Disp: , Rfl:     isosorbide mononitrate (IMDUR) 60 MG 24 hr tablet, Take 1 tablet by mouth twice daily, Disp: 180 tablet, Rfl: 2    lactulose (CEPHULAC) 20 gram Pack, Mix and take 1 packet (20 g total) by mouth 3 (three) " times daily. (Patient taking differently: Take 20 g by mouth 3 (three) times daily as needed.), Disp: 90 packet, Rfl: 3    lactulose (CHRONULAC) 10 gram/15 mL solution, Take 30 mL by mouth twice daily as needed for constipation., Disp: 473 mL, Rfl: 3    metoprolol tartrate (LOPRESSOR) 50 MG tablet, Take 1 tablet by mouth twice daily, Disp: 180 tablet, Rfl: 4    multivit-min-FA-lycopen-lutein 300-600-300 mcg Tab, Take 1 tablet by mouth., Disp: , Rfl:     nitroGLYCERIN (NITROSTAT) 0.4 MG SL tablet, Place 1 tablet (0.4 mg total) under the tongue every 5 (five) minutes as needed for Chest pain., Disp: 50 tablet, Rfl: 12    NOVOLOG FLEXPEN U-100 INSULIN 100 unit/mL (3 mL) InPn pen, INJECT 15 UNITS SUBCUTANEOUSLY THREE TIMES DAILY WITH MEALS, Disp: 30 mL, Rfl: 0    omega 3-dha-epa-fish oil 300-1,000 mg Cap, Take by mouth., Disp: , Rfl:     ondansetron (ZOFRAN) 4 MG tablet, Take 1 tablet (4 mg total) by mouth every 8 (eight) hours as needed for Nausea., Disp: 40 tablet, Rfl: 11    ondansetron (ZOFRAN-ODT) 4 MG TbDL, Dissolve 1 tab under the tongue every 4-6 hours as needed for nausea., Disp: 120 tablet, Rfl: 0    oxyCODONE (OXYCONTIN) 60 mg TR12 12 hr tablet, Take 1 tablet (60 mg total) by mouth every 12 (twelve) hours., Disp: 60 tablet, Rfl: 0    oxyCODONE (ROXICODONE) 20 mg Tab immediate release tablet, Take 1 tablet (20 mg total) by mouth every 6 (six) hours as needed (breakthrough pain)., Disp: 120 tablet, Rfl: 0    pantoprazole (PROTONIX) 40 MG tablet, Take 1 tablet by mouth once daily, Disp: 30 tablet, Rfl: 11    polyethylene glycol (GLYCOLAX) 17 gram PwPk, Take 17 g by mouth once daily., Disp: 100 each, Rfl: 1    pomalidomide 3 mg Cap, Take 3 mg by mouth 3 mg daily x 21 days, then off for 7 days, then repeat.., Disp: , Rfl:     potassium chloride SA (K-DUR,KLOR-CON) 20 MEQ tablet, TAKE 1 TABLET BY MOUTH ON MONDAY, WEDNESDAY AND FRIDAY, Disp: 35 tablet, Rfl: 2    prochlorperazine (COMPAZINE) 5 MG  tablet, Take 1 tablet (5 mg total) by mouth 4 (four) times daily as needed for Nausea., Disp: 40 tablet, Rfl: 2    ranolazine (RANEXA) 1,000 mg Tb12, Take 1 tablet (1,000 mg total) by mouth 2 (two) times daily., Disp: 60 tablet, Rfl: 6    rosuvastatin (CRESTOR) 40 MG Tab, Take 1 tablet (40 mg total) by mouth every evening., Disp: 90 tablet, Rfl: 0    sennosides 25 mg Tab, Take 1 tablet by mouth 2 (two) times a day., Disp: 60 each, Rfl: 11    triamcinolone acetonide 0.1% (KENALOG) 0.1 % cream, Apply topically 2 (two) times daily. For two weeks, Disp: 28.4 g, Rfl: 1    TRUE METRIX GLUCOSE TEST STRIP Strp, USE  STRIP TO CHECK GLUCOSE SIX TIMES DAILY, Disp: 250 strip, Rfl: 11    TRUEPLUS LANCETS 33 gauge Misc, USE   TO CHECK GLUCOSE SIX TIMES DAILY, Disp: 100 each, Rfl: 11    VITAMIN B COMPLEX ORAL, Take 1 capsule by mouth., Disp: , Rfl:     albuterol-ipratropium (DUO-NEB) 2.5 mg-0.5 mg/3 mL nebulizer solution, Take 3 mLs by nebulization every 8 (eight) hours. For shortness of breath and wheezing, Disp: 270 mL, Rfl: 0    insulin (LANTUS SOLOSTAR U-100 INSULIN) glargine 100 units/mL (3mL) SubQ pen, Inject 10 Units into the skin every evening. INJECT 15 UNITS SUBCUTANEOUSLY ONCE DAILY, Disp: 3 mL, Rfl: 11    OBJECTIVE:       ROS:  Review of Systems   Constitutional: Positive for activity change and fatigue. Negative for fever.   HENT: Positive for hearing loss. Negative for congestion.    Eyes: Negative for visual disturbance.   Respiratory: Positive for shortness of breath (with exertion or activity). Negative for cough.    Cardiovascular: Negative for chest pain.   Gastrointestinal: Negative for constipation, diarrhea and nausea.   Endocrine: Positive for cold intolerance.   Genitourinary: Negative for difficulty urinating and frequency.   Musculoskeletal: Positive for arthralgias and back pain.   Allergic/Immunologic: Positive for immunocompromised state.   Neurological: Positive for weakness. Negative for  seizures and speech difficulty.   Psychiatric/Behavioral: Negative for confusion and decreased concentration. The patient is not nervous/anxious.        Review of Symptoms    Symptom Assessment (ESAS 0-10 Scale)  Pain:  4  Dyspnea:  2  Anxiety:  0  Nausea:  0  Depression:  0  Anorexia:  6  Fatigue:  10  Insomnia:  0  Restlessness:  0  Agitation:  0 due to Other     CAM / Delirium:  Negative  Constipation:  Negative  Diarrhea:  Negative    Bowel Management Plan (BMP):  Yes      Location Choices: generalized, but mostly back/right side.      ECOG Performance Status stGstrstastdstest:st st1st Living Arrangements:  Lives alone and Lives in home    Psychosocial/Cultural: Daughter is involved is his care      Advance Care Planning   Advance Directives:   Living Will: Yes        Copy on chart: No    Medical Power of : Yes      Decision Making:  Patient answered questions              Physical Exam:  Vitals: Temp: 97.2 °F (36.2 °C) (01/06/22 1117)  Pulse: 85 (01/06/22 1117)  BP: 137/66 (01/06/22 1117)  SpO2: 97 % (01/06/22 1117)  Physical Exam  Constitutional:       General: He is not in acute distress.     Appearance: He is ill-appearing (chronically). He is not toxic-appearing.   HENT:      Head: Normocephalic and atraumatic.      Nose: No congestion.      Mouth/Throat:      Mouth: Mucous membranes are moist.      Pharynx: Oropharynx is clear.   Eyes:      Extraocular Movements: Extraocular movements intact.      Pupils: Pupils are equal, round, and reactive to light.   Cardiovascular:      Rate and Rhythm: Normal rate.   Pulmonary:      Effort: Pulmonary effort is normal.   Abdominal:      General: There is no distension.      Palpations: Abdomen is soft.   Musculoskeletal:         General: No swelling.      Cervical back: Neck supple.      Comments: SOLANO   Skin:     General: Skin is warm and dry.   Neurological:      Mental Status: He is oriented to person, place, and time.   Psychiatric:         Mood and Affect: Mood normal.          Behavior: Behavior normal.         Thought Content: Thought content normal.         Judgment: Judgment normal.         Labs:  CBC:   WBC   Date Value Ref Range Status   03/30/2021 2.15 (L) 3.90 - 12.70 K/uL Final     Hemoglobin   Date Value Ref Range Status   03/30/2021 11.0 (L) 14.0 - 18.0 g/dL Final     Hematocrit   Date Value Ref Range Status   03/30/2021 34.3 (L) 40.0 - 54.0 % Final     MCV   Date Value Ref Range Status   03/30/2021 104 (H) 82 - 98 fL Final     Platelets   Date Value Ref Range Status   03/30/2021 150 150 - 450 K/uL Final       LFT:   Lab Results   Component Value Date    AST 14 03/30/2021    ALKPHOS 64 03/30/2021    BILITOT 0.6 03/30/2021       Albumin:   Albumin   Date Value Ref Range Status   03/30/2021 3.5 3.5 - 5.2 g/dL Final     Protein:   Total Protein   Date Value Ref Range Status   03/30/2021 8.2 6.0 - 8.4 g/dL Final         49 minutes of total time spent on the encounter, which includes face to face time and non-face to face time preparing to see the patient (eg, review of tests), Obtaining and/or reviewing separately obtained history, Documenting clinical information in the electronic or other health record, Independently interpreting results (not separately reported) and communicating results to the patient/family/caregiver, or Care coordination (not separately reported).      Signature: Tammy Villa NP

## 2022-01-25 ENCOUNTER — OFFICE VISIT (OUTPATIENT)
Dept: DIABETES | Facility: CLINIC | Age: 77
End: 2022-01-25
Payer: MEDICARE

## 2022-01-25 DIAGNOSIS — E78.5 HYPERLIPIDEMIA, UNSPECIFIED HYPERLIPIDEMIA TYPE: ICD-10-CM

## 2022-01-25 DIAGNOSIS — Z79.4 TYPE 2 DIABETES MELLITUS WITH HYPERGLYCEMIA, WITH LONG-TERM CURRENT USE OF INSULIN: Primary | ICD-10-CM

## 2022-01-25 DIAGNOSIS — Z79.52 LONG TERM CURRENT USE OF SYSTEMIC STEROIDS: ICD-10-CM

## 2022-01-25 DIAGNOSIS — N28.9 RENAL INSUFFICIENCY: ICD-10-CM

## 2022-01-25 DIAGNOSIS — I10 ESSENTIAL HYPERTENSION: ICD-10-CM

## 2022-01-25 DIAGNOSIS — E11.65 TYPE 2 DIABETES MELLITUS WITH HYPERGLYCEMIA, WITH LONG-TERM CURRENT USE OF INSULIN: Primary | ICD-10-CM

## 2022-01-25 DIAGNOSIS — C90.00 MULTIPLE MYELOMA NOT HAVING ACHIEVED REMISSION: ICD-10-CM

## 2022-01-25 DIAGNOSIS — I25.118 CORONARY ARTERY DISEASE OF NATIVE ARTERY OF NATIVE HEART WITH STABLE ANGINA PECTORIS: ICD-10-CM

## 2022-01-25 DIAGNOSIS — H54.61 VISION LOSS OF RIGHT EYE: ICD-10-CM

## 2022-01-25 PROCEDURE — 99443 PR PHYSICIAN TELEPHONE EVALUATION 21-30 MIN: CPT | Mod: 95,,, | Performed by: PHYSICIAN ASSISTANT

## 2022-01-25 PROCEDURE — 99443 PR PHYSICIAN TELEPHONE EVALUATION 21-30 MIN: ICD-10-PCS | Mod: 95,,, | Performed by: PHYSICIAN ASSISTANT
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
## 2022-01-25 NOTE — Clinical Note
Podiatry appt - new  Dr. Balderas f/u eye  Next wed - fasting labs please link Please add urine micro next wed Flu vaccine next wed Covid booster next wed  [place] - Dexcom sample placement next Wed   [place] - Dexcom download 2/11 if placed sample 2/2

## 2022-02-02 ENCOUNTER — LAB VISIT (OUTPATIENT)
Dept: LAB | Facility: HOSPITAL | Age: 77
End: 2022-02-02
Attending: FAMILY MEDICINE
Payer: MEDICARE

## 2022-02-02 ENCOUNTER — TELEPHONE (OUTPATIENT)
Dept: PALLIATIVE MEDICINE | Facility: CLINIC | Age: 77
End: 2022-02-02
Payer: MEDICARE

## 2022-02-02 ENCOUNTER — IMMUNIZATION (OUTPATIENT)
Dept: INTERNAL MEDICINE | Facility: CLINIC | Age: 77
End: 2022-02-02
Payer: MEDICARE

## 2022-02-02 DIAGNOSIS — E11.65 TYPE 2 DIABETES MELLITUS WITH HYPERGLYCEMIA, WITH LONG-TERM CURRENT USE OF INSULIN: ICD-10-CM

## 2022-02-02 DIAGNOSIS — G89.3 NEOPLASM RELATED PAIN: ICD-10-CM

## 2022-02-02 DIAGNOSIS — Z79.4 TYPE 2 DIABETES MELLITUS WITH HYPERGLYCEMIA, WITH LONG-TERM CURRENT USE OF INSULIN: ICD-10-CM

## 2022-02-02 PROCEDURE — G0008 FLU VACCINE - QUADRIVALENT - ADJUVANTED: ICD-10-PCS | Mod: S$GLB,,, | Performed by: FAMILY MEDICINE

## 2022-02-02 PROCEDURE — G0008 ADMIN INFLUENZA VIRUS VAC: HCPCS | Mod: S$GLB,,, | Performed by: FAMILY MEDICINE

## 2022-02-02 PROCEDURE — 90694 FLU VACCINE - QUADRIVALENT - ADJUVANTED: ICD-10-PCS | Mod: S$GLB,,, | Performed by: FAMILY MEDICINE

## 2022-02-02 PROCEDURE — 90694 VACC AIIV4 NO PRSRV 0.5ML IM: CPT | Mod: S$GLB,,, | Performed by: FAMILY MEDICINE

## 2022-02-02 RX ORDER — OXYCODONE HYDROCHLORIDE 20 MG/1
20 TABLET ORAL EVERY 6 HOURS PRN
Qty: 120 TABLET | Refills: 0 | Status: SHIPPED | OUTPATIENT
Start: 2022-02-02 | End: 2022-03-04

## 2022-02-02 RX ORDER — OXYCODONE HYDROCHLORIDE 60 MG/1
60 TABLET, FILM COATED, EXTENDED RELEASE ORAL EVERY 12 HOURS
Qty: 60 TABLET | Refills: 0 | Status: SHIPPED | OUTPATIENT
Start: 2022-02-02 | End: 2022-03-04

## 2022-02-02 NOTE — TELEPHONE ENCOUNTER
----- Message from Alonso Archer sent at 2/2/2022 10:23 AM CST -----  Contact: Ana Luisa Orosco (daughter)would like to consult with nurse regarding questions and concerns.  Please comtact Ana Luisa @ [phone].  Thanks/As

## 2022-02-07 ENCOUNTER — PATIENT OUTREACH (OUTPATIENT)
Dept: ADMINISTRATIVE | Facility: OTHER | Age: 77
End: 2022-02-07
Payer: MEDICARE

## 2022-02-08 ENCOUNTER — OFFICE VISIT (OUTPATIENT)
Dept: PODIATRY | Facility: CLINIC | Age: 77
End: 2022-02-08
Payer: MEDICARE

## 2022-02-08 VITALS — HEIGHT: 70 IN | WEIGHT: 148 LBS | BODY MASS INDEX: 21.19 KG/M2

## 2022-02-08 DIAGNOSIS — L60.0 INGROWN TOENAIL OF BOTH FEET: ICD-10-CM

## 2022-02-08 DIAGNOSIS — M79.675 TOE PAIN, BILATERAL: ICD-10-CM

## 2022-02-08 DIAGNOSIS — E11.9 COMPREHENSIVE DIABETIC FOOT EXAMINATION, TYPE 2 DM, ENCOUNTER FOR: Primary | ICD-10-CM

## 2022-02-08 DIAGNOSIS — E11.65 TYPE 2 DIABETES MELLITUS WITH HYPERGLYCEMIA, WITH LONG-TERM CURRENT USE OF INSULIN: ICD-10-CM

## 2022-02-08 DIAGNOSIS — Z79.4 TYPE 2 DIABETES MELLITUS WITH HYPERGLYCEMIA, WITH LONG-TERM CURRENT USE OF INSULIN: ICD-10-CM

## 2022-02-08 DIAGNOSIS — M79.674 TOE PAIN, BILATERAL: ICD-10-CM

## 2022-02-08 DIAGNOSIS — B35.1 DERMATOPHYTOSIS OF NAIL: ICD-10-CM

## 2022-02-08 PROCEDURE — 11721 DEBRIDE NAIL 6 OR MORE: CPT | Mod: Q9,S$GLB,, | Performed by: PODIATRIST

## 2022-02-08 PROCEDURE — 3288F PR FALLS RISK ASSESSMENT DOCUMENTED: ICD-10-PCS | Mod: CPTII,S$GLB,, | Performed by: PODIATRIST

## 2022-02-08 PROCEDURE — 1101F PR PT FALLS ASSESS DOC 0-1 FALLS W/OUT INJ PAST YR: ICD-10-PCS | Mod: CPTII,S$GLB,, | Performed by: PODIATRIST

## 2022-02-08 PROCEDURE — 99203 OFFICE O/P NEW LOW 30 MIN: CPT | Mod: 25,S$GLB,, | Performed by: PODIATRIST

## 2022-02-08 PROCEDURE — 99203 PR OFFICE/OUTPT VISIT, NEW, LEVL III, 30-44 MIN: ICD-10-PCS | Mod: 25,S$GLB,, | Performed by: PODIATRIST

## 2022-02-08 PROCEDURE — 99999 PR PBB SHADOW E&M-EST. PATIENT-LVL V: CPT | Mod: PBBFAC,,, | Performed by: PODIATRIST

## 2022-02-08 PROCEDURE — 3051F HG A1C>EQUAL 7.0%<8.0%: CPT | Mod: CPTII,S$GLB,, | Performed by: PODIATRIST

## 2022-02-08 PROCEDURE — 1159F MED LIST DOCD IN RCRD: CPT | Mod: CPTII,S$GLB,, | Performed by: PODIATRIST

## 2022-02-08 PROCEDURE — 3051F PR MOST RECENT HEMOGLOBIN A1C LEVEL 7.0 - < 8.0%: ICD-10-PCS | Mod: CPTII,S$GLB,, | Performed by: PODIATRIST

## 2022-02-08 PROCEDURE — 1126F PR PAIN SEVERITY QUANTIFIED, NO PAIN PRESENT: ICD-10-PCS | Mod: CPTII,S$GLB,, | Performed by: PODIATRIST

## 2022-02-08 PROCEDURE — 99999 PR PBB SHADOW E&M-EST. PATIENT-LVL V: ICD-10-PCS | Mod: PBBFAC,,, | Performed by: PODIATRIST

## 2022-02-08 PROCEDURE — 1101F PT FALLS ASSESS-DOCD LE1/YR: CPT | Mod: CPTII,S$GLB,, | Performed by: PODIATRIST

## 2022-02-08 PROCEDURE — 11721 PR DEBRIDEMENT OF NAILS, 6 OR MORE: ICD-10-PCS | Mod: Q9,S$GLB,, | Performed by: PODIATRIST

## 2022-02-08 PROCEDURE — 3288F FALL RISK ASSESSMENT DOCD: CPT | Mod: CPTII,S$GLB,, | Performed by: PODIATRIST

## 2022-02-08 PROCEDURE — 1126F AMNT PAIN NOTED NONE PRSNT: CPT | Mod: CPTII,S$GLB,, | Performed by: PODIATRIST

## 2022-02-08 PROCEDURE — 1159F PR MEDICATION LIST DOCUMENTED IN MEDICAL RECORD: ICD-10-PCS | Mod: CPTII,S$GLB,, | Performed by: PODIATRIST

## 2022-02-08 NOTE — PROGRESS NOTES
[facility]  PODIATRIC MEDICINE AND SURGERY      CHIEF COMPLAINT  Chief Complaint   Patient presents with    Diabetic Foot Exam     Diabetic routine exam, numbness in feet, cold feet, 0 pain feet, diabetic, wears tennis and socks, last seen  diabetes management Dr. Hampton on 01/25/22         HPI    SUBJECTIVE: Familia Durbin Jr. is a 76 y.o. male who  has a past medical history of CAD (coronary artery disease), Diabetes mellitus, type 2 (1979), Hearing impaired, Hyperlipidemia, Hypertension, Lupus, Multiple myeloma, Old MI (myocardial infarction) (2012), Renal insufficiency, and Tracheostomy tube present. Williepresents to clinic for high risk diabetic foot exam and care.  Familia admits numbness, burning, and/or tingling sensations in their feet. Patient relates blood sugars normally run around 140. He complains about thickened elongated painful toenails. Pt has established care with primary care physician and would like to establish care with a podiatric physician. Patient has no other pedal complaints at this time      HgA1c:   Hemoglobin A1C   Date Value Ref Range Status   02/02/2022 7.6 (H) 4.0 - 5.6 % Final     Comment:     ADA Screening Guidelines:  5.7-6.4%  Consistent with prediabetes  >or=6.5%  Consistent with diabetes    High levels of fetal hemoglobin interfere with the HbA1C  assay. Heterozygous hemoglobin variants (HbS, HgC, etc)do  not significantly interfere with this assay.   However, presence of multiple variants may affect accuracy.     11/03/2020 6.4 (H) 4.0 - 5.6 % Final     Comment:     ADA Screening Guidelines:  5.7-6.4%  Consistent with prediabetes  >or=6.5%  Consistent with diabetes  High levels of fetal hemoglobin interfere with the HbA1C  assay. Heterozygous hemoglobin variants (HbS, HgC, etc)do  not significantly interfere with this assay.   However, presence of multiple variants may affect accuracy.     07/31/2020 7.1 (H) 4.0 - 5.6 % Final     Comment:     ADA  "Screening Guidelines:  5.7-6.4%  Consistent with prediabetes  >or=6.5%  Consistent with diabetes  High levels of fetal hemoglobin interfere with the HbA1C  assay. Heterozygous hemoglobin variants (HbS, HgC, etc)do  not significantly interfere with this assay.   However, presence of multiple variants may affect accuracy.           [facility]  Past Medical History:   Diagnosis Date    CAD (coronary artery disease)     luikart    Diabetes mellitus, type 2 1979    eye dr rocha    Hearing impaired     Hyperlipidemia     Hypertension     Lupus     Discoid    Multiple myeloma     Old MI (myocardial infarction) 2012    Renal insufficiency     Tracheostomy tube present        MEDS  Current Outpatient Medications on File Prior to Visit   Medication Sig Dispense Refill    aspirin 81 MG Chew Take 1 tablet (81 mg total) by mouth once daily. 100 tablet 12    azelastine (ASTELIN) 137 mcg (0.1 %) nasal spray 2 sprays (274 mcg total) by Nasal route 2 (two) times daily. 30 mL 11    BD ULTRA-FINE DAVE PEN NEEDLE 32 gauge x 5/32" Ndle USE  4 TIMES DAILY WITH MEALS AND AT BEDTIME 400 each 3    blood sugar diagnostic Strp 1 each by Misc.(Non-Drug; Combo Route) route 4 (four) times daily. 300 each 3    blood-glucose meter kit Use as instructed 1 each 0    BRILINTA 90 mg tablet Take 1 tablet (90 mg total) by mouth once daily. 30 tablet 6    cholecalciferol, vitamin D3, 5,000 unit Tab Take 5,000 Units by mouth once daily.      DOCOSAHEXANOIC ACID ORAL Take 1 capsule by mouth once daily.      docusate sodium (COLACE) 100 MG capsule Take 100 mg by mouth 2 (two) times daily.      flaxseed oil 1,000 mg Cap Take 1 capsule by mouth daily as needed.       furosemide (LASIX) 20 MG tablet Take 1 tablet (20 mg total) by mouth once daily. 30 tablet 3    ginger root (GINGER, ZINGIBER OFFICINALIS,) 550 mg Cap Take 1 capsule by mouth.      isosorbide mononitrate (IMDUR) 60 MG 24 hr tablet Take 1 tablet by mouth twice daily 180 tablet " 2    lactulose (CEPHULAC) 20 gram Pack Mix and take 1 packet (20 g total) by mouth 3 (three) times daily. (Patient taking differently: Take 20 g by mouth 3 (three) times daily as needed.) 90 packet 3    lactulose (CHRONULAC) 10 gram/15 mL solution Take 30 mL by mouth twice daily as needed for constipation. 473 mL 3    metoprolol tartrate (LOPRESSOR) 50 MG tablet Take 1 tablet by mouth twice daily 180 tablet 4    multivit-min-FA-lycopen-lutein 300-600-300 mcg Tab Take 1 tablet by mouth.      nitroGLYCERIN (NITROSTAT) 0.4 MG SL tablet Place 1 tablet (0.4 mg total) under the tongue every 5 (five) minutes as needed for Chest pain. 50 tablet 12    NOVOLOG FLEXPEN U-100 INSULIN 100 unit/mL (3 mL) InPn pen INJECT 15 UNITS SUBCUTANEOUSLY THREE TIMES DAILY WITH MEALS 30 mL 0    omega 3-dha-epa-fish oil 300-1,000 mg Cap Take by mouth.      ondansetron (ZOFRAN) 4 MG tablet Take 1 tablet (4 mg total) by mouth every 8 (eight) hours as needed for Nausea. 40 tablet 11    ondansetron (ZOFRAN-ODT) 4 MG TbDL Dissolve 1 tab under the tongue every 4-6 hours as needed for nausea. 120 tablet 0    oxyCODONE (OXYCONTIN) 60 mg TR12 12 hr tablet Take 1 tablet (60 mg total) by mouth every 12 (twelve) hours. 60 tablet 0    oxyCODONE (ROXICODONE) 20 mg Tab immediate release tablet Take 1 tablet (20 mg total) by mouth every 6 (six) hours as needed (breakthrough pain). 120 tablet 0    pantoprazole (PROTONIX) 40 MG tablet Take 1 tablet by mouth once daily 30 tablet 11    polyethylene glycol (GLYCOLAX) 17 gram PwPk Take 17 g by mouth once daily. 100 each 1    pomalidomide 3 mg Cap Take 3 mg by mouth 3 mg daily x 21 days, then off for 7 days, then repeat..      potassium chloride SA (K-DUR,KLOR-CON) 20 MEQ tablet TAKE 1 TABLET BY MOUTH ON MONDAY, WEDNESDAY AND FRIDAY 35 tablet 2    prochlorperazine (COMPAZINE) 5 MG tablet Take 1 tablet (5 mg total) by mouth 4 (four) times daily as needed for Nausea. 40 tablet 2    ranolazine  (RANEXA) 1,000 mg Tb12 Take 1 tablet (1,000 mg total) by mouth 2 (two) times daily. 60 tablet 6    rosuvastatin (CRESTOR) 40 MG Tab Take 1 tablet (40 mg total) by mouth every evening. 90 tablet 0    sennosides 25 mg Tab Take 1 tablet by mouth 2 (two) times a day. 60 each 11    triamcinolone acetonide 0.1% (KENALOG) 0.1 % cream Apply topically 2 (two) times daily. For two weeks 28.4 g 1    TRUEPLUS LANCETS 33 gauge Misc USE   TO CHECK GLUCOSE SIX TIMES DAILY 100 each 11    VITAMIN B COMPLEX ORAL Take 1 capsule by mouth.      albuterol-ipratropium (DUO-NEB) 2.5 mg-0.5 mg/3 mL nebulizer solution Take 3 mLs by nebulization every 8 (eight) hours. For shortness of breath and wheezing 270 mL 0    insulin (LANTUS SOLOSTAR U-100 INSULIN) glargine 100 units/mL (3mL) SubQ pen Inject 10 Units into the skin every evening. INJECT 15 UNITS SUBCUTANEOUSLY ONCE DAILY 3 mL 11     No current facility-administered medications on file prior to visit.       PSH     Past Surgical History:   Procedure Laterality Date    CARDIAC SURGERY      CATARACT EXTRACTION      COLONOSCOPY      CORONARY ARTERY BYPASS GRAFT      HAND SURGERY      KNEE SURGERY      TRACHEOSTOMY TUBE PLACEMENT      WRIST FUSION          ALL  Review of patient's allergies indicates:   Allergen Reactions    Cephalexin      Pt tolerated cefepime 11/4/2020    Fentanyl Nausea And Vomiting    Nsaids (non-steroidal anti-inflammatory drug)      Renal insuf       SOC     Social History     Tobacco Use    Smoking status: Never Smoker    Smokeless tobacco: Never Used   Substance Use Topics    Alcohol use: No    Drug use: No         Family HX    Family History   Problem Relation Age of Onset    Diabetes Mother     Diabetes Father     Prostate cancer Father     Pancreatic cancer Sister     No Known Problems Brother     Diabetes Maternal Uncle     Diabetes Maternal Grandmother     No Known Problems Maternal Grandfather     No Known Problems Paternal  "Grandmother     No Known Problems Paternal Grandfather             REVIEW OF SYSTEMS  General: Denies any fever or chills  Chest: Denies shortness of breath, wheezing, coughing, or sputum production  Heart: Denies chest pain.  As noted above and per history of current illness above, otherwise negative in the remainder of the 14 systems.    PHYSICAL EXAM  Vitals:    02/08/22 1111   Weight: 67.1 kg (148 lb)   Height: 5' 10" (1.778 m)   PainSc: 0-No pain       GEN:  This patient is well-developed, well-nourished and appears stated age, well-oriented to person, place and time, and cooperative and pleasant on today's visit.      LOWER EXTREMITY PHYSICAL EXAMINATION   VASCULAR  DP pedal pulse 2/4 RIGHT, LEFT2/4   PT pedal pulse 2/4 RIGHT, LEFT2/4  Capillary refill time immediate to the toes.   Feet are warm to the touch. Skin temperature warm to warm from proximally to distally   There are no varicosities, telangiectasias noted to bilateral foot and ankle regions.   There are no ecchymoses noted to bilateral foot and ankle regions.   There is no gross lower extremity edema.    DERMATOLOGIC  Skin moist with healthy texture and turgor.  Thickened, dystrophic, elongated toenails with subungal debris 1-5 b/l   Incurvated b/l hallux medial border, no acute SOI   There are no open ulcerations, lacerations, or fissures to bilateral foot and ankle regions. There are no signs of infection as there is no erythema, no proximal-extending lymphangiitis, no fluctuance, or crepitus noted on palpation to bilateral foot and ankle regions.   There is no interdigital maceration.   There are hyperkeratotic lesions noted to feet.     NEUROLOGIC  Protective sensation intact at 10/10 sites upon examination with [place] Weinsten 5.07 g monofilament.  Propioception intact at 1st MTPJ b/l.  Achilles and patellar deep tendon reflexes intact  Babinski reflex absent    ORTHOPEDIC/BIOMECHANICAL  No symptomatic structural abnormalities noted. Muscle " strength is 5/5 for foot inverters, everters, plantarflexors, and dorsiflexors. Muscle tone is normal.  Inspection/palpation of bone, joints and muscles unremarkable.      ASSESSMENT  Encounter Diagnoses   Name Primary?    Type 2 diabetes mellitus with hyperglycemia, with long-term current use of insulin     Comprehensive diabetic foot examination, type 2 DM, encounter for Yes    Dermatophytosis of nail     Toe pain, bilateral     Ingrown toenail of both feet          Plan:  -Initial patient evaluation with H&P was performed  -Discuss presenting problems, etiology, pathologic processes and management options with patient today.   -I counseled the patient on their conditions, their implications and medical management. An in depth discussion on diabetic management, risk prevention, amputation prevention verbally and provided educational literature in written format.    -With patient's permission, the elongated onychomycotic toenails, as outlined in the physical examination, were sharply debrided with a double action nail nipper to their soft tissue attachment. If indicated, the nails were then smoothed down in thickness with a mechanical rotary brayan and/or ernesto board to facilitate in further debridement removing all offending nail and subungual debris.    Utilizing sterile toenail clippers I aggressively trimmed the offending nail border approximately 3 mm from its edge and carried the nail plate incision down at an angle in order to wedge out the offending cryptotic portion of the nail plate. The offending border was then removed in toto. The remaining nail was grinded down with an electric  down to nail bed. Minimal blood was drawn. Applied betadine and covered with band-aid. Patient tolerated the procedure well and related significant relief.      - Shoe inspection. Diabetic Foot Education. Patient reminded of the importance of good nutrition and blood sugar control to help prevent podiatric  complications of diabetes. Patient instructed on proper foot hygeine. We discussed wearing proper shoe gear, daily foot inspections, never walking without protective shoe gear, never putting sharp instruments to feet, routine podiatric visits annually/semi annually or sooner if symptoms arise    Disclaimer: This note was partially prepared using a voice recognition system and is likely to have sound alike errors within the text.        Future Appointments   Date Time Provider Department Center   2/11/2022 10:00 AM DIABETES [facility]NAGEMENT [facility]UR[facility]       Report Electronically Signed By:     Shaunna Suero DPM   Podiatry  [facility]  2/8/2022

## 2022-02-08 NOTE — PATIENT INSTRUCTIONS
Your Proactive Measures   You play a vital role in reducing complications. Follow these guidelines and contact your foot and ankle surgeon if you notice any problems:     1. Inspect your feet daily. If your eyesight is poor, have someone else do it for you. Inspect for:   Skin or nail problems: Look for cuts, scrapes, redness, drainage, swelling, bad odor, rash, discoloration, loss of hair on toes, injuries, or nail changes (deformed, striped, yellowed or discolored, thickened, or not growing).   Signs of fracture: If your foot is swollen, red, hot, or has changed in size, shape, or direction, see your foot and ankle surgeon immediately.   2. Dont ignore leg pain. Pain in the leg that occurs at night or with a little activity could mean you have a blocked artery. Seek care immediately.   3. Nail cutting. If you have any nail problems, hard nails, or reduced feeling in your feet, your toenails should be properly trimmed.   4. No bathroom surgery. Never trim calluses or corns yourself, and dont use over-the-counter medicated pads.   5. Keep floors free of sharp objects. Make sure there are no needles, insulin syringes, or other sharp objects on the floor.   6. Dont go barefoot. Wear shoes, indoors and outdoors.   7. Check shoes and socks. Shake out your shoes before putting them on. Make sure your socks arent bunched up.   8. Have your circulation and sense of feeling tested. Your foot and ankle surgeon will perform tests to see if youve lost any feeling or circulation.       Diabetic Peripheral Neuropathy   What is Diabetic Peripheral Neuropathy?  Diabetic neuropathy is nerve damage caused by diabetes. When it affects the arms, hands, legs and feet it is known as diabetic peripheral neuropathy. Diabetic peripheral neuropathy is different from peripheral arterial disease (poor circulation), which affects the blood vessels rather than the nerves.   Three different groups of nerves can be affected by diabetic  neuropathy:   Sensory nerves, which enable people to feel pain, temperature, and other sensations   Motor nerves, which control the muscles and give them their strength and tone   Autonomic nerves, which allow the body to perform certain involuntary functions, such as sweating.   Diabetic peripheral neuropathy doesnt emerge overnight. Instead, it usually develops slowly and worsens over time. Some patients have this condition long before they are diagnosed with diabetes. Having diabetes for several years may increase the likelihood of having diabetic neuropathy.   The loss of sensation and other problems associated with nerve damage make a patient prone to developing skin ulcers (open sores) that can become infected and may not heal. This serious complication of diabetes can lead to loss of a foot, a leg, or even a life.     Causes  The nerve damage that characterizes diabetic peripheral neuropathy is more common in patients with poorly managed diabetes. However, even diabetic patients who have excellent blood sugar (glucose) control can develop diabetic neuropathy. There are several theories as to why this occurs, including the possibilities that high blood glucose or constricted blood vessels produce damage to the nerves.   As diabetic peripheral neuropathy progresses, various nerves are affected. These damaged nerves can cause problems that encourage development of ulcers. For example:   Motor Neuropathy (Deformity)    +    Ill-fitting shoes    +    Sensory Neuropathy (numbness)    =    Ulcers (sores)    Deformities (such as bunions or hammertoes) resulting from motor neuropathy may cause shoes to rub against toes, creating a sore. The numbness caused by sensory neuropathy can make the patient unaware that this is happening.   Because of numbness, a patient may not realize that he or she has stepped on a small object and cut the skin.   Cracked skin caused by autonomic neuropathy, combined with sensory  neuropathys numbness and problems associated with motor neuropathy can lead to developing a sore.     Symptoms  Depending on the type(s) of nerves involved, one or more symptoms may be present in diabetic peripheral neuropathy.     For sensory neuropathy:   Numbness or tingling in the feet   Pain or discomfort in the feet or legs, including prickly, sharp pain or burning feet     For motor neuropathy:   Muscle weakness and loss of muscle tone in the feet and lower legs   Loss of balance   Changes in foot shape that can lead to areas of increased pressure     For autonomic neuropathy:   Dry feet   Cracked skin     Diagnosis  To diagnose diabetic peripheral neuropathy, the foot and ankle surgeon will obtain the patients history of symptoms and will perform simple in-office tests on the feet and legs. This evaluation may include assessment of the patients reflexes, ability to feel light touch, and ability to feel vibration. In some cases, additional neurologic tests may be ordered.     Treatment  First and foremost, treatment of diabetic peripheral neuropathy centers on control of the patients blood sugar level. In addition, various options are used to treat the painful symptoms.   Medications are available to help relieve specific symptoms, such as tingling or burning. Sometimes a combination of different medications is used.   In some cases, the patient may also undergo physical therapy to help reduce balance problems or other symptoms .    Prevention  The patient plays a vital role in minimizing the risk of developing diabetic peripheral neuropathy and in preventing its possible consequences. Some important preventive measures include:   Keep blood sugar levels under control.   Wear well-fitting shoes to avoid getting sores.   Inspect your feet every day. If you notice any cuts, redness, blisters, or swelling, see your foot and ankle surgeon right away. This can prevent problems from becoming worse.   Visit your  foot and ankle surgeon on a regular basis for an examination to help prevent the foot complications of diabetes.   Have periodic visits with your primary care physician or endocrinologist. The foot and ankle surgeon works together with these and other providers to prevent and treat complications from diabetes.     Diabetic Complications and Amputation Prevention   People with diabetes are prone to having foot problems, often because of two complications of diabetes: nerve damage (neuropathy) and poor circulation. Neuropathy causes loss of feeling in your feet, taking away your ability to feel pain and discomfort, so you may not detect an injury or irritation. Poor circulation in your feet reduces your ability to heal, making it hard for even a tiny cut to resist infection.   Having diabetes increases the risk of developing a wide range of foot problems. Furthermore, with diabetes, small foot problems can turn into serious complications.     When Is Amputation Necessary?  Even with preventative care and prompt treatment of infection and complications, there are instances when amputation is necessary to remove infected tissue, save a limb, or even save a life.     Diabetes-related Foot and Leg Problems     Infections and ulcers (sores) that dont heal. An ulcer is a sore in the skin that may go all the way to the bone. Because of poor circulation and neuropathy in the feet, cuts or blisters can easily turn into ulcers that become infected and wont heal. This is a common - and serious - complication of diabetes and can lead to a loss of your foot, your leg, or your life.   Corns and calluses. When neuropathy is present, you cant tell if your shoes are causing pressure and producing corns or calluses. Corns and calluses must be properly treated or they can develop into ulcers.   Dry, cracked skin. Poor circulation and neuropathy can make your skin dry. This may seem harmless, but dry skin can result in cracks that may  become sores and can lead to infection.   Nail disorders. Ingrown toenails (which curve into the skin on the sides of the nail) and fungal infections can go unnoticed because of loss of feeling. If they are not properly treated, they can lead to infection.   Hammertoes and bunions. Nerve damage affecting muscles can cause muscle weakness and loss of tone in the feet, resulting in hammertoes and bunions. If left untreated, these deformities can cause ulcers.   Charcot foot. This is a complex foot deformity. It develops as a result of loss of sensation and an undetected broken bone that leads to destruction of the soft tissue of the foot. Because of neuropathy, the pain of the fracture goes unnoticed and the patient continues to walk on the broken bone, making it worse. This disabling complication is so severe that surgery, and occasionally amputation, may become necessary.   Poor blood flow. In diabetes, the blood vessels below the knee often become narrow and restrict blood flow. This prevents wounds from healing and may cause tissue death.     What Your Foot and Ankle Surgeon Can Do  Your foot and ankle surgeon can help wounds heal, preventing amputation. There are many new surgical techniques available to save feet and legs, including joint reconstruction and wound healing technologies. Getting regular foot checkups and seeking immediate help when you notice something can keep small problems from worsening. Your foot and ankle surgeon works together with other health care providers to prevent and treat complications from diabetes.

## 2022-02-09 DIAGNOSIS — D84.9 IMMUNOSUPPRESSED STATUS: ICD-10-CM

## 2022-02-18 ENCOUNTER — INFUSION (OUTPATIENT)
Dept: INFUSION THERAPY | Facility: HOSPITAL | Age: 77
End: 2022-02-18
Attending: INTERNAL MEDICINE
Payer: MEDICARE

## 2022-02-18 VITALS
DIASTOLIC BLOOD PRESSURE: 51 MMHG | OXYGEN SATURATION: 99 % | TEMPERATURE: 98 F | RESPIRATION RATE: 16 BRPM | HEART RATE: 60 BPM | SYSTOLIC BLOOD PRESSURE: 110 MMHG

## 2022-02-18 DIAGNOSIS — C90.00 MULTIPLE MYELOMA NOT HAVING ACHIEVED REMISSION: Primary | ICD-10-CM

## 2022-02-18 PROCEDURE — M0220 HC INJECTION ADMIN, TIXAGEVIMAB-CILGAVIMAB, INCL POST ADMIN MONIT: HCPCS | Performed by: INTERNAL MEDICINE

## 2022-02-18 PROCEDURE — 63600175 PHARM REV CODE 636 W HCPCS: Performed by: INTERNAL MEDICINE

## 2022-02-18 RX ORDER — PREDNISONE 20 MG/1
40 TABLET ORAL ONCE
Status: CANCELLED | OUTPATIENT
Start: 2022-02-18 | End: 2022-02-18

## 2022-02-18 RX ORDER — DIPHENHYDRAMINE HCL 25 MG
25 CAPSULE ORAL ONCE
Status: CANCELLED | OUTPATIENT
Start: 2022-02-18 | End: 2022-02-18

## 2022-02-18 RX ORDER — ALBUTEROL SULFATE 90 UG/1
2 AEROSOL, METERED RESPIRATORY (INHALATION) EVERY 4 HOURS PRN
Status: CANCELLED | OUTPATIENT
Start: 2022-02-18

## 2022-02-18 RX ORDER — ACETAMINOPHEN 325 MG/1
650 TABLET ORAL ONCE
Status: CANCELLED | OUTPATIENT
Start: 2022-02-18 | End: 2022-02-18

## 2022-02-18 RX ORDER — EPINEPHRINE 0.3 MG/.3ML
0.3 INJECTION SUBCUTANEOUS
Status: CANCELLED | OUTPATIENT
Start: 2022-02-18

## 2022-02-18 RX ORDER — ONDANSETRON 4 MG/1
4 TABLET, ORALLY DISINTEGRATING ORAL ONCE
Status: CANCELLED | OUTPATIENT
Start: 2022-02-18 | End: 2022-02-18

## 2022-02-18 RX ADMIN — Medication 150 MG: at 02:02

## 2022-02-18 NOTE — DISCHARGE INSTRUCTIONS
[facility]  [id number] [facility]  [address]  [phone] phone     [phone] fax  Hours of Operation: Monday- Friday 8:00am- 5:00pm  After hours phone  [phone]  Hematology / Oncology Physicians on call:    Dr. Edil Mosley      Nurse Practitioners:    Ghada Magaña, MUNA Guardado PA Phaon Dunbar, MUNA      Please don't hesitate to call if you have any concerns.      WAYS TO HELP PREVENT INFECTION         WASH YOUR HANDS OFTEN DURING THE DAY, ESPECIALLY BEFORE YOU EAT, AFTER USING THE BATHROOM, AND AFTER TOUCHING ANIMALS     STAY AWAY FROM PEOPLE WHO HAVE ILLNESSES YOU CAN CATCH; SUCH AS COLDS, FLU, CHICKEN POX     TRY TO AVOID CROWDS     STAY AWAY FROM CHILDREN WHO RECENTLY HAVE RECEIVED LIVE VIRUS VACCINES     MAINTAIN GOOD MOUTH CARE     DO NOT SQUEEZE OR SCRATCH PIMPLES     CLEAN CUTS & SCRAPES RIGHT AWAY AND DAILY UNTIL HEALED WITH WARM WATER, SOAP & AN ANTISEPTIC     AVOID CONTACT WITH LITTER BOXES, BIRD CAGES, & FISH TANKS     AVOID STANDING WATER, IE., BIRD BATHS, FLOWER POTS/VASES, OR HUMIDIFIERS     WEAR GLOVES WHEN GARDENING OR CLEANING UP AFTER OTHERS, ESPECIALLY BABIES & SMALL CHILDREN     DO NOT EAT RAW FISH, SEAFOOD, MEAT, OR EGGS      FALL PREVENTION   Falls often occur due to slipping, tripping or losing your balance. Here are ways to reduce your risk of falling again.   Was there anything that caused your fall that can be fixed, removed or replaced?   Make your home safe by keeping walkways clear of objects you may trip over.   Use non-slip pads under rugs.   Do not walk in poorly lit areas.   Do not stand on chairs or wobbly ladders.   Use caution when reaching overhead or looking upward. This position can cause a loss of balance.   Be sure your shoes fit properly, have non-slip bottoms and are in good condition.   Be cautious when going up and down stairs, curbs, and when walking  on uneven sidewalks.   If your balance is poor, consider using a cane or walker.   If your fall was related to alcohol use, stop or limit alcohol intake.   If your fall was related to use of sleeping medicines, talk to your doctor about this. You may need to reduce your dosage at bedtime if you awaken during the night to go to the bathroom.   To reduce the need for nighttime bathroom trips:   Avoid drinking fluids for several hours before going to bed   Empty your bladder before going to bed   Men can keep a urinal at the bedside   © 5356-5454 [facility], [address] [place] [address], [place] [address]. All rights reserved. This information is not intended as a substitute for professional medical care. Always follow your healthcare professional's instructions.

## 2022-02-18 NOTE — NURSING
Patient received two gluteal intramuscular injections (left and right) of tixagevimab/cilgavimab (EVUSHELD) with no difficulties tolerating the medication.  Stayed 1 hour post injections.  VSS.  NAD noted upon discharge.

## 2022-03-23 ENCOUNTER — PATIENT MESSAGE (OUTPATIENT)
Dept: PALLIATIVE MEDICINE | Facility: CLINIC | Age: 77
End: 2022-03-23
Payer: MEDICARE

## 2022-03-23 NOTE — TELEPHONE ENCOUNTER
We received this message form Mr. Durbin's daughter.  I'm sure y'all are more familiar than I am, but I confirmed with my SWer that he will need a face-to-face with Dr. Elkins for a mobility evaluation.  Do y'all need a copy of the form used?     Yumiko Rubio RN  Palliative Medicine, [facility]  [phone]

## 2022-03-24 ENCOUNTER — PATIENT MESSAGE (OUTPATIENT)
Dept: AUDIOLOGY | Facility: CLINIC | Age: 77
End: 2022-03-24
Payer: MEDICARE

## 2022-03-29 ENCOUNTER — INFUSION (OUTPATIENT)
Dept: INFUSION THERAPY | Facility: HOSPITAL | Age: 77
End: 2022-03-29
Attending: NURSE PRACTITIONER
Payer: MEDICARE

## 2022-03-29 VITALS
HEART RATE: 66 BPM | TEMPERATURE: 99 F | SYSTOLIC BLOOD PRESSURE: 99 MMHG | OXYGEN SATURATION: 99 % | DIASTOLIC BLOOD PRESSURE: 50 MMHG | RESPIRATION RATE: 18 BRPM

## 2022-03-29 DIAGNOSIS — D84.9 IMMUNOSUPPRESSED STATUS: Primary | ICD-10-CM

## 2022-03-29 PROCEDURE — M0220 HC INJECTION ADMIN, TIXAGEVIMAB-CILGAVIMAB, INCL POST ADMIN MONIT: HCPCS | Performed by: INTERNAL MEDICINE

## 2022-03-29 PROCEDURE — 63600175 PHARM REV CODE 636 W HCPCS: Performed by: INTERNAL MEDICINE

## 2022-03-29 RX ORDER — EPINEPHRINE 0.3 MG/.3ML
0.3 INJECTION SUBCUTANEOUS
Status: DISCONTINUED | OUTPATIENT
Start: 2022-03-29 | End: 2022-03-29 | Stop reason: HOSPADM

## 2022-03-29 RX ORDER — METHYLPREDNISOLONE SOD SUCC 125 MG
40 VIAL (EA) INJECTION
Status: DISCONTINUED | OUTPATIENT
Start: 2022-03-29 | End: 2022-03-29 | Stop reason: HOSPADM

## 2022-03-29 RX ORDER — ONDANSETRON 4 MG/1
4 TABLET, ORALLY DISINTEGRATING ORAL
Status: DISCONTINUED | OUTPATIENT
Start: 2022-03-29 | End: 2022-03-29 | Stop reason: HOSPADM

## 2022-03-29 RX ORDER — ALBUTEROL SULFATE 90 UG/1
2 AEROSOL, METERED RESPIRATORY (INHALATION)
Status: DISCONTINUED | OUTPATIENT
Start: 2022-03-29 | End: 2022-03-29 | Stop reason: HOSPADM

## 2022-03-29 RX ORDER — ACETAMINOPHEN 325 MG/1
650 TABLET ORAL ONCE AS NEEDED
Status: DISCONTINUED | OUTPATIENT
Start: 2022-03-29 | End: 2022-03-29 | Stop reason: HOSPADM

## 2022-03-29 RX ORDER — DIPHENHYDRAMINE HYDROCHLORIDE 50 MG/ML
25 INJECTION INTRAMUSCULAR; INTRAVENOUS
Status: DISCONTINUED | OUTPATIENT
Start: 2022-03-29 | End: 2022-03-29 | Stop reason: HOSPADM

## 2022-03-29 RX ADMIN — Medication 150 MG: at 02:03

## 2022-03-30 DIAGNOSIS — G89.3 NEOPLASM RELATED PAIN: ICD-10-CM

## 2022-03-30 RX ORDER — OXYCODONE HCL 10 MG/1
60 TABLET, FILM COATED, EXTENDED RELEASE ORAL
COMMUNITY
End: 2022-03-30 | Stop reason: SDUPTHER

## 2022-03-30 RX ORDER — OXYCODONE HYDROCHLORIDE 60 MG/1
60 TABLET, FILM COATED, EXTENDED RELEASE ORAL EVERY 12 HOURS
Qty: 60 TABLET | Refills: 0 | Status: CANCELLED | OUTPATIENT
Start: 2022-03-30 | End: 2022-04-29

## 2022-03-30 RX ORDER — OXYCODONE HYDROCHLORIDE 20 MG/1
20 TABLET ORAL EVERY 6 HOURS PRN
Qty: 120 TABLET | Refills: 0 | Status: CANCELLED | OUTPATIENT
Start: 2022-03-30 | End: 2022-04-29

## 2022-03-30 RX ORDER — OXYCODONE HYDROCHLORIDE 30 MG/1
30 TABLET ORAL EVERY 6 HOURS PRN
COMMUNITY
End: 2022-03-30 | Stop reason: SDUPTHER

## 2022-03-30 RX ORDER — OXYCODONE HYDROCHLORIDE 60 MG/1
60 TABLET, FILM COATED, EXTENDED RELEASE ORAL EVERY 12 HOURS
Qty: 60 TABLET | Refills: 0 | Status: SHIPPED | OUTPATIENT
Start: 2022-03-30 | End: 2022-04-27 | Stop reason: SDUPTHER

## 2022-03-30 RX ORDER — OXYCODONE HYDROCHLORIDE 20 MG/1
20 TABLET ORAL EVERY 6 HOURS PRN
Qty: 120 TABLET | Refills: 0 | Status: SHIPPED | OUTPATIENT
Start: 2022-03-30 | End: 2022-04-27 | Stop reason: SDUPTHER

## 2022-03-30 NOTE — TELEPHONE ENCOUNTER
----- Message from Lourdes Novak sent at 3/30/2022  3:26 PM CDT -----  Contact: Karen,Daughter  Karen called regarding getting Familia's medication, it is not on the chart, please give her a call back at [phone]        Thanks  kb

## 2022-04-06 ENCOUNTER — TELEPHONE (OUTPATIENT)
Dept: PALLIATIVE MEDICINE | Facility: CLINIC | Age: 77
End: 2022-04-06
Payer: MEDICARE

## 2022-04-06 NOTE — TELEPHONE ENCOUNTER
Nurse confirmed apt with palliative med with MUNA Buenrostro on 4-7-2022 [place] 1, 4 floor. Pt is aware.

## 2022-04-06 NOTE — TELEPHONE ENCOUNTER
Nurse confirmed apt with palliative med with MUNA Buenrostro on 4-7-2022 [place] 1, 4tf floor. Pt and daughter is aware.

## 2022-04-07 ENCOUNTER — PATIENT MESSAGE (OUTPATIENT)
Dept: PALLIATIVE MEDICINE | Facility: CLINIC | Age: 77
End: 2022-04-07

## 2022-04-07 ENCOUNTER — OFFICE VISIT (OUTPATIENT)
Dept: PALLIATIVE MEDICINE | Facility: CLINIC | Age: 77
End: 2022-04-07
Payer: MEDICARE

## 2022-04-07 VITALS
BODY MASS INDEX: 19.96 KG/M2 | RESPIRATION RATE: 18 BRPM | HEART RATE: 84 BPM | SYSTOLIC BLOOD PRESSURE: 109 MMHG | HEIGHT: 70 IN | DIASTOLIC BLOOD PRESSURE: 61 MMHG | WEIGHT: 139.44 LBS

## 2022-04-07 DIAGNOSIS — G89.3 NEOPLASM RELATED PAIN: ICD-10-CM

## 2022-04-07 DIAGNOSIS — Z51.5 ENCOUNTER FOR PALLIATIVE CARE: Primary | ICD-10-CM

## 2022-04-07 DIAGNOSIS — C90.00 MULTIPLE MYELOMA, REMISSION STATUS UNSPECIFIED: ICD-10-CM

## 2022-04-07 PROCEDURE — 1159F PR MEDICATION LIST DOCUMENTED IN MEDICAL RECORD: ICD-10-PCS | Mod: CPTII,S$GLB,, | Performed by: NURSE PRACTITIONER

## 2022-04-07 PROCEDURE — 3074F SYST BP LT 130 MM HG: CPT | Mod: CPTII,S$GLB,, | Performed by: NURSE PRACTITIONER

## 2022-04-07 PROCEDURE — 99999 PR PBB SHADOW E&M-EST. PATIENT-LVL V: ICD-10-PCS | Mod: PBBFAC,,, | Performed by: NURSE PRACTITIONER

## 2022-04-07 PROCEDURE — 1101F PR PT FALLS ASSESS DOC 0-1 FALLS W/OUT INJ PAST YR: ICD-10-PCS | Mod: CPTII,S$GLB,, | Performed by: NURSE PRACTITIONER

## 2022-04-07 PROCEDURE — 3078F DIAST BP <80 MM HG: CPT | Mod: CPTII,S$GLB,, | Performed by: NURSE PRACTITIONER

## 2022-04-07 PROCEDURE — 3078F PR MOST RECENT DIASTOLIC BLOOD PRESSURE < 80 MM HG: ICD-10-PCS | Mod: CPTII,S$GLB,, | Performed by: NURSE PRACTITIONER

## 2022-04-07 PROCEDURE — 1101F PT FALLS ASSESS-DOCD LE1/YR: CPT | Mod: CPTII,S$GLB,, | Performed by: NURSE PRACTITIONER

## 2022-04-07 PROCEDURE — 3288F FALL RISK ASSESSMENT DOCD: CPT | Mod: CPTII,S$GLB,, | Performed by: NURSE PRACTITIONER

## 2022-04-07 PROCEDURE — 99215 PR OFFICE/OUTPT VISIT, EST, LEVL V, 40-54 MIN: ICD-10-PCS | Mod: S$GLB,,, | Performed by: NURSE PRACTITIONER

## 2022-04-07 PROCEDURE — 1159F MED LIST DOCD IN RCRD: CPT | Mod: CPTII,S$GLB,, | Performed by: NURSE PRACTITIONER

## 2022-04-07 PROCEDURE — 1123F ACP DISCUSS/DSCN MKR DOCD: CPT | Mod: CPTII,S$GLB,, | Performed by: NURSE PRACTITIONER

## 2022-04-07 PROCEDURE — 99999 PR PBB SHADOW E&M-EST. PATIENT-LVL V: CPT | Mod: PBBFAC,,, | Performed by: NURSE PRACTITIONER

## 2022-04-07 PROCEDURE — 1125F AMNT PAIN NOTED PAIN PRSNT: CPT | Mod: CPTII,S$GLB,, | Performed by: NURSE PRACTITIONER

## 2022-04-07 PROCEDURE — 99215 OFFICE O/P EST HI 40 MIN: CPT | Mod: S$GLB,,, | Performed by: NURSE PRACTITIONER

## 2022-04-07 PROCEDURE — 1125F PR PAIN SEVERITY QUANTIFIED, PAIN PRESENT: ICD-10-PCS | Mod: CPTII,S$GLB,, | Performed by: NURSE PRACTITIONER

## 2022-04-07 PROCEDURE — 3288F PR FALLS RISK ASSESSMENT DOCUMENTED: ICD-10-PCS | Mod: CPTII,S$GLB,, | Performed by: NURSE PRACTITIONER

## 2022-04-07 PROCEDURE — 3074F PR MOST RECENT SYSTOLIC BLOOD PRESSURE < 130 MM HG: ICD-10-PCS | Mod: CPTII,S$GLB,, | Performed by: NURSE PRACTITIONER

## 2022-04-07 PROCEDURE — 1123F PR ADV CARE PLAN DISCUSSED, PLAN OR SURROGATE DOCUMENTED: ICD-10-PCS | Mod: CPTII,S$GLB,, | Performed by: NURSE PRACTITIONER

## 2022-04-07 NOTE — PROGRESS NOTES
Progress Note  Palliative Care      Consult Requested By:  Dr. Solo, oncology  Reason for Consult: ACP and symptom management      ASSESSMENT/PLAN:     Plan/Recommendations:  Diagnoses and all orders for this visit:    Encounter for palliative care   -patient is decisional   -patient is alone today   -staff had to get wheelchair due to his dyspnea   -philosophy of palliative medicine reviewed with patient and family at initial visit   -reviewed importance of ACP documents   -daughter reports she will bring to next visit or upload in the system through [employer]   -code status not specifically addressed today   -will plan to follow up at future encounters about code status    Multiple myeloma, remission status unspecified   -patient primary oncologist at [facility] in [place]   -followed locally at one time by Dr. Solo, last visit 2020   -medical record release signed; requested records from his oncologist   -patient is followed every 6 weeks   -tells me plan is for upcoming bone marrow   -? concerns about progression    Neoplasm related pain   -patient reports pain is controlled on current regimen; although he is mostly sedentary   -pain is worse with any movement or activity   -feels pain is tolerable on new regimen   -bowel regimen to prevent OIC    Metastatic bone cancer   -see above    Dyspnea   -patient with worsening dyspnea   -encouraged follow up with cardiology   -spoke with daughter and patient; if worse, go to ED      Understanding of illness/Prognosis:  Fair insight; seems to be declining; need to understanding prognosis; records requested from oncology    Goals of care:  Life-prolonging and quality of life    Follow up: 8 weeks      SUBJECTIVE:     History of Present Illness:  Patient is a 76 y.o. year old male presenting for follow with palliative medicine.  Patient has a history of metastatic multiple myeloma/ vertebral bone mets.  He previously followed by Dr. Solo (last  "visit 11/2020) with his primary oncologist at [facility] in [place], [place].  I have limited records but patient tells me he on a maintenance regimen of 3 weeks on/1 week off for treatment of his multiple myeloma. (?Revlamid).  He also tells me he see his oncologist next month in [place].    Although patient is new to me, he has been seen by palliative medicine.  He had a home-based referral but declined.  Today, he cannot recall palliative medicine and its philosophy so this was reviewed with both him and his daughter. Patient tells me he is mostly sedentary due to his pain.  He reports he hurts "all over" but especially painful with walking.  He feels his pain in controlled on his current pain regimen and rates his pain a "3" out of 10 during our visit today.  He is on a bowel regimen with last BM yesterday.  Otherwise, seems to be doing okay.    01/06/2022  Patient had ED visit in December due to N/V and low blood sugar.    Patient tells me he is doing okay today.  Pain is controlled on current regimen.  His biggest issue is fatigue and decreased appetite.  He is ambulatory and still able to perform his own ADLs.  He lives independently.  He is due for follow up with oncology in a couple of weeks.  I will try to connect with his team at [facility] in [place], [place] to get details on his disease and treatment plan.  When I asked patient today if his disease was stable, he response was "I wouldn't say that but I am living with it".  It is difficult to discuss ACP and goals of care without input from oncology.  I reminded patient to bring copy of living will to next visit.  Overall, he is doing fairly well with no new issues.      Interval History  04/08/2022  Patient here for routine follow up.  He is in good spirits but is also easily fatigue with worsening shortness of breath. I found he is dyspneic with talking at times.  He is requiring a wheelchair for longer distances secondary to " both pain and shortness of breath.  Staff working on [employer] for mobility.  Patient tells me he has an upcoming follow up with cancer doctor and his interval for follow up have been increased.  Plans for bone marrow biopsy at next visit.  This will be important information to know to help with goals of care, especially given his functional decline.  I did call his daughter to express my concerns, especially as it relates to his shortness of breath.  I recommended follow up with cardiology due to history of CHF or ED if worse.  She verbalized understanding.  Otherwise, I did not make any changes in his medications.  He feels his pain is tolerable at his current dose.      HIIGNIO LAMA reviewed and summarized:          Past Medical History:   Diagnosis Date    CAD (coronary artery disease)     luikart    Diabetes mellitus, type 2 1979    eye dr rocha    Hearing impaired     Hyperlipidemia     Hypertension     Lupus     Discoid    Multiple myeloma     Old MI (myocardial infarction) 2012    Renal insufficiency     Tracheostomy tube present      Past Surgical History:   Procedure Laterality Date    CARDIAC SURGERY      CATARACT EXTRACTION      COLONOSCOPY      CORONARY ARTERY BYPASS GRAFT      HAND SURGERY      KNEE SURGERY      TRACHEOSTOMY TUBE PLACEMENT      WRIST FUSION       Family History   Problem Relation Age of Onset    Diabetes Mother     Diabetes Father     Prostate cancer Father     Pancreatic cancer Sister     No Known Problems Brother     Diabetes Maternal Uncle     Diabetes Maternal Grandmother     No Known Problems Maternal Grandfather     No Known Problems Paternal Grandmother     No Known Problems Paternal Grandfather      Review of patient's allergies indicates:   Allergen Reactions    Cephalexin      Pt tolerated cefepime 11/4/2020    Fentanyl Nausea And Vomiting    Nsaids (non-steroidal anti-inflammatory drug)      Renal insuf       Medications:    Current  "Outpatient Medications:     aspirin 81 MG Chew, Take 1 tablet (81 mg total) by mouth once daily., Disp: 100 tablet, Rfl: 12    azelastine (ASTELIN) 137 mcg (0.1 %) nasal spray, 2 sprays (274 mcg total) by Nasal route 2 (two) times daily., Disp: 30 mL, Rfl: 11    BD ULTRA-FINE DAVE PEN NEEDLE 32 gauge x 5/32" Ndle, USE  4 TIMES DAILY WITH MEALS AND AT BEDTIME, Disp: 400 each, Rfl: 3    blood sugar diagnostic Strp, 1 each by Misc.(Non-Drug; Combo Route) route 4 (four) times daily., Disp: 300 each, Rfl: 3    blood-glucose meter kit, Use as instructed, Disp: 1 each, Rfl: 0    BRILINTA 90 mg tablet, Take 1 tablet (90 mg total) by mouth once daily., Disp: 30 tablet, Rfl: 6    cholecalciferol, vitamin D3, 5,000 unit Tab, Take 5,000 Units by mouth once daily., Disp: , Rfl:     DOCOSAHEXANOIC ACID ORAL, Take 1 capsule by mouth once daily., Disp: , Rfl:     docusate sodium (COLACE) 100 MG capsule, Take 100 mg by mouth 2 (two) times daily., Disp: , Rfl:     flaxseed oil 1,000 mg Cap, Take 1 capsule by mouth daily as needed. , Disp: , Rfl:     furosemide (LASIX) 20 MG tablet, Take 1 tablet (20 mg total) by mouth once daily., Disp: 30 tablet, Rfl: 3    ginger root (GINGER, ZINGIBER OFFICINALIS,) 550 mg Cap, Take 1 capsule by mouth., Disp: , Rfl:     isosorbide mononitrate (IMDUR) 60 MG 24 hr tablet, Take 1 tablet by mouth twice daily, Disp: 180 tablet, Rfl: 2    lactulose (CEPHULAC) 20 gram Pack, Mix and take 1 packet (20 g total) by mouth 3 (three) times daily. (Patient taking differently: Take 20 g by mouth 3 (three) times daily as needed.), Disp: 90 packet, Rfl: 3    lactulose (CHRONULAC) 10 gram/15 mL solution, Take 30 mL by mouth twice daily as needed for constipation., Disp: 473 mL, Rfl: 3    metoprolol tartrate (LOPRESSOR) 50 MG tablet, Take 1 tablet by mouth twice daily, Disp: 180 tablet, Rfl: 4    multivit-min-FA-lycopen-lutein 300-600-300 mcg Tab, Take 1 tablet by mouth., Disp: , Rfl:     " nitroGLYCERIN (NITROSTAT) 0.4 MG SL tablet, Place 1 tablet (0.4 mg total) under the tongue every 5 (five) minutes as needed for Chest pain., Disp: 50 tablet, Rfl: 12    NOVOLOG FLEXPEN U-100 INSULIN 100 unit/mL (3 mL) InPn pen, INJECT 15 UNITS SUBCUTANEOUSLY THREE TIMES DAILY WITH MEALS, Disp: 30 mL, Rfl: 0    omega 3-dha-epa-fish oil 300-1,000 mg Cap, Take by mouth., Disp: , Rfl:     ondansetron (ZOFRAN) 4 MG tablet, Take 1 tablet (4 mg total) by mouth every 8 (eight) hours as needed for Nausea., Disp: 40 tablet, Rfl: 11    ondansetron (ZOFRAN-ODT) 4 MG TbDL, Dissolve 1 tab under the tongue every 4-6 hours as needed for nausea., Disp: 120 tablet, Rfl: 0    oxyCODONE (OXYCONTIN) 60 mg TR12 12 hr tablet, Take 1 tablet (60 mg total) by mouth every 12 (twelve) hours., Disp: 60 tablet, Rfl: 0    oxyCODONE (ROXICODONE) 20 mg Tab immediate release tablet, Take 1 tablet (20 mg total) by mouth every 6 (six) hours as needed (breakthrough pain)., Disp: 120 tablet, Rfl: 0    pantoprazole (PROTONIX) 40 MG tablet, Take 1 tablet by mouth once daily, Disp: 30 tablet, Rfl: 11    polyethylene glycol (GLYCOLAX) 17 gram PwPk, Take 17 g by mouth once daily., Disp: 100 each, Rfl: 1    pomalidomide 3 mg Cap, Take 3 mg by mouth 3 mg daily x 21 days, then off for 7 days, then repeat.., Disp: , Rfl:     potassium chloride SA (K-DUR,KLOR-CON) 20 MEQ tablet, TAKE 1 TABLET BY MOUTH ON MONDAY, WEDNESDAY AND FRIDAY., Disp: 35 tablet, Rfl: 3    prochlorperazine (COMPAZINE) 5 MG tablet, Take 1 tablet (5 mg total) by mouth 4 (four) times daily as needed for Nausea., Disp: 40 tablet, Rfl: 2    ranolazine (RANEXA) 1,000 mg Tb12, Take 1 tablet (1,000 mg total) by mouth 2 (two) times daily., Disp: 60 tablet, Rfl: 6    rosuvastatin (CRESTOR) 40 MG Tab, Take 1 tablet (40 mg total) by mouth every evening., Disp: 90 tablet, Rfl: 0    sennosides 25 mg Tab, Take 1 tablet by mouth 2 (two) times a day., Disp: 60 each, Rfl: 11    triamcinolone  acetonide 0.1% (KENALOG) 0.1 % cream, Apply topically 2 (two) times daily. For two weeks, Disp: 28.4 g, Rfl: 1    TRUEPLUS LANCETS 33 gauge Misc, USE   TO CHECK GLUCOSE SIX TIMES DAILY, Disp: 100 each, Rfl: 11    VITAMIN B COMPLEX ORAL, Take 1 capsule by mouth., Disp: , Rfl:     albuterol-ipratropium (DUO-NEB) 2.5 mg-0.5 mg/3 mL nebulizer solution, Take 3 mLs by nebulization every 8 (eight) hours. For shortness of breath and wheezing, Disp: 270 mL, Rfl: 0    insulin (LANTUS SOLOSTAR U-100 INSULIN) glargine 100 units/mL (3mL) SubQ pen, Inject 10 Units into the skin every evening. INJECT 15 UNITS SUBCUTANEOUSLY ONCE DAILY, Disp: 3 mL, Rfl: 11    OBJECTIVE:       ROS:  Review of Systems   Constitutional: Positive for activity change, appetite change and fatigue. Negative for fever.   HENT: Positive for hearing loss. Negative for congestion.    Eyes: Negative for visual disturbance.   Respiratory: Positive for shortness of breath (with exertion or activity). Negative for cough.    Cardiovascular: Negative for chest pain.   Gastrointestinal: Negative for constipation, diarrhea and nausea.   Endocrine: Positive for cold intolerance.   Genitourinary: Negative for difficulty urinating and frequency.   Musculoskeletal: Positive for arthralgias, back pain and gait problem.   Skin: Negative.    Allergic/Immunologic: Positive for immunocompromised state.   Neurological: Positive for weakness. Negative for seizures and speech difficulty.   Psychiatric/Behavioral: Negative for confusion and decreased concentration. The patient is not nervous/anxious.        Review of Symptoms    Symptom Assessment (ESAS 0-10 Scale)  Pain:  10  Dyspnea:  2  Anxiety:  0  Nausea:  2  Depression:  0  Anorexia:  3  Fatigue:  5  Insomnia:  0  Restlessness:  0  Agitation:  0 due to Other     CAM / Delirium:  Negative  Constipation:  Negative  Diarrhea:  Negative    Bowel Management Plan (BMP):  Yes      Location Choices: generalized, but mostly  back/right side.      ECOG Performance Status stGstrstastdstest:st st1st Living Arrangements:  Lives alone and Lives in home    Psychosocial/Cultural: Daughter is involved is his care      Advance Care Planning   Advance Directives:   Living Will: Yes        Copy on chart: No    Medical Power of : Yes      Decision Making:  Patient answered questions              Physical Exam:  Vitals: Pulse: 84 (04/07/22 1455)  Resp: 18 (04/07/22 1455)  BP: 109/61 (04/07/22 1455)  Physical Exam  Constitutional:       General: He is not in acute distress.     Appearance: He is ill-appearing (chronically). He is not toxic-appearing.      Comments: thin   HENT:      Head: Normocephalic and atraumatic.      Nose: No congestion.      Mouth/Throat:      Mouth: Mucous membranes are dry.      Pharynx: Oropharynx is clear.   Eyes:      Extraocular Movements: Extraocular movements intact.      Pupils: Pupils are equal, round, and reactive to light.   Cardiovascular:      Rate and Rhythm: Normal rate.   Pulmonary:      Comments: Short of breath with minimal activity  Abdominal:      General: There is no distension.      Palpations: Abdomen is soft.   Musculoskeletal:         General: No swelling.      Cervical back: Neck supple.      Comments: SOLANO   Skin:     General: Skin is warm and dry.   Neurological:      Mental Status: He is oriented to person, place, and time.      Motor: Weakness present.      Gait: Gait abnormal.   Psychiatric:         Mood and Affect: Mood normal.         Behavior: Behavior normal.         Thought Content: Thought content normal.         Judgment: Judgment normal.         Labs:  CBC:   WBC   Date Value Ref Range Status   02/02/2022 2.79 (L) 3.90 - 12.70 K/uL Final     Hemoglobin   Date Value Ref Range Status   02/02/2022 11.0 (L) 14.0 - 18.0 g/dL Final     Hematocrit   Date Value Ref Range Status   02/02/2022 33.9 (L) 40.0 - 54.0 % Final     MCV   Date Value Ref Range Status   02/02/2022 106 (H) 82 - 98 fL Final      Platelets   Date Value Ref Range Status   02/02/2022 SEE COMMENT 150 - 450 K/uL Final     Comment:     Unable to report platelet count due to clumping.       LFT:   Lab Results   Component Value Date    AST 15 02/02/2022    ALKPHOS 73 02/02/2022    BILITOT 0.7 02/02/2022       Albumin:   Albumin   Date Value Ref Range Status   02/02/2022 3.8 3.5 - 5.2 g/dL Final     Protein:   Total Protein   Date Value Ref Range Status   02/02/2022 8.4 6.0 - 8.4 g/dL Final         50 minutes of total time spent on the encounter, which includes face to face time and non-face to face time preparing to see the patient (eg, review of tests), Obtaining and/or reviewing separately obtained history, Documenting clinical information in the electronic or other health record, Independently interpreting results (not separately reported) and communicating results to the patient/family/caregiver, or Care coordination (not separately reported).      Signature: Tammy Villa NP

## 2022-04-07 NOTE — ED NOTES
Lying:       HR-63         BP-165/72  Sitting:     HR-64          BP-193/81  Standing: HR-67         BP-172/74  
Patient placed on continuous cardiac monitor, automatic blood pressure cuff and continuous pulse oximeter.  
Report to DAXA Manuel.   
Shirley Forrest [phone] daughter to be called with further communication  
Yes

## 2022-04-08 NOTE — PATIENT INSTRUCTIONS
Please scan living will into chart or bring to next visit; time to complete advance care planning    Here is a link to [facility] information page on advance directives which includes forms available to download.     [email or web address]

## 2022-04-11 ENCOUNTER — TELEPHONE (OUTPATIENT)
Dept: PHARMACY | Facility: CLINIC | Age: 77
End: 2022-04-11
Payer: MEDICARE

## 2022-04-20 ENCOUNTER — TELEPHONE (OUTPATIENT)
Dept: INTERNAL MEDICINE | Facility: CLINIC | Age: 77
End: 2022-04-20
Payer: MEDICARE

## 2022-04-20 NOTE — TELEPHONE ENCOUNTER
The form was faxed to [phone]. I will also send it to the fax number you provided. Thank you.

## 2022-04-20 NOTE — TELEPHONE ENCOUNTER
Pt has an appt with provider at [facility] on 5/24/2022. He will be seen for evaluation of a power chair. Does a copy of [employer] form need to be faxed to the [facility] to be given to the provider? If so, can you please send the fax number?

## 2022-04-27 DIAGNOSIS — I10 ESSENTIAL HYPERTENSION: Primary | ICD-10-CM

## 2022-04-27 DIAGNOSIS — Z79.4 INSULIN LONG-TERM USE: Chronic | ICD-10-CM

## 2022-04-27 DIAGNOSIS — Z79.4 TYPE 2 DIABETES MELLITUS WITHOUT COMPLICATION, WITH LONG-TERM CURRENT USE OF INSULIN: Chronic | ICD-10-CM

## 2022-04-27 DIAGNOSIS — E78.5 HYPERLIPIDEMIA, UNSPECIFIED HYPERLIPIDEMIA TYPE: Chronic | ICD-10-CM

## 2022-04-27 DIAGNOSIS — Z79.4 TYPE 2 DIABETES MELLITUS WITH HYPERGLYCEMIA, WITH LONG-TERM CURRENT USE OF INSULIN: ICD-10-CM

## 2022-04-27 DIAGNOSIS — E11.9 TYPE 2 DIABETES MELLITUS WITHOUT COMPLICATION, WITH LONG-TERM CURRENT USE OF INSULIN: Chronic | ICD-10-CM

## 2022-04-27 DIAGNOSIS — E11.65 TYPE 2 DIABETES MELLITUS WITH HYPERGLYCEMIA, WITH LONG-TERM CURRENT USE OF INSULIN: ICD-10-CM

## 2022-04-27 DIAGNOSIS — Z79.52 LONG TERM CURRENT USE OF SYSTEMIC STEROIDS: ICD-10-CM

## 2022-04-27 DIAGNOSIS — I10 ESSENTIAL HYPERTENSION: Chronic | ICD-10-CM

## 2022-04-28 ENCOUNTER — OFFICE VISIT (OUTPATIENT)
Dept: CARDIOLOGY | Facility: CLINIC | Age: 77
End: 2022-04-28
Payer: MEDICARE

## 2022-04-28 ENCOUNTER — HOSPITAL ENCOUNTER (OUTPATIENT)
Dept: CARDIOLOGY | Facility: HOSPITAL | Age: 77
Discharge: HOME | End: 2022-04-28
Attending: INTERNAL MEDICINE
Payer: MEDICARE

## 2022-04-28 VITALS
HEART RATE: 65 BPM | DIASTOLIC BLOOD PRESSURE: 54 MMHG | WEIGHT: 141.31 LBS | SYSTOLIC BLOOD PRESSURE: 112 MMHG | BODY MASS INDEX: 20.28 KG/M2

## 2022-04-28 DIAGNOSIS — I50.32 CHRONIC DIASTOLIC CONGESTIVE HEART FAILURE: ICD-10-CM

## 2022-04-28 DIAGNOSIS — I10 ESSENTIAL HYPERTENSION: ICD-10-CM

## 2022-04-28 DIAGNOSIS — Z79.4 TYPE 2 DIABETES MELLITUS WITH DIABETIC NEPHROPATHY, WITH LONG-TERM CURRENT USE OF INSULIN: Chronic | ICD-10-CM

## 2022-04-28 DIAGNOSIS — I35.9 AORTIC VALVE DISORDER: ICD-10-CM

## 2022-04-28 DIAGNOSIS — E11.21 TYPE 2 DIABETES MELLITUS WITH DIABETIC NEPHROPATHY, WITH LONG-TERM CURRENT USE OF INSULIN: Chronic | ICD-10-CM

## 2022-04-28 DIAGNOSIS — I10 PRIMARY HYPERTENSION: Chronic | ICD-10-CM

## 2022-04-28 DIAGNOSIS — Z95.1 HX OF CABG: ICD-10-CM

## 2022-04-28 DIAGNOSIS — J47.9 BRONCHIECTASIS WITHOUT COMPLICATION: ICD-10-CM

## 2022-04-28 DIAGNOSIS — E78.2 MIXED HYPERLIPIDEMIA: Chronic | ICD-10-CM

## 2022-04-28 DIAGNOSIS — C90.00 MULTIPLE MYELOMA NOT HAVING ACHIEVED REMISSION: ICD-10-CM

## 2022-04-28 DIAGNOSIS — I25.118 CORONARY ARTERY DISEASE OF NATIVE ARTERY OF NATIVE HEART WITH STABLE ANGINA PECTORIS: Primary | Chronic | ICD-10-CM

## 2022-04-28 PROCEDURE — 93010 EKG 12-LEAD: ICD-10-PCS | Mod: ,,, | Performed by: INTERNAL MEDICINE

## 2022-04-28 PROCEDURE — 1160F PR REVIEW ALL MEDS BY PRESCRIBER/CLIN PHARMACIST DOCUMENTED: ICD-10-PCS | Mod: CPTII,S$GLB,, | Performed by: INTERNAL MEDICINE

## 2022-04-28 PROCEDURE — 3051F PR MOST RECENT HEMOGLOBIN A1C LEVEL 7.0 - < 8.0%: ICD-10-PCS | Mod: CPTII,S$GLB,, | Performed by: INTERNAL MEDICINE

## 2022-04-28 PROCEDURE — 93005 ELECTROCARDIOGRAM TRACING: CPT

## 2022-04-28 PROCEDURE — 99999 PR PBB SHADOW E&M-EST. PATIENT-LVL IV: ICD-10-PCS | Mod: PBBFAC,,, | Performed by: INTERNAL MEDICINE

## 2022-04-28 PROCEDURE — 3051F HG A1C>EQUAL 7.0%<8.0%: CPT | Mod: CPTII,S$GLB,, | Performed by: INTERNAL MEDICINE

## 2022-04-28 PROCEDURE — 3074F PR MOST RECENT SYSTOLIC BLOOD PRESSURE < 130 MM HG: ICD-10-PCS | Mod: CPTII,S$GLB,, | Performed by: INTERNAL MEDICINE

## 2022-04-28 PROCEDURE — 99999 PR PBB SHADOW E&M-EST. PATIENT-LVL IV: CPT | Mod: PBBFAC,,, | Performed by: INTERNAL MEDICINE

## 2022-04-28 PROCEDURE — 99214 PR OFFICE/OUTPT VISIT, EST, LEVL IV, 30-39 MIN: ICD-10-PCS | Mod: S$GLB,,, | Performed by: INTERNAL MEDICINE

## 2022-04-28 PROCEDURE — 1160F RVW MEDS BY RX/DR IN RCRD: CPT | Mod: CPTII,S$GLB,, | Performed by: INTERNAL MEDICINE

## 2022-04-28 PROCEDURE — 1159F MED LIST DOCD IN RCRD: CPT | Mod: CPTII,S$GLB,, | Performed by: INTERNAL MEDICINE

## 2022-04-28 PROCEDURE — 99214 OFFICE O/P EST MOD 30 MIN: CPT | Mod: S$GLB,,, | Performed by: INTERNAL MEDICINE

## 2022-04-28 PROCEDURE — 93010 ELECTROCARDIOGRAM REPORT: CPT | Mod: ,,, | Performed by: INTERNAL MEDICINE

## 2022-04-28 PROCEDURE — 3078F PR MOST RECENT DIASTOLIC BLOOD PRESSURE < 80 MM HG: ICD-10-PCS | Mod: CPTII,S$GLB,, | Performed by: INTERNAL MEDICINE

## 2022-04-28 PROCEDURE — 3074F SYST BP LT 130 MM HG: CPT | Mod: CPTII,S$GLB,, | Performed by: INTERNAL MEDICINE

## 2022-04-28 PROCEDURE — 1159F PR MEDICATION LIST DOCUMENTED IN MEDICAL RECORD: ICD-10-PCS | Mod: CPTII,S$GLB,, | Performed by: INTERNAL MEDICINE

## 2022-04-28 PROCEDURE — 3078F DIAST BP <80 MM HG: CPT | Mod: CPTII,S$GLB,, | Performed by: INTERNAL MEDICINE

## 2022-04-28 RX ORDER — OXYCODONE HYDROCHLORIDE 60 MG/1
60 TABLET, FILM COATED, EXTENDED RELEASE ORAL EVERY 12 HOURS
Qty: 60 TABLET | Refills: 0 | Status: SHIPPED | OUTPATIENT
Start: 2022-04-28 | End: 2022-06-08 | Stop reason: SDUPTHER

## 2022-04-28 RX ORDER — PANTOPRAZOLE SODIUM 40 MG/1
40 TABLET, DELAYED RELEASE ORAL DAILY
Qty: 30 TABLET | Refills: 11 | Status: SHIPPED | OUTPATIENT
Start: 2022-04-28

## 2022-04-28 RX ORDER — ALBUTEROL SULFATE 90 UG/1
2 AEROSOL, METERED RESPIRATORY (INHALATION) EVERY 6 HOURS PRN
Qty: 8.5 G | Refills: 5 | Status: SHIPPED | OUTPATIENT
Start: 2022-04-28 | End: 2022-06-23

## 2022-04-28 RX ORDER — INSULIN PUMP SYRINGE, 3 ML
EACH MISCELLANEOUS
Qty: 1 EACH | Refills: 0 | Status: SHIPPED | OUTPATIENT
Start: 2022-04-28

## 2022-04-28 RX ORDER — OXYCODONE HYDROCHLORIDE 20 MG/1
20 TABLET ORAL EVERY 6 HOURS PRN
Qty: 120 TABLET | Refills: 0 | Status: SHIPPED | OUTPATIENT
Start: 2022-04-28 | End: 2022-06-08 | Stop reason: SDUPTHER

## 2022-04-28 RX ORDER — ROSUVASTATIN CALCIUM 40 MG/1
40 TABLET, COATED ORAL NIGHTLY
Qty: 90 TABLET | Refills: 0 | Status: SHIPPED | OUTPATIENT
Start: 2022-04-28 | End: 2022-05-17

## 2022-04-28 NOTE — TELEPHONE ENCOUNTER
Refill Routing Note   Medication(s) are not appropriate for processing by [facility] for the following reason(s):      - Patient has not been seen in over 15 months by PCP  - Patient has been seen in the ED/Hospital since the last PCP visit    [facility] action(s):  Defer          Medication reconciliation completed: No     Appointments  past 12m or future 3m with PCP    Date Provider   Last Visit   7/16/2019 Andrea Elkins MD   Next Visit   5/24/2022 Andrea Elkins MD   ED visits in past 90 days: 0        Note composed:7:48 AM 04/28/2022

## 2022-04-28 NOTE — PROGRESS NOTES
Subjective:   Patient ID:  Familia Durbin Jr. is a 76 y.o. male who presents for follow up of No chief complaint on file.      76 y.o. AAM, 6 months f/u  PMHx CAD s/o cabg x2 in 2002 and few PCIs after cabg, CHf pEF mid to mode AS/, DM type 2, HLD, HTN, Lupus, Multiple myeloma with mets to the bone, old MI, Renal insufficiency,  S/p LHC done in 2018 at [facility], showed significant native three-vessel coronary artery disease, Patent LIMA-LAD and SVG-RCA.    MPI inferolateral scar     admitted [facility] for epigastric abdominal pain, which onset 6 days ago.  Patient states that he has had intermittent epigastric/CP, dyspnea on exertion, and orthopnea for the past 2 days. ER workup showed; elevated BP, otherwise stable VS.  CBC showed Hgb 10.0, Hct 30.9, otherwise unremarkable.  CMP showed , otherwise unremarkable.  BNP 2095.  Initial troponin 0.059.  ECHO on 11/3 showed EF of 55%, associated with mild-to-moderate aortic regurgitation and stenosis, with mild pulmonic and mitral regurgitation.  CXR consistent with FVO. ekg NSR and nonspecific ctt change  Echo   · There is left ventricular concentric hypertrophy.  · The left ventricle is normal in size with normal systolic function. The estimated ejection fraction is 55%.  · Indeterminate diastolic function.  · With low normal right ventricular systolic function.  · Mild-to-moderate aortic regurgitation.  · Mild-to-moderate aortic valve stenosis.  · Aortic valve area is 1.54 cm2; peak velocity is 2.17 m/s; mean gradient is 11 mmHg.  · Mild pulmonic regurgitation.  · The mitral valve is mildly sclerotic.  · Normal central venous pressure (3 mmHg).  · Mild mitral regurgitation.    Pt was Rx for angina pain and GERD. The pain improved. And had conversation with the oncologist Dr. Church, who believed pt had pretty good prognosis   The pain resolved. And ZAVALA improved.    MPI showed scar in lnferolaetral wall    12/22/2020 visit Dizziness when  stood up fast. No faint and syncope. The  nurse gave a report. The patient's BP on 12/22/2020 was 100/70 sitting  98/70 standing yesterday. And Cr elevated to 1.8 and K 4.5.  After holding lasix 20 mg daily lisinopril  2.5 mg daily, amlodipine 2.5 mg daily 3 days, he felt better  Breathing is ok   three weeks ago  Today 138/77 mmHg and pulse 66  Plan to [facility] recently.     visit feels fatigue and ZAVALA. Occasional chest pain with exertion. Did not need NTG SL. Stool dark   in .   Pt did not remember taking Lasix on MWF     visit  On palliative care due to metastasis bone cancer  The pain controlled. Decent appetite. No chest pain  Chronic SOB. Weight stable. No active bleeding      Interval history  Worsening SOB for few weeks, the grad daughter states that albuterol inhaler helped a lot. Some wheezing. Sleeps on 1 pillow and no PND. No leg swelling. Decent appetite. Chronic back pain and f/u at palliative care  Weight loss 8 pound sin 3 months. F/u at onc service at [facility]. And taking Rx for MM  ekg NSR LVH         Past Medical History:   Diagnosis Date    CAD (coronary artery disease)     luikart    Diabetes mellitus, type 2 1979    eye dr rocha    Hearing impaired     Hyperlipidemia     Hypertension     Lupus     Discoid    Multiple myeloma     Old MI (myocardial infarction) 2012    Renal insufficiency     Tracheostomy tube present        Past Surgical History:   Procedure Laterality Date    CARDIAC SURGERY      CATARACT EXTRACTION      COLONOSCOPY      CORONARY ARTERY BYPASS GRAFT      HAND SURGERY      KNEE SURGERY      TRACHEOSTOMY TUBE PLACEMENT      WRIST FUSION         Social History     Tobacco Use    Smoking status: Never Smoker    Smokeless tobacco: Never Used   Substance Use Topics    Alcohol use: No    Drug use: No       Family History   Problem Relation Age of Onset    Diabetes Mother     Diabetes Father     Prostate  cancer Father     Pancreatic cancer Sister     No Known Problems Brother     Diabetes Maternal Uncle     Diabetes Maternal Grandmother     No Known Problems Maternal Grandfather     No Known Problems Paternal Grandmother     No Known Problems Paternal Grandfather          Review of Systems   Constitutional: Positive for malaise/fatigue. Negative for decreased appetite, diaphoresis, fever and night sweats.   HENT: Negative for nosebleeds.    Eyes: Negative for blurred vision and double vision.   Cardiovascular: Positive for dyspnea on exertion. Negative for chest pain, claudication, irregular heartbeat, leg swelling, near-syncope, orthopnea, palpitations, paroxysmal nocturnal dyspnea and syncope.   Respiratory: Positive for shortness of breath. Negative for cough, sleep disturbances due to breathing, snoring, sputum production and wheezing.    Endocrine: Negative for cold intolerance and polyuria.   Hematologic/Lymphatic: Does not bruise/bleed easily.   Skin: Negative for rash.   Musculoskeletal: Positive for back pain. Negative for falls, joint pain, joint swelling and neck pain.   Gastrointestinal: Negative for abdominal pain, heartburn, nausea and vomiting.   Genitourinary: Negative for dysuria, frequency and hematuria.   Neurological: Positive for dizziness. Negative for difficulty with concentration, focal weakness, headaches, light-headedness, numbness, seizures and weakness.   Psychiatric/Behavioral: Negative for depression, memory loss and substance abuse. The patient does not have insomnia.    Allergic/Immunologic: Negative for HIV exposure and hives.       Objective:   Physical Exam  HENT:      Head: Normocephalic.   Eyes:      Pupils: Pupils are equal, round, and reactive to light.   Neck:      Thyroid: No thyromegaly.      Vascular: Normal carotid pulses. No carotid bruit or JVD.   Cardiovascular:      Rate and Rhythm: Normal rate and regular rhythm.  No extrasystoles are present.     Chest Wall:  PMI is not displaced.      Pulses: Normal pulses.      Heart sounds: Murmur (ESM on bases) heard.     No gallop. No S3 sounds.   Pulmonary:      Effort: No respiratory distress.      Breath sounds: No stridor. Rales present.   Abdominal:      General: Bowel sounds are normal.      Palpations: Abdomen is soft.      Tenderness: There is no abdominal tenderness. There is no rebound.   Musculoskeletal:         General: Normal range of motion.   Skin:     Findings: No rash.   Neurological:      Mental Status: He is alert and oriented to person, place, and time.   Psychiatric:         Behavior: Behavior normal.         Lab Results   Component Value Date    CHOL 148 02/02/2022    CHOL 156 07/31/2020    CHOL 130 11/22/2019     Lab Results   Component Value Date    HDL 56 02/02/2022    HDL 55 07/31/2020    HDL 47 11/22/2019     Lab Results   Component Value Date    LDLCALC 74.6 02/02/2022    LDLCALC 86.2 07/31/2020    LDLCALC 67.2 11/22/2019     Lab Results   Component Value Date    TRIG 87 02/02/2022    TRIG 74 07/31/2020    TRIG 79 11/22/2019     Lab Results   Component Value Date    CHOLHDL 37.8 02/02/2022    CHOLHDL 35.3 07/31/2020    CHOLHDL 36.2 11/22/2019       Chemistry        Component Value Date/Time     04/29/2022 1649    K 4.5 04/29/2022 1649     04/29/2022 1649    CO2 26 04/29/2022 1649    BUN 18 04/29/2022 1649    CREATININE 1.6 (H) 04/29/2022 1649     (H) 04/29/2022 1649        Component Value Date/Time    CALCIUM 9.2 04/29/2022 1649    ALKPHOS 73 02/02/2022 0944    AST 15 02/02/2022 0944    ALT 6 (L) 02/02/2022 0944    BILITOT 0.7 02/02/2022 0944    ESTGFRAFRICA 47.3 (A) 04/29/2022 1649    EGFRNONAA 40.9 (A) 04/29/2022 1649          Lab Results   Component Value Date    HGBA1C 7.6 (H) 02/02/2022     Lab Results   Component Value Date    TSH 1.328 02/02/2022     Lab Results   Component Value Date    INR 1.1 11/03/2020     Lab Results   Component Value Date    WBC 2.05 (L) 04/29/2022    HGB  9.8 (L) 04/29/2022    HCT 31.2 (L) 04/29/2022     (H) 04/29/2022     (L) 04/29/2022     BMP  Sodium   Date Value Ref Range Status   04/29/2022 139 136 - 145 mmol/L Final     Potassium   Date Value Ref Range Status   04/29/2022 4.5 3.5 - 5.1 mmol/L Final     Chloride   Date Value Ref Range Status   04/29/2022 102 95 - 110 mmol/L Final     CO2   Date Value Ref Range Status   04/29/2022 26 23 - 29 mmol/L Final     BUN   Date Value Ref Range Status   04/29/2022 18 8 - 23 mg/dL Final     Creatinine   Date Value Ref Range Status   04/29/2022 1.6 (H) 0.5 - 1.4 mg/dL Final     Calcium   Date Value Ref Range Status   04/29/2022 9.2 8.7 - 10.5 mg/dL Final     Anion Gap   Date Value Ref Range Status   04/29/2022 11 8 - 16 mmol/L Final     eGFR if    Date Value Ref Range Status   04/29/2022 47.3 (A) >60 mL/min/1.73 m^2 Final     eGFR if non    Date Value Ref Range Status   04/29/2022 40.9 (A) >60 mL/min/1.73 m^2 Final     Comment:     Calculation used to obtain the estimated glomerular filtration  rate (eGFR) is the CKD-EPI equation.        BNP  @LABRCNTIP(BNP,BNPTRIAGEBLO)@  @LABRCNTIP(troponini)@  Estimated Creatinine Clearance: 35.1 mL/min (A) (based on SCr of 1.6 mg/dL (H)).  No results found in the last 24 hours.  No results found in the last 24 hours.  No results found in the last 24 hours.    Assessment:      1. Coronary artery disease of native artery of native heart with stable angina pectoris    2. Hx of CABG    3. Aortic valve disorder    4. Primary hypertension    5. Multiple myeloma not having achieved remission    6. Chronic diastolic congestive heart failure    7. Mixed hyperlipidemia    8. Bronchiectasis without complication    9. Type 2 diabetes mellitus with diabetic nephropathy, with long-term current use of insulin      SOB worsening   Plan:   D/c brilliant  Refill Albuterol inhaler  Check BNP BMP and CBC  Continue ASA Lasix imdur metoprolol ranexa statin for CAD  s/p CABG CHF and HTN  DM Rx per PCP  Pain control per oncology  Check Lipid profile in 6 months  Hitesh. Risk modification.   I have reviewed all pertinent labs and cardiac studies independently. Plans and recommendations have been formulated under my direct supervision. All questions answered and patient voiced understanding.   If symptoms persist go to the ED  RTC as needed

## 2022-04-29 ENCOUNTER — LAB VISIT (OUTPATIENT)
Dept: LAB | Facility: HOSPITAL | Age: 77
End: 2022-04-29
Attending: INTERNAL MEDICINE
Payer: MEDICARE

## 2022-04-29 DIAGNOSIS — Z95.1 HX OF CABG: ICD-10-CM

## 2022-04-29 DIAGNOSIS — I50.32 CHRONIC DIASTOLIC CONGESTIVE HEART FAILURE: Primary | ICD-10-CM

## 2022-04-29 DIAGNOSIS — I10 PRIMARY HYPERTENSION: ICD-10-CM

## 2022-04-29 DIAGNOSIS — I35.9 AORTIC VALVE DISORDER: ICD-10-CM

## 2022-04-29 DIAGNOSIS — I25.118 CORONARY ARTERY DISEASE OF NATIVE ARTERY OF NATIVE HEART WITH STABLE ANGINA PECTORIS: ICD-10-CM

## 2022-04-29 DIAGNOSIS — I25.118 CORONARY ARTERY DISEASE OF NATIVE ARTERY OF NATIVE HEART WITH STABLE ANGINA PECTORIS: Chronic | ICD-10-CM

## 2022-04-29 DIAGNOSIS — I10 ESSENTIAL HYPERTENSION: ICD-10-CM

## 2022-04-29 PROCEDURE — 83880 ASSAY OF NATRIURETIC PEPTIDE: CPT | Performed by: INTERNAL MEDICINE

## 2022-04-29 PROCEDURE — 36415 COLL VENOUS BLD VENIPUNCTURE: CPT | Performed by: INTERNAL MEDICINE

## 2022-04-29 PROCEDURE — 85025 COMPLETE CBC W/AUTO DIFF WBC: CPT | Performed by: INTERNAL MEDICINE

## 2022-04-29 PROCEDURE — 80048 BASIC METABOLIC PNL TOTAL CA: CPT | Performed by: INTERNAL MEDICINE

## 2022-04-29 NOTE — TELEPHONE ENCOUNTER
----- Message from Betzy Lacey sent at 4/29/2022 10:38 AM CDT -----  Contact: Cam/Pharmacy  Type:  Pharmacy Calling to Clarify an RX    Name of Caller:Cam  Pharmacy Name:  [id number] [address]  [address]  [address] [place] [address]  Phone: [phone] Fax: [phone]  Prescription Name:Isosorbide ER  What do they need to clarify?:refill is needed  Best Call Back Number:[phone]  Additional Information:

## 2022-04-30 LAB
ANION GAP SERPL CALC-SCNC: 11 MMOL/L (ref 8–16)
ANISOCYTOSIS BLD QL SMEAR: SLIGHT
BASOPHILS # BLD AUTO: 0.02 K/UL (ref 0–0.2)
BASOPHILS NFR BLD: 1 % (ref 0–1.9)
BNP SERPL-MCNC: 758 PG/ML (ref 0–99)
BUN SERPL-MCNC: 18 MG/DL (ref 8–23)
CALCIUM SERPL-MCNC: 9.2 MG/DL (ref 8.7–10.5)
CHLORIDE SERPL-SCNC: 102 MMOL/L (ref 95–110)
CO2 SERPL-SCNC: 26 MMOL/L (ref 23–29)
CREAT SERPL-MCNC: 1.6 MG/DL (ref 0.5–1.4)
DIFFERENTIAL METHOD: ABNORMAL
EOSINOPHIL # BLD AUTO: 0.1 K/UL (ref 0–0.5)
EOSINOPHIL NFR BLD: 2.9 % (ref 0–8)
ERYTHROCYTE [DISTWIDTH] IN BLOOD BY AUTOMATED COUNT: 14.9 % (ref 11.5–14.5)
EST. GFR  (AFRICAN AMERICAN): 47.3 ML/MIN/1.73 M^2
EST. GFR  (NON AFRICAN AMERICAN): 40.9 ML/MIN/1.73 M^2
GLUCOSE SERPL-MCNC: 227 MG/DL (ref 70–110)
HCT VFR BLD AUTO: 31.2 % (ref 40–54)
HGB BLD-MCNC: 9.8 G/DL (ref 14–18)
HYPOCHROMIA BLD QL SMEAR: ABNORMAL
IMM GRANULOCYTES # BLD AUTO: 0.04 K/UL (ref 0–0.04)
IMM GRANULOCYTES NFR BLD AUTO: 2 % (ref 0–0.5)
LYMPHOCYTES # BLD AUTO: 0.9 K/UL (ref 1–4.8)
LYMPHOCYTES NFR BLD: 42.9 % (ref 18–48)
MCH RBC QN AUTO: 33.9 PG (ref 27–31)
MCHC RBC AUTO-ENTMCNC: 31.4 G/DL (ref 32–36)
MCV RBC AUTO: 108 FL (ref 82–98)
MONOCYTES # BLD AUTO: 0.3 K/UL (ref 0.3–1)
MONOCYTES NFR BLD: 14.6 % (ref 4–15)
NEUTROPHILS # BLD AUTO: 0.8 K/UL (ref 1.8–7.7)
NEUTROPHILS NFR BLD: 36.6 % (ref 38–73)
NRBC BLD-RTO: 0 /100 WBC
OVALOCYTES BLD QL SMEAR: ABNORMAL
PLATELET # BLD AUTO: 116 K/UL (ref 150–450)
PLATELET BLD QL SMEAR: ABNORMAL
PMV BLD AUTO: ABNORMAL FL (ref 9.2–12.9)
POIKILOCYTOSIS BLD QL SMEAR: SLIGHT
POLYCHROMASIA BLD QL SMEAR: ABNORMAL
POTASSIUM SERPL-SCNC: 4.5 MMOL/L (ref 3.5–5.1)
RBC # BLD AUTO: 2.89 M/UL (ref 4.6–6.2)
SCHISTOCYTES BLD QL SMEAR: ABNORMAL
SODIUM SERPL-SCNC: 139 MMOL/L (ref 136–145)
TARGETS BLD QL SMEAR: ABNORMAL
WBC # BLD AUTO: 2.05 K/UL (ref 3.9–12.7)

## 2022-04-30 RX ORDER — FUROSEMIDE 20 MG/1
40 TABLET ORAL DAILY
Qty: 180 TABLET | Refills: 0 | Status: SHIPPED | OUTPATIENT
Start: 2022-04-30 | End: 2022-05-02 | Stop reason: SDUPTHER

## 2022-04-30 RX ORDER — ISOSORBIDE MONONITRATE 60 MG/1
TABLET, EXTENDED RELEASE ORAL
Qty: 180 TABLET | Refills: 2 | Status: SHIPPED | OUTPATIENT
Start: 2022-04-30

## 2022-05-02 ENCOUNTER — PATIENT MESSAGE (OUTPATIENT)
Dept: CARDIOLOGY | Facility: CLINIC | Age: 77
End: 2022-05-02
Payer: MEDICARE

## 2022-05-02 ENCOUNTER — TELEPHONE (OUTPATIENT)
Dept: CARDIOLOGY | Facility: CLINIC | Age: 77
End: 2022-05-02
Payer: MEDICARE

## 2022-05-02 RX ORDER — FUROSEMIDE 20 MG/1
40 TABLET ORAL DAILY
Qty: 180 TABLET | Refills: 3 | Status: SHIPPED | OUTPATIENT
Start: 2022-05-02 | End: 2022-08-28

## 2022-05-02 NOTE — TELEPHONE ENCOUNTER
Patient was notified of results. All questions were answered. Pt verbalized understanding. Pt will call back with any other questions or concerns.    Labs scheduled 5/16 at the [facility]  ----- Message from Max Tristan MD sent at 4/30/2022  8:20 PM CDT -----  The lab showed CHF  Increase lasix from 20 mg daily to 40 mg daily  Repeat BNP and BMP in 2 weeks

## 2022-05-02 NOTE — TELEPHONE ENCOUNTER
.LVM for patient to call the office regarding results.  ----- Message from Max Tristan MD sent at 4/30/2022  8:20 PM CDT -----  The lab showed CHF  Increase lasix from 20 mg daily to 40 mg daily  Repeat BNP and BMP in 2 weeks

## 2022-05-03 NOTE — TELEPHONE ENCOUNTER
LVM for daughter to let her know Dr. Tristan said it is ok for her father to fly back from [facility]----- Message from Ady De Anda sent at 5/3/2022  8:18 AM CDT -----  Contact: Juany/ Daughter  Zachary would like a call back at [phone], in regards to pt test results.

## 2022-05-04 ENCOUNTER — NURSE TRIAGE (OUTPATIENT)
Dept: ADMINISTRATIVE | Facility: CLINIC | Age: 77
End: 2022-05-04
Payer: MEDICARE

## 2022-05-05 NOTE — TELEPHONE ENCOUNTER
Patient's daughter calling regarding widespread, internal itching. Reports the patient does take opioids. Per protocol, advise to contact PCP within 24 hours. Verbalized understanding. Knows to call back with new or worsening symptoms.       Reason for Disposition   Taking prescription medication that could cause itching (e.g., codeine/morphine/other opiates, aspirin)    Additional Information   Negative: [1] Life-threatening reaction (anaphylaxis) in the past to similar substance (e.g., food, insect bite/sting, chemical, etc.) AND [2] < 2 hours since exposure   Negative: Difficulty breathing or wheezing   Negative: [1] Difficulty swallowing or slurred speech AND [2] sudden onset   Negative: Sounds like a life-threatening emergency to the triager   Negative: Patient sounds very sick or weak to the triager   Negative: [1] MODERATE-SEVERE widespread itching (i.e., interferes with sleep, normal activities or school) AND [2] not improved after 24 hours of itching Care Advice   Negative: [1] MODERATE-SEVERE widespread itching (i.e., interferes with sleep, normal activities or school) AND [2] pregnant    Protocols used: ITCHING - WIDESPREAD-A-

## 2022-05-10 ENCOUNTER — TELEPHONE (OUTPATIENT)
Dept: DIABETES | Facility: CLINIC | Age: 77
End: 2022-05-10
Payer: MEDICARE

## 2022-05-10 NOTE — TELEPHONE ENCOUNTER
Called [employer] Agent No answer LVM----- Message from Yoli Browne sent at 5/10/2022 10:46 AM CDT -----  Contact: [employer] nurse  .Type:  Needs Medical Advice    Who Called:  Almaz   Symptoms (please be specific):   How long has patient had these symptoms:   Pharmacy name and phone #:    Would the patient rather a call back or a response via [facility]?   Call    Best Call Back Number:   [phone] ext  [phone]   Additional Information:   Caller is requesting  a call back from the nurse in regards to there pt needing a new dexcom meter please

## 2022-05-16 DIAGNOSIS — Z79.4 TYPE 2 DIABETES MELLITUS WITHOUT COMPLICATION, WITH LONG-TERM CURRENT USE OF INSULIN: Chronic | ICD-10-CM

## 2022-05-16 DIAGNOSIS — Z79.4 TYPE 2 DIABETES MELLITUS WITH HYPERGLYCEMIA, WITH LONG-TERM CURRENT USE OF INSULIN: ICD-10-CM

## 2022-05-16 DIAGNOSIS — E11.65 TYPE 2 DIABETES MELLITUS WITH HYPERGLYCEMIA, WITH LONG-TERM CURRENT USE OF INSULIN: ICD-10-CM

## 2022-05-16 DIAGNOSIS — E11.9 TYPE 2 DIABETES MELLITUS WITHOUT COMPLICATION, WITH LONG-TERM CURRENT USE OF INSULIN: Chronic | ICD-10-CM

## 2022-05-16 DIAGNOSIS — Z79.52 LONG TERM CURRENT USE OF SYSTEMIC STEROIDS: ICD-10-CM

## 2022-05-16 DIAGNOSIS — I10 ESSENTIAL HYPERTENSION: Chronic | ICD-10-CM

## 2022-05-16 DIAGNOSIS — Z79.4 INSULIN LONG-TERM USE: Chronic | ICD-10-CM

## 2022-05-16 DIAGNOSIS — E78.5 HYPERLIPIDEMIA, UNSPECIFIED HYPERLIPIDEMIA TYPE: Chronic | ICD-10-CM

## 2022-05-17 RX ORDER — ROSUVASTATIN CALCIUM 40 MG/1
TABLET, COATED ORAL
Qty: 90 TABLET | Refills: 0 | Status: SHIPPED | OUTPATIENT
Start: 2022-05-17

## 2022-05-17 NOTE — TELEPHONE ENCOUNTER
Refill Routing Note   Medication(s) are not appropriate for processing by [facility] for the following reason(s):      - Patient has not been seen in over 15 months by PCP    [facility] action(s):  Defer          Medication reconciliation completed: No     Appointments  past 12m or future 3m with PCP    Date Provider   Last Visit   7/16/2019 Andrea Elkins MD   Next Visit   5/24/2022 Andrea Elkins MD   ED visits in past 90 days: 0        Note composed:8:44 PM 05/16/2022

## 2022-05-17 NOTE — TELEPHONE ENCOUNTER
No new care gaps identified.  [facility] Embedded Care Gaps. Reference number: [id number]. 5/16/2022   8:39:39 PM CDT

## 2022-05-30 DIAGNOSIS — Z79.4 TYPE 2 DIABETES MELLITUS WITH HYPERGLYCEMIA, WITH LONG-TERM CURRENT USE OF INSULIN: ICD-10-CM

## 2022-05-30 DIAGNOSIS — I10 ESSENTIAL HYPERTENSION: Chronic | ICD-10-CM

## 2022-05-30 DIAGNOSIS — Z79.4 INSULIN LONG-TERM USE: Chronic | ICD-10-CM

## 2022-05-30 DIAGNOSIS — E11.65 TYPE 2 DIABETES MELLITUS WITH HYPERGLYCEMIA, WITH LONG-TERM CURRENT USE OF INSULIN: ICD-10-CM

## 2022-05-30 DIAGNOSIS — E78.5 HYPERLIPIDEMIA, UNSPECIFIED HYPERLIPIDEMIA TYPE: Chronic | ICD-10-CM

## 2022-05-30 DIAGNOSIS — Z79.4 TYPE 2 DIABETES MELLITUS WITHOUT COMPLICATION, WITH LONG-TERM CURRENT USE OF INSULIN: Chronic | ICD-10-CM

## 2022-05-30 DIAGNOSIS — Z79.52 LONG TERM CURRENT USE OF SYSTEMIC STEROIDS: ICD-10-CM

## 2022-05-30 DIAGNOSIS — E11.9 TYPE 2 DIABETES MELLITUS WITHOUT COMPLICATION, WITH LONG-TERM CURRENT USE OF INSULIN: Chronic | ICD-10-CM

## 2022-05-30 NOTE — TELEPHONE ENCOUNTER
Care Due:                  Date            Visit Type   Department     Provider  --------------------------------------------------------------------------------    Last Visit: None Found      None         None Found  Next Visit: None Scheduled  None         None Found                                                            Last  Test          Frequency    Reason                     Performed    Due Date  --------------------------------------------------------------------------------    Office Visit  12 months..  pantoprazole,              Not Found    Overdue                             rosuvastatin.............    [employer] Embedded Care Gaps. Reference number: [id number]. 5/30/2022   2:08:44 PM CDT

## 2022-05-31 RX ORDER — ROSUVASTATIN CALCIUM 40 MG/1
40 TABLET, COATED ORAL NIGHTLY
Qty: 90 TABLET | Refills: 0 | OUTPATIENT
Start: 2022-05-31

## 2022-06-09 RX ORDER — OXYCODONE HYDROCHLORIDE 60 MG/1
60 TABLET, FILM COATED, EXTENDED RELEASE ORAL EVERY 12 HOURS
Qty: 60 TABLET | Refills: 0 | Status: SHIPPED | OUTPATIENT
Start: 2022-06-09 | End: 2022-07-07 | Stop reason: SDUPTHER

## 2022-06-09 RX ORDER — OXYCODONE HYDROCHLORIDE 20 MG/1
20 TABLET ORAL EVERY 6 HOURS PRN
Qty: 120 TABLET | Refills: 0 | Status: SHIPPED | OUTPATIENT
Start: 2022-06-09 | End: 2022-07-07 | Stop reason: SDUPTHER

## 2022-06-13 ENCOUNTER — HOSPITAL ENCOUNTER (OUTPATIENT)
Dept: RADIOLOGY | Facility: CLINIC | Age: 77
Discharge: HOME | End: 2022-06-13
Attending: PHYSICIAN ASSISTANT

## 2022-06-13 ENCOUNTER — OFFICE VISIT (OUTPATIENT)
Dept: URGENT CARE | Facility: CLINIC | Age: 77
End: 2022-06-13
Payer: MEDICARE

## 2022-06-13 VITALS
WEIGHT: 141 LBS | HEART RATE: 90 BPM | TEMPERATURE: 97 F | DIASTOLIC BLOOD PRESSURE: 53 MMHG | SYSTOLIC BLOOD PRESSURE: 112 MMHG | BODY MASS INDEX: 20.19 KG/M2 | RESPIRATION RATE: 18 BRPM | HEIGHT: 70 IN | OXYGEN SATURATION: 93 %

## 2022-06-13 DIAGNOSIS — J20.9 ACUTE BRONCHITIS, UNSPECIFIED ORGANISM: Primary | ICD-10-CM

## 2022-06-13 DIAGNOSIS — R05.9 COUGH: ICD-10-CM

## 2022-06-13 LAB
CTP QC/QA: YES
SARS-COV-2 RDRP RESP QL NAA+PROBE: NEGATIVE

## 2022-06-13 PROCEDURE — 1159F PR MEDICATION LIST DOCUMENTED IN MEDICAL RECORD: ICD-10-PCS | Mod: CPTII,S$GLB,, | Performed by: PHYSICIAN ASSISTANT

## 2022-06-13 PROCEDURE — 71046 X-RAY EXAM CHEST 2 VIEWS: CPT | Mod: S$GLB,,, | Performed by: RADIOLOGY

## 2022-06-13 PROCEDURE — U0002 COVID-19 LAB TEST NON-CDC: HCPCS | Mod: QW,S$GLB,, | Performed by: PHYSICIAN ASSISTANT

## 2022-06-13 PROCEDURE — 1126F PR PAIN SEVERITY QUANTIFIED, NO PAIN PRESENT: ICD-10-PCS | Mod: CPTII,S$GLB,, | Performed by: PHYSICIAN ASSISTANT

## 2022-06-13 PROCEDURE — 3074F PR MOST RECENT SYSTOLIC BLOOD PRESSURE < 130 MM HG: ICD-10-PCS | Mod: CPTII,S$GLB,, | Performed by: PHYSICIAN ASSISTANT

## 2022-06-13 PROCEDURE — 1126F AMNT PAIN NOTED NONE PRSNT: CPT | Mod: CPTII,S$GLB,, | Performed by: PHYSICIAN ASSISTANT

## 2022-06-13 PROCEDURE — 1159F MED LIST DOCD IN RCRD: CPT | Mod: CPTII,S$GLB,, | Performed by: PHYSICIAN ASSISTANT

## 2022-06-13 PROCEDURE — 3078F DIAST BP <80 MM HG: CPT | Mod: CPTII,S$GLB,, | Performed by: PHYSICIAN ASSISTANT

## 2022-06-13 PROCEDURE — 71046 XR CHEST PA AND LATERAL: ICD-10-PCS | Mod: S$GLB,,, | Performed by: RADIOLOGY

## 2022-06-13 PROCEDURE — 99214 PR OFFICE/OUTPT VISIT, EST, LEVL IV, 30-39 MIN: ICD-10-PCS | Mod: S$GLB,,, | Performed by: PHYSICIAN ASSISTANT

## 2022-06-13 PROCEDURE — 3078F PR MOST RECENT DIASTOLIC BLOOD PRESSURE < 80 MM HG: ICD-10-PCS | Mod: CPTII,S$GLB,, | Performed by: PHYSICIAN ASSISTANT

## 2022-06-13 PROCEDURE — U0002: ICD-10-PCS | Mod: QW,S$GLB,, | Performed by: PHYSICIAN ASSISTANT

## 2022-06-13 PROCEDURE — 3074F SYST BP LT 130 MM HG: CPT | Mod: CPTII,S$GLB,, | Performed by: PHYSICIAN ASSISTANT

## 2022-06-13 PROCEDURE — 1160F RVW MEDS BY RX/DR IN RCRD: CPT | Mod: CPTII,S$GLB,, | Performed by: PHYSICIAN ASSISTANT

## 2022-06-13 PROCEDURE — 99214 OFFICE O/P EST MOD 30 MIN: CPT | Mod: S$GLB,,, | Performed by: PHYSICIAN ASSISTANT

## 2022-06-13 PROCEDURE — 1160F PR REVIEW ALL MEDS BY PRESCRIBER/CLIN PHARMACIST DOCUMENTED: ICD-10-PCS | Mod: CPTII,S$GLB,, | Performed by: PHYSICIAN ASSISTANT

## 2022-06-13 RX ORDER — PREDNISONE 20 MG/1
20 TABLET ORAL DAILY
Qty: 5 TABLET | Refills: 0 | Status: SHIPPED | OUTPATIENT
Start: 2022-06-13 | End: 2022-06-18

## 2022-06-13 RX ORDER — DOXYCYCLINE 100 MG/1
100 CAPSULE ORAL EVERY 12 HOURS
Qty: 14 CAPSULE | Refills: 0 | Status: SHIPPED | OUTPATIENT
Start: 2022-06-13 | End: 2022-06-20

## 2022-06-13 RX ORDER — ALBUTEROL SULFATE 90 UG/1
1-2 AEROSOL, METERED RESPIRATORY (INHALATION) EVERY 4 HOURS PRN
Qty: 8 G | Refills: 0 | Status: SHIPPED | OUTPATIENT
Start: 2022-06-13 | End: 2022-06-23

## 2022-06-13 NOTE — PROGRESS NOTES
"Subjective:       Patient ID: Familia Durbin Jr. is a 76 y.o. male.    Vitals:  height is 5' 10" (1.778 m) and weight is 64 kg (141 lb). His tympanic temperature is 96.8 °F (36 °C). His blood pressure is 112/53 (abnormal) and his pulse is 90. His respiration is 18 and oxygen saturation is 93% (abnormal).     Chief Complaint: Sinus Problem    Patient presents with cough, sore throat, runny nose, sob, headache for a few days.  Denies fever, body aches, or chills.  No known sick contacts.  Patient is difficult of hearing so hard to get a good HPI today.      Sinus Problem  This is a new problem. The current episode started 1 to 4 weeks ago (2). The problem has been gradually worsening since onset. There has been no fever. Associated symptoms include congestion, coughing, headaches, shortness of breath, sneezing and a sore throat. Pertinent negatives include no chills, diaphoresis, ear pain, hoarse voice, neck pain, sinus pressure or swollen glands. Past treatments include nothing.       Constitution: Negative for chills and sweating.   HENT: Positive for congestion and sore throat. Negative for ear pain and sinus pressure.    Neck: Negative for neck pain.   Respiratory: Positive for cough and shortness of breath.    Allergic/Immunologic: Positive for sneezing.   Neurological: Positive for headaches.       Objective:      Physical Exam   Constitutional: He is oriented to person, place, and time. He appears well-developed. He is cooperative.  Non-toxic appearance. He does not appear ill. No distress.   HENT:   Head: Normocephalic and atraumatic.   Ears:   Right Ear: Hearing, tympanic membrane, external ear and ear canal normal.   Left Ear: Hearing, tympanic membrane, external ear and ear canal normal.   Nose: Nose normal. No mucosal edema, rhinorrhea or nasal deformity. No epistaxis. Right sinus exhibits no maxillary sinus tenderness and no frontal sinus tenderness. Left sinus exhibits no maxillary sinus tenderness and " no frontal sinus tenderness.   Mouth/Throat: Uvula is midline, oropharynx is clear and moist and mucous membranes are normal. No trismus in the jaw. Normal dentition. No uvula swelling. No oropharyngeal exudate, posterior oropharyngeal edema or posterior oropharyngeal erythema.   Eyes: Conjunctivae and lids are normal. No scleral icterus.   Neck: Trachea normal and phonation normal. Neck supple. No edema present. No erythema present. No neck rigidity present.   Cardiovascular: Normal rate, regular rhythm, normal heart sounds and normal pulses.   Pulmonary/Chest: Effort normal. No respiratory distress. He has decreased breath sounds in the right middle field, the right lower field, the left middle field and the left lower field. He has wheezes in the right middle field, the right lower field, the left middle field and the left lower field. He has no rhonchi.   Abdominal: Normal appearance.   Musculoskeletal: Normal range of motion.         General: No deformity. Normal range of motion.   Neurological: He is alert and oriented to person, place, and time. He exhibits normal muscle tone. Coordination normal.   Skin: Skin is warm, dry, intact, not diaphoretic and not pale.   Psychiatric: His speech is normal and behavior is normal. Judgment and thought content normal.   Nursing note and vitals reviewed.        Assessment:       1. Acute bronchitis, unspecified organism    2. Cough          Plan:         Acute bronchitis, unspecified organism  -     predniSONE (DELTASONE) 20 MG tablet; Take 1 tablet (20 mg total) by mouth once daily. for 5 days  Dispense: 5 tablet; Refill: 0  -     albuterol (PROVENTIL/VENTOLIN HFA) 90 mcg/actuation inhaler; Inhale 1-2 puffs into the lungs every 4 (four) hours as needed for Wheezing or Shortness of Breath (cough).  Dispense: 8 g; Refill: 0  -     doxycycline (VIBRAMYCIN) 100 MG Cap; Take 1 capsule (100 mg total) by mouth every 12 (twelve) hours. for 7 days  Dispense: 14 capsule; Refill:  0    Cough  -     POCT COVID-19 Rapid Screening  -     XR CHEST PA AND LATERAL; Future; Expected date: 06/13/2022         Results for orders placed or performed in visit on 06/13/22   POCT COVID-19 Rapid Screening   Result Value Ref Range    POC Rapid COVID Negative Negative     Acceptable Yes      *Note: Due to a large number of results and/or encounters for the requested time period, some results have not been displayed. A complete set of results can be found in Results Review.     XR CHEST PA AND LATERAL    Result Date: 6/13/2022  EXAMINATION: XR CHEST PA AND LATERAL CLINICAL HISTORY: Cough, unspecified TECHNIQUE: PA and lateral views of the chest were performed. COMPARISON: 11/24/2020 FINDINGS: There appear to be some chronic appearing interstitial opacities within the bilateral lower lung zones.  Fibrotic changes are also with associated bronchiectatic changes.  The heart is mildly enlarged.  There is evidence for prior median sternotomy.     1.  No acute cardiopulmonary process. Electronically signed by: Rad Quick DO Date:    06/13/2022 Time:    14:57      Increase fluids.  Get plenty of rest.   Normal saline nasal wash to irrigate sinuses and for congestion/runny nose.  Cool mist humidifier/vaporizer.  Practice good handwashing.  Mucinex for cough and chest congestion.  Tylenol or Ibuprofen for fever, headache and body aches.  Warm salt water gargles for throat comfort.  Chloraseptic spray or lozenges for throat comfort.  See PCP or go to ER if symptoms worsen or fail to improve with treatment.

## 2022-06-14 ENCOUNTER — OFFICE VISIT (OUTPATIENT)
Dept: PALLIATIVE MEDICINE | Facility: CLINIC | Age: 77
End: 2022-06-14
Payer: MEDICARE

## 2022-06-14 VITALS
TEMPERATURE: 98 F | WEIGHT: 143.5 LBS | HEIGHT: 70 IN | BODY MASS INDEX: 20.54 KG/M2 | RESPIRATION RATE: 18 BRPM | SYSTOLIC BLOOD PRESSURE: 115 MMHG | HEART RATE: 65 BPM | DIASTOLIC BLOOD PRESSURE: 56 MMHG

## 2022-06-14 DIAGNOSIS — G89.3 NEOPLASM RELATED PAIN: ICD-10-CM

## 2022-06-14 DIAGNOSIS — Z51.5 ENCOUNTER FOR PALLIATIVE CARE: Primary | ICD-10-CM

## 2022-06-14 DIAGNOSIS — R06.09 DYSPNEA ON EXERTION: ICD-10-CM

## 2022-06-14 DIAGNOSIS — C90.00 MULTIPLE MYELOMA, REMISSION STATUS UNSPECIFIED: ICD-10-CM

## 2022-06-14 PROCEDURE — 1125F PR PAIN SEVERITY QUANTIFIED, PAIN PRESENT: ICD-10-PCS | Mod: CPTII,S$GLB,, | Performed by: NURSE PRACTITIONER

## 2022-06-14 PROCEDURE — 1101F PT FALLS ASSESS-DOCD LE1/YR: CPT | Mod: CPTII,S$GLB,, | Performed by: NURSE PRACTITIONER

## 2022-06-14 PROCEDURE — 1125F AMNT PAIN NOTED PAIN PRSNT: CPT | Mod: CPTII,S$GLB,, | Performed by: NURSE PRACTITIONER

## 2022-06-14 PROCEDURE — 1159F PR MEDICATION LIST DOCUMENTED IN MEDICAL RECORD: ICD-10-PCS | Mod: CPTII,S$GLB,, | Performed by: NURSE PRACTITIONER

## 2022-06-14 PROCEDURE — 3078F PR MOST RECENT DIASTOLIC BLOOD PRESSURE < 80 MM HG: ICD-10-PCS | Mod: CPTII,S$GLB,, | Performed by: NURSE PRACTITIONER

## 2022-06-14 PROCEDURE — 1101F PR PT FALLS ASSESS DOC 0-1 FALLS W/OUT INJ PAST YR: ICD-10-PCS | Mod: CPTII,S$GLB,, | Performed by: NURSE PRACTITIONER

## 2022-06-14 PROCEDURE — 99999 PR PBB SHADOW E&M-EST. PATIENT-LVL V: ICD-10-PCS | Mod: PBBFAC,,, | Performed by: NURSE PRACTITIONER

## 2022-06-14 PROCEDURE — 3078F DIAST BP <80 MM HG: CPT | Mod: CPTII,S$GLB,, | Performed by: NURSE PRACTITIONER

## 2022-06-14 PROCEDURE — 1159F MED LIST DOCD IN RCRD: CPT | Mod: CPTII,S$GLB,, | Performed by: NURSE PRACTITIONER

## 2022-06-14 PROCEDURE — 99215 OFFICE O/P EST HI 40 MIN: CPT | Mod: S$GLB,,, | Performed by: NURSE PRACTITIONER

## 2022-06-14 PROCEDURE — 3288F FALL RISK ASSESSMENT DOCD: CPT | Mod: CPTII,S$GLB,, | Performed by: NURSE PRACTITIONER

## 2022-06-14 PROCEDURE — 3074F SYST BP LT 130 MM HG: CPT | Mod: CPTII,S$GLB,, | Performed by: NURSE PRACTITIONER

## 2022-06-14 PROCEDURE — 99215 PR OFFICE/OUTPT VISIT, EST, LEVL V, 40-54 MIN: ICD-10-PCS | Mod: S$GLB,,, | Performed by: NURSE PRACTITIONER

## 2022-06-14 PROCEDURE — 99999 PR PBB SHADOW E&M-EST. PATIENT-LVL V: CPT | Mod: PBBFAC,,, | Performed by: NURSE PRACTITIONER

## 2022-06-14 PROCEDURE — 1123F PR ADV CARE PLAN DISCUSSED, PLAN OR SURROGATE DOCUMENTED: ICD-10-PCS | Mod: CPTII,S$GLB,, | Performed by: NURSE PRACTITIONER

## 2022-06-14 PROCEDURE — 3074F PR MOST RECENT SYSTOLIC BLOOD PRESSURE < 130 MM HG: ICD-10-PCS | Mod: CPTII,S$GLB,, | Performed by: NURSE PRACTITIONER

## 2022-06-14 PROCEDURE — 3288F PR FALLS RISK ASSESSMENT DOCUMENTED: ICD-10-PCS | Mod: CPTII,S$GLB,, | Performed by: NURSE PRACTITIONER

## 2022-06-14 PROCEDURE — 1123F ACP DISCUSS/DSCN MKR DOCD: CPT | Mod: CPTII,S$GLB,, | Performed by: NURSE PRACTITIONER

## 2022-06-14 NOTE — PROGRESS NOTES
Progress Note  Palliative Care      Consult Requested By:  Dr. Solo, oncology  Reason for Consult: ACP and symptom management      ASSESSMENT/PLAN:     Plan/Recommendations:  Diagnoses and all orders for this visit:    Encounter for palliative care   -patient is decisional   -patient is alone today   -patient comes to appointment in wheelchair due to his dyspnea-drives himself   -philosophy of palliative medicine reviewed with patient and family at initial visit   -reviewed importance of ACP documents   -daughter reports she will bring to next visit or upload in the system through my chart   -code status not specifically addressed today   -will plan to follow up at future encounters about code status    Multiple myeloma, not in remission   -patient primary oncologist at [facility] in [place]   -Dr. Rock Lacey at [phone]   -followed locally at one time by Dr. Solo, last visit 2020   -medical record release signed; requested records from his oncologist in [place] (scanned in chart)   -patient is followed every 6 weeks   -s/p recent bone marrow disease-stable disease   -continues on pomalyst     Neoplasm related pain   -patient reports pain is controlled on current regimen; although he is mostly sedentary   -continue oxycontin Er 60mg PO BID    -continue oxyIR 20mg Q 6 hrs prn breakthrough pain   -pain is worse with any movement or activity   -saw pain management in [place]: recommend MRI of thoracic/lumbar spine to re-evaluate   -bowel regimen to prevent OIC    Metastatic bone cancer   -see above    Dyspnea   -patient with worsening dyspnea   -followed by cardiology   -currently on medications for bronchitis: doxycycline and nebs   -chest xray at urgent care abnormal: refer to pulmonology   -eval for oxygen      Understanding of illness/Prognosis:  Fair insight; seems to be declining; need to understanding prognosis; records requested from [place]    Goals of care:   "Life-prolonging and quality of life    Follow up: 10 weeks      SUBJECTIVE:     History of Present Illness:  Patient is a 76 y.o. year old male presenting for follow with palliative medicine.  Patient has a history of metastatic multiple myeloma/ vertebral bone mets.  He previously followed by Dr. Solo (last visit 11/2020) with his primary oncologist at [facility] in [place], [place].  I have limited records but patient tells me he on a maintenance regimen of 3 weeks on/1 week off for treatment of his multiple myeloma. (?Revlamid).  He also tells me he see his oncologist next month in [place].    Although patient is new to me, he has been seen by palliative medicine.  He had a home-based referral but declined.  Today, he cannot recall palliative medicine and its philosophy so this was reviewed with both him and his daughter. Patient tells me he is mostly sedentary due to his pain.  He reports he hurts "all over" but especially painful with walking.  He feels his pain in controlled on his current pain regimen and rates his pain a "3" out of 10 during our visit today.  He is on a bowel regimen with last BM yesterday.  Otherwise, seems to be doing okay.    01/06/2022  Patient had ED visit in December due to N/V and low blood sugar.    Patient tells me he is doing okay today.  Pain is controlled on current regimen.  His biggest issue is fatigue and decreased appetite.  He is ambulatory and still able to perform his own ADLs.  He lives independently.  He is due for follow up with oncology in a couple of weeks.  I will try to connect with his team at [facility] in [place], [place] to get details on his disease and treatment plan.  When I asked patient today if his disease was stable, he response was "I wouldn't say that but I am living with it".  It is difficult to discuss ACP and goals of care without input from oncology.  I reminded patient to bring copy of living will to next visit.  Overall, " he is doing fairly well with no new issues.      04/08/2022  Patient here for routine follow up.  He is in good spirits but is also easily fatigue with worsening shortness of breath. I found he is dyspneic with talking at times.  He is requiring a wheelchair for longer distances secondary to both pain and shortness of breath.  Staff working on motorCoreTraceooter for mobility.  Patient tells me he has an upcoming follow up with cancer doctor and his interval for follow up have been increased.  Plans for bone marrow biopsy at next visit.  This will be important information to know to help with goals of care, especially given his functional decline.  I did call his daughter to express my concerns, especially as it relates to his shortness of breath.  I recommended follow up with cardiology due to history of CHF or ED if worse.  She verbalized understanding.  Otherwise, I did not make any changes in his medications.  He feels his pain is tolerable at his current dose.      Interval History  06/14/2022  Patient is doing about the same.  Pain is mostly controlled on current regimen.  He is hesitant to make any changes.  I reviewed records from his primary oncologist.  Appears patient with stable disease and continues on treatment.  His biggest issue of late is his persistent dyspnea.  He cannot walk short distances without getting short of breath.  He went to urgent care yesterday and diagnosed with bronchitis.  He was given steroids, antibiotics and inhaler.  He says inhaler helps the most.  His dyspnea has been present for several months now and after review of chest xray, we discussed referral to pulmonology. His dyspnea is disabling and he may meet criteria for oxygen.  Otherwise, no major changes.  We will continue to follow and support.    HIGINIO LAMA reviewed and summarized:          Past Medical History:   Diagnosis Date    CAD (coronary artery disease)     tyree    Diabetes mellitus, type 2 1979    eye   "rocha    Hearing impaired     Hyperlipidemia     Hypertension     Lupus     Discoid    Multiple myeloma     Old MI (myocardial infarction) 2012    Renal insufficiency     Tracheostomy tube present      Past Surgical History:   Procedure Laterality Date    CARDIAC SURGERY      CATARACT EXTRACTION      COLONOSCOPY      CORONARY ARTERY BYPASS GRAFT      HAND SURGERY      KNEE SURGERY      TRACHEOSTOMY TUBE PLACEMENT      WRIST FUSION       Family History   Problem Relation Age of Onset    Diabetes Mother     Diabetes Father     Prostate cancer Father     Pancreatic cancer Sister     No Known Problems Brother     Diabetes Maternal Uncle     Diabetes Maternal Grandmother     No Known Problems Maternal Grandfather     No Known Problems Paternal Grandmother     No Known Problems Paternal Grandfather      Review of patient's allergies indicates:   Allergen Reactions    Cephalexin      Pt tolerated cefepime 11/4/2020    Fentanyl Nausea And Vomiting    Nsaids (non-steroidal anti-inflammatory drug)      Renal insuf       Medications:    Current Outpatient Medications:     albuterol (PROVENTIL/VENTOLIN HFA) 90 mcg/actuation inhaler, Inhale 2 puffs into the lungs every 6 (six) hours as needed for Wheezing. Rescue, Disp: 8.5 g, Rfl: 5    albuterol (PROVENTIL/VENTOLIN HFA) 90 mcg/actuation inhaler, Inhale 1-2 puffs into the lungs every 4 (four) hours as needed for Wheezing or Shortness of Breath (cough)., Disp: 8 g, Rfl: 0    aspirin 81 MG Chew, Take 1 tablet (81 mg total) by mouth once daily., Disp: 100 tablet, Rfl: 12    azelastine (ASTELIN) 137 mcg (0.1 %) nasal spray, 2 sprays (274 mcg total) by Nasal route 2 (two) times daily., Disp: 30 mL, Rfl: 11    BD ULTRA-FINE DAVE PEN NEEDLE 32 gauge x 5/32" Ndle, USE  4 TIMES DAILY WITH MEALS AND AT BEDTIME, Disp: 400 each, Rfl: 3    blood sugar diagnostic Strp, Use 1 strip 4 (four) times daily., Disp: 300 each, Rfl: 0    blood-glucose meter " kit, Use as instructed, Disp: 1 each, Rfl: 0    cholecalciferol, vitamin D3, 5,000 unit Tab, Take 5,000 Units by mouth once daily., Disp: , Rfl:     DOCOSAHEXANOIC ACID ORAL, Take 1 capsule by mouth once daily., Disp: , Rfl:     docusate sodium (COLACE) 100 MG capsule, Take 100 mg by mouth 2 (two) times daily., Disp: , Rfl:     doxycycline (VIBRAMYCIN) 100 MG Cap, Take 1 capsule (100 mg total) by mouth every 12 (twelve) hours. for 7 days, Disp: 14 capsule, Rfl: 0    flaxseed oil 1,000 mg Cap, Take 1 capsule by mouth daily as needed. , Disp: , Rfl:     furosemide (LASIX) 20 MG tablet, Take 2 tablets (40 mg total) by mouth once daily., Disp: 180 tablet, Rfl: 3    ginger root (GINGER, ZINGIBER OFFICINALIS,) 550 mg Cap, Take 1 capsule by mouth., Disp: , Rfl:     insulin (LANTUS SOLOSTAR U-100 INSULIN) glargine 100 units/mL (3mL) SubQ pen, Inject 10 Units into the skin every evening., Disp: 15 mL, Rfl: 0    isosorbide mononitrate (IMDUR) 60 MG 24 hr tablet, Take 1 tablet by mouth twice daily, Disp: 180 tablet, Rfl: 2    lactulose (CEPHULAC) 20 gram Pack, Mix and take 1 packet (20 g total) by mouth 3 (three) times daily. (Patient taking differently: Take 20 g by mouth 3 (three) times daily as needed.), Disp: 90 packet, Rfl: 3    lactulose (CHRONULAC) 10 gram/15 mL solution, Take 30 mL by mouth twice daily as needed for constipation., Disp: 473 mL, Rfl: 3    metoprolol tartrate (LOPRESSOR) 50 MG tablet, Take 1 tablet by mouth twice daily, Disp: 180 tablet, Rfl: 4    multivit-min-FA-lycopen-lutein 300-600-300 mcg Tab, Take 1 tablet by mouth., Disp: , Rfl:     nitroGLYCERIN (NITROSTAT) 0.4 MG SL tablet, Place 1 tablet (0.4 mg total) under the tongue every 5 (five) minutes as needed for Chest pain., Disp: 50 tablet, Rfl: 12    NOVOLOG FLEXPEN U-100 INSULIN 100 unit/mL (3 mL) InPn pen, Inject 15 Units into the skin 3 (three) times daily with meals., Disp: 30 mL, Rfl: 0    omega 3-dha-epa-fish oil 300-1,000 mg  Cap, Take by mouth., Disp: , Rfl:     ondansetron (ZOFRAN) 4 MG tablet, Take 1 tablet (4 mg total) by mouth every 8 (eight) hours as needed for Nausea., Disp: 40 tablet, Rfl: 11    ondansetron (ZOFRAN-ODT) 4 MG TbDL, Dissolve 1 tab under the tongue every 4-6 hours as needed for nausea., Disp: 120 tablet, Rfl: 0    oxyCODONE (OXYCONTIN) 60 mg TR12 12 hr tablet, Take 1 tablet (60 mg total) by mouth every 12 (twelve) hours., Disp: 60 tablet, Rfl: 0    oxyCODONE (ROXICODONE) 20 mg Tab immediate release tablet, Take 1 tablet (20 mg total) by mouth every 6 (six) hours as needed (breakthrough pain)., Disp: 120 tablet, Rfl: 0    pantoprazole (PROTONIX) 40 MG tablet, Take 1 tablet (40 mg total) by mouth once daily., Disp: 30 tablet, Rfl: 11    polyethylene glycol (GLYCOLAX) 17 gram PwPk, Take 17 g by mouth once daily., Disp: 100 each, Rfl: 1    pomalidomide 3 mg Cap, Take 3 mg by mouth 3 mg daily x 21 days, then off for 7 days, then repeat.., Disp: , Rfl:     potassium chloride SA (K-DUR,KLOR-CON) 20 MEQ tablet, TAKE 1 TABLET BY MOUTH ON MONDAY, WEDNESDAY AND FRIDAY., Disp: 35 tablet, Rfl: 3    predniSONE (DELTASONE) 20 MG tablet, Take 1 tablet (20 mg total) by mouth once daily. for 5 days, Disp: 5 tablet, Rfl: 0    prochlorperazine (COMPAZINE) 5 MG tablet, Take 1 tablet (5 mg total) by mouth 4 (four) times daily as needed for Nausea., Disp: 40 tablet, Rfl: 2    ranolazine (RANEXA) 1,000 mg Tb12, Take 1 tablet (1,000 mg total) by mouth 2 (two) times daily., Disp: 60 tablet, Rfl: 6    rosuvastatin (CRESTOR) 40 MG Tab, TAKE 1 TABLET BY MOUTH ONCE DAILY IN THE EVENING, Disp: 90 tablet, Rfl: 0    sennosides 25 mg Tab, Take 1 tablet by mouth 2 (two) times a day., Disp: 60 each, Rfl: 11    triamcinolone acetonide 0.1% (KENALOG) 0.1 % cream, Apply topically 2 (two) times daily. For two weeks, Disp: 28.4 g, Rfl: 1    TRUEPLUS LANCETS 33 gauge Misc, USE   TO CHECK GLUCOSE SIX TIMES DAILY, Disp: 100 each, Rfl: 11     VITAMIN B COMPLEX ORAL, Take 1 capsule by mouth., Disp: , Rfl:     albuterol-ipratropium (DUO-NEB) 2.5 mg-0.5 mg/3 mL nebulizer solution, Take 3 mLs by nebulization every 8 (eight) hours. For shortness of breath and wheezing, Disp: 270 mL, Rfl: 0    OBJECTIVE:       ROS:  Review of Systems   Constitutional: Positive for activity change, appetite change and fatigue. Negative for fever.   HENT: Positive for hearing loss. Negative for congestion.    Eyes: Negative for visual disturbance.   Respiratory: Positive for shortness of breath (with exertion or activity). Negative for cough.    Cardiovascular: Negative for chest pain.   Gastrointestinal: Negative for constipation, diarrhea and nausea.   Endocrine: Positive for cold intolerance.   Genitourinary: Negative for difficulty urinating and frequency.   Musculoskeletal: Positive for arthralgias, back pain and gait problem.   Skin: Negative.    Allergic/Immunologic: Positive for immunocompromised state.   Neurological: Positive for weakness. Negative for seizures and speech difficulty.   Psychiatric/Behavioral: Negative for confusion and decreased concentration. The patient is not nervous/anxious.        Review of Symptoms    Symptom Assessment (ESAS 0-10 Scale)  Pain:  10  Dyspnea:  2  Anxiety:  0  Nausea:  2  Depression:  0  Anorexia:  3  Fatigue:  5  Insomnia:  0  Restlessness:  0  Agitation:  0 due to Other     CAM / Delirium:  Negative  Constipation:  Negative  Diarrhea:  Negative    Bowel Management Plan (BMP):  Yes      Location Choices: generalized, but mostly back/right side.      ECOG Performance Status rdGrdrrdarddrderd:rd rd3rd Living Arrangements:  Lives alone and Lives in home    Psychosocial/Cultural: Daughter is involved is his care      Advance Care Planning   Advance Directives:   Living Will: Yes        Copy on chart: No    Medical Power of : Yes      Decision Making:  Patient answered questions              Physical Exam:  Vitals: Temp: 97.5 °F (36.4 °C)  (06/14/22 1452)  Pulse: 65 (06/14/22 1452)  Resp: 18 (06/14/22 1452)  BP: (!) 115/56 (06/14/22 1452)  Physical Exam  Constitutional:       General: He is not in acute distress.     Appearance: He is ill-appearing (chronically). He is not toxic-appearing.      Comments: thin   HENT:      Head: Normocephalic and atraumatic.      Nose: No congestion.      Mouth/Throat:      Mouth: Mucous membranes are dry.      Pharynx: Oropharynx is clear.   Eyes:      Extraocular Movements: Extraocular movements intact.      Pupils: Pupils are equal, round, and reactive to light.   Cardiovascular:      Rate and Rhythm: Normal rate.   Pulmonary:      Comments: Short of breath with minimal activity  Abdominal:      General: There is no distension.      Palpations: Abdomen is soft.   Musculoskeletal:         General: No swelling.      Cervical back: Neck supple.      Comments: SOLANO   Skin:     General: Skin is warm and dry.   Neurological:      Mental Status: He is oriented to person, place, and time.      Motor: Weakness present.      Gait: Gait abnormal.   Psychiatric:         Mood and Affect: Mood normal.         Behavior: Behavior normal.         Thought Content: Thought content normal.         Judgment: Judgment normal.         Labs:  CBC:   WBC   Date Value Ref Range Status   04/29/2022 2.05 (L) 3.90 - 12.70 K/uL Final     Hemoglobin   Date Value Ref Range Status   04/29/2022 9.8 (L) 14.0 - 18.0 g/dL Final     Hematocrit   Date Value Ref Range Status   04/29/2022 31.2 (L) 40.0 - 54.0 % Final     MCV   Date Value Ref Range Status   04/29/2022 108 (H) 82 - 98 fL Final     Platelets   Date Value Ref Range Status   04/29/2022 116 (L) 150 - 450 K/uL Final       LFT:   Lab Results   Component Value Date    AST 15 02/02/2022    ALKPHOS 73 02/02/2022    BILITOT 0.7 02/02/2022       Albumin:   Albumin   Date Value Ref Range Status   02/02/2022 3.8 3.5 - 5.2 g/dL Final     Protein:   Total Protein   Date Value Ref Range Status   02/02/2022  8.4 6.0 - 8.4 g/dL Final         45 minutes of total time spent on the encounter, which includes face to face time and non-face to face time preparing to see the patient (eg, review of tests), Obtaining and/or reviewing separately obtained history, Documenting clinical information in the electronic or other health record, Independently interpreting results (not separately reported) and communicating results to the patient/family/caregiver, or Care coordination (not separately reported).      Signature: Tammy Villa NP

## 2022-06-16 ENCOUNTER — TELEPHONE (OUTPATIENT)
Dept: URGENT CARE | Facility: CLINIC | Age: 77
End: 2022-06-16
Payer: MEDICARE

## 2022-06-16 NOTE — TELEPHONE ENCOUNTER
Attempted to contact patient as courtesy call from recent Urgent Care visit on 06.13.2022, patient did not answer, left voicemail. /[email or web address]/

## 2022-06-21 ENCOUNTER — TELEPHONE (OUTPATIENT)
Dept: PULMONOLOGY | Facility: CLINIC | Age: 77
End: 2022-06-21
Payer: MEDICARE

## 2022-06-21 NOTE — TELEPHONE ENCOUNTER
----- Message from Kayleen Delaney LPN sent at 6/20/2022  4:18 PM CDT -----  Regarding: sooner apt  Hi is there any one in pulmonary care can see Mr Barbosa prior to Aug. Please he is one of our cancer pt, seem to have increased SOB, please see red [place] Note     Please call pt or his daughter with an apt.     Thank you

## 2022-06-22 ENCOUNTER — TELEPHONE (OUTPATIENT)
Dept: PALLIATIVE MEDICINE | Facility: CLINIC | Age: 77
End: 2022-06-22
Payer: MEDICARE

## 2022-06-22 NOTE — TELEPHONE ENCOUNTER
Nurse reached out to pt regarding his pulmonary apt was at 9am today, pt daughter stated he was out of town but is back now, nurse explained apt may not be available for a month out, pt's daughter stated that's fine just reschedule him, nurse was ankita to get pt in for tomorrow at 9am with Dr Zelaya at the [facility], daughter notified and agreed to apt date/time/location. She will have her father discuss his severe SOB and request an order for a motorized scooter.

## 2022-06-23 ENCOUNTER — OFFICE VISIT (OUTPATIENT)
Dept: PULMONOLOGY | Facility: CLINIC | Age: 77
End: 2022-06-23
Payer: MEDICARE

## 2022-06-23 ENCOUNTER — CLINICAL SUPPORT (OUTPATIENT)
Dept: PULMONOLOGY | Facility: CLINIC | Age: 77
End: 2022-06-23
Payer: MEDICARE

## 2022-06-23 VITALS
WEIGHT: 140.63 LBS | BODY MASS INDEX: 20.13 KG/M2 | OXYGEN SATURATION: 96 % | HEART RATE: 50 BPM | DIASTOLIC BLOOD PRESSURE: 62 MMHG | SYSTOLIC BLOOD PRESSURE: 100 MMHG | RESPIRATION RATE: 18 BRPM | HEIGHT: 70 IN

## 2022-06-23 DIAGNOSIS — R09.02 EXERCISE HYPOXEMIA: ICD-10-CM

## 2022-06-23 DIAGNOSIS — J47.9 BRONCHIECTASIS WITHOUT COMPLICATION: Primary | ICD-10-CM

## 2022-06-23 DIAGNOSIS — R06.09 DYSPNEA ON EXERTION: ICD-10-CM

## 2022-06-23 DIAGNOSIS — J47.9 BRONCHIECTASIS WITHOUT COMPLICATION: ICD-10-CM

## 2022-06-23 DIAGNOSIS — J47.1 BRONCHIECTASIS WITH (ACUTE) EXACERBATION: ICD-10-CM

## 2022-06-23 DIAGNOSIS — G47.34 NOCTURNAL HYPOXEMIA: ICD-10-CM

## 2022-06-23 DIAGNOSIS — J20.9 ACUTE BRONCHITIS, UNSPECIFIED ORGANISM: ICD-10-CM

## 2022-06-23 PROCEDURE — 99999 PR PBB SHADOW E&M-EST. PATIENT-LVL I: CPT | Mod: PBBFAC,,,

## 2022-06-23 PROCEDURE — 99999 PR PBB SHADOW E&M-EST. PATIENT-LVL I: ICD-10-PCS | Mod: PBBFAC,,,

## 2022-06-23 PROCEDURE — 99215 PR OFFICE/OUTPT VISIT, EST, LEVL V, 40-54 MIN: ICD-10-PCS | Mod: 25,S$GLB,, | Performed by: INTERNAL MEDICINE

## 2022-06-23 PROCEDURE — 99999 PR PBB SHADOW E&M-EST. PATIENT-LVL V: ICD-10-PCS | Mod: PBBFAC,,, | Performed by: INTERNAL MEDICINE

## 2022-06-23 PROCEDURE — 99999 PR PBB SHADOW E&M-EST. PATIENT-LVL V: CPT | Mod: PBBFAC,,, | Performed by: INTERNAL MEDICINE

## 2022-06-23 PROCEDURE — 1160F PR REVIEW ALL MEDS BY PRESCRIBER/CLIN PHARMACIST DOCUMENTED: ICD-10-PCS | Mod: CPTII,S$GLB,, | Performed by: INTERNAL MEDICINE

## 2022-06-23 PROCEDURE — 3078F DIAST BP <80 MM HG: CPT | Mod: CPTII,S$GLB,, | Performed by: INTERNAL MEDICINE

## 2022-06-23 PROCEDURE — 3074F PR MOST RECENT SYSTOLIC BLOOD PRESSURE < 130 MM HG: ICD-10-PCS | Mod: CPTII,S$GLB,, | Performed by: INTERNAL MEDICINE

## 2022-06-23 PROCEDURE — 1101F PT FALLS ASSESS-DOCD LE1/YR: CPT | Mod: CPTII,S$GLB,, | Performed by: INTERNAL MEDICINE

## 2022-06-23 PROCEDURE — 1160F RVW MEDS BY RX/DR IN RCRD: CPT | Mod: CPTII,S$GLB,, | Performed by: INTERNAL MEDICINE

## 2022-06-23 PROCEDURE — 94762 N-INVAS EAR/PLS OXIMTRY CONT: CPT | Mod: S$GLB,,, | Performed by: INTERNAL MEDICINE

## 2022-06-23 PROCEDURE — 3074F SYST BP LT 130 MM HG: CPT | Mod: CPTII,S$GLB,, | Performed by: INTERNAL MEDICINE

## 2022-06-23 PROCEDURE — 99215 OFFICE O/P EST HI 40 MIN: CPT | Mod: 25,S$GLB,, | Performed by: INTERNAL MEDICINE

## 2022-06-23 PROCEDURE — 3078F PR MOST RECENT DIASTOLIC BLOOD PRESSURE < 80 MM HG: ICD-10-PCS | Mod: CPTII,S$GLB,, | Performed by: INTERNAL MEDICINE

## 2022-06-23 PROCEDURE — 1159F PR MEDICATION LIST DOCUMENTED IN MEDICAL RECORD: ICD-10-PCS | Mod: CPTII,S$GLB,, | Performed by: INTERNAL MEDICINE

## 2022-06-23 PROCEDURE — 1159F MED LIST DOCD IN RCRD: CPT | Mod: CPTII,S$GLB,, | Performed by: INTERNAL MEDICINE

## 2022-06-23 PROCEDURE — 94762 PR NONINVASV OXYGEN SATUR, CONT: ICD-10-PCS | Mod: S$GLB,,, | Performed by: INTERNAL MEDICINE

## 2022-06-23 PROCEDURE — 3288F PR FALLS RISK ASSESSMENT DOCUMENTED: ICD-10-PCS | Mod: CPTII,S$GLB,, | Performed by: INTERNAL MEDICINE

## 2022-06-23 PROCEDURE — 1101F PR PT FALLS ASSESS DOC 0-1 FALLS W/OUT INJ PAST YR: ICD-10-PCS | Mod: CPTII,S$GLB,, | Performed by: INTERNAL MEDICINE

## 2022-06-23 PROCEDURE — 3288F FALL RISK ASSESSMENT DOCD: CPT | Mod: CPTII,S$GLB,, | Performed by: INTERNAL MEDICINE

## 2022-06-23 RX ORDER — AZITHROMYCIN 250 MG/1
TABLET, FILM COATED ORAL
Qty: 6 TABLET | Refills: 0 | Status: SHIPPED | OUTPATIENT
Start: 2022-06-23

## 2022-06-23 RX ORDER — ALBUTEROL SULFATE 0.83 MG/ML
2.5 SOLUTION RESPIRATORY (INHALATION)
Qty: 360 ML | Refills: 11 | Status: SHIPPED | OUTPATIENT
Start: 2022-06-23 | End: 2023-06-23

## 2022-06-23 RX ORDER — PREDNISONE 20 MG/1
TABLET ORAL
Qty: 20 TABLET | Refills: 0 | Status: SHIPPED | OUTPATIENT
Start: 2022-06-23

## 2022-06-23 RX ORDER — ALBUTEROL SULFATE 90 UG/1
2 AEROSOL, METERED RESPIRATORY (INHALATION) EVERY 4 HOURS PRN
Qty: 8.5 G | Refills: 11 | Status: SHIPPED | OUTPATIENT
Start: 2022-06-23 | End: 2022-07-23

## 2022-06-23 NOTE — PROGRESS NOTES
Subjective:     Patient ID: Familia Drubin Jr. is a 76 y.o. male.    Chief Complaint:   history of bronchiectesis and Congestive Heart failure      HPI   Bronchiectasis  He presents for evaluation and treatment of bronchiectasis. The patient was diagnosed with bronchiectasis in 2020 on the basis of a CT scan. The bronchiectasis was localized to the left lower lobe and right middle lobe . The patient has no history of atypical mycobacterium infection. Prior testing has included none which revealed NO PFT. The patient is having constitutional symptoms, including malaise. There is history of pneumonia, and the patient has been hospitalized for this condition before. He has a history of 7 pack years. The patient is experiencing exercise intolerance (difficulty walking 1 blocks on flat ground). The patient has had no prior treatment for bronchiectasis.   Hx of pipewelder -     Hx of tracheostomy and mechanical ventilation in [place], [place] years ago - 2016 - etiology - not certain ( pneumonia ?)      Past Medical History:   Diagnosis Date    CAD (coronary artery disease)     tyree    Diabetes mellitus, type 2 1979    eye dr rocha    Hearing impaired     Hyperlipidemia     Hypertension     Lupus     Discoid    Multiple myeloma     Old MI (myocardial infarction) 2012    Renal insufficiency     Tracheostomy tube present      Past Surgical History:   Procedure Laterality Date    CARDIAC SURGERY      CATARACT EXTRACTION      COLONOSCOPY      CORONARY ARTERY BYPASS GRAFT      HAND SURGERY      KNEE SURGERY      TRACHEOSTOMY TUBE PLACEMENT      WRIST FUSION       Review of patient's allergies indicates:   Allergen Reactions    Cephalexin      Pt tolerated cefepime 11/4/2020    Fentanyl Nausea And Vomiting    Nsaids (non-steroidal anti-inflammatory drug)      Renal insuf     Current Outpatient Medications on File Prior to Visit   Medication Sig Dispense Refill    aspirin 81 MG Chew Take 1 tablet (81 mg  "total) by mouth once daily. 100 tablet 12    azelastine (ASTELIN) 137 mcg (0.1 %) nasal spray 2 sprays (274 mcg total) by Nasal route 2 (two) times daily. 30 mL 11    BD ULTRA-FINE DAVE PEN NEEDLE 32 gauge x 5/32" Ndle USE  4 TIMES DAILY WITH MEALS AND AT BEDTIME 400 each 3    blood sugar diagnostic Strp Use 1 strip 4 (four) times daily. 300 each 0    blood-glucose meter kit Use as instructed 1 each 0    cholecalciferol, vitamin D3, 5,000 unit Tab Take 5,000 Units by mouth once daily.      DOCOSAHEXANOIC ACID ORAL Take 1 capsule by mouth once daily.      docusate sodium (COLACE) 100 MG capsule Take 100 mg by mouth 2 (two) times daily.      flaxseed oil 1,000 mg Cap Take 1 capsule by mouth daily as needed.       furosemide (LASIX) 20 MG tablet Take 2 tablets (40 mg total) by mouth once daily. 180 tablet 3    ginger root (GINGER, ZINGIBER OFFICINALIS,) 550 mg Cap Take 1 capsule by mouth.      insulin (LANTUS SOLOSTAR U-100 INSULIN) glargine 100 units/mL (3mL) SubQ pen Inject 10 Units into the skin every evening. 15 mL 0    isosorbide mononitrate (IMDUR) 60 MG 24 hr tablet Take 1 tablet by mouth twice daily 180 tablet 2    lactulose (CEPHULAC) 20 gram Pack Mix and take 1 packet (20 g total) by mouth 3 (three) times daily. (Patient taking differently: Take 20 g by mouth 3 (three) times daily as needed.) 90 packet 3    lactulose (CHRONULAC) 10 gram/15 mL solution Take 30 mL by mouth twice daily as needed for constipation. 473 mL 3    metoprolol tartrate (LOPRESSOR) 50 MG tablet Take 1 tablet by mouth twice daily 180 tablet 4    multivit-min-FA-lycopen-lutein 300-600-300 mcg Tab Take 1 tablet by mouth.      nitroGLYCERIN (NITROSTAT) 0.4 MG SL tablet Place 1 tablet (0.4 mg total) under the tongue every 5 (five) minutes as needed for Chest pain. 50 tablet 12    NOVOLOG FLEXPEN U-100 INSULIN 100 unit/mL (3 mL) InPn pen Inject 15 Units into the skin 3 (three) times daily with meals. 30 mL 0    omega " 3-dha-epa-fish oil 300-1,000 mg Cap Take by mouth.      ondansetron (ZOFRAN) 4 MG tablet Take 1 tablet (4 mg total) by mouth every 8 (eight) hours as needed for Nausea. 40 tablet 11    ondansetron (ZOFRAN-ODT) 4 MG TbDL Dissolve 1 tab under the tongue every 4-6 hours as needed for nausea. 120 tablet 0    oxyCODONE (OXYCONTIN) 60 mg TR12 12 hr tablet Take 1 tablet (60 mg total) by mouth every 12 (twelve) hours. 60 tablet 0    oxyCODONE (ROXICODONE) 20 mg Tab immediate release tablet Take 1 tablet (20 mg total) by mouth every 6 (six) hours as needed (breakthrough pain). 120 tablet 0    pantoprazole (PROTONIX) 40 MG tablet Take 1 tablet (40 mg total) by mouth once daily. 30 tablet 11    polyethylene glycol (GLYCOLAX) 17 gram PwPk Take 17 g by mouth once daily. 100 each 1    pomalidomide 3 mg Cap Take 3 mg by mouth 3 mg daily x 21 days, then off for 7 days, then repeat..      potassium chloride SA (K-DUR,KLOR-CON) 20 MEQ tablet TAKE 1 TABLET BY MOUTH ON MONDAY, WEDNESDAY AND FRIDAY. 35 tablet 3    prochlorperazine (COMPAZINE) 5 MG tablet Take 1 tablet (5 mg total) by mouth 4 (four) times daily as needed for Nausea. 40 tablet 2    ranolazine (RANEXA) 1,000 mg Tb12 Take 1 tablet (1,000 mg total) by mouth 2 (two) times daily. 60 tablet 6    rosuvastatin (CRESTOR) 40 MG Tab TAKE 1 TABLET BY MOUTH ONCE DAILY IN THE EVENING 90 tablet 0    sennosides 25 mg Tab Take 1 tablet by mouth 2 (two) times a day. 60 each 11    triamcinolone acetonide 0.1% (KENALOG) 0.1 % cream Apply topically 2 (two) times daily. For two weeks 28.4 g 1    TRUEPLUS LANCETS 33 gauge Misc USE   TO CHECK GLUCOSE SIX TIMES DAILY 100 each 11    VITAMIN B COMPLEX ORAL Take 1 capsule by mouth.      [DISCONTINUED] albuterol (PROVENTIL/VENTOLIN HFA) 90 mcg/actuation inhaler Inhale 2 puffs into the lungs every 6 (six) hours as needed for Wheezing. Rescue 8.5 g 5    [DISCONTINUED] albuterol (PROVENTIL/VENTOLIN HFA) 90 mcg/actuation inhaler Inhale  1-2 puffs into the lungs every 4 (four) hours as needed for Wheezing or Shortness of Breath (cough). 8 g 0    [DISCONTINUED] albuterol-ipratropium (DUO-NEB) 2.5 mg-0.5 mg/3 mL nebulizer solution Take 3 mLs by nebulization every 8 (eight) hours. For shortness of breath and wheezing 270 mL 0     No current facility-administered medications on file prior to visit.     Social History     Socioeconomic History    Marital status: Single    Number of children: 9   Tobacco Use    Smoking status: Former Smoker     Packs/day: 1.00     Years: 7.00     Pack years: 7.00     Start date: 1961     Quit date: 1970     Years since quittin.5    Smokeless tobacco: Never Used   Substance and Sexual Activity    Alcohol use: No    Drug use: No    Sexual activity: Yes     Partners: Female     Family History   Problem Relation Age of Onset    Diabetes Mother     Diabetes Father     Prostate cancer Father     Pancreatic cancer Sister     No Known Problems Brother     Diabetes Maternal Uncle     Diabetes Maternal Grandmother     No Known Problems Maternal Grandfather     No Known Problems Paternal Grandmother     No Known Problems Paternal Grandfather        Review of Systems   Constitutional: Positive for fatigue. Negative for fever.   HENT: Positive for postnasal drip, rhinorrhea and congestion.    Eyes: Negative for redness and itching.   Respiratory: Positive for cough, sputum production, shortness of breath, dyspnea on extertion, use of rescue inhaler and Paroxysmal Nocturnal Dyspnea.    Cardiovascular: Negative for chest pain, palpitations and leg swelling.   Genitourinary: Negative for difficulty urinating and hematuria.   Endocrine: Negative for cold intolerance and heat intolerance.    Musculoskeletal: Positive for arthralgias.   Skin: Negative for rash.   Gastrointestinal: Negative for nausea and abdominal pain.   Neurological: Negative for dizziness, syncope, weakness and light-headedness.  "  Hematological: Negative for adenopathy. Does not bruise/bleed easily.   Psychiatric/Behavioral: Positive for sleep disturbance. The patient is not nervous/anxious.        Objective:      /62   Pulse (!) 50   Resp 18   Ht 5' 10" (1.778 m)   Wt 63.8 kg (140 lb 10.5 oz)   SpO2 96%   BMI 20.18 kg/m²   Physical Exam  Vitals and nursing note reviewed.   Constitutional:       Appearance: He is well-developed.   HENT:      Head: Normocephalic and atraumatic.      Nose: Nose normal.   Eyes:      Conjunctiva/sclera: Conjunctivae normal.      Pupils: Pupils are equal, round, and reactive to light.   Neck:      Thyroid: No thyromegaly.      Vascular: No JVD.      Trachea: No tracheal deviation.   Cardiovascular:      Rate and Rhythm: Normal rate. Rhythm irregular.      Heart sounds: Murmur heard.    Systolic murmur is present with a grade of 2/6.  Pulmonary:      Breath sounds: Examination of the right-lower field reveals wheezing. Examination of the left-lower field reveals wheezing. Decreased breath sounds and wheezing present. No rhonchi or rales.   Chest:       Abdominal:      General: Bowel sounds are normal.      Palpations: Abdomen is soft.   Musculoskeletal:         General: No tenderness. Normal range of motion.      Cervical back: Neck supple.   Lymphadenopathy:      Cervical: No cervical adenopathy.   Skin:     General: Skin is warm and dry.   Neurological:      Mental Status: He is alert and oriented to person, place, and time.       Personal Diagnostic Review  REVIEW OF CT SCAN:    EXAMINATION:  CT CHEST WITHOUT CONTRAST     CLINICAL HISTORY:  Pneumonia;     TECHNIQUE:  Chest CT was performed without contrast. All CT scans at this facility use dose modulation, iterative reconstruction, and/or weight based dosing when appropriate to reduce radiation dose to as low as reasonably achievable.     COMPARISON:  PET-CT scan from December 5, 2019.     FINDINGS:  Prior median sternotomy.  There has been " progression of volume loss involving the majority of the left lower lobe with varicoid bronchiectasis and air bronchograms.  Volume loss involving the medial right lower lobe with air bronchograms.  Small focus of tree-in-bud opacity within the posterior aspect of the left upper lobe at the terminus of a bronchovascular which structure is slightly larger compared to the prior PET-CT scan.  It measures 11 mm craniocaudal by 17 mm AP x 9 mm transverse.  Cluster of nodules within the periphery of the right upper lobe on axial series 3, image 177 is unchanged.  Thickening of major fissure with somewhat more prominent nodule projecting posteriorly within the left lower lobe.  It measures 5 mm and is best demonstrated on image 302.  6 mm nodule within the left apex on image 118 is not definitely changed.  No endobronchial or endotracheal lesions.  No pathologically enlarged mediastinal, hilar, or axillary lymph nodes. The heart is enlarged with coronary artery calcifications.  Profound osteopenia.  Limited images through the upper abdomen demonstrate no acute abnormality.     Impression:     1. Atelectasis of the near entirety of the left lower lobe with varicoid bronchiectasis.  This has progressed compared to the prior PET-CT scan.  There is also atelectasis involving the medial aspect of the right middle lobe where there are air bronchograms.  This has also progressed in the interval.  2. Somewhat linear focus of nodularity within the posterior aspect of the left upper lobe could relate to bronchial plugging.  It has increased in size compared to the prior PET-CT scan.  Close attention should be given to this region upon future follow-up.  3. There is more nodularity and thickening of the major fissure within adjacent nodule that is also new compared to the prior PET-CT scan.  4. Cluster of nodules within the periphery of the right upper lobe posteriorly has not definitely changed.  5. Prior median sternotomy with  coronary artery calcifications.  6. Profound osteopenia.  All CT scans at this facility use dose modulation, iterative reconstruction, and/or weight base dosing when appropriate to reduce radiation dose to as low as reasonably achievable.        Electronically signed by: Tae Bazan Jr., MD  Date:                                            11/03/2020  Time:                                           13:08             Exam Ended: 11/03/20 12:52                       Echocardiogram Summary    · There is left ventricular concentric hypertrophy.  · The left ventricle is normal in size with normal systolic function. The estimated ejection fraction is 55%.  · Indeterminate diastolic function.  · With low normal right ventricular systolic function.  · Mild-to-moderate aortic regurgitation.  · Mild-to-moderate aortic valve stenosis.  · Aortic valve area is 1.54 cm2; peak velocity is 2.17 m/s; mean gradient is 11 mmHg.  · Mild pulmonic regurgitation.  · The mitral valve is mildly sclerotic.  · Normal central venous pressure (3 mmHg).  · Mild mitral regurgitation.           Pulmonary Studies Review 6/23/2022   SpO2 96   Height 70   Weight 2250.46   BMI (Calculated) 20.2   Predicted Distance 339.3   Predicted Distance Meters (Calculated) 538.12       XR CHEST PA AND LATERAL  Narrative: EXAMINATION:  XR CHEST PA AND LATERAL    CLINICAL HISTORY:  Cough, unspecified    TECHNIQUE:  PA and lateral views of the chest were performed.    COMPARISON:  11/24/2020    FINDINGS:  There appear to be some chronic appearing interstitial opacities within the bilateral lower lung zones.  Fibrotic changes are also with associated bronchiectatic changes.  The heart is mildly enlarged.  There is evidence for prior median sternotomy.  Impression: 1.  No acute cardiopulmonary process.    Electronically signed by: Rad Quick DO  Date:    06/13/2022  Time:    14:57      Office Spirometry Results:     No flowsheet data found.  Pulmonary Studies  Review 6/23/2022   SpO2 96   Height 70   Weight 2250.46   BMI (Calculated) 20.2   Predicted Distance 339.3   Predicted Distance Meters (Calculated) 538.12   Cardiac catheterization     Ref Range & Units 4 yr ago   Cath EF Estimated % 53      Narrative      · There is mild left ventricular systolic dysfunction.   · The ejection fraction is 50- % by visual estimate.   · There are wall motion abnormalities in the left ventricle.   · There is mild lateral wall hypokinesis noted.   · The left internal mammary artery bypass graft is patent to the left   anterior descending vessel with good distal runoff.   · The saphenous vein graft is patent to the distal right coronary artery   and there is mild disease of the native vessel up to 40% in the native   vessel after the anastomosis of the vein graft.   · Total occlusion of a second obtuse marginal branch which was previously   stented. There is antegrade flow into the distal obtuse marginal vessels   and this looks like a chronic total occlusion.   · The patient will be treated medically but if he continues to have   recurrent angina he could be considered for  revascularization of the   obtuse marginal vessel.     1) Left main artery: 0%     2) Proximal left anterior descending artery: 100%     3) Mid-Distal left anterior descending artery: 0%     4) Circumflex/obtuse marginal artery:There is a  stent in the proximal to   mid vessel which is patent however the second obtuse marginal branch is   totally occluded in we can see that there is a stent previously placed   here.  There is antegrade flow into the distal obtuse marginal branches   and this suggests that this is a chronic total occlusion.     5) Ramus intermedius artery: 0%     6) Right coronary artery: There is a total occlusion of the proximal   native right coronary artery.     7) Coronary Dominance: Right     8)The saphenous vein graft is patent to the distal right coronary artery   and there is mild disease  at 40% in the native vessel after the   anastomosis.  The left internal mammary arteries patent the left anterior   descending vessel good distal runoff.     Other Result Text    This result has an attachment that is not available.  Specimen Collected: -- Last Resulted: --   Date: 01/12/18      CT Angiogram Chest    Anatomical Region Laterality Modality   -- -- Computed Tomography       Impression    Negative study.    Narrative    CT angiogram of the pulmonary arteries with and without contrast     CLINICAL HISTORY: Shortness of breath.     Spiral axial images were performed with and without intravenous contrast through the chest using our routine CTA protocol. 3-D and multiplanar reconstructed images were performed. Automated exposure control was used for dose reduction.     FINDINGS: The pulmonary artery and branches are well opacified and there are no filling defects to suggest a pulmonary embolus. The aorta is normal in caliber. No pericardial effusion or pleural effusion or pneumothorax are seen. No masses or adenopathy are seen. The lungs are clear.    Procedure Note    Tyler Caldwell MD - 08/05/2017   Formatting of this note might be different from the original.   CT angiogram of the pulmonary arteries with and without contrast     CLINICAL HISTORY: Shortness of breath.     Spiral axial images were performed with and without intravenous contrast through the chest using our routine CTA protocol. 3-D and multiplanar reconstructed images were performed. Automated exposure control was used for dose reduction.     FINDINGS: The pulmonary artery and branches are well opacified and there are no filling defects to suggest a pulmonary embolus. The aorta is normal in caliber. No pericardial effusion or pleural effusion or pneumothorax are seen. No masses or adenopathy are seen. The lungs are clear.     IMPRESSION:   Negative study.  Exam End: 08/05/17  2:25 PM Last Resulted: 08/05/17  2:40 PM   [facility]  [facility]  Result Received: 01/06/22 11:06 AM    View Encounter             Assessment:            Bronchiectasis without complication  -     albuterol (PROVENTIL) 2.5 mg /3 mL (0.083 %) nebulizer solution; Take 3 mLs (2.5 mg total) by nebulization every 4 to 6 hours as needed for Wheezing or Shortness of Breath.  Dispense: 360 mL; Refill: 11  -     NEBULIZER KIT (SUPPLIES) FOR HOME USE  -     NEBULIZER FOR HOME USE  -     Complete PFT with bronchodilator; Future; Expected date: 06/23/2022  -     PULSE OXIMETRY OVERNIGHT; Future    Dyspnea on exertion  -     Ambulatory referral/consult to Pulmonology  -     Complete PFT with bronchodilator; Future; Expected date: 06/23/2022    Bronchiectasis with (acute) exacerbation  -     predniSONE (DELTASONE) 20 MG tablet; Prednisone 60 mg/ day for 3 days, 40 mg/day for 3 days,20 mg/ day for 3 days, (1/2 tablet )10 mg a day for 3 days.  Dispense: 20 tablet; Refill: 0  -     azithromycin (ZITHROMAX Z-JERRI) 250 MG tablet; 500 mg on day 1 (two tablets) followed by 250 mg once daily on days 2-5  Dispense: 6 tablet; Refill: 0    Acute bronchitis, unspecified organism  -     albuterol (PROVENTIL/VENTOLIN HFA) 90 mcg/actuation inhaler; Inhale 2 puffs into the lungs every 4 (four) hours as needed for Wheezing or Shortness of Breath (cough).  Dispense: 18 g; Refill: 11    Exercise hypoxemia  -     Six Minute Walk Test to qualify for Home Oxygen; Future    Nocturnal hypoxemia  -     PULSE OXIMETRY OVERNIGHT; Future          Outpatient Encounter Medications as of 6/23/2022   Medication Sig Dispense Refill    aspirin 81 MG Chew Take 1 tablet (81 mg total) by mouth once daily. 100 tablet 12    azelastine (ASTELIN) 137 mcg (0.1 %) nasal spray 2 sprays (274 mcg total) by Nasal route 2 (two) times daily. 30 mL 11    BD ULTRA-FINE DAVE PEN NEEDLE 32 gauge x 5/32" Ndle USE  4 TIMES DAILY WITH MEALS AND AT BEDTIME 400 " each 3    blood sugar diagnostic Strp Use 1 strip 4 (four) times daily. 300 each 0    blood-glucose meter kit Use as instructed 1 each 0    cholecalciferol, vitamin D3, 5,000 unit Tab Take 5,000 Units by mouth once daily.      DOCOSAHEXANOIC ACID ORAL Take 1 capsule by mouth once daily.      docusate sodium (COLACE) 100 MG capsule Take 100 mg by mouth 2 (two) times daily.      flaxseed oil 1,000 mg Cap Take 1 capsule by mouth daily as needed.       furosemide (LASIX) 20 MG tablet Take 2 tablets (40 mg total) by mouth once daily. 180 tablet 3    ginger root (GINGER, ZINGIBER OFFICINALIS,) 550 mg Cap Take 1 capsule by mouth.      insulin (LANTUS SOLOSTAR U-100 INSULIN) glargine 100 units/mL (3mL) SubQ pen Inject 10 Units into the skin every evening. 15 mL 0    isosorbide mononitrate (IMDUR) 60 MG 24 hr tablet Take 1 tablet by mouth twice daily 180 tablet 2    lactulose (CEPHULAC) 20 gram Pack Mix and take 1 packet (20 g total) by mouth 3 (three) times daily. (Patient taking differently: Take 20 g by mouth 3 (three) times daily as needed.) 90 packet 3    lactulose (CHRONULAC) 10 gram/15 mL solution Take 30 mL by mouth twice daily as needed for constipation. 473 mL 3    metoprolol tartrate (LOPRESSOR) 50 MG tablet Take 1 tablet by mouth twice daily 180 tablet 4    multivit-min-FA-lycopen-lutein 300-600-300 mcg Tab Take 1 tablet by mouth.      nitroGLYCERIN (NITROSTAT) 0.4 MG SL tablet Place 1 tablet (0.4 mg total) under the tongue every 5 (five) minutes as needed for Chest pain. 50 tablet 12    NOVOLOG FLEXPEN U-100 INSULIN 100 unit/mL (3 mL) InPn pen Inject 15 Units into the skin 3 (three) times daily with meals. 30 mL 0    omega 3-dha-epa-fish oil 300-1,000 mg Cap Take by mouth.      ondansetron (ZOFRAN) 4 MG tablet Take 1 tablet (4 mg total) by mouth every 8 (eight) hours as needed for Nausea. 40 tablet 11    ondansetron (ZOFRAN-ODT) 4 MG TbDL Dissolve 1 tab under the tongue every 4-6 hours as  needed for nausea. 120 tablet 0    oxyCODONE (OXYCONTIN) 60 mg TR12 12 hr tablet Take 1 tablet (60 mg total) by mouth every 12 (twelve) hours. 60 tablet 0    oxyCODONE (ROXICODONE) 20 mg Tab immediate release tablet Take 1 tablet (20 mg total) by mouth every 6 (six) hours as needed (breakthrough pain). 120 tablet 0    pantoprazole (PROTONIX) 40 MG tablet Take 1 tablet (40 mg total) by mouth once daily. 30 tablet 11    polyethylene glycol (GLYCOLAX) 17 gram PwPk Take 17 g by mouth once daily. 100 each 1    pomalidomide 3 mg Cap Take 3 mg by mouth 3 mg daily x 21 days, then off for 7 days, then repeat..      potassium chloride SA (K-DUR,KLOR-CON) 20 MEQ tablet TAKE 1 TABLET BY MOUTH ON MONDAY, WEDNESDAY AND FRIDAY. 35 tablet 3    prochlorperazine (COMPAZINE) 5 MG tablet Take 1 tablet (5 mg total) by mouth 4 (four) times daily as needed for Nausea. 40 tablet 2    ranolazine (RANEXA) 1,000 mg Tb12 Take 1 tablet (1,000 mg total) by mouth 2 (two) times daily. 60 tablet 6    rosuvastatin (CRESTOR) 40 MG Tab TAKE 1 TABLET BY MOUTH ONCE DAILY IN THE EVENING 90 tablet 0    sennosides 25 mg Tab Take 1 tablet by mouth 2 (two) times a day. 60 each 11    triamcinolone acetonide 0.1% (KENALOG) 0.1 % cream Apply topically 2 (two) times daily. For two weeks 28.4 g 1    TRUEPLUS LANCETS 33 gauge Misc USE   TO CHECK GLUCOSE SIX TIMES DAILY 100 each 11    VITAMIN B COMPLEX ORAL Take 1 capsule by mouth.      [DISCONTINUED] albuterol (PROVENTIL/VENTOLIN HFA) 90 mcg/actuation inhaler Inhale 2 puffs into the lungs every 6 (six) hours as needed for Wheezing. Rescue 8.5 g 5    [DISCONTINUED] albuterol (PROVENTIL/VENTOLIN HFA) 90 mcg/actuation inhaler Inhale 1-2 puffs into the lungs every 4 (four) hours as needed for Wheezing or Shortness of Breath (cough). 8 g 0    albuterol (PROVENTIL) 2.5 mg /3 mL (0.083 %) nebulizer solution Take 3 mLs (2.5 mg total) by nebulization every 4 to 6 hours as needed for Wheezing or Shortness of  Breath. 360 mL 11    albuterol (PROVENTIL/VENTOLIN HFA) 90 mcg/actuation inhaler Inhale 2 puffs into the lungs every 4 (four) hours as needed for Wheezing or Shortness of Breath (cough). 18 g 11    azithromycin (ZITHROMAX Z-JERRI) 250 MG tablet 500 mg on day 1 (two tablets) followed by 250 mg once daily on days 2-5 6 tablet 0    predniSONE (DELTASONE) 20 MG tablet Prednisone 60 mg/ day for 3 days, 40 mg/day for 3 days,20 mg/ day for 3 days, (1/2 tablet )10 mg a day for 3 days. 20 tablet 0    [DISCONTINUED] albuterol-ipratropium (DUO-NEB) 2.5 mg-0.5 mg/3 mL nebulizer solution Take 3 mLs by nebulization every 8 (eight) hours. For shortness of breath and wheezing 270 mL 0     No facility-administered encounter medications on file as of 2022.     Plan:       Requested Prescriptions     Signed Prescriptions Disp Refills    albuterol (PROVENTIL) 2.5 mg /3 mL (0.083 %) nebulizer solution 360 mL 11     Sig: Take 3 mLs (2.5 mg total) by nebulization every 4 to 6 hours as needed for Wheezing or Shortness of Breath.    predniSONE (DELTASONE) 20 MG tablet 20 tablet 0     Sig: Prednisone 60 mg/ day for 3 days, 40 mg/day for 3 days,20 mg/ day for 3 days, (1/2 tablet )10 mg a day for 3 days.    azithromycin (ZITHROMAX Z-JERRI) 250 MG tablet 6 tablet 0     Si mg on day 1 (two tablets) followed by 250 mg once daily on days 2-5    albuterol (PROVENTIL/VENTOLIN HFA) 90 mcg/actuation inhaler 18 g 11     Sig: Inhale 2 puffs into the lungs every 4 (four) hours as needed for Wheezing or Shortness of Breath (cough).     Problem List Items Addressed This Visit     Bronchiectasis without complication - Primary    Relevant Medications    albuterol (PROVENTIL) 2.5 mg /3 mL (0.083 %) nebulizer solution    Other Relevant Orders    NEBULIZER KIT (SUPPLIES) FOR HOME USE    NEBULIZER FOR HOME USE    Complete PFT with bronchodilator    PULSE OXIMETRY OVERNIGHT    Dyspnea on exertion    Relevant Orders    Complete PFT with  bronchodilator      Other Visit Diagnoses     Bronchiectasis with (acute) exacerbation        Relevant Medications    predniSONE (DELTASONE) 20 MG tablet    azithromycin (ZITHROMAX Z-JERRI) 250 MG tablet    Acute bronchitis, unspecified organism        Relevant Medications    albuterol (PROVENTIL/VENTOLIN HFA) 90 mcg/actuation inhaler    Exercise hypoxemia        Relevant Orders    Six Minute Walk Test to qualify for Home Oxygen    Nocturnal hypoxemia        Relevant Orders    PULSE OXIMETRY OVERNIGHT             Follow up in about 3 months (around 9/23/2022) for Overnight O2 Sat ASAP, 6 min walk - on return.    MEDICAL DECISION MAKING: Moderate to high complexity.  Overall, the multiple problems listed are of moderate to high severity that may impact quality of life and activities of daily living. Side effects of medications, treatment plan as well as options and alternatives reviewed and discussed with patient. There was counseling of patient concerning these issues.    Total time spent in counseling and coordination of care - 45  minutes of total time spent on the encounter, which includes face to face time and non-face to face time preparing to see the patient (eg, review of tests), Obtaining and/or reviewing separately obtained history, Documenting clinical information in the electronic or other health record, Independently interpreting results (not separately reported) and communicating results to the patient/family/caregiver, or Care coordination (not separately reported).    Time was used in discussion of prognosis, risks, benefits of treatment, instructions and compliance with regimen . Discussion with other physicians and/or health care providers - home health or for use of durable medical equipment (oxygen, nebulizers, CPAP, BiPAP) occurred.

## 2022-06-24 ENCOUNTER — TELEPHONE (OUTPATIENT)
Dept: PULMONOLOGY | Facility: CLINIC | Age: 77
End: 2022-06-24
Payer: MEDICARE

## 2022-06-24 NOTE — PROCEDURES
[facility]  [address] [address]. * [place] [id number]  Telephone: [phone]  Test date: 22 Start: 22 22:22:32 Familia Durbin Jr.  Doctor: Ady Byrd MD End: 22 08:48:08 [id number]  Oximetry: Summary Report  Comments: Room Air  Recording time: 10:25:36 Highest pulse: 88 Highest SpO2: 100%  Excluded samplin:06:52 Lowest pulse: 63 Lowest SpO2: 80%  Total valid samplin:18:44 Mean pulse: 71 Mean SpO2: 92.5%  1 S.D.: 2.6 1 S.D.: 1.9  Time with SpO2<90: 0:26:44, 4.8%  Time with SpO2<80: 0:00:00, 0.0%  Time with SpO2<70: 0:00:00, 0.0%  Time with SpO2<60: 0:00:00, 0.0%  Time with SpO2<88: 0:14:20, 2.6%  Time with SpO2 =>90: 8:52:00, 95.2%  Time with SpO2=>80 & <90: 0:26:44, 4.8%  Time with SpO2=>70 & <80: 0:00:00, 0.0%  Time with SpO2=>60 & <70: 0:00:00, 0.0%  The longest continuous time with saturation <=89 was 00:03:00, which started at  22 05:04:24.  A desaturation event was defined as a decrease of saturation by 4 or more.  3 events were excluded due to artifact.  There were 9 desaturation events over 3 minutes duration.  There were 40 desaturation events of less than 3 minutes duration during which:  The mean high was 93.3%. The mean low was 86.3%.  The number of these events that were:  > 0 & <10 seconds: 0 > 0 seconds: 40  =>10 & <20 seconds: 8 =>10 seconds: 40  =>20 & <30 seconds: 13 =>20 seconds: 32  =>30 & <40 seconds: 7 =>30 seconds: 19  =>40 & <50 seconds: 5 =>40 seconds: 12  =>50 & <60 seconds: 0 =>50 seconds: 7  =>60 seconds: 7 =>60 seconds: 7  The mean length of desaturation events that were >=10 sec & <=3 mins was: 41.6 sec.  Desaturation event index (events >=10 sec per sampled hour): 4.3  Desaturation event index (events >= 0 sec per sampled hour): 4.3    Overnight Oxygen Saturation Study:    INTERPRETATION:  Conditions of Test: Noted above in report    Interpretation of results of overnight oxygen saturation study.  This was a technically  adequate  study.  Overnight oxygen saturation study is abnormal with O2 saturation less than 89%.   Time with SpO2<88: 0:14:20, 2.6% of the study period. Lowest oxygen saturation recorded was 80%.    Based on the above information patient meets criteria for oxygen prescription.  Clinical correlation suggested.  Ady Byrd MD    Medicare Criteria Comments:   Overnight Oximetry test results suggest the patient does fall under Medicare Group 1 Criteria and would be eligible for oxygen prescription.   (Arterial oxygen saturation at or below 88% for at least 5 minutes taken during sleep on stable outpatient)    Details about Medicare Group Criteria coverage can be found at http://www.cms.hhs.gov/manuals/downloads/

## 2022-06-27 ENCOUNTER — PATIENT MESSAGE (OUTPATIENT)
Dept: PULMONOLOGY | Facility: CLINIC | Age: 77
End: 2022-06-27
Payer: MEDICARE

## 2022-06-27 DIAGNOSIS — J47.9 BRONCHIECTASIS WITHOUT COMPLICATION: ICD-10-CM

## 2022-06-27 DIAGNOSIS — G47.34 NOCTURNAL HYPOXEMIA: Primary | ICD-10-CM

## 2022-06-27 DIAGNOSIS — J44.9 CHRONIC OBSTRUCTIVE PULMONARY DISEASE, UNSPECIFIED COPD TYPE: ICD-10-CM

## 2022-06-27 NOTE — PROGRESS NOTES
Orders for oxygen at night submitted    Subjective:     Patient ID: Familia Durbin Jr. is a 76 y.o. male.    Chief Complaint:   history of bronchiectesis and Congestive Heart failure      HPI   Bronchiectasis  He presents for evaluation and treatment of bronchiectasis. The patient was diagnosed with bronchiectasis in 2020 on the basis of a CT scan. The bronchiectasis was localized to the left lower lobe and right middle lobe . The patient has no history of atypical mycobacterium infection. Prior testing has included none which revealed NO PFT. The patient is having constitutional symptoms, including malaise. There is history of pneumonia, and the patient has been hospitalized for this condition before. He has a history of 7 pack years. The patient is experiencing exercise intolerance (difficulty walking 1 blocks on flat ground). The patient has had no prior treatment for bronchiectasis.   Hx of pipewelder -     Hx of tracheostomy and mechanical ventilation in [place], [place] years ago - 2016 - etiology - not certain ( pneumonia ?)      Past Medical History:   Diagnosis Date    CAD (coronary artery disease)     tyree    Diabetes mellitus, type 2 1979    eye dr rocha    Hearing impaired     Hyperlipidemia     Hypertension     Lupus     Discoid    Multiple myeloma     Old MI (myocardial infarction) 2012    Renal insufficiency     Tracheostomy tube present      Past Surgical History:   Procedure Laterality Date    CARDIAC SURGERY      CATARACT EXTRACTION      COLONOSCOPY      CORONARY ARTERY BYPASS GRAFT      HAND SURGERY      KNEE SURGERY      TRACHEOSTOMY TUBE PLACEMENT      WRIST FUSION       Review of patient's allergies indicates:   Allergen Reactions    Cephalexin      Pt tolerated cefepime 11/4/2020    Fentanyl Nausea And Vomiting    Nsaids (non-steroidal anti-inflammatory drug)      Renal insuf     Current Outpatient Medications on File Prior to Visit   Medication Sig Dispense Refill     "albuterol (PROVENTIL) 2.5 mg /3 mL (0.083 %) nebulizer solution Take 3 mLs (2.5 mg total) by nebulization every 4 to 6 hours as needed for Wheezing or Shortness of Breath. 360 mL 11    albuterol (PROVENTIL/VENTOLIN HFA) 90 mcg/actuation inhaler Inhale 2 puffs into the lungs every 4 (four) hours as needed for Wheezing or Shortness of Breath (cough). 8.5 g 11    aspirin 81 MG Chew Take 1 tablet (81 mg total) by mouth once daily. 100 tablet 12    azelastine (ASTELIN) 137 mcg (0.1 %) nasal spray 2 sprays (274 mcg total) by Nasal route 2 (two) times daily. 30 mL 11    azithromycin (ZITHROMAX Z-JERRI) 250 MG tablet Take 2 tablets (500 mg) by mouth on day 1 followed by 1 tablet once daily on days 2-5 6 tablet 0    BD ULTRA-FINE DAVE PEN NEEDLE 32 gauge x 5/32" Ndle USE  4 TIMES DAILY WITH MEALS AND AT BEDTIME 400 each 3    blood sugar diagnostic Strp Use 1 strip 4 (four) times daily. 300 each 0    blood-glucose meter kit Use as instructed 1 each 0    cholecalciferol, vitamin D3, 5,000 unit Tab Take 5,000 Units by mouth once daily.      DOCOSAHEXANOIC ACID ORAL Take 1 capsule by mouth once daily.      docusate sodium (COLACE) 100 MG capsule Take 100 mg by mouth 2 (two) times daily.      flaxseed oil 1,000 mg Cap Take 1 capsule by mouth daily as needed.       furosemide (LASIX) 20 MG tablet Take 2 tablets (40 mg total) by mouth once daily. 180 tablet 3    ginger root (GINGER, ZINGIBER OFFICINALIS,) 550 mg Cap Take 1 capsule by mouth.      insulin (LANTUS SOLOSTAR U-100 INSULIN) glargine 100 units/mL (3mL) SubQ pen Inject 10 Units into the skin every evening. 15 mL 0    isosorbide mononitrate (IMDUR) 60 MG 24 hr tablet Take 1 tablet by mouth twice daily 180 tablet 2    lactulose (CEPHULAC) 20 gram Pack Mix and take 1 packet (20 g total) by mouth 3 (three) times daily. (Patient taking differently: Take 20 g by mouth 3 (three) times daily as needed.) 90 packet 3    lactulose (CHRONULAC) 10 gram/15 mL solution " Take 30 mL by mouth twice daily as needed for constipation. 473 mL 3    metoprolol tartrate (LOPRESSOR) 50 MG tablet Take 1 tablet by mouth twice daily 180 tablet 4    multivit-min-FA-lycopen-lutein 300-600-300 mcg Tab Take 1 tablet by mouth.      nitroGLYCERIN (NITROSTAT) 0.4 MG SL tablet Place 1 tablet (0.4 mg total) under the tongue every 5 (five) minutes as needed for Chest pain. 50 tablet 12    NOVOLOG FLEXPEN U-100 INSULIN 100 unit/mL (3 mL) InPn pen Inject 15 Units into the skin 3 (three) times daily with meals. 30 mL 0    omega 3-dha-epa-fish oil 300-1,000 mg Cap Take by mouth.      ondansetron (ZOFRAN) 4 MG tablet Take 1 tablet (4 mg total) by mouth every 8 (eight) hours as needed for Nausea. 40 tablet 11    ondansetron (ZOFRAN-ODT) 4 MG TbDL Dissolve 1 tab under the tongue every 4-6 hours as needed for nausea. 120 tablet 0    oxyCODONE (OXYCONTIN) 60 mg TR12 12 hr tablet Take 1 tablet (60 mg total) by mouth every 12 (twelve) hours. 60 tablet 0    oxyCODONE (ROXICODONE) 20 mg Tab immediate release tablet Take 1 tablet (20 mg total) by mouth every 6 (six) hours as needed (breakthrough pain). 120 tablet 0    pantoprazole (PROTONIX) 40 MG tablet Take 1 tablet (40 mg total) by mouth once daily. 30 tablet 11    polyethylene glycol (GLYCOLAX) 17 gram PwPk Take 17 g by mouth once daily. 100 each 1    pomalidomide 3 mg Cap Take 3 mg by mouth 3 mg daily x 21 days, then off for 7 days, then repeat..      potassium chloride SA (K-DUR,KLOR-CON) 20 MEQ tablet TAKE 1 TABLET BY MOUTH ON MONDAY, WEDNESDAY AND FRIDAY. 35 tablet 3    predniSONE (DELTASONE) 20 MG tablet Take 3 tablets (60 mg/ day) by mouth for 3 days, 2 tablets (40 mg/day) for 3 days, 1 tablet (20 mg/ day) for 3 days, then (1/2 tablet )10 mg a day for 3 days. 20 tablet 0    prochlorperazine (COMPAZINE) 5 MG tablet Take 1 tablet (5 mg total) by mouth 4 (four) times daily as needed for Nausea. 40 tablet 2    ranolazine (RANEXA) 1,000 mg Tb12  Take 1 tablet (1,000 mg total) by mouth 2 (two) times daily. 60 tablet 6    rosuvastatin (CRESTOR) 40 MG Tab TAKE 1 TABLET BY MOUTH ONCE DAILY IN THE EVENING 90 tablet 0    sennosides 25 mg Tab Take 1 tablet by mouth 2 (two) times a day. 60 each 11    triamcinolone acetonide 0.1% (KENALOG) 0.1 % cream Apply topically 2 (two) times daily. For two weeks 28.4 g 1    TRUEPLUS LANCETS 33 gauge Misc USE   TO CHECK GLUCOSE SIX TIMES DAILY 100 each 11    VITAMIN B COMPLEX ORAL Take 1 capsule by mouth.       No current facility-administered medications on file prior to visit.     Social History     Socioeconomic History    Marital status: Single    Number of children: 9   Tobacco Use    Smoking status: Former Smoker     Packs/day: 1.00     Years: 7.00     Pack years: 7.00     Start date: 1961     Quit date: 1970     Years since quittin.5    Smokeless tobacco: Never Used   Substance and Sexual Activity    Alcohol use: No    Drug use: No    Sexual activity: Yes     Partners: Female     Family History   Problem Relation Age of Onset    Diabetes Mother     Diabetes Father     Prostate cancer Father     Pancreatic cancer Sister     No Known Problems Brother     Diabetes Maternal Uncle     Diabetes Maternal Grandmother     No Known Problems Maternal Grandfather     No Known Problems Paternal Grandmother     No Known Problems Paternal Grandfather        Review of Systems   Constitutional: Positive for fatigue. Negative for fever.   HENT: Positive for postnasal drip, rhinorrhea and congestion.    Eyes: Negative for redness and itching.   Respiratory: Positive for cough, sputum production, shortness of breath, dyspnea on extertion, use of rescue inhaler and Paroxysmal Nocturnal Dyspnea.    Cardiovascular: Negative for chest pain, palpitations and leg swelling.   Genitourinary: Negative for difficulty urinating and hematuria.   Endocrine: Negative for cold intolerance and heat intolerance.     Musculoskeletal: Positive for arthralgias.   Skin: Negative for rash.   Gastrointestinal: Negative for nausea and abdominal pain.   Neurological: Negative for dizziness, syncope, weakness and light-headedness.   Hematological: Negative for adenopathy. Does not bruise/bleed easily.   Psychiatric/Behavioral: Positive for sleep disturbance. The patient is not nervous/anxious.        Objective:      There were no vitals taken for this visit.  Physical Exam  Vitals and nursing note reviewed.   Constitutional:       Appearance: He is well-developed.   HENT:      Head: Normocephalic and atraumatic.      Nose: Nose normal.   Eyes:      Conjunctiva/sclera: Conjunctivae normal.      Pupils: Pupils are equal, round, and reactive to light.   Neck:      Thyroid: No thyromegaly.      Vascular: No JVD.      Trachea: No tracheal deviation.   Cardiovascular:      Rate and Rhythm: Normal rate. Rhythm irregular.      Heart sounds: Murmur heard.    Systolic murmur is present with a grade of 2/6.  Pulmonary:      Breath sounds: Examination of the right-lower field reveals wheezing. Examination of the left-lower field reveals wheezing. Decreased breath sounds and wheezing present. No rhonchi or rales.   Chest:       Abdominal:      General: Bowel sounds are normal.      Palpations: Abdomen is soft.   Musculoskeletal:         General: No tenderness. Normal range of motion.      Cervical back: Neck supple.   Lymphadenopathy:      Cervical: No cervical adenopathy.   Skin:     General: Skin is warm and dry.   Neurological:      Mental Status: He is alert and oriented to person, place, and time.       Personal Diagnostic Review  REVIEW OF CT SCAN:    EXAMINATION:  CT CHEST WITHOUT CONTRAST     CLINICAL HISTORY:  Pneumonia;     TECHNIQUE:  Chest CT was performed without contrast. All CT scans at this facility use dose modulation, iterative reconstruction, and/or weight based dosing when appropriate to reduce radiation dose to as low as  reasonably achievable.     COMPARISON:  PET-CT scan from December 5, 2019.     FINDINGS:  Prior median sternotomy.  There has been progression of volume loss involving the majority of the left lower lobe with varicoid bronchiectasis and air bronchograms.  Volume loss involving the medial right lower lobe with air bronchograms.  Small focus of tree-in-bud opacity within the posterior aspect of the left upper lobe at the terminus of a bronchovascular which structure is slightly larger compared to the prior PET-CT scan.  It measures 11 mm craniocaudal by 17 mm AP x 9 mm transverse.  Cluster of nodules within the periphery of the right upper lobe on axial series 3, image 177 is unchanged.  Thickening of major fissure with somewhat more prominent nodule projecting posteriorly within the left lower lobe.  It measures 5 mm and is best demonstrated on image 302.  6 mm nodule within the left apex on image 118 is not definitely changed.  No endobronchial or endotracheal lesions.  No pathologically enlarged mediastinal, hilar, or axillary lymph nodes. The heart is enlarged with coronary artery calcifications.  Profound osteopenia.  Limited images through the upper abdomen demonstrate no acute abnormality.     Impression:     1. Atelectasis of the near entirety of the left lower lobe with varicoid bronchiectasis.  This has progressed compared to the prior PET-CT scan.  There is also atelectasis involving the medial aspect of the right middle lobe where there are air bronchograms.  This has also progressed in the interval.  2. Somewhat linear focus of nodularity within the posterior aspect of the left upper lobe could relate to bronchial plugging.  It has increased in size compared to the prior PET-CT scan.  Close attention should be given to this region upon future follow-up.  3. There is more nodularity and thickening of the major fissure within adjacent nodule that is also new compared to the prior PET-CT scan.  4. Cluster of  nodules within the periphery of the right upper lobe posteriorly has not definitely changed.  5. Prior median sternotomy with coronary artery calcifications.  6. Profound osteopenia.  All CT scans at this facility use dose modulation, iterative reconstruction, and/or weight base dosing when appropriate to reduce radiation dose to as low as reasonably achievable.        Electronically signed by: Tae Bazan Jr., MD  Date:                                            11/03/2020  Time:                                           13:08             Exam Ended: 11/03/20 12:52                       Echocardiogram Summary    · There is left ventricular concentric hypertrophy.  · The left ventricle is normal in size with normal systolic function. The estimated ejection fraction is 55%.  · Indeterminate diastolic function.  · With low normal right ventricular systolic function.  · Mild-to-moderate aortic regurgitation.  · Mild-to-moderate aortic valve stenosis.  · Aortic valve area is 1.54 cm2; peak velocity is 2.17 m/s; mean gradient is 11 mmHg.  · Mild pulmonic regurgitation.  · The mitral valve is mildly sclerotic.  · Normal central venous pressure (3 mmHg).  · Mild mitral regurgitation.           Pulmonary Studies Review 6/23/2022   SpO2 96   Height 70   Weight 2250.46   BMI (Calculated) 20.2   Predicted Distance 339.3   Predicted Distance Meters (Calculated) 538.12       XR CHEST PA AND LATERAL  Narrative: EXAMINATION:  XR CHEST PA AND LATERAL    CLINICAL HISTORY:  Cough, unspecified    TECHNIQUE:  PA and lateral views of the chest were performed.    COMPARISON:  11/24/2020    FINDINGS:  There appear to be some chronic appearing interstitial opacities within the bilateral lower lung zones.  Fibrotic changes are also with associated bronchiectatic changes.  The heart is mildly enlarged.  There is evidence for prior median sternotomy.  Impression: 1.  No acute cardiopulmonary process.    Electronically signed by: Rad  DO Abdelrahman  Date:    06/13/2022  Time:    14:57      Office Spirometry Results:     No flowsheet data found.  Pulmonary Studies Review 6/23/2022   SpO2 96   Height 70   Weight 2250.46   BMI (Calculated) 20.2   Predicted Distance 339.3   Predicted Distance Meters (Calculated) 538.12   Cardiac catheterization     Ref Range & Units 4 yr ago   Cath EF Estimated % 53      Narrative      · There is mild left ventricular systolic dysfunction.   · The ejection fraction is 50- % by visual estimate.   · There are wall motion abnormalities in the left ventricle.   · There is mild lateral wall hypokinesis noted.   · The left internal mammary artery bypass graft is patent to the left   anterior descending vessel with good distal runoff.   · The saphenous vein graft is patent to the distal right coronary artery   and there is mild disease of the native vessel up to 40% in the native   vessel after the anastomosis of the vein graft.   · Total occlusion of a second obtuse marginal branch which was previously   stented. There is antegrade flow into the distal obtuse marginal vessels   and this looks like a chronic total occlusion.   · The patient will be treated medically but if he continues to have   recurrent angina he could be considered for  revascularization of the   obtuse marginal vessel.     1) Left main artery: 0%     2) Proximal left anterior descending artery: 100%     3) Mid-Distal left anterior descending artery: 0%     4) Circumflex/obtuse marginal artery:There is a  stent in the proximal to   mid vessel which is patent however the second obtuse marginal branch is   totally occluded in we can see that there is a stent previously placed   here.  There is antegrade flow into the distal obtuse marginal branches   and this suggests that this is a chronic total occlusion.     5) Ramus intermedius artery: 0%     6) Right coronary artery: There is a total occlusion of the proximal   native right coronary artery.     7)  Coronary Dominance: Right     8)The saphenous vein graft is patent to the distal right coronary artery   and there is mild disease at 40% in the native vessel after the   anastomosis.  The left internal mammary arteries patent the left anterior   descending vessel good distal runoff.     Other Result Text    This result has an attachment that is not available.  Specimen Collected: -- Last Resulted: --   Date: 01/12/18      CT Angiogram Chest    Anatomical Region Laterality Modality   -- -- Computed Tomography       Impression    Negative study.    Narrative    CT angiogram of the pulmonary arteries with and without contrast     CLINICAL HISTORY: Shortness of breath.     Spiral axial images were performed with and without intravenous contrast through the chest using our routine CTA protocol. 3-D and multiplanar reconstructed images were performed. Automated exposure control was used for dose reduction.     FINDINGS: The pulmonary artery and branches are well opacified and there are no filling defects to suggest a pulmonary embolus. The aorta is normal in caliber. No pericardial effusion or pleural effusion or pneumothorax are seen. No masses or adenopathy are seen. The lungs are clear.    Procedure Note    Tyler Caldwell MD - 08/05/2017   Formatting of this note might be different from the original.   CT angiogram of the pulmonary arteries with and without contrast     CLINICAL HISTORY: Shortness of breath.     Spiral axial images were performed with and without intravenous contrast through the chest using our routine CTA protocol. 3-D and multiplanar reconstructed images were performed. Automated exposure control was used for dose reduction.     FINDINGS: The pulmonary artery and branches are well opacified and there are no filling defects to suggest a pulmonary embolus. The aorta is normal in caliber. No pericardial effusion or pleural effusion or pneumothorax are seen. No masses or adenopathy are seen. The lungs  "are clear.     IMPRESSION:   Negative study.  Exam End: 08/05/17  2:25 PM Last Resulted: 08/05/17  2:40 PM   Received From: [facility]  Result Received: 01/06/22 11:06 AM    View Encounter             Assessment:            Nocturnal hypoxemia  -     OXYGEN FOR HOME USE    Bronchiectasis without complication  -     OXYGEN FOR HOME USE    Chronic obstructive pulmonary disease, unspecified COPD type  -     OXYGEN FOR HOME USE          Outpatient Encounter Medications as of 6/27/2022   Medication Sig Dispense Refill    albuterol (PROVENTIL) 2.5 mg /3 mL (0.083 %) nebulizer solution Take 3 mLs (2.5 mg total) by nebulization every 4 to 6 hours as needed for Wheezing or Shortness of Breath. 360 mL 11    albuterol (PROVENTIL/VENTOLIN HFA) 90 mcg/actuation inhaler Inhale 2 puffs into the lungs every 4 (four) hours as needed for Wheezing or Shortness of Breath (cough). 8.5 g 11    aspirin 81 MG Chew Take 1 tablet (81 mg total) by mouth once daily. 100 tablet 12    azelastine (ASTELIN) 137 mcg (0.1 %) nasal spray 2 sprays (274 mcg total) by Nasal route 2 (two) times daily. 30 mL 11    azithromycin (ZITHROMAX Z-JERRI) 250 MG tablet Take 2 tablets (500 mg) by mouth on day 1 followed by 1 tablet once daily on days 2-5 6 tablet 0    BD ULTRA-FINE DAVE PEN NEEDLE 32 gauge x 5/32" Ndle USE  4 TIMES DAILY WITH MEALS AND AT BEDTIME 400 each 3    blood sugar diagnostic Strp Use 1 strip 4 (four) times daily. 300 each 0    blood-glucose meter kit Use as instructed 1 each 0    cholecalciferol, vitamin D3, 5,000 unit Tab Take 5,000 Units by mouth once daily.      DOCOSAHEXANOIC ACID ORAL Take 1 capsule by mouth once daily.      docusate sodium (COLACE) 100 MG capsule Take 100 mg by mouth 2 (two) times daily.      flaxseed oil 1,000 mg Cap Take 1 capsule by mouth daily as needed.       furosemide (LASIX) 20 MG tablet Take 2 tablets (40 mg total) by mouth " once daily. 180 tablet 3    ginger root (GINGER, ZINGIBER OFFICINALIS,) 550 mg Cap Take 1 capsule by mouth.      insulin (LANTUS SOLOSTAR U-100 INSULIN) glargine 100 units/mL (3mL) SubQ pen Inject 10 Units into the skin every evening. 15 mL 0    isosorbide mononitrate (IMDUR) 60 MG 24 hr tablet Take 1 tablet by mouth twice daily 180 tablet 2    lactulose (CEPHULAC) 20 gram Pack Mix and take 1 packet (20 g total) by mouth 3 (three) times daily. (Patient taking differently: Take 20 g by mouth 3 (three) times daily as needed.) 90 packet 3    lactulose (CHRONULAC) 10 gram/15 mL solution Take 30 mL by mouth twice daily as needed for constipation. 473 mL 3    metoprolol tartrate (LOPRESSOR) 50 MG tablet Take 1 tablet by mouth twice daily 180 tablet 4    multivit-min-FA-lycopen-lutein 300-600-300 mcg Tab Take 1 tablet by mouth.      nitroGLYCERIN (NITROSTAT) 0.4 MG SL tablet Place 1 tablet (0.4 mg total) under the tongue every 5 (five) minutes as needed for Chest pain. 50 tablet 12    NOVOLOG FLEXPEN U-100 INSULIN 100 unit/mL (3 mL) InPn pen Inject 15 Units into the skin 3 (three) times daily with meals. 30 mL 0    omega 3-dha-epa-fish oil 300-1,000 mg Cap Take by mouth.      ondansetron (ZOFRAN) 4 MG tablet Take 1 tablet (4 mg total) by mouth every 8 (eight) hours as needed for Nausea. 40 tablet 11    ondansetron (ZOFRAN-ODT) 4 MG TbDL Dissolve 1 tab under the tongue every 4-6 hours as needed for nausea. 120 tablet 0    oxyCODONE (OXYCONTIN) 60 mg TR12 12 hr tablet Take 1 tablet (60 mg total) by mouth every 12 (twelve) hours. 60 tablet 0    oxyCODONE (ROXICODONE) 20 mg Tab immediate release tablet Take 1 tablet (20 mg total) by mouth every 6 (six) hours as needed (breakthrough pain). 120 tablet 0    pantoprazole (PROTONIX) 40 MG tablet Take 1 tablet (40 mg total) by mouth once daily. 30 tablet 11    polyethylene glycol (GLYCOLAX) 17 gram PwPk Take 17 g by mouth once daily. 100 each 1    pomalidomide 3 mg  Cap Take 3 mg by mouth 3 mg daily x 21 days, then off for 7 days, then repeat..      potassium chloride SA (K-DUR,KLOR-CON) 20 MEQ tablet TAKE 1 TABLET BY MOUTH ON MONDAY, WEDNESDAY AND FRIDAY. 35 tablet 3    predniSONE (DELTASONE) 20 MG tablet Take 3 tablets (60 mg/ day) by mouth for 3 days, 2 tablets (40 mg/day) for 3 days, 1 tablet (20 mg/ day) for 3 days, then (1/2 tablet )10 mg a day for 3 days. 20 tablet 0    prochlorperazine (COMPAZINE) 5 MG tablet Take 1 tablet (5 mg total) by mouth 4 (four) times daily as needed for Nausea. 40 tablet 2    ranolazine (RANEXA) 1,000 mg Tb12 Take 1 tablet (1,000 mg total) by mouth 2 (two) times daily. 60 tablet 6    rosuvastatin (CRESTOR) 40 MG Tab TAKE 1 TABLET BY MOUTH ONCE DAILY IN THE EVENING 90 tablet 0    sennosides 25 mg Tab Take 1 tablet by mouth 2 (two) times a day. 60 each 11    triamcinolone acetonide 0.1% (KENALOG) 0.1 % cream Apply topically 2 (two) times daily. For two weeks 28.4 g 1    TRUEPLUS LANCETS 33 gauge Misc USE   TO CHECK GLUCOSE SIX TIMES DAILY 100 each 11    VITAMIN B COMPLEX ORAL Take 1 capsule by mouth.       No facility-administered encounter medications on file as of 6/27/2022.     Plan:       Requested Prescriptions      No prescriptions requested or ordered in this encounter     Problem List Items Addressed This Visit     Bronchiectasis without complication    Relevant Orders    OXYGEN FOR HOME USE      Other Visit Diagnoses     Nocturnal hypoxemia    -  Primary    Relevant Orders    OXYGEN FOR HOME USE    Chronic obstructive pulmonary disease, unspecified COPD type        Relevant Orders    OXYGEN FOR HOME USE             No follow-ups on file.    MEDICAL DECISION MAKING: Moderate to high complexity.  Overall, the multiple problems listed are of moderate to high severity that may impact quality of life and activities of daily living. Side effects of medications, treatment plan as well as options and alternatives reviewed and discussed  with patient. There was counseling of patient concerning these issues.    Total time spent in counseling and coordination of care - 45  minutes of total time spent on the encounter, which includes face to face time and non-face to face time preparing to see the patient (eg, review of tests), Obtaining and/or reviewing separately obtained history, Documenting clinical information in the electronic or other health record, Independently interpreting results (not separately reported) and communicating results to the patient/family/caregiver, or Care coordination (not separately reported).    Time was used in discussion of prognosis, risks, benefits of treatment, instructions and compliance with regimen . Discussion with other physicians and/or health care providers - home health or for use of durable medical equipment (oxygen, nebulizers, CPAP, BiPAP) occurred.

## 2022-06-28 ENCOUNTER — TELEPHONE (OUTPATIENT)
Dept: PULMONOLOGY | Facility: CLINIC | Age: 77
End: 2022-06-28
Payer: MEDICARE

## 2022-06-28 NOTE — TELEPHONE ENCOUNTER
----- Message from Marilu Chris sent at 6/28/2022 11:10 AM CDT -----  Regarding: [employer]/  States he needs an order for a scooter sent to [facility] fax#  [phone]. They will need demographics, insurance, height and weight. Please call Beth [phone], ext [phone]. Thank you

## 2022-07-06 ENCOUNTER — TELEPHONE (OUTPATIENT)
Dept: INTERNAL MEDICINE | Facility: CLINIC | Age: 77
End: 2022-07-06
Payer: MEDICARE

## 2022-07-06 NOTE — TELEPHONE ENCOUNTER
----- Message from Michelle Souza sent at 7/6/2022  1:56 PM CDT -----  Regarding: DME  Contact: Myron Carrera  Ms Campos has some information for a scooter for patient: [facility]- need prescription, demographic, insurance, heigh and weight, fax [phone]. Please call her back if needed [phone] ext [phone]

## 2022-07-07 RX ORDER — OXYCODONE HYDROCHLORIDE 60 MG/1
60 TABLET, FILM COATED, EXTENDED RELEASE ORAL EVERY 12 HOURS
Qty: 60 TABLET | Refills: 0 | Status: SHIPPED | OUTPATIENT
Start: 2022-07-07

## 2022-07-07 RX ORDER — OXYCODONE HYDROCHLORIDE 20 MG/1
20 TABLET ORAL EVERY 6 HOURS PRN
Qty: 120 TABLET | Refills: 0 | Status: SHIPPED | OUTPATIENT
Start: 2022-07-07

## 2022-07-14 ENCOUNTER — TELEPHONE (OUTPATIENT)
Dept: INTERNAL MEDICINE | Facility: CLINIC | Age: 77
End: 2022-07-14

## 2022-07-14 NOTE — TELEPHONE ENCOUNTER
----- Message from Radha Estrada LPN sent at 7/14/2022 10:17 AM CDT -----  In reference to this pt, pt was not seen by  on 5/24/22. Pt no showed for his appt ,he has a rescheduled appt 7/19/22.  You can send forms to [phone]. I do not have paperwork needed.

## 2022-07-14 NOTE — TELEPHONE ENCOUNTER
Thank you for assisting with the message. After speaking with the pt's daughter. Pt did no show original appt on 5/24/2022. Pt will see provider on 7/19/2022 on 1000. Paperwork from [facility] will be faxed to clinic in [facility] once received. Again, thank you for all of your help. Have a good day.//escobarw

## 2022-07-14 NOTE — TELEPHONE ENCOUNTER
In reference to this pt, he actually did not see  on 5/24/22 pt no showed!  He is actually rescheduled to see him on 7/19. You can resend paperwork to [phone].

## 2022-07-16 ENCOUNTER — OFFICE VISIT (OUTPATIENT)
Dept: URGENT CARE | Facility: CLINIC | Age: 77
End: 2022-07-16
Payer: MEDICARE

## 2022-07-16 VITALS
WEIGHT: 140 LBS | OXYGEN SATURATION: 94 % | RESPIRATION RATE: 18 BRPM | BODY MASS INDEX: 20.04 KG/M2 | DIASTOLIC BLOOD PRESSURE: 54 MMHG | HEART RATE: 78 BPM | SYSTOLIC BLOOD PRESSURE: 107 MMHG | HEIGHT: 70 IN

## 2022-07-16 DIAGNOSIS — U07.1 COVID-19 VIRUS INFECTION: Primary | ICD-10-CM

## 2022-07-16 DIAGNOSIS — U07.1 COVID: ICD-10-CM

## 2022-07-16 DIAGNOSIS — R05.9 COUGH: ICD-10-CM

## 2022-07-16 LAB
CTP QC/QA: YES
SARS-COV-2 RDRP RESP QL NAA+PROBE: POSITIVE

## 2022-07-16 PROCEDURE — 3074F PR MOST RECENT SYSTOLIC BLOOD PRESSURE < 130 MM HG: ICD-10-PCS | Mod: CPTII,S$GLB,, | Performed by: PHYSICIAN ASSISTANT

## 2022-07-16 PROCEDURE — 1125F PR PAIN SEVERITY QUANTIFIED, PAIN PRESENT: ICD-10-PCS | Mod: CPTII,S$GLB,, | Performed by: PHYSICIAN ASSISTANT

## 2022-07-16 PROCEDURE — 99214 OFFICE O/P EST MOD 30 MIN: CPT | Mod: S$GLB,,, | Performed by: PHYSICIAN ASSISTANT

## 2022-07-16 PROCEDURE — 3078F DIAST BP <80 MM HG: CPT | Mod: CPTII,S$GLB,, | Performed by: PHYSICIAN ASSISTANT

## 2022-07-16 PROCEDURE — 3074F SYST BP LT 130 MM HG: CPT | Mod: CPTII,S$GLB,, | Performed by: PHYSICIAN ASSISTANT

## 2022-07-16 PROCEDURE — U0002 COVID-19 LAB TEST NON-CDC: HCPCS | Mod: QW,S$GLB,, | Performed by: PHYSICIAN ASSISTANT

## 2022-07-16 PROCEDURE — 99214 PR OFFICE/OUTPT VISIT, EST, LEVL IV, 30-39 MIN: ICD-10-PCS | Mod: S$GLB,,, | Performed by: PHYSICIAN ASSISTANT

## 2022-07-16 PROCEDURE — U0002: ICD-10-PCS | Mod: QW,S$GLB,, | Performed by: PHYSICIAN ASSISTANT

## 2022-07-16 PROCEDURE — 3078F PR MOST RECENT DIASTOLIC BLOOD PRESSURE < 80 MM HG: ICD-10-PCS | Mod: CPTII,S$GLB,, | Performed by: PHYSICIAN ASSISTANT

## 2022-07-16 PROCEDURE — 1160F PR REVIEW ALL MEDS BY PRESCRIBER/CLIN PHARMACIST DOCUMENTED: ICD-10-PCS | Mod: CPTII,S$GLB,, | Performed by: PHYSICIAN ASSISTANT

## 2022-07-16 PROCEDURE — 1125F AMNT PAIN NOTED PAIN PRSNT: CPT | Mod: CPTII,S$GLB,, | Performed by: PHYSICIAN ASSISTANT

## 2022-07-16 PROCEDURE — 1159F PR MEDICATION LIST DOCUMENTED IN MEDICAL RECORD: ICD-10-PCS | Mod: CPTII,S$GLB,, | Performed by: PHYSICIAN ASSISTANT

## 2022-07-16 PROCEDURE — 1160F RVW MEDS BY RX/DR IN RCRD: CPT | Mod: CPTII,S$GLB,, | Performed by: PHYSICIAN ASSISTANT

## 2022-07-16 PROCEDURE — 1159F MED LIST DOCD IN RCRD: CPT | Mod: CPTII,S$GLB,, | Performed by: PHYSICIAN ASSISTANT

## 2022-07-16 RX ORDER — BENZONATATE 100 MG/1
100 CAPSULE ORAL 3 TIMES DAILY PRN
Qty: 30 CAPSULE | Refills: 0 | Status: SHIPPED | OUTPATIENT
Start: 2022-07-16 | End: 2022-07-26

## 2022-07-16 NOTE — PROGRESS NOTES
"Subjective:       Patient ID: Familia Durbin Jr. is a 76 y.o. male.    Vitals:  height is 5' 10" (1.778 m) and weight is 63.5 kg (140 lb). His blood pressure is 107/54 (abnormal) and his pulse is 78. His respiration is 18 and oxygen saturation is 94% (abnormal).     Chief Complaint: Cough    Patient has a dry mouth for a few days with a new onset cough and nasal congestion that started 2 days ago.  Patient has been on antibiotics.  Urinating with no difficulty.  Has oxygen tank at home for night time.  Denies fever, body aches, chills.  Patient still had the same appetite but seems to be rapidly losing weight.    Cough  The current episode started in the past 7 days. The cough is productive of sputum. Associated symptoms include nasal congestion and shortness of breath. Pertinent negatives include no chest pain, chills, ear congestion, ear pain, fever, headaches, heartburn, hemoptysis, myalgias, postnasal drip, rash, rhinorrhea, sore throat, sweats, weight loss or wheezing. He has tried steroid inhaler for the symptoms. The treatment provided no relief. His past medical history is significant for bronchitis and pneumonia. There is no history of asthma, bronchiectasis, COPD, emphysema or environmental allergies.       Constitution: Negative for chills and fever.   HENT: Negative for ear pain, postnasal drip and sore throat.    Neck: neck negative.   Cardiovascular: Negative for chest pain.   Respiratory: Positive for cough and shortness of breath. Negative for bloody sputum and wheezing.    Gastrointestinal: Negative for heartburn.   Musculoskeletal: Negative for muscle ache.   Skin: Negative.  Negative for rash.   Allergic/Immunologic: Negative for environmental allergies.   Neurological: Negative for headaches.       Objective:      Physical Exam   Constitutional: He is oriented to person, place, and time. He appears well-developed. He is cooperative.  Non-toxic appearance. He does not appear ill. No distress. "   HENT:   Head: Normocephalic and atraumatic.   Ears:   Right Ear: Hearing and external ear normal.   Left Ear: Hearing and external ear normal.   Nose: Nose normal. No mucosal edema, rhinorrhea or nasal deformity. No epistaxis. Right sinus exhibits no maxillary sinus tenderness and no frontal sinus tenderness. Left sinus exhibits no maxillary sinus tenderness and no frontal sinus tenderness.   Mouth/Throat: Uvula is midline, oropharynx is clear and moist and mucous membranes are normal. No trismus in the jaw. Normal dentition. No uvula swelling. No oropharyngeal exudate, posterior oropharyngeal edema or posterior oropharyngeal erythema.   Eyes: Conjunctivae and lids are normal. No scleral icterus.   Neck: Trachea normal and phonation normal. Neck supple. No edema present. No erythema present. No neck rigidity present.   Cardiovascular: Normal rate, regular rhythm, normal heart sounds and normal pulses.   Pulmonary/Chest: Effort normal and breath sounds normal. No respiratory distress. He has no decreased breath sounds. He has no rhonchi.   Abdominal: Normal appearance.   Musculoskeletal: Normal range of motion.         General: No deformity. Normal range of motion.   Neurological: He is alert and oriented to person, place, and time. He exhibits normal muscle tone. Coordination normal.   Skin: Skin is warm, dry, intact, not diaphoretic and not pale.   Psychiatric: His speech is normal and behavior is normal. Judgment and thought content normal.   Nursing note and vitals reviewed.        Assessment:       1. COVID-19 virus infection    2. Cough          Plan:         COVID-19 virus infection    Cough  -     POCT COVID-19 Rapid Screening        8 Risk score for Covid.    Results for orders placed or performed in visit on 07/16/22   POCT COVID-19 Rapid Screening   Result Value Ref Range    POC Rapid COVID Positive (A) Negative     Acceptable Yes      *Note: Due to a large number of results and/or  encounters for the requested time period, some results have not been displayed. A complete set of results can be found in Results Review.     MAB infusion ordered today.  Discussed options with this patient.    Increase fluids.  Get plenty of rest.   Normal saline nasal wash to irrigate sinuses and for congestion/runny nose.  Cool mist humidifier/vaporizer.  Practice good handwashing.  Mucinex for cough and chest congestion.  Tylenol or Ibuprofen for fever, headache and body aches.  Warm salt water gargles for throat comfort.  Chloraseptic spray or lozenges for throat comfort.  See PCP or go to ER if symptoms worsen or fail to improve with treatment.

## 2022-07-18 ENCOUNTER — INFUSION (OUTPATIENT)
Dept: INFECTIOUS DISEASES | Facility: HOSPITAL | Age: 77
End: 2022-07-18
Attending: INTERNAL MEDICINE
Payer: MEDICARE

## 2022-07-18 VITALS
HEART RATE: 89 BPM | DIASTOLIC BLOOD PRESSURE: 56 MMHG | SYSTOLIC BLOOD PRESSURE: 146 MMHG | TEMPERATURE: 98 F | OXYGEN SATURATION: 96 % | RESPIRATION RATE: 18 BRPM

## 2022-07-18 DIAGNOSIS — U07.1 COVID-19: Primary | ICD-10-CM

## 2022-07-18 PROCEDURE — 63600175 PHARM REV CODE 636 W HCPCS: Performed by: INTERNAL MEDICINE

## 2022-07-18 PROCEDURE — M0222 HC IV INJECTION, BEBTELOVIMAB, INCL POST ADMIN MONIT: HCPCS | Performed by: INTERNAL MEDICINE

## 2022-07-18 RX ORDER — DIPHENHYDRAMINE HYDROCHLORIDE 50 MG/ML
25 INJECTION INTRAMUSCULAR; INTRAVENOUS
Status: ACTIVE | OUTPATIENT
Start: 2022-07-18 | End: 2022-07-19

## 2022-07-18 RX ORDER — ACETAMINOPHEN 325 MG/1
650 TABLET ORAL
Status: ACTIVE | OUTPATIENT
Start: 2022-07-18 | End: 2022-07-19

## 2022-07-18 RX ORDER — BEBTELOVIMAB 87.5 MG/ML
175 INJECTION, SOLUTION INTRAVENOUS
Status: COMPLETED | OUTPATIENT
Start: 2022-07-18 | End: 2022-07-18

## 2022-07-18 RX ORDER — ALBUTEROL SULFATE 90 UG/1
2 AEROSOL, METERED RESPIRATORY (INHALATION)
Status: ACTIVE | OUTPATIENT
Start: 2022-07-18 | End: 2022-07-21

## 2022-07-18 RX ORDER — ONDANSETRON 4 MG/1
4 TABLET, ORALLY DISINTEGRATING ORAL
Status: ACTIVE | OUTPATIENT
Start: 2022-07-18 | End: 2022-07-19

## 2022-07-18 RX ORDER — EPINEPHRINE 0.3 MG/.3ML
0.3 INJECTION SUBCUTANEOUS
Status: ACTIVE | OUTPATIENT
Start: 2022-07-18 | End: 2022-07-21

## 2022-07-18 RX ADMIN — BEBTELOVIMAB 175 MG: 87.5 INJECTION, SOLUTION INTRAVENOUS at 02:07

## 2023-10-22 NOTE — PROGRESS NOTES
PCP: Andrea Elkins MD    Subjective:     Chief Complaint: Diabetes - Established Patient    HISTORY OF PRESENT ILLNESS: 74 y.o.   male presenting for diabetes management visit.   Patient has previously been established with the Diabetes Management Department and was last seen by myself on 06/10/2020  Patient has had Type II diabetes since 1985.  Pertinent to decision making is the following comorbidities: HTN, HLD, CAD and Renal Insufficiency, Vision loss of Right Eye, and Multiple Myeloma  Patient has the following Diabetes complications: with diabetic nephropathy  He  has attended diabetes education in the past.     Patient's most recent A1c of 7.3% was completed 8 months ago.   Patient states since His last A1c His blood glucose levels have been both high and low throughout the day .   Patient monitors blood glucose 4 times per day and Continuously with personal CGM Dexcom.   Patient blood glucose monitoring device will be uploaded into [employer] [facility] today.  Interpretation of CGM includes an average blood glucose of 142 mg/dL with a standard deviation of 35. GMI of N/A%. Patient's time in range of 84%, time above range of 16%, and time below range of <1%.   Patients records show mixed postprandial hyperglycemia and hypoglycemia. Some episodes of overnight lows secondary to dosing with meal time insulin before bed. Euglycemia on 7/31 with lower carb intake.   Patient endorses the following diabetes related symptoms: Tingling in Lower Extremities. This is a chronic issue.   Patient is due today for the following diabetes-related health maintenance standards: Foot Exam , Eye Exam, Urine Microalbumin/creatinine ratio and A1c. Previously established with Dr. Balderas of [facility].   He denies recent hospital admissions or emergency room visits.   He voices having hypoglycemia as above.  Patient's concerns today include glycemic control. Of note, patient is Hard of Hearing and has issues with  "understanding speech. Patient does not always make it clear that he can not understand what you are saying. Patient is also poor historian and is not sure what his medications including insulin are called.   Patient medication regimen is as below.     CURRENT DM MEDICATIONS:    Lantus 10 units qhs - patient stopped    Novolog 15 units with breakfast and 15 with dinner and 6-10 units before bed    Patient has failed the following Diabetes medications:    Novolog       Labs Reviewed.       Lab Results   Component Value Date    CPEPTIDE 1.4 11/15/2016     Lab Results   Component Value Date    GLUTAMICACID 0.00 11/15/2016       Height: 5' 10" (177.8 cm)  //  Weight: 71.1 kg (156 lb 12 oz), Body mass index is 22.49 kg/m².  His blood sugar in clinic today is:    Lab Results   Component Value Date    POCGLU 123 (A) 07/31/2020       Review of Systems   Constitutional: Negative for activity change, appetite change, chills and fever.   HENT: Negative for dental problem, mouth sores, nosebleeds, sore throat and trouble swallowing.    Eyes: Negative for pain and discharge.   Respiratory: Negative for shortness of breath, wheezing and stridor.    Cardiovascular: Negative for chest pain, palpitations and leg swelling.   Gastrointestinal: Negative for abdominal pain, diarrhea, nausea and vomiting.   Endocrine: Negative for polydipsia, polyphagia and polyuria.   Genitourinary: Negative for dysuria, frequency and urgency.   Musculoskeletal: Negative for joint swelling and myalgias.   Skin: Negative for rash and wound.   Neurological: Positive for numbness. Negative for dizziness, syncope, weakness and headaches.   Psychiatric/Behavioral: Negative for behavioral problems and dysphoric mood.         Diabetes Management Status  Statin: Taking  ACE/ARB: Taking    Screening or Prevention Patient's value Goal Complete/Controlled?   HgA1C Testing and Control   Lab Results   Component Value Date    HGBA1C 7.3 (H) 11/22/2019      " Annually/Less than 8% Yes   Lipid profile : 11/22/2019 Annually Yes   LDL control Lab Results   Component Value Date    LDLCALC 67.2 11/22/2019    Annually/Less than 100 mg/dl  Yes   Nephropathy screening Lab Results   Component Value Date    MICALBCREAT 29.7 09/21/2016     Lab Results   Component Value Date    PROTEINUA 1+ (A) 09/11/2018    Annually No   Blood pressure BP Readings from Last 1 Encounters:   07/31/20 130/72    Less than 140/90 Yes   Dilated retinal exam : 03/01/2019 Annually No    Foot exam   : 10/15/2018 Annually No     Social History     Socioeconomic History    Marital status: Single     Spouse name: Not on file    Number of children: 9    Years of education: Not on file    Highest education level: Not on file   Occupational History    Not on file   Social Needs    Financial resource strain: Not on file    Food insecurity     Worry: Not on file     Inability: Not on file    Transportation needs     Medical: Not on file     Non-medical: Not on file   Tobacco Use    Smoking status: Never Smoker    Smokeless tobacco: Never Used   Substance and Sexual Activity    Alcohol use: No    Drug use: No    Sexual activity: Yes     Partners: Female   Lifestyle    Physical activity     Days per week: Not on file     Minutes per session: Not on file    Stress: Not on file   Relationships    Social connections     Talks on phone: Not on file     Gets together: Not on file     Attends Islam service: Not on file     Active member of club or organization: Not on file     Attends meetings of clubs or organizations: Not on file     Relationship status: Not on file   Other Topics Concern    Not on file   Social History Narrative    Not on file     Past Medical History:   Diagnosis Date    CAD (coronary artery disease)     tyree    Diabetes mellitus, type 2 1979    eye dr rocha    Hearing impaired     Hyperlipidemia     Hypertension     Lupus     Discoid    Multiple myeloma     Old MI  No (myocardial infarction) 2012    Renal insufficiency     Tracheostomy tube present        Objective:        Physical Exam  Constitutional:       General: He is not in acute distress.     Appearance: He is well-developed. He is not diaphoretic.   HENT:      Head: Normocephalic and atraumatic.      Right Ear: External ear normal.      Left Ear: External ear normal.      Nose: Nose normal.   Eyes:      General:         Right eye: No discharge.         Left eye: No discharge.      Pupils: Pupils are equal, round, and reactive to light.   Neck:      Musculoskeletal: Normal range of motion and neck supple.   Cardiovascular:      Rate and Rhythm: Normal rate and regular rhythm.      Pulses:           Dorsalis pedis pulses are 2+ on the right side and 2+ on the left side.        Posterior tibial pulses are 2+ on the right side and 2+ on the left side.      Heart sounds: Normal heart sounds.   Pulmonary:      Effort: Pulmonary effort is normal.      Breath sounds: Normal breath sounds.   Abdominal:      Palpations: Abdomen is soft.   Musculoskeletal: Normal range of motion.      Right foot: Normal range of motion. No deformity.      Left foot: Normal range of motion. No deformity.   Feet:      Right foot:      Protective Sensation: 6 sites tested. 6 sites sensed.      Skin integrity: Callus and dry skin present. No ulcer, blister, skin breakdown, erythema or warmth.      Left foot:      Protective Sensation: 6 sites tested. 6 sites sensed.      Skin integrity: Callus and dry skin present. No ulcer, blister, skin breakdown, erythema or warmth.   Skin:     General: Skin is warm and dry.      Capillary Refill: Capillary refill takes less than 2 seconds.   Neurological:      Mental Status: He is oriented to person, place, and time.   Psychiatric:         Behavior: Behavior normal.         Thought Content: Thought content normal.         Judgment: Judgment normal.           Assessment / Plan:     Type 2 diabetes mellitus with  hyperglycemia, with long-term current use of insulin  -     POCT Glucose, Hand-Held Device  -     Ambulatory referral/consult to Diabetes Education; Future; Expected date: 08/07/2020  -     insulin (LANTUS SOLOSTAR U-100 INSULIN) glargine 100 units/mL (3mL) SubQ pen; Inject 10 Units into the skin every evening. INJECT 15 UNITS SUBCUTANEOUSLY ONCE DAILY  Dispense: 3 mL; Refill: 11    Renal insufficiency    Long term current use of systemic steroids    Essential hypertension    Hyperlipidemia, unspecified hyperlipidemia type    Coronary artery disease of native artery of native heart with stable angina pectoris    Vision loss of right eye    Multiple myeloma not having achieved remission      Additional Plan Details:    - POCT Glucose  - Encouraged continuation of lifestyle changes including regular exercise and limiting carbohydrates to 30-45 grams per meal threes times daily and 15 grams per snack with a limit of two daily.   - Encouraged continued monitoring of blood glucose with maintenance of 4 times daily and Continuously with personal CGM Dexcom.   - Current DM Medication Regimen: Restart Lantus 10 units qhs and change Novolog to 5 units TID wm.   - Fasting Labs - today  - Follow up with CDE - Dexcom additional training  - Health Maintenance standards addressed today: Foot Exam - completed today and documented in physical exam with feedback to patient about proper diabetic foot care and findings, Eye Exam - will be completed within [facility] system and scheduled today, Urine Microalbumin / Creatinine Ratio and A1c to be scheduled today  - Nursing Visit: deferred for now.   - Follow up in 6 weeks with A1c prior.       Blakeney McKnight, PA-C [facility]    A total of 30 minutes was spent in face to face time, of which over 50 % was spent in counseling patient on disease process, complications, treatment, and side effects of medications.